# Patient Record
Sex: FEMALE | Race: WHITE | NOT HISPANIC OR LATINO | Employment: OTHER | URBAN - METROPOLITAN AREA
[De-identification: names, ages, dates, MRNs, and addresses within clinical notes are randomized per-mention and may not be internally consistent; named-entity substitution may affect disease eponyms.]

---

## 2019-09-17 ENCOUNTER — TRANSCRIBE ORDERS (OUTPATIENT)
Dept: LAB | Facility: CLINIC | Age: 64
End: 2019-09-17

## 2019-09-17 ENCOUNTER — APPOINTMENT (OUTPATIENT)
Dept: LAB | Facility: CLINIC | Age: 64
End: 2019-09-17
Payer: MEDICARE

## 2019-09-17 DIAGNOSIS — Z00.01 ENCOUNTER FOR GENERAL ADULT MEDICAL EXAMINATION WITH ABNORMAL FINDINGS: ICD-10-CM

## 2019-09-17 DIAGNOSIS — E55.9 AVITAMINOSIS D: ICD-10-CM

## 2019-09-17 DIAGNOSIS — G90.09 SPHENOPALATINE NEURALGIA: ICD-10-CM

## 2019-09-17 DIAGNOSIS — E78.5 HYPERLIPIDEMIA, UNSPECIFIED HYPERLIPIDEMIA TYPE: Primary | ICD-10-CM

## 2019-09-17 DIAGNOSIS — E78.5 HYPERLIPIDEMIA, UNSPECIFIED HYPERLIPIDEMIA TYPE: ICD-10-CM

## 2019-09-17 DIAGNOSIS — E11.65 TYPE 2 DIABETES MELLITUS WITH HYPERGLYCEMIA, UNSPECIFIED WHETHER LONG TERM INSULIN USE (HCC): ICD-10-CM

## 2019-09-17 LAB
25(OH)D3 SERPL-MCNC: 35.5 NG/ML (ref 30–100)
ALBUMIN SERPL BCP-MCNC: 4 G/DL (ref 3.5–5)
ALP SERPL-CCNC: 41 U/L (ref 46–116)
ALT SERPL W P-5'-P-CCNC: 24 U/L (ref 12–78)
ANION GAP SERPL CALCULATED.3IONS-SCNC: 6 MMOL/L (ref 4–13)
AST SERPL W P-5'-P-CCNC: 16 U/L (ref 5–45)
BASOPHILS # BLD AUTO: 0.05 THOUSANDS/ΜL (ref 0–0.1)
BASOPHILS NFR BLD AUTO: 1 % (ref 0–1)
BILIRUB SERPL-MCNC: 0.53 MG/DL (ref 0.2–1)
BUN SERPL-MCNC: 22 MG/DL (ref 5–25)
CALCIUM SERPL-MCNC: 9 MG/DL (ref 8.3–10.1)
CHLORIDE SERPL-SCNC: 101 MMOL/L (ref 100–108)
CHOLEST SERPL-MCNC: 107 MG/DL (ref 50–200)
CO2 SERPL-SCNC: 29 MMOL/L (ref 21–32)
CREAT SERPL-MCNC: 0.97 MG/DL (ref 0.6–1.3)
EOSINOPHIL # BLD AUTO: 0.52 THOUSAND/ΜL (ref 0–0.61)
EOSINOPHIL NFR BLD AUTO: 7 % (ref 0–6)
ERYTHROCYTE [DISTWIDTH] IN BLOOD BY AUTOMATED COUNT: 13.2 % (ref 11.6–15.1)
EST. AVERAGE GLUCOSE BLD GHB EST-MCNC: 114 MG/DL
GFR SERPL CREATININE-BSD FRML MDRD: 61 ML/MIN/1.73SQ M
GLUCOSE P FAST SERPL-MCNC: 94 MG/DL (ref 65–99)
HBA1C MFR BLD: 5.6 % (ref 4.2–6.3)
HCT VFR BLD AUTO: 47.2 % (ref 34.8–46.1)
HDLC SERPL-MCNC: 37 MG/DL (ref 40–60)
HGB BLD-MCNC: 15.5 G/DL (ref 11.5–15.4)
IMM GRANULOCYTES # BLD AUTO: 0.03 THOUSAND/UL (ref 0–0.2)
IMM GRANULOCYTES NFR BLD AUTO: 0 % (ref 0–2)
LDLC SERPL CALC-MCNC: 45 MG/DL (ref 0–100)
LYMPHOCYTES # BLD AUTO: 1.88 THOUSANDS/ΜL (ref 0.6–4.47)
LYMPHOCYTES NFR BLD AUTO: 25 % (ref 14–44)
MCH RBC QN AUTO: 30.9 PG (ref 26.8–34.3)
MCHC RBC AUTO-ENTMCNC: 32.8 G/DL (ref 31.4–37.4)
MCV RBC AUTO: 94 FL (ref 82–98)
MONOCYTES # BLD AUTO: 0.61 THOUSAND/ΜL (ref 0.17–1.22)
MONOCYTES NFR BLD AUTO: 8 % (ref 4–12)
NEUTROPHILS # BLD AUTO: 4.52 THOUSANDS/ΜL (ref 1.85–7.62)
NEUTS SEG NFR BLD AUTO: 59 % (ref 43–75)
NONHDLC SERPL-MCNC: 70 MG/DL
NRBC BLD AUTO-RTO: 0 /100 WBCS
PLATELET # BLD AUTO: 257 THOUSANDS/UL (ref 149–390)
PMV BLD AUTO: 11.3 FL (ref 8.9–12.7)
POTASSIUM SERPL-SCNC: 3.4 MMOL/L (ref 3.5–5.3)
PROT SERPL-MCNC: 7.4 G/DL (ref 6.4–8.2)
RBC # BLD AUTO: 5.01 MILLION/UL (ref 3.81–5.12)
SODIUM SERPL-SCNC: 136 MMOL/L (ref 136–145)
TRIGL SERPL-MCNC: 123 MG/DL
TSH SERPL DL<=0.05 MIU/L-ACNC: 1.74 UIU/ML (ref 0.36–3.74)
VIT B12 SERPL-MCNC: 637 PG/ML (ref 100–900)
WBC # BLD AUTO: 7.61 THOUSAND/UL (ref 4.31–10.16)

## 2019-09-17 PROCEDURE — 83036 HEMOGLOBIN GLYCOSYLATED A1C: CPT

## 2019-09-17 PROCEDURE — 85025 COMPLETE CBC W/AUTO DIFF WBC: CPT

## 2019-09-17 PROCEDURE — 84443 ASSAY THYROID STIM HORMONE: CPT

## 2019-09-17 PROCEDURE — 80061 LIPID PANEL: CPT

## 2019-09-17 PROCEDURE — 82306 VITAMIN D 25 HYDROXY: CPT

## 2019-09-17 PROCEDURE — 80053 COMPREHEN METABOLIC PANEL: CPT

## 2019-09-17 PROCEDURE — 36415 COLL VENOUS BLD VENIPUNCTURE: CPT

## 2019-09-17 PROCEDURE — 82607 VITAMIN B-12: CPT

## 2020-01-13 ENCOUNTER — TRANSCRIBE ORDERS (OUTPATIENT)
Dept: LAB | Facility: CLINIC | Age: 65
End: 2020-01-13

## 2020-01-13 ENCOUNTER — APPOINTMENT (OUTPATIENT)
Dept: LAB | Facility: CLINIC | Age: 65
End: 2020-01-13
Payer: MEDICARE

## 2020-01-13 DIAGNOSIS — E03.9 SEVERE HYPOTHYROIDISM: ICD-10-CM

## 2020-01-13 DIAGNOSIS — I10 HYPERTENSION, UNSPECIFIED TYPE: Primary | ICD-10-CM

## 2020-01-13 DIAGNOSIS — R53.83 FATIGUE, UNSPECIFIED TYPE: ICD-10-CM

## 2020-01-13 DIAGNOSIS — I10 HYPERTENSION, UNSPECIFIED TYPE: ICD-10-CM

## 2020-01-13 LAB
ALBUMIN SERPL BCP-MCNC: 3.8 G/DL (ref 3.5–5)
ALP SERPL-CCNC: 40 U/L (ref 46–116)
ALT SERPL W P-5'-P-CCNC: 21 U/L (ref 12–78)
ANION GAP SERPL CALCULATED.3IONS-SCNC: 6 MMOL/L (ref 4–13)
AST SERPL W P-5'-P-CCNC: 11 U/L (ref 5–45)
BASOPHILS # BLD AUTO: 0.06 THOUSANDS/ΜL (ref 0–0.1)
BASOPHILS NFR BLD AUTO: 1 % (ref 0–1)
BILIRUB SERPL-MCNC: 0.69 MG/DL (ref 0.2–1)
BUN SERPL-MCNC: 25 MG/DL (ref 5–25)
CALCIUM SERPL-MCNC: 9.3 MG/DL (ref 8.3–10.1)
CHLORIDE SERPL-SCNC: 103 MMOL/L (ref 100–108)
CO2 SERPL-SCNC: 28 MMOL/L (ref 21–32)
CREAT SERPL-MCNC: 0.98 MG/DL (ref 0.6–1.3)
EOSINOPHIL # BLD AUTO: 0.4 THOUSAND/ΜL (ref 0–0.61)
EOSINOPHIL NFR BLD AUTO: 5 % (ref 0–6)
ERYTHROCYTE [DISTWIDTH] IN BLOOD BY AUTOMATED COUNT: 13.2 % (ref 11.6–15.1)
GFR SERPL CREATININE-BSD FRML MDRD: 60 ML/MIN/1.73SQ M
GLUCOSE P FAST SERPL-MCNC: 94 MG/DL (ref 65–99)
HCT VFR BLD AUTO: 46 % (ref 34.8–46.1)
HGB BLD-MCNC: 14.9 G/DL (ref 11.5–15.4)
IMM GRANULOCYTES # BLD AUTO: 0.02 THOUSAND/UL (ref 0–0.2)
IMM GRANULOCYTES NFR BLD AUTO: 0 % (ref 0–2)
LYMPHOCYTES # BLD AUTO: 2.36 THOUSANDS/ΜL (ref 0.6–4.47)
LYMPHOCYTES NFR BLD AUTO: 31 % (ref 14–44)
MCH RBC QN AUTO: 31 PG (ref 26.8–34.3)
MCHC RBC AUTO-ENTMCNC: 32.4 G/DL (ref 31.4–37.4)
MCV RBC AUTO: 96 FL (ref 82–98)
MONOCYTES # BLD AUTO: 0.63 THOUSAND/ΜL (ref 0.17–1.22)
MONOCYTES NFR BLD AUTO: 8 % (ref 4–12)
NEUTROPHILS # BLD AUTO: 4.23 THOUSANDS/ΜL (ref 1.85–7.62)
NEUTS SEG NFR BLD AUTO: 55 % (ref 43–75)
NRBC BLD AUTO-RTO: 0 /100 WBCS
PLATELET # BLD AUTO: 229 THOUSANDS/UL (ref 149–390)
PMV BLD AUTO: 11.2 FL (ref 8.9–12.7)
POTASSIUM SERPL-SCNC: 3.7 MMOL/L (ref 3.5–5.3)
PROT SERPL-MCNC: 7.2 G/DL (ref 6.4–8.2)
RBC # BLD AUTO: 4.8 MILLION/UL (ref 3.81–5.12)
SODIUM SERPL-SCNC: 137 MMOL/L (ref 136–145)
TSH SERPL DL<=0.05 MIU/L-ACNC: 1.24 UIU/ML (ref 0.36–3.74)
WBC # BLD AUTO: 7.7 THOUSAND/UL (ref 4.31–10.16)

## 2020-01-13 PROCEDURE — 84443 ASSAY THYROID STIM HORMONE: CPT

## 2020-01-13 PROCEDURE — 85025 COMPLETE CBC W/AUTO DIFF WBC: CPT

## 2020-01-13 PROCEDURE — 36415 COLL VENOUS BLD VENIPUNCTURE: CPT

## 2020-01-13 PROCEDURE — 80053 COMPREHEN METABOLIC PANEL: CPT

## 2020-05-29 ENCOUNTER — HOSPITAL ENCOUNTER (INPATIENT)
Facility: HOSPITAL | Age: 65
LOS: 3 days | DRG: 374 | End: 2020-06-01
Attending: EMERGENCY MEDICINE | Admitting: INTERNAL MEDICINE
Payer: MEDICARE

## 2020-05-29 ENCOUNTER — APPOINTMENT (EMERGENCY)
Dept: ULTRASOUND IMAGING | Facility: HOSPITAL | Age: 65
DRG: 374 | End: 2020-05-29
Payer: MEDICARE

## 2020-05-29 ENCOUNTER — APPOINTMENT (EMERGENCY)
Dept: CT IMAGING | Facility: HOSPITAL | Age: 65
DRG: 374 | End: 2020-05-29
Payer: MEDICARE

## 2020-05-29 ENCOUNTER — TRANSCRIBE ORDERS (OUTPATIENT)
Dept: ADMINISTRATIVE | Facility: HOSPITAL | Age: 65
End: 2020-05-29

## 2020-05-29 DIAGNOSIS — K86.89 PANCREATIC MASS: ICD-10-CM

## 2020-05-29 DIAGNOSIS — K83.1 CHRONIC CHOLESTATIC JAUNDICE SYNDROME: Primary | ICD-10-CM

## 2020-05-29 DIAGNOSIS — E80.6 HYPERBILIRUBINEMIA: ICD-10-CM

## 2020-05-29 DIAGNOSIS — R74.01 TRANSAMINITIS: ICD-10-CM

## 2020-05-29 DIAGNOSIS — R17 PAINLESS JAUNDICE: Primary | ICD-10-CM

## 2020-05-29 DIAGNOSIS — K83.1 COMMON BILE DUCT (CBD) OBSTRUCTION: ICD-10-CM

## 2020-05-29 LAB
ALBUMIN SERPL BCP-MCNC: 3.4 G/DL (ref 3.5–5)
ALP SERPL-CCNC: 390 U/L (ref 46–116)
ALT SERPL W P-5'-P-CCNC: 295 U/L (ref 12–78)
AMMONIA PLAS-SCNC: 31 UMOL/L (ref 11–35)
ANION GAP SERPL CALCULATED.3IONS-SCNC: 15 MMOL/L (ref 4–13)
APTT PPP: 30 SECONDS (ref 23–37)
AST SERPL W P-5'-P-CCNC: 143 U/L (ref 5–45)
BACTERIA UR QL AUTO: ABNORMAL /HPF
BASOPHILS # BLD AUTO: 0.05 THOUSANDS/ΜL (ref 0–0.1)
BASOPHILS NFR BLD AUTO: 1 % (ref 0–1)
BILIRUB DIRECT SERPL-MCNC: 7.94 MG/DL (ref 0–0.2)
BILIRUB SERPL-MCNC: 9.4 MG/DL (ref 0.2–1)
BILIRUB UR QL STRIP: ABNORMAL
BUN SERPL-MCNC: 27 MG/DL (ref 5–25)
CALCIUM SERPL-MCNC: 9.7 MG/DL (ref 8.3–10.1)
CHLORIDE SERPL-SCNC: 97 MMOL/L (ref 100–108)
CLARITY UR: CLEAR
CO2 SERPL-SCNC: 26 MMOL/L (ref 21–32)
COLOR UR: YELLOW
CREAT SERPL-MCNC: 1.08 MG/DL (ref 0.6–1.3)
EOSINOPHIL # BLD AUTO: 0.16 THOUSAND/ΜL (ref 0–0.61)
EOSINOPHIL NFR BLD AUTO: 2 % (ref 0–6)
ERYTHROCYTE [DISTWIDTH] IN BLOOD BY AUTOMATED COUNT: 16.2 % (ref 11.6–15.1)
GFR SERPL CREATININE-BSD FRML MDRD: 53 ML/MIN/1.73SQ M
GLUCOSE SERPL-MCNC: 189 MG/DL (ref 65–140)
GLUCOSE UR STRIP-MCNC: NEGATIVE MG/DL
HCT VFR BLD AUTO: 43.3 % (ref 34.8–46.1)
HGB BLD-MCNC: 15.3 G/DL (ref 11.5–15.4)
HGB UR QL STRIP.AUTO: ABNORMAL
IMM GRANULOCYTES # BLD AUTO: 0.05 THOUSAND/UL (ref 0–0.2)
IMM GRANULOCYTES NFR BLD AUTO: 1 % (ref 0–2)
INR PPP: 0.88 (ref 0.84–1.19)
KETONES UR STRIP-MCNC: NEGATIVE MG/DL
LACTATE SERPL-SCNC: 4.4 MMOL/L (ref 0.5–2)
LEUKOCYTE ESTERASE UR QL STRIP: ABNORMAL
LIPASE SERPL-CCNC: 2744 U/L (ref 73–393)
LYMPHOCYTES # BLD AUTO: 2.01 THOUSANDS/ΜL (ref 0.6–4.47)
LYMPHOCYTES NFR BLD AUTO: 23 % (ref 14–44)
MAGNESIUM SERPL-MCNC: 1.6 MG/DL (ref 1.6–2.6)
MCH RBC QN AUTO: 31.6 PG (ref 26.8–34.3)
MCHC RBC AUTO-ENTMCNC: 35.3 G/DL (ref 31.4–37.4)
MCV RBC AUTO: 90 FL (ref 82–98)
MONOCYTES # BLD AUTO: 0.86 THOUSAND/ΜL (ref 0.17–1.22)
MONOCYTES NFR BLD AUTO: 10 % (ref 4–12)
NEUTROPHILS # BLD AUTO: 5.71 THOUSANDS/ΜL (ref 1.85–7.62)
NEUTS SEG NFR BLD AUTO: 63 % (ref 43–75)
NITRITE UR QL STRIP: NEGATIVE
NON-SQ EPI CELLS URNS QL MICRO: ABNORMAL /HPF
NRBC BLD AUTO-RTO: 0 /100 WBCS
NT-PROBNP SERPL-MCNC: 22 PG/ML
PH UR STRIP.AUTO: 6 [PH]
PLATELET # BLD AUTO: 300 THOUSANDS/UL (ref 149–390)
PMV BLD AUTO: 11.8 FL (ref 8.9–12.7)
POTASSIUM SERPL-SCNC: 3.6 MMOL/L (ref 3.5–5.3)
PROT SERPL-MCNC: 7.8 G/DL (ref 6.4–8.2)
PROT UR STRIP-MCNC: NEGATIVE MG/DL
PROTHROMBIN TIME: 11.9 SECONDS (ref 11.6–14.5)
RBC # BLD AUTO: 4.84 MILLION/UL (ref 3.81–5.12)
RBC #/AREA URNS AUTO: ABNORMAL /HPF
SARS-COV-2 RNA RESP QL NAA+PROBE: NEGATIVE
SODIUM SERPL-SCNC: 138 MMOL/L (ref 136–145)
SP GR UR STRIP.AUTO: 1.02 (ref 1–1.03)
TROPONIN I SERPL-MCNC: <0.02 NG/ML
UROBILINOGEN UR QL STRIP.AUTO: 1 E.U./DL
WBC # BLD AUTO: 8.84 THOUSAND/UL (ref 4.31–10.16)
WBC #/AREA URNS AUTO: ABNORMAL /HPF

## 2020-05-29 PROCEDURE — 80076 HEPATIC FUNCTION PANEL: CPT | Performed by: EMERGENCY MEDICINE

## 2020-05-29 PROCEDURE — 83735 ASSAY OF MAGNESIUM: CPT | Performed by: EMERGENCY MEDICINE

## 2020-05-29 PROCEDURE — 80074 ACUTE HEPATITIS PANEL: CPT | Performed by: EMERGENCY MEDICINE

## 2020-05-29 PROCEDURE — 81001 URINALYSIS AUTO W/SCOPE: CPT | Performed by: EMERGENCY MEDICINE

## 2020-05-29 PROCEDURE — 83690 ASSAY OF LIPASE: CPT | Performed by: EMERGENCY MEDICINE

## 2020-05-29 PROCEDURE — 99285 EMERGENCY DEPT VISIT HI MDM: CPT | Performed by: EMERGENCY MEDICINE

## 2020-05-29 PROCEDURE — 85610 PROTHROMBIN TIME: CPT | Performed by: EMERGENCY MEDICINE

## 2020-05-29 PROCEDURE — 83880 ASSAY OF NATRIURETIC PEPTIDE: CPT | Performed by: EMERGENCY MEDICINE

## 2020-05-29 PROCEDURE — 96361 HYDRATE IV INFUSION ADD-ON: CPT

## 2020-05-29 PROCEDURE — 36415 COLL VENOUS BLD VENIPUNCTURE: CPT | Performed by: EMERGENCY MEDICINE

## 2020-05-29 PROCEDURE — 85730 THROMBOPLASTIN TIME PARTIAL: CPT | Performed by: EMERGENCY MEDICINE

## 2020-05-29 PROCEDURE — 80048 BASIC METABOLIC PNL TOTAL CA: CPT | Performed by: EMERGENCY MEDICINE

## 2020-05-29 PROCEDURE — 96374 THER/PROPH/DIAG INJ IV PUSH: CPT

## 2020-05-29 PROCEDURE — 84484 ASSAY OF TROPONIN QUANT: CPT | Performed by: EMERGENCY MEDICINE

## 2020-05-29 PROCEDURE — 87635 SARS-COV-2 COVID-19 AMP PRB: CPT | Performed by: EMERGENCY MEDICINE

## 2020-05-29 PROCEDURE — 74177 CT ABD & PELVIS W/CONTRAST: CPT

## 2020-05-29 PROCEDURE — 82140 ASSAY OF AMMONIA: CPT | Performed by: EMERGENCY MEDICINE

## 2020-05-29 PROCEDURE — 83605 ASSAY OF LACTIC ACID: CPT | Performed by: EMERGENCY MEDICINE

## 2020-05-29 PROCEDURE — 99285 EMERGENCY DEPT VISIT HI MDM: CPT

## 2020-05-29 PROCEDURE — 76705 ECHO EXAM OF ABDOMEN: CPT

## 2020-05-29 PROCEDURE — 71260 CT THORAX DX C+: CPT

## 2020-05-29 PROCEDURE — 85025 COMPLETE CBC W/AUTO DIFF WBC: CPT | Performed by: EMERGENCY MEDICINE

## 2020-05-29 PROCEDURE — 93005 ELECTROCARDIOGRAM TRACING: CPT

## 2020-05-29 RX ORDER — SIMVASTATIN 10 MG
10 TABLET ORAL DAILY
COMMUNITY
End: 2020-06-11

## 2020-05-29 RX ORDER — GABAPENTIN 300 MG/1
300 CAPSULE ORAL 3 TIMES DAILY
COMMUNITY

## 2020-05-29 RX ORDER — TRIAMTERENE AND HYDROCHLOROTHIAZIDE 37.5; 25 MG/1; MG/1
1 CAPSULE ORAL DAILY
COMMUNITY
End: 2020-06-11

## 2020-05-29 RX ORDER — ALLOPURINOL 100 MG/1
100 TABLET ORAL DAILY
COMMUNITY
Start: 2019-02-13 | End: 2021-01-07 | Stop reason: ALTCHOICE

## 2020-05-29 RX ORDER — LORAZEPAM 2 MG/ML
0.5 INJECTION INTRAMUSCULAR ONCE
Status: COMPLETED | OUTPATIENT
Start: 2020-05-29 | End: 2020-05-29

## 2020-05-29 RX ORDER — HYDROXYZINE HYDROCHLORIDE 25 MG/1
50 TABLET, FILM COATED ORAL ONCE
Status: COMPLETED | OUTPATIENT
Start: 2020-05-29 | End: 2020-05-29

## 2020-05-29 RX ORDER — METFORMIN HYDROCHLORIDE 500 MG/1
500 TABLET, EXTENDED RELEASE ORAL 2 TIMES DAILY WITH MEALS
COMMUNITY
Start: 2019-02-13 | End: 2020-06-11

## 2020-05-29 RX ORDER — MONTELUKAST SODIUM 10 MG/1
10 TABLET ORAL
COMMUNITY
End: 2020-06-09 | Stop reason: HOSPADM

## 2020-05-29 RX ORDER — ASPIRIN 81 MG/1
81 TABLET ORAL DAILY
COMMUNITY
End: 2020-10-09

## 2020-05-29 RX ORDER — LORAZEPAM 2 MG/ML
1 INJECTION INTRAMUSCULAR ONCE
Status: COMPLETED | OUTPATIENT
Start: 2020-05-29 | End: 2020-05-29

## 2020-05-29 RX ADMIN — SODIUM CHLORIDE 500 ML: 0.9 INJECTION, SOLUTION INTRAVENOUS at 21:28

## 2020-05-29 RX ADMIN — IOHEXOL 100 ML: 350 INJECTION, SOLUTION INTRAVENOUS at 22:15

## 2020-05-29 RX ADMIN — SODIUM CHLORIDE 500 ML: 0.9 INJECTION, SOLUTION INTRAVENOUS at 20:36

## 2020-05-29 RX ADMIN — SODIUM CHLORIDE 1000 ML: 0.9 INJECTION, SOLUTION INTRAVENOUS at 23:16

## 2020-05-29 RX ADMIN — LORAZEPAM 0.5 MG: 2 INJECTION INTRAMUSCULAR; INTRAVENOUS at 21:20

## 2020-05-29 RX ADMIN — LORAZEPAM 1 MG: 2 INJECTION INTRAMUSCULAR; INTRAVENOUS at 23:16

## 2020-05-29 RX ADMIN — HYDROXYZINE HYDROCHLORIDE 50 MG: 25 TABLET ORAL at 20:21

## 2020-05-30 PROBLEM — K86.89 PANCREATIC MASS: Status: ACTIVE | Noted: 2020-05-30

## 2020-05-30 PROBLEM — R17 JAUNDICE: Status: ACTIVE | Noted: 2020-05-30

## 2020-05-30 PROBLEM — E78.5 HYPERLIPIDEMIA: Status: ACTIVE | Noted: 2018-02-14

## 2020-05-30 PROBLEM — E87.2 LACTIC ACIDOSIS: Status: ACTIVE | Noted: 2020-05-30

## 2020-05-30 PROBLEM — I10 ESSENTIAL HYPERTENSION: Status: ACTIVE | Noted: 2018-02-14

## 2020-05-30 PROBLEM — E11.9 TYPE 2 DIABETES MELLITUS WITHOUT COMPLICATION, WITHOUT LONG-TERM CURRENT USE OF INSULIN (HCC): Status: ACTIVE | Noted: 2018-02-14

## 2020-05-30 PROBLEM — E87.20 LACTIC ACIDOSIS: Status: ACTIVE | Noted: 2020-05-30

## 2020-05-30 LAB
ALBUMIN SERPL BCP-MCNC: 2.6 G/DL (ref 3.5–5)
ALP SERPL-CCNC: 295 U/L (ref 46–116)
ALT SERPL W P-5'-P-CCNC: 229 U/L (ref 12–78)
ANION GAP SERPL CALCULATED.3IONS-SCNC: 13 MMOL/L (ref 4–13)
AST SERPL W P-5'-P-CCNC: 124 U/L (ref 5–45)
ATRIAL RATE: 90 BPM
BASOPHILS # BLD AUTO: 0.05 THOUSANDS/ΜL (ref 0–0.1)
BASOPHILS NFR BLD AUTO: 1 % (ref 0–1)
BILIRUB SERPL-MCNC: 7.6 MG/DL (ref 0.2–1)
BUN SERPL-MCNC: 21 MG/DL (ref 5–25)
CALCIUM SERPL-MCNC: 8.1 MG/DL (ref 8.3–10.1)
CHLORIDE SERPL-SCNC: 105 MMOL/L (ref 100–108)
CO2 SERPL-SCNC: 23 MMOL/L (ref 21–32)
CREAT SERPL-MCNC: 0.98 MG/DL (ref 0.6–1.3)
EOSINOPHIL # BLD AUTO: 0.17 THOUSAND/ΜL (ref 0–0.61)
EOSINOPHIL NFR BLD AUTO: 2 % (ref 0–6)
ERYTHROCYTE [DISTWIDTH] IN BLOOD BY AUTOMATED COUNT: 16.7 % (ref 11.6–15.1)
GFR SERPL CREATININE-BSD FRML MDRD: 60 ML/MIN/1.73SQ M
GLUCOSE SERPL-MCNC: 100 MG/DL (ref 65–140)
GLUCOSE SERPL-MCNC: 101 MG/DL (ref 65–140)
GLUCOSE SERPL-MCNC: 134 MG/DL (ref 65–140)
GLUCOSE SERPL-MCNC: 240 MG/DL (ref 65–140)
GLUCOSE SERPL-MCNC: 84 MG/DL (ref 65–140)
HAV IGM SER QL: NORMAL
HBV CORE IGM SER QL: NORMAL
HBV SURFACE AG SER QL: NORMAL
HCT VFR BLD AUTO: 37 % (ref 34.8–46.1)
HCV AB SER QL: NORMAL
HGB BLD-MCNC: 12.7 G/DL (ref 11.5–15.4)
IMM GRANULOCYTES # BLD AUTO: 0.04 THOUSAND/UL (ref 0–0.2)
IMM GRANULOCYTES NFR BLD AUTO: 1 % (ref 0–2)
LACTATE SERPL-SCNC: 1.2 MMOL/L (ref 0.5–2)
LACTATE SERPL-SCNC: 2.8 MMOL/L (ref 0.5–2)
LACTATE SERPL-SCNC: 2.8 MMOL/L (ref 0.5–2)
LYMPHOCYTES # BLD AUTO: 2.09 THOUSANDS/ΜL (ref 0.6–4.47)
LYMPHOCYTES NFR BLD AUTO: 26 % (ref 14–44)
MCH RBC QN AUTO: 30.8 PG (ref 26.8–34.3)
MCHC RBC AUTO-ENTMCNC: 34.3 G/DL (ref 31.4–37.4)
MCV RBC AUTO: 90 FL (ref 82–98)
MONOCYTES # BLD AUTO: 0.69 THOUSAND/ΜL (ref 0.17–1.22)
MONOCYTES NFR BLD AUTO: 9 % (ref 4–12)
NEUTROPHILS # BLD AUTO: 4.92 THOUSANDS/ΜL (ref 1.85–7.62)
NEUTS SEG NFR BLD AUTO: 61 % (ref 43–75)
NRBC BLD AUTO-RTO: 0 /100 WBCS
P AXIS: 59 DEGREES
PLATELET # BLD AUTO: 254 THOUSANDS/UL (ref 149–390)
PMV BLD AUTO: 11.9 FL (ref 8.9–12.7)
POTASSIUM SERPL-SCNC: 3.4 MMOL/L (ref 3.5–5.3)
PR INTERVAL: 128 MS
PROT SERPL-MCNC: 5.9 G/DL (ref 6.4–8.2)
QRS AXIS: 43 DEGREES
QRSD INTERVAL: 68 MS
QT INTERVAL: 368 MS
QTC INTERVAL: 450 MS
RBC # BLD AUTO: 4.12 MILLION/UL (ref 3.81–5.12)
SODIUM SERPL-SCNC: 141 MMOL/L (ref 136–145)
T WAVE AXIS: 33 DEGREES
VENTRICULAR RATE: 90 BPM
WBC # BLD AUTO: 7.96 THOUSAND/UL (ref 4.31–10.16)

## 2020-05-30 PROCEDURE — 83605 ASSAY OF LACTIC ACID: CPT | Performed by: NURSE PRACTITIONER

## 2020-05-30 PROCEDURE — 99223 1ST HOSP IP/OBS HIGH 75: CPT | Performed by: PHYSICIAN ASSISTANT

## 2020-05-30 PROCEDURE — 82948 REAGENT STRIP/BLOOD GLUCOSE: CPT

## 2020-05-30 PROCEDURE — 80053 COMPREHEN METABOLIC PANEL: CPT | Performed by: PHYSICIAN ASSISTANT

## 2020-05-30 PROCEDURE — 99223 1ST HOSP IP/OBS HIGH 75: CPT | Performed by: INTERNAL MEDICINE

## 2020-05-30 PROCEDURE — 85025 COMPLETE CBC W/AUTO DIFF WBC: CPT | Performed by: PHYSICIAN ASSISTANT

## 2020-05-30 PROCEDURE — 83605 ASSAY OF LACTIC ACID: CPT | Performed by: PHYSICIAN ASSISTANT

## 2020-05-30 PROCEDURE — 93010 ELECTROCARDIOGRAM REPORT: CPT | Performed by: INTERNAL MEDICINE

## 2020-05-30 RX ORDER — SODIUM CHLORIDE 9 MG/ML
100 INJECTION, SOLUTION INTRAVENOUS CONTINUOUS
Status: DISPENSED | OUTPATIENT
Start: 2020-05-30 | End: 2020-05-31

## 2020-05-30 RX ORDER — DIPHENHYDRAMINE HCL 25 MG
25 CAPSULE ORAL EVERY 6 HOURS PRN
COMMUNITY

## 2020-05-30 RX ORDER — CHOLESTYRAMINE LIGHT 4 G/5.7G
4 POWDER, FOR SUSPENSION ORAL 2 TIMES DAILY
Status: DISCONTINUED | OUTPATIENT
Start: 2020-05-30 | End: 2020-05-31

## 2020-05-30 RX ORDER — SODIUM CHLORIDE 9 MG/ML
100 INJECTION, SOLUTION INTRAVENOUS CONTINUOUS
Status: DISPENSED | OUTPATIENT
Start: 2020-05-30 | End: 2020-05-30

## 2020-05-30 RX ORDER — ASPIRIN 81 MG/1
81 TABLET ORAL DAILY
Status: DISCONTINUED | OUTPATIENT
Start: 2020-05-30 | End: 2020-06-01 | Stop reason: HOSPADM

## 2020-05-30 RX ORDER — ONDANSETRON 2 MG/ML
4 INJECTION INTRAMUSCULAR; INTRAVENOUS EVERY 6 HOURS PRN
Status: DISCONTINUED | OUTPATIENT
Start: 2020-05-30 | End: 2020-06-01 | Stop reason: HOSPADM

## 2020-05-30 RX ORDER — GABAPENTIN 300 MG/1
300 CAPSULE ORAL 3 TIMES DAILY
Status: DISCONTINUED | OUTPATIENT
Start: 2020-05-30 | End: 2020-06-01 | Stop reason: HOSPADM

## 2020-05-30 RX ORDER — DOCUSATE SODIUM 100 MG/1
100 CAPSULE, LIQUID FILLED ORAL 2 TIMES DAILY
Status: DISCONTINUED | OUTPATIENT
Start: 2020-05-30 | End: 2020-06-01 | Stop reason: HOSPADM

## 2020-05-30 RX ORDER — HYDROXYZINE HYDROCHLORIDE 25 MG/1
25 TABLET, FILM COATED ORAL EVERY 6 HOURS PRN
Status: DISCONTINUED | OUTPATIENT
Start: 2020-05-30 | End: 2020-06-01 | Stop reason: HOSPADM

## 2020-05-30 RX ORDER — DIPHENHYDRAMINE HCL 25 MG
25 TABLET ORAL EVERY 6 HOURS PRN
Status: DISCONTINUED | OUTPATIENT
Start: 2020-05-30 | End: 2020-06-01 | Stop reason: HOSPADM

## 2020-05-30 RX ORDER — MONTELUKAST SODIUM 10 MG/1
10 TABLET ORAL
Status: DISCONTINUED | OUTPATIENT
Start: 2020-05-30 | End: 2020-06-01 | Stop reason: HOSPADM

## 2020-05-30 RX ORDER — TRIAMTERENE AND HYDROCHLOROTHIAZIDE 37.5; 25 MG/1; MG/1
1 TABLET ORAL DAILY
Status: DISCONTINUED | OUTPATIENT
Start: 2020-05-30 | End: 2020-06-01 | Stop reason: HOSPADM

## 2020-05-30 RX ORDER — ALLOPURINOL 100 MG/1
100 TABLET ORAL DAILY
Status: DISCONTINUED | OUTPATIENT
Start: 2020-05-30 | End: 2020-06-01 | Stop reason: HOSPADM

## 2020-05-30 RX ADMIN — CHOLESTYRAMINE 4 G: 4 POWDER, FOR SUSPENSION ORAL at 15:27

## 2020-05-30 RX ADMIN — INSULIN LISPRO 2 UNITS: 100 INJECTION, SOLUTION INTRAVENOUS; SUBCUTANEOUS at 17:41

## 2020-05-30 RX ADMIN — HYDROXYZINE HYDROCHLORIDE 25 MG: 25 TABLET, FILM COATED ORAL at 18:27

## 2020-05-30 RX ADMIN — DOCUSATE SODIUM 100 MG: 100 CAPSULE, LIQUID FILLED ORAL at 09:11

## 2020-05-30 RX ADMIN — SODIUM CHLORIDE 100 ML/HR: 0.9 INJECTION, SOLUTION INTRAVENOUS at 02:12

## 2020-05-30 RX ADMIN — ENOXAPARIN SODIUM 40 MG: 40 INJECTION SUBCUTANEOUS at 09:10

## 2020-05-30 RX ADMIN — SODIUM CHLORIDE 100 ML/HR: 0.9 INJECTION, SOLUTION INTRAVENOUS at 18:28

## 2020-05-30 RX ADMIN — SODIUM CHLORIDE 500 ML: 0.9 INJECTION, SOLUTION INTRAVENOUS at 02:12

## 2020-05-30 RX ADMIN — MONTELUKAST 10 MG: 10 TABLET, FILM COATED ORAL at 21:54

## 2020-05-30 RX ADMIN — TRIAMTERENE AND HYDROCHLOROTHIAZIDE 1 TABLET: 37.5; 25 TABLET ORAL at 09:11

## 2020-05-30 RX ADMIN — ASPIRIN 81 MG: 81 TABLET, COATED ORAL at 09:11

## 2020-05-30 RX ADMIN — GABAPENTIN 300 MG: 300 CAPSULE ORAL at 17:36

## 2020-05-30 RX ADMIN — GABAPENTIN 300 MG: 300 CAPSULE ORAL at 21:54

## 2020-05-30 RX ADMIN — ALLOPURINOL 100 MG: 100 TABLET ORAL at 09:11

## 2020-05-30 RX ADMIN — MONTELUKAST 10 MG: 10 TABLET, FILM COATED ORAL at 02:23

## 2020-05-30 RX ADMIN — DOCUSATE SODIUM 100 MG: 100 CAPSULE, LIQUID FILLED ORAL at 17:36

## 2020-05-30 RX ADMIN — DIPHENHYDRAMINE HCL 25 MG: 25 TABLET, COATED ORAL at 12:10

## 2020-05-30 RX ADMIN — GABAPENTIN 300 MG: 300 CAPSULE ORAL at 09:11

## 2020-05-30 RX ADMIN — DIPHENHYDRAMINE HCL 25 MG: 25 TABLET, COATED ORAL at 21:54

## 2020-05-30 RX ADMIN — HYDROXYZINE HYDROCHLORIDE 25 MG: 25 TABLET, FILM COATED ORAL at 23:57

## 2020-05-31 PROBLEM — L29.9 PRURITUS: Status: ACTIVE | Noted: 2020-05-31

## 2020-05-31 PROBLEM — E80.6 HYPERBILIRUBINEMIA: Status: ACTIVE | Noted: 2020-05-31

## 2020-05-31 PROBLEM — R74.01 TRANSAMINITIS: Status: ACTIVE | Noted: 2020-05-31

## 2020-05-31 LAB
ALBUMIN SERPL BCP-MCNC: 2.7 G/DL (ref 3.5–5)
ALP SERPL-CCNC: 330 U/L (ref 46–116)
ALT SERPL W P-5'-P-CCNC: 254 U/L (ref 12–78)
ANION GAP SERPL CALCULATED.3IONS-SCNC: 10 MMOL/L (ref 4–13)
AST SERPL W P-5'-P-CCNC: 131 U/L (ref 5–45)
BASOPHILS # BLD AUTO: 0.05 THOUSANDS/ΜL (ref 0–0.1)
BASOPHILS NFR BLD AUTO: 1 % (ref 0–1)
BILIRUB SERPL-MCNC: 10.3 MG/DL (ref 0.2–1)
BUN SERPL-MCNC: 18 MG/DL (ref 5–25)
CALCIUM SERPL-MCNC: 8.5 MG/DL (ref 8.3–10.1)
CHLORIDE SERPL-SCNC: 107 MMOL/L (ref 100–108)
CO2 SERPL-SCNC: 25 MMOL/L (ref 21–32)
CREAT SERPL-MCNC: 0.79 MG/DL (ref 0.6–1.3)
EOSINOPHIL # BLD AUTO: 0.36 THOUSAND/ΜL (ref 0–0.61)
EOSINOPHIL NFR BLD AUTO: 5 % (ref 0–6)
ERYTHROCYTE [DISTWIDTH] IN BLOOD BY AUTOMATED COUNT: 17.2 % (ref 11.6–15.1)
GFR SERPL CREATININE-BSD FRML MDRD: 78 ML/MIN/1.73SQ M
GLUCOSE SERPL-MCNC: 130 MG/DL (ref 65–140)
GLUCOSE SERPL-MCNC: 138 MG/DL (ref 65–140)
GLUCOSE SERPL-MCNC: 143 MG/DL (ref 65–140)
GLUCOSE SERPL-MCNC: 157 MG/DL (ref 65–140)
GLUCOSE SERPL-MCNC: 212 MG/DL (ref 65–140)
GLUCOSE SERPL-MCNC: 280 MG/DL (ref 65–140)
HCT VFR BLD AUTO: 37.1 % (ref 34.8–46.1)
HGB BLD-MCNC: 12.9 G/DL (ref 11.5–15.4)
IMM GRANULOCYTES # BLD AUTO: 0.04 THOUSAND/UL (ref 0–0.2)
IMM GRANULOCYTES NFR BLD AUTO: 1 % (ref 0–2)
INR PPP: 0.97 (ref 0.84–1.19)
LIPASE SERPL-CCNC: 2304 U/L (ref 73–393)
LYMPHOCYTES # BLD AUTO: 1.66 THOUSANDS/ΜL (ref 0.6–4.47)
LYMPHOCYTES NFR BLD AUTO: 22 % (ref 14–44)
MCH RBC QN AUTO: 31.6 PG (ref 26.8–34.3)
MCHC RBC AUTO-ENTMCNC: 34.8 G/DL (ref 31.4–37.4)
MCV RBC AUTO: 91 FL (ref 82–98)
MONOCYTES # BLD AUTO: 0.66 THOUSAND/ΜL (ref 0.17–1.22)
MONOCYTES NFR BLD AUTO: 9 % (ref 4–12)
NEUTROPHILS # BLD AUTO: 4.77 THOUSANDS/ΜL (ref 1.85–7.62)
NEUTS SEG NFR BLD AUTO: 62 % (ref 43–75)
NRBC BLD AUTO-RTO: 0 /100 WBCS
PLATELET # BLD AUTO: 266 THOUSANDS/UL (ref 149–390)
PMV BLD AUTO: 11.9 FL (ref 8.9–12.7)
POTASSIUM SERPL-SCNC: 3.7 MMOL/L (ref 3.5–5.3)
PROT SERPL-MCNC: 6.2 G/DL (ref 6.4–8.2)
PROTHROMBIN TIME: 12.9 SECONDS (ref 11.6–14.5)
RBC # BLD AUTO: 4.08 MILLION/UL (ref 3.81–5.12)
SODIUM SERPL-SCNC: 142 MMOL/L (ref 136–145)
WBC # BLD AUTO: 7.54 THOUSAND/UL (ref 4.31–10.16)

## 2020-05-31 PROCEDURE — 99232 SBSQ HOSP IP/OBS MODERATE 35: CPT | Performed by: INTERNAL MEDICINE

## 2020-05-31 PROCEDURE — 86301 IMMUNOASSAY TUMOR CA 19-9: CPT | Performed by: INTERNAL MEDICINE

## 2020-05-31 PROCEDURE — 82948 REAGENT STRIP/BLOOD GLUCOSE: CPT

## 2020-05-31 PROCEDURE — 80053 COMPREHEN METABOLIC PANEL: CPT | Performed by: FAMILY MEDICINE

## 2020-05-31 PROCEDURE — 85025 COMPLETE CBC W/AUTO DIFF WBC: CPT | Performed by: FAMILY MEDICINE

## 2020-05-31 PROCEDURE — 99232 SBSQ HOSP IP/OBS MODERATE 35: CPT | Performed by: FAMILY MEDICINE

## 2020-05-31 PROCEDURE — 85610 PROTHROMBIN TIME: CPT | Performed by: INTERNAL MEDICINE

## 2020-05-31 PROCEDURE — 83690 ASSAY OF LIPASE: CPT | Performed by: INTERNAL MEDICINE

## 2020-05-31 RX ORDER — SODIUM CHLORIDE 9 MG/ML
125 INJECTION, SOLUTION INTRAVENOUS CONTINUOUS
Status: DISCONTINUED | OUTPATIENT
Start: 2020-05-31 | End: 2020-06-01

## 2020-05-31 RX ADMIN — DOCUSATE SODIUM 100 MG: 100 CAPSULE, LIQUID FILLED ORAL at 08:53

## 2020-05-31 RX ADMIN — GABAPENTIN 300 MG: 300 CAPSULE ORAL at 08:53

## 2020-05-31 RX ADMIN — INSULIN LISPRO 1 UNITS: 100 INJECTION, SOLUTION INTRAVENOUS; SUBCUTANEOUS at 12:31

## 2020-05-31 RX ADMIN — MONTELUKAST 10 MG: 10 TABLET, FILM COATED ORAL at 22:02

## 2020-05-31 RX ADMIN — CHOLESTYRAMINE 4 G: 4 POWDER, FOR SUSPENSION ORAL at 08:53

## 2020-05-31 RX ADMIN — ENOXAPARIN SODIUM 40 MG: 40 INJECTION SUBCUTANEOUS at 08:53

## 2020-05-31 RX ADMIN — GABAPENTIN 300 MG: 300 CAPSULE ORAL at 20:21

## 2020-05-31 RX ADMIN — DIPHENHYDRAMINE HCL 25 MG: 25 TABLET, COATED ORAL at 20:21

## 2020-05-31 RX ADMIN — DIPHENHYDRAMINE HCL 25 MG: 25 TABLET, COATED ORAL at 10:52

## 2020-05-31 RX ADMIN — DOCUSATE SODIUM 100 MG: 100 CAPSULE, LIQUID FILLED ORAL at 17:04

## 2020-05-31 RX ADMIN — ASPIRIN 81 MG: 81 TABLET, COATED ORAL at 08:53

## 2020-05-31 RX ADMIN — GABAPENTIN 300 MG: 300 CAPSULE ORAL at 17:04

## 2020-05-31 RX ADMIN — SODIUM CHLORIDE 100 ML/HR: 0.9 INJECTION, SOLUTION INTRAVENOUS at 04:17

## 2020-05-31 RX ADMIN — ALLOPURINOL 100 MG: 100 TABLET ORAL at 08:53

## 2020-05-31 RX ADMIN — INSULIN LISPRO 2 UNITS: 100 INJECTION, SOLUTION INTRAVENOUS; SUBCUTANEOUS at 17:05

## 2020-05-31 RX ADMIN — SODIUM CHLORIDE 125 ML/HR: 0.9 INJECTION, SOLUTION INTRAVENOUS at 19:19

## 2020-05-31 RX ADMIN — HYDROXYZINE HYDROCHLORIDE 25 MG: 25 TABLET, FILM COATED ORAL at 10:52

## 2020-05-31 RX ADMIN — TRIAMTERENE AND HYDROCHLOROTHIAZIDE 1 TABLET: 37.5; 25 TABLET ORAL at 08:53

## 2020-05-31 RX ADMIN — SODIUM CHLORIDE 125 ML/HR: 0.9 INJECTION, SOLUTION INTRAVENOUS at 10:53

## 2020-05-31 RX ADMIN — HYDROXYZINE HYDROCHLORIDE 25 MG: 25 TABLET, FILM COATED ORAL at 22:01

## 2020-06-01 ENCOUNTER — APPOINTMENT (INPATIENT)
Dept: MRI IMAGING | Facility: HOSPITAL | Age: 65
DRG: 374 | End: 2020-06-01
Payer: MEDICARE

## 2020-06-01 ENCOUNTER — APPOINTMENT (INPATIENT)
Dept: PERIOP | Facility: HOSPITAL | Age: 65
DRG: 374 | End: 2020-06-01
Payer: MEDICARE

## 2020-06-01 ENCOUNTER — ANESTHESIA (INPATIENT)
Dept: PERIOP | Facility: HOSPITAL | Age: 65
DRG: 374 | End: 2020-06-01
Payer: MEDICARE

## 2020-06-01 ENCOUNTER — ANESTHESIA EVENT (INPATIENT)
Dept: PERIOP | Facility: HOSPITAL | Age: 65
DRG: 374 | End: 2020-06-01
Payer: MEDICARE

## 2020-06-01 ENCOUNTER — HOSPITAL ENCOUNTER (INPATIENT)
Facility: HOSPITAL | Age: 65
LOS: 2 days | Discharge: HOME | DRG: 435 | End: 2020-06-03
Attending: HOSPITALIST | Admitting: HOSPITALIST
Payer: MEDICARE

## 2020-06-01 ENCOUNTER — APPOINTMENT (INPATIENT)
Dept: RADIOLOGY | Facility: HOSPITAL | Age: 65
DRG: 374 | End: 2020-06-01
Payer: MEDICARE

## 2020-06-01 VITALS
DIASTOLIC BLOOD PRESSURE: 78 MMHG | WEIGHT: 119.49 LBS | SYSTOLIC BLOOD PRESSURE: 143 MMHG | OXYGEN SATURATION: 98 % | HEIGHT: 60 IN | RESPIRATION RATE: 18 BRPM | BODY MASS INDEX: 23.46 KG/M2 | TEMPERATURE: 97.9 F | HEART RATE: 80 BPM

## 2020-06-01 DIAGNOSIS — K83.1 OBSTRUCTIVE JAUNDICE: ICD-10-CM

## 2020-06-01 DIAGNOSIS — K86.89 PANCREATIC MASS: Primary | ICD-10-CM

## 2020-06-01 LAB
ALBUMIN SERPL BCP-MCNC: 2.8 G/DL (ref 3.5–5)
ALP SERPL-CCNC: 358 U/L (ref 46–116)
ALT SERPL W P-5'-P-CCNC: 276 U/L (ref 12–78)
ANION GAP SERPL CALCULATED.3IONS-SCNC: 10 MMOL/L (ref 4–13)
AST SERPL W P-5'-P-CCNC: 152 U/L (ref 5–45)
BASOPHILS # BLD AUTO: 0.05 THOUSANDS/ΜL (ref 0–0.1)
BASOPHILS NFR BLD AUTO: 1 % (ref 0–1)
BILIRUB SERPL-MCNC: 12.2 MG/DL (ref 0.2–1)
BUN SERPL-MCNC: 16 MG/DL (ref 5–25)
CALCIUM SERPL-MCNC: 9 MG/DL (ref 8.3–10.1)
CHLORIDE SERPL-SCNC: 105 MMOL/L (ref 100–108)
CO2 SERPL-SCNC: 26 MMOL/L (ref 21–32)
CREAT SERPL-MCNC: 0.81 MG/DL (ref 0.6–1.3)
EOSINOPHIL # BLD AUTO: 0.41 THOUSAND/ΜL (ref 0–0.61)
EOSINOPHIL NFR BLD AUTO: 5 % (ref 0–6)
ERYTHROCYTE [DISTWIDTH] IN BLOOD BY AUTOMATED COUNT: 17.8 % (ref 11.6–15.1)
EST. AVERAGE GLUCOSE BLD GHB EST-MCNC: 114 MG/DL
GFR SERPL CREATININE-BSD FRML MDRD: 75 ML/MIN/1.73SQ M
GLUCOSE SERPL-MCNC: 147 MG/DL (ref 65–140)
GLUCOSE SERPL-MCNC: 149 MG/DL (ref 65–140)
GLUCOSE SERPL-MCNC: 161 MG/DL (ref 65–140)
GLUCOSE SERPL-MCNC: 165 MG/DL (ref 65–140)
GLUCOSE SERPL-MCNC: 212 MG/DL (ref 65–140)
GLUCOSE SERPL-MCNC: 329 MG/DL (ref 65–140)
GLUCOSE SERPL-MCNC: 372 MG/DL (ref 65–140)
HBA1C MFR BLD: 5.6 %
HCT VFR BLD AUTO: 37.6 % (ref 34.8–46.1)
HGB BLD-MCNC: 13 G/DL (ref 11.5–15.4)
IMM GRANULOCYTES # BLD AUTO: 0.06 THOUSAND/UL (ref 0–0.2)
IMM GRANULOCYTES NFR BLD AUTO: 1 % (ref 0–2)
INR PPP: 0.99 (ref 0.84–1.19)
LYMPHOCYTES # BLD AUTO: 1.74 THOUSANDS/ΜL (ref 0.6–4.47)
LYMPHOCYTES NFR BLD AUTO: 21 % (ref 14–44)
MCH RBC QN AUTO: 30.8 PG (ref 26.8–34.3)
MCHC RBC AUTO-ENTMCNC: 34.6 G/DL (ref 31.4–37.4)
MCV RBC AUTO: 89 FL (ref 82–98)
MONOCYTES # BLD AUTO: 0.73 THOUSAND/ΜL (ref 0.17–1.22)
MONOCYTES NFR BLD AUTO: 9 % (ref 4–12)
NEUTROPHILS # BLD AUTO: 5.34 THOUSANDS/ΜL (ref 1.85–7.62)
NEUTS SEG NFR BLD AUTO: 63 % (ref 43–75)
NRBC BLD AUTO-RTO: 0 /100 WBCS
PLATELET # BLD AUTO: 299 THOUSANDS/UL (ref 149–390)
PMV BLD AUTO: 12.1 FL (ref 8.9–12.7)
POTASSIUM SERPL-SCNC: 3.6 MMOL/L (ref 3.5–5.3)
PROT SERPL-MCNC: 6.4 G/DL (ref 6.4–8.2)
PROTHROMBIN TIME: 13.1 SECONDS (ref 11.6–14.5)
RBC # BLD AUTO: 4.22 MILLION/UL (ref 3.81–5.12)
SODIUM SERPL-SCNC: 141 MMOL/L (ref 136–145)
WBC # BLD AUTO: 8.33 THOUSAND/UL (ref 4.31–10.16)

## 2020-06-01 PROCEDURE — 82948 REAGENT STRIP/BLOOD GLUCOSE: CPT

## 2020-06-01 PROCEDURE — 85610 PROTHROMBIN TIME: CPT | Performed by: INTERNAL MEDICINE

## 2020-06-01 PROCEDURE — 74183 MRI ABD W/O CNTR FLWD CNTR: CPT

## 2020-06-01 PROCEDURE — 99223 1ST HOSP IP/OBS HIGH 75: CPT | Performed by: INTERNAL MEDICINE

## 2020-06-01 PROCEDURE — C1769 GUIDE WIRE: HCPCS

## 2020-06-01 PROCEDURE — A9585 GADOBUTROL INJECTION: HCPCS | Performed by: FAMILY MEDICINE

## 2020-06-01 PROCEDURE — 85610 PROTHROMBIN TIME: CPT | Performed by: FAMILY MEDICINE

## 2020-06-01 PROCEDURE — 85025 COMPLETE CBC W/AUTO DIFF WBC: CPT | Performed by: FAMILY MEDICINE

## 2020-06-01 PROCEDURE — 76377 3D RENDER W/INTRP POSTPROCES: CPT

## 2020-06-01 PROCEDURE — 74328 X-RAY BILE DUCT ENDOSCOPY: CPT

## 2020-06-01 PROCEDURE — 99239 HOSP IP/OBS DSCHRG MGMT >30: CPT | Performed by: FAMILY MEDICINE

## 2020-06-01 PROCEDURE — 80053 COMPREHEN METABOLIC PANEL: CPT | Performed by: FAMILY MEDICINE

## 2020-06-01 PROCEDURE — 83036 HEMOGLOBIN GLYCOSYLATED A1C: CPT | Performed by: INTERNAL MEDICINE

## 2020-06-01 PROCEDURE — 43260 ERCP W/SPECIMEN COLLECTION: CPT | Performed by: INTERNAL MEDICINE

## 2020-06-01 PROCEDURE — 0DJ08ZZ INSPECTION OF UPPER INTESTINAL TRACT, VIA NATURAL OR ARTIFICIAL OPENING ENDOSCOPIC: ICD-10-PCS | Performed by: INTERNAL MEDICINE

## 2020-06-01 RX ORDER — METOCLOPRAMIDE HYDROCHLORIDE 5 MG/ML
10 INJECTION INTRAMUSCULAR; INTRAVENOUS ONCE AS NEEDED
Status: DISCONTINUED | OUTPATIENT
Start: 2020-06-01 | End: 2020-06-01 | Stop reason: HOSPADM

## 2020-06-01 RX ORDER — SUCCINYLCHOLINE/SOD CL,ISO/PF 100 MG/5ML
SYRINGE (ML) INTRAVENOUS AS NEEDED
Status: DISCONTINUED | OUTPATIENT
Start: 2020-06-01 | End: 2020-06-01 | Stop reason: SURG

## 2020-06-01 RX ORDER — MONTELUKAST SODIUM 10 MG/1
10 TABLET ORAL
Status: DISCONTINUED | OUTPATIENT
Start: 2020-06-01 | End: 2020-06-03 | Stop reason: HOSPADM

## 2020-06-01 RX ORDER — DOCUSATE SODIUM 100 MG/1
100 CAPSULE, LIQUID FILLED ORAL 2 TIMES DAILY
Status: DISCONTINUED | OUTPATIENT
Start: 2020-06-01 | End: 2020-06-03 | Stop reason: HOSPADM

## 2020-06-01 RX ORDER — SODIUM CHLORIDE, SODIUM LACTATE, POTASSIUM CHLORIDE, CALCIUM CHLORIDE 600; 310; 30; 20 MG/100ML; MG/100ML; MG/100ML; MG/100ML
50 INJECTION, SOLUTION INTRAVENOUS CONTINUOUS
Status: DISCONTINUED | OUTPATIENT
Start: 2020-06-01 | End: 2020-06-01 | Stop reason: HOSPADM

## 2020-06-01 RX ORDER — ALLOPURINOL 100 MG/1
100 TABLET ORAL DAILY
Status: DISCONTINUED | OUTPATIENT
Start: 2020-06-02 | End: 2020-06-03 | Stop reason: HOSPADM

## 2020-06-01 RX ORDER — LIDOCAINE HYDROCHLORIDE 10 MG/ML
INJECTION, SOLUTION EPIDURAL; INFILTRATION; INTRACAUDAL; PERINEURAL AS NEEDED
Status: DISCONTINUED | OUTPATIENT
Start: 2020-06-01 | End: 2020-06-01 | Stop reason: SURG

## 2020-06-01 RX ORDER — ONDANSETRON 2 MG/ML
4 INJECTION INTRAMUSCULAR; INTRAVENOUS EVERY 6 HOURS PRN
Status: DISCONTINUED | OUTPATIENT
Start: 2020-06-01 | End: 2020-06-03 | Stop reason: HOSPADM

## 2020-06-01 RX ORDER — FENTANYL CITRATE 50 UG/ML
INJECTION, SOLUTION INTRAMUSCULAR; INTRAVENOUS AS NEEDED
Status: DISCONTINUED | OUTPATIENT
Start: 2020-06-01 | End: 2020-06-01 | Stop reason: SURG

## 2020-06-01 RX ORDER — HYDROXYZINE HYDROCHLORIDE 10 MG/1
10 TABLET, FILM COATED ORAL EVERY 8 HOURS PRN
Status: DISCONTINUED | OUTPATIENT
Start: 2020-06-01 | End: 2020-06-03 | Stop reason: HOSPADM

## 2020-06-01 RX ORDER — TRIAMTERENE AND HYDROCHLOROTHIAZIDE 37.5; 25 MG/1; MG/1
1 TABLET ORAL DAILY
Status: DISCONTINUED | OUTPATIENT
Start: 2020-06-02 | End: 2020-06-03 | Stop reason: HOSPADM

## 2020-06-01 RX ORDER — MIDAZOLAM HYDROCHLORIDE 2 MG/2ML
INJECTION, SOLUTION INTRAMUSCULAR; INTRAVENOUS AS NEEDED
Status: DISCONTINUED | OUTPATIENT
Start: 2020-06-01 | End: 2020-06-01 | Stop reason: SURG

## 2020-06-01 RX ORDER — DIPHENHYDRAMINE HCL 25 MG
25 TABLET ORAL EVERY 6 HOURS PRN
Status: DISCONTINUED | OUTPATIENT
Start: 2020-06-01 | End: 2020-06-03 | Stop reason: HOSPADM

## 2020-06-01 RX ORDER — MAGNESIUM HYDROXIDE/ALUMINUM HYDROXICE/SIMETHICONE 120; 1200; 1200 MG/30ML; MG/30ML; MG/30ML
30 SUSPENSION ORAL EVERY 6 HOURS PRN
Status: DISCONTINUED | OUTPATIENT
Start: 2020-06-01 | End: 2020-06-03 | Stop reason: HOSPADM

## 2020-06-01 RX ORDER — GABAPENTIN 300 MG/1
300 CAPSULE ORAL 3 TIMES DAILY
Status: DISCONTINUED | OUTPATIENT
Start: 2020-06-01 | End: 2020-06-03 | Stop reason: HOSPADM

## 2020-06-01 RX ORDER — FENTANYL CITRATE/PF 50 MCG/ML
25 SYRINGE (ML) INJECTION
Status: DISCONTINUED | OUTPATIENT
Start: 2020-06-01 | End: 2020-06-01 | Stop reason: HOSPADM

## 2020-06-01 RX ORDER — ONDANSETRON 2 MG/ML
INJECTION INTRAMUSCULAR; INTRAVENOUS AS NEEDED
Status: DISCONTINUED | OUTPATIENT
Start: 2020-06-01 | End: 2020-06-01 | Stop reason: SURG

## 2020-06-01 RX ORDER — LORAZEPAM 2 MG/ML
0.5 INJECTION INTRAMUSCULAR ONCE
Status: DISCONTINUED | OUTPATIENT
Start: 2020-06-01 | End: 2020-06-01 | Stop reason: HOSPADM

## 2020-06-01 RX ORDER — PROMETHAZINE HYDROCHLORIDE 25 MG/ML
12.5 INJECTION, SOLUTION INTRAMUSCULAR; INTRAVENOUS ONCE AS NEEDED
Status: DISCONTINUED | OUTPATIENT
Start: 2020-06-01 | End: 2020-06-01 | Stop reason: HOSPADM

## 2020-06-01 RX ORDER — SODIUM CHLORIDE, SODIUM LACTATE, POTASSIUM CHLORIDE, CALCIUM CHLORIDE 600; 310; 30; 20 MG/100ML; MG/100ML; MG/100ML; MG/100ML
INJECTION, SOLUTION INTRAVENOUS CONTINUOUS PRN
Status: DISCONTINUED | OUTPATIENT
Start: 2020-06-01 | End: 2020-06-01 | Stop reason: SURG

## 2020-06-01 RX ORDER — ONDANSETRON 2 MG/ML
4 INJECTION INTRAMUSCULAR; INTRAVENOUS ONCE AS NEEDED
Status: COMPLETED | OUTPATIENT
Start: 2020-06-01 | End: 2020-06-01

## 2020-06-01 RX ORDER — ACETAMINOPHEN 325 MG/1
650 TABLET ORAL EVERY 6 HOURS PRN
Status: DISCONTINUED | OUTPATIENT
Start: 2020-06-01 | End: 2020-06-03 | Stop reason: HOSPADM

## 2020-06-01 RX ORDER — DEXAMETHASONE SODIUM PHOSPHATE 10 MG/ML
INJECTION, SOLUTION INTRAMUSCULAR; INTRAVENOUS AS NEEDED
Status: DISCONTINUED | OUTPATIENT
Start: 2020-06-01 | End: 2020-06-01 | Stop reason: SURG

## 2020-06-01 RX ORDER — PRAVASTATIN SODIUM 20 MG
20 TABLET ORAL
Status: DISCONTINUED | OUTPATIENT
Start: 2020-06-01 | End: 2020-06-03 | Stop reason: HOSPADM

## 2020-06-01 RX ORDER — PROPOFOL 10 MG/ML
INJECTION, EMULSION INTRAVENOUS AS NEEDED
Status: DISCONTINUED | OUTPATIENT
Start: 2020-06-01 | End: 2020-06-01 | Stop reason: SURG

## 2020-06-01 RX ORDER — ALBUTEROL SULFATE 2.5 MG/3ML
2.5 SOLUTION RESPIRATORY (INHALATION) ONCE AS NEEDED
Status: DISCONTINUED | OUTPATIENT
Start: 2020-06-01 | End: 2020-06-01 | Stop reason: HOSPADM

## 2020-06-01 RX ORDER — HYDROMORPHONE HCL/PF 1 MG/ML
0.4 SYRINGE (ML) INJECTION
Status: DISCONTINUED | OUTPATIENT
Start: 2020-06-01 | End: 2020-06-01 | Stop reason: HOSPADM

## 2020-06-01 RX ADMIN — HYDROXYZINE HYDROCHLORIDE 25 MG: 25 TABLET, FILM COATED ORAL at 05:53

## 2020-06-01 RX ADMIN — DEXAMETHASONE SODIUM PHOSPHATE 4 MG: 10 INJECTION, SOLUTION INTRAMUSCULAR; INTRAVENOUS at 12:26

## 2020-06-01 RX ADMIN — INDOMETHACIN 100 MG: 50 SUPPOSITORY RECTAL at 12:31

## 2020-06-01 RX ADMIN — SODIUM CHLORIDE 125 ML/HR: 0.9 INJECTION, SOLUTION INTRAVENOUS at 03:12

## 2020-06-01 RX ADMIN — PHENYLEPHRINE HYDROCHLORIDE 100 MCG: 10 INJECTION INTRAVENOUS at 12:26

## 2020-06-01 RX ADMIN — PHENYLEPHRINE HYDROCHLORIDE 100 MCG: 10 INJECTION INTRAVENOUS at 12:42

## 2020-06-01 RX ADMIN — ONDANSETRON 4 MG: 2 INJECTION INTRAMUSCULAR; INTRAVENOUS at 13:45

## 2020-06-01 RX ADMIN — DIPHENHYDRAMINE HCL 25 MG: 25 TABLET ORAL at 21:51

## 2020-06-01 RX ADMIN — MONTELUKAST 10 MG: 10 TABLET, FILM COATED ORAL at 21:51

## 2020-06-01 RX ADMIN — GLUCAGON HYDROCHLORIDE 0.5 MG: KIT at 12:32

## 2020-06-01 RX ADMIN — GABAPENTIN 300 MG: 300 CAPSULE ORAL at 09:51

## 2020-06-01 RX ADMIN — LIDOCAINE HYDROCHLORIDE 50 MG: 10 INJECTION, SOLUTION EPIDURAL; INFILTRATION; INTRACAUDAL; PERINEURAL at 12:15

## 2020-06-01 RX ADMIN — PROPOFOL 160 MG: 10 INJECTION, EMULSION INTRAVENOUS at 12:15

## 2020-06-01 RX ADMIN — GADOBUTROL 5 ML: 604.72 INJECTION INTRAVENOUS at 07:52

## 2020-06-01 RX ADMIN — Medication 100 MG: at 12:15

## 2020-06-01 RX ADMIN — HYDROXYZINE HYDROCHLORIDE 10 MG: 10 TABLET, FILM COATED ORAL at 21:04

## 2020-06-01 RX ADMIN — HYDROXYZINE HYDROCHLORIDE 25 MG: 25 TABLET, FILM COATED ORAL at 14:15

## 2020-06-01 RX ADMIN — GLUCAGON HYDROCHLORIDE 0.5 MG: KIT at 12:45

## 2020-06-01 RX ADMIN — DIPHENHYDRAMINE HCL 25 MG: 25 TABLET, COATED ORAL at 03:16

## 2020-06-01 RX ADMIN — ONDANSETRON 4 MG: 2 INJECTION INTRAMUSCULAR; INTRAVENOUS at 12:51

## 2020-06-01 RX ADMIN — GABAPENTIN 300 MG: 300 CAPSULE ORAL at 21:51

## 2020-06-01 RX ADMIN — TRIAMTERENE AND HYDROCHLOROTHIAZIDE 1 TABLET: 37.5; 25 TABLET ORAL at 09:46

## 2020-06-01 RX ADMIN — SODIUM CHLORIDE, SODIUM LACTATE, POTASSIUM CHLORIDE, AND CALCIUM CHLORIDE: .6; .31; .03; .02 INJECTION, SOLUTION INTRAVENOUS at 12:13

## 2020-06-01 RX ADMIN — INSULIN HUMAN 5 UNITS: 100 INJECTION, SOLUTION PARENTERAL at 13:40

## 2020-06-01 RX ADMIN — SODIUM CHLORIDE, SODIUM LACTATE, POTASSIUM CHLORIDE, AND CALCIUM CHLORIDE 50 ML/HR: .6; .31; .03; .02 INJECTION, SOLUTION INTRAVENOUS at 14:16

## 2020-06-01 RX ADMIN — DIPHENHYDRAMINE HCL 25 MG: 25 TABLET, COATED ORAL at 14:15

## 2020-06-01 RX ADMIN — ALLOPURINOL 100 MG: 100 TABLET ORAL at 09:51

## 2020-06-01 RX ADMIN — FENTANYL CITRATE 100 MCG: 50 INJECTION, SOLUTION INTRAMUSCULAR; INTRAVENOUS at 12:15

## 2020-06-01 RX ADMIN — MIDAZOLAM HYDROCHLORIDE 2 MG: 1 INJECTION, SOLUTION INTRAMUSCULAR; INTRAVENOUS at 12:13

## 2020-06-01 RX ADMIN — PRAVASTATIN SODIUM 20 MG: 20 TABLET ORAL at 21:51

## 2020-06-02 ENCOUNTER — APPOINTMENT (INPATIENT)
Dept: RADIOLOGY | Facility: HOSPITAL | Age: 65
DRG: 435 | End: 2020-06-02
Payer: MEDICARE

## 2020-06-02 ENCOUNTER — APPOINTMENT (INPATIENT)
Dept: GASTROENTEROLOGY | Facility: HOSPITAL | Age: 65
DRG: 435 | End: 2020-06-02
Attending: INTERNAL MEDICINE
Payer: MEDICARE

## 2020-06-02 ENCOUNTER — ANESTHESIA (INPATIENT)
Dept: GASTROENTEROLOGY | Facility: HOSPITAL | Age: 65
DRG: 435 | End: 2020-06-02
Payer: MEDICARE

## 2020-06-02 ENCOUNTER — ANESTHESIA EVENT (INPATIENT)
Dept: GASTROENTEROLOGY | Facility: HOSPITAL | Age: 65
DRG: 435 | End: 2020-06-02
Payer: MEDICARE

## 2020-06-02 LAB
ALBUMIN SERPL BCP-MCNC: 2.8 G/DL (ref 3.5–5)
ALP SERPL-CCNC: 349 U/L (ref 46–116)
ALT SERPL W P-5'-P-CCNC: 251 U/L (ref 12–78)
ANION GAP SERPL CALCULATED.3IONS-SCNC: 6 MMOL/L (ref 4–13)
AST SERPL W P-5'-P-CCNC: 91 U/L (ref 5–45)
BASOPHILS # BLD AUTO: 0.03 THOUSANDS/ΜL (ref 0–0.1)
BASOPHILS NFR BLD AUTO: 0 % (ref 0–1)
BILIRUB SERPL-MCNC: 6.38 MG/DL (ref 0.2–1)
BUN SERPL-MCNC: 22 MG/DL (ref 5–25)
CALCIUM SERPL-MCNC: 9.1 MG/DL (ref 8.3–10.1)
CANCER AG19-9 SERPL-ACNC: 51 U/ML (ref 0–35)
CHLORIDE SERPL-SCNC: 105 MMOL/L (ref 100–108)
CO2 SERPL-SCNC: 25 MMOL/L (ref 21–32)
CREAT SERPL-MCNC: 0.97 MG/DL (ref 0.6–1.3)
EOSINOPHIL # BLD AUTO: 0.02 THOUSAND/ΜL (ref 0–0.61)
EOSINOPHIL NFR BLD AUTO: 0 % (ref 0–6)
ERYTHROCYTE [DISTWIDTH] IN BLOOD BY AUTOMATED COUNT: 18.1 % (ref 11.6–15.1)
GFR SERPL CREATININE-BSD FRML MDRD: 61 ML/MIN/1.73SQ M
GLUCOSE SERPL-MCNC: 122 MG/DL (ref 65–140)
GLUCOSE SERPL-MCNC: 145 MG/DL (ref 65–140)
GLUCOSE SERPL-MCNC: 182 MG/DL (ref 65–140)
GLUCOSE SERPL-MCNC: 207 MG/DL (ref 65–140)
GLUCOSE SERPL-MCNC: 340 MG/DL (ref 65–140)
GLUCOSE SERPL-MCNC: 413 MG/DL (ref 65–140)
HCT VFR BLD AUTO: 36.1 % (ref 34.8–46.1)
HGB BLD-MCNC: 12.8 G/DL (ref 11.5–15.4)
IMM GRANULOCYTES # BLD AUTO: 0.09 THOUSAND/UL (ref 0–0.2)
IMM GRANULOCYTES NFR BLD AUTO: 1 % (ref 0–2)
INR PPP: 1.07 (ref 0.84–1.19)
LYMPHOCYTES # BLD AUTO: 1.58 THOUSANDS/ΜL (ref 0.6–4.47)
LYMPHOCYTES NFR BLD AUTO: 12 % (ref 14–44)
MCH RBC QN AUTO: 31.2 PG (ref 26.8–34.3)
MCHC RBC AUTO-ENTMCNC: 35.5 G/DL (ref 31.4–37.4)
MCV RBC AUTO: 88 FL (ref 82–98)
MONOCYTES # BLD AUTO: 0.89 THOUSAND/ΜL (ref 0.17–1.22)
MONOCYTES NFR BLD AUTO: 7 % (ref 4–12)
NEUTROPHILS # BLD AUTO: 11.14 THOUSANDS/ΜL (ref 1.85–7.62)
NEUTS SEG NFR BLD AUTO: 80 % (ref 43–75)
NRBC BLD AUTO-RTO: 0 /100 WBCS
PLATELET # BLD AUTO: 326 THOUSANDS/UL (ref 149–390)
PMV BLD AUTO: 12.5 FL (ref 8.9–12.7)
POTASSIUM SERPL-SCNC: 3.8 MMOL/L (ref 3.5–5.3)
PROT SERPL-MCNC: 6.6 G/DL (ref 6.4–8.2)
PROTHROMBIN TIME: 13.5 SECONDS (ref 11.6–14.5)
RBC # BLD AUTO: 4.1 MILLION/UL (ref 3.81–5.12)
SODIUM SERPL-SCNC: 136 MMOL/L (ref 136–145)
WBC # BLD AUTO: 13.75 THOUSAND/UL (ref 4.31–10.16)

## 2020-06-02 PROCEDURE — 88172 CYTP DX EVAL FNA 1ST EA SITE: CPT | Performed by: PATHOLOGY

## 2020-06-02 PROCEDURE — 99232 SBSQ HOSP IP/OBS MODERATE 35: CPT | Performed by: INTERNAL MEDICINE

## 2020-06-02 PROCEDURE — C1874 STENT, COATED/COV W/DEL SYS: HCPCS

## 2020-06-02 PROCEDURE — 0FBG8ZX EXCISION OF PANCREAS, VIA NATURAL OR ARTIFICIAL OPENING ENDOSCOPIC, DIAGNOSTIC: ICD-10-PCS | Performed by: INTERNAL MEDICINE

## 2020-06-02 PROCEDURE — 0FJD8ZZ INSPECTION OF PANCREATIC DUCT, VIA NATURAL OR ARTIFICIAL OPENING ENDOSCOPIC: ICD-10-PCS | Performed by: INTERNAL MEDICINE

## 2020-06-02 PROCEDURE — NC001 PR NO CHARGE: Performed by: INTERNAL MEDICINE

## 2020-06-02 PROCEDURE — C1769 GUIDE WIRE: HCPCS

## 2020-06-02 PROCEDURE — 85025 COMPLETE CBC W/AUTO DIFF WBC: CPT | Performed by: INTERNAL MEDICINE

## 2020-06-02 PROCEDURE — 0F798DZ DILATION OF COMMON BILE DUCT WITH INTRALUMINAL DEVICE, VIA NATURAL OR ARTIFICIAL OPENING ENDOSCOPIC: ICD-10-PCS | Performed by: INTERNAL MEDICINE

## 2020-06-02 PROCEDURE — 88305 TISSUE EXAM BY PATHOLOGIST: CPT | Performed by: PATHOLOGY

## 2020-06-02 PROCEDURE — 74328 X-RAY BILE DUCT ENDOSCOPY: CPT

## 2020-06-02 PROCEDURE — C2617 STENT, NON-COR, TEM W/O DEL: HCPCS

## 2020-06-02 PROCEDURE — 88173 CYTOPATH EVAL FNA REPORT: CPT | Performed by: PATHOLOGY

## 2020-06-02 PROCEDURE — 80053 COMPREHEN METABOLIC PANEL: CPT | Performed by: INTERNAL MEDICINE

## 2020-06-02 PROCEDURE — 82948 REAGENT STRIP/BLOOD GLUCOSE: CPT

## 2020-06-02 PROCEDURE — 43238 EGD US FINE NEEDLE BX/ASPIR: CPT | Performed by: INTERNAL MEDICINE

## 2020-06-02 PROCEDURE — 43274 ERCP DUCT STENT PLACEMENT: CPT | Performed by: INTERNAL MEDICINE

## 2020-06-02 RX ORDER — SUCCINYLCHOLINE/SOD CL,ISO/PF 100 MG/5ML
SYRINGE (ML) INTRAVENOUS AS NEEDED
Status: DISCONTINUED | OUTPATIENT
Start: 2020-06-02 | End: 2020-06-02 | Stop reason: SURG

## 2020-06-02 RX ORDER — LIDOCAINE HYDROCHLORIDE 10 MG/ML
INJECTION, SOLUTION EPIDURAL; INFILTRATION; INTRACAUDAL; PERINEURAL AS NEEDED
Status: DISCONTINUED | OUTPATIENT
Start: 2020-06-02 | End: 2020-06-02 | Stop reason: SURG

## 2020-06-02 RX ORDER — PROPOFOL 10 MG/ML
INJECTION, EMULSION INTRAVENOUS AS NEEDED
Status: DISCONTINUED | OUTPATIENT
Start: 2020-06-02 | End: 2020-06-02 | Stop reason: SURG

## 2020-06-02 RX ORDER — FENTANYL CITRATE/PF 50 MCG/ML
25 SYRINGE (ML) INJECTION
Status: DISCONTINUED | OUTPATIENT
Start: 2020-06-02 | End: 2020-06-02

## 2020-06-02 RX ORDER — FENTANYL CITRATE 50 UG/ML
INJECTION, SOLUTION INTRAMUSCULAR; INTRAVENOUS AS NEEDED
Status: DISCONTINUED | OUTPATIENT
Start: 2020-06-02 | End: 2020-06-02 | Stop reason: SURG

## 2020-06-02 RX ORDER — DEXAMETHASONE SODIUM PHOSPHATE 10 MG/ML
INJECTION, SOLUTION INTRAMUSCULAR; INTRAVENOUS AS NEEDED
Status: DISCONTINUED | OUTPATIENT
Start: 2020-06-02 | End: 2020-06-02 | Stop reason: SURG

## 2020-06-02 RX ORDER — SODIUM CHLORIDE 9 MG/ML
50 INJECTION, SOLUTION INTRAVENOUS CONTINUOUS
Status: DISCONTINUED | OUTPATIENT
Start: 2020-06-02 | End: 2020-06-03 | Stop reason: HOSPADM

## 2020-06-02 RX ORDER — ONDANSETRON 2 MG/ML
INJECTION INTRAMUSCULAR; INTRAVENOUS AS NEEDED
Status: DISCONTINUED | OUTPATIENT
Start: 2020-06-02 | End: 2020-06-02 | Stop reason: SURG

## 2020-06-02 RX ORDER — ONDANSETRON 2 MG/ML
4 INJECTION INTRAMUSCULAR; INTRAVENOUS EVERY 4 HOURS PRN
Status: DISCONTINUED | OUTPATIENT
Start: 2020-06-02 | End: 2020-06-02

## 2020-06-02 RX ORDER — SODIUM CHLORIDE, SODIUM LACTATE, POTASSIUM CHLORIDE, CALCIUM CHLORIDE 600; 310; 30; 20 MG/100ML; MG/100ML; MG/100ML; MG/100ML
INJECTION, SOLUTION INTRAVENOUS CONTINUOUS PRN
Status: DISCONTINUED | OUTPATIENT
Start: 2020-06-02 | End: 2020-06-02 | Stop reason: SURG

## 2020-06-02 RX ADMIN — FENTANYL CITRATE 50 MCG: 50 INJECTION, SOLUTION INTRAMUSCULAR; INTRAVENOUS at 16:08

## 2020-06-02 RX ADMIN — INSULIN LISPRO 4 UNITS: 100 INJECTION, SOLUTION INTRAVENOUS; SUBCUTANEOUS at 22:31

## 2020-06-02 RX ADMIN — PHENYLEPHRINE HYDROCHLORIDE 100 MCG: 10 INJECTION INTRAVENOUS at 15:49

## 2020-06-02 RX ADMIN — DOCUSATE SODIUM 100 MG: 100 CAPSULE, LIQUID FILLED ORAL at 09:08

## 2020-06-02 RX ADMIN — TRIAMTERENE AND HYDROCHLOROTHIAZIDE 1 TABLET: 37.5; 25 TABLET ORAL at 09:09

## 2020-06-02 RX ADMIN — GABAPENTIN 300 MG: 300 CAPSULE ORAL at 09:08

## 2020-06-02 RX ADMIN — HYDROXYZINE HYDROCHLORIDE 10 MG: 10 TABLET, FILM COATED ORAL at 12:24

## 2020-06-02 RX ADMIN — Medication 100 MG: at 15:38

## 2020-06-02 RX ADMIN — PROPOFOL 200 MG: 10 INJECTION, EMULSION INTRAVENOUS at 15:38

## 2020-06-02 RX ADMIN — FENTANYL CITRATE 50 MCG: 50 INJECTION, SOLUTION INTRAMUSCULAR; INTRAVENOUS at 15:36

## 2020-06-02 RX ADMIN — SITAGLIPTIN 100 MG: 100 TABLET, FILM COATED ORAL at 09:09

## 2020-06-02 RX ADMIN — HYDROXYZINE HYDROCHLORIDE 10 MG: 10 TABLET, FILM COATED ORAL at 05:34

## 2020-06-02 RX ADMIN — GABAPENTIN 300 MG: 300 CAPSULE ORAL at 21:31

## 2020-06-02 RX ADMIN — IOHEXOL 11 ML: 240 INJECTION, SOLUTION INTRATHECAL; INTRAVASCULAR; INTRAVENOUS; ORAL at 17:25

## 2020-06-02 RX ADMIN — DIPHENHYDRAMINE HCL 25 MG: 25 TABLET ORAL at 09:08

## 2020-06-02 RX ADMIN — PHENYLEPHRINE HYDROCHLORIDE 200 MCG: 10 INJECTION INTRAVENOUS at 15:57

## 2020-06-02 RX ADMIN — HYDROXYZINE HYDROCHLORIDE 10 MG: 10 TABLET, FILM COATED ORAL at 22:34

## 2020-06-02 RX ADMIN — LIDOCAINE HYDROCHLORIDE 100 MG: 10 INJECTION, SOLUTION EPIDURAL; INFILTRATION; INTRACAUDAL; PERINEURAL at 15:38

## 2020-06-02 RX ADMIN — DIPHENHYDRAMINE HCL 25 MG: 25 TABLET ORAL at 21:31

## 2020-06-02 RX ADMIN — MONTELUKAST 10 MG: 10 TABLET, FILM COATED ORAL at 21:31

## 2020-06-02 RX ADMIN — SODIUM CHLORIDE, SODIUM LACTATE, POTASSIUM CHLORIDE, AND CALCIUM CHLORIDE: .6; .31; .03; .02 INJECTION, SOLUTION INTRAVENOUS at 15:35

## 2020-06-02 RX ADMIN — PHENYLEPHRINE HYDROCHLORIDE 200 MCG: 10 INJECTION INTRAVENOUS at 15:43

## 2020-06-02 RX ADMIN — PHENYLEPHRINE HYDROCHLORIDE 40 MCG/MIN: 10 INJECTION INTRAVENOUS at 16:04

## 2020-06-02 RX ADMIN — ALLOPURINOL 100 MG: 100 TABLET ORAL at 09:08

## 2020-06-02 RX ADMIN — DEXAMETHASONE SODIUM PHOSPHATE 10 MG: 10 INJECTION, SOLUTION INTRAMUSCULAR; INTRAVENOUS at 15:38

## 2020-06-02 RX ADMIN — ONDANSETRON 4 MG: 2 INJECTION INTRAMUSCULAR; INTRAVENOUS at 15:38

## 2020-06-03 ENCOUNTER — TELEPHONE (OUTPATIENT)
Dept: SURGICAL ONCOLOGY | Facility: CLINIC | Age: 65
End: 2020-06-03

## 2020-06-03 ENCOUNTER — TELEPHONE (OUTPATIENT)
Dept: OTHER | Facility: HOSPITAL | Age: 65
End: 2020-06-03

## 2020-06-03 VITALS
SYSTOLIC BLOOD PRESSURE: 119 MMHG | DIASTOLIC BLOOD PRESSURE: 80 MMHG | OXYGEN SATURATION: 98 % | BODY MASS INDEX: 23.29 KG/M2 | WEIGHT: 118.61 LBS | HEART RATE: 70 BPM | HEIGHT: 60 IN | RESPIRATION RATE: 14 BRPM | TEMPERATURE: 97.5 F

## 2020-06-03 DIAGNOSIS — K86.89 PANCREATIC MASS: Primary | ICD-10-CM

## 2020-06-03 LAB
ALBUMIN SERPL BCP-MCNC: 3 G/DL (ref 3.5–5)
ALP SERPL-CCNC: 383 U/L (ref 46–116)
ALT SERPL W P-5'-P-CCNC: 372 U/L (ref 12–78)
ANION GAP SERPL CALCULATED.3IONS-SCNC: 7 MMOL/L (ref 4–13)
AST SERPL W P-5'-P-CCNC: 162 U/L (ref 5–45)
BASOPHILS # BLD AUTO: 0.03 THOUSANDS/ΜL (ref 0–0.1)
BASOPHILS NFR BLD AUTO: 0 % (ref 0–1)
BILIRUB DIRECT SERPL-MCNC: 3.8 MG/DL (ref 0–0.2)
BILIRUB SERPL-MCNC: 5.07 MG/DL (ref 0.2–1)
BUN SERPL-MCNC: 25 MG/DL (ref 5–25)
CALCIUM SERPL-MCNC: 10 MG/DL (ref 8.3–10.1)
CHLORIDE SERPL-SCNC: 102 MMOL/L (ref 100–108)
CO2 SERPL-SCNC: 26 MMOL/L (ref 21–32)
CREAT SERPL-MCNC: 1.13 MG/DL (ref 0.6–1.3)
EOSINOPHIL # BLD AUTO: 0 THOUSAND/ΜL (ref 0–0.61)
EOSINOPHIL NFR BLD AUTO: 0 % (ref 0–6)
ERYTHROCYTE [DISTWIDTH] IN BLOOD BY AUTOMATED COUNT: 18.8 % (ref 11.6–15.1)
GFR SERPL CREATININE-BSD FRML MDRD: 50 ML/MIN/1.73SQ M
GLUCOSE SERPL-MCNC: 252 MG/DL (ref 65–140)
GLUCOSE SERPL-MCNC: 263 MG/DL (ref 65–140)
GLUCOSE SERPL-MCNC: 276 MG/DL (ref 65–140)
GLUCOSE SERPL-MCNC: 344 MG/DL (ref 65–140)
GLUCOSE SERPL-MCNC: 345 MG/DL (ref 65–140)
HCT VFR BLD AUTO: 39.6 % (ref 34.8–46.1)
HGB BLD-MCNC: 13.4 G/DL (ref 11.5–15.4)
IMM GRANULOCYTES # BLD AUTO: 0.12 THOUSAND/UL (ref 0–0.2)
IMM GRANULOCYTES NFR BLD AUTO: 1 % (ref 0–2)
LYMPHOCYTES # BLD AUTO: 1.07 THOUSANDS/ΜL (ref 0.6–4.47)
LYMPHOCYTES NFR BLD AUTO: 7 % (ref 14–44)
MCH RBC QN AUTO: 31.3 PG (ref 26.8–34.3)
MCHC RBC AUTO-ENTMCNC: 33.8 G/DL (ref 31.4–37.4)
MCV RBC AUTO: 93 FL (ref 82–98)
MONOCYTES # BLD AUTO: 0.77 THOUSAND/ΜL (ref 0.17–1.22)
MONOCYTES NFR BLD AUTO: 5 % (ref 4–12)
NEUTROPHILS # BLD AUTO: 12.93 THOUSANDS/ΜL (ref 1.85–7.62)
NEUTS SEG NFR BLD AUTO: 87 % (ref 43–75)
NRBC BLD AUTO-RTO: 0 /100 WBCS
PLATELET # BLD AUTO: 329 THOUSANDS/UL (ref 149–390)
PMV BLD AUTO: 12.4 FL (ref 8.9–12.7)
POTASSIUM SERPL-SCNC: 4.4 MMOL/L (ref 3.5–5.3)
PROT SERPL-MCNC: 7 G/DL (ref 6.4–8.2)
RBC # BLD AUTO: 4.28 MILLION/UL (ref 3.81–5.12)
SODIUM SERPL-SCNC: 135 MMOL/L (ref 136–145)
WBC # BLD AUTO: 14.92 THOUSAND/UL (ref 4.31–10.16)

## 2020-06-03 PROCEDURE — 99239 HOSP IP/OBS DSCHRG MGMT >30: CPT | Performed by: INTERNAL MEDICINE

## 2020-06-03 PROCEDURE — 82948 REAGENT STRIP/BLOOD GLUCOSE: CPT

## 2020-06-03 PROCEDURE — 80048 BASIC METABOLIC PNL TOTAL CA: CPT | Performed by: INTERNAL MEDICINE

## 2020-06-03 PROCEDURE — NC001 PR NO CHARGE: Performed by: INTERNAL MEDICINE

## 2020-06-03 PROCEDURE — 80076 HEPATIC FUNCTION PANEL: CPT | Performed by: INTERNAL MEDICINE

## 2020-06-03 PROCEDURE — 99232 SBSQ HOSP IP/OBS MODERATE 35: CPT | Performed by: INTERNAL MEDICINE

## 2020-06-03 PROCEDURE — 85025 COMPLETE CBC W/AUTO DIFF WBC: CPT | Performed by: INTERNAL MEDICINE

## 2020-06-03 RX ORDER — HYDROXYZINE HYDROCHLORIDE 10 MG/1
10 TABLET, FILM COATED ORAL EVERY 8 HOURS PRN
Qty: 30 TABLET | Refills: 0 | Status: SHIPPED | OUTPATIENT
Start: 2020-06-03 | End: 2022-03-23

## 2020-06-03 RX ORDER — PRAVASTATIN SODIUM 20 MG
20 TABLET ORAL
Qty: 30 TABLET | Refills: 0 | Status: SHIPPED | OUTPATIENT
Start: 2020-06-03 | End: 2020-06-11

## 2020-06-03 RX ADMIN — SITAGLIPTIN 100 MG: 100 TABLET, FILM COATED ORAL at 08:24

## 2020-06-03 RX ADMIN — GABAPENTIN 300 MG: 300 CAPSULE ORAL at 08:24

## 2020-06-03 RX ADMIN — ALLOPURINOL 100 MG: 100 TABLET ORAL at 08:24

## 2020-06-03 RX ADMIN — INSULIN LISPRO 4 UNITS: 100 INJECTION, SOLUTION INTRAVENOUS; SUBCUTANEOUS at 11:57

## 2020-06-03 RX ADMIN — ACETAMINOPHEN 650 MG: 325 TABLET ORAL at 12:41

## 2020-06-03 RX ADMIN — INSULIN LISPRO 2 UNITS: 100 INJECTION, SOLUTION INTRAVENOUS; SUBCUTANEOUS at 08:26

## 2020-06-03 RX ADMIN — TRIAMTERENE AND HYDROCHLOROTHIAZIDE 1 TABLET: 37.5; 25 TABLET ORAL at 08:24

## 2020-06-03 RX ADMIN — INSULIN LISPRO 3 UNITS: 100 INJECTION, SOLUTION INTRAVENOUS; SUBCUTANEOUS at 03:02

## 2020-06-03 RX ADMIN — DOCUSATE SODIUM 100 MG: 100 CAPSULE, LIQUID FILLED ORAL at 08:24

## 2020-06-03 RX ADMIN — DIPHENHYDRAMINE HCL 25 MG: 25 TABLET ORAL at 08:24

## 2020-06-04 ENCOUNTER — TELEPHONE (OUTPATIENT)
Dept: GASTROENTEROLOGY | Facility: AMBULARY SURGERY CENTER | Age: 65
End: 2020-06-04

## 2020-06-04 ENCOUNTER — TELEPHONE (OUTPATIENT)
Dept: SURGICAL ONCOLOGY | Facility: CLINIC | Age: 65
End: 2020-06-04

## 2020-06-04 ENCOUNTER — APPOINTMENT (OUTPATIENT)
Dept: LAB | Facility: CLINIC | Age: 65
End: 2020-06-04
Payer: MEDICARE

## 2020-06-04 DIAGNOSIS — K86.89 PANCREATIC MASS: ICD-10-CM

## 2020-06-04 DIAGNOSIS — K83.1 OBSTRUCTIVE JAUNDICE: ICD-10-CM

## 2020-06-04 LAB
ALBUMIN SERPL BCP-MCNC: 3.3 G/DL (ref 3.5–5)
ALP SERPL-CCNC: 329 U/L (ref 46–116)
ALT SERPL W P-5'-P-CCNC: 236 U/L (ref 12–78)
AST SERPL W P-5'-P-CCNC: 47 U/L (ref 5–45)
BILIRUB DIRECT SERPL-MCNC: 3.66 MG/DL (ref 0–0.2)
BILIRUB SERPL-MCNC: 5.02 MG/DL (ref 0.2–1)
PROT SERPL-MCNC: 7.9 G/DL (ref 6.4–8.2)

## 2020-06-04 PROCEDURE — 36415 COLL VENOUS BLD VENIPUNCTURE: CPT

## 2020-06-04 PROCEDURE — 80076 HEPATIC FUNCTION PANEL: CPT

## 2020-06-08 PROBLEM — C25.9 PANCREATIC ADENOCARCINOMA (HCC): Status: ACTIVE | Noted: 2020-06-08

## 2020-06-09 ENCOUNTER — CONSULT (OUTPATIENT)
Dept: SURGICAL ONCOLOGY | Facility: CLINIC | Age: 65
End: 2020-06-09
Payer: MEDICARE

## 2020-06-09 ENCOUNTER — PATIENT OUTREACH (OUTPATIENT)
Dept: HEMATOLOGY ONCOLOGY | Facility: CLINIC | Age: 65
End: 2020-06-09

## 2020-06-09 VITALS
HEIGHT: 60 IN | TEMPERATURE: 98.4 F | BODY MASS INDEX: 21.99 KG/M2 | HEART RATE: 112 BPM | RESPIRATION RATE: 16 BRPM | SYSTOLIC BLOOD PRESSURE: 114 MMHG | DIASTOLIC BLOOD PRESSURE: 78 MMHG | WEIGHT: 112 LBS

## 2020-06-09 DIAGNOSIS — C25.9 PANCREATIC ADENOCARCINOMA (HCC): Primary | ICD-10-CM

## 2020-06-09 DIAGNOSIS — K86.89 PANCREATIC MASS: ICD-10-CM

## 2020-06-09 PROCEDURE — 1124F ACP DISCUSS-NO DSCNMKR DOCD: CPT | Performed by: SURGERY

## 2020-06-09 PROCEDURE — 99203 OFFICE O/P NEW LOW 30 MIN: CPT | Performed by: SURGERY

## 2020-06-10 ENCOUNTER — TELEPHONE (OUTPATIENT)
Dept: SURGICAL ONCOLOGY | Facility: CLINIC | Age: 65
End: 2020-06-10

## 2020-06-16 ENCOUNTER — PATIENT OUTREACH (OUTPATIENT)
Dept: HEMATOLOGY ONCOLOGY | Facility: CLINIC | Age: 65
End: 2020-06-16

## 2020-06-16 ENCOUNTER — APPOINTMENT (OUTPATIENT)
Dept: LAB | Facility: CLINIC | Age: 65
End: 2020-06-16
Payer: MEDICARE

## 2020-06-16 DIAGNOSIS — C25.9 PANCREATIC ADENOCARCINOMA (HCC): Primary | ICD-10-CM

## 2020-06-16 DIAGNOSIS — C25.9 PANCREATIC ADENOCARCINOMA (HCC): ICD-10-CM

## 2020-06-16 LAB
ALBUMIN SERPL BCP-MCNC: 3.3 G/DL (ref 3.5–5)
ALP SERPL-CCNC: 103 U/L (ref 46–116)
ALT SERPL W P-5'-P-CCNC: 41 U/L (ref 12–78)
ANION GAP SERPL CALCULATED.3IONS-SCNC: 4 MMOL/L (ref 4–13)
AST SERPL W P-5'-P-CCNC: 15 U/L (ref 5–45)
BASOPHILS # BLD AUTO: 0.07 THOUSANDS/ΜL (ref 0–0.1)
BASOPHILS NFR BLD AUTO: 1 % (ref 0–1)
BILIRUB SERPL-MCNC: 1 MG/DL (ref 0.2–1)
BUN SERPL-MCNC: 14 MG/DL (ref 5–25)
CALCIUM SERPL-MCNC: 9.5 MG/DL (ref 8.3–10.1)
CHLORIDE SERPL-SCNC: 107 MMOL/L (ref 100–108)
CO2 SERPL-SCNC: 29 MMOL/L (ref 21–32)
CREAT SERPL-MCNC: 0.79 MG/DL (ref 0.6–1.3)
EOSINOPHIL # BLD AUTO: 0.3 THOUSAND/ΜL (ref 0–0.61)
EOSINOPHIL NFR BLD AUTO: 3 % (ref 0–6)
ERYTHROCYTE [DISTWIDTH] IN BLOOD BY AUTOMATED COUNT: 15.1 % (ref 11.6–15.1)
GFR SERPL CREATININE-BSD FRML MDRD: 78 ML/MIN/1.73SQ M
GLUCOSE P FAST SERPL-MCNC: 216 MG/DL (ref 65–99)
HCT VFR BLD AUTO: 35.9 % (ref 34.8–46.1)
HGB BLD-MCNC: 11.5 G/DL (ref 11.5–15.4)
IMM GRANULOCYTES # BLD AUTO: 0.11 THOUSAND/UL (ref 0–0.2)
IMM GRANULOCYTES NFR BLD AUTO: 1 % (ref 0–2)
LYMPHOCYTES # BLD AUTO: 2.29 THOUSANDS/ΜL (ref 0.6–4.47)
LYMPHOCYTES NFR BLD AUTO: 19 % (ref 14–44)
MCH RBC QN AUTO: 31.6 PG (ref 26.8–34.3)
MCHC RBC AUTO-ENTMCNC: 32 G/DL (ref 31.4–37.4)
MCV RBC AUTO: 99 FL (ref 82–98)
MONOCYTES # BLD AUTO: 0.81 THOUSAND/ΜL (ref 0.17–1.22)
MONOCYTES NFR BLD AUTO: 7 % (ref 4–12)
NEUTROPHILS # BLD AUTO: 8.43 THOUSANDS/ΜL (ref 1.85–7.62)
NEUTS SEG NFR BLD AUTO: 69 % (ref 43–75)
NRBC BLD AUTO-RTO: 0 /100 WBCS
PLATELET # BLD AUTO: 451 THOUSANDS/UL (ref 149–390)
PMV BLD AUTO: 11.6 FL (ref 8.9–12.7)
POTASSIUM SERPL-SCNC: 3.9 MMOL/L (ref 3.5–5.3)
PROT SERPL-MCNC: 7.2 G/DL (ref 6.4–8.2)
RBC # BLD AUTO: 3.64 MILLION/UL (ref 3.81–5.12)
SODIUM SERPL-SCNC: 140 MMOL/L (ref 136–145)
WBC # BLD AUTO: 12.01 THOUSAND/UL (ref 4.31–10.16)

## 2020-06-16 PROCEDURE — 86301 IMMUNOASSAY TUMOR CA 19-9: CPT

## 2020-06-16 PROCEDURE — 85025 COMPLETE CBC W/AUTO DIFF WBC: CPT

## 2020-06-16 PROCEDURE — 80053 COMPREHEN METABOLIC PANEL: CPT

## 2020-06-16 PROCEDURE — 36415 COLL VENOUS BLD VENIPUNCTURE: CPT

## 2020-06-17 LAB — CANCER AG19-9 SERPL-ACNC: 107 U/ML (ref 0–35)

## 2020-06-18 ENCOUNTER — TELEPHONE (OUTPATIENT)
Dept: HEMATOLOGY ONCOLOGY | Facility: CLINIC | Age: 65
End: 2020-06-18

## 2020-06-18 ENCOUNTER — CONSULT (OUTPATIENT)
Dept: HEMATOLOGY ONCOLOGY | Facility: CLINIC | Age: 65
End: 2020-06-18
Payer: MEDICARE

## 2020-06-18 ENCOUNTER — DOCUMENTATION (OUTPATIENT)
Dept: HEMATOLOGY ONCOLOGY | Facility: CLINIC | Age: 65
End: 2020-06-18

## 2020-06-18 VITALS
BODY MASS INDEX: 23.39 KG/M2 | WEIGHT: 116 LBS | RESPIRATION RATE: 18 BRPM | HEIGHT: 59 IN | OXYGEN SATURATION: 98 % | TEMPERATURE: 98.8 F | HEART RATE: 90 BPM | DIASTOLIC BLOOD PRESSURE: 82 MMHG | SYSTOLIC BLOOD PRESSURE: 152 MMHG

## 2020-06-18 DIAGNOSIS — C25.9 PANCREATIC ADENOCARCINOMA (HCC): ICD-10-CM

## 2020-06-18 DIAGNOSIS — C25.9 PANCREATIC ADENOCARCINOMA (HCC): Primary | ICD-10-CM

## 2020-06-18 PROBLEM — Z95.828 PORT-A-CATH IN PLACE: Status: ACTIVE | Noted: 2020-06-18

## 2020-06-18 PROCEDURE — U0003 INFECTIOUS AGENT DETECTION BY NUCLEIC ACID (DNA OR RNA); SEVERE ACUTE RESPIRATORY SYNDROME CORONAVIRUS 2 (SARS-COV-2) (CORONAVIRUS DISEASE [COVID-19]), AMPLIFIED PROBE TECHNIQUE, MAKING USE OF HIGH THROUGHPUT TECHNOLOGIES AS DESCRIBED BY CMS-2020-01-R: HCPCS | Performed by: OBSTETRICS & GYNECOLOGY

## 2020-06-18 PROCEDURE — 99205 OFFICE O/P NEW HI 60 MIN: CPT | Performed by: INTERNAL MEDICINE

## 2020-06-18 RX ORDER — ATROPINE SULFATE 1 MG/ML
0.25 INJECTION, SOLUTION INTRAMUSCULAR; INTRAVENOUS; SUBCUTANEOUS ONCE AS NEEDED
Status: CANCELLED | OUTPATIENT
Start: 2020-06-30

## 2020-06-18 RX ORDER — DEXTROSE MONOHYDRATE 50 MG/ML
20 INJECTION, SOLUTION INTRAVENOUS ONCE
Status: CANCELLED | OUTPATIENT
Start: 2020-06-30

## 2020-06-18 RX ORDER — ATROPINE SULFATE 1 MG/ML
0.25 INJECTION, SOLUTION INTRAMUSCULAR; INTRAVENOUS; SUBCUTANEOUS ONCE
Status: CANCELLED | OUTPATIENT
Start: 2020-06-30

## 2020-06-18 RX ORDER — FLUOROURACIL 50 MG/ML
400 INJECTION, SOLUTION INTRAVENOUS ONCE
Status: CANCELLED | OUTPATIENT
Start: 2020-06-30

## 2020-06-18 RX ORDER — SODIUM CHLORIDE 9 MG/ML
20 INJECTION, SOLUTION INTRAVENOUS ONCE AS NEEDED
Status: CANCELLED | OUTPATIENT
Start: 2020-06-30

## 2020-06-18 RX ORDER — PROCHLORPERAZINE MALEATE 10 MG
10 TABLET ORAL EVERY 6 HOURS PRN
Qty: 45 TABLET | Refills: 3 | Status: SHIPPED | OUTPATIENT
Start: 2020-06-18 | End: 2021-02-23 | Stop reason: SDUPTHER

## 2020-06-19 DIAGNOSIS — C25.9 PANCREATIC ADENOCARCINOMA (HCC): Primary | ICD-10-CM

## 2020-06-19 LAB — SARS-COV-2 RNA SPEC QL NAA+PROBE: NOT DETECTED

## 2020-06-22 ENCOUNTER — ANESTHESIA EVENT (OUTPATIENT)
Dept: PERIOP | Facility: HOSPITAL | Age: 65
End: 2020-06-22
Payer: MEDICARE

## 2020-06-22 DIAGNOSIS — C25.9 PANCREATIC ADENOCARCINOMA (HCC): Primary | ICD-10-CM

## 2020-06-22 RX ORDER — TRAMADOL HYDROCHLORIDE 50 MG/1
50 TABLET ORAL EVERY 6 HOURS PRN
Qty: 10 TABLET | Refills: 0 | Status: SHIPPED | OUTPATIENT
Start: 2020-06-22 | End: 2020-07-09

## 2020-06-23 ENCOUNTER — ANESTHESIA (OUTPATIENT)
Dept: PERIOP | Facility: HOSPITAL | Age: 65
End: 2020-06-23
Payer: MEDICARE

## 2020-06-23 ENCOUNTER — HOSPITAL ENCOUNTER (OUTPATIENT)
Dept: RADIOLOGY | Facility: HOSPITAL | Age: 65
Setting detail: OUTPATIENT SURGERY
Discharge: HOME | End: 2020-06-23
Payer: MEDICARE

## 2020-06-23 ENCOUNTER — APPOINTMENT (OUTPATIENT)
Dept: RADIOLOGY | Facility: HOSPITAL | Age: 65
End: 2020-06-23
Payer: MEDICARE

## 2020-06-23 ENCOUNTER — HOSPITAL ENCOUNTER (OUTPATIENT)
Facility: HOSPITAL | Age: 65
Setting detail: OUTPATIENT SURGERY
Discharge: HOME | End: 2020-06-23
Attending: SURGERY | Admitting: SURGERY
Payer: MEDICARE

## 2020-06-23 VITALS
RESPIRATION RATE: 18 BRPM | TEMPERATURE: 97.6 F | SYSTOLIC BLOOD PRESSURE: 117 MMHG | HEIGHT: 59 IN | WEIGHT: 116 LBS | OXYGEN SATURATION: 98 % | BODY MASS INDEX: 23.39 KG/M2 | DIASTOLIC BLOOD PRESSURE: 62 MMHG | HEART RATE: 81 BPM

## 2020-06-23 DIAGNOSIS — C25.9 PANCREATIC ADENOCARCINOMA (HCC): ICD-10-CM

## 2020-06-23 LAB
GLUCOSE SERPL-MCNC: 107 MG/DL (ref 65–140)
GLUCOSE SERPL-MCNC: 113 MG/DL (ref 65–140)

## 2020-06-23 PROCEDURE — 77001 FLUOROGUIDE FOR VEIN DEVICE: CPT | Performed by: SURGERY

## 2020-06-23 PROCEDURE — 77001 FLUOROGUIDE FOR VEIN DEVICE: CPT

## 2020-06-23 PROCEDURE — C1788 PORT, INDWELLING, IMP: HCPCS | Performed by: SURGERY

## 2020-06-23 PROCEDURE — 82948 REAGENT STRIP/BLOOD GLUCOSE: CPT

## 2020-06-23 PROCEDURE — 36561 INSERT TUNNELED CV CATH: CPT | Performed by: SURGERY

## 2020-06-23 DEVICE — 8F PLASTIC DIGNITY® MID-SIZED CT PORT W/SILICONE FILLED SUTURE HOLES W/ATTACHABLE CHRONOFLEX® POLYURETHANE CATHETER
Type: IMPLANTABLE DEVICE | Site: CHEST | Status: FUNCTIONAL
Brand: DIGNITY® MID-SIZED CT PORT

## 2020-06-23 RX ORDER — ALBUTEROL SULFATE 2.5 MG/3ML
2.5 SOLUTION RESPIRATORY (INHALATION) ONCE AS NEEDED
Status: DISCONTINUED | OUTPATIENT
Start: 2020-06-23 | End: 2020-06-23 | Stop reason: HOSPADM

## 2020-06-23 RX ORDER — MIDAZOLAM HYDROCHLORIDE 2 MG/2ML
INJECTION, SOLUTION INTRAMUSCULAR; INTRAVENOUS AS NEEDED
Status: DISCONTINUED | OUTPATIENT
Start: 2020-06-23 | End: 2020-06-23 | Stop reason: SURG

## 2020-06-23 RX ORDER — METOCLOPRAMIDE HYDROCHLORIDE 5 MG/ML
10 INJECTION INTRAMUSCULAR; INTRAVENOUS ONCE AS NEEDED
Status: DISCONTINUED | OUTPATIENT
Start: 2020-06-23 | End: 2020-06-23 | Stop reason: HOSPADM

## 2020-06-23 RX ORDER — LABETALOL 20 MG/4 ML (5 MG/ML) INTRAVENOUS SYRINGE
10
Status: DISCONTINUED | OUTPATIENT
Start: 2020-06-23 | End: 2020-06-23 | Stop reason: HOSPADM

## 2020-06-23 RX ORDER — SODIUM CHLORIDE, SODIUM LACTATE, POTASSIUM CHLORIDE, CALCIUM CHLORIDE 600; 310; 30; 20 MG/100ML; MG/100ML; MG/100ML; MG/100ML
50 INJECTION, SOLUTION INTRAVENOUS CONTINUOUS
Status: DISCONTINUED | OUTPATIENT
Start: 2020-06-23 | End: 2020-06-23 | Stop reason: HOSPADM

## 2020-06-23 RX ORDER — PROMETHAZINE HYDROCHLORIDE 25 MG/ML
12.5 INJECTION, SOLUTION INTRAMUSCULAR; INTRAVENOUS ONCE AS NEEDED
Status: DISCONTINUED | OUTPATIENT
Start: 2020-06-23 | End: 2020-06-23 | Stop reason: HOSPADM

## 2020-06-23 RX ORDER — HYDROMORPHONE HCL/PF 1 MG/ML
0.2 SYRINGE (ML) INJECTION
Status: DISCONTINUED | OUTPATIENT
Start: 2020-06-23 | End: 2020-06-23 | Stop reason: HOSPADM

## 2020-06-23 RX ORDER — ACETAMINOPHEN 325 MG/1
650 TABLET ORAL EVERY 6 HOURS PRN
Status: DISCONTINUED | OUTPATIENT
Start: 2020-06-23 | End: 2020-06-23 | Stop reason: HOSPADM

## 2020-06-23 RX ORDER — PROPOFOL 10 MG/ML
INJECTION, EMULSION INTRAVENOUS AS NEEDED
Status: DISCONTINUED | OUTPATIENT
Start: 2020-06-23 | End: 2020-06-23 | Stop reason: SURG

## 2020-06-23 RX ORDER — FENTANYL CITRATE/PF 50 MCG/ML
25 SYRINGE (ML) INJECTION
Status: DISCONTINUED | OUTPATIENT
Start: 2020-06-23 | End: 2020-06-23 | Stop reason: HOSPADM

## 2020-06-23 RX ORDER — DEXAMETHASONE SODIUM PHOSPHATE 10 MG/ML
INJECTION, SOLUTION INTRAMUSCULAR; INTRAVENOUS AS NEEDED
Status: DISCONTINUED | OUTPATIENT
Start: 2020-06-23 | End: 2020-06-23 | Stop reason: SURG

## 2020-06-23 RX ORDER — BUPIVACAINE HYDROCHLORIDE 2.5 MG/ML
INJECTION, SOLUTION EPIDURAL; INFILTRATION; INTRACAUDAL AS NEEDED
Status: DISCONTINUED | OUTPATIENT
Start: 2020-06-23 | End: 2020-06-23 | Stop reason: HOSPADM

## 2020-06-23 RX ORDER — ONDANSETRON 2 MG/ML
4 INJECTION INTRAMUSCULAR; INTRAVENOUS ONCE AS NEEDED
Status: DISCONTINUED | OUTPATIENT
Start: 2020-06-23 | End: 2020-06-23 | Stop reason: HOSPADM

## 2020-06-23 RX ORDER — LIDOCAINE HYDROCHLORIDE 10 MG/ML
0.5 INJECTION, SOLUTION EPIDURAL; INFILTRATION; INTRACAUDAL; PERINEURAL ONCE AS NEEDED
Status: COMPLETED | OUTPATIENT
Start: 2020-06-23 | End: 2020-06-23

## 2020-06-23 RX ORDER — HYDROMORPHONE HCL/PF 1 MG/ML
0.5 SYRINGE (ML) INJECTION
Status: DISCONTINUED | OUTPATIENT
Start: 2020-06-23 | End: 2020-06-23 | Stop reason: HOSPADM

## 2020-06-23 RX ORDER — LIDOCAINE HYDROCHLORIDE 10 MG/ML
INJECTION, SOLUTION EPIDURAL; INFILTRATION; INTRACAUDAL; PERINEURAL AS NEEDED
Status: DISCONTINUED | OUTPATIENT
Start: 2020-06-23 | End: 2020-06-23 | Stop reason: HOSPADM

## 2020-06-23 RX ORDER — CEFAZOLIN SODIUM 1 G/3ML
INJECTION, POWDER, FOR SOLUTION INTRAMUSCULAR; INTRAVENOUS AS NEEDED
Status: DISCONTINUED | OUTPATIENT
Start: 2020-06-23 | End: 2020-06-23 | Stop reason: SURG

## 2020-06-23 RX ORDER — HYDRALAZINE HYDROCHLORIDE 20 MG/ML
5 INJECTION INTRAMUSCULAR; INTRAVENOUS
Status: DISCONTINUED | OUTPATIENT
Start: 2020-06-23 | End: 2020-06-23 | Stop reason: HOSPADM

## 2020-06-23 RX ORDER — FENTANYL CITRATE 50 UG/ML
INJECTION, SOLUTION INTRAMUSCULAR; INTRAVENOUS AS NEEDED
Status: DISCONTINUED | OUTPATIENT
Start: 2020-06-23 | End: 2020-06-23 | Stop reason: SURG

## 2020-06-23 RX ORDER — LIDOCAINE HYDROCHLORIDE 10 MG/ML
INJECTION, SOLUTION EPIDURAL; INFILTRATION; INTRACAUDAL; PERINEURAL AS NEEDED
Status: DISCONTINUED | OUTPATIENT
Start: 2020-06-23 | End: 2020-06-23 | Stop reason: SURG

## 2020-06-23 RX ORDER — OXYCODONE HYDROCHLORIDE AND ACETAMINOPHEN 5; 325 MG/1; MG/1
1 TABLET ORAL EVERY 4 HOURS PRN
Status: DISCONTINUED | OUTPATIENT
Start: 2020-06-23 | End: 2020-06-23 | Stop reason: HOSPADM

## 2020-06-23 RX ORDER — ONDANSETRON 2 MG/ML
INJECTION INTRAMUSCULAR; INTRAVENOUS AS NEEDED
Status: DISCONTINUED | OUTPATIENT
Start: 2020-06-23 | End: 2020-06-23 | Stop reason: SURG

## 2020-06-23 RX ADMIN — DEXAMETHASONE SODIUM PHOSPHATE 5 MG: 10 INJECTION, SOLUTION INTRAMUSCULAR; INTRAVENOUS at 15:03

## 2020-06-23 RX ADMIN — FENTANYL CITRATE 25 MCG: 50 INJECTION, SOLUTION INTRAMUSCULAR; INTRAVENOUS at 14:35

## 2020-06-23 RX ADMIN — SODIUM CHLORIDE, SODIUM LACTATE, POTASSIUM CHLORIDE, AND CALCIUM CHLORIDE 50 ML/HR: .6; .31; .03; .02 INJECTION, SOLUTION INTRAVENOUS at 13:42

## 2020-06-23 RX ADMIN — MIDAZOLAM 1 MG: 1 INJECTION INTRAMUSCULAR; INTRAVENOUS at 14:10

## 2020-06-23 RX ADMIN — CEFAZOLIN 1000 MG: 1 INJECTION, POWDER, FOR SOLUTION INTRAMUSCULAR; INTRAVENOUS at 14:38

## 2020-06-23 RX ADMIN — LIDOCAINE HYDROCHLORIDE 0.2 ML: 10 INJECTION, SOLUTION EPIDURAL; INFILTRATION; INTRACAUDAL; PERINEURAL at 13:42

## 2020-06-23 RX ADMIN — MIDAZOLAM 1 MG: 1 INJECTION INTRAMUSCULAR; INTRAVENOUS at 14:20

## 2020-06-23 RX ADMIN — FENTANYL CITRATE 25 MCG: 50 INJECTION, SOLUTION INTRAMUSCULAR; INTRAVENOUS at 15:18

## 2020-06-23 RX ADMIN — ONDANSETRON 4 MG: 2 INJECTION INTRAMUSCULAR; INTRAVENOUS at 15:02

## 2020-06-23 RX ADMIN — SODIUM CHLORIDE, SODIUM LACTATE, POTASSIUM CHLORIDE, AND CALCIUM CHLORIDE: .6; .31; .03; .02 INJECTION, SOLUTION INTRAVENOUS at 14:09

## 2020-06-23 RX ADMIN — OXYCODONE HYDROCHLORIDE AND ACETAMINOPHEN 1 TABLET: 5; 325 TABLET ORAL at 16:50

## 2020-06-23 RX ADMIN — PHENYLEPHRINE HYDROCHLORIDE 20 MCG/MIN: 10 INJECTION INTRAVENOUS at 14:25

## 2020-06-23 RX ADMIN — LIDOCAINE HYDROCHLORIDE 50 MG: 10 INJECTION, SOLUTION EPIDURAL; INFILTRATION; INTRACAUDAL; PERINEURAL at 14:20

## 2020-06-23 RX ADMIN — PROPOFOL 100 MG: 10 INJECTION, EMULSION INTRAVENOUS at 14:20

## 2020-06-29 ENCOUNTER — TELEPHONE (OUTPATIENT)
Dept: PALLIATIVE MEDICINE | Facility: CLINIC | Age: 65
End: 2020-06-29

## 2020-06-29 ENCOUNTER — APPOINTMENT (OUTPATIENT)
Dept: LAB | Facility: HOSPITAL | Age: 65
End: 2020-06-29
Attending: INTERNAL MEDICINE
Payer: MEDICARE

## 2020-06-29 ENCOUNTER — TELEPHONE (OUTPATIENT)
Dept: HEMATOLOGY ONCOLOGY | Facility: CLINIC | Age: 65
End: 2020-06-29

## 2020-06-29 DIAGNOSIS — C25.9 PANCREATIC ADENOCARCINOMA (HCC): ICD-10-CM

## 2020-06-29 LAB
ALBUMIN SERPL BCP-MCNC: 3.5 G/DL (ref 3.5–5)
ALP SERPL-CCNC: 62 U/L (ref 46–116)
ALT SERPL W P-5'-P-CCNC: 16 U/L (ref 12–78)
ANION GAP SERPL CALCULATED.3IONS-SCNC: 9 MMOL/L (ref 4–13)
AST SERPL W P-5'-P-CCNC: 10 U/L (ref 5–45)
BASOPHILS # BLD AUTO: 0.02 THOUSANDS/ΜL (ref 0–0.1)
BASOPHILS NFR BLD AUTO: 0 % (ref 0–1)
BILIRUB SERPL-MCNC: 0.8 MG/DL (ref 0.2–1)
BUN SERPL-MCNC: 14 MG/DL (ref 5–25)
CALCIUM SERPL-MCNC: 9.2 MG/DL (ref 8.3–10.1)
CHLORIDE SERPL-SCNC: 107 MMOL/L (ref 100–108)
CO2 SERPL-SCNC: 28 MMOL/L (ref 21–32)
CREAT SERPL-MCNC: 0.83 MG/DL (ref 0.6–1.3)
EOSINOPHIL # BLD AUTO: 0.26 THOUSAND/ΜL (ref 0–0.61)
EOSINOPHIL NFR BLD AUTO: 3 % (ref 0–6)
ERYTHROCYTE [DISTWIDTH] IN BLOOD BY AUTOMATED COUNT: 15.3 % (ref 11.6–15.1)
GFR SERPL CREATININE-BSD FRML MDRD: 73 ML/MIN/1.73SQ M
GLUCOSE P FAST SERPL-MCNC: 131 MG/DL (ref 65–99)
HCT VFR BLD AUTO: 38.4 % (ref 34.8–46.1)
HGB BLD-MCNC: 12.5 G/DL (ref 11.5–15.4)
IMM GRANULOCYTES # BLD AUTO: 0.05 THOUSAND/UL (ref 0–0.2)
IMM GRANULOCYTES NFR BLD AUTO: 1 % (ref 0–2)
LYMPHOCYTES # BLD AUTO: 1.21 THOUSANDS/ΜL (ref 0.6–4.47)
LYMPHOCYTES NFR BLD AUTO: 12 % (ref 14–44)
MCH RBC QN AUTO: 32 PG (ref 26.8–34.3)
MCHC RBC AUTO-ENTMCNC: 32.6 G/DL (ref 31.4–37.4)
MCV RBC AUTO: 98 FL (ref 82–98)
MONOCYTES # BLD AUTO: 0.66 THOUSAND/ΜL (ref 0.17–1.22)
MONOCYTES NFR BLD AUTO: 7 % (ref 4–12)
NEUTROPHILS # BLD AUTO: 7.98 THOUSANDS/ΜL (ref 1.85–7.62)
NEUTS SEG NFR BLD AUTO: 77 % (ref 43–75)
NRBC BLD AUTO-RTO: 0 /100 WBCS
PLATELET # BLD AUTO: 217 THOUSANDS/UL (ref 149–390)
PMV BLD AUTO: 11.2 FL (ref 8.9–12.7)
POTASSIUM SERPL-SCNC: 4.4 MMOL/L (ref 3.5–5.3)
PROT SERPL-MCNC: 6.9 G/DL (ref 6.4–8.2)
RBC # BLD AUTO: 3.91 MILLION/UL (ref 3.81–5.12)
SODIUM SERPL-SCNC: 144 MMOL/L (ref 136–145)
WBC # BLD AUTO: 10.18 THOUSAND/UL (ref 4.31–10.16)

## 2020-06-29 PROCEDURE — 85025 COMPLETE CBC W/AUTO DIFF WBC: CPT

## 2020-06-29 PROCEDURE — 36415 COLL VENOUS BLD VENIPUNCTURE: CPT

## 2020-06-29 PROCEDURE — 80053 COMPREHEN METABOLIC PANEL: CPT

## 2020-06-30 ENCOUNTER — HOSPITAL ENCOUNTER (OUTPATIENT)
Dept: INFUSION CENTER | Facility: CLINIC | Age: 65
Discharge: HOME | End: 2020-06-30
Payer: MEDICARE

## 2020-06-30 ENCOUNTER — TELEPHONE (OUTPATIENT)
Dept: HEMATOLOGY ONCOLOGY | Facility: CLINIC | Age: 65
End: 2020-06-30

## 2020-06-30 VITALS
WEIGHT: 113.76 LBS | HEART RATE: 86 BPM | OXYGEN SATURATION: 99 % | TEMPERATURE: 97.2 F | HEIGHT: 59 IN | RESPIRATION RATE: 20 BRPM | SYSTOLIC BLOOD PRESSURE: 128 MMHG | DIASTOLIC BLOOD PRESSURE: 59 MMHG | BODY MASS INDEX: 22.93 KG/M2

## 2020-06-30 DIAGNOSIS — C25.9 PANCREATIC ADENOCARCINOMA (HCC): Primary | ICD-10-CM

## 2020-06-30 PROCEDURE — G0498 CHEMO EXTEND IV INFUS W/PUMP: HCPCS

## 2020-06-30 PROCEDURE — 96413 CHEMO IV INFUSION 1 HR: CPT

## 2020-06-30 PROCEDURE — 96367 TX/PROPH/DG ADDL SEQ IV INF: CPT

## 2020-06-30 PROCEDURE — 96415 CHEMO IV INFUSION ADDL HR: CPT

## 2020-06-30 PROCEDURE — 96375 TX/PRO/DX INJ NEW DRUG ADDON: CPT

## 2020-06-30 PROCEDURE — 96411 CHEMO IV PUSH ADDL DRUG: CPT

## 2020-06-30 PROCEDURE — 96417 CHEMO IV INFUS EACH ADDL SEQ: CPT

## 2020-06-30 RX ORDER — SODIUM CHLORIDE 9 MG/ML
20 INJECTION, SOLUTION INTRAVENOUS ONCE AS NEEDED
Status: DISCONTINUED | OUTPATIENT
Start: 2020-06-30 | End: 2020-07-03 | Stop reason: HOSPADM

## 2020-06-30 RX ORDER — ATROPINE SULFATE 1 MG/ML
0.25 INJECTION, SOLUTION INTRAMUSCULAR; INTRAVENOUS; SUBCUTANEOUS ONCE AS NEEDED
Status: DISCONTINUED | OUTPATIENT
Start: 2020-06-30 | End: 2020-07-03 | Stop reason: HOSPADM

## 2020-06-30 RX ORDER — FLUOROURACIL 50 MG/ML
400 INJECTION, SOLUTION INTRAVENOUS ONCE
Status: COMPLETED | OUTPATIENT
Start: 2020-06-30 | End: 2020-06-30

## 2020-06-30 RX ORDER — DEXTROSE MONOHYDRATE 50 MG/ML
20 INJECTION, SOLUTION INTRAVENOUS ONCE
Status: COMPLETED | OUTPATIENT
Start: 2020-06-30 | End: 2020-06-30

## 2020-06-30 RX ORDER — ATROPINE SULFATE 1 MG/ML
0.25 INJECTION, SOLUTION INTRAMUSCULAR; INTRAVENOUS; SUBCUTANEOUS ONCE
Status: COMPLETED | OUTPATIENT
Start: 2020-06-30 | End: 2020-06-30

## 2020-06-30 RX ADMIN — OXALIPLATIN 124.1 MG: 5 INJECTION, SOLUTION INTRAVENOUS at 10:41

## 2020-06-30 RX ADMIN — DEXAMETHASONE SODIUM PHOSPHATE: 10 INJECTION, SOLUTION INTRAMUSCULAR; INTRAVENOUS at 09:32

## 2020-06-30 RX ADMIN — SODIUM CHLORIDE 150 MG: 0.9 INJECTION, SOLUTION INTRAVENOUS at 09:58

## 2020-06-30 RX ADMIN — SODIUM CHLORIDE 20 ML/HR: 0.9 INJECTION, SOLUTION INTRAVENOUS at 09:15

## 2020-06-30 RX ADMIN — ATROPINE SULFATE 0.25 MG: 1 INJECTION, SOLUTION INTRAMUSCULAR; INTRAVENOUS; SUBCUTANEOUS at 12:48

## 2020-06-30 RX ADMIN — DEXTROSE 20 ML/HR: 5 SOLUTION INTRAVENOUS at 10:33

## 2020-06-30 RX ADMIN — IRINOTECAN HYDROCHLORIDE 263 MG: 20 INJECTION, SOLUTION INTRAVENOUS at 12:51

## 2020-06-30 RX ADMIN — FLUOROURACIL 585 MG: 50 INJECTION, SOLUTION INTRAVENOUS at 15:11

## 2020-06-30 NOTE — PROGRESS NOTES
Pt tolerated remainder of irinotecan and adrucil and fluorouracil CADD pump hook-up without complications, CADD pump education completed, questions encouraged and answered, pt aware of when to return for CADD pump disconnect, avs printed and reviewed

## 2020-06-30 NOTE — PROGRESS NOTES
Pt to clinic for initial FOLFIRINOX treatment, about 50 minutes into irinotecan infusion pt c/o jaw tightness and a "sluggish" tongue, irinotecan stopped, pt denied SOB, VSS, spoke with Astrid Grijalva RN regarding pt's c/o jaw tightness and a "sluggish" tongue, per Suresh Bunch these are likely d/t oxaliplatin and are nerve-related symptoms, Suresh Bunch stated to inform pt to avoid anything cold and utilize warm compresses and beverages, Suresh Bunch also stated it is okay to proceed with remainder of today's treatment and no changes are to be made, pt informed of this information and agreed to proceed with treatment, warm blanket given to pt, irinotecan restarted, pt currently tolerated remainder of irinotecan without complications

## 2020-06-30 NOTE — PLAN OF CARE
Problem: Potential for Falls  Goal: Patient will remain free of falls  Description  INTERVENTIONS:  - Assess patient frequently for physical needs  -  Identify cognitive and physical deficits and behaviors that affect risk of falls  -  [place] fall precautions as indicated by assessment   - Educate patient/family on patient safety including physical limitations  - Instruct patient to call for assistance with activity based on assessment  - Modify environment to reduce risk of injury  - Consider OT/PT consult to assist with strengthening/mobility  Outcome: Progressing     Problem: SAFETY ADULT  Goal: Patient will remain free of falls  Description  INTERVENTIONS:  - Assess patient frequently for physical needs  -  Identify cognitive and physical deficits and behaviors that affect risk of falls  -  [place] fall precautions as indicated by assessment   - Educate patient/family on patient safety including physical limitations  - Instruct patient to call for assistance with activity based on assessment  - Modify environment to reduce risk of injury  - Consider OT/PT consult to assist with strengthening/mobility  Outcome: Progressing     Problem: Knowledge Deficit  Goal: Patient/family/caregiver demonstrates understanding of disease process, treatment plan, medications, and discharge instructions  Description  Complete learning assessment and assess knowledge base    Interventions:  - Provide teaching at level of understanding  - Provide teaching via preferred learning methods  Outcome: Progressing

## 2020-07-01 ENCOUNTER — DOCUMENTATION (OUTPATIENT)
Dept: INFUSION CENTER | Facility: CLINIC | Age: 65
End: 2020-07-01

## 2020-07-01 NOTE — SOCIAL WORK
LSW received Dt and problem list via Email  PT self scored 7/10 and noted concerns with fear, nervousness, worry, bowel issues, fatigue, indigestion and sleep  LSW contacted PT to discuss the DT  PT stated she is tired and sleeps at least 20 hours a day  PT stated she is struggling with anxiety and her primary physician has given her medication to help her sleep at night  Pt stated she has an excellent support system with her  and children  Pt reported her daughter and son in law live in [place] and were up to visit for the weekend  Pt stated her son is struggling with her diagnosis and is tearful during their phone conversations  Pt does not want her children to know her fears as she does not want to worry them  PT stated she attempts to get outside, walk her dog and do crafts to keep her mind busy  She stated the physicians have told her the cancer she has is very treatable and curable  Pt stated she is focusing on getting better and positive thoughts  Pt was very talkative and open with LSW stating she is very pleased with the care she is receiving from [address] [place] [address] and all the individuals she has been in contact with  LSW encouraged PT to contact LSW when needs arises or if she needs support  PT agreed to call LSW, took contact information for further conversations

## 2020-07-02 ENCOUNTER — HOSPITAL ENCOUNTER (OUTPATIENT)
Dept: INFUSION CENTER | Facility: CLINIC | Age: 65
Discharge: HOME | End: 2020-07-02
Payer: MEDICARE

## 2020-07-02 VITALS — TEMPERATURE: 98 F

## 2020-07-02 DIAGNOSIS — C25.9 PANCREATIC ADENOCARCINOMA (HCC): Primary | ICD-10-CM

## 2020-07-02 PROCEDURE — 96372 THER/PROPH/DIAG INJ SC/IM: CPT

## 2020-07-02 RX ADMIN — PEGFILGRASTIM 6 MG: KIT SUBCUTANEOUS at 14:09

## 2020-07-02 NOTE — PROGRESS NOTES
Pt here today for cadd pump d/c and Neulasta Onpro  Pt denies any discomforts  Good blood return noted from port  Res volume=0mL  Neulasta Onpro placed on right abdomen, green light blinking  Pt provided instructional video for Neulasta Onpro    Pt aware to take off Onpro tomorrow 7/3 at 630pm   Pt aware of next appointment, printed AVS

## 2020-07-05 ENCOUNTER — HOSPITAL ENCOUNTER (EMERGENCY)
Facility: HOSPITAL | Age: 65
Discharge: HOME | End: 2020-07-05
Attending: EMERGENCY MEDICINE | Admitting: EMERGENCY MEDICINE
Payer: MEDICARE

## 2020-07-05 ENCOUNTER — TELEPHONE (OUTPATIENT)
Dept: OTHER | Facility: HOSPITAL | Age: 65
End: 2020-07-05

## 2020-07-05 ENCOUNTER — TELEPHONE (OUTPATIENT)
Dept: OTHER | Facility: OTHER | Age: 65
End: 2020-07-05

## 2020-07-05 ENCOUNTER — APPOINTMENT (EMERGENCY)
Dept: CT IMAGING | Facility: HOSPITAL | Age: 65
End: 2020-07-05
Payer: MEDICARE

## 2020-07-05 ENCOUNTER — APPOINTMENT (EMERGENCY)
Dept: RADIOLOGY | Facility: HOSPITAL | Age: 65
End: 2020-07-05
Payer: MEDICARE

## 2020-07-05 VITALS
RESPIRATION RATE: 16 BRPM | SYSTOLIC BLOOD PRESSURE: 156 MMHG | DIASTOLIC BLOOD PRESSURE: 72 MMHG | TEMPERATURE: 98 F | OXYGEN SATURATION: 98 % | HEART RATE: 84 BPM

## 2020-07-05 DIAGNOSIS — R79.89 ELEVATED LACTIC ACID LEVEL: ICD-10-CM

## 2020-07-05 DIAGNOSIS — K52.9 ENTERITIS: ICD-10-CM

## 2020-07-05 DIAGNOSIS — R10.13 ACUTE EPIGASTRIC PAIN: Primary | ICD-10-CM

## 2020-07-05 LAB
ALBUMIN SERPL BCP-MCNC: 3.3 G/DL (ref 3.5–5)
ALP SERPL-CCNC: 88 U/L (ref 46–116)
ALT SERPL W P-5'-P-CCNC: 23 U/L (ref 12–78)
ANION GAP SERPL CALCULATED.3IONS-SCNC: 12 MMOL/L (ref 4–13)
APTT PPP: 24 SECONDS (ref 23–37)
AST SERPL W P-5'-P-CCNC: 12 U/L (ref 5–45)
BACTERIA UR QL AUTO: ABNORMAL /HPF
BASOPHILS # BLD MANUAL: 0 THOUSAND/UL (ref 0–0.1)
BASOPHILS NFR MAR MANUAL: 0 % (ref 0–1)
BILIRUB DIRECT SERPL-MCNC: 0.3 MG/DL (ref 0–0.2)
BILIRUB SERPL-MCNC: 0.5 MG/DL (ref 0.2–1)
BILIRUB UR QL STRIP: NEGATIVE
BUN SERPL-MCNC: 15 MG/DL (ref 5–25)
CALCIUM SERPL-MCNC: 8.9 MG/DL (ref 8.3–10.1)
CHLORIDE SERPL-SCNC: 104 MMOL/L (ref 100–108)
CLARITY UR: CLEAR
CO2 SERPL-SCNC: 25 MMOL/L (ref 21–32)
COLOR UR: YELLOW
CREAT SERPL-MCNC: 1.05 MG/DL (ref 0.6–1.3)
EOSINOPHIL # BLD MANUAL: 0 THOUSAND/UL (ref 0–0.4)
EOSINOPHIL NFR BLD MANUAL: 0 % (ref 0–6)
ERYTHROCYTE [DISTWIDTH] IN BLOOD BY AUTOMATED COUNT: 14.3 % (ref 11.6–15.1)
GFR SERPL CREATININE-BSD FRML MDRD: 55 ML/MIN/1.73SQ M
GLUCOSE SERPL-MCNC: 136 MG/DL (ref 65–140)
GLUCOSE UR STRIP-MCNC: NEGATIVE MG/DL
HCT VFR BLD AUTO: 40 % (ref 34.8–46.1)
HGB BLD-MCNC: 13.3 G/DL (ref 11.5–15.4)
HGB UR QL STRIP.AUTO: ABNORMAL
INR PPP: 1.06 (ref 0.84–1.19)
KETONES UR STRIP-MCNC: NEGATIVE MG/DL
LACTATE SERPL-SCNC: 3.2 MMOL/L (ref 0.5–2)
LACTATE SERPL-SCNC: 4.1 MMOL/L (ref 0.5–2)
LEUKOCYTE ESTERASE UR QL STRIP: NEGATIVE
LIPASE SERPL-CCNC: 510 U/L (ref 73–393)
LYMPHOCYTES # BLD AUTO: 3.33 THOUSAND/UL (ref 0.6–4.47)
LYMPHOCYTES # BLD AUTO: 9 % (ref 14–44)
MAGNESIUM SERPL-MCNC: 1.6 MG/DL (ref 1.6–2.6)
MCH RBC QN AUTO: 32 PG (ref 26.8–34.3)
MCHC RBC AUTO-ENTMCNC: 33.3 G/DL (ref 31.4–37.4)
MCV RBC AUTO: 96 FL (ref 82–98)
MONOCYTES # BLD AUTO: 1.11 THOUSAND/UL (ref 0–1.22)
MONOCYTES NFR BLD: 3 % (ref 4–12)
NEUTROPHILS # BLD MANUAL: 32.56 THOUSAND/UL (ref 1.85–7.62)
NEUTS BAND NFR BLD MANUAL: 7 % (ref 0–8)
NEUTS SEG NFR BLD AUTO: 81 % (ref 43–75)
NITRITE UR QL STRIP: NEGATIVE
NON-SQ EPI CELLS URNS QL MICRO: ABNORMAL /HPF
NRBC BLD AUTO-RTO: 0 /100 WBCS
PH UR STRIP.AUTO: 6 [PH]
PLATELET # BLD AUTO: 191 THOUSANDS/UL (ref 149–390)
PLATELET BLD QL SMEAR: ADEQUATE
PMV BLD AUTO: 11.5 FL (ref 8.9–12.7)
POTASSIUM SERPL-SCNC: 3.7 MMOL/L (ref 3.5–5.3)
PROT SERPL-MCNC: 6.8 G/DL (ref 6.4–8.2)
PROT UR STRIP-MCNC: NEGATIVE MG/DL
PROTHROMBIN TIME: 13.3 SECONDS (ref 11.6–14.5)
RBC # BLD AUTO: 4.16 MILLION/UL (ref 3.81–5.12)
RBC #/AREA URNS AUTO: ABNORMAL /HPF
RBC MORPH BLD: NORMAL
SODIUM SERPL-SCNC: 141 MMOL/L (ref 136–145)
SP GR UR STRIP.AUTO: 1.02 (ref 1–1.03)
TOTAL CELLS COUNTED SPEC: 100
TROPONIN I SERPL-MCNC: <0.02 NG/ML
UROBILINOGEN UR QL STRIP.AUTO: 0.2 E.U./DL
WBC # BLD AUTO: 37 THOUSAND/UL (ref 4.31–10.16)
WBC #/AREA URNS AUTO: ABNORMAL /HPF

## 2020-07-05 PROCEDURE — 80076 HEPATIC FUNCTION PANEL: CPT | Performed by: EMERGENCY MEDICINE

## 2020-07-05 PROCEDURE — 83605 ASSAY OF LACTIC ACID: CPT | Performed by: EMERGENCY MEDICINE

## 2020-07-05 PROCEDURE — 96375 TX/PRO/DX INJ NEW DRUG ADDON: CPT

## 2020-07-05 PROCEDURE — 85610 PROTHROMBIN TIME: CPT | Performed by: EMERGENCY MEDICINE

## 2020-07-05 PROCEDURE — 85007 BL SMEAR W/DIFF WBC COUNT: CPT | Performed by: EMERGENCY MEDICINE

## 2020-07-05 PROCEDURE — 99285 EMERGENCY DEPT VISIT HI MDM: CPT | Performed by: EMERGENCY MEDICINE

## 2020-07-05 PROCEDURE — 74177 CT ABD & PELVIS W/CONTRAST: CPT

## 2020-07-05 PROCEDURE — 96361 HYDRATE IV INFUSION ADD-ON: CPT

## 2020-07-05 PROCEDURE — 84484 ASSAY OF TROPONIN QUANT: CPT | Performed by: EMERGENCY MEDICINE

## 2020-07-05 PROCEDURE — C9113 INJ PANTOPRAZOLE SODIUM, VIA: HCPCS | Performed by: EMERGENCY MEDICINE

## 2020-07-05 PROCEDURE — 83690 ASSAY OF LIPASE: CPT | Performed by: EMERGENCY MEDICINE

## 2020-07-05 PROCEDURE — 93005 ELECTROCARDIOGRAM TRACING: CPT

## 2020-07-05 PROCEDURE — 99285 EMERGENCY DEPT VISIT HI MDM: CPT

## 2020-07-05 PROCEDURE — 85730 THROMBOPLASTIN TIME PARTIAL: CPT | Performed by: EMERGENCY MEDICINE

## 2020-07-05 PROCEDURE — 96374 THER/PROPH/DIAG INJ IV PUSH: CPT

## 2020-07-05 PROCEDURE — 81001 URINALYSIS AUTO W/SCOPE: CPT | Performed by: EMERGENCY MEDICINE

## 2020-07-05 PROCEDURE — 80048 BASIC METABOLIC PNL TOTAL CA: CPT | Performed by: EMERGENCY MEDICINE

## 2020-07-05 PROCEDURE — 36415 COLL VENOUS BLD VENIPUNCTURE: CPT | Performed by: EMERGENCY MEDICINE

## 2020-07-05 PROCEDURE — 85027 COMPLETE CBC AUTOMATED: CPT | Performed by: EMERGENCY MEDICINE

## 2020-07-05 PROCEDURE — 71045 X-RAY EXAM CHEST 1 VIEW: CPT

## 2020-07-05 PROCEDURE — 83735 ASSAY OF MAGNESIUM: CPT | Performed by: EMERGENCY MEDICINE

## 2020-07-05 RX ORDER — LIDOCAINE HYDROCHLORIDE 20 MG/ML
10 SOLUTION OROPHARYNGEAL ONCE
Status: COMPLETED | OUTPATIENT
Start: 2020-07-05 | End: 2020-07-05

## 2020-07-05 RX ORDER — MAGNESIUM HYDROXIDE/ALUMINUM HYDROXICE/SIMETHICONE 120; 1200; 1200 MG/30ML; MG/30ML; MG/30ML
30 SUSPENSION ORAL ONCE
Status: COMPLETED | OUTPATIENT
Start: 2020-07-05 | End: 2020-07-05

## 2020-07-05 RX ORDER — DICYCLOMINE HCL 20 MG
20 TABLET ORAL EVERY 8 HOURS PRN
Qty: 12 TABLET | Refills: 0 | Status: SHIPPED | OUTPATIENT
Start: 2020-07-05 | End: 2020-07-09 | Stop reason: SDUPTHER

## 2020-07-05 RX ORDER — DICYCLOMINE HCL 20 MG
20 TABLET ORAL ONCE
Status: COMPLETED | OUTPATIENT
Start: 2020-07-05 | End: 2020-07-05

## 2020-07-05 RX ORDER — PANTOPRAZOLE SODIUM 40 MG/1
40 INJECTION, POWDER, FOR SOLUTION INTRAVENOUS ONCE
Status: COMPLETED | OUTPATIENT
Start: 2020-07-05 | End: 2020-07-05

## 2020-07-05 RX ORDER — MORPHINE SULFATE 4 MG/ML
4 INJECTION, SOLUTION INTRAMUSCULAR; INTRAVENOUS ONCE
Status: COMPLETED | OUTPATIENT
Start: 2020-07-05 | End: 2020-07-05

## 2020-07-05 RX ORDER — SUCRALFATE ORAL 1 G/10ML
1 SUSPENSION ORAL
Qty: 420 ML | Refills: 0 | Status: ON HOLD | OUTPATIENT
Start: 2020-07-05 | End: 2020-10-20 | Stop reason: ALTCHOICE

## 2020-07-05 RX ORDER — OMEPRAZOLE 20 MG/1
20 CAPSULE, DELAYED RELEASE ORAL DAILY
Qty: 30 CAPSULE | Refills: 0 | Status: SHIPPED | OUTPATIENT
Start: 2020-07-05 | End: 2020-07-09 | Stop reason: SDUPTHER

## 2020-07-05 RX ADMIN — PANTOPRAZOLE SODIUM 40 MG: 40 INJECTION, POWDER, FOR SOLUTION INTRAVENOUS at 14:07

## 2020-07-05 RX ADMIN — LIDOCAINE HYDROCHLORIDE 10 ML: 20 SOLUTION ORAL; TOPICAL at 14:05

## 2020-07-05 RX ADMIN — SODIUM CHLORIDE 1000 ML: 0.9 INJECTION, SOLUTION INTRAVENOUS at 15:10

## 2020-07-05 RX ADMIN — MORPHINE SULFATE 4 MG: 4 INJECTION INTRAVENOUS at 16:27

## 2020-07-05 RX ADMIN — DICYCLOMINE HYDROCHLORIDE 20 MG: 20 TABLET ORAL at 16:27

## 2020-07-05 RX ADMIN — ALUMINUM HYDROXIDE, MAGNESIUM HYDROXIDE, AND SIMETHICONE 30 ML: 200; 200; 20 SUSPENSION ORAL at 14:05

## 2020-07-05 RX ADMIN — SODIUM CHLORIDE 1000 ML: 0.9 INJECTION, SOLUTION INTRAVENOUS at 14:11

## 2020-07-05 RX ADMIN — IOHEXOL 100 ML: 350 INJECTION, SOLUTION INTRAVENOUS at 14:59

## 2020-07-05 NOTE — TELEPHONE ENCOUNTER
She is having abdominal area severe pain that she haven't had experienced before  She had her infusion on Tuesday, the pump came out Thursday then Neulasta on Friday

## 2020-07-05 NOTE — TELEPHONE ENCOUNTER
The patient called with severe abdominal pain  She is status post 1 cycle of FOLFIRINOX followed by Neulasta about 2 days ago  She stated that she had the tramadol this morning which improved her pain little bit but now her severe abdominal pain recurred  I did advise her to go to the emergency room immediately for further evaluation  The patient agreed with the plan

## 2020-07-05 NOTE — DISCHARGE INSTRUCTIONS
Epigastric Pain   WHAT YOU NEED TO KNOW:   Epigastric pain is felt in the middle of the upper abdomen, between the ribs and the bellybutton  The pain may be mild or severe  Pain may spread from or to another part of your body  Epigastric pain may be a sign of a serious health problem that needs to be treated  DISCHARGE INSTRUCTIONS:   Call 911 for any of the following:   · You have any of the following signs of a heart attack:      ¨ Squeezing, pressure, or pain in your chest that lasts longer than 5 minutes or returns    ¨ Discomfort or pain in your back, neck, jaw, stomach, or arm     ¨ Trouble breathing    ¨ Nausea or vomiting    ¨ Lightheadedness or a sudden cold sweat, especially with chest pain or trouble breathing    · You have severe pain that radiates to your jaw or back  Return to the emergency department if:   · You have severe pain that starts suddenly and quickly gets worse  · You cannot have a bowel movement and are vomiting  · You vomit or cough up blood  · You see blood in your urine or bowel movement  · You feel drowsy and your breathing is slower than usual   Contact your healthcare provider if:   · You have a fever or chills  · You have yellowing of your skin or the whites of your eyes  · You vomit often or several times in a row  · You lose weight without trying  · You have symptoms for longer than 2 weeks  · You have questions or concerns about your condition or care  Medicines:   · Medicines  may be given to treat pain or stop vomiting  You may also need medicines to reduce or control stomach acid, or treat an infection  · Take your medicine as directed  Contact your healthcare provider if you think your medicine is not helping or if you have side effects  Tell him of her if you are allergic to any medicine  Keep a list of the medicines, vitamins, and herbs you take  Include the amounts, and when and why you take them   Bring the list or the pill bottles to follow-up visits  Carry your medicine list with you in case of an emergency  Follow up with your healthcare provider as directed:  Write down your questions so you remember to ask them during your visits  Manage your symptoms:   · Keep a record of your symptoms  Include when the pain starts, how long it lasts, and if it is sharp or dull  Also include any foods you ate or activities you did before the pain started  Keep track of anything that helped the pain  · Eat a variety of healthy foods  Healthy foods include fruits, vegetables, whole-grain breads, low-fat dairy products, beans, lean meats, and fish  Ask if you need to be on a special diet  Certain foods may cause your pain, such as alcohol or foods that are high in fat  You may need to eat smaller meals and to eat more often than usual     · Drink liquids as directed  Ask how much liquid to drink each day and which liquids are best for you  Do not have drinks that contain alcohol or caffeine  © 2017 [address] [facility] Information is for End User's use only and may not be sold, redistributed or otherwise used for commercial purposes  All illustrations and images included in CareNotes® are the copyrighted property of [employer] D [employer] M [employer]  or [employer]i [employer]  The above information is an  only  It is not intended as medical advice for individual conditions or treatments  Talk to your doctor, nurse or pharmacist before following any medical regimen to see if it is safe and effective for you  Enteritis   WHAT YOU NEED TO KNOW:   Enteritis is inflammation of the small intestine  It may be caused by eating foods or drinking liquids contaminated with a virus, bacteria, or parasites  It may also be caused by certain medicines, damage from radiation, and medical conditions such as Crohn disease  DISCHARGE INSTRUCTIONS:   Seek care immediately if:   · You cannot stop vomiting      · You have not urinated for 12 hours   Contact your healthcare provider if:   · You have a fever over 101 5  · You have blood or mucus in your bowel movements  · You continue to vomit or have diarrhea for more than 3 days, even after treatment  · You have a dry mouth and eyes, you are urinating less than usual, and you feel dizzy when you stand up  · Your mouth or eyes are dry  You are not urinating as much or as often  · You are losing weight without trying  · You have questions or concerns about your condition or care  Medicines:   · Medicines  may be given to fight an infection caused by bacteria or a parasite  You may also need medicines to slow or stop your diarrhea or vomiting  Do not take these medicines unless your healthcare provider say it is okay  Other medicines may be needed to treat medical conditions that are causing enteritis  · Take your medicine as directed  Contact your healthcare provider if you think your medicine is not helping or if you have side effects  Tell him of her if you are allergic to any medicine  Keep a list of the medicines, vitamins, and herbs you take  Include the amounts, and when and why you take them  Bring the list or the pill bottles to follow-up visits  Carry your medicine list with you in case of an emergency  Manage enteritis:   · Eat foods that help to decrease symptoms  Limit or avoid foods and liquids that are high in sugar, fat, and fiber to help relieve diarrhea  It may be helpful to avoid lactose  Lactose is a type of sugar that is found in milk products  You may be able to tolerate soups, broths, well-cooked vegetables, canned fruit, and baked or broiled meats  Ask your dietitian or healthcare provider if you should follow a special diet  You may need to avoid other foods if you have certain medical conditions such as celiac disease  · Drink liquids as directed  Ask how much liquid to drink each day and which liquids are best for you   It is important to prevent or treat dehydration  Even if you have been vomiting, suck on ice chips or take small sips of clear liquids often  Slowly increase the amount of clear liquids you drink  If you become dehydrated, you may need IV liquids  · Drink an oral rehydration solution (ORS) as directed  An ORS contains water, salts, and sugar that are needed to replace lost body fluids  Ask what kind of ORS to use, how much to drink, and where to get it  Prevent enteritis:  Enteritis that is caused by bacteria, parasites, or viruses can be prevented  The following may help to prevent this type of enteritis:  · Wash your hands often  Use soap and water  Wash your hands after you use the bathroom, change a child's diapers, or sneeze  Wash your hands before you prepare or eat food  · Clean surfaces and do laundry often  Wash your clothes and towels separately from the rest of the laundry  Clean surfaces in your home with antibacterial  or bleach  · Clean food thoroughly and cook safely  Wash raw vegetables before you cook  Cook meat, fish, and eggs fully  Do not use the same dishes for raw meat as you do for other foods  Refrigerate any leftover food immediately  · Be aware when you camp or travel  Drink only clean water  Do not drink from rivers or lakes unless you purify or boil the water first  When you travel, drink bottled water and do not add ice  Do not eat fruit that has not been peeled  Do not eat raw fish or meat that is not fully cooked  Follow up with your healthcare provider as directed:  Write down your questions so you remember to ask them during your visits  © 2017 [address] [employer] Information is for End User's use only and may not be sold, redistributed or otherwise used for commercial purposes  All illustrations and images included in CareNotes® are the copyrighted property of [employer]  or Jordi Chow  The above information is an  only   It is not intended as medical advice for individual conditions or treatments  Talk to your doctor, nurse or pharmacist before following any medical regimen to see if it is safe and effective for you  Lactic Acidosis   WHAT YOU NEED TO KNOW:   What is lactic acidosis and what causes it? Lactic acidosis is the buildup of lactic acid in your blood  Lactic acid is a substance that can build up in your body if you are not getting enough oxygen  It can also occur if you have a condition that causes an increased need for oxygen  The following may cause lactic acidosis:  · Shock from trauma or severe blood loss    · Sepsis (a serious condition that occurs when the body overreacts to an infection)    · Seizures    · Heart attack or heart failure    · Severe lung disease    · Liver or kidney disease    · Cancer or AIDS    · Diabetic ketoacidosis     · Certain medicines such as metformin (diabetes medicine) or some HIV medicines    · Intense exercise  What are the signs and symptoms of lactic acidosis? · Muscular weakness    · Breathing faster than normal    · Nausea and vomiting    · Coma  How is lactic acidosis diagnosed and treated? Lactic acidosis is diagnosed with a blood test  The blood test measures the amount of lactate in your blood  Treatment depends on the cause of your lactic acidosis  The condition that caused lactic acidosis will need to be treated  When should I contact my healthcare provider? · Your symptoms return  · You have questions or concerns about your condition or care  CARE AGREEMENT:   You have the right to help plan your care  Learn about your health condition and how it may be treated  Discuss treatment options with your caregivers to decide what care you want to receive  You always have the right to refuse treatment  The above information is an  only  It is not intended as medical advice for individual conditions or treatments   Talk to your doctor, nurse or pharmacist before following any medical regimen to see if it is safe and effective for you  © 2017 [address] [employer] Information is for End User's use only and may not be sold, redistributed or otherwise used for commercial purposes  All illustrations and images included in CareNotes® are the copyrighted property of [employer] M [employer]  or Jordi Chow

## 2020-07-05 NOTE — ED PROVIDER NOTES
History  Chief Complaint   Patient presents with    Abdominal Pain     pt has abdominal pain since 5am, just finished her fiorst round of chemo thsi week alot of belching pt took ativan as well as tramadol this am      Patient is a 24-year-old female with past medical history of type 2 non-insulin-dependent diabetes, hypertension, diabetic neuropathy, recently diagnosed pancreatic cancer after she had presented with painless jaundice, status post bile duct stent and status post her 1st round of chemotherapy last Tuesday, presents to the ED complaining of epigastric abdominal pain that started today  Patient reports the pain is both sharp and crampy and waxes and wanes in severity  She reports eating crackers seems to alleviate the pain somewhat  She did take tramadol which helped initially but it wore off very quickly  She reports she does have some indigestion feeling and has been burping more  When she does burp she feels some burning in her chest but then it goes down into her epigastrium immediately  She denies any new fever, shaking chills, headaches, dizziness or near syncope, cough, URI symptoms, hemoptysis, palpitations, chest pain, dyspnea, abdominal distension, nausea, vomiting, diarrhea, constipation, blood per rectum or melena, dysuria, change in urinary frequency, hematuria, flank pain, skin rash or new color change, extremity swelling or pain, extremity weakness or paresthesia or other focal neurologic deficits  Other than her recent biliary stent procedure, only other surgical history of the abdomen includes prior   Patient reports her 1st chemo was on Tuesday and then she got Neulasta on Thursday        History provided by:  Patient, spouse and medical records   used: No    Abdominal Pain   Associated symptoms: no chest pain, no chills, no constipation, no cough, no diarrhea, no dysuria, no fever, no hematuria, no nausea, no shortness of breath, no sore throat and no vomiting        Prior to Admission Medications   Prescriptions Last Dose Informant Patient Reported?  Taking?   allopurinol (ZYLOPRIM) 100 mg tablet  Self Yes No   Sig: Take 100 mg by mouth daily   aspirin (Aspirin 81) 81 mg EC tablet  Self Yes No   Sig: Take 81 mg by mouth daily   diphenhydrAMINE (BENADRYL) 25 mg capsule  Self Yes No   Sig: Take 25 mg by mouth every 6 (six) hours as needed for itching   gabapentin (NEURONTIN) 300 mg capsule  Self Yes No   Sig: Take 300 mg by mouth 3 (three) times a day   hydrOXYzine HCL (ATARAX) 10 mg tablet  Self No No   Sig: Take 1 tablet (10 mg total) by mouth every 8 (eight) hours as needed for itching   metFORMIN (GLUCOPHAGE) 500 mg tablet  Self Yes No   Sig: Take by mouth 2 (two) times a day   prochlorperazine (COMPAZINE) 10 mg tablet   No No   Sig: Take 1 tablet (10 mg total) by mouth every 6 (six) hours as needed for nausea or vomiting   sitaGLIPtin-metFORMIN (JANUMET)  MG per tablet  Self Yes No   Sig: Take 1 tablet by mouth 2 (two) times a day with meals   traMADol (ULTRAM) 50 mg tablet   No No   Sig: Take 1 tablet (50 mg total) by mouth every 6 (six) hours as needed for moderate pain      Facility-Administered Medications: None       Past Medical History:   Diagnosis Date    Diabetes mellitus ([facility] [address] )     Hypertension     Neuropathy        Past Surgical History:   Procedure Laterality Date     SECTION      COLONOSCOPY      ECTOPIC PREGNANCY SURGERY      FL GUIDED CENTRAL VENOUS ACCESS DEVICE INSERTION  2020    JOINT REPLACEMENT Bilateral     MANDIBLE FRACTURE SURGERY  1972    REPLACEMENT TOTAL HIP W/  RESURFACING IMPLANTS Bilateral     right hip done 2012; left hip done 2012    TONSILLECTOMY      TUNNELED VENOUS PORT PLACEMENT Left 2020    Procedure: INSERTION VENOUS PORT (PORT-A-CATH), left;  Surgeon: Fern Jain MD;  [facility]: [facility];  Service: Surgical Oncology       Family History   Problem Relation Age of Onset    Diabetes Mother     Heart disease Mother     Heart disease Father     Breast cancer additional onset Sister     Breast cancer additional onset Maternal Aunt     Stroke Maternal Grandfather      I have reviewed and agree with the history as documented  E-Cigarette/Vaping    E-Cigarette Use Never User      E-Cigarette/Vaping Substances    Nicotine No     THC No     CBD No     Flavoring No     Other No     Unknown No      Social History     Tobacco Use    Smoking status: Former Smoker    Smokeless tobacco: Never Used    Tobacco comment: quit 40 years ago   Substance Use Topics    Alcohol use: Not Currently     Frequency: Monthly or less    Drug use: Never       Review of Systems   Constitutional: Negative for appetite change, chills, diaphoresis and fever  HENT: Negative for congestion, ear pain, rhinorrhea and sore throat  Eyes: Negative for pain and visual disturbance  Respiratory: Negative for cough, chest tightness, shortness of breath and wheezing  Cardiovascular: Negative for chest pain and palpitations  Gastrointestinal: Positive for abdominal pain  Negative for abdominal distention, blood in stool, constipation, diarrhea, nausea and vomiting  Genitourinary: Negative for dysuria, flank pain, frequency and hematuria  Musculoskeletal: Negative for back pain and neck pain  Skin: Negative for color change, pallor and rash  Allergic/Immunologic: Negative for immunocompromised state  Neurological: Negative for dizziness, syncope, weakness, light-headedness, numbness and headaches  Hematological: Negative for adenopathy  Psychiatric/Behavioral: Negative for confusion and decreased concentration  All other systems reviewed and are negative  Physical Exam  Physical Exam   Constitutional: She is oriented to person, place, and time  She appears well-developed and well-nourished  No distress  HENT:   Head: Normocephalic and atraumatic     Right Ear: External ear normal    Left Ear: External ear normal    Orpharyngeal exam deferred at this time due to risk of exposure to COVID-19 during current pandemic  Patient has no oropharyngeal complaints  Eyes: Pupils are equal, round, and reactive to light  Conjunctivae and EOM are normal    Neck: Normal range of motion  Neck supple  No JVD present  Cardiovascular: Normal rate, regular rhythm, normal heart sounds and intact distal pulses  Exam reveals no gallop and no friction rub  No murmur heard  Pulmonary/Chest: Effort normal and breath sounds normal  No respiratory distress  She has no wheezes  She has no rales  She exhibits no tenderness  Abdominal: Soft  Bowel sounds are normal  She exhibits no distension  There is tenderness  There is no rebound and no guarding  +Epigastric tenderness  Musculoskeletal: Normal range of motion  She exhibits no edema or tenderness  Neurological: She is alert and oriented to person, place, and time  No gross motor or sensory deficits  Skin: Skin is warm and dry  No rash noted  She is not diaphoretic  No erythema  No pallor  Psychiatric: She has a normal mood and affect  Her behavior is normal    Nursing note and vitals reviewed        Vital Signs  ED Triage Vitals   Temperature Pulse Respirations Blood Pressure SpO2   07/05/20 1254 07/05/20 1254 07/05/20 1254 07/05/20 1254 07/05/20 1254   98 °F (36 7 °C) 99 18 124/92 99 %      Temp Source Heart Rate Source Patient Position - Orthostatic VS BP Location FiO2 (%)   07/05/20 1254 07/05/20 1254 07/05/20 1254 07/05/20 1254 --   Oral Monitor Sitting Left arm       Pain Score       07/05/20 1627       5         Vitals:    07/05/20 1415 07/05/20 1515 07/05/20 1530 07/05/20 1600   BP: 128/74 150/70 150/72 158/79   BP Location: Right arm   Right arm   Pulse: 88 82 81 84   Resp: 17 16 15 18   Temp:       TempSrc:       SpO2: 98% 100% 100% 100%       Visual Acuity      ED Medications  Medications   sodium chloride 0 9 % bolus 1,000 mL (0 mL Intravenous Stopped 7/5/20 1601)   pantoprazole (PROTONIX) injection 40 mg (40 mg Intravenous Given 7/5/20 1407)   aluminum-magnesium hydroxide-simethicone (MYLANTA) 200-200-20 mg/5 mL oral suspension 30 mL (30 mL Oral Given 7/5/20 1405)   Lidocaine Viscous HCl (XYLOCAINE) 2 % mucosal solution 10 mL (10 mL Swish & Swallow Given 7/5/20 1405)   sodium chloride 0 9 % bolus 1,000 mL (0 mL Intravenous Stopped 7/5/20 1610)   iohexol (OMNIPAQUE) 350 MG/ML injection (MULTI-DOSE) 100 mL (100 mL Intravenous Given 7/5/20 1459)   morphine (PF) 4 mg/mL injection 4 mg (4 mg Intravenous Given 7/5/20 1627)   dicyclomine (BENTYL) tablet 20 mg (20 mg Oral Given 7/5/20 1627)       Diagnostic Studies  Results Reviewed     Procedure Component Value Units Date/Time    Lactic acid 2 Hours [[id number]]  (Abnormal) Collected:  07/05/20 1603    Lab Status:  Final result Specimen:  Blood from Arm, Left Updated:  07/05/20 1657     LACTIC ACID 3 2 mmol/L     Narrative:       Result may be elevated if tourniquet was used during collection      Urine Microscopic [[id number]]  (Abnormal) Collected:  07/05/20 1450    Lab Status:  Final result Specimen:  Urine, Clean Catch Updated:  07/05/20 1507     RBC, UA 2-4 /hpf      WBC, UA 0-1 /hpf      Epithelial Cells Occasional /hpf      Bacteria, UA None Seen /hpf     UA (URINE) with reflex to Scope [[id number]]  (Abnormal) Collected:  07/05/20 1450    Lab Status:  Final result Specimen:  Urine, Clean Catch Updated:  07/05/20 1501     Color, UA Yellow     Clarity, UA Clear     Specific Gravity, UA 1 025     pH, UA 6 0     Leukocytes, UA Negative     Nitrite, UA Negative     Protein, UA Negative mg/dl      Glucose, UA Negative mg/dl      Ketones, UA Negative mg/dl      Urobilinogen, UA 0 2 E U /dl      Bilirubin, UA Negative     Blood, UA Trace-Intact    CBC and differential [[id number]]  (Abnormal) Collected:  07/05/20 1408    Lab Status:  Final result Specimen:  Blood from Arm, Left Updated:  07/05/20 1458     WBC 37 00 Thousand/uL      RBC 4 16 Million/uL      Hemoglobin 13 3 g/dL      Hematocrit 40 0 %      MCV 96 fL      MCH 32 0 pg      MCHC 33 3 g/dL      RDW 14 3 %      MPV 11 5 fL      Platelets 414 Thousands/uL      nRBC 0 /100 WBCs     Lactic acid [[id number]]  (Abnormal) Collected:  07/05/20 1408    Lab Status:  Final result Specimen:  Blood from Arm, Left Updated:  07/05/20 1454     LACTIC ACID 4 1 mmol/L     Narrative:       Result may be elevated if tourniquet was used during collection      Troponin I [[id number]]  (Normal) Collected:  07/05/20 1408    Lab Status:  Final result Specimen:  Blood from Arm, Left Updated:  07/05/20 1447     Troponin I <0 02 ng/mL     Lipase [[id number]]  (Abnormal) Collected:  07/05/20 1408    Lab Status:  Final result Specimen:  Blood from Arm, Left Updated:  07/05/20 1446     Lipase 510 u/L     Basic metabolic panel [[id number]] Collected:  07/05/20 1408    Lab Status:  Final result Specimen:  Blood from Arm, Left Updated:  07/05/20 1446     Sodium 141 mmol/L      Potassium 3 7 mmol/L      Chloride 104 mmol/L      CO2 25 mmol/L      ANION GAP 12 mmol/L      BUN 15 mg/dL      Creatinine 1 05 mg/dL      Glucose 136 mg/dL      Calcium 8 9 mg/dL      eGFR 55 ml/min/1 73sq m     Narrative:       [place] guidelines for Chronic Kidney Disease (CKD):     Stage 1 with normal or high GFR (GFR > 90 mL/min/1 73 square meters)    Stage 2 Mild CKD (GFR = 60-89 mL/min/1 73 square meters)    Stage 3A Moderate CKD (GFR = 45-59 mL/min/1 73 square meters)    Stage 3B Moderate CKD (GFR = 30-44 mL/min/1 73 square meters)    Stage 4 Severe CKD (GFR = 15-29 mL/min/1 73 square meters)    Stage 5 End Stage CKD (GFR <15 mL/min/1 73 square meters)  Note: GFR calculation is accurate only with a steady state creatinine    Hepatic function panel [[id number]]  (Abnormal) Collected:  07/05/20 1408    Lab Status:  Final result Specimen:  Blood from Arm, Left Updated:  07/05/20 1446     Total Bilirubin 0 50 mg/dL      Bilirubin, Direct 0 30 mg/dL      Alkaline Phosphatase 88 U/L      AST 12 U/L      ALT 23 U/L      Total Protein 6 8 g/dL      Albumin 3 3 g/dL     Magnesium [[id number]]  (Normal) Collected:  07/05/20 1408    Lab Status:  Final result Specimen:  Blood from Arm, Left Updated:  07/05/20 1446     Magnesium 1 6 mg/dL     Protime-INR [[id number]]  (Normal) Collected:  07/05/20 1408    Lab Status:  Final result Specimen:  Blood from Arm, Left Updated:  07/05/20 1442     Protime 13 3 seconds      INR 1 06    APTT [[id number]]  (Normal) Collected:  07/05/20 1408    Lab Status:  Final result Specimen:  Blood from Arm, Left Updated:  07/05/20 1442     PTT 24 seconds                  CT abdomen pelvis with contrast   Final Result by Marissa Lee MD (07/05 2049)         1  Interval placement of a bile duct stent with resolution of biliary obstruction  Pneumobilia is likely postsurgical    2   Stable dilatation of the proximal pancreatic duct  Limited visualization of pancreatic head 2 2 cm mass, stable  3   Findings suggestive of constipation  Possible mild enteritis  No evidence of bowel obstruction, colitis or diverticulitis  Workstation performed: [id number]         XR chest 1 view portable   Final Result by Lucía Cruz MD (07/05 1410)      No acute cardiopulmonary disease              Workstation performed: [id number]                    Procedures  ECG 12 Lead Documentation Only  Date/Time: 7/5/2020 1:30 PM  Performed by: Jose Carolina DO  Authorized by: Jose Carolina DO     ECG reviewed by me, the ED Provider: yes    Patient location:  ED  Previous ECG:     Previous ECG:  Compared to current    Comparison ECG info:  5-    Similarity:  No change  Rate:     ECG rate:  91    ECG rate assessment: normal    Rhythm:     Rhythm: sinus rhythm      Rhythm comment:  With short NH interval  Ectopy:     Ectopy: none    QRS:     QRS axis:  Normal    QRS intervals:  Normal  Conduction:     Conduction: normal    ST segments:     ST segments:  Normal  T waves:     T waves: normal               ED Course  ED Course as of Jul 05 1728   Sun Jul 05, 2020   1446 Significantly decreased from 1 month ago (lipase >2000 at that time)  She is not vomiting or having inability to tolerate PO  Lipase(!): 510   1455 Will recheck after 1st L IVF  Will add 2nd liter  LACTIC ACID(!!): 4 1   1500 Patient received Neulasta 4 days ago  WBC(!!): 37 00   1623 Updated patient about workup thus far being essentially normal other than elevated lactic acid  Repeat lactic acid pending  Patient reports the pain has improved and she is no longer getting the sharp pain but she does still get intermittent cramping pain  Will give a dose of morphine and Bentyl and reassessed  I advised her to follow up closely with Dr Joshua Bowen, her oncologist tomorrow and will also give referral for Gastroenterology  02 40 12 20 89 with on-call hematologist/oncologist, Dr Vaibhav Mcginnis, who recommended discharging her home if her pain is under control and does not feel she needs to be admitted just for the lactic acid alone  She is not presenting like she is septic, is afebrile and overall nontoxic appearing  No source of infection identified other than mild enteritis  He stated that the white count is normal after getting Neulasta  1720 Updated patient about repeat lactic acid, my conversation with oncologist and I gave patient the option of being discharged and following up closely with her oncologist tomorrow 1st being admitted for observation to trend the lactic acid  Patient would like to be discharged this time  Her pain is improved  I advised her to return immediately if her symptoms worsen or she develops fever, vomiting or any other new concerning symptoms  Advised her to call her oncologist's office 1st thing in the morning to get seen            US AUDIT      Most Recent Value   Initial Alcohol Screen: US AUDIT-C    1  How often do you have a drink containing alcohol?  0 Filed at: 07/05/2020 1412   2  How many drinks containing alcohol do you have on a typical day you are drinking? 0 Filed at: 07/05/2020 1412   3a  Male UNDER 65: How often do you have five or more drinks on one occasion? 0 Filed at: 07/05/2020 1412   3b  FEMALE Any Age, or MALE 65+: How often do you have 4 or more drinks on one occassion? 0 Filed at: 07/05/2020 1412   Audit-C Score  0 Filed at: 07/05/2020 1412                  ELLEN/DAST-10      Most Recent Value   How many times in the past year have you    Used an illegal drug or used a prescription medication for non-medical reasons? Never Filed at: 07/05/2020 1412                                MDM  Number of Diagnoses or Management Options  Diagnosis management comments: 57-year-old female with recently diagnosed pancreatic cancer, presents to the ED complaining of new epigastric pain that started today  Most likely patient has acute gastritis, possibly peptic ulcer disease  Differential includes biliary colic, cholecystitis, acute pancreatitis, cancer related pain but less likely, bowel obstruction  Cardiac etiology also considered but overall low suspicion for such  Will workup with abdominal and cardiac labs, chest x-ray, EKG and CT abdomen and pelvis  Will give IV fluids, Protonix and GI cocktail         Amount and/or Complexity of Data Reviewed  Clinical lab tests: ordered and reviewed  Tests in the radiology section of CPT®: ordered and reviewed  Tests in the medicine section of CPT®: ordered and reviewed  Independent visualization of images, tracings, or specimens: yes          Disposition  Final diagnoses:   Acute epigastric pain   Enteritis   Elevated lactic acid level     Time reflects when diagnosis was documented in both MDM as applicable and the Disposition within this note     Time User Action Codes Description Comment    7/5/2020  5:22 PM Oumar LE Add [R10 13] Acute epigastric pain     7/5/2020  5:23 PM Oumar LE Add [K52 9] Enteritis     7/5/2020  5:23 PM Oumar LE Add [R79 89] Elevated lactic acid level       ED Disposition     ED Disposition Condition Date/Time Comment    Discharge Stable Sun Jul 5, 2020  5:22 PM Jone Trujillo discharge to home/self care  Follow-up Information     Follow up With Specialties Details Why Contact Info Additional Information    Kenna Hu MD Oncology, Hematology and Oncology, Hematology Call in 1 day  [address]       [facility] Gastroenterology Schedule an appointment as soon as possible for a visit   [address]  [address] [facility] [address]  [address] [facility], [address] [facility]  [address] [facility], [facility], [place], [address] [facility] [facility] Emergency Department Emergency Medicine Go to  If symptoms worsen [address] [facility] [address]  [phone] [place] [facility], [address] [place], [place], [address]          Patient's Medications   Discharge Prescriptions    DICYCLOMINE (BENTYL) 20 MG TABLET    Take 1 tablet (20 mg total) by mouth every 8 (eight) hours as needed (abdominal pain)       Start Date: 7/5/2020  End Date: --       Order Dose: 20 mg       Quantity: 12 tablet    Refills: 0    OMEPRAZOLE (PRILOSEC) 20 MG DELAYED RELEASE CAPSULE    Take 1 capsule (20 mg total) by mouth daily       Start Date: 7/5/2020  End Date: --       Order Dose: 20 mg       Quantity: 30 capsule    Refills: 0    SUCRALFATE (CARAFATE) 1 G/10 ML SUSPENSION    Take 10 mL (1 g total) by mouth 4 (four) times a day (with meals and at bedtime)       Start Date: 7/5/2020  End Date: --       Order Dose: 1 g       Quantity: 420 mL    Refills: 0     No discharge procedures on file      PDMP Review Value Time User    PDMP Reviewed  Yes 6/22/2020  1:29 PM Eze Chirinos MD          ED Provider  Electronically Signed by           Finn Underwood DO  07/05/20 8606

## 2020-07-06 ENCOUNTER — TELEPHONE (OUTPATIENT)
Dept: HEMATOLOGY ONCOLOGY | Facility: CLINIC | Age: 65
End: 2020-07-06

## 2020-07-06 ENCOUNTER — CONSULT (OUTPATIENT)
Dept: GYNECOLOGIC ONCOLOGY | Facility: CLINIC | Age: 65
End: 2020-07-06

## 2020-07-06 DIAGNOSIS — Z80.3 FAMILY HISTORY OF BREAST CANCER: ICD-10-CM

## 2020-07-06 DIAGNOSIS — C25.9 PANCREATIC ADENOCARCINOMA (HCC): Primary | ICD-10-CM

## 2020-07-06 PROCEDURE — NC001 PR NO CHARGE: Performed by: GENETIC COUNSELOR, MS

## 2020-07-06 NOTE — TELEPHONE ENCOUNTER
Patient called stating that she was in the ED and would like to discuss the result of the testing that was completed during her stay  Best call back  [phone]

## 2020-07-06 NOTE — PROGRESS NOTES
FOLLOW UP PHONE CALL: Pt called for follow up questionnaire  Pt reports having abdominal pain yesterday, Dr Fidel Jimenez advised pt to be seen in the ED  Pt reports she was evaluated and is feeling much better  Pt was prescribed Bentyl and Prilosec  Pt verbalized understanding of next physician follow up and infusion appointments  Pt aware to get blood work prior to next treatment, report she is planning on getting labs done Monday before she comes for treatment  Pt aware of when to call the physician if symptoms or concerns arise

## 2020-07-06 NOTE — PROGRESS NOTES
Pre-Test Genetic Counseling Consult Note    Patient Name: Puneet Gutierrze   /Age: 1955/65 y o  Referring Provider: Bonnie Ac MD     Date of Service: 2020  Genetic Counselor: [facility]    Interpretation Services: None    Service: Type: Telephone consult   Length of Visit: 61 minutes      Ceci Pritchett was referred to the [address] [place] [address] and Genetic Assessment Program due to her personal history of pancreatic cancer  Cancer and Treatment History:    20 FNA of Pancreas Final Diagnosis   A-B-C  Pancreas, uncinate mass (fine needle aspiration with cell block preparation):      - Malignant (PSC Category VI) - See note  - Compatible with adenocarcinoma with mucinous features       Cancer Screening:     Breast  Clinical breast exam: Annual   Mammogram: Annual    Breast MRI: None   Breast biopsy: One bx for calcifications     Colon  Colonoscopy: Most recent colonoscopy at age 46; no history of colon polyps     Gynecologic  Pelvic exam: Routine    Ovaries and uterus: Intact    Skin   Skin cancer screening: None     Reproductive History  Age at menarche: 6y  Parity:   Age at first live birth: 29Y  : No   Oral Contraception: Approximately 5-10 years   Fertility Medication: No   Menopause: 44; natural   Hormone replacement: None      Medical and Surgical History  Pertinent surgical history:   Past Surgical History:   Procedure Laterality Date     SECTION      [address]  2020    JOINT REPLACEMENT Bilateral     MANDIBLE FRACTURE SURGERY  1972    REPLACEMENT TOTAL HIP W/  RESURFACING IMPLANTS Bilateral     right hip done 2012; left hip done 2012    TONSILLECTOMY      TUNNELED VENOUS PORT PLACEMENT Left 2020    Procedure: INSERTION VENOUS PORT (PORT-A-CATH), left;  Surgeon: Bonnie Ac MD;  [facility];  Service: Surgical Oncology      Pertinent medical history:  Past Medical History:   Diagnosis Date    Diabetes mellitus ([address] )     Hypertension     Neuropathy        Other History:  Height:   Ht Readings from Last 1 Encounters:   20 4' 11" (1 499 m)     Weight:   Wt Readings from Last 1 Encounters:   20 51 6 kg (113 lb 12 1 oz)      Relevant Family History:  Ancestry: Polish/Yi (Non-Mormonism)    Please refer to the scanned pedigree in the Media Tab for a complete family history     - Sister:  age 47 with breast cancer diagnosed at age 48  - Maternal Aunt:  52's with breast cancer    *All history is reported as provided by the patient; records are not available for review, except where indicated  Assessment:  We discussed sporadic, familial and hereditary cancer  We also discussed the many factors that influence our risk for cancer such as age, environmental exposures, lifestyle choices and family history  We discussed that the majority of pancreatic cancer is sporadic but there is a percentage associated with an underlying hereditary condition  Pawan Francisco meets NCCN C8 7362 Testing Criteria for High-Penetrance Breast and/or Ovarian Cancer Susceptibility genes and NCCN V1 2020 Testing Criteria for Pancreatic Cancer Susceptibility Genes based on her personal history of pancreatic cancer  The risks, benefits, and limitations of genetic testing were reviewed with the patient, as well as genetic discrimination laws, and possible test results (positive, negative, variants of uncertain significance) and their clinical implications  Pawan Francisco was informed that there will be no out of pocket cost for the test as she meets Medicare's genetic testing criteria  Plan    Pawan Francisco seemed anxious throughout our discussion and expressed concern about her prognosis and treatment course    We did our best to explain that the primary purpose of this test is to determine if Pawan Francisco was born with a genetic change that may explain her history of pancreatic cancer and to determine if she is at risk for any other type of cancer  We explained that if she carries a genetic change in certain genes, she may be eligible for a medication such as a PARP inhibitor  We stressed that this test will not tell her about the overall prognosis or how curable her condition is, it can only tell us if she is eligible for a different medication  She expressed concern over learning her risk for breast cancer as well as concern for whether or not we would recommend additional surgery like a mastectomy  I assured her that we would not recommend any additional surgery at this time  We discussed that her primary concern should be coming to terms with her recent diagnosis and successfully navigating her treatment  We discussed that information learned from this test will help direct future screening  Overall Oskar Crowe verbalized that she is feeling very overwhelmed with her recent diagnosis and this information  She verbalized her desire to take some time to think about testing and discuss the information with her   We will e-mail Oskar Crowe a brochure on hereditary cancer testing and the [place] law  We provided her with our contact information in the event that she or her family have any additional questions  If and when Oskar Crowe is ready to move forward with testing she can contact our office

## 2020-07-07 RX ORDER — FLUOROURACIL 50 MG/ML
400 INJECTION, SOLUTION INTRAVENOUS ONCE
Status: CANCELLED | OUTPATIENT
Start: 2020-07-14

## 2020-07-07 RX ORDER — ATROPINE SULFATE 1 MG/ML
0.25 INJECTION, SOLUTION INTRAMUSCULAR; INTRAVENOUS; SUBCUTANEOUS ONCE AS NEEDED
Status: CANCELLED | OUTPATIENT
Start: 2020-07-14

## 2020-07-07 RX ORDER — DEXTROSE MONOHYDRATE 50 MG/ML
20 INJECTION, SOLUTION INTRAVENOUS ONCE
Status: CANCELLED | OUTPATIENT
Start: 2020-07-14

## 2020-07-07 RX ORDER — SODIUM CHLORIDE 9 MG/ML
20 INJECTION, SOLUTION INTRAVENOUS ONCE AS NEEDED
Status: CANCELLED | OUTPATIENT
Start: 2020-07-14

## 2020-07-07 RX ORDER — ATROPINE SULFATE 1 MG/ML
0.25 INJECTION, SOLUTION INTRAMUSCULAR; INTRAVENOUS; SUBCUTANEOUS ONCE
Status: CANCELLED | OUTPATIENT
Start: 2020-07-14

## 2020-07-07 NOTE — TELEPHONE ENCOUNTER
Called and spoke with the patient  She stated that she was in the ED for acute epigastric pain and they advised her to follow up with a gastroenterologist  She stated she was feeling better after taking Prilosec and using a heating pad and wants to know if a follow up is necessary

## 2020-07-07 NOTE — PATIENT INSTRUCTIONS
PRESCRIPTION REFILL REMINDER:  All medication refills should be requested prior to [facility] on Friday  Any refill requests after noon on Friday would be addressed the following Monday  Please protect yourself from the novel Coronavirus (COVID-19)! Even though we STILL do not have a vaccine or good antiviral drugs for this infection, the following strategies can help you stay healthy:    = Wash your hands with soap and water, or hand  with at least 60% alcohol, often  = Avoid touching your face!   = Avoid close contact with others, even if they seem healthy  Avoid gatherings of more than 10 people, and don't travel unnecessarily  = Stay home, except to get medical care or other essentials  If you can eat and drink and breathe and sleep, please consider calling your doctor's office instead of visiting in person, even if you are ill   = Cover your cough with a tissue, or your elbow  = Clean frequently touched surfaces and objects daily (e g , tables, countertops, light switches, doorknobs, and cabinet handles)  Regular household detergent and water are sufficient  Numbers of cases of Coronavirus are spiking in many [place] States  This is not a more dangerous virus, but a sign that more people in a community are spreading the virus  Please check the local disease reports near you if you consider travelling this summer  We do NOT advise travel to any community or State with a rising viral caseload  Check out [employer] for Elizabeth data that are updated daily  http://www [employer]/   Global Epidemics  Org, from [facility] will give you [id number] information on virus cases  Https://globalepidemics  org/

## 2020-07-07 NOTE — TELEPHONE ENCOUNTER
Please advise patient this follow up is necessary  She can follow up with PCP if she does not wish to continue with GI but they most likely will defer back to them for follow up

## 2020-07-08 ENCOUNTER — PATIENT OUTREACH (OUTPATIENT)
Dept: HEMATOLOGY ONCOLOGY | Facility: CLINIC | Age: 65
End: 2020-07-08

## 2020-07-08 DIAGNOSIS — C25.9 PANCREATIC ADENOCARCINOMA (HCC): Primary | ICD-10-CM

## 2020-07-08 LAB
ATRIAL RATE: 91 BPM
P AXIS: 55 DEGREES
PR INTERVAL: 108 MS
QRS AXIS: 32 DEGREES
QRSD INTERVAL: 68 MS
QT INTERVAL: 346 MS
QTC INTERVAL: 425 MS
T WAVE AXIS: 36 DEGREES
VENTRICULAR RATE: 91 BPM

## 2020-07-08 PROCEDURE — 93010 ELECTROCARDIOGRAM REPORT: CPT | Performed by: INTERNAL MEDICINE

## 2020-07-08 NOTE — PROGRESS NOTES
Spoke to Mikki Shultz to check in, she said after her first treatment she had neuropathy, jaw claudication and felt like her tongue expanded but all symptoms resolved once she got home, she said she is also experiencing a rash on her hands, she went to the ED over the weekend for stomach cramps and they gave her Bentyl and Prilosec and that has helped, she has scheduled an appointment with the GI group, she said she spoke to the [employer] but she is unsure if she is ready to undergo this at this time as there is so much going on and she is afraid she will have a positive result that will tell her she will have to worry about breast cancer, she said she will have this done down the road, she is set up to see Palliative care to help manage symptoms, she said everyone at [facility] has been so wonderful and helpful and has been so caring, she is staying positive and looking for the best outcome possible, she also stated she is having a metallic taste in her mouth, we discussed this is most likely from the Oxaliplatin, we talked about plastic forks and spoons and using lemon candy to suck on as the strong flavor may take away that taste, emailed her the " taste changes during cancer therapies" article from [facility] for more guidance, she was appreciative, instructed her to call with any other questions or concerns

## 2020-07-09 ENCOUNTER — TELEMEDICINE (OUTPATIENT)
Dept: PALLIATIVE MEDICINE | Facility: CLINIC | Age: 65
End: 2020-07-09
Payer: MEDICARE

## 2020-07-09 DIAGNOSIS — K52.9 ENTERITIS: ICD-10-CM

## 2020-07-09 DIAGNOSIS — T40.2X5A THERAPEUTIC OPIOID-INDUCED CONSTIPATION (OIC): Chronic | ICD-10-CM

## 2020-07-09 DIAGNOSIS — G89.3 CANCER-RELATED PAIN: Primary | ICD-10-CM

## 2020-07-09 DIAGNOSIS — K59.03 THERAPEUTIC OPIOID-INDUCED CONSTIPATION (OIC): Chronic | ICD-10-CM

## 2020-07-09 DIAGNOSIS — C25.9 PANCREATIC ADENOCARCINOMA (HCC): ICD-10-CM

## 2020-07-09 DIAGNOSIS — R10.13 ACUTE EPIGASTRIC PAIN: ICD-10-CM

## 2020-07-09 PROBLEM — E87.20 LACTIC ACIDOSIS: Status: RESOLVED | Noted: 2020-05-30 | Resolved: 2020-07-09

## 2020-07-09 PROBLEM — E87.2 LACTIC ACIDOSIS: Status: RESOLVED | Noted: 2020-05-30 | Resolved: 2020-07-09

## 2020-07-09 PROBLEM — K83.1 OBSTRUCTIVE JAUNDICE: Status: RESOLVED | Noted: 2020-06-01 | Resolved: 2020-07-09

## 2020-07-09 PROCEDURE — 99214 OFFICE O/P EST MOD 30 MIN: CPT | Performed by: FAMILY MEDICINE

## 2020-07-09 RX ORDER — OMEPRAZOLE 20 MG/1
20 CAPSULE, DELAYED RELEASE ORAL DAILY
Qty: 30 CAPSULE | Refills: 1 | Status: SHIPPED | OUTPATIENT
Start: 2020-07-09 | End: 2020-07-09 | Stop reason: SDUPTHER

## 2020-07-09 RX ORDER — OMEPRAZOLE 20 MG/1
20 CAPSULE, DELAYED RELEASE ORAL DAILY
Qty: 30 CAPSULE | Refills: 1 | Status: SHIPPED | OUTPATIENT
Start: 2020-07-09 | End: 2020-09-25

## 2020-07-09 RX ORDER — DICYCLOMINE HCL 20 MG
20 TABLET ORAL 2 TIMES DAILY
Qty: 60 TABLET | Refills: 0 | Status: SHIPPED | OUTPATIENT
Start: 2020-07-09 | End: 2020-08-07

## 2020-07-09 RX ORDER — OXYCODONE HYDROCHLORIDE 5 MG/1
5 TABLET ORAL EVERY 4 HOURS PRN
Qty: 45 TABLET | Refills: 0 | Status: SHIPPED | OUTPATIENT
Start: 2020-07-09 | End: 2020-09-21 | Stop reason: HOSPADM

## 2020-07-09 RX ORDER — POLYETHYLENE GLYCOL 3350 17 G/17G
17 POWDER, FOR SOLUTION ORAL DAILY
Qty: 30 EACH | Refills: 1 | Status: SHIPPED | OUTPATIENT
Start: 2020-07-09 | End: 2020-10-26 | Stop reason: HOSPADM

## 2020-07-09 NOTE — Clinical Note
Not sure if you already addressed this, but it may have been that pt had something of an extrapyramidal side effect after Zofran given during chemo last infusion  Not typical of Zofran, but can occur with higher doses  Please consider Zofran dose reduction, and consider bendryl if side effects recur

## 2020-07-09 NOTE — PROGRESS NOTES
Outpatient Virtual Regular Visit - Consult to Palliative and [address] 77 y o  female [phone]    Intake and Disclaimer:    Reason for visit is est care re: cancer of pancreas  The patient is unable to visit the clinic safely due to the coronavirus pandemic  Patient agrees to participate in a virtual check in via telephone or video visit instead of presenting to the office to address urgent/immediate medical needs, or to respect quarantine and public health guidelines  Patient was informed this is a billable service  After connecting through [employer], the patient was identified by name and date of birth  Neo Saez was informed that this was a telemedicine visit and that the visit is being conducted through [employer] and patient was informed that this is a secure, HIPAA-compliant platform  She agrees to proceed  which may not be secure and therefore might not be HIPAA-compliant  My office door was closed  No one else was in the room  She acknowledged consent and understanding of privacy and security of the virtual check-in visit  I informed the patient that I have reviewed her record in Epic and presented the opportunity for her to ask any questions regarding the visit today  The patient initiated communication and agreed to participate          Assessment and Plan  Problem List Items Addressed This Visit     Pancreatic adenocarcinoma (HCC) (Chronic)    Relevant Medications    oxyCODONE (ROXICODONE) 5 mg immediate release tablet    polyethylene glycol (MIRALAX) 17 g packet    Therapeutic opioid-induced constipation (OIC) (Chronic)    Relevant Medications    polyethylene glycol (MIRALAX) 17 g packet      Other Visit Diagnoses     Cancer-related pain    -  Primary    Relevant Medications    oxyCODONE (ROXICODONE) 5 mg immediate release tablet    Acute epigastric pain        Relevant Medications    dicyclomine (BENTYL) 20 mg tablet    omeprazole (PriLOSEC) 20 mg delayed release capsule    Enteritis        Relevant Medications    dicyclomine (BENTYL) 20 mg tablet    omeprazole (PriLOSEC) 20 mg delayed release capsule        Medications Discontinued During This Encounter   Medication Reason    dicyclomine (BENTYL) 20 mg tablet Reorder    omeprazole (PriLOSEC) 20 mg delayed release capsule Reorder    omeprazole (PriLOSEC) 20 mg delayed release capsule Reorder    traMADol (ULTRAM) 50 mg tablet         Alyson Lee was assessed today for symptoms and care planning related to above illnesses  I have reviewed the patient's controlled substance dispensing history in the Prescription Drug Monitoring Program in compliance with the [facility] regulations before prescribing any controlled substances  She is invited to continue to follow with us  If there are questions or concerns, please contact us through our clinic/answering service 24 hours a day, seven days a week  As a result of this visit, I have referred the patient for further evaluation  No Rex Boxer, MD  [facility]            Subjective    Alyson Lee is a 77 y o  female referred for us by Dr Bienvenido Talbot for panc adedno with obstructive jaundice  Bili fell, and she was advised to take neoadjuvant chemo prior to resection of primary mass  She is following with Dr Bienvenido Talbot for this  She has had a port placed, and has been struggling since that time with occ abd discomfort and nausea  She went to ED this weekend for severe abd pain, not responsive to tramadol nor lorazepam   She has undergone one round of chemo, and GCSF x 1  After the GCSF, her abd pain sharply worsened overnight, and she presented to ED  She has found bentyl helpful for this, and now she is feeling more or less back to baseline  She suffered with what might have been possible EPSE during first chemo; she describes and uncontrollable TMJ spasm    After chemo, there was an itchy, flaky psoriaform rash   She has also recently noted stool urgency      Past Medical History:   Diagnosis Date    Diabetes mellitus ([address] )     Hypertension     Lactic acidosis 2020    Neuropathy     Obstructive jaundice 2020     Past Surgical History:   Procedure Laterality Date     SECTION      COLONOSCOPY     [address]    [place] [facility] [facility] VENOUS ACCESS DEVICE INSERTION  2020    JOINT REPLACEMENT Bilateral     MANDIBLE FRACTURE SURGERY  1972    REPLACEMENT TOTAL HIP W/  RESURFACING IMPLANTS Bilateral     right hip done 2012; left hip done 2012    TONSILLECTOMY      TUNNELED VENOUS PORT PLACEMENT Left 2020    Procedure: INSERTION VENOUS PORT (PORT-A-CATH), left;  Surgeon: Maria Elena Alvarado MD;  [facility];  Service: Surgical Oncology     Current Outpatient Medications   Medication Sig Dispense Refill    allopurinol (ZYLOPRIM) 100 mg tablet Take 100 mg by mouth daily      aspirin (Aspirin 81) 81 mg EC tablet Take 81 mg by mouth daily      dicyclomine (BENTYL) 20 mg tablet Take 1 tablet (20 mg total) by mouth 2 (two) times a day 60 tablet 0    diphenhydrAMINE (BENADRYL) 25 mg capsule Take 25 mg by mouth every 6 (six) hours as needed for itching      gabapentin (NEURONTIN) 300 mg capsule Take 300 mg by mouth 3 (three) times a day      hydrOXYzine HCL (ATARAX) 10 mg tablet Take 1 tablet (10 mg total) by mouth every 8 (eight) hours as needed for itching 30 tablet 0    metFORMIN (GLUCOPHAGE) 500 mg tablet Take by mouth 2 (two) times a day      omeprazole (PriLOSEC) 20 mg delayed release capsule Take 1 capsule (20 mg total) by mouth daily 30 capsule 1    oxyCODONE (ROXICODONE) 5 mg immediate release tablet Take 1 tablet (5 mg total) by mouth every 4 (four) hours as needed (cancer pain)Max Daily Amount: 30 mg 45 tablet 0    polyethylene glycol (MIRALAX) 17 g packet Take 17 g by mouth daily 30 each 1    prochlorperazine (COMPAZINE) 10 mg tablet Take 1 tablet (10 mg total) by mouth every 6 (six) hours as needed for nausea or vomiting 45 tablet 3    sitaGLIPtin-metFORMIN (JANUMET)  MG per tablet Take 1 tablet by mouth 2 (two) times a day with meals      sucralfate (CARAFATE) 1 g/10 mL suspension Take 10 mL (1 g total) by mouth 4 (four) times a day (with meals and at bedtime) 420 mL 0     No current facility-administered medications for this visit  Allergies   Allergen Reactions    Betadine [Povidone Iodine] Rash     Also itching from betadine       Review of Systems   Constitutional: Positive for fatigue  Negative for activity change (decreased energy), appetite change, chills, diaphoresis and unexpected weight change  HENT: Negative for mouth sores and nosebleeds  Eyes: Negative for photophobia and redness  Cardiovascular: Negative for chest pain and leg swelling  Gastrointestinal: Positive for nausea  Negative for vomiting  Endocrine: Negative for polyphagia and polyuria  Genitourinary: Negative for difficulty urinating, dysuria and flank pain  Musculoskeletal: Positive for back pain  Negative for gait problem and neck pain  Skin: Negative for pallor and rash  Neurological: Positive for light-headedness  Negative for seizures and speech difficulty  Spasms noted   Psychiatric/Behavioral: Negative for agitation, dysphoric mood and sleep disturbance  The patient is nervous/anxious  Video Exam  There were no vitals filed for this visit  Physical Exam   Constitutional: She is oriented to person, place, and time  She appears well-developed and well-nourished  No distress  HENT:   Head: Normocephalic and atraumatic  Right Ear: External ear normal    Left Ear: External ear normal    Mouth/Throat: Oropharynx is clear and moist    Eyes: Pupils are equal, round, and reactive to light  Conjunctivae and EOM are normal  Right eye exhibits no discharge  Left eye exhibits no discharge     Neck: Normal range of motion  No tracheal deviation present  Cardiovascular: Normal rate and regular rhythm  Pulmonary/Chest: Effort normal  No stridor  No respiratory distress  Abdominal: Soft  She exhibits no distension  Musculoskeletal: She exhibits no deformity  Neurological: She is alert and oriented to person, place, and time  No cranial nerve deficit  Skin: Skin is warm and dry  No rash noted  She is not diaphoretic  No erythema  No pallor  Psychiatric: She has a normal mood and affect  Her behavior is normal  Judgment and thought content normal            Using virtual media, I made an assessment that the patient has a severe and/or cancer-related illness that is causing intense pain and suffering that is not responding to non-opioid therapies  I discussed the following rules about opioid use with the patient, and informed them that the will need to sign a hard copy document that the Palliative office will mail to them  1 - Opioids are not going to completely eliminate pain, but are designed to reduce pain enough to improve function  They have risks and side effects like any other medicine  2 - Unlike other medicines, the side effects from opioid use may range from simple constipation to life-threatening respiratory suppression  3 - The patient is completely responsible for their own medicines  Medicines that are lost, destroyed, stolen, or misplaced will not be replaced by the provider  4 - The patient should not --  under ANY circumstances -- sell nor distribute their medicines  This is not only unsafe, it is illegal according to [place] [employer] [place] [employer]  5 - Opioids will be used by the patient not for recreational purposes, but to improve participation with a multimodal and interdisciplinary care plan  This may include attending visits to clinic with other specialities, and/or completing therapies and exercises with other disciplines besides the prescriber's service line    6 - The patient will bring a list of medication, and/or medication bottles upon provider request, to perform medication reconciliation at every visit  7 - Any change in opioid usage -- due to emergency, hospitalization, travel, or other cause - will be reported to provider as soon as possible to allow recalculation of appropriate opioid dosing by provider  8 - The patient may be required to submit for drug screening or pill counts at provider discretion  9 - Violation of the agreement, or any stipulation thereof, allows the provider to immediately cease provision of any and all opioid substances  10 - Patients who commit verbal, physical, and/or emotional abuse, or manipulation of the prescriber or their staff may trigger immediate discontinuation of all opioid medicines  Abuse of health care workers will not be tolerated  11 - In the setting of acute cessation of opioids, withdrawal may ensue  Withdrawal symptoms may include rebound pain, nausea, diarrhea, runny nose, irritability  Opioid withdrawal is not a life-threatening condition, and is self-limited  Special considerations for opioids:  1 - Narcan may be provided by prescriber for patients with high opioid burden or needs  This is a safety measure meant to help reverse overdose-related injury in settings of intentional or unintentional overdose  2 - Pregnant patients, or patients who may become pregnant, may impose several severe birth or developmental complications on the fetus by using opioids, even as prescribed  The patient is advised of these risks (if applicable), and I have considered appropriate contraception and/or family planning items with them  3 - Patients who do not have cancer pain may be restricted to fewer than 90mg of Oral Morphine Equivalents (OME) per day, per CDC guidelines  The patient accepts that prescriber has ultimate authority to respect or appeal this limitation  4 - Opioids can cause sedation or sleepiness, and may affect judgment  The patient is instructed not to drive nor operate machinery if experiencing these side effects, or other impairments  I spent 50+ minutes was spent during this virtual visit by Dorie, face to face with Alyson Lee and her family with greater than 50% of the time spent in counseling or coordination of care including discussions of etiology of diagnosis, diagnostic results, impression, and recommendations, risks and benefits of treatment, instructions for disease self management, risk factors and risk reduction of disease and compliance with treatment regimen, as well as intake and initial counseling on appropriate opioid use  All of the patient's questions were answered during this discussion  Encounter provider Rex Boxer, MD, located at:  [address] [place] [id number] [phone]    Recent Visits  Date Type Provider Dept   07/09/20 Telemedicine Rex Boxer, MD [address] recent visits within past 7 days and meeting all other requirements     Future Appointments  No visits were found meeting these conditions     Showing future appointments within next 150 days and meeting all other requirements

## 2020-07-13 ENCOUNTER — PATIENT OUTREACH (OUTPATIENT)
Dept: HEMATOLOGY ONCOLOGY | Facility: CLINIC | Age: 65
End: 2020-07-13

## 2020-07-13 ENCOUNTER — APPOINTMENT (OUTPATIENT)
Dept: LAB | Facility: HOSPITAL | Age: 65
End: 2020-07-13
Attending: INTERNAL MEDICINE
Payer: MEDICARE

## 2020-07-13 DIAGNOSIS — C25.9 PANCREATIC ADENOCARCINOMA (HCC): ICD-10-CM

## 2020-07-13 LAB
ALBUMIN SERPL BCP-MCNC: 3 G/DL (ref 3.5–5)
ALP SERPL-CCNC: 90 U/L (ref 46–116)
ALT SERPL W P-5'-P-CCNC: 20 U/L (ref 12–78)
ANION GAP SERPL CALCULATED.3IONS-SCNC: 10 MMOL/L (ref 4–13)
AST SERPL W P-5'-P-CCNC: 17 U/L (ref 5–45)
BASOPHILS # BLD MANUAL: 0 THOUSAND/UL (ref 0–0.1)
BASOPHILS NFR MAR MANUAL: 0 % (ref 0–1)
BILIRUB SERPL-MCNC: 0.4 MG/DL (ref 0.2–1)
BUN SERPL-MCNC: 13 MG/DL (ref 5–25)
CALCIUM SERPL-MCNC: 8.5 MG/DL (ref 8.3–10.1)
CHLORIDE SERPL-SCNC: 107 MMOL/L (ref 100–108)
CO2 SERPL-SCNC: 27 MMOL/L (ref 21–32)
CREAT SERPL-MCNC: 0.87 MG/DL (ref 0.6–1.3)
EOSINOPHIL # BLD MANUAL: 0 THOUSAND/UL (ref 0–0.4)
EOSINOPHIL NFR BLD MANUAL: 0 % (ref 0–6)
ERYTHROCYTE [DISTWIDTH] IN BLOOD BY AUTOMATED COUNT: 14.3 % (ref 11.6–15.1)
GFR SERPL CREATININE-BSD FRML MDRD: 69 ML/MIN/1.73SQ M
GLUCOSE P FAST SERPL-MCNC: 133 MG/DL (ref 65–99)
HCT VFR BLD AUTO: 38.7 % (ref 34.8–46.1)
HGB BLD-MCNC: 12.6 G/DL (ref 11.5–15.4)
LYMPHOCYTES # BLD AUTO: 33 % (ref 14–44)
LYMPHOCYTES # BLD AUTO: 4.79 THOUSAND/UL (ref 0.6–4.47)
MCH RBC QN AUTO: 31.8 PG (ref 26.8–34.3)
MCHC RBC AUTO-ENTMCNC: 32.6 G/DL (ref 31.4–37.4)
MCV RBC AUTO: 98 FL (ref 82–98)
METAMYELOCYTES NFR BLD MANUAL: 1 % (ref 0–1)
MONOCYTES # BLD AUTO: 0.44 THOUSAND/UL (ref 0–1.22)
MONOCYTES NFR BLD: 3 % (ref 4–12)
MYELOCYTES NFR BLD MANUAL: 1 % (ref 0–1)
NEUTROPHILS # BLD MANUAL: 8.99 THOUSAND/UL (ref 1.85–7.62)
NEUTS BAND NFR BLD MANUAL: 14 % (ref 0–8)
NEUTS SEG NFR BLD AUTO: 48 % (ref 43–75)
NRBC BLD AUTO-RTO: 0 /100 WBCS
PLATELET # BLD AUTO: 218 THOUSANDS/UL (ref 149–390)
PLATELET BLD QL SMEAR: ADEQUATE
PMV BLD AUTO: 10.5 FL (ref 8.9–12.7)
POTASSIUM SERPL-SCNC: 4.2 MMOL/L (ref 3.5–5.3)
PROT SERPL-MCNC: 6.5 G/DL (ref 6.4–8.2)
RBC # BLD AUTO: 3.96 MILLION/UL (ref 3.81–5.12)
SODIUM SERPL-SCNC: 144 MMOL/L (ref 136–145)
TOTAL CELLS COUNTED SPEC: 100
WBC # BLD AUTO: 14.5 THOUSAND/UL (ref 4.31–10.16)

## 2020-07-13 PROCEDURE — 80053 COMPREHEN METABOLIC PANEL: CPT

## 2020-07-13 PROCEDURE — 85007 BL SMEAR W/DIFF WBC COUNT: CPT

## 2020-07-13 PROCEDURE — 86301 IMMUNOASSAY TUMOR CA 19-9: CPT

## 2020-07-13 PROCEDURE — 85027 COMPLETE CBC AUTOMATED: CPT

## 2020-07-13 PROCEDURE — 36415 COLL VENOUS BLD VENIPUNCTURE: CPT

## 2020-07-14 ENCOUNTER — HOSPITAL ENCOUNTER (OUTPATIENT)
Dept: INFUSION CENTER | Facility: CLINIC | Age: 65
Discharge: HOME | End: 2020-07-14

## 2020-07-14 ENCOUNTER — HOSPITAL ENCOUNTER (OUTPATIENT)
Dept: INFUSION CENTER | Facility: CLINIC | Age: 65
Discharge: HOME | End: 2020-07-14
Payer: MEDICARE

## 2020-07-14 VITALS
HEART RATE: 100 BPM | DIASTOLIC BLOOD PRESSURE: 81 MMHG | TEMPERATURE: 97.7 F | BODY MASS INDEX: 23.11 KG/M2 | SYSTOLIC BLOOD PRESSURE: 144 MMHG | OXYGEN SATURATION: 99 % | RESPIRATION RATE: 20 BRPM | WEIGHT: 114.64 LBS | HEIGHT: 59 IN

## 2020-07-14 DIAGNOSIS — C25.9 PANCREATIC ADENOCARCINOMA (HCC): Primary | ICD-10-CM

## 2020-07-14 PROCEDURE — 96417 CHEMO IV INFUS EACH ADDL SEQ: CPT

## 2020-07-14 PROCEDURE — 96375 TX/PRO/DX INJ NEW DRUG ADDON: CPT

## 2020-07-14 PROCEDURE — 96415 CHEMO IV INFUSION ADDL HR: CPT

## 2020-07-14 PROCEDURE — 96376 TX/PRO/DX INJ SAME DRUG ADON: CPT

## 2020-07-14 PROCEDURE — 96367 TX/PROPH/DG ADDL SEQ IV INF: CPT

## 2020-07-14 PROCEDURE — 96411 CHEMO IV PUSH ADDL DRUG: CPT

## 2020-07-14 PROCEDURE — G0498 CHEMO EXTEND IV INFUS W/PUMP: HCPCS

## 2020-07-14 PROCEDURE — 96413 CHEMO IV INFUSION 1 HR: CPT

## 2020-07-14 RX ORDER — ATROPINE SULFATE 1 MG/ML
0.25 INJECTION, SOLUTION INTRAMUSCULAR; INTRAVENOUS; SUBCUTANEOUS ONCE
Status: COMPLETED | OUTPATIENT
Start: 2020-07-14 | End: 2020-07-14

## 2020-07-14 RX ORDER — ATROPINE SULFATE 1 MG/ML
0.25 INJECTION, SOLUTION INTRAMUSCULAR; INTRAVENOUS; SUBCUTANEOUS ONCE AS NEEDED
Status: DISCONTINUED | OUTPATIENT
Start: 2020-07-14 | End: 2020-07-17 | Stop reason: HOSPADM

## 2020-07-14 RX ORDER — DIPHENHYDRAMINE HYDROCHLORIDE 50 MG/ML
25 INJECTION INTRAMUSCULAR; INTRAVENOUS ONCE
Status: CANCELLED
Start: 2020-07-14

## 2020-07-14 RX ORDER — FAMOTIDINE 20 MG/50ML
20 INJECTION, SOLUTION INTRAVENOUS ONCE
Status: COMPLETED | OUTPATIENT
Start: 2020-07-14 | End: 2020-07-14

## 2020-07-14 RX ORDER — ALBUTEROL SULFATE 90 UG/1
2 AEROSOL, METERED RESPIRATORY (INHALATION) EVERY 4 HOURS PRN
Status: DISCONTINUED | OUTPATIENT
Start: 2020-07-14 | End: 2020-07-17 | Stop reason: HOSPADM

## 2020-07-14 RX ORDER — DIPHENHYDRAMINE HYDROCHLORIDE 50 MG/ML
50 INJECTION INTRAMUSCULAR; INTRAVENOUS ONCE
Status: COMPLETED | OUTPATIENT
Start: 2020-07-14 | End: 2020-07-14

## 2020-07-14 RX ORDER — FLUOROURACIL 50 MG/ML
400 INJECTION, SOLUTION INTRAVENOUS ONCE
Status: COMPLETED | OUTPATIENT
Start: 2020-07-14 | End: 2020-07-14

## 2020-07-14 RX ORDER — SODIUM CHLORIDE 9 MG/ML
20 INJECTION, SOLUTION INTRAVENOUS ONCE AS NEEDED
Status: DISCONTINUED | OUTPATIENT
Start: 2020-07-14 | End: 2020-07-17 | Stop reason: HOSPADM

## 2020-07-14 RX ORDER — DEXTROSE MONOHYDRATE 50 MG/ML
20 INJECTION, SOLUTION INTRAVENOUS ONCE
Status: COMPLETED | OUTPATIENT
Start: 2020-07-14 | End: 2020-07-14

## 2020-07-14 RX ADMIN — DIPHENHYDRAMINE HYDROCHLORIDE 50 MG: 50 INJECTION, SOLUTION INTRAMUSCULAR; INTRAVENOUS at 14:57

## 2020-07-14 RX ADMIN — DEXTROSE 20 ML/HR: 5 SOLUTION INTRAVENOUS at 10:22

## 2020-07-14 RX ADMIN — SODIUM CHLORIDE 150 MG: 0.9 INJECTION, SOLUTION INTRAVENOUS at 09:48

## 2020-07-14 RX ADMIN — SODIUM CHLORIDE 20 ML/HR: 0.9 INJECTION, SOLUTION INTRAVENOUS at 08:33

## 2020-07-14 RX ADMIN — DIPHENHYDRAMINE HYDROCHLORIDE 25 MG: 50 INJECTION, SOLUTION INTRAMUSCULAR; INTRAVENOUS at 09:18

## 2020-07-14 RX ADMIN — HYDROCORTISONE SODIUM SUCCINATE 100 MG: 100 INJECTION, POWDER, FOR SOLUTION INTRAMUSCULAR; INTRAVENOUS at 14:58

## 2020-07-14 RX ADMIN — ATROPINE SULFATE 0.25 MG: 1 INJECTION, SOLUTION INTRAMUSCULAR; INTRAVENOUS; SUBCUTANEOUS at 13:23

## 2020-07-14 RX ADMIN — FAMOTIDINE 20 MG: 20 INJECTION, SOLUTION INTRAVENOUS at 15:00

## 2020-07-14 RX ADMIN — OXALIPLATIN 124.1 MG: 5 INJECTION, SOLUTION INTRAVENOUS at 10:26

## 2020-07-14 RX ADMIN — FLUOROURACIL 585 MG: 50 INJECTION, SOLUTION INTRAVENOUS at 16:40

## 2020-07-14 RX ADMIN — ALBUTEROL SULFATE 2 PUFF: 90 AEROSOL, METERED RESPIRATORY (INHALATION) at 15:04

## 2020-07-14 RX ADMIN — IRINOTECAN HYDROCHLORIDE 263 MG: 20 INJECTION, SOLUTION INTRAVENOUS at 13:29

## 2020-07-14 RX ADMIN — DEXAMETHASONE SODIUM PHOSPHATE: 10 INJECTION, SOLUTION INTRAMUSCULAR; INTRAVENOUS at 08:55

## 2020-07-14 NOTE — PLAN OF CARE
Problem: Potential for Falls  Goal: Patient will remain free of falls  Description  INTERVENTIONS:  - Assess patient frequently for physical needs  -  Identify cognitive and physical deficits and behaviors that affect risk of falls  -  [place] fall precautions as indicated by assessment   - Educate patient/family on patient safety including physical limitations  - Instruct patient to call for assistance with activity based on assessment  - Modify environment to reduce risk of injury  - Consider OT/PT consult to assist with strengthening/mobility  Outcome: Progressing     Problem: SAFETY ADULT  Goal: Patient will remain free of falls  Description  INTERVENTIONS:  - Assess patient frequently for physical needs  -  Identify cognitive and physical deficits and behaviors that affect risk of falls  -  [place] fall precautions as indicated by assessment   - Educate patient/family on patient safety including physical limitations  - Instruct patient to call for assistance with activity based on assessment  - Modify environment to reduce risk of injury  - Consider OT/PT consult to assist with strengthening/mobility  Outcome: Progressing     Problem: Knowledge Deficit  Goal: Patient/family/caregiver demonstrates understanding of disease process, treatment plan, medications, and discharge instructions  Description  Complete learning assessment and assess knowledge base    Interventions:  - Provide teaching at level of understanding  - Provide teaching via preferred learning methods  Outcome: Progressing

## 2020-07-15 LAB — CANCER AG19-9 SERPL-ACNC: 41 U/ML (ref 0–35)

## 2020-07-16 ENCOUNTER — HOSPITAL ENCOUNTER (OUTPATIENT)
Dept: INFUSION CENTER | Facility: CLINIC | Age: 65
Discharge: HOME | End: 2020-07-16
Payer: MEDICARE

## 2020-07-16 DIAGNOSIS — C25.9 PANCREATIC ADENOCARCINOMA (HCC): Primary | ICD-10-CM

## 2020-07-16 PROCEDURE — 96372 THER/PROPH/DIAG INJ SC/IM: CPT

## 2020-07-16 RX ADMIN — PEGFILGRASTIM 6 MG: KIT SUBCUTANEOUS at 15:51

## 2020-07-16 NOTE — PROGRESS NOTES
Patient here for CADD pump removal and Neulasta OnPro  Reservoir volume was [employer]  Port flushed and deaccessed  Neulasta OnPro applied to the left abdomen  Patient aware to take onpro off 7/17 2000  AVS given  Aware of next scheduled appointment  Walked out in stable condition

## 2020-07-20 RX ORDER — LORAZEPAM 0.5 MG/1
0.5 TABLET ORAL 2 TIMES DAILY PRN
COMMUNITY
Start: 2020-06-30 | End: 2020-11-16 | Stop reason: SDUPTHER

## 2020-07-20 NOTE — PROGRESS NOTES
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  The patient's bilirubin which was elevated at 12 2 at its peak has now come down to 1 0  She was seen by our colleagues in Surgical Oncology and they recommended neoadjuvant chemotherapy prior to consideration of a resection  She was started on FOLFIRINOX  every 2 weeks with Neulasta growth factor support; first dose was given on 6/30/20  Previous Hematologic/ Oncologic History:       Pancreatic adenocarcinoma ([facility] [address] )    6/2/2020 Biopsy     [facility]- Dr Nguyen Prime:  Pancreas, uncinate mass:      - Malignant ([facility] Category VI)      - Compatible with adenocarcinoma with mucinous features        6/30/2020 -  Chemotherapy     fluorouracil (ADRUCIL) injection 585 mg, 400 mg/m2 = 585 mg, Intravenous, Once, 2 of 12 cycles  Administration: 585 mg (6/30/2020), 585 mg (7/14/2020)  pegfilgrastim (NEULASTA ONPRO) subcutaneous injection kit 6 mg, 6 mg, Subcutaneous, Once, 2 of 12 cycles  Administration: 6 mg (7/2/2020), 6 mg (7/16/2020)  fosaprepitant (EMEND) 150 mg in sodium chloride 0 9 % 250 mL IVPB, 150 mg, Intravenous, Once, 2 of 12 cycles  Administration: 150 mg (6/30/2020), 150 mg (7/14/2020)  irinotecan (CAMPTOSAR) 263 mg in dextrose 5 % 500 mL chemo infusion, 180 mg/m2 = 263 mg, Intravenous, Once, 2 of 12 cycles  Administration: 263 mg (6/30/2020), 263 mg (7/14/2020)  oxaliplatin (ELOXATIN) 124 1 mg in dextrose 5 % 250 mL chemo infusion, 85 mg/m2 = 124 1 mg, Intravenous, Once, 2 of 12 cycles  Administration: 124 1 mg (2020), 124 1 mg (2020)         Current Hematologic/ Oncologic Treatment:    FOLFIRINOX every 2 weeks with Neulasta growth factor support    ECO - Symptomatic but completely ambulatory    Interval History:  The patient presents for follow-up visit along with her   She has completed 2 cycles of FOLFIRINOX  She has experienced some treatment related side effects including crampy abdominal pain, taste bud changes, minor cold sensitivity, numbness in her fingers, jaw claudication  Patient is highly anxious and endorses heightened anxiety on days she has to go for treatment  During her last infusion she described sensation of lock jaw and feeling that her throat was closing; this improved with warm tea and all of her symptoms were resolved once she was home from treatment  She was seen in ED on  for evaluation of abdominal pain and was started on Bentyl and Prilosec  These have been helpful  CT of the abdomen and pelvis was done as part of her ED workup, results indicated: Interval placement of a bile duct stent with resolution of biliary obstruction  Pneumobilia is likely postsurgical  Stable dilatation of the proximal pancreatic duct  Limited visualization of pancreatic head 2 2 cm mass, stable  Findings suggestive of constipation  Possible mild enteritis  No evidence of bowel obstruction, colitis or diverticulitis  The  She is following with palliative care  She feels well today and states that typically she feels well the week off treatment  She denies any nausea/vomiting  No complaints of diarrhea or constipation  She now taking MiraLax regularly  She has been having some treatment related fatigue  She does nap during the day  She continues to do activities around the house that she enjoys such as crafting  Sleep is disrupted by her anxiety at times    Most recent laboratory results were reviewed    Her CA 19- 9: 41; this is coming down nicely  Her CBC and CMP all within acceptable range  Cancer Staging:  Cancer Staging  No matching staging information was found for the patient  Molecular Testing:       Test Results:    Imaging: Xr Chest 1 View Portable    Result Date: 7/5/2020  Narrative: CHEST INDICATION:   epigastric pain  COMPARISON:  Chest radiograph from 6/23/2020 and chest CT from 5/29/2020  EXAM PERFORMED/VIEWS:  XR CHEST PORTABLE FINDINGS:  Left port at cavoatrial junction  Cardiomediastinal silhouette appears unremarkable  The lungs are clear  No pneumothorax or pleural effusion  Osseous structures appear within normal limits for patient age  Impression: No acute cardiopulmonary disease  Workstation performed: [id number]     Xr Chest Portable    Result Date: 6/23/2020  Narrative: CHEST INDICATION:   port insertion  COMPARISON:  None EXAM PERFORMED/VIEWS:  XR CHEST PORTABLE FINDINGS:  Left subclavian chest port with tip near the cavoatrial junction  Cardiomediastinal silhouette appears unremarkable  The lungs are clear  No pneumothorax or pleural effusion  Osseous structures appear within normal limits for patient age  Impression: Left subclavian chest port with tip near the cavoatrial junction  No active pulmonary disease  No pneumothorax  Workstation performed: [id number]     Fl Guided Central Venous Access Device Insertion    Result Date: 6/24/2020  Narrative: C-ARM - chest INDICATION: C25 9: Malignant neoplasm of pancreas, unspecified  Procedure guidance  COMPARISON:  None TECHNIQUE: FLUOROSCOPY TIME:   3 3 SECONDS 1 FLUOROSCOPIC IMAGES FINDINGS: Fluoroscopic guidance provided for surgical procedure  Osseous and soft tissue detail limited by technique  Impression: Fluoroscopic guidance provided for surgical procedure  Please refer to the separate procedure notes for additional details    Workstation performed: [id number]     Ct Abdomen Pelvis With Contrast    Result Date: 7/5/2020  Narrative: CT ABDOMEN AND PELVIS WITH IV CONTRAST INDICATION:   Epigastric pain  Undergoing chemotherapy for pancreatic cancer  COMPARISON:  5/29/2020  TECHNIQUE:  CT examination of the abdomen and pelvis was performed  Axial, sagittal, and coronal 2D reformatted images were created from the source data and submitted for interpretation  Radiation dose length product (DLP) for this visit:  440 mGy-cm   This examination, like all CT scans performed in the [facility], was performed utilizing techniques to minimize radiation dose exposure, including the use of iterative reconstruction and automated exposure control  IV Contrast:  100 mL of iohexol (OMNIPAQUE) Enteric Contrast:  Enteric contrast was not administered  FINDINGS: ABDOMEN LOWER CHEST:  Clear lung bases  LIVER/BILIARY TREE:  Intrahepatic pneumobilia with stable mild biliary dilatation  Interval placement of stent in the common bile duct with proximal pneumobilia and interval resolution of ductal obstruction  GALLBLADDER:  Small amount of gas in the gallbladder likely secondary to stent placement  No evidence of cholecystitis  SPLEEN:  Unremarkable  PANCREAS:  Stable dilatation of the proximal pancreatic duct measuring 8 mm in diameter  Pancreatic head mass less well visualized though likely stable measuring approximately 2 2 cm  ADRENAL GLANDS:  Unremarkable  KIDNEYS/URETERS:  No pyelonephritis or obstructive uropathy  One or more sharply circumscribed subcentimeter renal hypodensities are noted  These lesions are too small to accurately characterize, but are statistically most likely to represent benign cortical renal cyst(s)  According to the guidelines published in  the [employer] Paper of the ACR Incidental Findings Committee (Radiology 2010), no further workup of these lesions is recommended  STOMACH AND BOWEL: Partially collapsed gastric fundus, limiting evaluation  Fluid-filled loops of mildly distended small bowel without wall thickening or transition point  Moderate amount stool throughout the colon  No evidence of colitis, diverticulitis or bowel obstruction    APPENDIX:  No findings to suggest appendicitis  ABDOMINOPELVIC CAVITY:  No ascites  No pneumoperitoneum  No lymphadenopathy  VESSELS:  No mesenteric vessel encasement  Patent portal, splenic and superior mesenteric veins  PELVIS REPRODUCTIVE ORGANS:  Pelvis obscured by streak and beam hardening artifact  URINARY BLADDER:  Unremarkable  ABDOMINAL WALL/INGUINAL REGIONS:  Unremarkable  OSSEOUS STRUCTURES:  Normal alignment of bilateral hip arthroplasties  Impression: 1  Interval placement of a bile duct stent with resolution of biliary obstruction  Pneumobilia is likely postsurgical  2   Stable dilatation of the proximal pancreatic duct  Limited visualization of pancreatic head 2 2 cm mass, stable  3   Findings suggestive of constipation  Possible mild enteritis  No evidence of bowel obstruction, colitis or diverticulitis  Workstation performed: [id number]       Labs:   Lab Results   Component Value Date    WBC 14 50 (H) 07/13/2020    HGB 12 6 07/13/2020    HCT 38 7 07/13/2020    MCV 98 07/13/2020     07/13/2020     Lab Results   Component Value Date    K 4 2 07/13/2020     07/13/2020    CO2 27 07/13/2020    BUN 13 07/13/2020    CREATININE 0 87 07/13/2020    GLUF 133 (H) 07/13/2020    CALCIUM 8 5 07/13/2020    AST 17 07/13/2020    ALT 20 07/13/2020    ALKPHOS 90 07/13/2020    EGFR 69 07/13/2020       Lab Results   Component Value Date    [id number] 458 09/17/2019         Review of Systems   Constitutional: Positive for fatigue  HENT: Positive for voice change (hoarsness )  Neurological: Positive for numbness (finger tips, feet)  Psychiatric/Behavioral: Positive for sleep disturbance  The patient is nervous/anxious            Active Problems:   Patient Active Problem List   Diagnosis    Hyperlipidemia    Essential hypertension    Type 2 diabetes mellitus without complication, without long-term current use of insulin (HCC)    Pruritus    Transaminitis    Pancreatic adenocarcinoma ([facility] [address] )    Port-A-Cath in place    Therapeutic opioid-induced constipation (OIC)    Anxiety       Past Medical History:   Past Medical History:   Diagnosis Date    Diabetes mellitus ([facility] [address] )     Hypertension     Lactic acidosis 2020    Neuropathy     Obstructive jaundice 2020       Surgical History:   Past Surgical History:   Procedure Laterality Date     SECTION      COLONOSCOPY      ECTOPIC PREGNANCY SURGERY      [address]  2020    JOINT REPLACEMENT Bilateral     MANDIBLE FRACTURE SURGERY  1972    REPLACEMENT TOTAL HIP W/  RESURFACING IMPLANTS Bilateral 2012    right hip done 2012; left hip done 2012    TONSILLECTOMY      TUNNELED VENOUS PORT PLACEMENT Left 2020    Procedure: INSERTION VENOUS PORT (PORT-A-CATH), left;  Surgeon: Génesis Khalil MD;  [facility];  Service: Surgical Oncology       Family History:    Family History   Problem Relation Age of Onset    Diabetes Mother     Heart disease Mother     Heart disease Father     Breast cancer additional onset Sister     Breast cancer additional onset Maternal Aunt     Stroke Maternal Grandfather        Cancer-related family history is not on file      Social History:   Social History     Socioeconomic History    Marital status: /Civil Union     Spouse name: Not on file    Number of children: Not on file    Years of education: Not on file    Highest education level: Not on file   Occupational History    Not on file   Social Needs    Financial resource strain: Not on file    Food insecurity:     Worry: Not on file     Inability: Not on file    Transportation needs:     Medical: Not on file     Non-medical: Not on file   Tobacco Use    Smoking status: Former Smoker    Smokeless tobacco: Never Used    Tobacco comment: quit 40 years ago   Substance and Sexual Activity    Alcohol use: Not Currently     Frequency: Monthly or less    Drug use: Never    Sexual activity: Not Currently   Lifestyle    Physical activity:     Days per week: Not on file     Minutes per session: Not on file    Stress: Not on file   Relationships    Social connections:     Talks on phone: Not on file     Gets together: Not on file     Attends Yazdanism service: Not on file     Active member of club or organization: Not on file     Attends meetings of clubs or organizations: Not on file     Relationship status: Not on file    Intimate partner violence:     Fear of current or ex partner: Not on file     Emotionally abused: Not on file     Physically abused: Not on file     Forced sexual activity: Not on file   Other Topics Concern    Not on file   Social History Narrative    Not on file       Current Medications:   Current Outpatient Medications   Medication Sig Dispense Refill    allopurinol (ZYLOPRIM) 100 mg tablet Take 100 mg by mouth daily      aspirin (Aspirin 81) 81 mg EC tablet Take 81 mg by mouth daily      dicyclomine (BENTYL) 20 mg tablet Take 1 tablet (20 mg total) by mouth 2 (two) times a day 60 tablet 0    diphenhydrAMINE (BENADRYL) 25 mg capsule Take 25 mg by mouth every 6 (six) hours as needed for itching      gabapentin (NEURONTIN) 300 mg capsule Take 300 mg by mouth 3 (three) times a day      hydrOXYzine HCL (ATARAX) 10 mg tablet Take 1 tablet (10 mg total) by mouth every 8 (eight) hours as needed for itching 30 tablet 0    LORazepam (ATIVAN) 0 5 mg tablet Take 0 5 mg by mouth 2 (two) times a day as needed      metFORMIN (GLUCOPHAGE) 500 mg tablet Take by mouth 2 (two) times a day      omeprazole (PriLOSEC) 20 mg delayed release capsule Take 1 capsule (20 mg total) by mouth daily 30 capsule 1    oxyCODONE (ROXICODONE) 5 mg immediate release tablet Take 1 tablet (5 mg total) by mouth every 4 (four) hours as needed (cancer pain)Max Daily Amount: 30 mg 45 tablet 0    polyethylene glycol (MIRALAX) 17 g packet Take 17 g by mouth daily 30 each 1    prochlorperazine (COMPAZINE) 10 mg tablet Take 1 tablet (10 mg total) by mouth every 6 (six) hours as needed for nausea or vomiting 45 tablet 3    sitaGLIPtin-metFORMIN (JANUMET)  MG per tablet Take 1 tablet by mouth 2 (two) times a day with meals      sucralfate (CARAFATE) 1 g/10 mL suspension Take 10 mL (1 g total) by mouth 4 (four) times a day (with meals and at bedtime) 420 mL 0    mirtazapine (REMERON) 15 mg tablet Take 1 tablet (15 mg total) by mouth daily at bedtime 30 tablet 1     No current facility-administered medications for this visit  Allergies: Allergies   Allergen Reactions    Betadine [Povidone Iodine] Rash     Also itching from betadine       Physical Exam:  /76 (BP Location: Right arm)   Pulse (!) 125   Temp (!) 97 4 °F (36 3 °C) (Tympanic)   Resp 16   Ht 4' 11" (1 499 m)   Wt 50 3 kg (111 lb)   SpO2 98%   BMI 22 42 kg/m²   Body surface area is 1 44 meters squared  Wt Readings from Last 3 Encounters:   07/21/20 50 3 kg (111 lb)   07/14/20 52 kg (114 lb 10 2 oz)   06/30/20 51 6 kg (113 lb 12 1 oz)           Physical Exam   Constitutional: She is oriented to person, place, and time  She appears well-developed and well-nourished  No distress  Alert, pleasant, anxious appearing  In no acute distress   HENT:   Head: Normocephalic and atraumatic  Right Ear: External ear normal    Left Ear: External ear normal    Mouth/Throat: Oropharynx is clear and moist  No oropharyngeal exudate  Eyes: Pupils are equal, round, and reactive to light  Right eye exhibits no discharge  Left eye exhibits no discharge  No scleral icterus  Neck: Normal range of motion  Neck supple  Cardiovascular: Normal rate and regular rhythm  Pulmonary/Chest: Effort normal and breath sounds normal    Abdominal: Soft  Bowel sounds are normal  There is no tenderness  Musculoskeletal: Normal range of motion  She exhibits no edema  Lymphadenopathy:     She has no cervical adenopathy  Neurological: She is alert and oriented to person, place, and time  Skin: Skin is warm and dry  She is not diaphoretic  Psychiatric: Her behavior is normal  Judgment and thought content normal    Patient is anxious       Assessment / Plan:    1  Pancreatic adenocarcinoma [facility])   The patient is a pleasant 57-year-old female with newly diagnosed pancreatic cancer which is localized to the pancreas based on imaging  She is currently undergoing neoadjuvant chemotherapy with FOLFIRINOX every 2 weeks with Neulasta growth factor support  She has completed 2 cycles  She has had some treatment related side effects, however she feels her symptoms have been manageable and does not wish to have any dose reduction on her chemotherapy  I did explain to her that if her baseline neuropathy worsens or she develops worsening jaw claudication we may need to adjust her dose of oxaliplatin  She verbalized understanding  Most recent CT scan was completed on 7/5 showing stable disease  Plan would be to complete at least 6 cycles prior to resection  She has a follow up with her surgeon, Dr Evie Abad on 8/17    She will continue on current treatment with blood work every 2 weeks  I will see her back in 4 weeks  She was instructed to call with any questions or concerns prior to her next office visit  2  Anxiety  Patient endorses baseline anxiety that is the heightened on day of treatment  Her PCP started her on Lorazepam  This has been helpful  I will add lorazepam 0 5 mg IV as a premedication on her day 1 of future treatments  Also discussed the benefit of starting mirtazapine to help provide better control over of her anxiety  This medication has also been shown to be effective in helping with sleep and appetite which she could benefit from as well    Patient was agreeable to starting this medication  Mirtazapine 15 mg at bedtime was recommended  Prescription was sent to her pharmacy  She may also continue to take her lorazepam as needed as prescribed by her PCP  Goals and Barriers:  Current Goal:  Prolong Survival from pancreatic cancer  Barriers: None  Patient's Capacity to Self Care:  Patient is able to self care  Portions of the record may have been created with voice recognition software  Occasional wrong word or "sound a like" substitutions may have occurred due to the inherent limitations of voice recognition software  Read the chart carefully and recognize, using context, where substitutions have occurred

## 2020-07-21 ENCOUNTER — OFFICE VISIT (OUTPATIENT)
Dept: HEMATOLOGY ONCOLOGY | Facility: CLINIC | Age: 65
End: 2020-07-21
Payer: MEDICARE

## 2020-07-21 VITALS
BODY MASS INDEX: 22.38 KG/M2 | OXYGEN SATURATION: 98 % | RESPIRATION RATE: 16 BRPM | TEMPERATURE: 97.4 F | WEIGHT: 111 LBS | HEIGHT: 59 IN | DIASTOLIC BLOOD PRESSURE: 76 MMHG | SYSTOLIC BLOOD PRESSURE: 142 MMHG | HEART RATE: 125 BPM

## 2020-07-21 DIAGNOSIS — C25.9 PANCREATIC ADENOCARCINOMA (HCC): Chronic | ICD-10-CM

## 2020-07-21 DIAGNOSIS — F41.9 ANXIETY: Primary | ICD-10-CM

## 2020-07-21 DIAGNOSIS — C25.9 PANCREATIC ADENOCARCINOMA (HCC): Primary | ICD-10-CM

## 2020-07-21 PROCEDURE — 99215 OFFICE O/P EST HI 40 MIN: CPT | Performed by: NURSE PRACTITIONER

## 2020-07-21 RX ORDER — DEXTROSE MONOHYDRATE 50 MG/ML
20 INJECTION, SOLUTION INTRAVENOUS ONCE
Status: CANCELLED | OUTPATIENT
Start: 2020-07-28

## 2020-07-21 RX ORDER — LORAZEPAM 2 MG/ML
0.5 INJECTION INTRAMUSCULAR ONCE
Status: CANCELLED
Start: 2020-07-28

## 2020-07-21 RX ORDER — SODIUM CHLORIDE 9 MG/ML
20 INJECTION, SOLUTION INTRAVENOUS ONCE AS NEEDED
Status: CANCELLED | OUTPATIENT
Start: 2020-08-11

## 2020-07-21 RX ORDER — FLUOROURACIL 50 MG/ML
400 INJECTION, SOLUTION INTRAVENOUS ONCE
Status: CANCELLED | OUTPATIENT
Start: 2020-07-28

## 2020-07-21 RX ORDER — MIRTAZAPINE 15 MG/1
15 TABLET, FILM COATED ORAL
Qty: 30 TABLET | Refills: 1 | Status: SHIPPED | OUTPATIENT
Start: 2020-07-21 | End: 2020-08-12

## 2020-07-21 RX ORDER — ATROPINE SULFATE 1 MG/ML
0.25 INJECTION, SOLUTION INTRAMUSCULAR; INTRAVENOUS; SUBCUTANEOUS ONCE
Status: CANCELLED | OUTPATIENT
Start: 2020-08-11

## 2020-07-21 RX ORDER — ATROPINE SULFATE 1 MG/ML
0.25 INJECTION, SOLUTION INTRAMUSCULAR; INTRAVENOUS; SUBCUTANEOUS ONCE AS NEEDED
Status: CANCELLED | OUTPATIENT
Start: 2020-07-28

## 2020-07-21 RX ORDER — SODIUM CHLORIDE 9 MG/ML
20 INJECTION, SOLUTION INTRAVENOUS ONCE AS NEEDED
Status: CANCELLED | OUTPATIENT
Start: 2020-07-28

## 2020-07-21 RX ORDER — ATROPINE SULFATE 1 MG/ML
0.25 INJECTION, SOLUTION INTRAMUSCULAR; INTRAVENOUS; SUBCUTANEOUS ONCE
Status: CANCELLED | OUTPATIENT
Start: 2020-07-28

## 2020-07-21 RX ORDER — DEXTROSE MONOHYDRATE 50 MG/ML
20 INJECTION, SOLUTION INTRAVENOUS ONCE
Status: CANCELLED | OUTPATIENT
Start: 2020-08-11

## 2020-07-21 RX ORDER — ATROPINE SULFATE 1 MG/ML
0.25 INJECTION, SOLUTION INTRAMUSCULAR; INTRAVENOUS; SUBCUTANEOUS ONCE AS NEEDED
Status: CANCELLED | OUTPATIENT
Start: 2020-08-11

## 2020-07-21 RX ORDER — FLUOROURACIL 50 MG/ML
400 INJECTION, SOLUTION INTRAVENOUS ONCE
Status: CANCELLED | OUTPATIENT
Start: 2020-08-11

## 2020-07-21 RX ORDER — LORAZEPAM 2 MG/ML
0.5 INJECTION INTRAMUSCULAR ONCE
Status: CANCELLED
Start: 2020-08-11

## 2020-07-23 ENCOUNTER — TELEPHONE (OUTPATIENT)
Dept: GYNECOLOGIC ONCOLOGY | Facility: CLINIC | Age: 65
End: 2020-07-23

## 2020-07-23 ENCOUNTER — OFFICE VISIT (OUTPATIENT)
Dept: GASTROENTEROLOGY | Facility: CLINIC | Age: 65
End: 2020-07-23
Payer: MEDICARE

## 2020-07-23 VITALS
TEMPERATURE: 98 F | DIASTOLIC BLOOD PRESSURE: 64 MMHG | SYSTOLIC BLOOD PRESSURE: 120 MMHG | HEART RATE: 114 BPM | WEIGHT: 111 LBS | HEIGHT: 59 IN | BODY MASS INDEX: 22.38 KG/M2

## 2020-07-23 DIAGNOSIS — C25.9 PANCREATIC ADENOCARCINOMA (HCC): Primary | Chronic | ICD-10-CM

## 2020-07-23 PROCEDURE — 99213 OFFICE O/P EST LOW 20 MIN: CPT | Performed by: PHYSICIAN ASSISTANT

## 2020-07-23 NOTE — TELEPHONE ENCOUNTER
Harvey Lee called back and decided to proceed with genetic testing  Plan    Summary: Genetic testing is indicated for Harvey Lee based on meeting NCCN testing criteria  Sample Collection: We will overnight her an [employer] blood kit and requisition form  She will go to a [facility]'s lab to have her blood drawn  Genetic Testing Preformed: [employer] (21 genes): APC, TIMOTHY, BRCA1, BRCA2, BRIP1, CDH1, CDKN2A, CHEK2, EPCAM, MLH1, MSH2, MSH6, NBN, NF1, PALB2, PMS2, PTEN, RAD51C, RAD51D, STK11, TP53    Genetic Testing Lab: [facility]    Results take approximately 2-3 weeks to complete once test is started  We will contact Harvey Rosa once results are available  Additional recommendations for surveillance/medical management will be made pending genetic test results

## 2020-07-23 NOTE — PATIENT INSTRUCTIONS
Jaundice   WHAT YOU NEED TO KNOW:   Jaundice is yellowing of your skin and eyes  It is caused by high levels of bilirubin in your body  Bilirubin is a substance that is produced when the liver breaks down red blood cells  Jaundice may be a symptom of liver, pancreas, or gallbladder problems  Genetic disorders, medicines such as acetaminophen, or excess alcohol use may also cause jaundice  DISCHARGE INSTRUCTIONS:   Return to the emergency department if:   · You have severe abdominal pain or a fever  · You suddenly feel lightheaded or faint  Contact your healthcare provider if:   · You begin to have tea-colored urine or pale, gray bowel movements  · Your skin and eyes become more yellow, or other symptoms get worse  · You are confused, or others notice changes in your behavior  · You have questions or concerns about your condition or care  Medicines:   · Medicines  can decrease bilirubin levels and reduce your itching  · Take your medicine as directed  Contact your healthcare provider if you think your medicine is not helping or if you have side effects  Tell him of her if you are allergic to any medicine  Keep a list of the medicines, vitamins, and herbs you take  Include the amounts, and when and why you take them  Bring the list or the pill bottles to follow-up visits  Carry your medicine list with you in case of an emergency  Follow up with your healthcare provider as directed: You will need to return for more tests  Write down your questions so you remember to ask them during your visits  Manage your symptoms:   · Drink more liquids as directed  Liquids help you stay hydrated and urinate more  This helps prevent harm to your kidneys  Ask how much liquid to drink each day and which liquids are best for you  · Eat foods low in fat  Healthy low-fat foods include fruits, vegetables, whole-grain breads, low-fat dairy products, beans, lean meats, and fish   These foods are easier to digest and may help reduce your symptoms  · Do not drink alcohol  Alcohol harms your liver and may make your symptoms worse  © 2017 [employer] Information is for End User's use only and may not be sold, redistributed or otherwise used for commercial purposes  All illustrations and images included in CareNotes® are the copyrighted property of A [employer] M [employer]  or J[employer]i Chow  The above information is an  only  It is not intended as medical advice for individual conditions or treatments  Talk to your doctor, nurse or pharmacist before following any medical regimen to see if it is safe and effective for you

## 2020-07-23 NOTE — TELEPHONE ENCOUNTER
Overnight Amb[employer] Kit and paperwork to patient as instructed by Ellen Conde       [facility] Tracking# [id number]

## 2020-07-23 NOTE — PROGRESS NOTES
[facility] - Outpatient Follow-up Note  Puneet Gutierrez 77 y o  female MRN: [id number]  Encounter: [id number]          ASSESSMENT AND PLAN:      1  Pancreatic adenocarcinoma [facility])  Patient will continue treatment and follow up with Oncology and Surgical Oncology at present  No plans for any gastrointestinal procedures at this time  Will continue to monitor patient's liver enzymes  Encourage patient to utilize Gas-X as needed for abdominal gas symptoms  Patient has Bentyl to utilize for pain  I have encouraged continued activity  Patient is going to work on eating 6 meals a day  Will continue to monitor weight     ______________________________________________________________________    SUBJECTIVE:   64-year-old female recently diagnosed with pancreatic adenocarcinoma  Patient reports that over a month ago she started with an acute onset of diffuse pruritus as well as jaundice  Patient was admitted to [facility] [place] and then further transferred to [facility] for workup of a pancreatic mass  Patient was diagnosed pancreatic adenocarcinoma and is currently following with Oncology as well as Surgical Oncology  Patient is currently ready to start her 3rd round of chemotherapy next week  Patient is doing very well and responding to the chemotherapy regimen  Patient's tumor has shrunk in size  Patient will be having surgery after her 6th round of chemotherapy  At the present time patient's liver enzymes are completely normal   Patient has a complaint of abdominal gas  I have asked her to start taking Gas-X daily  The present time patient denies any nausea or vomiting  She denies any severe abdominal pain  She reports epigastric discomfort  She denies any issues with diarrhea  She reports occasional constipation  REVIEW OF SYSTEMS IS OTHERWISE NEGATIVE        Historical Information   Past Medical History:   Diagnosis Date    Diabetes mellitus ([facility] [address] )     Hypertension     Lactic acidosis 2020    Neuropathy     Obstructive jaundice 2020     Past Surgical History:   Procedure Laterality Date     SECTION      COLONOSCOPY      ECTOPIC PREGNANCY SURGERY      [place] GUIDED CENTRAL VENOUS ACCESS DEVICE INSERTION  2020    JOINT REPLACEMENT Bilateral     MANDIBLE FRACTURE SURGERY  1972    REPLACEMENT TOTAL HIP W/  RESURFACING IMPLANTS Bilateral 2012    right hip done 2012; left hip done 2012    TONSILLECTOMY  195    TUNNELED VENOUS PORT PLACEMENT Left 2020    Procedure: INSERTION VENOUS PORT (PORT-A-CATH), left;  Surgeon: Consuelo Forman MD;  [facility];  Service: Surgical Oncology     Social History   Social History     Substance and Sexual Activity   Alcohol Use Not Currently    Frequency: Monthly or less     Social History     Substance and Sexual Activity   Drug Use Never     Social History     Tobacco Use   Smoking Status Former Smoker   Smokeless Tobacco Never Used   Tobacco Comment    quit 40 years ago     Family History   Problem Relation Age of Onset    Diabetes Mother     Heart disease Mother     Heart disease Father     Breast cancer additional onset Sister     Breast cancer additional onset Maternal Aunt     Stroke Maternal Grandfather        Meds/Allergies       Current Outpatient Medications:     allopurinol (ZYLOPRIM) 100 mg tablet    aspirin (Aspirin 81) 81 mg EC tablet    dicyclomine (BENTYL) 20 mg tablet    diphenhydrAMINE (BENADRYL) 25 mg capsule    fluorouracil 3,505 mg in CADD infusion pump    gabapentin (NEURONTIN) 300 mg capsule    hydrOXYzine HCL (ATARAX) 10 mg tablet    LORazepam (ATIVAN) 0 5 mg tablet    metFORMIN (GLUCOPHAGE) 500 mg tablet    mirtazapine (REMERON) 15 mg tablet    omeprazole (PriLOSEC) 20 mg delayed release capsule    oxyCODONE (ROXICODONE) 5 mg immediate release tablet    polyethylene glycol (MIRALAX) 17 g packet   prochlorperazine (COMPAZINE) 10 mg tablet    sitaGLIPtin-metFORMIN (JANUMET)  MG per tablet    sucralfate (CARAFATE) 1 g/10 mL suspension    Allergies   Allergen Reactions    Betadine [Povidone Iodine] Rash     Also itching from betadine           Objective     Blood pressure 120/64, pulse (!) 114, temperature 98 °F (36 7 °C), temperature source Tympanic, height 4' 11" (1 499 m), weight 50 3 kg (111 lb)  Body mass index is 22 42 kg/m²  PHYSICAL EXAM:      General Appearance:   Alert, cooperative, no distress   HEENT:   Normocephalic, atraumatic, anicteric      Neck:  Supple, symmetrical, trachea midline   Lungs:   Clear to auscultation bilaterally; no rales, rhonchi or wheezing; respirations unlabored    Heart[de-identified]   Regular rate and rhythm; no murmur, rub, or gallop  Abdomen:   Soft, non-tender, non-distended; normal bowel sounds; no masses, no organomegaly    Genitalia:   Deferred    Rectal:   Deferred    Extremities:  No cyanosis, clubbing or edema    Pulses:  2+ and symmetric    Skin:  No jaundice, rashes, or lesions    Lymph nodes:  No palpable cervical lymphadenopathy        Lab Results:   No visits with results within 1 Day(s) from this visit     Latest known visit with results is:   Appointment on 07/13/2020   Component Date Value    CA 19-9 07/13/2020 41*    WBC 07/13/2020 14 50*    RBC 07/13/2020 3 96     Hemoglobin 07/13/2020 12 6     Hematocrit 07/13/2020 38 7     MCV 07/13/2020 98     MCH 07/13/2020 31 8     MCHC 07/13/2020 32 6     RDW 07/13/2020 14 3     MPV 07/13/2020 10 5     Platelets 40/18/4770 218     nRBC 07/13/2020 0     Sodium 07/13/2020 144     Potassium 07/13/2020 4 2     Chloride 07/13/2020 107     CO2 07/13/2020 27     ANION GAP 07/13/2020 10     BUN 07/13/2020 13     Creatinine 07/13/2020 0 87     Glucose, Fasting 07/13/2020 133*    Calcium 07/13/2020 8 5     AST 07/13/2020 17     ALT 07/13/2020 20     Alkaline Phosphatase 07/13/2020 90     Total Protein 07/13/2020 6 5     Albumin 07/13/2020 3 0*    Total Bilirubin 07/13/2020 0 40     eGFR 07/13/2020 69     Segmented % 07/13/2020 48     Bands % 07/13/2020 14*    Lymphocytes % 07/13/2020 33     Monocytes % 07/13/2020 3*    Eosinophils, % 07/13/2020 0     Basophils % 07/13/2020 0     Metamyelocytes% 07/13/2020 1     Myelocytes % 07/13/2020 1     Absolute Neutrophils 07/13/2020 8 99*    Lymphocytes Absolute 07/13/2020 4 79*    Monocytes Absolute 07/13/2020 0 44     Eosinophils Absolute 07/13/2020 0 00     Basophils Absolute 07/13/2020 0 00     Total Counted 07/13/2020 100     Platelet Estimate 74/36/0302 Adequate          Radiology Results:   Xr Chest 1 View Portable    Result Date: 7/5/2020  Narrative: CHEST INDICATION:   epigastric pain  COMPARISON:  Chest radiograph from 6/23/2020 and chest CT from 5/29/2020  EXAM PERFORMED/VIEWS:  XR CHEST PORTABLE FINDINGS:  Left port at cavoatrial junction  Cardiomediastinal silhouette appears unremarkable  The lungs are clear  No pneumothorax or pleural effusion  Osseous structures appear within normal limits for patient age  Impression: No acute cardiopulmonary disease  Workstation performed: [id number]     Xr Chest Portable    Result Date: 6/23/2020  Narrative: CHEST INDICATION:   port insertion  COMPARISON:  None EXAM PERFORMED/VIEWS:  XR CHEST PORTABLE FINDINGS:  Left subclavian chest port with tip near the cavoatrial junction  Cardiomediastinal silhouette appears unremarkable  The lungs are clear  No pneumothorax or pleural effusion  Osseous structures appear within normal limits for patient age  Impression: Left subclavian chest port with tip near the cavoatrial junction  No active pulmonary disease  No pneumothorax  Workstation performed: [id number]     Fl Guided Central Venous Access Device Insertion    Result Date: 6/24/2020  Narrative: C-ARM - chest INDICATION: C25 9: Malignant neoplasm of pancreas, unspecified    Procedure guidance  COMPARISON:  None TECHNIQUE: FLUOROSCOPY TIME:   3 3 SECONDS 1 FLUOROSCOPIC IMAGES FINDINGS: Fluoroscopic guidance provided for surgical procedure  Osseous and soft tissue detail limited by technique  Impression: Fluoroscopic guidance provided for surgical procedure  Please refer to the separate procedure notes for additional details  Workstation performed: [id number]     Ct Abdomen Pelvis With Contrast    Result Date: 7/5/2020  Narrative: CT ABDOMEN AND PELVIS WITH IV CONTRAST INDICATION:   Epigastric pain  Undergoing chemotherapy for pancreatic cancer  COMPARISON:  5/29/2020  TECHNIQUE:  CT examination of the abdomen and pelvis was performed  Axial, sagittal, and coronal 2D reformatted images were created from the source data and submitted for interpretation  Radiation dose length product (DLP) for this visit:  440 mGy-cm   This examination, like all CT scans performed in the [facility], was performed utilizing techniques to minimize radiation dose exposure, including the use of iterative reconstruction and automated exposure control  IV Contrast:  100 mL of iohexol (OMNIPAQUE) Enteric Contrast:  Enteric contrast was not administered  FINDINGS: ABDOMEN LOWER CHEST:  Clear lung bases  LIVER/BILIARY TREE:  Intrahepatic pneumobilia with stable mild biliary dilatation  Interval placement of stent in the common bile duct with proximal pneumobilia and interval resolution of ductal obstruction  GALLBLADDER:  Small amount of gas in the gallbladder likely secondary to stent placement  No evidence of cholecystitis  SPLEEN:  Unremarkable  PANCREAS:  Stable dilatation of the proximal pancreatic duct measuring 8 mm in diameter  Pancreatic head mass less well visualized though likely stable measuring approximately 2 2 cm  ADRENAL GLANDS:  Unremarkable  KIDNEYS/URETERS:  No pyelonephritis or obstructive uropathy   One or more sharply circumscribed subcentimeter renal hypodensities are noted  These lesions are too small to accurately characterize, but are statistically most likely to represent benign cortical renal cyst(s)  According to the guidelines published in  the [employer] Paper of the ACR Incidental Findings Committee (Radiology 2010), no further workup of these lesions is recommended  STOMACH AND BOWEL: Partially collapsed gastric fundus, limiting evaluation  Fluid-filled loops of mildly distended small bowel without wall thickening or transition point  Moderate amount stool throughout the colon  No evidence of colitis, diverticulitis or bowel obstruction    APPENDIX:  No findings to suggest appendicitis  ABDOMINOPELVIC CAVITY:  No ascites  No pneumoperitoneum  No lymphadenopathy  VESSELS:  No mesenteric vessel encasement  Patent portal, splenic and superior mesenteric veins  PELVIS REPRODUCTIVE ORGANS:  Pelvis obscured by streak and beam hardening artifact  URINARY BLADDER:  Unremarkable  ABDOMINAL WALL/INGUINAL REGIONS:  Unremarkable  OSSEOUS STRUCTURES:  Normal alignment of bilateral hip arthroplasties  Impression: 1  Interval placement of a bile duct stent with resolution of biliary obstruction  Pneumobilia is likely postsurgical  2   Stable dilatation of the proximal pancreatic duct  Limited visualization of pancreatic head 2 2 cm mass, stable  3   Findings suggestive of constipation  Possible mild enteritis  No evidence of bowel obstruction, colitis or diverticulitis   Workstation performed: [id number]

## 2020-07-23 NOTE — LETTER
July 23, 2020     Nory El DO  [address] [address]    Patient: Cathy Potts   YOB: 1955   Date of Visit: 7/23/2020       Dear Dr Dilip Norris: Thank you for referring Cathy Potts to me for evaluation  Below are my notes for this consultation  If you have questions, please do not hesitate to call me  I look forward to following your patient along with you  Sincerely,        Bro Douglas PA-C        CC: No Recipients  Jhon Hollis  7/23/2020 10:08 AM  Sign at close encounter  [facility] - Outpatient Follow-up Note  Cathy Potts 77 y o  female MRN: [id number]  Encounter: [id number]          ASSESSMENT AND PLAN:      1  Pancreatic adenocarcinoma [facility])  Patient will continue treatment and follow up with Oncology and Surgical Oncology at present  No plans for any gastrointestinal procedures at this time  Will continue to monitor patient's liver enzymes  Encourage patient to utilize Gas-X as needed for abdominal gas symptoms  Patient has Bentyl to utilize for pain  I have encouraged continued activity  Patient is going to work on eating 6 meals a day  Will continue to monitor weight     ______________________________________________________________________    SUBJECTIVE:   78-year-old female recently diagnosed with pancreatic adenocarcinoma  Patient reports that over a month ago she started with an acute onset of diffuse pruritus as well as jaundice  Patient was admitted to [facility] [place] and then further transferred to [facility] for workup of a pancreatic mass  Patient was diagnosed pancreatic adenocarcinoma and is currently following with Oncology as well as Surgical Oncology  Patient is currently ready to start her 3rd round of chemotherapy next week  Patient is doing very well and responding to the chemotherapy regimen  Patient's tumor has shrunk in size    Patient will be having surgery after her 6th round of chemotherapy  At the present time patient's liver enzymes are completely normal   Patient has a complaint of abdominal gas  I have asked her to start taking Gas-X daily  The present time patient denies any nausea or vomiting  She denies any severe abdominal pain  She reports epigastric discomfort  She denies any issues with diarrhea  She reports occasional constipation  REVIEW OF SYSTEMS IS OTHERWISE NEGATIVE        Historical Information   Past Medical History:   Diagnosis Date    Diabetes mellitus ([facility] [address] )     Hypertension     Lactic acidosis 2020    Neuropathy     Obstructive jaundice 2020     Past Surgical History:   Procedure Laterality Date     SECTION      COLONOSCOPY      ECTOPIC PREGNANCY SURGERY      [facility] GUIDED CENTRAL VENOUS ACCESS DEVICE INSERTION  2020    JOINT REPLACEMENT Bilateral     MANDIBLE FRACTURE SURGERY      REPLACEMENT TOTAL HIP W/  RESURFACING IMPLANTS Bilateral 2012    right hip done 2012; left hip done 2012    TONSILLECTOMY      TUNNELED VENOUS PORT PLACEMENT Left 2020    Procedure: INSERTION VENOUS PORT (PORT-A-CATH), left;  Surgeon: Milton Gates MD;  [facility];  Service: Surgical Oncology     Social History   Social History     Substance and Sexual Activity   Alcohol Use Not Currently    Frequency: Monthly or less     Social History     Substance and Sexual Activity   Drug Use Never     Social History     Tobacco Use   Smoking Status Former Smoker   Smokeless Tobacco Never Used   Tobacco Comment    quit 36 years ago     Family History   Problem Relation Age of Onset    Diabetes Mother     Heart disease Mother     Heart disease Father     Breast cancer additional onset Sister     Breast cancer additional onset Maternal Aunt     Stroke Maternal Grandfather        Meds/Allergies       Current Outpatient Medications:     allopurinol (ZYLOPRIM) 100 mg tablet    aspirin (Aspirin 81) 81 mg EC tablet    dicyclomine (BENTYL) 20 mg tablet    diphenhydrAMINE (BENADRYL) 25 mg capsule    fluorouracil 3,505 mg in CADD infusion pump    gabapentin (NEURONTIN) 300 mg capsule    hydrOXYzine HCL (ATARAX) 10 mg tablet    LORazepam (ATIVAN) 0 5 mg tablet    metFORMIN (GLUCOPHAGE) 500 mg tablet    mirtazapine (REMERON) 15 mg tablet    omeprazole (PriLOSEC) 20 mg delayed release capsule    oxyCODONE (ROXICODONE) 5 mg immediate release tablet    polyethylene glycol (MIRALAX) 17 g packet    prochlorperazine (COMPAZINE) 10 mg tablet    sitaGLIPtin-metFORMIN (JANUMET)  MG per tablet    sucralfate (CARAFATE) 1 g/10 mL suspension    Allergies   Allergen Reactions    Betadine [Povidone Iodine] Rash     Also itching from betadine           Objective     Blood pressure 120/64, pulse (!) 114, temperature 98 °F (36 7 °C), temperature source Tympanic, height 4' 11" (1 499 m), weight 50 3 kg (111 lb)  Body mass index is 22 42 kg/m²  PHYSICAL EXAM:      General Appearance:   Alert, cooperative, no distress   HEENT:   Normocephalic, atraumatic, anicteric      Neck:  Supple, symmetrical, trachea midline   Lungs:   Clear to auscultation bilaterally; no rales, rhonchi or wheezing; respirations unlabored    Heart[de-identified]   Regular rate and rhythm; no murmur, rub, or gallop  Abdomen:   Soft, non-tender, non-distended; normal bowel sounds; no masses, no organomegaly    Genitalia:   Deferred    Rectal:   Deferred    Extremities:  No cyanosis, clubbing or edema    Pulses:  2+ and symmetric    Skin:  No jaundice, rashes, or lesions    Lymph nodes:  No palpable cervical lymphadenopathy        Lab Results:   No visits with results within 1 Day(s) from this visit     Latest known visit with results is:   Appointment on 07/13/2020   Component Date Value    CA 19-9 07/13/2020 41*    WBC 07/13/2020 14 50*    RBC 07/13/2020 3 96     Hemoglobin 07/13/2020 12 6     Hematocrit 07/13/2020 38 7     MCV 07/13/2020 98     MCH 07/13/2020 31 8     MCHC 07/13/2020 32 6     RDW 07/13/2020 14 3     MPV 07/13/2020 10 5     Platelets 39/27/4881 218     nRBC 07/13/2020 0     Sodium 07/13/2020 144     Potassium 07/13/2020 4 2     Chloride 07/13/2020 107     CO2 07/13/2020 27     ANION GAP 07/13/2020 10     BUN 07/13/2020 13     Creatinine 07/13/2020 0 87     Glucose, Fasting 07/13/2020 133*    Calcium 07/13/2020 8 5     AST 07/13/2020 17     ALT 07/13/2020 20     Alkaline Phosphatase 07/13/2020 90     Total Protein 07/13/2020 6 5     Albumin 07/13/2020 3 0*    Total Bilirubin 07/13/2020 0 40     eGFR 07/13/2020 69     Segmented % 07/13/2020 48     Bands % 07/13/2020 14*    Lymphocytes % 07/13/2020 33     Monocytes % 07/13/2020 3*    Eosinophils, % 07/13/2020 0     Basophils % 07/13/2020 0     Metamyelocytes% 07/13/2020 1     Myelocytes % 07/13/2020 1     Absolute Neutrophils 07/13/2020 8 99*    Lymphocytes Absolute 07/13/2020 4 79*    Monocytes Absolute 07/13/2020 0 44     Eosinophils Absolute 07/13/2020 0 00     Basophils Absolute 07/13/2020 0 00     Total Counted 07/13/2020 100     Platelet Estimate 74/15/6978 Adequate          Radiology Results:   Xr Chest 1 View Portable    Result Date: 7/5/2020  Narrative: CHEST INDICATION:   epigastric pain  COMPARISON:  Chest radiograph from 6/23/2020 and chest CT from 5/29/2020  EXAM PERFORMED/VIEWS:  XR CHEST PORTABLE FINDINGS:  Left port at cavoatrial junction  Cardiomediastinal silhouette appears unremarkable  The lungs are clear  No pneumothorax or pleural effusion  Osseous structures appear within normal limits for patient age  Impression: No acute cardiopulmonary disease  Workstation performed: [id number]     Xr Chest Portable    Result Date: 6/23/2020  Narrative: CHEST INDICATION:   port insertion   COMPARISON:  None EXAM PERFORMED/VIEWS:  XR CHEST PORTABLE FINDINGS:  Left subclavian chest port with tip near the cavoatrial junction  Cardiomediastinal silhouette appears unremarkable  The lungs are clear  No pneumothorax or pleural effusion  Osseous structures appear within normal limits for patient age  Impression: Left subclavian chest port with tip near the cavoatrial junction  No active pulmonary disease  No pneumothorax  Workstation performed: [id number]     Fl Guided Central Venous Access Device Insertion    Result Date: 6/24/2020  Narrative: C-ARM - chest INDICATION: C25 9: Malignant neoplasm of pancreas, unspecified  Procedure guidance  COMPARISON:  None TECHNIQUE: FLUOROSCOPY TIME:   3 3 SECONDS 1 FLUOROSCOPIC IMAGES FINDINGS: Fluoroscopic guidance provided for surgical procedure  Osseous and soft tissue detail limited by technique  Impression: Fluoroscopic guidance provided for surgical procedure  Please refer to the separate procedure notes for additional details  Workstation performed: [id number]     Ct Abdomen Pelvis With Contrast    Result Date: 7/5/2020  Narrative: CT ABDOMEN AND PELVIS WITH IV CONTRAST INDICATION:   Epigastric pain  Undergoing chemotherapy for pancreatic cancer  COMPARISON:  5/29/2020  TECHNIQUE:  CT examination of the abdomen and pelvis was performed  Axial, sagittal, and coronal 2D reformatted images were created from the source data and submitted for interpretation  Radiation dose length product (DLP) for this visit:  440 mGy-cm   This examination, like all CT scans performed in the [facility], was performed utilizing techniques to minimize radiation dose exposure, including the use of iterative reconstruction and automated exposure control  IV Contrast:  100 mL of iohexol (OMNIPAQUE) Enteric Contrast:  Enteric contrast was not administered  FINDINGS: ABDOMEN LOWER CHEST:  Clear lung bases  LIVER/BILIARY TREE:  Intrahepatic pneumobilia with stable mild biliary dilatation    Interval placement of stent in the common bile duct with proximal pneumobilia and interval resolution of ductal obstruction  GALLBLADDER:  Small amount of gas in the gallbladder likely secondary to stent placement  No evidence of cholecystitis  SPLEEN:  Unremarkable  PANCREAS:  Stable dilatation of the proximal pancreatic duct measuring 8 mm in diameter  Pancreatic head mass less well visualized though likely stable measuring approximately 2 2 cm  ADRENAL GLANDS:  Unremarkable  KIDNEYS/URETERS:  No pyelonephritis or obstructive uropathy  One or more sharply circumscribed subcentimeter renal hypodensities are noted  These lesions are too small to accurately characterize, but are statistically most likely to represent benign cortical renal cyst(s)  According to the guidelines published in  the [facility] Paper of the ACR Incidental Findings Committee (Radiology 2010), no further workup of these lesions is recommended  STOMACH AND BOWEL: Partially collapsed gastric fundus, limiting evaluation  Fluid-filled loops of mildly distended small bowel without wall thickening or transition point  Moderate amount stool throughout the colon  No evidence of colitis, diverticulitis or bowel obstruction    APPENDIX:  No findings to suggest appendicitis  ABDOMINOPELVIC CAVITY:  No ascites  No pneumoperitoneum  No lymphadenopathy  VESSELS:  No mesenteric vessel encasement  Patent portal, splenic and superior mesenteric veins  PELVIS REPRODUCTIVE ORGANS:  Pelvis obscured by streak and beam hardening artifact  URINARY BLADDER:  Unremarkable  ABDOMINAL WALL/INGUINAL REGIONS:  Unremarkable  OSSEOUS STRUCTURES:  Normal alignment of bilateral hip arthroplasties  Impression: 1  Interval placement of a bile duct stent with resolution of biliary obstruction  Pneumobilia is likely postsurgical  2   Stable dilatation of the proximal pancreatic duct  Limited visualization of pancreatic head 2 2 cm mass, stable  3   Findings suggestive of constipation  Possible mild enteritis    No evidence of bowel obstruction, colitis or diverticulitis   Workstation performed: [id number]

## 2020-07-27 ENCOUNTER — TRANSCRIBE ORDERS (OUTPATIENT)
Dept: ADMINISTRATIVE | Facility: HOSPITAL | Age: 65
End: 2020-07-27

## 2020-07-27 ENCOUNTER — APPOINTMENT (OUTPATIENT)
Dept: LAB | Facility: HOSPITAL | Age: 65
End: 2020-07-27
Attending: INTERNAL MEDICINE
Payer: MEDICARE

## 2020-07-27 DIAGNOSIS — Z80.3 FAMILY HISTORY OF MALIGNANT NEOPLASM OF BREAST: ICD-10-CM

## 2020-07-27 DIAGNOSIS — C25.9 MALIGNANT NEOPLASM OF PANCREAS, UNSPECIFIED LOCATION OF MALIGNANCY (HCC): Primary | ICD-10-CM

## 2020-07-27 DIAGNOSIS — C25.9 PANCREATIC ADENOCARCINOMA (HCC): ICD-10-CM

## 2020-07-27 DIAGNOSIS — C25.9 MALIGNANT NEOPLASM OF PANCREAS, UNSPECIFIED LOCATION OF MALIGNANCY (HCC): ICD-10-CM

## 2020-07-27 LAB
ALBUMIN SERPL BCP-MCNC: 3 G/DL (ref 3.5–5)
ALP SERPL-CCNC: 89 U/L (ref 46–116)
ALT SERPL W P-5'-P-CCNC: 18 U/L (ref 12–78)
ANION GAP SERPL CALCULATED.3IONS-SCNC: 10 MMOL/L (ref 4–13)
AST SERPL W P-5'-P-CCNC: 14 U/L (ref 5–45)
BASOPHILS # BLD MANUAL: 0 THOUSAND/UL (ref 0–0.1)
BASOPHILS NFR MAR MANUAL: 0 % (ref 0–1)
BILIRUB SERPL-MCNC: 0.3 MG/DL (ref 0.2–1)
BUN SERPL-MCNC: 14 MG/DL (ref 5–25)
CALCIUM SERPL-MCNC: 8.7 MG/DL (ref 8.3–10.1)
CHLORIDE SERPL-SCNC: 104 MMOL/L (ref 100–108)
CO2 SERPL-SCNC: 27 MMOL/L (ref 21–32)
CREAT SERPL-MCNC: 1.04 MG/DL (ref 0.6–1.3)
EOSINOPHIL # BLD MANUAL: 0 THOUSAND/UL (ref 0–0.4)
EOSINOPHIL NFR BLD MANUAL: 0 % (ref 0–6)
ERYTHROCYTE [DISTWIDTH] IN BLOOD BY AUTOMATED COUNT: 14.2 % (ref 11.6–15.1)
GFR SERPL CREATININE-BSD FRML MDRD: 56 ML/MIN/1.73SQ M
GLUCOSE P FAST SERPL-MCNC: 124 MG/DL (ref 65–99)
HCT VFR BLD AUTO: 42.7 % (ref 34.8–46.1)
HGB BLD-MCNC: 14 G/DL (ref 11.5–15.4)
LYMPHOCYTES # BLD AUTO: 2.97 THOUSAND/UL (ref 0.6–4.47)
LYMPHOCYTES # BLD AUTO: 32 % (ref 14–44)
MCH RBC QN AUTO: 31.8 PG (ref 26.8–34.3)
MCHC RBC AUTO-ENTMCNC: 32.8 G/DL (ref 31.4–37.4)
MCV RBC AUTO: 97 FL (ref 82–98)
MISCELLANEOUS LAB TEST RESULT: NORMAL
MONOCYTES # BLD AUTO: 0.56 THOUSAND/UL (ref 0–1.22)
MONOCYTES NFR BLD: 6 % (ref 4–12)
MYELOCYTES NFR BLD MANUAL: 1 % (ref 0–1)
NEUTROPHILS # BLD MANUAL: 5.57 THOUSAND/UL (ref 1.85–7.62)
NEUTS BAND NFR BLD MANUAL: 15 % (ref 0–8)
NEUTS SEG NFR BLD AUTO: 45 % (ref 43–75)
NRBC BLD AUTO-RTO: 0 /100 WBCS
PLATELET # BLD AUTO: 239 THOUSANDS/UL (ref 149–390)
PLATELET BLD QL SMEAR: ADEQUATE
PMV BLD AUTO: 10.1 FL (ref 8.9–12.7)
POTASSIUM SERPL-SCNC: 3.9 MMOL/L (ref 3.5–5.3)
PROT SERPL-MCNC: 6.3 G/DL (ref 6.4–8.2)
RBC # BLD AUTO: 4.4 MILLION/UL (ref 3.81–5.12)
SODIUM SERPL-SCNC: 141 MMOL/L (ref 136–145)
TOTAL CELLS COUNTED SPEC: 100
VARIANT LYMPHS # BLD AUTO: 1 %
WBC # BLD AUTO: 9.29 THOUSAND/UL (ref 4.31–10.16)

## 2020-07-27 PROCEDURE — 86301 IMMUNOASSAY TUMOR CA 19-9: CPT

## 2020-07-27 PROCEDURE — 85007 BL SMEAR W/DIFF WBC COUNT: CPT

## 2020-07-27 PROCEDURE — 80053 COMPREHEN METABOLIC PANEL: CPT

## 2020-07-27 PROCEDURE — 85027 COMPLETE CBC AUTOMATED: CPT

## 2020-07-27 PROCEDURE — 36415 COLL VENOUS BLD VENIPUNCTURE: CPT

## 2020-07-28 ENCOUNTER — HOSPITAL ENCOUNTER (OUTPATIENT)
Dept: INFUSION CENTER | Facility: CLINIC | Age: 65
Discharge: HOME | End: 2020-07-28
Payer: MEDICARE

## 2020-07-28 ENCOUNTER — DOCUMENTATION (OUTPATIENT)
Dept: HEMATOLOGY ONCOLOGY | Facility: CLINIC | Age: 65
End: 2020-07-28

## 2020-07-28 VITALS
WEIGHT: 111.11 LBS | HEART RATE: 94 BPM | TEMPERATURE: 97.3 F | OXYGEN SATURATION: 99 % | RESPIRATION RATE: 20 BRPM | BODY MASS INDEX: 22.4 KG/M2 | HEIGHT: 59 IN | SYSTOLIC BLOOD PRESSURE: 119 MMHG | DIASTOLIC BLOOD PRESSURE: 61 MMHG

## 2020-07-28 DIAGNOSIS — C25.9 PANCREATIC ADENOCARCINOMA (HCC): Primary | ICD-10-CM

## 2020-07-28 LAB — CANCER AG19-9 SERPL-ACNC: 26 U/ML (ref 0–35)

## 2020-07-28 PROCEDURE — 96411 CHEMO IV PUSH ADDL DRUG: CPT

## 2020-07-28 PROCEDURE — 96415 CHEMO IV INFUSION ADDL HR: CPT

## 2020-07-28 PROCEDURE — 96376 TX/PRO/DX INJ SAME DRUG ADON: CPT

## 2020-07-28 PROCEDURE — 96367 TX/PROPH/DG ADDL SEQ IV INF: CPT

## 2020-07-28 PROCEDURE — 96375 TX/PRO/DX INJ NEW DRUG ADDON: CPT

## 2020-07-28 PROCEDURE — 96413 CHEMO IV INFUSION 1 HR: CPT

## 2020-07-28 PROCEDURE — G0498 CHEMO EXTEND IV INFUS W/PUMP: HCPCS

## 2020-07-28 PROCEDURE — 96417 CHEMO IV INFUS EACH ADDL SEQ: CPT

## 2020-07-28 RX ORDER — ATROPINE SULFATE 1 MG/ML
0.25 INJECTION, SOLUTION INTRAMUSCULAR; INTRAVENOUS; SUBCUTANEOUS ONCE AS NEEDED
Status: DISCONTINUED | OUTPATIENT
Start: 2020-07-28 | End: 2020-07-31 | Stop reason: HOSPADM

## 2020-07-28 RX ORDER — SODIUM CHLORIDE 9 MG/ML
20 INJECTION, SOLUTION INTRAVENOUS ONCE AS NEEDED
Status: DISCONTINUED | OUTPATIENT
Start: 2020-07-28 | End: 2020-07-31 | Stop reason: HOSPADM

## 2020-07-28 RX ORDER — DEXTROSE MONOHYDRATE 50 MG/ML
20 INJECTION, SOLUTION INTRAVENOUS ONCE
Status: COMPLETED | OUTPATIENT
Start: 2020-07-28 | End: 2020-07-28

## 2020-07-28 RX ORDER — LORAZEPAM 2 MG/ML
0.5 INJECTION INTRAMUSCULAR ONCE
Status: COMPLETED | OUTPATIENT
Start: 2020-07-28 | End: 2020-07-28

## 2020-07-28 RX ORDER — FLUOROURACIL 50 MG/ML
400 INJECTION, SOLUTION INTRAVENOUS ONCE
Status: COMPLETED | OUTPATIENT
Start: 2020-07-28 | End: 2020-07-28

## 2020-07-28 RX ORDER — ATROPINE SULFATE 1 MG/ML
0.25 INJECTION, SOLUTION INTRAMUSCULAR; INTRAVENOUS; SUBCUTANEOUS ONCE
Status: COMPLETED | OUTPATIENT
Start: 2020-07-28 | End: 2020-07-28

## 2020-07-28 RX ADMIN — SODIUM CHLORIDE 150 MG: 0.9 INJECTION, SOLUTION INTRAVENOUS at 09:55

## 2020-07-28 RX ADMIN — IRINOTECAN HYDROCHLORIDE 263 MG: 20 INJECTION, SOLUTION INTRAVENOUS at 13:40

## 2020-07-28 RX ADMIN — LORAZEPAM 0.5 MG: 2 INJECTION INTRAMUSCULAR; INTRAVENOUS at 10:43

## 2020-07-28 RX ADMIN — FLUOROURACIL 585 MG: 50 INJECTION, SOLUTION INTRAVENOUS at 17:08

## 2020-07-28 RX ADMIN — ATROPINE SULFATE 0.25 MG: 1 INJECTION, SOLUTION INTRAMUSCULAR; INTRAVENOUS; SUBCUTANEOUS at 13:36

## 2020-07-28 RX ADMIN — DIPHENHYDRAMINE HYDROCHLORIDE 25 MG: 50 INJECTION, SOLUTION INTRAMUSCULAR; INTRAVENOUS at 08:43

## 2020-07-28 RX ADMIN — DEXAMETHASONE SODIUM PHOSPHATE: 10 INJECTION, SOLUTION INTRAMUSCULAR; INTRAVENOUS at 09:17

## 2020-07-28 RX ADMIN — DEXTROSE 20 ML/HR: 5 SOLUTION INTRAVENOUS at 08:33

## 2020-07-28 RX ADMIN — HYDROCORTISONE SODIUM SUCCINATE 50 MG: 100 INJECTION, POWDER, FOR SOLUTION INTRAMUSCULAR; INTRAVENOUS at 16:34

## 2020-07-28 RX ADMIN — HYDROCORTISONE SODIUM SUCCINATE 50 MG: 100 INJECTION, POWDER, FOR SOLUTION INTRAMUSCULAR; INTRAVENOUS at 15:16

## 2020-07-28 RX ADMIN — ATROPINE SULFATE 0.25 MG: 1 INJECTION, SOLUTION INTRAMUSCULAR; INTRAVENOUS; SUBCUTANEOUS at 10:36

## 2020-07-28 RX ADMIN — OXALIPLATIN 124.1 MG: 5 INJECTION, SOLUTION INTRAVENOUS at 10:47

## 2020-07-28 NOTE — PROGRESS NOTES
Several calls were received today from infusion center as patient developed symptoms of locked jaw sensation and  tongue thickening during her irinotecan infusion  Patiently was heavily pretreated with lorazepam 0 5 mg IV, Benadryl 25 mg IV, atropine 0 25 mg, Zofran 16 mg with DXM 10 mg and  Emend 150 mg as she has experienced these symptoms during her last infusion  Thought was that she was experiencing a delayed reaction to her oxaliplatin  Her oxaliplatin was infused today over 3 hours without incident  However patient started developing symptoms while irinotecan was actively infusing  At initial presentation of symptoms, nursing staff stopped infusion and patient was observed  Hypersensitivity protocol was not initiated as patient did not display any hives, shortness of breath or change in vital signs  Patient's symptoms started to resolve on their own once Irinotecan had been stooped  I ordered a rechallenge of her Irinotecan  Patient was premedicated with Solu-Cortef 50 mg and Irinotecan was started at half the rate for 30 minutes then rate was increased to regular rate  Patient tolerated Irinotecan at reduced rate but started to develop symptoms again 16 minutes into the infusion at a regular rate  This time, infusion nurses report patient's tongue thickening with interfering with speech  I ordered and an additional dose of Solu-Cortef 50 mg to be given x1  Patient will not be rechallenged any further with irinotecan today  Nurses were instructed to observe patient for 30 minutes  If her symptoms have resolved they may then give her 5 FU bolus and connect her CADD pump  I will address discontinuation of irinotecan from treatment regimen with Dr Kiran Patel upon his return

## 2020-07-28 NOTE — PROGRESS NOTES
Patient to infusion today for FOLFIRINOX and about 41 minutes into treatment with Irinotecan complained of "lock jaw" feeling, sore throat and her tongue is "thickening"  Patient has no chest pain, itching, hives, SOB or other symptoms at this time  Infusion is stopped  Vitals are stable  Once infusion has been stopped, patient reports improvement in her symptoms  Spoke with Lawson Milian in Dr Vashti Masterson office  According to Leslie Elise, this could be a delayed reaction to the Oxaliplatin which the patient experienced during her past 2 treatments  She will order 50mg of Solu-Cortef to be administered  Once Solu-Cortef is administered, patient to have Irinotecan restarted at 1/2 the rate it was being administered at the time of reaction  At time of the reaction, the patient was receiving 271 5 ml/hr with 358 3ml left within the bag  If patient tolerates the decreased rate after 30 minutes, patient is to have rate increased to previous rate at time of symptom on-set  If patient tolerates the increased rate, we are to complete treatment  If patient has return of symptoms, Irinotecan is to be discontinued today and we are to proceed with her push and pump  Patient made aware of this plan and verbalized understanding  Rates verified with Damian Kraus RN prior to re-starting Irinotecan

## 2020-07-28 NOTE — PLAN OF CARE
Problem: Potential for Falls  Goal: Patient will remain free of falls  Description  INTERVENTIONS:  - Assess patient frequently for physical needs  -  Identify cognitive and physical deficits and behaviors that affect risk of falls    -  [place] fall precautions as indicated by assessment   - Educate patient/family on patient safety including physical limitations  - Instruct patient to call for assistance with activity based on assessment  - Modify environment to reduce risk of injury  - Consider OT/PT consult to assist with strengthening/mobility  Outcome: Progressing

## 2020-07-28 NOTE — PROGRESS NOTES
Pt tolerated second dose of steroid and 30 minute observation period without incident  Pt reports her symptoms were resolved  5FU push given and CADD pump connected, line patent and infusing  Pt aware of CADD disconnection time ob Thursday  AVS printed for pt  Pt aware to contact physician's office if symptoms return or pt has developing concerns

## 2020-07-28 NOTE — PROGRESS NOTES
Patient presents today for chemotherapy offering complaints of occasional diarrhea which is resolved with Immodium  Otherwise patient has no complaints  Vitals are stable  Will continue to monitor

## 2020-07-28 NOTE — PROGRESS NOTES
Patient re-started on Irinotecan at lower dose as ordered and tolerated without incident  Patient was 16 minutes into the resumed dose of 271 5ml/hr and complained that her lock jaw and tongue "thickening" were returning  Patient had 218 7ml remaining in the bag  Infusion stopped  Vitals are stable  Patient has no other complaints at this time  Spoke with Jayden Martínez who stated patient is not to be re-challenged and treatment with Irinotecan was completed for today  She is to receive an additional 50mg of Solu-Cortef, observe for 30 minutes and if patient improves with Solu-Cortef, the push and the CADD may be administered  Patient made aware of this plan and verbalized understanding

## 2020-07-30 ENCOUNTER — HOSPITAL ENCOUNTER (OUTPATIENT)
Dept: INFUSION CENTER | Facility: CLINIC | Age: 65
Discharge: HOME | End: 2020-07-30
Payer: MEDICARE

## 2020-07-30 ENCOUNTER — PATIENT OUTREACH (OUTPATIENT)
Dept: HEMATOLOGY ONCOLOGY | Facility: CLINIC | Age: 65
End: 2020-07-30

## 2020-07-30 DIAGNOSIS — C25.9 PANCREATIC ADENOCARCINOMA (HCC): Primary | ICD-10-CM

## 2020-07-30 PROCEDURE — 96372 THER/PROPH/DIAG INJ SC/IM: CPT

## 2020-07-30 RX ADMIN — PEGFILGRASTIM 6 MG: KIT SUBCUTANEOUS at 15:58

## 2020-07-30 NOTE — PROGRESS NOTES
Call to Adriano Reyes to see how she was feeling since her last infusion, no answer, left her a message also containing an appointment made for her with Toribio Langford on 8/6/20 at [address] [place] [address] in the [facility] office so that she may be re evaluated and discuss possible treatment change prior to her next infusion, provided my contact information and asked her to call back with any questions or concerns    Received call back from Adriano Reyes, she said she is feeling much better today, she is having fatigue, neuropathy when warm but she states when she runs her hands under warm water and puts on gloves it is improved, she also takes a Compazine 1 hour prior to dinner and that helps her eat, we discussed in our last conversation her c/o metallic taste in her mouth, she has been trying out what we reviewed and that has been a big help, she confirmed the appointment with [facility]

## 2020-07-30 NOTE — PROGRESS NOTES
Pt arrived for CADD pump disconnect  Res volume 0 0mL  CADD pump disconnected per protocol without incident  Neulasta OnPro applied to left side of abdomen - blinking green and full upon discharge  Pt aware to remove device tomorrow when infusion is complete (8PM)  AVS provided and pt aware of next infusion appointment  Discharged in stable condition

## 2020-07-31 DIAGNOSIS — R10.13 ACUTE EPIGASTRIC PAIN: ICD-10-CM

## 2020-07-31 DIAGNOSIS — K52.9 ENTERITIS: ICD-10-CM

## 2020-08-03 NOTE — PROGRESS NOTES
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   The patient's bilirubin which was elevated at 12 2 at its peak has now come down to 1 0   She was seen by our colleagues in Surgical Oncology and they recommended neoadjuvant chemotherapy prior to consideration of a resection  She was started on FOLFIRINOX  every 2 weeks with Neulasta growth factor support; first dose was given on 6/30/20  Previous Hematologic/ Oncologic History:    Oncology History   Pancreatic adenocarcinoma ([facility] [address] )   6/2/2020 Biopsy    EUS- Dr Sarika Rudolph:  Pancreas, uncinate mass:      - Malignant ([facility] Category VI)      - Compatible with adenocarcinoma with mucinous features       6/30/2020 -  Chemotherapy    fluorouracil (ADRUCIL) injection 585 mg, 400 mg/m2 = 585 mg, Intravenous, Once, 3 of 12 cycles  Administration: 585 mg (6/30/2020), 585 mg (7/14/2020), 585 mg (7/28/2020)  pegfilgrastim (NEULASTA ONPRO) subcutaneous injection kit 6 mg, 6 mg, Subcutaneous, Once, 3 of 12 cycles  Administration: 6 mg (7/2/2020), 6 mg (7/16/2020), 6 mg (7/30/2020)  fosaprepitant (EMEND) 150 mg in sodium chloride 0 9 % 250 mL IVPB, 150 mg, Intravenous, Once, 3 of 12 cycles  Administration: 150 mg (6/30/2020), 150 mg (7/14/2020), 150 mg (7/28/2020)  irinotecan (CAMPTOSAR) 263 mg in dextrose 5 % 500 mL chemo infusion, 180 mg/m2 = 263 mg, Intravenous, Once, 3 of 11 cycles  Administration: 263 mg (6/30/2020), 263 mg (7/14/2020), 263 mg (7/28/2020)  oxaliplatin (ELOXATIN) 124 1 mg in dextrose 5 % 250 mL chemo infusion, 85 mg/m2 = 124 1 mg, Intravenous, Once, 3 of 12 cycles  Administration: 124 1 mg (2020), 124 1 mg (2020), 124 1 mg (2020)         Current Hematologic/ Oncologic Treatment:    FOLFIRINOX every 2 weeks with Neulasta growth factor support    ECO - Symptomatic but completely ambulatory    Interval History:  Pt reports for a follow up visit  She has had treatment related complications including crampy abdominal pain, taste bud changes, minor cold sensitivity, numbness in her fingers, jaw claudication  She developed infusion reaction last week while irinotecan was infusing, she had symptoms of lock jaw, the sensation that her tongue was swelling, slurred speech and brain fog  ? Delayed Oxaliplatin reaction vs actual reaction to CPT11  Pt symptoms did improve once her CPT11 was placed on hold and did return once CPT11 was re-started  Over the past week, she reports frequent episodes of diarrhea over first 24-48 post treatment, fatigue and decreased appetite and 3 lb weight loss  She denies any abdominal pain  She reports her anxiety has greatly improved since starting mirtazapine  Her labs were reviewed with her today and her tumor marker has normalized, at presentation her CEA was 107 its now 26 indicating good treatment response  CBC and CMP WNL  I discussed ongoing treatment options with Dr Anastasia Severe given her history of infusion related side effects and possible infusion reaction to CPT T11  For her next cycle we will skip Irinotecan  I discussed this with patient and her  today and reassured her that she still getting standard care with curative intent  If she tolerates her next infusion without any reactions, we can determine that she has probable reaction to CPT 11  If she still develops symptoms of locked all with tongue thickening and slurred speech then we can presume her symptoms are related to oxaliplatin          Cancer Staging:  Cancer Staging  No matching staging information was found for the patient  Molecular Testing:         Test Results:    Imaging: Xr Chest 1 View Portable    Result Date: 7/5/2020  Narrative: CHEST INDICATION:   epigastric pain  COMPARISON:  Chest radiograph from 6/23/2020 and chest CT from 5/29/2020  EXAM PERFORMED/VIEWS:  XR CHEST PORTABLE FINDINGS:  Left port at cavoatrial junction  Cardiomediastinal silhouette appears unremarkable  The lungs are clear  No pneumothorax or pleural effusion  Osseous structures appear within normal limits for patient age  Impression: No acute cardiopulmonary disease  Workstation performed: [id number]     Ct Abdomen Pelvis With Contrast    Result Date: 7/5/2020  Narrative: CT ABDOMEN AND PELVIS WITH IV CONTRAST INDICATION:   Epigastric pain  Undergoing chemotherapy for pancreatic cancer  COMPARISON:  5/29/2020  TECHNIQUE:  CT examination of the abdomen and pelvis was performed  Axial, sagittal, and coronal 2D reformatted images were created from the source data and submitted for interpretation  Radiation dose length product (DLP) for this visit:  440 mGy-cm   This examination, like all CT scans performed in the [facility], was performed utilizing techniques to minimize radiation dose exposure, including the use of iterative reconstruction and automated exposure control  IV Contrast:  100 mL of iohexol (OMNIPAQUE) Enteric Contrast:  Enteric contrast was not administered  FINDINGS: ABDOMEN LOWER CHEST:  Clear lung bases  LIVER/BILIARY TREE:  Intrahepatic pneumobilia with stable mild biliary dilatation  Interval placement of stent in the common bile duct with proximal pneumobilia and interval resolution of ductal obstruction  GALLBLADDER:  Small amount of gas in the gallbladder likely secondary to stent placement  No evidence of cholecystitis  SPLEEN:  Unremarkable  PANCREAS:  Stable dilatation of the proximal pancreatic duct measuring 8 mm in diameter    Pancreatic head mass less well visualized though likely stable measuring approximately 2 2 cm  ADRENAL GLANDS:  Unremarkable  KIDNEYS/URETERS:  No pyelonephritis or obstructive uropathy  One or more sharply circumscribed subcentimeter renal hypodensities are noted  These lesions are too small to accurately characterize, but are statistically most likely to represent benign cortical renal cyst(s)  According to the guidelines published in  the [facility] Paper of the ACR Incidental Findings Committee (Radiology 2010), no further workup of these lesions is recommended  STOMACH AND BOWEL: Partially collapsed gastric fundus, limiting evaluation  Fluid-filled loops of mildly distended small bowel without wall thickening or transition point  Moderate amount stool throughout the colon  No evidence of colitis, diverticulitis or bowel obstruction    APPENDIX:  No findings to suggest appendicitis  ABDOMINOPELVIC CAVITY:  No ascites  No pneumoperitoneum  No lymphadenopathy  VESSELS:  No mesenteric vessel encasement  Patent portal, splenic and superior mesenteric veins  PELVIS REPRODUCTIVE ORGANS:  Pelvis obscured by streak and beam hardening artifact  URINARY BLADDER:  Unremarkable  ABDOMINAL WALL/INGUINAL REGIONS:  Unremarkable  OSSEOUS STRUCTURES:  Normal alignment of bilateral hip arthroplasties  Impression: 1  Interval placement of a bile duct stent with resolution of biliary obstruction  Pneumobilia is likely postsurgical  2   Stable dilatation of the proximal pancreatic duct  Limited visualization of pancreatic head 2 2 cm mass, stable  3   Findings suggestive of constipation  Possible mild enteritis  No evidence of bowel obstruction, colitis or diverticulitis   Workstation performed: [id number]       Labs:   Lab Results   Component Value Date    WBC 9 29 07/27/2020    HGB 14 0 07/27/2020    HCT 42 7 07/27/2020    MCV 97 07/27/2020     07/27/2020     Lab Results   Component Value Date    K 3 9 07/27/2020     07/27/2020    CO2 27 2020    BUN 14 2020    CREATININE 1 04 2020    GLUF 124 (H) 2020    CALCIUM 8 7 2020    AST 14 2020    ALT 18 2020    ALKPHOS 89 2020    EGFR 56 2020             Lab Results   Component Value Date    [id number] 469 2019       Review of Systems   Constitutional: Positive for fatigue and unexpected weight change (lost 3 lb since last visit )  Gastrointestinal: Positive for diarrhea (first 24-48 hours after treatment ) and nausea (intermittent, compazine helps  )  Psychiatric/Behavioral: The patient is nervous/anxious  All other systems reviewed and are negative          Active Problems:   Patient Active Problem List   Diagnosis    Hyperlipidemia    Essential hypertension    Type 2 diabetes mellitus without complication, without long-term current use of insulin (HCC)    Pruritus    Transaminitis    Pancreatic adenocarcinoma ([address] )    Port-A-Cath in place    Therapeutic opioid-induced constipation (OIC)    Anxiety    Functional diarrhea    Poor appetite       Past Medical History:   Past Medical History:   Diagnosis Date    Diabetes mellitus ([address] )     Hypertension     Lactic acidosis 2020    Neuropathy     Obstructive jaundice 2020       Surgical History:   Past Surgical History:   Procedure Laterality Date     SECTION      COLONOSCOPY      ECTOPIC PREGNANCY SURGERY      [place] GUIDED CENTRAL VENOUS ACCESS DEVICE INSERTION  2020    JOINT REPLACEMENT Bilateral     MANDIBLE FRACTURE SURGERY      REPLACEMENT TOTAL HIP W/  RESURFACING IMPLANTS Bilateral     right hip done 2012; left hip done 2012    TONSILLECTOMY      TUNNELED VENOUS PORT PLACEMENT Left 2020    Procedure: INSERTION VENOUS PORT (PORT-A-CATH), left;  Surgeon: Fern Jain MD;  [facility]: [facility];  Service: Surgical Oncology       Family History:    Family History   Problem Relation Age of Onset    Diabetes Mother     Heart disease Mother     Heart disease Father     Breast cancer additional onset Sister     Breast cancer additional onset Maternal Aunt     Stroke Maternal Grandfather        Cancer-related family history is not on file      Social History:   Social History     Socioeconomic History    Marital status: /Civil Union     Spouse name: Not on file    Number of children: Not on file    Years of education: Not on file    Highest education level: Not on file   Occupational History    Not on file   Social Needs    Financial resource strain: Not on file    Food insecurity     Worry: Not on file     Inability: Not on file   [employer] needs     Medical: Not on file     Non-medical: Not on file   Tobacco Use    Smoking status: Former Smoker    Smokeless tobacco: Never Used    Tobacco comment: quit 40 years ago   Substance and Sexual Activity    Alcohol use: Not Currently     Frequency: Monthly or less    Drug use: Never    Sexual activity: Not Currently   Lifestyle    Physical activity     Days per week: Not on file     Minutes per session: Not on file    Stress: Not on file   Relationships    Social connections     Talks on phone: Not on file     Gets together: Not on file     Attends Bahai service: Not on file     Active member of club or organization: Not on file     Attends meetings of clubs or organizations: Not on file     Relationship status: Not on file    Intimate partner violence     Fear of current or ex partner: Not on file     Emotionally abused: Not on file     Physically abused: Not on file     Forced sexual activity: Not on file   Other Topics Concern    Not on file   Social History Narrative    Not on file       Current Medications:   Current Outpatient Medications   Medication Sig Dispense Refill    allopurinol (ZYLOPRIM) 100 mg tablet Take 100 mg by mouth daily      aspirin (Aspirin 81) 81 mg EC tablet Take 81 mg by mouth daily      dicyclomine (BENTYL) 20 mg tablet Take 1 tablet (20 mg total) by mouth 2 (two) times a day 60 tablet 0    diphenhydrAMINE (BENADRYL) 25 mg capsule Take 25 mg by mouth every 6 (six) hours as needed for itching      fluorouracil 3,505 mg in CADD infusion pump Infuse 3,505 mg (1,200 mg/m2/day x 1 46 m2 (Treatment plan recorded BSA)) into a venous catheter over 46 hours for 2 days [place] to be administered on 8/11/20 1 Device 0    gabapentin (NEURONTIN) 300 mg capsule Take 300 mg by mouth 3 (three) times a day      hydrOXYzine HCL (ATARAX) 10 mg tablet Take 1 tablet (10 mg total) by mouth every 8 (eight) hours as needed for itching 30 tablet 0    LORazepam (ATIVAN) 0 5 mg tablet Take 0 5 mg by mouth 2 (two) times a day as needed      metFORMIN (GLUCOPHAGE) 500 mg tablet Take by mouth 2 (two) times a day      mirtazapine (REMERON) 15 mg tablet Take 1 tablet (15 mg total) by mouth daily at bedtime 30 tablet 1    omeprazole (PriLOSEC) 20 mg delayed release capsule Take 1 capsule (20 mg total) by mouth daily 30 capsule 1    oxyCODONE (ROXICODONE) 5 mg immediate release tablet Take 1 tablet (5 mg total) by mouth every 4 (four) hours as needed (cancer pain)Max Daily Amount: 30 mg 45 tablet 0    polyethylene glycol (MIRALAX) 17 g packet Take 17 g by mouth daily 30 each 1    prochlorperazine (COMPAZINE) 10 mg tablet Take 1 tablet (10 mg total) by mouth every 6 (six) hours as needed for nausea or vomiting 45 tablet 3    sitaGLIPtin-metFORMIN (JANUMET)  MG per tablet Take 1 tablet by mouth 2 (two) times a day with meals      sucralfate (CARAFATE) 1 g/10 mL suspension Take 10 mL (1 g total) by mouth 4 (four) times a day (with meals and at bedtime) 420 mL 0    dexamethasone (DECADRON) 4 mg tablet Take 1 tablet (4 mg total) by mouth daily with breakfast 40 tablet 0    diphenoxylate-atropine (LOMOTIL) 2 5-0 025 mg per tablet Take 1 tablet by mouth 4 (four) times a day as needed for diarrhea DO NOT EXCEED 4 DOSES IN 1 DAY 30 tablet 0     No current facility-administered medications for this visit  Allergies: Allergies   Allergen Reactions    Betadine [Povidone Iodine] Rash     Also itching from betadine       Physical Exam:  /70 (BP Location: Right arm, Patient Position: Sitting, Cuff Size: Adult)   Pulse (!) 111   Temp 97 6 °F (36 4 °C)   Resp 18   Ht 4' 11" (1 499 m)   Wt 49 kg (108 lb)   SpO2 97%   BMI 21 81 kg/m²   Body surface area is 1 42 meters squared  Wt Readings from Last 3 Encounters:   08/06/20 49 kg (108 lb)   07/28/20 50 4 kg (111 lb 1 8 oz)   07/23/20 50 3 kg (111 lb)           Physical Exam   Constitutional: She is oriented to person, place, and time  She does not appear ill  No distress  Alert, pleasant, cooperative  Thin appearing female who appears her stated age in no acute distress   HENT:   Head: Normocephalic and atraumatic  Mouth/Throat: Mucous membranes are moist  Oropharynx is clear  Eyes: Left eye exhibits no discharge  No scleral icterus  Neck: Normal range of motion  Neck supple  Cardiovascular: Normal rate, regular rhythm and normal heart sounds  Pulmonary/Chest: Effort normal and breath sounds normal    Musculoskeletal: Normal range of motion  Right lower leg: No edema  Left lower leg: No edema  Lymphadenopathy:     She has no cervical adenopathy  Neurological: She is alert and oriented to person, place, and time  Skin: Skin is warm and dry  She is not diaphoretic  Psychiatric: Her behavior is normal  Mood, judgment and thought content normal        Assessment / Plan:    1  Pancreatic adenocarcinoma [facility])   The patient is a pleasant 47-year-old female with newly diagnosed pancreatic cancer which is localized to the pancreas based on imaging  She is currently undergoing neoadjuvant chemotherapy with FOLFIRINOX every 2 weeks with Neulasta growth factor support  She has completed 3 cycles    She has had some treatment related side effects, and experienced an  infusion reaction while irinotecan was infusing during cycle 3  She was initially rechallenged however developed further reaction and irinotecan was discontinued  She did not receive full dose of irinotecan     I discussed ongoing treatment options with Dr Betty Seymour given her history of infusion related side effects and possible infusion reaction to CPT T11  For her next cycle we will skip Irinotecan  I discussed this with patient and her  today and reassured her that she still getting standard care with curative intent  If she tolerates her next infusion without any reactions, we can determine that she has probable reaction to CPT 11  If she still develops symptoms of locked all with tongue thickening and slurred speech then we can presume her symptoms are related to oxaliplatin  Patients tumor has has normalized, at presentation her CEA was 107 its now 26 indicating good treatment response  Remainder of her counts also remain within acceptable treatment range  She will proceed with cycle 4 next week  Irinotecan will be held from her regimen  Many questions were answered for patient and her spouse today  They were in agreement of plan of care created today  They will return for follow up in 2 weeks  Patient was instructed to call with any questions or concerns prior to her next office  2  Diarrhea   I discussed diarrhea management today  Imodium was not effective at keeping her symptoms controlled  I have ordered Lomotil with instructions on how to use  Patient also encouraged to limit dairy and follow a brat diet days following treatment    Patient verbalized understanding     3  Poor appetite   Patient has experienced poor appetite, recent 3 lb weight loss and some worsening fatigue    I explained these are common treatment related side effects  Discussed the benefit of low-dose daily dexamethasone to help manage the spectrum of the side effects enable her to stay on task with her scheduled chemotherapy treatments  Patient was agreeable  She will start dexamethasone 4 mg daily with food every morning  Goals and Barriers:  Current Goal:  Prolong Survival from pancreatic cancer  Barriers: None  Patient's Capacity to Self Care:  Patient is able to self care  Portions of the record may have been created with voice recognition software  Occasional wrong word or "sound a like" substitutions may have occurred due to the inherent limitations of voice recognition software  Read the chart carefully and recognize, using context, where substitutions have occurred

## 2020-08-04 DIAGNOSIS — C25.9 PANCREATIC ADENOCARCINOMA (HCC): Primary | Chronic | ICD-10-CM

## 2020-08-05 ENCOUNTER — TELEPHONE (OUTPATIENT)
Dept: HEMATOLOGY ONCOLOGY | Facility: CLINIC | Age: 65
End: 2020-08-05

## 2020-08-05 DIAGNOSIS — C25.9 PANCREATIC ADENOCARCINOMA (HCC): Primary | ICD-10-CM

## 2020-08-05 RX ORDER — OMEPRAZOLE 20 MG/1
20 CAPSULE, DELAYED RELEASE ORAL DAILY
Qty: 30 CAPSULE | Refills: 1 | OUTPATIENT
Start: 2020-08-05

## 2020-08-05 NOTE — TELEPHONE ENCOUNTER
Patient called to do COVID-19 pre screening  Patient was tested and it was negative   Patient answered no to all other pre screening questions

## 2020-08-06 ENCOUNTER — OFFICE VISIT (OUTPATIENT)
Dept: HEMATOLOGY ONCOLOGY | Facility: CLINIC | Age: 65
End: 2020-08-06
Payer: MEDICARE

## 2020-08-06 VITALS
BODY MASS INDEX: 21.77 KG/M2 | HEIGHT: 59 IN | OXYGEN SATURATION: 97 % | DIASTOLIC BLOOD PRESSURE: 70 MMHG | WEIGHT: 108 LBS | TEMPERATURE: 97.6 F | RESPIRATION RATE: 18 BRPM | HEART RATE: 111 BPM | SYSTOLIC BLOOD PRESSURE: 124 MMHG

## 2020-08-06 DIAGNOSIS — C25.9 PANCREATIC ADENOCARCINOMA (HCC): Primary | Chronic | ICD-10-CM

## 2020-08-06 DIAGNOSIS — R63.0 POOR APPETITE: ICD-10-CM

## 2020-08-06 DIAGNOSIS — K59.1 FUNCTIONAL DIARRHEA: ICD-10-CM

## 2020-08-06 DIAGNOSIS — C25.9 PANCREATIC ADENOCARCINOMA (HCC): Primary | ICD-10-CM

## 2020-08-06 PROCEDURE — 99215 OFFICE O/P EST HI 40 MIN: CPT | Performed by: NURSE PRACTITIONER

## 2020-08-06 RX ORDER — ATROPINE SULFATE 1 MG/ML
0.25 INJECTION, SOLUTION INTRAMUSCULAR; INTRAVENOUS; SUBCUTANEOUS ONCE AS NEEDED
Status: CANCELLED | OUTPATIENT
Start: 2020-08-25

## 2020-08-06 RX ORDER — DEXTROSE MONOHYDRATE 50 MG/ML
20 INJECTION, SOLUTION INTRAVENOUS ONCE
Status: CANCELLED | OUTPATIENT
Start: 2020-09-22

## 2020-08-06 RX ORDER — SODIUM CHLORIDE 9 MG/ML
20 INJECTION, SOLUTION INTRAVENOUS ONCE AS NEEDED
Status: CANCELLED | OUTPATIENT
Start: 2020-08-25

## 2020-08-06 RX ORDER — FLUOROURACIL 50 MG/ML
400 INJECTION, SOLUTION INTRAVENOUS ONCE
Status: CANCELLED | OUTPATIENT
Start: 2020-08-25

## 2020-08-06 RX ORDER — DEXTROSE MONOHYDRATE 50 MG/ML
20 INJECTION, SOLUTION INTRAVENOUS ONCE
Status: CANCELLED | OUTPATIENT
Start: 2020-09-08

## 2020-08-06 RX ORDER — ATROPINE SULFATE 1 MG/ML
0.25 INJECTION, SOLUTION INTRAMUSCULAR; INTRAVENOUS; SUBCUTANEOUS ONCE
Status: CANCELLED | OUTPATIENT
Start: 2020-09-22

## 2020-08-06 RX ORDER — LORAZEPAM 2 MG/ML
0.5 INJECTION INTRAMUSCULAR ONCE
Status: CANCELLED
Start: 2020-09-08

## 2020-08-06 RX ORDER — LORAZEPAM 2 MG/ML
0.5 INJECTION INTRAMUSCULAR ONCE
Status: CANCELLED
Start: 2020-09-22

## 2020-08-06 RX ORDER — DIPHENOXYLATE HYDROCHLORIDE AND ATROPINE SULFATE 2.5; .025 MG/1; MG/1
1 TABLET ORAL 4 TIMES DAILY PRN
Qty: 30 TABLET | Refills: 0 | Status: SHIPPED | OUTPATIENT
Start: 2020-08-06 | End: 2021-08-03 | Stop reason: SDUPTHER

## 2020-08-06 RX ORDER — ATROPINE SULFATE 1 MG/ML
0.25 INJECTION, SOLUTION INTRAMUSCULAR; INTRAVENOUS; SUBCUTANEOUS ONCE AS NEEDED
Status: CANCELLED | OUTPATIENT
Start: 2020-09-08

## 2020-08-06 RX ORDER — FLUOROURACIL 50 MG/ML
400 INJECTION, SOLUTION INTRAVENOUS ONCE
Status: CANCELLED | OUTPATIENT
Start: 2020-09-08

## 2020-08-06 RX ORDER — ATROPINE SULFATE 1 MG/ML
0.25 INJECTION, SOLUTION INTRAMUSCULAR; INTRAVENOUS; SUBCUTANEOUS ONCE
Status: CANCELLED | OUTPATIENT
Start: 2020-09-08

## 2020-08-06 RX ORDER — ATROPINE SULFATE 1 MG/ML
0.25 INJECTION, SOLUTION INTRAMUSCULAR; INTRAVENOUS; SUBCUTANEOUS ONCE AS NEEDED
Status: CANCELLED | OUTPATIENT
Start: 2020-09-22

## 2020-08-06 RX ORDER — DEXTROSE MONOHYDRATE 50 MG/ML
20 INJECTION, SOLUTION INTRAVENOUS ONCE
Status: CANCELLED | OUTPATIENT
Start: 2020-08-25

## 2020-08-06 RX ORDER — FLUOROURACIL 50 MG/ML
400 INJECTION, SOLUTION INTRAVENOUS ONCE
Status: CANCELLED | OUTPATIENT
Start: 2020-09-22

## 2020-08-06 RX ORDER — SODIUM CHLORIDE 9 MG/ML
20 INJECTION, SOLUTION INTRAVENOUS ONCE AS NEEDED
Status: CANCELLED | OUTPATIENT
Start: 2020-09-08

## 2020-08-06 RX ORDER — DEXAMETHASONE 4 MG/1
4 TABLET ORAL
Qty: 40 TABLET | Refills: 0 | Status: SHIPPED | OUTPATIENT
Start: 2020-08-06 | End: 2020-09-05

## 2020-08-06 RX ORDER — LORAZEPAM 2 MG/ML
0.5 INJECTION INTRAMUSCULAR ONCE
Status: CANCELLED
Start: 2020-08-25

## 2020-08-06 RX ORDER — ATROPINE SULFATE 1 MG/ML
0.25 INJECTION, SOLUTION INTRAMUSCULAR; INTRAVENOUS; SUBCUTANEOUS ONCE
Status: CANCELLED | OUTPATIENT
Start: 2020-08-25

## 2020-08-06 RX ORDER — SODIUM CHLORIDE 9 MG/ML
20 INJECTION, SOLUTION INTRAVENOUS ONCE AS NEEDED
Status: CANCELLED | OUTPATIENT
Start: 2020-09-22

## 2020-08-07 DIAGNOSIS — K52.9 ENTERITIS: ICD-10-CM

## 2020-08-07 DIAGNOSIS — R10.13 ACUTE EPIGASTRIC PAIN: ICD-10-CM

## 2020-08-07 RX ORDER — DICYCLOMINE HCL 20 MG
TABLET ORAL
Qty: 60 TABLET | Refills: 0 | Status: SHIPPED | OUTPATIENT
Start: 2020-08-07 | End: 2020-08-17

## 2020-08-10 ENCOUNTER — APPOINTMENT (OUTPATIENT)
Dept: LAB | Facility: HOSPITAL | Age: 65
End: 2020-08-10
Attending: INTERNAL MEDICINE
Payer: MEDICARE

## 2020-08-10 DIAGNOSIS — C25.9 PANCREATIC ADENOCARCINOMA (HCC): ICD-10-CM

## 2020-08-10 LAB
ALBUMIN SERPL BCP-MCNC: 3 G/DL (ref 3.5–5)
ALP SERPL-CCNC: 106 U/L (ref 46–116)
ALT SERPL W P-5'-P-CCNC: 25 U/L (ref 12–78)
ANION GAP SERPL CALCULATED.3IONS-SCNC: 9 MMOL/L (ref 4–13)
AST SERPL W P-5'-P-CCNC: 21 U/L (ref 5–45)
BASOPHILS # BLD MANUAL: 0 THOUSAND/UL (ref 0–0.1)
BASOPHILS NFR MAR MANUAL: 0 % (ref 0–1)
BILIRUB SERPL-MCNC: 0.2 MG/DL (ref 0.2–1)
BUN SERPL-MCNC: 12 MG/DL (ref 5–25)
CALCIUM SERPL-MCNC: 8.8 MG/DL (ref 8.3–10.1)
CHLORIDE SERPL-SCNC: 108 MMOL/L (ref 100–108)
CO2 SERPL-SCNC: 28 MMOL/L (ref 21–32)
CREAT SERPL-MCNC: 0.99 MG/DL (ref 0.6–1.3)
EOSINOPHIL # BLD MANUAL: 0 THOUSAND/UL (ref 0–0.4)
EOSINOPHIL NFR BLD MANUAL: 0 % (ref 0–6)
ERYTHROCYTE [DISTWIDTH] IN BLOOD BY AUTOMATED COUNT: 14.5 % (ref 11.6–15.1)
GFR SERPL CREATININE-BSD FRML MDRD: 59 ML/MIN/1.73SQ M
GLUCOSE P FAST SERPL-MCNC: 136 MG/DL (ref 65–99)
HCT VFR BLD AUTO: 38.6 % (ref 34.8–46.1)
HGB BLD-MCNC: 12.4 G/DL (ref 11.5–15.4)
LG PLATELETS BLD QL SMEAR: PRESENT
LYMPHOCYTES # BLD AUTO: 19 % (ref 14–44)
LYMPHOCYTES # BLD AUTO: 4.68 THOUSAND/UL (ref 0.6–4.47)
MCH RBC QN AUTO: 31.5 PG (ref 26.8–34.3)
MCHC RBC AUTO-ENTMCNC: 32.1 G/DL (ref 31.4–37.4)
MCV RBC AUTO: 98 FL (ref 82–98)
METAMYELOCYTES NFR BLD MANUAL: 3 % (ref 0–1)
MONOCYTES # BLD AUTO: 0.98 THOUSAND/UL (ref 0–1.22)
MONOCYTES NFR BLD: 4 % (ref 4–12)
MYELOCYTES NFR BLD MANUAL: 3 % (ref 0–1)
NEUTROPHILS # BLD MANUAL: 17.48 THOUSAND/UL (ref 1.85–7.62)
NEUTS SEG NFR BLD AUTO: 71 % (ref 43–75)
NRBC BLD AUTO-RTO: 1 /100 WBCS
PLATELET # BLD AUTO: 211 THOUSANDS/UL (ref 149–390)
PLATELET BLD QL SMEAR: ADEQUATE
PMV BLD AUTO: 10.2 FL (ref 8.9–12.7)
POTASSIUM SERPL-SCNC: 3.5 MMOL/L (ref 3.5–5.3)
PROT SERPL-MCNC: 6.2 G/DL (ref 6.4–8.2)
RBC # BLD AUTO: 3.94 MILLION/UL (ref 3.81–5.12)
SODIUM SERPL-SCNC: 145 MMOL/L (ref 136–145)
TOTAL CELLS COUNTED SPEC: 100
WBC # BLD AUTO: 24.62 THOUSAND/UL (ref 4.31–10.16)

## 2020-08-10 PROCEDURE — 86301 IMMUNOASSAY TUMOR CA 19-9: CPT

## 2020-08-10 PROCEDURE — 85027 COMPLETE CBC AUTOMATED: CPT

## 2020-08-10 PROCEDURE — 85007 BL SMEAR W/DIFF WBC COUNT: CPT

## 2020-08-10 PROCEDURE — 80053 COMPREHEN METABOLIC PANEL: CPT

## 2020-08-10 PROCEDURE — 36415 COLL VENOUS BLD VENIPUNCTURE: CPT

## 2020-08-11 ENCOUNTER — TELEPHONE (OUTPATIENT)
Dept: GYNECOLOGIC ONCOLOGY | Facility: CLINIC | Age: 65
End: 2020-08-11

## 2020-08-11 ENCOUNTER — HOSPITAL ENCOUNTER (OUTPATIENT)
Dept: INFUSION CENTER | Facility: CLINIC | Age: 65
Discharge: HOME | End: 2020-08-11
Payer: MEDICARE

## 2020-08-11 VITALS
HEART RATE: 100 BPM | OXYGEN SATURATION: 97 % | DIASTOLIC BLOOD PRESSURE: 71 MMHG | HEIGHT: 59 IN | TEMPERATURE: 97.5 F | BODY MASS INDEX: 22.84 KG/M2 | SYSTOLIC BLOOD PRESSURE: 135 MMHG | RESPIRATION RATE: 18 BRPM | WEIGHT: 113.32 LBS

## 2020-08-11 DIAGNOSIS — C25.9 PANCREATIC ADENOCARCINOMA (HCC): Primary | ICD-10-CM

## 2020-08-11 LAB — CANCER AG19-9 SERPL-ACNC: 33 U/ML (ref 0–35)

## 2020-08-11 PROCEDURE — 96413 CHEMO IV INFUSION 1 HR: CPT

## 2020-08-11 PROCEDURE — G0498 CHEMO EXTEND IV INFUS W/PUMP: HCPCS

## 2020-08-11 PROCEDURE — 96415 CHEMO IV INFUSION ADDL HR: CPT

## 2020-08-11 PROCEDURE — 96367 TX/PROPH/DG ADDL SEQ IV INF: CPT

## 2020-08-11 PROCEDURE — 96375 TX/PRO/DX INJ NEW DRUG ADDON: CPT

## 2020-08-11 PROCEDURE — 96411 CHEMO IV PUSH ADDL DRUG: CPT

## 2020-08-11 RX ORDER — LORAZEPAM 2 MG/ML
0.5 INJECTION INTRAMUSCULAR ONCE
Status: COMPLETED | OUTPATIENT
Start: 2020-08-11 | End: 2020-08-11

## 2020-08-11 RX ORDER — FLUOROURACIL 50 MG/ML
400 INJECTION, SOLUTION INTRAVENOUS ONCE
Status: COMPLETED | OUTPATIENT
Start: 2020-08-11 | End: 2020-08-11

## 2020-08-11 RX ORDER — SODIUM CHLORIDE 9 MG/ML
20 INJECTION, SOLUTION INTRAVENOUS ONCE AS NEEDED
Status: DISCONTINUED | OUTPATIENT
Start: 2020-08-11 | End: 2020-08-14 | Stop reason: HOSPADM

## 2020-08-11 RX ORDER — DEXTROSE MONOHYDRATE 50 MG/ML
20 INJECTION, SOLUTION INTRAVENOUS ONCE
Status: COMPLETED | OUTPATIENT
Start: 2020-08-11 | End: 2020-08-11

## 2020-08-11 RX ADMIN — DEXTROSE 20 ML/HR: 5 SOLUTION INTRAVENOUS at 10:59

## 2020-08-11 RX ADMIN — FLUOROURACIL 585 MG: 50 INJECTION, SOLUTION INTRAVENOUS at 14:27

## 2020-08-11 RX ADMIN — DEXAMETHASONE SODIUM PHOSPHATE: 10 INJECTION, SOLUTION INTRAMUSCULAR; INTRAVENOUS at 09:10

## 2020-08-11 RX ADMIN — DIPHENHYDRAMINE HYDROCHLORIDE 25 MG: 50 INJECTION, SOLUTION INTRAMUSCULAR; INTRAVENOUS at 09:35

## 2020-08-11 RX ADMIN — LORAZEPAM 0.5 MG: 2 INJECTION INTRAMUSCULAR; INTRAVENOUS at 10:56

## 2020-08-11 RX ADMIN — OXALIPLATIN 124.1 MG: 5 INJECTION, SOLUTION INTRAVENOUS at 11:10

## 2020-08-11 RX ADMIN — SODIUM CHLORIDE 20 ML/HR: 0.9 INJECTION, SOLUTION INTRAVENOUS at 08:52

## 2020-08-11 RX ADMIN — SODIUM CHLORIDE 150 MG: 0.9 INJECTION, SOLUTION INTRAVENOUS at 10:03

## 2020-08-11 NOTE — TELEPHONE ENCOUNTER
I called Jorge Albertolayton Baumann today to discuss the results of her genetic test  She was in infusion and stated it was not a good time to talk and said that tomorrow would be best  I let her know that I will reach out tomorrow

## 2020-08-11 NOTE — PROGRESS NOTES
Pt to clinic for oxaliplatin, fluorouracil, and CADD pump hook-up, tolerated infusions and CADD pump hook-up without complications, CADD pump states "running" and blinking green, pt aware of when to return for CADD pump disconnect, avs printed and reviewed

## 2020-08-12 ENCOUNTER — TELEPHONE (OUTPATIENT)
Dept: GYNECOLOGIC ONCOLOGY | Facility: CLINIC | Age: 65
End: 2020-08-12

## 2020-08-12 DIAGNOSIS — F41.9 ANXIETY: ICD-10-CM

## 2020-08-12 DIAGNOSIS — C25.9 PANCREATIC ADENOCARCINOMA (HCC): Chronic | ICD-10-CM

## 2020-08-12 RX ORDER — MIRTAZAPINE 15 MG/1
TABLET, FILM COATED ORAL
Qty: 30 TABLET | Refills: 1 | Status: SHIPPED | OUTPATIENT
Start: 2020-08-12 | End: 2020-11-02 | Stop reason: SDUPTHER

## 2020-08-12 NOTE — TELEPHONE ENCOUNTER
I called Jaden Lassiter and left a voicemail letting her know her results were available and to call our program when she is free to discuss them

## 2020-08-13 ENCOUNTER — TELEPHONE (OUTPATIENT)
Dept: GYNECOLOGIC ONCOLOGY | Facility: CLINIC | Age: 65
End: 2020-08-13

## 2020-08-13 ENCOUNTER — HOSPITAL ENCOUNTER (OUTPATIENT)
Dept: INFUSION CENTER | Facility: CLINIC | Age: 65
Discharge: HOME | End: 2020-08-13
Payer: MEDICARE

## 2020-08-13 DIAGNOSIS — K52.9 ENTERITIS: ICD-10-CM

## 2020-08-13 DIAGNOSIS — C25.9 PANCREATIC ADENOCARCINOMA (HCC): Primary | ICD-10-CM

## 2020-08-13 DIAGNOSIS — R10.13 ACUTE EPIGASTRIC PAIN: ICD-10-CM

## 2020-08-13 PROCEDURE — 96372 THER/PROPH/DIAG INJ SC/IM: CPT

## 2020-08-13 RX ADMIN — PEGFILGRASTIM 6 MG: KIT SUBCUTANEOUS at 13:16

## 2020-08-13 NOTE — TELEPHONE ENCOUNTER
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); heterozygous; uncertain significance     Assessment:  A variant of uncertain significance (VUS) means that a change was identified in a specific gene but it cannot be determined whether the variant is associated with an increased risk of cancer or is a harmless genetic change  It is possible that the variant was seen in only a handful of individuals, or there may be conflicting or incomplete information in the medical literature about the variant and its association with hereditary cancer  Variant 1  The significance of the PALB2 variant is currently not known and therefore this test result cannot be used to help determine Jaden Lassiter cancer risks  Variant 1 Familial VUS testing  Familial VUS testing was not offered at this time  The laboratory will continue to accumulate information on this variant and will reclassify it as either a positive or negative genetic test result when they are confident that they have adequate information  As updated information is obtained, we will notify Jaden Lassiter with the updated information  It is important to note that the majority of variants of uncertain significance are reclassified as likely benign or benign as additional information about the variant becomes available  I reviewed with Jaden Lassiter that her cousin could consider testing as her mom was diagnosed in her 52's  Jaden Lassiter plans on speaking with her cousin about her results and will mention this       Risk Based on Family History:  The significance of this variant is currently not known and therefore this test result cannot be used to help determine Christine Cm cancer risks  Rather her personal medical history and family history of cancer are the most important factors used to estimate her risk for developing certain cancers and to direct her medical management  We discussed that Christine Cm risk of developing breast cancer may be elevated, compared to the general population risk, based on her family history  I reviewed with Christine Cm that different breast cancer risk models estimate her risk to be between 14 5% and 23 3% when considering various factors  This is compared to a 12-13% average lifetime risk for a woman to develop breast cancer  We discussed that this does qualify her for increased screening if she was interested, which is outlined below  Plan:   Recommendations for Christine Cm are outlined below however the surveillance and medical management should continue as clinically indicated and as determined appropriate by her healthcare providers  Breast Cancer Screening:  Screening for women who have a lifetime risk of >20% as defined by models that are largely dependent on family history:  Hank Perry: 23 3  TC: 17 7  Erica: 14 5  -Clinical encounter including breast cancer risk assessment, risk reduction counseling, and clinical breast exam every 6-12 months   -Annual screening mammogram and consideration of tomosynthesis  -Recommend annual breast MRI  -Breast awareness   -Consider risk reduction strategies  Options for risk reduction may include risk-reducing medications, risk-reducing surgery, participation in clinical research and healthy lifestyle choices  In general, risk-reducing surgery should only be considered in women with a gene mutation strongly predisposing them to breast cancer or those at high risk due to a compelling family history or prior thoracic radiotherapy    In addition, there are age-dependent risks and contraindications to taking risk-reducing medications  The options for risk reduction should be discussed with a breast specialist or other healthcare provider able to explain the benefits and risks of these options  I encouraged Sorin Ward to review this information with her healthcare provider when she goes for her annual mammogram later this year  Colon Cancer Screening:   Population-based screening guidelines are appropriate  Gynecologic Cancer Screening/Risk Reduction  Routine gynecologic screening  There are no standard screening recommendations for endometrial or ovarian cancer  Skin Cancer Screening   Continue skin cancer screening as recommended by your healthcare provider  VUS Result: Sorin Ward was strongly encouraged to contact us regarding any changes in her personal or family history of cancer as these changes could alter our recommendation regarding genetic testing and/or cancer screening  Sorin Ward was also encouraged to follow up with us on an annual basis as variant classifications are subject to change

## 2020-08-13 NOTE — PROGRESS NOTES
Pt arrived for cadd d/c, res vol=0  Good blood return noted, flushed per protocol  Neulasta on pro places on left side abdomen   Offers no complaints

## 2020-08-17 RX ORDER — DICYCLOMINE HCL 20 MG
TABLET ORAL
Qty: 180 TABLET | Refills: 1 | Status: SHIPPED | OUTPATIENT
Start: 2020-08-17

## 2020-08-18 DIAGNOSIS — C25.9 PANCREATIC ADENOCARCINOMA (HCC): Primary | ICD-10-CM

## 2020-08-23 NOTE — PLAN OF CARE
Problem: Potential for Falls  Goal: Patient will remain free of falls  Description: INTERVENTIONS:  - Assess patient frequently for physical needs  -  Identify cognitive and physical deficits and behaviors that affect risk of falls  -  [place] fall precautions as indicated by assessment   - Educate patient/family on patient safety including physical limitations  - Instruct patient to call for assistance with activity based on assessment  - Modify environment to reduce risk of injury  - Consider OT/PT consult to assist with strengthening/mobility  Outcome: Progressing     Problem: SAFETY ADULT  Goal: Patient will remain free of falls  Description: INTERVENTIONS:  - Assess patient frequently for physical needs  -  Identify cognitive and physical deficits and behaviors that affect risk of falls  -  [place] fall precautions as indicated by assessment   - Educate patient/family on patient safety including physical limitations  - Instruct patient to call for assistance with activity based on assessment  - Modify environment to reduce risk of injury  - Consider OT/PT consult to assist with strengthening/mobility  Outcome: Progressing     Problem: Knowledge Deficit  Goal: Patient/family/caregiver demonstrates understanding of disease process, treatment plan, medications, and discharge instructions  Description: Complete learning assessment and assess knowledge base    Interventions:  - Provide teaching at level of understanding  - Provide teaching via preferred learning methods  Outcome: Progressing No

## 2020-08-24 ENCOUNTER — APPOINTMENT (OUTPATIENT)
Dept: LAB | Facility: HOSPITAL | Age: 65
End: 2020-08-24
Attending: INTERNAL MEDICINE
Payer: MEDICARE

## 2020-08-24 DIAGNOSIS — C25.9 PANCREATIC ADENOCARCINOMA (HCC): ICD-10-CM

## 2020-08-24 LAB
ALBUMIN SERPL BCP-MCNC: 2.8 G/DL (ref 3.5–5)
ALP SERPL-CCNC: 135 U/L (ref 46–116)
ALT SERPL W P-5'-P-CCNC: 19 U/L (ref 12–78)
ANION GAP SERPL CALCULATED.3IONS-SCNC: 12 MMOL/L (ref 4–13)
AST SERPL W P-5'-P-CCNC: 15 U/L (ref 5–45)
BASOPHILS # BLD AUTO: 0.04 THOUSANDS/ΜL (ref 0–0.1)
BASOPHILS NFR BLD AUTO: 0 % (ref 0–1)
BILIRUB SERPL-MCNC: 0.2 MG/DL (ref 0.2–1)
BUN SERPL-MCNC: 15 MG/DL (ref 5–25)
CALCIUM SERPL-MCNC: 8.5 MG/DL (ref 8.3–10.1)
CHLORIDE SERPL-SCNC: 107 MMOL/L (ref 100–108)
CO2 SERPL-SCNC: 26 MMOL/L (ref 21–32)
CREAT SERPL-MCNC: 1.01 MG/DL (ref 0.6–1.3)
EOSINOPHIL # BLD AUTO: 0.14 THOUSAND/ΜL (ref 0–0.61)
EOSINOPHIL NFR BLD AUTO: 1 % (ref 0–6)
ERYTHROCYTE [DISTWIDTH] IN BLOOD BY AUTOMATED COUNT: 14.8 % (ref 11.6–15.1)
GFR SERPL CREATININE-BSD FRML MDRD: 58 ML/MIN/1.73SQ M
GLUCOSE P FAST SERPL-MCNC: 145 MG/DL (ref 65–99)
HCT VFR BLD AUTO: 38.4 % (ref 34.8–46.1)
HGB BLD-MCNC: 12 G/DL (ref 11.5–15.4)
IMM GRANULOCYTES # BLD AUTO: 0.18 THOUSAND/UL (ref 0–0.2)
IMM GRANULOCYTES NFR BLD AUTO: 1 % (ref 0–2)
LYMPHOCYTES # BLD AUTO: 2.2 THOUSANDS/ΜL (ref 0.6–4.47)
LYMPHOCYTES NFR BLD AUTO: 14 % (ref 14–44)
MCH RBC QN AUTO: 30.8 PG (ref 26.8–34.3)
MCHC RBC AUTO-ENTMCNC: 31.3 G/DL (ref 31.4–37.4)
MCV RBC AUTO: 99 FL (ref 82–98)
MONOCYTES # BLD AUTO: 1.25 THOUSAND/ΜL (ref 0.17–1.22)
MONOCYTES NFR BLD AUTO: 8 % (ref 4–12)
NEUTROPHILS # BLD AUTO: 11.63 THOUSANDS/ΜL (ref 1.85–7.62)
NEUTS SEG NFR BLD AUTO: 76 % (ref 43–75)
NRBC BLD AUTO-RTO: 0 /100 WBCS
PLATELET # BLD AUTO: 223 THOUSANDS/UL (ref 149–390)
PMV BLD AUTO: 10.5 FL (ref 8.9–12.7)
POTASSIUM SERPL-SCNC: 3.3 MMOL/L (ref 3.5–5.3)
PROT SERPL-MCNC: 6.2 G/DL (ref 6.4–8.2)
RBC # BLD AUTO: 3.9 MILLION/UL (ref 3.81–5.12)
SODIUM SERPL-SCNC: 145 MMOL/L (ref 136–145)
WBC # BLD AUTO: 15.44 THOUSAND/UL (ref 4.31–10.16)

## 2020-08-24 PROCEDURE — 36415 COLL VENOUS BLD VENIPUNCTURE: CPT

## 2020-08-24 PROCEDURE — 80053 COMPREHEN METABOLIC PANEL: CPT

## 2020-08-24 PROCEDURE — 85025 COMPLETE CBC W/AUTO DIFF WBC: CPT

## 2020-08-25 ENCOUNTER — HOSPITAL ENCOUNTER (OUTPATIENT)
Dept: INFUSION CENTER | Facility: CLINIC | Age: 65
Discharge: HOME | End: 2020-08-25
Payer: MEDICARE

## 2020-08-25 VITALS
SYSTOLIC BLOOD PRESSURE: 140 MMHG | HEIGHT: 59 IN | RESPIRATION RATE: 18 BRPM | BODY MASS INDEX: 22.31 KG/M2 | TEMPERATURE: 96.9 F | WEIGHT: 110.67 LBS | HEART RATE: 99 BPM | DIASTOLIC BLOOD PRESSURE: 73 MMHG

## 2020-08-25 DIAGNOSIS — C25.9 PANCREATIC ADENOCARCINOMA (HCC): Primary | ICD-10-CM

## 2020-08-25 PROCEDURE — 96375 TX/PRO/DX INJ NEW DRUG ADDON: CPT

## 2020-08-25 PROCEDURE — 96411 CHEMO IV PUSH ADDL DRUG: CPT

## 2020-08-25 PROCEDURE — G0498 CHEMO EXTEND IV INFUS W/PUMP: HCPCS

## 2020-08-25 PROCEDURE — 96367 TX/PROPH/DG ADDL SEQ IV INF: CPT

## 2020-08-25 PROCEDURE — 96415 CHEMO IV INFUSION ADDL HR: CPT

## 2020-08-25 PROCEDURE — 96413 CHEMO IV INFUSION 1 HR: CPT

## 2020-08-25 RX ORDER — DEXTROSE MONOHYDRATE 50 MG/ML
20 INJECTION, SOLUTION INTRAVENOUS ONCE
Status: COMPLETED | OUTPATIENT
Start: 2020-08-25 | End: 2020-08-25

## 2020-08-25 RX ORDER — LORAZEPAM 2 MG/ML
0.5 INJECTION INTRAMUSCULAR ONCE
Status: COMPLETED | OUTPATIENT
Start: 2020-08-25 | End: 2020-08-25

## 2020-08-25 RX ORDER — FLUOROURACIL 50 MG/ML
400 INJECTION, SOLUTION INTRAVENOUS ONCE
Status: COMPLETED | OUTPATIENT
Start: 2020-08-25 | End: 2020-08-25

## 2020-08-25 RX ORDER — SODIUM CHLORIDE 9 MG/ML
20 INJECTION, SOLUTION INTRAVENOUS ONCE AS NEEDED
Status: DISCONTINUED | OUTPATIENT
Start: 2020-08-25 | End: 2020-08-28 | Stop reason: HOSPADM

## 2020-08-25 RX ADMIN — DIPHENHYDRAMINE HYDROCHLORIDE 25 MG: 50 INJECTION, SOLUTION INTRAMUSCULAR; INTRAVENOUS at 09:38

## 2020-08-25 RX ADMIN — DEXTROSE 20 ML/HR: 5 SOLUTION INTRAVENOUS at 10:54

## 2020-08-25 RX ADMIN — FLUOROURACIL 585 MG: 50 INJECTION, SOLUTION INTRAVENOUS at 14:16

## 2020-08-25 RX ADMIN — DEXAMETHASONE SODIUM PHOSPHATE: 10 INJECTION, SOLUTION INTRAMUSCULAR; INTRAVENOUS at 09:13

## 2020-08-25 RX ADMIN — OXALIPLATIN 124.1 MG: 5 INJECTION, SOLUTION INTRAVENOUS at 11:01

## 2020-08-25 RX ADMIN — LORAZEPAM 0.5 MG: 2 INJECTION INTRAMUSCULAR; INTRAVENOUS at 10:50

## 2020-08-25 RX ADMIN — SODIUM CHLORIDE 20 ML/HR: 0.9 INJECTION, SOLUTION INTRAVENOUS at 09:11

## 2020-08-25 RX ADMIN — SODIUM CHLORIDE 150 MG: 0.9 INJECTION, SOLUTION INTRAVENOUS at 10:03

## 2020-08-25 NOTE — PROGRESS NOTES
Pt here for chemotherapy and CADD connection  Left port accessed with positive blood return throughout treatment  TIME[facility] Myron Eastman [facility] re: they are aware of potassium of 3 3, told to have patient increase her potassium rich foods, diarrhea: patient to take her antidiarrheals as prescribed, she discontinued the atropine from plan as patient isn't getting Irinotecan anymore, they are changing her to FOLFOX but don't have consent for leucovorin as of yet, they will consent her next office visit  Per Watson Apley, pt is only getting Oxaliplatin, Fluorouracill push and Fluorouracil CADD pump today  Patient tolerated treatment without incident today  Port flushed with positive blood return noted before CADD pump connection  AVS given  Pt aware to come Thursday 8/27 at 1300 for CADD disconnect  Walked out in stable condition

## 2020-08-27 ENCOUNTER — HOSPITAL ENCOUNTER (OUTPATIENT)
Dept: INFUSION CENTER | Facility: CLINIC | Age: 65
Discharge: HOME | End: 2020-08-27
Payer: MEDICARE

## 2020-08-27 VITALS — TEMPERATURE: 97 F

## 2020-08-27 DIAGNOSIS — R10.13 ACUTE EPIGASTRIC PAIN: ICD-10-CM

## 2020-08-27 DIAGNOSIS — C25.9 PANCREATIC ADENOCARCINOMA (HCC): Primary | ICD-10-CM

## 2020-08-27 DIAGNOSIS — K52.9 ENTERITIS: ICD-10-CM

## 2020-08-27 PROCEDURE — 96372 THER/PROPH/DIAG INJ SC/IM: CPT

## 2020-08-27 RX ORDER — OMEPRAZOLE 20 MG/1
CAPSULE, DELAYED RELEASE ORAL
Qty: 30 CAPSULE | Refills: 1 | OUTPATIENT
Start: 2020-08-27

## 2020-08-27 RX ADMIN — PEGFILGRASTIM 6 MG: KIT SUBCUTANEOUS at 14:01

## 2020-08-27 NOTE — PROGRESS NOTES
Patient presents today for CADD pump disconnect and Neulasta OnPro placement  CADD pump reservoir volume is 0ml upon arrical  CADD pump disconnected, port flushed, blood return noted and de-accessed without incident  Neulasta OnPro placed on left lower quadrant of abdomen without incident  OnPro was full and flashing green upon discharge  Patient provided AVS  Ambulated off unit in no signs of distress

## 2020-08-27 NOTE — PLAN OF CARE
Problem: Potential for Falls  Goal: Patient will remain free of falls  Description: INTERVENTIONS:  - Assess patient frequently for physical needs  -  Identify cognitive and physical deficits and behaviors that affect risk of falls    -  [place] fall precautions as indicated by assessment   - Educate patient/family on patient safety including physical limitations  - Instruct patient to call for assistance with activity based on assessment  - Modify environment to reduce risk of injury  - Consider OT/PT consult to assist with strengthening/mobility  Outcome: Progressing

## 2020-08-29 NOTE — PROGRESS NOTES
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   The patient's bilirubin which was elevated at 12 2 at its peak has now come down to 1 0   She was seen by our colleagues in Surgical Oncology and they recommended neoadjuvant chemotherapy prior to consideration of a resection  She was started on FOLFIRINOX  every 2 weeks with Neulasta growth factor support; first dose was given on 6/30/20      Previous Hematologic/ Oncologic History:    Oncology History   Pancreatic adenocarcinoma ([facility] [address] )   6/2/2020 Biopsy    EUS- Dr Jordyn Valdovinos:  Pancreas, uncinate mass:      - Malignant ([facility] Category VI)      - Compatible with adenocarcinoma with mucinous features       6/30/2020 -  Chemotherapy    fluorouracil (ADRUCIL) injection 585 mg, 400 mg/m2 = 585 mg, Intravenous, Once, 5 of 12 cycles  Administration: 585 mg (6/30/2020), 585 mg (7/14/2020), 585 mg (7/28/2020), 585 mg (8/11/2020), 585 mg (8/25/2020)  pegfilgrastim (NEULASTA ONPRO) subcutaneous injection kit 6 mg, 6 mg, Subcutaneous, Once, 5 of 12 cycles  Administration: 6 mg (7/2/2020), 6 mg (7/16/2020), 6 mg (7/30/2020), 6 mg (8/13/2020), 6 mg (8/27/2020)  fosaprepitant (EMEND) 150 mg in sodium chloride 0 9 % 250 mL IVPB, 150 mg, Intravenous, Once, 5 of 12 cycles  Administration: 150 mg (6/30/2020), 150 mg (7/14/2020), 150 mg (7/28/2020), 150 mg (8/11/2020), 150 mg (8/25/2020)  irinotecan (CAMPTOSAR) 263 mg in dextrose 5 % 500 mL chemo infusion, 180 mg/m2 = 263 mg, Intravenous, Once, 3 of 3 cycles  Administration: 263 mg (2020), 263 mg (2020), 263 mg (2020)  oxaliplatin (ELOXATIN) 124 1 mg in dextrose 5 % 250 mL chemo infusion, 85 mg/m2 = 124 1 mg, Intravenous, Once, 5 of 12 cycles  Administration: 124 1 mg (2020), 124 1 mg (2020), 124 1 mg (2020), 124 1 mg (2020), 124 1 mg (2020)  leucovorin calcium injection 584 mg, 400 mg/m2 = 584 mg (100 % of original dose 400 mg/m2), Intravenous, Once, 0 of 7 cycles  Dose modification: 400 mg/m2 (original dose 400 mg/m2, Cycle 6)     FOLFIRINOX every 2 weeks with Neulasta growth factor support  Irinotecan discontinued after cycle 4 d/t drug allergy (she developed infusion reaction while irinotecan was infusing, she had symptoms of lock jaw, the sensation that her tongue was swelling, slurred speech and brain fog)    Current Hematologic/ Oncologic Treatment:    FOLFOX: She received Oxaliplatin and 5Fu alone for cycle 5  Leucovorin added for cycle 6    ECO - Symptomatic but completely ambulatory    Interval History:   The patient presents for routine follow up  She developed infusion reaction during cycle 3 while irinotecan was infusing, she had symptoms of lock jaw, the sensation that her tongue was swelling, slurred speech and brain fog  This was held for cycle 4 and she tolerated her entire infusion without difficulty  Irinotecan has been discontinued and she will finish out her neoadjuvant treatment with FOLFOX, Leucovorin will be added to her regimen  Most recent blood work completed on 20 was reviewed  Her CA 19-9 is within normal range at 33  CBC and CMP remain in acceptable treatment range  As far as symptoms are concerned, she feels fairly well  She has some fatigue, and some diarrhea but this is managed with imodium as needed  Her appetite is good  Denies N/V  Weight is stable    Denies any pain      Cancer Staging:  Cancer Staging  No matching staging information was found for the patient  Molecular Testing:       Test Results:    Imaging: No results found  Labs:   Lab Results   Component Value Date    WBC 15 44 (H) 2020    HGB 12 0 2020    HCT 38 4 2020    MCV 99 (H) 2020     2020     Lab Results   Component Value Date    K 3 3 (L) 2020     2020    CO2 26 2020    BUN 15 2020    CREATININE 1 01 2020    GLUF 145 (H) 2020    CALCIUM 8 5 2020    AST 15 2020    ALT 19 2020    ALKPHOS 135 (H) 2020    EGFR 58 2020           Review of Systems   Constitutional: Positive for fatigue  Gastrointestinal: Positive for diarrhea  Psychiatric/Behavioral: The patient is nervous/anxious  All other systems reviewed and are negative          Active Problems:   Patient Active Problem List   Diagnosis    Hyperlipidemia    Essential hypertension    Type 2 diabetes mellitus without complication, without long-term current use of insulin (HCC)    Pruritus    Transaminitis    Pancreatic adenocarcinoma ([facility] [address] )    Port-A-Cath in place    Therapeutic opioid-induced constipation (OIC)    Anxiety    Functional diarrhea    Poor appetite       Past Medical History:   Past Medical History:   Diagnosis Date    Diabetes mellitus ([address] )     Hypertension     Lactic acidosis 2020    Neuropathy     Obstructive jaundice 2020       Surgical History:   Past Surgical History:   Procedure Laterality Date     SECTION      COLONOSCOPY      ECTOPIC PREGNANCY SURGERY      [facility] GUIDED CENTRAL VENOUS ACCESS DEVICE INSERTION  2020    JOINT REPLACEMENT Bilateral     MANDIBLE FRACTURE SURGERY  1972    REPLACEMENT TOTAL HIP W/  RESURFACING IMPLANTS Bilateral     right hip done 2012; left hip done 2012    TONSILLECTOMY      TUNNELED VENOUS PORT PLACEMENT Left 2020    Procedure: INSERTION VENOUS PORT (PORT-A-CATH), left;  Surgeon: Milton Gates MD; [facility];  Service: Surgical Oncology       Family History:    Family History   Problem Relation Age of Onset    Diabetes Mother     Heart disease Mother     Heart disease Father     Breast cancer additional onset Sister     Breast cancer additional onset Maternal Aunt     Stroke Maternal Grandfather        Cancer-related family history is not on file      Social History:   Social History     Socioeconomic History    Marital status: /Civil Union     Spouse name: Not on file    Number of children: Not on file    Years of education: Not on file    Highest education level: Not on file   Occupational History    Not on file   Social Needs    Financial resource strain: Not on file    Food insecurity     Worry: Not on file     Inability: Not on file   [employer] needs     Medical: Not on file     Non-medical: Not on file   Tobacco Use    Smoking status: Former Smoker    Smokeless tobacco: Never Used    Tobacco comment: quit 40 years ago   Substance and Sexual Activity    Alcohol use: Not Currently     Frequency: Monthly or less    Drug use: Never    Sexual activity: Not Currently   Lifestyle    Physical activity     Days per week: Not on file     Minutes per session: Not on file    Stress: Not on file   Relationships    Social connections     Talks on phone: Not on file     Gets together: Not on file     Attends [employer] service: Not on file     Active member of club or organization: Not on file     Attends meetings of clubs or organizations: Not on file     Relationship status: Not on file    Intimate partner violence     Fear of current or ex partner: Not on file     Emotionally abused: Not on file     Physically abused: Not on file     Forced sexual activity: Not on file   Other Topics Concern    Not on file   Social History Narrative    Not on file       Current Medications:   Current Outpatient Medications   Medication Sig Dispense Refill    allopurinol (ZYLOPRIM) 100 mg tablet Take 100 mg by mouth daily      aspirin (Aspirin 81) 81 mg EC tablet Take 81 mg by mouth daily      dexamethasone (DECADRON) 4 mg tablet Take 1 tablet (4 mg total) by mouth daily with breakfast 40 tablet 0    dicyclomine (BENTYL) 20 mg tablet TAKE 1 TABLET BY MOUTH TWICE A  tablet 1    diphenhydrAMINE (BENADRYL) 25 mg capsule Take 25 mg by mouth every 6 (six) hours as needed for itching      diphenoxylate-atropine (LOMOTIL) 2 5-0 025 mg per tablet Take 1 tablet by mouth 4 (four) times a day as needed for diarrhea DO NOT EXCEED 4 DOSES IN 1 DAY 30 tablet 0    gabapentin (NEURONTIN) 300 mg capsule Take 300 mg by mouth 3 (three) times a day      hydrOXYzine HCL (ATARAX) 10 mg tablet Take 1 tablet (10 mg total) by mouth every 8 (eight) hours as needed for itching 30 tablet 0    LORazepam (ATIVAN) 0 5 mg tablet Take 0 5 mg by mouth 2 (two) times a day as needed      metFORMIN (GLUCOPHAGE) 500 mg tablet Take by mouth 2 (two) times a day      mirtazapine (REMERON) 15 mg tablet TAKE 1 TABLET BY MOUTH DAILY AT BEDTIME 30 tablet 1    omeprazole (PriLOSEC) 20 mg delayed release capsule Take 1 capsule (20 mg total) by mouth daily 30 capsule 1    oxyCODONE (ROXICODONE) 5 mg immediate release tablet Take 1 tablet (5 mg total) by mouth every 4 (four) hours as needed (cancer pain)Max Daily Amount: 30 mg 45 tablet 0    polyethylene glycol (MIRALAX) 17 g packet Take 17 g by mouth daily 30 each 1    prochlorperazine (COMPAZINE) 10 mg tablet Take 1 tablet (10 mg total) by mouth every 6 (six) hours as needed for nausea or vomiting 45 tablet 3    sitaGLIPtin-metFORMIN (JANUMET)  MG per tablet Take 1 tablet by mouth 2 (two) times a day with meals      sucralfate (CARAFATE) 1 g/10 mL suspension Take 10 mL (1 g total) by mouth 4 (four) times a day (with meals and at bedtime) 420 mL 0     No current facility-administered medications for this visit  Allergies:    Allergies   Allergen Reactions    Betadine [Povidone Iodine] Rash     Also itching from betadine       Physical Exam:  /72 (BP Location: Left arm)   Pulse (!) 114   Temp 98 5 °F (36 9 °C) (Tympanic)   Resp 16   Ht 4' 11" (1 499 m)   Wt 49 4 kg (109 lb)   SpO2 97%   BMI 22 02 kg/m²   Body surface area is 1 42 meters squared  Wt Readings from Last 3 Encounters:   09/01/20 49 4 kg (109 lb)   08/25/20 50 2 kg (110 lb 10 7 oz)   08/11/20 51 4 kg (113 lb 5 1 oz)           Physical Exam  Constitutional:       Appearance: Normal appearance  HENT:      Head: Normocephalic and atraumatic  Mouth/Throat:      Mouth: Mucous membranes are moist       Pharynx: Oropharynx is clear  Eyes:      General: No scleral icterus  Right eye: No discharge  Left eye: No discharge  Neck:      Musculoskeletal: Neck supple  Cardiovascular:      Rate and Rhythm: Normal rate and regular rhythm  Heart sounds: Normal heart sounds  Pulmonary:      Effort: Pulmonary effort is normal       Breath sounds: Normal breath sounds  Abdominal:      General: Bowel sounds are normal       Palpations: Abdomen is soft  There is no mass  Tenderness: There is no abdominal tenderness  Musculoskeletal: Normal range of motion  Right lower leg: No edema  Left lower leg: No edema  Skin:     General: Skin is warm and dry  Neurological:      General: No focal deficit present  Mental Status: She is alert and oriented to person, place, and time  Psychiatric:         Mood and Affect: Mood normal          Behavior: Behavior normal          Assessment / Plan:    1   Pancreatic adenocarcinoma [facility])    The patient is a pleasant 57-year-old female with pancreatic cancer which is localized to the pancreas based on imaging   She was initiated on neoadjuvant chemotherapy with FOLFIRINOX every 2 weeks with Neulasta growth factor support   She completed 3 cycles but developed  an  infusion reaction while irinotecan was infusing during cycle 3  She was initially rechallenged however developed further reaction and irinotecan was discontinued  She did not receive full dose of irinotecan  Irinotecan was held from cycle 4 and she was able to tolerate her infusion without incident  Cycle 5 she received Oxaliplatin and 5Fu alone as she had not been consented to Leucovorin  Today, I discussed with her potential benefit of adding on Leucovorin and having her finish out her neoadjuvant therapy with FOLFOX  I explained that Leucovorin is used in combination with her other chemotherapy drugs to enhance their effectiveness  Potential side effects also reviewed to include potentially increasing side effects associated with her other chemotherapy I e  diarrhea, nausea, mouth sores  Patient was agreeable to proceed  Both Verbal and written consent obtained by myself and Dr Kiran Patel     Patient will proceed with cycle 6 of treatment with FOLFOX  She will have blood work completed prior to her treatment scheduled for next week  I will order CT scan to be completed after she has completed cycle 6  She is scheduled to follow up with Dr Jo Gottron on 9/21/20 to discuss surgical intervention  She will follow up with Dr Kiran Patel after she meets with Dr Jo Gottron  Patient was in agreement with plan of care  She was instructed to call with any questions or concerns prior to her next offcie visit  Goals and Barriers:  Current Goal:  Prolong Survival from pancreatic cancer  Barriers: None  Patient's Capacity to Self Care:  Patient is  able to self care  Portions of the record may have been created with voice recognition software  Occasional wrong word or "sound a like" substitutions may have occurred due to the inherent limitations of voice recognition software  Read the chart carefully and recognize, using context, where substitutions have occurred

## 2020-08-31 ENCOUNTER — TELEPHONE (OUTPATIENT)
Dept: HEMATOLOGY ONCOLOGY | Facility: CLINIC | Age: 65
End: 2020-08-31

## 2020-09-01 ENCOUNTER — OFFICE VISIT (OUTPATIENT)
Dept: HEMATOLOGY ONCOLOGY | Facility: CLINIC | Age: 65
End: 2020-09-01
Payer: MEDICARE

## 2020-09-01 VITALS
WEIGHT: 109 LBS | TEMPERATURE: 98.5 F | BODY MASS INDEX: 21.97 KG/M2 | OXYGEN SATURATION: 97 % | RESPIRATION RATE: 16 BRPM | SYSTOLIC BLOOD PRESSURE: 122 MMHG | HEART RATE: 114 BPM | DIASTOLIC BLOOD PRESSURE: 72 MMHG | HEIGHT: 59 IN

## 2020-09-01 DIAGNOSIS — C25.9 PANCREATIC ADENOCARCINOMA (HCC): Primary | Chronic | ICD-10-CM

## 2020-09-01 DIAGNOSIS — C25.9 PANCREATIC ADENOCARCINOMA (HCC): Primary | ICD-10-CM

## 2020-09-01 PROCEDURE — 99215 OFFICE O/P EST HI 40 MIN: CPT | Performed by: NURSE PRACTITIONER

## 2020-09-01 RX ORDER — LEUCOVORIN CALCIUM 50 MG/5ML
400 INJECTION, POWDER, LYOPHILIZED, FOR SOLUTION INTRAMUSCULAR; INTRAVENOUS ONCE
Status: CANCELLED
Start: 2020-09-08

## 2020-09-05 ENCOUNTER — APPOINTMENT (OUTPATIENT)
Dept: LAB | Facility: HOSPITAL | Age: 65
End: 2020-09-05
Attending: INTERNAL MEDICINE
Payer: MEDICARE

## 2020-09-05 DIAGNOSIS — C25.9 PANCREATIC ADENOCARCINOMA (HCC): ICD-10-CM

## 2020-09-05 LAB
ALBUMIN SERPL BCP-MCNC: 3.2 G/DL (ref 3.5–5)
ALP SERPL-CCNC: 174 U/L (ref 46–116)
ALT SERPL W P-5'-P-CCNC: 36 U/L (ref 12–78)
ANION GAP SERPL CALCULATED.3IONS-SCNC: 11 MMOL/L (ref 4–13)
AST SERPL W P-5'-P-CCNC: 11 U/L (ref 5–45)
BASOPHILS # BLD AUTO: 0.05 THOUSANDS/ΜL (ref 0–0.1)
BASOPHILS NFR BLD AUTO: 0 % (ref 0–1)
BILIRUB SERPL-MCNC: 0.1 MG/DL (ref 0.2–1)
BUN SERPL-MCNC: 23 MG/DL (ref 5–25)
CALCIUM SERPL-MCNC: 8.6 MG/DL (ref 8.3–10.1)
CHLORIDE SERPL-SCNC: 105 MMOL/L (ref 100–108)
CO2 SERPL-SCNC: 27 MMOL/L (ref 21–32)
CREAT SERPL-MCNC: 1.03 MG/DL (ref 0.6–1.3)
EOSINOPHIL # BLD AUTO: 0.01 THOUSAND/ΜL (ref 0–0.61)
EOSINOPHIL NFR BLD AUTO: 0 % (ref 0–6)
ERYTHROCYTE [DISTWIDTH] IN BLOOD BY AUTOMATED COUNT: 15.3 % (ref 11.6–15.1)
GFR SERPL CREATININE-BSD FRML MDRD: 56 ML/MIN/1.73SQ M
GLUCOSE SERPL-MCNC: 135 MG/DL (ref 65–140)
HCT VFR BLD AUTO: 39 % (ref 34.8–46.1)
HGB BLD-MCNC: 12.2 G/DL (ref 11.5–15.4)
IMM GRANULOCYTES # BLD AUTO: 0.3 THOUSAND/UL (ref 0–0.2)
IMM GRANULOCYTES NFR BLD AUTO: 1 % (ref 0–2)
LYMPHOCYTES # BLD AUTO: 4.74 THOUSANDS/ΜL (ref 0.6–4.47)
LYMPHOCYTES NFR BLD AUTO: 20 % (ref 14–44)
MCH RBC QN AUTO: 30.9 PG (ref 26.8–34.3)
MCHC RBC AUTO-ENTMCNC: 31.3 G/DL (ref 31.4–37.4)
MCV RBC AUTO: 99 FL (ref 82–98)
MONOCYTES # BLD AUTO: 2.04 THOUSAND/ΜL (ref 0.17–1.22)
MONOCYTES NFR BLD AUTO: 8 % (ref 4–12)
NEUTROPHILS # BLD AUTO: 17.05 THOUSANDS/ΜL (ref 1.85–7.62)
NEUTS SEG NFR BLD AUTO: 71 % (ref 43–75)
NRBC BLD AUTO-RTO: 0 /100 WBCS
PLATELET # BLD AUTO: 224 THOUSANDS/UL (ref 149–390)
PMV BLD AUTO: 10.1 FL (ref 8.9–12.7)
POTASSIUM SERPL-SCNC: 3.7 MMOL/L (ref 3.5–5.3)
PROT SERPL-MCNC: 6.3 G/DL (ref 6.4–8.2)
RBC # BLD AUTO: 3.95 MILLION/UL (ref 3.81–5.12)
SODIUM SERPL-SCNC: 143 MMOL/L (ref 136–145)
WBC # BLD AUTO: 24.19 THOUSAND/UL (ref 4.31–10.16)

## 2020-09-05 PROCEDURE — 86301 IMMUNOASSAY TUMOR CA 19-9: CPT

## 2020-09-05 PROCEDURE — 36415 COLL VENOUS BLD VENIPUNCTURE: CPT

## 2020-09-05 PROCEDURE — 85025 COMPLETE CBC W/AUTO DIFF WBC: CPT

## 2020-09-05 PROCEDURE — 80053 COMPREHEN METABOLIC PANEL: CPT

## 2020-09-08 ENCOUNTER — HOSPITAL ENCOUNTER (OUTPATIENT)
Dept: INFUSION CENTER | Facility: CLINIC | Age: 65
Discharge: HOME | End: 2020-09-08
Payer: MEDICARE

## 2020-09-08 VITALS
SYSTOLIC BLOOD PRESSURE: 124 MMHG | BODY MASS INDEX: 21.87 KG/M2 | HEIGHT: 59 IN | WEIGHT: 108.47 LBS | DIASTOLIC BLOOD PRESSURE: 73 MMHG | RESPIRATION RATE: 16 BRPM | HEART RATE: 80 BPM | TEMPERATURE: 98.6 F

## 2020-09-08 DIAGNOSIS — C25.9 PANCREATIC ADENOCARCINOMA (HCC): Primary | ICD-10-CM

## 2020-09-08 PROCEDURE — 96375 TX/PRO/DX INJ NEW DRUG ADDON: CPT

## 2020-09-08 PROCEDURE — 96367 TX/PROPH/DG ADDL SEQ IV INF: CPT

## 2020-09-08 PROCEDURE — 96413 CHEMO IV INFUSION 1 HR: CPT

## 2020-09-08 PROCEDURE — 96368 THER/DIAG CONCURRENT INF: CPT

## 2020-09-08 PROCEDURE — G0498 CHEMO EXTEND IV INFUS W/PUMP: HCPCS

## 2020-09-08 PROCEDURE — 96415 CHEMO IV INFUSION ADDL HR: CPT

## 2020-09-08 PROCEDURE — 96411 CHEMO IV PUSH ADDL DRUG: CPT

## 2020-09-08 RX ORDER — DEXTROSE MONOHYDRATE 50 MG/ML
20 INJECTION, SOLUTION INTRAVENOUS ONCE
Status: COMPLETED | OUTPATIENT
Start: 2020-09-08 | End: 2020-09-08

## 2020-09-08 RX ORDER — FLUOROURACIL 50 MG/ML
400 INJECTION, SOLUTION INTRAVENOUS ONCE
Status: COMPLETED | OUTPATIENT
Start: 2020-09-08 | End: 2020-09-08

## 2020-09-08 RX ORDER — SODIUM CHLORIDE 9 MG/ML
20 INJECTION, SOLUTION INTRAVENOUS ONCE AS NEEDED
Status: DISCONTINUED | OUTPATIENT
Start: 2020-09-08 | End: 2020-09-11 | Stop reason: HOSPADM

## 2020-09-08 RX ORDER — LORAZEPAM 2 MG/ML
0.5 INJECTION INTRAMUSCULAR ONCE
Status: COMPLETED | OUTPATIENT
Start: 2020-09-08 | End: 2020-09-08

## 2020-09-08 RX ADMIN — SODIUM CHLORIDE 20 ML/HR: 0.9 INJECTION, SOLUTION INTRAVENOUS at 08:37

## 2020-09-08 RX ADMIN — DEXAMETHASONE SODIUM PHOSPHATE: 10 INJECTION, SOLUTION INTRAMUSCULAR; INTRAVENOUS at 09:48

## 2020-09-08 RX ADMIN — FLUOROURACIL 585 MG: 50 INJECTION, SOLUTION INTRAVENOUS at 14:17

## 2020-09-08 RX ADMIN — OXALIPLATIN 124.1 MG: 5 INJECTION, SOLUTION INTRAVENOUS at 11:12

## 2020-09-08 RX ADMIN — DIPHENHYDRAMINE HYDROCHLORIDE 25 MG: 50 INJECTION, SOLUTION INTRAMUSCULAR; INTRAVENOUS at 09:23

## 2020-09-08 RX ADMIN — LORAZEPAM 0.5 MG: 2 INJECTION INTRAMUSCULAR; INTRAVENOUS at 09:20

## 2020-09-08 RX ADMIN — LEUCOVORIN CALCIUM 584 MG: 350 INJECTION, POWDER, LYOPHILIZED, FOR SOLUTION INTRAMUSCULAR; INTRAVENOUS at 11:07

## 2020-09-08 RX ADMIN — DEXTROSE 20 ML/HR: 5 SOLUTION INTRAVENOUS at 11:04

## 2020-09-08 RX ADMIN — SODIUM CHLORIDE 150 MG: 0.9 INJECTION, SOLUTION INTRAVENOUS at 10:21

## 2020-09-08 NOTE — PROGRESS NOTES
Patient completed treatment without any difficulty or complaint  Patiet port remained accessed blood return noted, with CADD connect  AVS provided, patient d/c'ed in stable condition

## 2020-09-09 LAB — CANCER AG19-9 SERPL-ACNC: 61 U/ML (ref 0–35)

## 2020-09-10 ENCOUNTER — HOSPITAL ENCOUNTER (OUTPATIENT)
Dept: INFUSION CENTER | Facility: CLINIC | Age: 65
Discharge: HOME | End: 2020-09-10
Payer: MEDICARE

## 2020-09-10 DIAGNOSIS — C25.9 PANCREATIC ADENOCARCINOMA (HCC): Primary | ICD-10-CM

## 2020-09-10 PROCEDURE — 96372 THER/PROPH/DIAG INJ SC/IM: CPT

## 2020-09-10 RX ADMIN — PEGFILGRASTIM 6 MG: KIT SUBCUTANEOUS at 13:03

## 2020-09-10 NOTE — PROGRESS NOTES
Patient presents today for CADD pump disconnect and Neulasta On-pro placement  Patient offers no complaints  VSS  CADD pump reservoir volume noted zero ml  Port flushed and de-accessed as per protocol  Neulasta On-pro placed onto left abdomen as per patient request, green light noted flashing  Patient aware of date and time to remove pump  Patient's next appointment confirmed  AVS printed and given to patient

## 2020-09-14 ENCOUNTER — HOSPITAL ENCOUNTER (OUTPATIENT)
Dept: RADIOLOGY | Facility: MEDICAL CENTER | Age: 65
Discharge: HOME | End: 2020-09-14
Payer: MEDICARE

## 2020-09-14 DIAGNOSIS — C25.9 PANCREATIC ADENOCARCINOMA (HCC): Chronic | ICD-10-CM

## 2020-09-14 PROCEDURE — G1004 CDSM NDSC: HCPCS

## 2020-09-14 PROCEDURE — 74177 CT ABD & PELVIS W/CONTRAST: CPT

## 2020-09-14 PROCEDURE — 71260 CT THORAX DX C+: CPT

## 2020-09-14 RX ADMIN — IOHEXOL 85 ML: 350 INJECTION, SOLUTION INTRAVENOUS at 11:20

## 2020-09-15 ENCOUNTER — TELEPHONE (OUTPATIENT)
Dept: HEMATOLOGY ONCOLOGY | Facility: CLINIC | Age: 65
End: 2020-09-15

## 2020-09-15 DIAGNOSIS — C25.9 PANCREATIC ADENOCARCINOMA (HCC): Primary | ICD-10-CM

## 2020-09-16 ENCOUNTER — TELEPHONE (OUTPATIENT)
Dept: HEMATOLOGY ONCOLOGY | Facility: CLINIC | Age: 65
End: 2020-09-16

## 2020-09-16 NOTE — TELEPHONE ENCOUNTER
Patient called to see if she needs to keep her appointment for chemo scheduled on 9/22/20  She thought she was finished but still see's this appointment scheduled      Let me know and I can call her back

## 2020-09-17 DIAGNOSIS — C25.9 PANCREATIC ADENOCARCINOMA (HCC): Primary | Chronic | ICD-10-CM

## 2020-09-17 RX ORDER — FLUOROURACIL 50 MG/ML
400 INJECTION, SOLUTION INTRAVENOUS ONCE
Status: CANCELLED | OUTPATIENT
Start: 2020-09-22

## 2020-09-17 NOTE — TELEPHONE ENCOUNTER
Called and cancelled treatment for 9/22  Patients CT reviewed and is good  Patient aware of results  Will see Dr Agustín Leos on Monday 9/21 to discuss surgery

## 2020-09-18 ENCOUNTER — TELEPHONE (OUTPATIENT)
Dept: SURGICAL ONCOLOGY | Facility: CLINIC | Age: 65
End: 2020-09-18

## 2020-09-21 ENCOUNTER — OFFICE VISIT (OUTPATIENT)
Dept: SURGICAL ONCOLOGY | Facility: CLINIC | Age: 65
End: 2020-09-21
Payer: MEDICARE

## 2020-09-21 VITALS
DIASTOLIC BLOOD PRESSURE: 80 MMHG | RESPIRATION RATE: 16 BRPM | HEIGHT: 59 IN | BODY MASS INDEX: 22.38 KG/M2 | HEART RATE: 99 BPM | SYSTOLIC BLOOD PRESSURE: 132 MMHG | TEMPERATURE: 96.5 F | WEIGHT: 111 LBS

## 2020-09-21 DIAGNOSIS — C25.9 PANCREATIC ADENOCARCINOMA (HCC): Primary | Chronic | ICD-10-CM

## 2020-09-21 PROCEDURE — 99215 OFFICE O/P EST HI 40 MIN: CPT | Performed by: SURGERY

## 2020-09-21 NOTE — PROGRESS NOTES
Surgical Oncology Follow Up       [address] [facility] [place]  [address] [facility] [address]  [facility] [address] [phone]    Sharon Telles  1955  [id number]  [address] [facility] [place]  [address] [place] [address] [phone]    Chief Complaint   Patient presents with    Follow-up    Pancreatic Cancer       Assessment/Plan:    No problem-specific Assessment & Plan notes found for this encounter  Diagnoses and all orders for this visit:    Pancreatic adenocarcinoma [facility])  -     Case request operating room: WHIPPLE PROCEDURE/PANCREATICO-DUODENECTOMY; Standing  -     PAT Covid Screening; Future  -     Type and screen; Future  -     Prepare Leukoreduced RBC: 2 Units; Future  -     APTT; Future  -     Protime-INR; Future  -     HEMOGLOBIN A1C W/ EAG ESTIMATION; Future  -     CBC and differential; Future  -     Comprehensive metabolic panel; Future  -     Case request operating room: WHIPPLE PROCEDURE/PANCREATICO-DUODENECTOMY    Other orders  -     sitaGLIPtin (JANUVIA) 100 mg tablet; Take 100 mg by mouth daily  -     Incentive spirometry; Standing  -     Insert and maintain IV line; Standing  -     Void On-Call to O R ; Standing  -     Place sequential compression device; Standing  -     Nursing communication Please give pre-op Carbohydrate drink to patient 2-4 hours prior to surgery (Drink is provided by [address] [place] [address]); Standing        Advance Care Planning/Advance Directives:  Discussed disease status, cancer treatment plans and/or cancer treatment goals with the patient  Oncology History   Pancreatic adenocarcinoma ([facility] [address] )   6/2/2020 Biopsy    EUS- Dr iHwot Reddy:  Pancreas, uncinate mass:      - Malignant ([facility] Category VI)      - Compatible with adenocarcinoma with mucinous features       6/30/2020 -  Chemotherapy    fluorouracil (ADRUCIL) injection 585 mg, 400 mg/m2 = 585 mg, Intravenous, Once, 7 of 12 cycles  Administration: 585 mg (6/30/2020), 585 mg (7/14/2020), 585 mg (7/28/2020), 585 mg (8/11/2020), 585 mg (9/8/2020), 585 mg (8/25/2020)  pegfilgrastim (NEULASTA ONPRO) subcutaneous injection kit 6 mg, 6 mg, Subcutaneous, Once, 7 of 12 cycles  Administration: 6 mg (7/2/2020), 6 mg (7/16/2020), 6 mg (7/30/2020), 6 mg (8/13/2020), 6 mg (8/27/2020), 6 mg (9/10/2020)  fosaprepitant (EMEND) 150 mg in sodium chloride 0 9 % 250 mL IVPB, 150 mg, Intravenous, Once, 7 of 12 cycles  Administration: 150 mg (6/30/2020), 150 mg (7/14/2020), 150 mg (7/28/2020), 150 mg (8/11/2020), 150 mg (9/8/2020), 150 mg (8/25/2020)  irinotecan (CAMPTOSAR) 263 mg in dextrose 5 % 500 mL chemo infusion, 180 mg/m2 = 263 mg, Intravenous, Once, 3 of 3 cycles  Administration: 263 mg (6/30/2020), 263 mg (7/14/2020), 263 mg (7/28/2020)  leucovorin 584 mg in dextrose 5 % 250 mL IVPB, 400 mg/m2 = 584 mg (100 % of original dose 400 mg/m2), Intravenous, Once, 2 of 7 cycles  Dose modification: 400 mg/m2 (original dose 400 mg/m2, Cycle 6)  Administration: 584 mg (9/8/2020)  oxaliplatin (ELOXATIN) 124 1 mg in dextrose 5 % 250 mL chemo infusion, 85 mg/m2 = 124 1 mg, Intravenous, Once, 7 of 12 cycles  Administration: 124 1 mg (6/30/2020), 124 1 mg (7/14/2020), 124 1 mg (7/28/2020), 124 1 mg (8/11/2020), 124 1 mg (9/8/2020), 124 1 mg (8/25/2020)         History of Present Illness:  Patient is a 64-year-old woman with pancreas cancer  She has completed 6 cycles of chemotherapy so far  -Interval History:  She is here to discuss surgery  She did fairly well for the chemotherapy, with the usual side effects  She had a CT scan done recently in anticipation of today's visit  Her appetite is fair and her weight is stable  Review of Systems:  Review of Systems   Constitutional: Positive for appetite change  HENT: Negative  Eyes: Negative  Respiratory: Negative  Cardiovascular: Negative      Gastrointestinal: Positive for diarrhea  Endocrine: Negative  Genitourinary: Negative  Musculoskeletal: Negative  Skin: Negative  Allergic/Immunologic: Negative  Neurological: Negative  Hematological: Negative  Psychiatric/Behavioral: Negative          Patient Active Problem List   Diagnosis    Hyperlipidemia    Essential hypertension    Type 2 diabetes mellitus without complication, without long-term current use of insulin ([facility] [address] )    Pruritus    Transaminitis    Pancreatic adenocarcinoma ([facility] [address] )    Port-A-Cath in place    Therapeutic opioid-induced constipation (OIC)    Anxiety    Functional diarrhea    Poor appetite     Past Medical History:   Diagnosis Date    Diabetes mellitus ([facility] [address] )     Hypertension     Lactic acidosis 2020    Neuropathy     Obstructive jaundice 2020     Past Surgical History:   Procedure Laterality Date     SECTION      COLONOSCOPY      ECTOPIC PREGNANCY SURGERY      [address]  2020    JOINT REPLACEMENT Bilateral     MANDIBLE FRACTURE SURGERY      REPLACEMENT TOTAL HIP W/  RESURFACING IMPLANTS Bilateral 2012    right hip done 2012; left hip done 2012    TONSILLECTOMY      TUNNELED VENOUS PORT PLACEMENT Left 2020    Procedure: INSERTION VENOUS PORT (PORT-A-CATH), left;  Surgeon: Petrona Guerrero MD;  [facility];  Service: Surgical Oncology     Family History   Problem Relation Age of Onset    Diabetes Mother     Heart disease Mother     Heart disease Father     Breast cancer additional onset Sister     Breast cancer additional onset Maternal Aunt     Stroke Maternal Grandfather      Social History     Socioeconomic History    Marital status: /Civil Union     Spouse name: Not on file    Number of children: Not on file    Years of education: Not on file    Highest education level: Not on file   Occupational History    Not on file   Social Needs    Financial resource strain: Not on file    Food insecurity     Worry: Not on file     Inability: Not on file    Transportation needs     Medical: Not on file     Non-medical: Not on file   Tobacco Use    Smoking status: Former Smoker    Smokeless tobacco: Never Used    Tobacco comment: quit 40 years ago   Substance and Sexual Activity    Alcohol use: Not Currently     Frequency: Monthly or less    Drug use: Never    Sexual activity: Not Currently   Lifestyle    Physical activity     Days per week: Not on file     Minutes per session: Not on file    Stress: Not on file   Relationships    Social connections     Talks on phone: Not on file     Gets together: Not on file     Attends Muslim service: Not on file     Active member of club or organization: Not on file     Attends meetings of clubs or organizations: Not on file     Relationship status: Not on file    Intimate partner violence     Fear of current or ex partner: Not on file     Emotionally abused: Not on file     Physically abused: Not on file     Forced sexual activity: Not on file   Other Topics Concern    Not on file   Social History Narrative    Not on file       Current Outpatient Medications:     allopurinol (ZYLOPRIM) 100 mg tablet, Take 100 mg by mouth daily, Disp: , Rfl:     dicyclomine (BENTYL) 20 mg tablet, TAKE 1 TABLET BY MOUTH TWICE A DAY, Disp: 180 tablet, Rfl: 1    diphenoxylate-atropine (LOMOTIL) 2 5-0 025 mg per tablet, Take 1 tablet by mouth 4 (four) times a day as needed for diarrhea DO NOT EXCEED 4 DOSES IN 1 DAY, Disp: 30 tablet, Rfl: 0    gabapentin (NEURONTIN) 300 mg capsule, Take 300 mg by mouth 3 (three) times a day, Disp: , Rfl:     LORazepam (ATIVAN) 0 5 mg tablet, Take 0 5 mg by mouth 2 (two) times a day as needed, Disp: , Rfl:     metFORMIN (GLUCOPHAGE) 500 mg tablet, Take by mouth 2 (two) times a day, Disp: , Rfl:     mirtazapine (REMERON) 15 mg tablet, TAKE 1 TABLET BY MOUTH DAILY AT BEDTIME, Disp: 30 tablet, Rfl: 1    omeprazole (PriLOSEC) 20 mg delayed release capsule, Take 1 capsule (20 mg total) by mouth daily, Disp: 30 capsule, Rfl: 1    prochlorperazine (COMPAZINE) 10 mg tablet, Take 1 tablet (10 mg total) by mouth every 6 (six) hours as needed for nausea or vomiting, Disp: 45 tablet, Rfl: 3    sitaGLIPtin (JANUVIA) 100 mg tablet, Take 100 mg by mouth daily, Disp: , Rfl:     aspirin (Aspirin 81) 81 mg EC tablet, Take 81 mg by mouth daily, Disp: , Rfl:     diphenhydrAMINE (BENADRYL) 25 mg capsule, Take 25 mg by mouth every 6 (six) hours as needed for itching, Disp: , Rfl:     [START ON 9/22/2020] fluorouracil 3,505 mg in CADD infusion pump, Infuse 3,505 mg (1,200 mg/m2/day x 1 46 m2 (Treatment plan recorded BSA)) into a venous catheter over 46 hours for 2 days [facility] to be administered on (Patient not taking: Reported on 9/21/2020), Disp: 1 Device, Rfl: 0    hydrOXYzine HCL (ATARAX) 10 mg tablet, Take 1 tablet (10 mg total) by mouth every 8 (eight) hours as needed for itching (Patient not taking: Reported on 9/21/2020), Disp: 30 tablet, Rfl: 0    polyethylene glycol (MIRALAX) 17 g packet, Take 17 g by mouth daily (Patient not taking: Reported on 9/21/2020), Disp: 30 each, Rfl: 1    sucralfate (CARAFATE) 1 g/10 mL suspension, Take 10 mL (1 g total) by mouth 4 (four) times a day (with meals and at bedtime) (Patient not taking: Reported on 9/21/2020), Disp: 420 mL, Rfl: 0  Allergies   Allergen Reactions    Betadine [Povidone Iodine] Rash     Also itching from betadine     Vitals:    09/21/20 0958   BP: 132/80   Pulse: 99   Resp: 16   Temp: (!) 96 5 °F (35 8 °C)       Physical Exam  Vitals signs reviewed  Constitutional:       General: She is not in acute distress  Appearance: Normal appearance  She is not ill-appearing  HENT:      Head: Normocephalic and atraumatic  Nose: Nose normal    Eyes:      General: No scleral icterus  Extraocular Movements: Extraocular movements intact  Pupils: Pupils are equal, round, and reactive to light  Neck:      Musculoskeletal: Normal range of motion  Cardiovascular:      Rate and Rhythm: Normal rate and regular rhythm  Heart sounds: Normal heart sounds  Pulmonary:      Effort: Pulmonary effort is normal       Breath sounds: Normal breath sounds  Abdominal:      General: Bowel sounds are normal       Palpations: Abdomen is soft  Musculoskeletal: Normal range of motion  Skin:     General: Skin is warm and dry  Coloration: Skin is not jaundiced  Neurological:      General: No focal deficit present  Mental Status: She is alert and oriented to person, place, and time  Psychiatric:         Mood and Affect: Mood normal          Behavior: Behavior normal          Thought Content: Thought content normal          Judgment: Judgment normal            Results:  Labs:  Lab Results   Component Value Date    SODIUM 143 09/05/2020    K 3 7 09/05/2020     09/05/2020    CO2 27 09/05/2020    AGAP 11 09/05/2020    BUN 23 09/05/2020    CREATININE 1 03 09/05/2020    GLUC 135 09/05/2020    GLUF 145 (H) 08/24/2020    CALCIUM 8 6 09/05/2020    AST 11 09/05/2020    ALT 36 09/05/2020    ALKPHOS 174 (H) 09/05/2020    TP 6 3 (L) 09/05/2020    TBILI 0 10 (L) 09/05/2020    EGFR 56 09/05/2020      Ref Range & Units  9/5/20 8:28 AM   CA 19-9  0 - 35 U/mL  61High           Imaging  Ct Chest Abdomen Pelvis W Contrast    Result Date: 9/16/2020  Narrative: CT CHEST, ABDOMEN AND PELVIS WITH IV CONTRAST INDICATION:   C25 9: Malignant neoplasm of pancreas, unspecified  Patient was diagnosed with pancreatic adenocarcinoma with mucinous features at biopsy performed June 2, 2020  The patient was given neoadjuvant chemotherapy beginning June 30, 2020  COMPARISON: Multiple prior examinations including most recent abdominal and pelvic CT performed July 5, 2020 TECHNIQUE: CT examination of the chest, abdomen and pelvis was performed   Scanning through the abdomen was performed in arterial, venous and delayed phases according a protocol spefically designed to evaluate upper abdominal viscera  Axial, sagittal, and coronal 2D reformatted images were created from the source data and submitted for interpretation  Radiation dose length product (DLP) for this visit:  905 mGy-cm   This examination, like all CT scans performed in the [facility], was performed utilizing techniques to minimize radiation dose exposure, including the use of iterative reconstruction and automated exposure control  IV Contrast:  85 mL of iohexol (OMNIPAQUE) Enteric Contrast: Enteric contrast was administered  FINDINGS: CHEST LUNGS:  Lungs are clear  There is no tracheal or endobronchial lesion  PLEURA:  Unremarkable  HEART/GREAT VESSELS:  Unremarkable for patient's age  MEDIASTINUM AND SANDRA:  No mediastinal or hilar lymphadenopathy  Mild uniform thickening of the wall of the esophagus suggests the possibility of reflux esophagitis  CHEST WALL AND LOWER NECK:   Left chest port noted  No axillary lymphadenopathy  ABDOMEN LIVER/BILIARY TREE:  No suspicious hepatic mass  Mild hepatic steatosis  Pneumobilia is again noted  There is a metallic stent in the common bile duct, unchanged in position and overall CT appearance compared to July 5, 2020  GALLBLADDER:  Decompressed but otherwise unremarkable  Bubbles of gas again noted in the gallbladder lumen SPLEEN:  Unremarkable  PANCREAS:  Again noted is enlargement of main pancreatic duct most notably in the pancreatic head and uncinate process measuring up to about 8 mm  Borders of pancreatic head mass are difficult to ascertain but the mass is estimated at 1 6 x 1 2 cm on image 61 of series 3 and, and this represents very slight decrease in size from estimated 1 8 x 1 4 cm when measured using similar technique on the previous exam   As on prior examination there is no encasement of nearby vessels  ADRENAL GLANDS:  Unremarkable  KIDNEYS/URETERS:  Lobulated renal contours suggesting prior renal scarring  No solid renal mass, urinary tract calculus, or obstructive uropathy  No hydronephrosis  STOMACH AND BOWEL:  Unremarkable  APPENDIX:  No findings to suggest appendicitis  ABDOMINOPELVIC CAVITY:  No ascites  No pneumoperitoneum  No lymphadenopathy  VESSELS:  Unremarkable for patient's age  PELVIS REPRODUCTIVE ORGANS:  Unremarkable for patient's age  URINARY BLADDER:  Unremarkable  ABDOMINAL WALL/INGUINAL REGIONS:  Unremarkable  OSSEOUS STRUCTURES:  No acute fracture or destructive osseous lesion  There is streak artifact over the pelvis as a result of bilateral metallic hip arthroplasty  Impression: Slight decrease in the size of patient's primary pancreatic head tumor mass  Indwelling biliary stent unchanged in position from prior examination  No CT evidence of metastatic disease in the chest, abdomen or pelvis  Mild uniform thickening of the wall of the esophagus suggesting the possibility of reflux esophagitis  Workstation performed: [id number]     I reviewed the above laboratory and imaging data  Discussion/Summary:  57-year-old woman, who was completed neoadjuvant chemotherapy for pancreas cancer  She has had what appears to be a fairly good response to therapy with decrease in size of tumor, in no other evidence of disease progression  She is therefore candidate for resection  This would entail Whipple procedure  Rationale for this plan along with risks and benefits of surgery, details of operation, and possible outcomes discussed with patient and her   We discussed complications including infection, bleeding, heart trouble, lung trouble, kidney trouble, stroke, or even death  We discussed complications such as pancreatic leak, fistula, abscess, as well, with up to 25% chance of leak, which is mitigated by placement of a drain  All questions answered    Consents signed for Whipple procedure, with NanoKnife/ablation

## 2020-09-21 NOTE — LETTER
September 21, 2020     Chris Trujillo, DO  [address] [place] [address]    Patient: Shauna Boothe   YOB: 1955   Date of Visit: 9/21/2020       Dear Dr Brice Ring: Thank you for referring Shauna Boothe to me for evaluation  Below are my notes for this consultation  If you have questions, please do not hesitate to call me  I look forward to following your patient along with you  Sincerely,        Ricky Neely MD        CC: MD Tim Miller RN Harlee Mae, PA-C Afton Lawyer, MD Ruddy Sick, MD  9/21/2020 10:52 AM  Sign when Signing Visit     Surgical Oncology Follow Up       [address] [facility] [place]  [address] [facility] [address]  [phone]    Shauna Boothe  1955  [id number]  [address] [facility]  [address] [facility] [address] [facility]  [phone]    Chief Complaint   Patient presents with    Follow-up    Pancreatic Cancer       Assessment/Plan:    No problem-specific Assessment & Plan notes found for this encounter  Diagnoses and all orders for this visit:    Pancreatic adenocarcinoma [facility])  -     Case request operating room: WHIPPLE PROCEDURE/PANCREATICO-DUODENECTOMY; Standing  -     PAT Covid Screening; Future  -     Type and screen; Future  -     Prepare Leukoreduced RBC: 2 Units; Future  -     APTT; Future  -     Protime-INR; Future  -     HEMOGLOBIN A1C W/ EAG ESTIMATION; Future  -     CBC and differential; Future  -     Comprehensive metabolic panel; Future  -     Case request operating room: WHIPPLE PROCEDURE/PANCREATICO-DUODENECTOMY    Other orders  -     sitaGLIPtin (JANUVIA) 100 mg tablet;  Take 100 mg by mouth daily  -     Incentive spirometry; Standing  -     Insert and maintain IV line; Standing  -     Void On-Call to O R ; Standing  -     Place sequential compression device; Standing  -     Nursing communication Please give pre-op Carbohydrate drink to patient 2-4 hours prior to surgery (Drink is provided by [address] [place] [address]); Standing        Advance Care Planning/Advance Directives:  Discussed disease status, cancer treatment plans and/or cancer treatment goals with the patient  Oncology History   Pancreatic adenocarcinoma ([facility] [address] )   6/2/2020 Biopsy    [facility]- Dr Mendes Ship:  Pancreas, uncinate mass:      - Malignant ([facility] Category VI)      - Compatible with adenocarcinoma with mucinous features       6/30/2020 -  Chemotherapy    fluorouracil (ADRUCIL) injection 585 mg, 400 mg/m2 = 585 mg, Intravenous, Once, 7 of 12 cycles  Administration: 585 mg (6/30/2020), 585 mg (7/14/2020), 585 mg (7/28/2020), 585 mg (8/11/2020), 585 mg (9/8/2020), 585 mg (8/25/2020)  pegfilgrastim (NEULASTA ONPRO) subcutaneous injection kit 6 mg, 6 mg, Subcutaneous, Once, 7 of 12 cycles  Administration: 6 mg (7/2/2020), 6 mg (7/16/2020), 6 mg (7/30/2020), 6 mg (8/13/2020), 6 mg (8/27/2020), 6 mg (9/10/2020)  fosaprepitant (EMEND) 150 mg in sodium chloride 0 9 % 250 mL IVPB, 150 mg, Intravenous, Once, 7 of 12 cycles  Administration: 150 mg (6/30/2020), 150 mg (7/14/2020), 150 mg (7/28/2020), 150 mg (8/11/2020), 150 mg (9/8/2020), 150 mg (8/25/2020)  irinotecan (CAMPTOSAR) 263 mg in dextrose 5 % 500 mL chemo infusion, 180 mg/m2 = 263 mg, Intravenous, Once, 3 of 3 cycles  Administration: 263 mg (6/30/2020), 263 mg (7/14/2020), 263 mg (7/28/2020)  leucovorin 584 mg in dextrose 5 % 250 mL IVPB, 400 mg/m2 = 584 mg (100 % of original dose 400 mg/m2), Intravenous, Once, 2 of 7 cycles  Dose modification: 400 mg/m2 (original dose 400 mg/m2, Cycle 6)  Administration: 584 mg (9/8/2020)  oxaliplatin (ELOXATIN) 124 1 mg in dextrose 5 % 250 mL chemo infusion, 85 mg/m2 = 124 1 mg, Intravenous, Once, 7 of 12 cycles  Administration: 124 1 mg (6/30/2020), 124 1 mg (7/14/2020), 124 1 mg (7/28/2020), 124 1 mg (8/11/2020), 124 1 mg (2020), 124 1 mg (2020)         History of Present Illness:  Patient is a 75-year-old woman with pancreas cancer  She has completed 6 cycles of chemotherapy so far  -Interval History:  She is here to discuss surgery  She did fairly well for the chemotherapy, with the usual side effects  She had a CT scan done recently in anticipation of today's visit  Her appetite is fair and her weight is stable  Review of Systems:  Review of Systems   Constitutional: Positive for appetite change  HENT: Negative  Eyes: Negative  Respiratory: Negative  Cardiovascular: Negative  Gastrointestinal: Positive for diarrhea  Endocrine: Negative  Genitourinary: Negative  Musculoskeletal: Negative  Skin: Negative  Allergic/Immunologic: Negative  Neurological: Negative  Hematological: Negative  Psychiatric/Behavioral: Negative          Patient Active Problem List   Diagnosis    Hyperlipidemia    Essential hypertension    Type 2 diabetes mellitus without complication, without long-term current use of insulin ([facility] [address] )    Pruritus    Transaminitis    Pancreatic adenocarcinoma ([address] )    Port-A-Cath in place    Therapeutic opioid-induced constipation (OIC)    Anxiety    Functional diarrhea    Poor appetite     Past Medical History:   Diagnosis Date    Diabetes mellitus ([address] )     Hypertension     Lactic acidosis 2020    Neuropathy     Obstructive jaundice 2020     Past Surgical History:   Procedure Laterality Date     SECTION      COLONOSCOPY      ECTOPIC PREGNANCY SURGERY      [address]  2020    JOINT REPLACEMENT Bilateral     MANDIBLE FRACTURE SURGERY      REPLACEMENT TOTAL HIP W/  RESURFACING IMPLANTS Bilateral     right hip done 2012; left hip done 2012    TONSILLECTOMY      TUNNELED VENOUS PORT PLACEMENT Left 2020    Procedure: INSERTION VENOUS PORT (PORT-A-CATH), left; Surgeon: Fern Jain MD;  [facility];  Service: Surgical Oncology     Family History   Problem Relation Age of Onset    Diabetes Mother     Heart disease Mother     Heart disease Father     Breast cancer additional onset Sister     Breast cancer additional onset Maternal Aunt     Stroke Maternal Grandfather      Social History     Socioeconomic History    Marital status: /Civil Union     Spouse name: Not on file    Number of children: Not on file    Years of education: Not on file    Highest education level: Not on file   Occupational History    Not on file   Social Needs    Financial resource strain: Not on file    Food insecurity     Worry: Not on file     Inability: Not on file   [employer] needs     Medical: Not on file     Non-medical: Not on file   Tobacco Use    Smoking status: Former Smoker    Smokeless tobacco: Never Used    Tobacco comment: quit 40 years ago   Substance and Sexual Activity    Alcohol use: Not Currently     Frequency: Monthly or less    Drug use: Never    Sexual activity: Not Currently   Lifestyle    Physical activity     Days per week: Not on file     Minutes per session: Not on file    Stress: Not on file   Relationships    Social connections     Talks on phone: Not on file     Gets together: Not on file     Attends Faith service: Not on file     Active member of club or organization: Not on file     Attends meetings of clubs or organizations: Not on file     Relationship status: Not on file    Intimate partner violence     Fear of current or ex partner: Not on file     Emotionally abused: Not on file     Physically abused: Not on file     Forced sexual activity: Not on file   Other Topics Concern    Not on file   Social History Narrative    Not on file       Current Outpatient Medications:     allopurinol (ZYLOPRIM) 100 mg tablet, Take 100 mg by mouth daily, Disp: , Rfl:     dicyclomine (BENTYL) 20 mg tablet, TAKE 1 TABLET BY MOUTH TWICE A DAY, Disp: 180 tablet, Rfl: 1    diphenoxylate-atropine (LOMOTIL) 2 5-0 025 mg per tablet, Take 1 tablet by mouth 4 (four) times a day as needed for diarrhea DO NOT EXCEED 4 DOSES IN 1 DAY, Disp: 30 tablet, Rfl: 0    gabapentin (NEURONTIN) 300 mg capsule, Take 300 mg by mouth 3 (three) times a day, Disp: , Rfl:     LORazepam (ATIVAN) 0 5 mg tablet, Take 0 5 mg by mouth 2 (two) times a day as needed, Disp: , Rfl:     metFORMIN (GLUCOPHAGE) 500 mg tablet, Take by mouth 2 (two) times a day, Disp: , Rfl:     mirtazapine (REMERON) 15 mg tablet, TAKE 1 TABLET BY MOUTH DAILY AT BEDTIME, Disp: 30 tablet, Rfl: 1    omeprazole (PriLOSEC) 20 mg delayed release capsule, Take 1 capsule (20 mg total) by mouth daily, Disp: 30 capsule, Rfl: 1    prochlorperazine (COMPAZINE) 10 mg tablet, Take 1 tablet (10 mg total) by mouth every 6 (six) hours as needed for nausea or vomiting, Disp: 45 tablet, Rfl: 3    sitaGLIPtin (JANUVIA) 100 mg tablet, Take 100 mg by mouth daily, Disp: , Rfl:     aspirin (Aspirin 81) 81 mg EC tablet, Take 81 mg by mouth daily, Disp: , Rfl:     diphenhydrAMINE (BENADRYL) 25 mg capsule, Take 25 mg by mouth every 6 (six) hours as needed for itching, Disp: , Rfl:     [START ON 9/22/2020] fluorouracil 3,505 mg in CADD infusion pump, Infuse 3,505 mg (1,200 mg/m2/day x 1 46 m2 (Treatment plan recorded BSA)) into a venous catheter over 46 hours for 2 days [employer] to be administered on (Patient not taking: Reported on 9/21/2020), Disp: 1 Device, Rfl: 0    hydrOXYzine HCL (ATARAX) 10 mg tablet, Take 1 tablet (10 mg total) by mouth every 8 (eight) hours as needed for itching (Patient not taking: Reported on 9/21/2020), Disp: 30 tablet, Rfl: 0    polyethylene glycol (MIRALAX) 17 g packet, Take 17 g by mouth daily (Patient not taking: Reported on 9/21/2020), Disp: 30 each, Rfl: 1    sucralfate (CARAFATE) 1 g/10 mL suspension, Take 10 mL (1 g total) by mouth 4 (four) times a day (with meals and at bedtime) (Patient not taking: Reported on 9/21/2020), Disp: 420 mL, Rfl: 0  Allergies   Allergen Reactions    Betadine [Povidone Iodine] Rash     Also itching from betadine     Vitals:    09/21/20 0958   BP: 132/80   Pulse: 99   Resp: 16   Temp: (!) 96 5 °F (35 8 °C)       Physical Exam  Vitals signs reviewed  Constitutional:       General: She is not in acute distress  Appearance: Normal appearance  She is not ill-appearing  HENT:      Head: Normocephalic and atraumatic  Nose: Nose normal    Eyes:      General: No scleral icterus  Extraocular Movements: Extraocular movements intact  Pupils: Pupils are equal, round, and reactive to light  Neck:      Musculoskeletal: Normal range of motion  Cardiovascular:      Rate and Rhythm: Normal rate and regular rhythm  Heart sounds: Normal heart sounds  Pulmonary:      Effort: Pulmonary effort is normal       Breath sounds: Normal breath sounds  Abdominal:      General: Bowel sounds are normal       Palpations: Abdomen is soft  Musculoskeletal: Normal range of motion  Skin:     General: Skin is warm and dry  Coloration: Skin is not jaundiced  Neurological:      General: No focal deficit present  Mental Status: She is alert and oriented to person, place, and time  Psychiatric:         Mood and Affect: Mood normal          Behavior: Behavior normal          Thought Content:  Thought content normal          Judgment: Judgment normal            Results:  Labs:  Lab Results   Component Value Date    SODIUM 143 09/05/2020    K 3 7 09/05/2020     09/05/2020    CO2 27 09/05/2020    AGAP 11 09/05/2020    BUN 23 09/05/2020    CREATININE 1 03 09/05/2020    GLUC 135 09/05/2020    GLUF 145 (H) 08/24/2020    CALCIUM 8 6 09/05/2020    AST 11 09/05/2020    ALT 36 09/05/2020    ALKPHOS 174 (H) 09/05/2020    TP 6 3 (L) 09/05/2020    TBILI 0 10 (L) 09/05/2020    EGFR 56 09/05/2020      Ref Range & Units  9/5/20 8:28 AM   CA 19-9  0 - 35 U/mL  61High           Imaging  Ct Chest Abdomen Pelvis W Contrast    Result Date: 9/16/2020  Narrative: CT CHEST, ABDOMEN AND PELVIS WITH IV CONTRAST INDICATION:   C25 9: Malignant neoplasm of pancreas, unspecified  Patient was diagnosed with pancreatic adenocarcinoma with mucinous features at biopsy performed June 2, 2020  The patient was given neoadjuvant chemotherapy beginning June 30, 2020  COMPARISON: Multiple prior examinations including most recent abdominal and pelvic CT performed July 5, 2020 TECHNIQUE: CT examination of the chest, abdomen and pelvis was performed  Scanning through the abdomen was performed in arterial, venous and delayed phases according a protocol spefically designed to evaluate upper abdominal viscera  Axial, sagittal, and coronal 2D reformatted images were created from the source data and submitted for interpretation  Radiation dose length product (DLP) for this visit:  905 mGy-cm   This examination, like all CT scans performed in the [facility], was performed utilizing techniques to minimize radiation dose exposure, including the use of iterative reconstruction and automated exposure control  IV Contrast:  85 mL of iohexol (OMNIPAQUE) Enteric Contrast: Enteric contrast was administered  FINDINGS: CHEST LUNGS:  Lungs are clear  There is no tracheal or endobronchial lesion  PLEURA:  Unremarkable  HEART/GREAT VESSELS:  Unremarkable for patient's age  MEDIASTINUM AND SANDRA:  No mediastinal or hilar lymphadenopathy  Mild uniform thickening of the wall of the esophagus suggests the possibility of reflux esophagitis  CHEST WALL AND LOWER NECK:   Left chest port noted  No axillary lymphadenopathy  ABDOMEN LIVER/BILIARY TREE:  No suspicious hepatic mass  Mild hepatic steatosis  Pneumobilia is again noted  There is a metallic stent in the common bile duct, unchanged in position and overall CT appearance compared to July 5, 2020   GALLBLADDER:  Decompressed but otherwise unremarkable  Bubbles of gas again noted in the gallbladder lumen SPLEEN:  Unremarkable  PANCREAS:  Again noted is enlargement of main pancreatic duct most notably in the pancreatic head and uncinate process measuring up to about 8 mm  Borders of pancreatic head mass are difficult to ascertain but the mass is estimated at 1 6 x 1 2 cm on image 61 of series 3 and, and this represents very slight decrease in size from estimated 1 8 x 1 4 cm when measured using similar technique on the previous exam   As on prior examination there is no encasement of nearby vessels  ADRENAL GLANDS:  Unremarkable  KIDNEYS/URETERS:  Lobulated renal contours suggesting prior renal scarring  No solid renal mass, urinary tract calculus, or obstructive uropathy  No hydronephrosis  STOMACH AND BOWEL:  Unremarkable  APPENDIX:  No findings to suggest appendicitis  ABDOMINOPELVIC CAVITY:  No ascites  No pneumoperitoneum  No lymphadenopathy  VESSELS:  Unremarkable for patient's age  PELVIS REPRODUCTIVE ORGANS:  Unremarkable for patient's age  URINARY BLADDER:  Unremarkable  ABDOMINAL WALL/INGUINAL REGIONS:  Unremarkable  OSSEOUS STRUCTURES:  No acute fracture or destructive osseous lesion  There is streak artifact over the pelvis as a result of bilateral metallic hip arthroplasty  Impression: Slight decrease in the size of patient's primary pancreatic head tumor mass  Indwelling biliary stent unchanged in position from prior examination  No CT evidence of metastatic disease in the chest, abdomen or pelvis  Mild uniform thickening of the wall of the esophagus suggesting the possibility of reflux esophagitis  Workstation performed: [id number]     I reviewed the above laboratory and imaging data  Discussion/Summary:  77-year-old woman, who was completed neoadjuvant chemotherapy for pancreas cancer    She has had what appears to be a fairly good response to therapy with decrease in size of tumor, in no other evidence of disease progression  She is therefore candidate for resection  This would entail Whipple procedure  Rationale for this plan along with risks and benefits of surgery, details of operation, and possible outcomes discussed with patient and her   We discussed complications including infection, bleeding, heart trouble, lung trouble, kidney trouble, stroke, or even death  We discussed complications such as pancreatic leak, fistula, abscess, as well, with up to 25% chance of leak, which is mitigated by placement of a drain  All questions answered  Consents signed for Whipple procedure, with [employer]ife/ablation

## 2020-09-21 NOTE — PATIENT INSTRUCTIONS
Pre-Surgery Instructions:   Medication Instructions    allopurinol (ZYLOPRIM) 100 mg tablet Stop taking 1 days prior to surgery    dicyclomine (BENTYL) 20 mg tablet Stop taking 1 days prior to surgery    diphenoxylate-atropine (LOMOTIL) 2 5-0 025 mg per tablet Stop taking 1 days prior to surgery    gabapentin (NEURONTIN) 300 mg capsule Stop taking 1 days prior to surgery    LORazepam (ATIVAN) 0 5 mg tablet Stop taking 1 days prior to surgery    metFORMIN (GLUCOPHAGE) 500 mg tablet Stop taking 1 days prior to surgery    mirtazapine (REMERON) 15 mg tablet Stop taking 1 days prior to surgery    omeprazole (PriLOSEC) 20 mg delayed release capsule Stop taking 1 days prior to surgery    prochlorperazine (COMPAZINE) 10 mg tablet Stop taking 1 days prior to surgery    sitaGLIPtin (JANUVIA) 100 mg tablet Stop taking 1 days prior to surgery     Pancreaticoduodenectomy   WHAT YOU NEED TO KNOW:   Pancreaticoduodenectomy is surgery to remove a tumor from your pancreas or bile duct  It is also called a Whipple procedure  DISCHARGE INSTRUCTIONS:   Medicines:   · Medicines  are given to decrease pain and prevent a bacterial infection  You may also need medicine to control your blood sugar  · Take your medicine as directed  Contact your healthcare provider if you think your medicine is not helping or if you have side effects  Tell him or her if you are allergic to any medicine  Keep a list of the medicines, vitamins, and herbs you take  Include the amounts, and when and why you take them  Bring the list or the pill bottles to follow-up visits  Carry your medicine list with you in case of an emergency  Follow up with your healthcare provider as directed: You may need to return to have your stitches removed  You may also be referred to an oncologist  Write down your questions so you remember to ask them during your visits  Wound care:  Care for your wound as directed   Carefully wash the wound with soap and water  Dry the area and put on new, clean bandages as directed  Change your bandages when they get wet or dirty  Rest as needed:  Slowly start to do more each day  Return to your daily activities as directed  Contact your healthcare provider if:   · You have a fever  · You have chills, a cough, or feel weak and achy  · Your wound is red, swollen, or draining pus  · You have dizziness, nausea, or vomiting  · Your bandage becomes soaked with blood  · Your skin is itchy, swollen, or has a rash  · Your symptoms come back or become worse  · You have questions or concerns about your condition or care  Seek care immediately or call 911 if:   · You have any of the following signs of a stroke:      ¨ Numbness or drooping on one side of your face     ¨ Weakness in an arm or leg    ¨ Confusion or difficulty speaking    ¨ Dizziness, a severe headache, or vision loss    · You feel lightheaded, faint, or have a seizure  · You are short of breath, have chest pain, or cough up blood  · Your arm or leg feels warm, tender, and painful  It may look swollen and red  · You have severe abdominal pain that does not go away even after you take medicine  · Your jaundice gets worse  © 2017 [employer]  Information is for End User's use only and may not be sold, redistributed or otherwise used for commercial purposes  All illustrations and images included in CareNotes® are the copyrighted property of A D A M [employer] Inc  or Jordi Chow  The above information is an  only  It is not intended as medical advice for individual conditions or treatments  Talk to your doctor, nurse or pharmacist before following any medical regimen to see if it is safe and effective for you

## 2020-09-22 ENCOUNTER — PREP FOR PROCEDURE (OUTPATIENT)
Dept: SURGICAL ONCOLOGY | Facility: CLINIC | Age: 65
End: 2020-09-22

## 2020-09-25 DIAGNOSIS — R10.13 ACUTE EPIGASTRIC PAIN: ICD-10-CM

## 2020-09-25 DIAGNOSIS — K52.9 ENTERITIS: ICD-10-CM

## 2020-09-25 RX ORDER — OMEPRAZOLE 20 MG/1
CAPSULE, DELAYED RELEASE ORAL
Qty: 30 CAPSULE | Refills: 1 | Status: ON HOLD | OUTPATIENT
Start: 2020-09-25 | End: 2020-10-26

## 2020-09-28 ENCOUNTER — TELEPHONE (OUTPATIENT)
Dept: HEMATOLOGY ONCOLOGY | Facility: CLINIC | Age: 65
End: 2020-09-28

## 2020-09-28 RX ORDER — LEVOFLOXACIN 5 MG/ML
500 INJECTION, SOLUTION INTRAVENOUS ONCE
Status: CANCELLED | OUTPATIENT
Start: 2020-09-28

## 2020-09-28 NOTE — TELEPHONE ENCOUNTER
Patient called requesting to speak with Cecilia Villela, patient would like to know if she should keep her 10/1 follow up with Dr Sunday Bradford  Best call back [phone]

## 2020-09-29 ENCOUNTER — TELEPHONE (OUTPATIENT)
Dept: HEMATOLOGY ONCOLOGY | Facility: CLINIC | Age: 65
End: 2020-09-29

## 2020-09-29 NOTE — TELEPHONE ENCOUNTER
Joanna Carranza did not get to pt calls till late yesterday as I could not get off the phone with infusion rooms yesterday

## 2020-09-29 NOTE — TELEPHONE ENCOUNTER
She states she is having surgery on 10/20/2020 and isn't sure she needs this appt on Thursday with Dr Amaris Licea  she is screened, but I did not cancel the appt

## 2020-10-08 ENCOUNTER — HOSPITAL ENCOUNTER (OUTPATIENT)
Dept: INFUSION CENTER | Facility: CLINIC | Age: 65
Discharge: HOME | End: 2020-10-08
Payer: MEDICARE

## 2020-10-08 DIAGNOSIS — Z95.828 PORT-A-CATH IN PLACE: Primary | ICD-10-CM

## 2020-10-08 LAB
ABO GROUP BLD: NORMAL
ALBUMIN SERPL BCP-MCNC: 2.8 G/DL (ref 3.5–5)
ALP SERPL-CCNC: 112 U/L (ref 46–116)
ALT SERPL W P-5'-P-CCNC: 49 U/L (ref 12–78)
ANION GAP SERPL CALCULATED.3IONS-SCNC: 9 MMOL/L (ref 4–13)
APTT PPP: 30 SECONDS (ref 23–37)
AST SERPL W P-5'-P-CCNC: 27 U/L (ref 5–45)
BASOPHILS # BLD AUTO: 0.04 THOUSANDS/ΜL (ref 0–0.1)
BASOPHILS NFR BLD AUTO: 0 % (ref 0–1)
BILIRUB SERPL-MCNC: 0.3 MG/DL (ref 0.2–1)
BLD GP AB SCN SERPL QL: NEGATIVE
BUN SERPL-MCNC: 17 MG/DL (ref 5–25)
CALCIUM ALBUM COR SERPL-MCNC: 9.8 MG/DL (ref 8.3–10.1)
CALCIUM SERPL-MCNC: 8.8 MG/DL (ref 8.3–10.1)
CHLORIDE SERPL-SCNC: 106 MMOL/L (ref 100–108)
CO2 SERPL-SCNC: 29 MMOL/L (ref 21–32)
CREAT SERPL-MCNC: 0.66 MG/DL (ref 0.6–1.3)
EOSINOPHIL # BLD AUTO: 0.26 THOUSAND/ΜL (ref 0–0.61)
EOSINOPHIL NFR BLD AUTO: 3 % (ref 0–6)
ERYTHROCYTE [DISTWIDTH] IN BLOOD BY AUTOMATED COUNT: 16 % (ref 11.6–15.1)
GFR SERPL CREATININE-BSD FRML MDRD: 92 ML/MIN/1.73SQ M
GLUCOSE SERPL-MCNC: 102 MG/DL (ref 65–140)
HCT VFR BLD AUTO: 34.8 % (ref 34.8–46.1)
HGB BLD-MCNC: 11.1 G/DL (ref 11.5–15.4)
IMM GRANULOCYTES # BLD AUTO: 0.11 THOUSAND/UL (ref 0–0.2)
IMM GRANULOCYTES NFR BLD AUTO: 1 % (ref 0–2)
INR PPP: 1.05 (ref 0.84–1.19)
LYMPHOCYTES # BLD AUTO: 2.01 THOUSANDS/ΜL (ref 0.6–4.47)
LYMPHOCYTES NFR BLD AUTO: 20 % (ref 14–44)
MCH RBC QN AUTO: 31.9 PG (ref 26.8–34.3)
MCHC RBC AUTO-ENTMCNC: 31.9 G/DL (ref 31.4–37.4)
MCV RBC AUTO: 100 FL (ref 82–98)
MONOCYTES # BLD AUTO: 0.65 THOUSAND/ΜL (ref 0.17–1.22)
MONOCYTES NFR BLD AUTO: 7 % (ref 4–12)
NEUTROPHILS # BLD AUTO: 6.95 THOUSANDS/ΜL (ref 1.85–7.62)
NEUTS SEG NFR BLD AUTO: 69 % (ref 43–75)
NRBC BLD AUTO-RTO: 0 /100 WBCS
PLATELET # BLD AUTO: 268 THOUSANDS/UL (ref 149–390)
PMV BLD AUTO: 10.5 FL (ref 8.9–12.7)
POTASSIUM SERPL-SCNC: 3.5 MMOL/L (ref 3.5–5.3)
PROT SERPL-MCNC: 6.1 G/DL (ref 6.4–8.2)
PROTHROMBIN TIME: 13.2 SECONDS (ref 11.6–14.5)
RBC # BLD AUTO: 3.48 MILLION/UL (ref 3.81–5.12)
RH BLD: POSITIVE
SODIUM SERPL-SCNC: 144 MMOL/L (ref 136–145)
SPECIMEN EXPIRATION DATE: NORMAL
WBC # BLD AUTO: 10.02 THOUSAND/UL (ref 4.31–10.16)

## 2020-10-08 PROCEDURE — 85730 THROMBOPLASTIN TIME PARTIAL: CPT

## 2020-10-08 PROCEDURE — 86901 BLOOD TYPING SEROLOGIC RH(D): CPT

## 2020-10-08 PROCEDURE — 36415 COLL VENOUS BLD VENIPUNCTURE: CPT

## 2020-10-08 PROCEDURE — 80053 COMPREHEN METABOLIC PANEL: CPT

## 2020-10-08 PROCEDURE — 86900 BLOOD TYPING SEROLOGIC ABO: CPT

## 2020-10-08 PROCEDURE — 85025 COMPLETE CBC W/AUTO DIFF WBC: CPT

## 2020-10-08 PROCEDURE — 85610 PROTHROMBIN TIME: CPT

## 2020-10-08 PROCEDURE — 86850 RBC ANTIBODY SCREEN: CPT

## 2020-10-08 PROCEDURE — 83036 HEMOGLOBIN GLYCOSYLATED A1C: CPT

## 2020-10-09 ENCOUNTER — CLINICAL SUPPORT (OUTPATIENT)
Dept: URGENT CARE | Facility: MEDICAL CENTER | Age: 65
End: 2020-10-09
Payer: MEDICARE

## 2020-10-09 DIAGNOSIS — Z23 FLU VACCINE NEED: Primary | ICD-10-CM

## 2020-10-09 DIAGNOSIS — Z91.89 AT HIGH RISK FOR KIDNEY INJURY: Primary | ICD-10-CM

## 2020-10-09 LAB
EST. AVERAGE GLUCOSE BLD GHB EST-MCNC: 137 MG/DL
HBA1C MFR BLD: 6.4 %

## 2020-10-09 PROCEDURE — 90662 IIV NO PRSV INCREASED AG IM: CPT

## 2020-10-09 PROCEDURE — G0008 ADMIN INFLUENZA VIRUS VAC: HCPCS | Performed by: PHYSICIAN ASSISTANT

## 2020-10-10 DIAGNOSIS — C25.9 PANCREATIC ADENOCARCINOMA (HCC): Chronic | ICD-10-CM

## 2020-10-10 PROCEDURE — U0003 INFECTIOUS AGENT DETECTION BY NUCLEIC ACID (DNA OR RNA); SEVERE ACUTE RESPIRATORY SYNDROME CORONAVIRUS 2 (SARS-COV-2) (CORONAVIRUS DISEASE [COVID-19]), AMPLIFIED PROBE TECHNIQUE, MAKING USE OF HIGH THROUGHPUT TECHNOLOGIES AS DESCRIBED BY CMS-2020-01-R: HCPCS | Performed by: SURGERY

## 2020-10-11 LAB — SARS-COV-2 RNA SPEC QL NAA+PROBE: NOT DETECTED

## 2020-10-12 DIAGNOSIS — F41.9 ANXIETY: ICD-10-CM

## 2020-10-12 DIAGNOSIS — C25.9 PANCREATIC ADENOCARCINOMA (HCC): Chronic | ICD-10-CM

## 2020-10-12 RX ORDER — MIRTAZAPINE 15 MG/1
15 TABLET, FILM COATED ORAL
Qty: 30 TABLET | Refills: 1 | Status: CANCELLED | OUTPATIENT
Start: 2020-10-12

## 2020-10-13 ENCOUNTER — ANESTHESIA EVENT (OUTPATIENT)
Dept: PERIOP | Facility: HOSPITAL | Age: 65
DRG: 406 | End: 2020-10-13
Payer: MEDICARE

## 2020-10-20 ENCOUNTER — HOSPITAL ENCOUNTER (INPATIENT)
Facility: HOSPITAL | Age: 65
LOS: 6 days | Discharge: HOME | DRG: 406 | End: 2020-10-26
Attending: SURGERY | Admitting: SURGERY
Payer: MEDICARE

## 2020-10-20 ENCOUNTER — ANESTHESIA (OUTPATIENT)
Dept: PERIOP | Facility: HOSPITAL | Age: 65
DRG: 406 | End: 2020-10-20
Payer: MEDICARE

## 2020-10-20 VITALS — HEART RATE: 88 BPM

## 2020-10-20 DIAGNOSIS — C25.9 PANCREATIC ADENOCARCINOMA (HCC): ICD-10-CM

## 2020-10-20 LAB
ABO GROUP BLD: NORMAL
ALBUMIN SERPL BCP-MCNC: 3.1 G/DL (ref 3.5–5)
ALP SERPL-CCNC: 81 U/L (ref 46–116)
ALT SERPL W P-5'-P-CCNC: 151 U/L (ref 12–78)
ANION GAP SERPL CALCULATED.3IONS-SCNC: 8 MMOL/L (ref 4–13)
AST SERPL W P-5'-P-CCNC: 320 U/L (ref 5–45)
BASE EXCESS BLDA CALC-SCNC: -1 MMOL/L (ref -2–3)
BASE EXCESS BLDA CALC-SCNC: -1 MMOL/L (ref -2–3)
BASE EXCESS BLDA CALC-SCNC: -2 MMOL/L (ref -2–3)
BASE EXCESS BLDA CALC-SCNC: -3 MMOL/L (ref -2–3)
BASOPHILS # BLD AUTO: 0.01 THOUSANDS/ΜL (ref 0–0.1)
BASOPHILS NFR BLD AUTO: 0 % (ref 0–1)
BILIRUB SERPL-MCNC: 1.01 MG/DL (ref 0.2–1)
BLD GP AB SCN SERPL QL: NEGATIVE
BUN SERPL-MCNC: 13 MG/DL (ref 5–25)
CA-I BLD-SCNC: 1.09 MMOL/L (ref 1.12–1.32)
CA-I BLD-SCNC: 1.1 MMOL/L (ref 1.12–1.32)
CA-I BLD-SCNC: 1.11 MMOL/L (ref 1.12–1.32)
CA-I BLD-SCNC: 1.12 MMOL/L (ref 1.12–1.32)
CA-I BLD-SCNC: 1.13 MMOL/L (ref 1.12–1.32)
CA-I BLD-SCNC: 1.14 MMOL/L (ref 1.12–1.32)
CALCIUM ALBUM COR SERPL-MCNC: 8 MG/DL (ref 8.3–10.1)
CALCIUM SERPL-MCNC: 7.3 MG/DL (ref 8.3–10.1)
CHLORIDE SERPL-SCNC: 112 MMOL/L (ref 100–108)
CO2 SERPL-SCNC: 22 MMOL/L (ref 21–32)
CREAT SERPL-MCNC: 0.83 MG/DL (ref 0.6–1.3)
EOSINOPHIL # BLD AUTO: 0.02 THOUSAND/ΜL (ref 0–0.61)
EOSINOPHIL NFR BLD AUTO: 0 % (ref 0–6)
ERYTHROCYTE [DISTWIDTH] IN BLOOD BY AUTOMATED COUNT: 15 % (ref 11.6–15.1)
GFR SERPL CREATININE-BSD FRML MDRD: 73 ML/MIN/1.73SQ M
GLUCOSE SERPL-MCNC: 119 MG/DL (ref 65–140)
GLUCOSE SERPL-MCNC: 133 MG/DL (ref 65–140)
GLUCOSE SERPL-MCNC: 141 MG/DL (ref 65–140)
GLUCOSE SERPL-MCNC: 153 MG/DL (ref 65–140)
GLUCOSE SERPL-MCNC: 165 MG/DL (ref 65–140)
GLUCOSE SERPL-MCNC: 175 MG/DL (ref 65–140)
GLUCOSE SERPL-MCNC: 187 MG/DL (ref 65–140)
GLUCOSE SERPL-MCNC: 221 MG/DL (ref 65–140)
GLUCOSE SERPL-MCNC: 245 MG/DL (ref 65–140)
GLUCOSE SERPL-MCNC: 245 MG/DL (ref 65–140)
GLUCOSE SERPL-MCNC: 247 MG/DL (ref 65–140)
HCO3 BLDA-SCNC: 21.2 MMOL/L (ref 22–28)
HCO3 BLDA-SCNC: 21.3 MMOL/L (ref 22–28)
HCO3 BLDA-SCNC: 21.4 MMOL/L (ref 22–28)
HCO3 BLDA-SCNC: 22.4 MMOL/L (ref 22–28)
HCO3 BLDA-SCNC: 23.6 MMOL/L (ref 22–28)
HCO3 BLDA-SCNC: 23.6 MMOL/L (ref 22–28)
HCT VFR BLD AUTO: 30 % (ref 34.8–46.1)
HCT VFR BLD CALC: 25 % (ref 34.8–46.1)
HCT VFR BLD CALC: 25 % (ref 34.8–46.1)
HCT VFR BLD CALC: 26 % (ref 34.8–46.1)
HCT VFR BLD CALC: 26 % (ref 34.8–46.1)
HCT VFR BLD CALC: 27 % (ref 34.8–46.1)
HCT VFR BLD CALC: 29 % (ref 34.8–46.1)
HGB BLD-MCNC: 9.4 G/DL (ref 11.5–15.4)
HGB BLDA-MCNC: 8.5 G/DL (ref 11.5–15.4)
HGB BLDA-MCNC: 8.5 G/DL (ref 11.5–15.4)
HGB BLDA-MCNC: 8.8 G/DL (ref 11.5–15.4)
HGB BLDA-MCNC: 8.8 G/DL (ref 11.5–15.4)
HGB BLDA-MCNC: 9.2 G/DL (ref 11.5–15.4)
HGB BLDA-MCNC: 9.9 G/DL (ref 11.5–15.4)
IMM GRANULOCYTES # BLD AUTO: 0.03 THOUSAND/UL (ref 0–0.2)
IMM GRANULOCYTES NFR BLD AUTO: 0 % (ref 0–2)
LYMPHOCYTES # BLD AUTO: 0.54 THOUSANDS/ΜL (ref 0.6–4.47)
LYMPHOCYTES NFR BLD AUTO: 6 % (ref 14–44)
MCH RBC QN AUTO: 32.3 PG (ref 26.8–34.3)
MCHC RBC AUTO-ENTMCNC: 31.3 G/DL (ref 31.4–37.4)
MCV RBC AUTO: 103 FL (ref 82–98)
MONOCYTES # BLD AUTO: 0.76 THOUSAND/ΜL (ref 0.17–1.22)
MONOCYTES NFR BLD AUTO: 8 % (ref 4–12)
NEUTROPHILS # BLD AUTO: 8.54 THOUSANDS/ΜL (ref 1.85–7.62)
NEUTS SEG NFR BLD AUTO: 86 % (ref 43–75)
NRBC BLD AUTO-RTO: 0 /100 WBCS
PCO2 BLD: 22 MMOL/L (ref 21–32)
PCO2 BLD: 23 MMOL/L (ref 21–32)
PCO2 BLD: 25 MMOL/L (ref 21–32)
PCO2 BLD: 25 MMOL/L (ref 21–32)
PCO2 BLD: 33.6 MM HG (ref 36–44)
PCO2 BLD: 35.1 MM HG (ref 36–44)
PCO2 BLD: 35.1 MM HG (ref 36–44)
PCO2 BLD: 35.9 MM HG (ref 36–44)
PCO2 BLD: 37.1 MM HG (ref 36–44)
PCO2 BLD: 37.4 MM HG (ref 36–44)
PH BLD: 7.38 [PH] (ref 7.35–7.45)
PH BLD: 7.39 [PH] (ref 7.35–7.45)
PH BLD: 7.39 [PH] (ref 7.35–7.45)
PH BLD: 7.41 [PH] (ref 7.35–7.45)
PH BLD: 7.41 [PH] (ref 7.35–7.45)
PH BLD: 7.43 [PH] (ref 7.35–7.45)
PLATELET # BLD AUTO: 173 THOUSANDS/UL (ref 149–390)
PMV BLD AUTO: 10.4 FL (ref 8.9–12.7)
PO2 BLD: 223 MM HG (ref 75–129)
PO2 BLD: 230 MM HG (ref 75–129)
PO2 BLD: 234 MM HG (ref 75–129)
PO2 BLD: 240 MM HG (ref 75–129)
PO2 BLD: 240 MM HG (ref 75–129)
PO2 BLD: 292 MM HG (ref 75–129)
POTASSIUM BLD-SCNC: 3.4 MMOL/L (ref 3.5–5.3)
POTASSIUM BLD-SCNC: 3.4 MMOL/L (ref 3.5–5.3)
POTASSIUM BLD-SCNC: 3.5 MMOL/L (ref 3.5–5.3)
POTASSIUM BLD-SCNC: 3.5 MMOL/L (ref 3.5–5.3)
POTASSIUM BLD-SCNC: 3.7 MMOL/L (ref 3.5–5.3)
POTASSIUM BLD-SCNC: 3.9 MMOL/L (ref 3.5–5.3)
POTASSIUM SERPL-SCNC: 4 MMOL/L (ref 3.5–5.3)
PROT SERPL-MCNC: 5.2 G/DL (ref 6.4–8.2)
RBC # BLD AUTO: 2.91 MILLION/UL (ref 3.81–5.12)
RH BLD: POSITIVE
SAO2 % BLD FROM PO2: 100 % (ref 60–85)
SODIUM BLD-SCNC: 140 MMOL/L (ref 136–145)
SODIUM BLD-SCNC: 141 MMOL/L (ref 136–145)
SODIUM BLD-SCNC: 142 MMOL/L (ref 136–145)
SODIUM BLD-SCNC: 143 MMOL/L (ref 136–145)
SODIUM SERPL-SCNC: 142 MMOL/L (ref 136–145)
SPECIMEN EXPIRATION DATE: NORMAL
SPECIMEN SOURCE: ABNORMAL
WBC # BLD AUTO: 9.9 THOUSAND/UL (ref 4.31–10.16)

## 2020-10-20 PROCEDURE — 84295 ASSAY OF SERUM SODIUM: CPT

## 2020-10-20 PROCEDURE — 82948 REAGENT STRIP/BLOOD GLUCOSE: CPT

## 2020-10-20 PROCEDURE — 82803 BLOOD GASES ANY COMBINATION: CPT

## 2020-10-20 PROCEDURE — 0FBG0ZZ EXCISION OF PANCREAS, OPEN APPROACH: ICD-10-PCS | Performed by: SURGERY

## 2020-10-20 PROCEDURE — 88331 PATH CONSLTJ SURG 1 BLK 1SPC: CPT | Performed by: PATHOLOGY

## 2020-10-20 PROCEDURE — 0DT90ZZ RESECTION OF DUODENUM, OPEN APPROACH: ICD-10-PCS | Performed by: SURGERY

## 2020-10-20 PROCEDURE — 0FT40ZZ RESECTION OF GALLBLADDER, OPEN APPROACH: ICD-10-PCS | Performed by: SURGERY

## 2020-10-20 PROCEDURE — G9198 NO ORDER FOR CEPH NO REASON: HCPCS | Performed by: SURGERY

## 2020-10-20 PROCEDURE — 88332 PATH CONSLTJ SURG EA ADD BLK: CPT | Performed by: PATHOLOGY

## 2020-10-20 PROCEDURE — 0FB90ZZ EXCISION OF COMMON BILE DUCT, OPEN APPROACH: ICD-10-PCS | Performed by: SURGERY

## 2020-10-20 PROCEDURE — 88305 TISSUE EXAM BY PATHOLOGIST: CPT | Performed by: PATHOLOGY

## 2020-10-20 PROCEDURE — 86850 RBC ANTIBODY SCREEN: CPT | Performed by: SURGERY

## 2020-10-20 PROCEDURE — 88304 TISSUE EXAM BY PATHOLOGIST: CPT | Performed by: PATHOLOGY

## 2020-10-20 PROCEDURE — 99222 1ST HOSP IP/OBS MODERATE 55: CPT | Performed by: SURGERY

## 2020-10-20 PROCEDURE — 86900 BLOOD TYPING SEROLOGIC ABO: CPT | Performed by: SURGERY

## 2020-10-20 PROCEDURE — C9113 INJ PANTOPRAZOLE SODIUM, VIA: HCPCS | Performed by: SURGERY

## 2020-10-20 PROCEDURE — 85025 COMPLETE CBC W/AUTO DIFF WBC: CPT | Performed by: SURGERY

## 2020-10-20 PROCEDURE — 85014 HEMATOCRIT: CPT

## 2020-10-20 PROCEDURE — 88309 TISSUE EXAM BY PATHOLOGIST: CPT | Performed by: PATHOLOGY

## 2020-10-20 PROCEDURE — 82947 ASSAY GLUCOSE BLOOD QUANT: CPT

## 2020-10-20 PROCEDURE — 80053 COMPREHEN METABOLIC PANEL: CPT | Performed by: SURGERY

## 2020-10-20 PROCEDURE — 86920 COMPATIBILITY TEST SPIN: CPT

## 2020-10-20 PROCEDURE — 86901 BLOOD TYPING SEROLOGIC RH(D): CPT | Performed by: SURGERY

## 2020-10-20 PROCEDURE — 48150 PARTIAL REMOVAL OF PANCREAS: CPT | Performed by: SURGERY

## 2020-10-20 PROCEDURE — 49203 PR EXCISION/DESTRUCTION OPEN ABDOMINAL TUMORS 5 CM: CPT | Performed by: SURGERY

## 2020-10-20 PROCEDURE — 82330 ASSAY OF CALCIUM: CPT

## 2020-10-20 PROCEDURE — 84132 ASSAY OF SERUM POTASSIUM: CPT

## 2020-10-20 RX ORDER — BUPIVACAINE HYDROCHLORIDE 2.5 MG/ML
INJECTION, SOLUTION EPIDURAL; INFILTRATION; INTRACAUDAL AS NEEDED
Status: DISCONTINUED | OUTPATIENT
Start: 2020-10-20 | End: 2020-10-20

## 2020-10-20 RX ORDER — HYDRALAZINE HYDROCHLORIDE 20 MG/ML
5 INJECTION INTRAMUSCULAR; INTRAVENOUS
Status: DISCONTINUED | OUTPATIENT
Start: 2020-10-20 | End: 2020-10-20 | Stop reason: HOSPADM

## 2020-10-20 RX ORDER — SODIUM CHLORIDE 9 MG/ML
INJECTION, SOLUTION INTRAVENOUS CONTINUOUS PRN
Status: DISCONTINUED | OUTPATIENT
Start: 2020-10-20 | End: 2020-10-20

## 2020-10-20 RX ORDER — MAGNESIUM HYDROXIDE 1200 MG/15ML
LIQUID ORAL AS NEEDED
Status: DISCONTINUED | OUTPATIENT
Start: 2020-10-20 | End: 2020-10-20 | Stop reason: HOSPADM

## 2020-10-20 RX ORDER — CHLORHEXIDINE GLUCONATE 0.12 MG/ML
15 RINSE ORAL EVERY 12 HOURS SCHEDULED
Status: DISCONTINUED | OUTPATIENT
Start: 2020-10-20 | End: 2020-10-26 | Stop reason: HOSPADM

## 2020-10-20 RX ORDER — SODIUM CHLORIDE, SODIUM GLUCONATE, SODIUM ACETATE, POTASSIUM CHLORIDE, MAGNESIUM CHLORIDE, SODIUM PHOSPHATE, DIBASIC, AND POTASSIUM PHOSPHATE .53; .5; .37; .037; .03; .012; .00082 G/100ML; G/100ML; G/100ML; G/100ML; G/100ML; G/100ML; G/100ML
125 INJECTION, SOLUTION INTRAVENOUS CONTINUOUS
Status: DISCONTINUED | OUTPATIENT
Start: 2020-10-20 | End: 2020-10-21

## 2020-10-20 RX ORDER — ONDANSETRON 2 MG/ML
INJECTION INTRAMUSCULAR; INTRAVENOUS AS NEEDED
Status: DISCONTINUED | OUTPATIENT
Start: 2020-10-20 | End: 2020-10-20

## 2020-10-20 RX ORDER — DEXAMETHASONE SODIUM PHOSPHATE 10 MG/ML
INJECTION, SOLUTION INTRAMUSCULAR; INTRAVENOUS AS NEEDED
Status: DISCONTINUED | OUTPATIENT
Start: 2020-10-20 | End: 2020-10-20

## 2020-10-20 RX ORDER — PROPOFOL 10 MG/ML
INJECTION, EMULSION INTRAVENOUS CONTINUOUS PRN
Status: DISCONTINUED | OUTPATIENT
Start: 2020-10-20 | End: 2020-10-20

## 2020-10-20 RX ORDER — LIDOCAINE HYDROCHLORIDE AND EPINEPHRINE 15; 5 MG/ML; UG/ML
INJECTION, SOLUTION EPIDURAL
Status: COMPLETED | OUTPATIENT
Start: 2020-10-20 | End: 2020-10-20

## 2020-10-20 RX ORDER — METOCLOPRAMIDE HYDROCHLORIDE 5 MG/ML
10 INJECTION INTRAMUSCULAR; INTRAVENOUS ONCE AS NEEDED
Status: DISCONTINUED | OUTPATIENT
Start: 2020-10-20 | End: 2020-10-20 | Stop reason: HOSPADM

## 2020-10-20 RX ORDER — FENTANYL CITRATE/PF 50 MCG/ML
25 SYRINGE (ML) INJECTION
Status: DISCONTINUED | OUTPATIENT
Start: 2020-10-20 | End: 2020-10-20 | Stop reason: HOSPADM

## 2020-10-20 RX ORDER — LIDOCAINE HYDROCHLORIDE 10 MG/ML
0.5 INJECTION, SOLUTION EPIDURAL; INFILTRATION; INTRACAUDAL; PERINEURAL ONCE AS NEEDED
Status: COMPLETED | OUTPATIENT
Start: 2020-10-20 | End: 2020-10-20

## 2020-10-20 RX ORDER — CALCIUM GLUCONATE 20 MG/ML
1 INJECTION, SOLUTION INTRAVENOUS ONCE
Status: COMPLETED | OUTPATIENT
Start: 2020-10-20 | End: 2020-10-20

## 2020-10-20 RX ORDER — ONDANSETRON 2 MG/ML
4 INJECTION INTRAMUSCULAR; INTRAVENOUS ONCE AS NEEDED
Status: DISCONTINUED | OUTPATIENT
Start: 2020-10-20 | End: 2020-10-20 | Stop reason: HOSPADM

## 2020-10-20 RX ORDER — HEPARIN SODIUM 5000 [USP'U]/ML
INJECTION, SOLUTION INTRAVENOUS; SUBCUTANEOUS AS NEEDED
Status: DISCONTINUED | OUTPATIENT
Start: 2020-10-20 | End: 2020-10-20

## 2020-10-20 RX ORDER — DIPHENHYDRAMINE HYDROCHLORIDE 50 MG/ML
25 INJECTION INTRAMUSCULAR; INTRAVENOUS EVERY 6 HOURS PRN
Status: DISCONTINUED | OUTPATIENT
Start: 2020-10-20 | End: 2020-10-26 | Stop reason: HOSPADM

## 2020-10-20 RX ORDER — SODIUM CHLORIDE, SODIUM LACTATE, POTASSIUM CHLORIDE, CALCIUM CHLORIDE 600; 310; 30; 20 MG/100ML; MG/100ML; MG/100ML; MG/100ML
125 INJECTION, SOLUTION INTRAVENOUS CONTINUOUS
Status: DISCONTINUED | OUTPATIENT
Start: 2020-10-20 | End: 2020-10-20

## 2020-10-20 RX ORDER — METOCLOPRAMIDE HYDROCHLORIDE 5 MG/ML
5 INJECTION INTRAMUSCULAR; INTRAVENOUS EVERY 6 HOURS PRN
Status: DISCONTINUED | OUTPATIENT
Start: 2020-10-20 | End: 2020-10-26 | Stop reason: HOSPADM

## 2020-10-20 RX ORDER — HYDROMORPHONE HCL/PF 1 MG/ML
0.2 SYRINGE (ML) INJECTION
Status: DISCONTINUED | OUTPATIENT
Start: 2020-10-20 | End: 2020-10-20 | Stop reason: HOSPADM

## 2020-10-20 RX ORDER — FENTANYL CITRATE 50 UG/ML
INJECTION, SOLUTION INTRAMUSCULAR; INTRAVENOUS AS NEEDED
Status: DISCONTINUED | OUTPATIENT
Start: 2020-10-20 | End: 2020-10-20

## 2020-10-20 RX ORDER — PROMETHAZINE HYDROCHLORIDE 25 MG/ML
12.5 INJECTION, SOLUTION INTRAMUSCULAR; INTRAVENOUS ONCE AS NEEDED
Status: DISCONTINUED | OUTPATIENT
Start: 2020-10-20 | End: 2020-10-20 | Stop reason: HOSPADM

## 2020-10-20 RX ORDER — ROPIVACAINE HYDROCHLORIDE 2 MG/ML
INJECTION, SOLUTION EPIDURAL; INFILTRATION; PERINEURAL AS NEEDED
Status: DISCONTINUED | OUTPATIENT
Start: 2020-10-20 | End: 2020-10-20

## 2020-10-20 RX ORDER — SODIUM CHLORIDE, SODIUM GLUCONATE, SODIUM ACETATE, POTASSIUM CHLORIDE, MAGNESIUM CHLORIDE, SODIUM PHOSPHATE, DIBASIC, AND POTASSIUM PHOSPHATE .53; .5; .37; .037; .03; .012; .00082 G/100ML; G/100ML; G/100ML; G/100ML; G/100ML; G/100ML; G/100ML
500 INJECTION, SOLUTION INTRAVENOUS ONCE
Status: COMPLETED | OUTPATIENT
Start: 2020-10-21 | End: 2020-10-21

## 2020-10-20 RX ORDER — HEPARIN SODIUM 5000 [USP'U]/ML
5000 INJECTION, SOLUTION INTRAVENOUS; SUBCUTANEOUS EVERY 8 HOURS SCHEDULED
Status: DISCONTINUED | OUTPATIENT
Start: 2020-10-20 | End: 2020-10-26 | Stop reason: HOSPADM

## 2020-10-20 RX ORDER — LABETALOL 20 MG/4 ML (5 MG/ML) INTRAVENOUS SYRINGE
10
Status: DISCONTINUED | OUTPATIENT
Start: 2020-10-20 | End: 2020-10-20 | Stop reason: HOSPADM

## 2020-10-20 RX ORDER — KETAMINE HYDROCHLORIDE 50 MG/ML
INJECTION, SOLUTION, CONCENTRATE INTRAMUSCULAR; INTRAVENOUS AS NEEDED
Status: DISCONTINUED | OUTPATIENT
Start: 2020-10-20 | End: 2020-10-20

## 2020-10-20 RX ORDER — HYDROMORPHONE HCL/PF 1 MG/ML
0.5 SYRINGE (ML) INJECTION
Status: DISCONTINUED | OUTPATIENT
Start: 2020-10-20 | End: 2020-10-20 | Stop reason: HOSPADM

## 2020-10-20 RX ORDER — CLONIDINE 100 UG/ML
INJECTION, SOLUTION EPIDURAL AS NEEDED
Status: DISCONTINUED | OUTPATIENT
Start: 2020-10-20 | End: 2020-10-20

## 2020-10-20 RX ORDER — ONDANSETRON 2 MG/ML
4 INJECTION INTRAMUSCULAR; INTRAVENOUS EVERY 4 HOURS PRN
Status: DISCONTINUED | OUTPATIENT
Start: 2020-10-20 | End: 2020-10-26 | Stop reason: HOSPADM

## 2020-10-20 RX ORDER — METOPROLOL TARTRATE 5 MG/5ML
5 INJECTION INTRAVENOUS EVERY 6 HOURS PRN
Status: DISCONTINUED | OUTPATIENT
Start: 2020-10-20 | End: 2020-10-26 | Stop reason: HOSPADM

## 2020-10-20 RX ORDER — MIDAZOLAM HYDROCHLORIDE 2 MG/2ML
INJECTION, SOLUTION INTRAMUSCULAR; INTRAVENOUS AS NEEDED
Status: DISCONTINUED | OUTPATIENT
Start: 2020-10-20 | End: 2020-10-20

## 2020-10-20 RX ORDER — LIDOCAINE HYDROCHLORIDE 10 MG/ML
INJECTION, SOLUTION EPIDURAL; INFILTRATION; INTRACAUDAL; PERINEURAL AS NEEDED
Status: DISCONTINUED | OUTPATIENT
Start: 2020-10-20 | End: 2020-10-20

## 2020-10-20 RX ORDER — ROCURONIUM BROMIDE 10 MG/ML
INJECTION, SOLUTION INTRAVENOUS AS NEEDED
Status: DISCONTINUED | OUTPATIENT
Start: 2020-10-20 | End: 2020-10-20

## 2020-10-20 RX ORDER — ONDANSETRON 2 MG/ML
4 INJECTION INTRAMUSCULAR; INTRAVENOUS EVERY 6 HOURS PRN
Status: DISCONTINUED | OUTPATIENT
Start: 2020-10-20 | End: 2020-10-20

## 2020-10-20 RX ORDER — LEVOFLOXACIN 5 MG/ML
500 INJECTION, SOLUTION INTRAVENOUS ONCE
Status: COMPLETED | OUTPATIENT
Start: 2020-10-20 | End: 2020-10-20

## 2020-10-20 RX ORDER — ROPIVACAINE HYDROCHLORIDE 2 MG/ML
INJECTION, SOLUTION EPIDURAL; INFILTRATION; PERINEURAL CONTINUOUS PRN
Status: DISCONTINUED | OUTPATIENT
Start: 2020-10-20 | End: 2020-10-20

## 2020-10-20 RX ORDER — ALBUMIN, HUMAN INJ 5% 5 %
SOLUTION INTRAVENOUS CONTINUOUS PRN
Status: DISCONTINUED | OUTPATIENT
Start: 2020-10-20 | End: 2020-10-20

## 2020-10-20 RX ORDER — PANTOPRAZOLE SODIUM 40 MG/1
40 INJECTION, POWDER, FOR SOLUTION INTRAVENOUS
Status: DISCONTINUED | OUTPATIENT
Start: 2020-10-20 | End: 2020-10-23 | Stop reason: SDUPTHER

## 2020-10-20 RX ORDER — ALBUTEROL SULFATE 2.5 MG/3ML
2.5 SOLUTION RESPIRATORY (INHALATION) ONCE AS NEEDED
Status: DISCONTINUED | OUTPATIENT
Start: 2020-10-20 | End: 2020-10-20 | Stop reason: HOSPADM

## 2020-10-20 RX ORDER — PROPOFOL 10 MG/ML
INJECTION, EMULSION INTRAVENOUS AS NEEDED
Status: DISCONTINUED | OUTPATIENT
Start: 2020-10-20 | End: 2020-10-20

## 2020-10-20 RX ADMIN — PROPOFOL 100 MCG/KG/MIN: 10 INJECTION, EMULSION INTRAVENOUS at 08:34

## 2020-10-20 RX ADMIN — PHENYLEPHRINE HYDROCHLORIDE 100 MCG: 10 INJECTION INTRAVENOUS at 09:33

## 2020-10-20 RX ADMIN — SODIUM CHLORIDE, SODIUM LACTATE, POTASSIUM CHLORIDE, AND CALCIUM CHLORIDE 125 ML/HR: .6; .31; .03; .02 INJECTION, SOLUTION INTRAVENOUS at 07:21

## 2020-10-20 RX ADMIN — SODIUM CHLORIDE, SODIUM GLUCONATE, SODIUM ACETATE, POTASSIUM CHLORIDE, MAGNESIUM CHLORIDE, SODIUM PHOSPHATE, DIBASIC, AND POTASSIUM PHOSPHATE 500 ML: .53; .5; .37; .037; .03; .012; .00082 INJECTION, SOLUTION INTRAVENOUS at 23:55

## 2020-10-20 RX ADMIN — PHENYLEPHRINE HYDROCHLORIDE 300 MCG: 10 INJECTION INTRAVENOUS at 08:17

## 2020-10-20 RX ADMIN — INSULIN LISPRO 2 UNITS: 100 INJECTION, SOLUTION INTRAVENOUS; SUBCUTANEOUS at 20:15

## 2020-10-20 RX ADMIN — ROCURONIUM BROMIDE 20 MG: 10 INJECTION, SOLUTION INTRAVENOUS at 10:26

## 2020-10-20 RX ADMIN — PHENYLEPHRINE HYDROCHLORIDE 300 MCG: 10 INJECTION INTRAVENOUS at 08:08

## 2020-10-20 RX ADMIN — PHENYLEPHRINE HYDROCHLORIDE 100 MCG: 10 INJECTION INTRAVENOUS at 08:01

## 2020-10-20 RX ADMIN — MIDAZOLAM 2 MG: 1 INJECTION INTRAMUSCULAR; INTRAVENOUS at 07:50

## 2020-10-20 RX ADMIN — LEVOFLOXACIN 500 MG: 5 INJECTION, SOLUTION INTRAVENOUS at 07:22

## 2020-10-20 RX ADMIN — PHENYLEPHRINE HYDROCHLORIDE 100 MCG: 10 INJECTION INTRAVENOUS at 10:46

## 2020-10-20 RX ADMIN — ROCURONIUM BROMIDE 10 MG: 10 INJECTION, SOLUTION INTRAVENOUS at 12:23

## 2020-10-20 RX ADMIN — PHENYLEPHRINE HYDROCHLORIDE 100 MCG: 10 INJECTION INTRAVENOUS at 12:16

## 2020-10-20 RX ADMIN — KETAMINE HYDROCHLORIDE 20 MG: 50 INJECTION, SOLUTION INTRAMUSCULAR; INTRAVENOUS at 11:32

## 2020-10-20 RX ADMIN — PHENYLEPHRINE HYDROCHLORIDE 100 MCG: 10 INJECTION INTRAVENOUS at 11:48

## 2020-10-20 RX ADMIN — ALBUMIN (HUMAN): 12.5 INJECTION, SOLUTION INTRAVENOUS at 11:58

## 2020-10-20 RX ADMIN — ROPIVACAINE HYDROCHLORIDE 2.5 ML/HR: 2 INJECTION, SOLUTION EPIDURAL; INFILTRATION at 09:58

## 2020-10-20 RX ADMIN — ROCURONIUM BROMIDE 10 MG: 10 INJECTION, SOLUTION INTRAVENOUS at 14:10

## 2020-10-20 RX ADMIN — SODIUM CHLORIDE: 0.9 INJECTION, SOLUTION INTRAVENOUS at 08:08

## 2020-10-20 RX ADMIN — REMIFENTANIL HYDROCHLORIDE 0.2 MCG/KG/MIN: 1 INJECTION, POWDER, LYOPHILIZED, FOR SOLUTION INTRAVENOUS at 12:19

## 2020-10-20 RX ADMIN — PROPOFOL 120 MG: 10 INJECTION, EMULSION INTRAVENOUS at 08:01

## 2020-10-20 RX ADMIN — PHENYLEPHRINE HYDROCHLORIDE 20 MCG/MIN: 10 INJECTION INTRAVENOUS at 09:32

## 2020-10-20 RX ADMIN — SODIUM CHLORIDE, SODIUM LACTATE, POTASSIUM CHLORIDE, AND CALCIUM CHLORIDE: .6; .31; .03; .02 INJECTION, SOLUTION INTRAVENOUS at 07:55

## 2020-10-20 RX ADMIN — PHENYLEPHRINE HYDROCHLORIDE 100 MCG: 10 INJECTION INTRAVENOUS at 11:46

## 2020-10-20 RX ADMIN — BUPIVACAINE HYDROCHLORIDE 2.5 ML: 2.5 INJECTION, SOLUTION EPIDURAL; INFILTRATION; INTRACAUDAL at 09:02

## 2020-10-20 RX ADMIN — HEPARIN SODIUM 5000 UNITS: 5000 INJECTION INTRAVENOUS; SUBCUTANEOUS at 22:09

## 2020-10-20 RX ADMIN — CHLORHEXIDINE GLUCONATE 15 ML: 1.2 RINSE ORAL at 20:15

## 2020-10-20 RX ADMIN — FENTANYL CITRATE 50 MCG: 50 INJECTION, SOLUTION INTRAMUSCULAR; INTRAVENOUS at 07:50

## 2020-10-20 RX ADMIN — PANTOPRAZOLE SODIUM 40 MG: 40 INJECTION, POWDER, FOR SOLUTION INTRAVENOUS at 17:46

## 2020-10-20 RX ADMIN — PHENYLEPHRINE HYDROCHLORIDE 200 MCG: 10 INJECTION INTRAVENOUS at 08:20

## 2020-10-20 RX ADMIN — PHENYLEPHRINE HYDROCHLORIDE 300 MCG: 10 INJECTION INTRAVENOUS at 08:29

## 2020-10-20 RX ADMIN — METRONIDAZOLE 500 MG: 500 INJECTION, SOLUTION INTRAVENOUS at 08:32

## 2020-10-20 RX ADMIN — ONDANSETRON 4 MG: 2 INJECTION INTRAMUSCULAR; INTRAVENOUS at 08:01

## 2020-10-20 RX ADMIN — SODIUM CHLORIDE, SODIUM GLUCONATE, SODIUM ACETATE, POTASSIUM CHLORIDE, MAGNESIUM CHLORIDE, SODIUM PHOSPHATE, DIBASIC, AND POTASSIUM PHOSPHATE 125 ML/HR: .53; .5; .37; .037; .03; .012; .00082 INJECTION, SOLUTION INTRAVENOUS at 15:39

## 2020-10-20 RX ADMIN — ONDANSETRON 4 MG: 2 INJECTION INTRAMUSCULAR; INTRAVENOUS at 23:16

## 2020-10-20 RX ADMIN — FENTANYL CITRATE 50 MCG: 50 INJECTION, SOLUTION INTRAMUSCULAR; INTRAVENOUS at 08:01

## 2020-10-20 RX ADMIN — ROCURONIUM BROMIDE 10 MG: 10 INJECTION, SOLUTION INTRAVENOUS at 13:17

## 2020-10-20 RX ADMIN — PHENYLEPHRINE HYDROCHLORIDE 100 MCG: 10 INJECTION INTRAVENOUS at 11:39

## 2020-10-20 RX ADMIN — LIDOCAINE HYDROCHLORIDE AND EPINEPHRINE 5 ML: 15; 5 INJECTION, SOLUTION EPIDURAL at 07:50

## 2020-10-20 RX ADMIN — ROCURONIUM BROMIDE 10 MG: 10 INJECTION, SOLUTION INTRAVENOUS at 11:44

## 2020-10-20 RX ADMIN — INSULIN HUMAN 4 UNITS: 100 INJECTION, SOLUTION PARENTERAL at 09:15

## 2020-10-20 RX ADMIN — SUGAMMADEX 101 MG: 100 INJECTION, SOLUTION INTRAVENOUS at 15:19

## 2020-10-20 RX ADMIN — SODIUM CHLORIDE, SODIUM LACTATE, POTASSIUM CHLORIDE, AND CALCIUM CHLORIDE: .6; .31; .03; .02 INJECTION, SOLUTION INTRAVENOUS at 11:31

## 2020-10-20 RX ADMIN — CLONIDINE 50 MCG: 100 INJECTION, SOLUTION EPIDURAL at 08:50

## 2020-10-20 RX ADMIN — Medication 25 MCG: at 15:56

## 2020-10-20 RX ADMIN — HEPARIN SODIUM 5000 UNITS: 5000 INJECTION INTRAVENOUS; SUBCUTANEOUS at 08:57

## 2020-10-20 RX ADMIN — Medication 25 MCG: at 16:03

## 2020-10-20 RX ADMIN — ALBUMIN (HUMAN): 12.5 INJECTION, SOLUTION INTRAVENOUS at 09:07

## 2020-10-20 RX ADMIN — ONDANSETRON 4 MG: 2 INJECTION INTRAMUSCULAR; INTRAVENOUS at 15:01

## 2020-10-20 RX ADMIN — CALCIUM GLUCONATE 1 G: 20 INJECTION, SOLUTION INTRAVENOUS at 17:46

## 2020-10-20 RX ADMIN — SODIUM CHLORIDE, SODIUM GLUCONATE, SODIUM ACETATE, POTASSIUM CHLORIDE, MAGNESIUM CHLORIDE, SODIUM PHOSPHATE, DIBASIC, AND POTASSIUM PHOSPHATE 125 ML/HR: .53; .5; .37; .037; .03; .012; .00082 INJECTION, SOLUTION INTRAVENOUS at 23:30

## 2020-10-20 RX ADMIN — METRONIDAZOLE 500 MG: 500 INJECTION, SOLUTION INTRAVENOUS at 14:20

## 2020-10-20 RX ADMIN — BUPIVACAINE HYDROCHLORIDE 2.5 ML: 2.5 INJECTION, SOLUTION EPIDURAL; INFILTRATION; INTRACAUDAL at 08:50

## 2020-10-20 RX ADMIN — LIDOCAINE HYDROCHLORIDE 50 MG: 10 INJECTION, SOLUTION EPIDURAL; INFILTRATION; INTRACAUDAL; PERINEURAL at 08:01

## 2020-10-20 RX ADMIN — SODIUM CHLORIDE 3 UNITS/HR: 9 INJECTION, SOLUTION INTRAVENOUS at 09:30

## 2020-10-20 RX ADMIN — ALBUMIN (HUMAN): 12.5 INJECTION, SOLUTION INTRAVENOUS at 08:37

## 2020-10-20 RX ADMIN — ROCURONIUM BROMIDE 50 MG: 10 INJECTION, SOLUTION INTRAVENOUS at 08:02

## 2020-10-20 RX ADMIN — SODIUM CHLORIDE: 0.9 INJECTION, SOLUTION INTRAVENOUS at 09:31

## 2020-10-20 RX ADMIN — KETAMINE HYDROCHLORIDE 30 MG: 50 INJECTION, SOLUTION INTRAMUSCULAR; INTRAVENOUS at 08:43

## 2020-10-20 RX ADMIN — ROCURONIUM BROMIDE 20 MG: 10 INJECTION, SOLUTION INTRAVENOUS at 09:26

## 2020-10-20 RX ADMIN — PHENYLEPHRINE HYDROCHLORIDE 200 MCG: 10 INJECTION INTRAVENOUS at 08:05

## 2020-10-20 RX ADMIN — DEXAMETHASONE SODIUM PHOSPHATE 5 MG: 10 INJECTION, SOLUTION INTRAMUSCULAR; INTRAVENOUS at 08:13

## 2020-10-20 RX ADMIN — CLONIDINE 50 MCG: 100 INJECTION, SOLUTION EPIDURAL at 09:02

## 2020-10-20 RX ADMIN — LIDOCAINE HYDROCHLORIDE 0.2 ML: 10 INJECTION, SOLUTION EPIDURAL; INFILTRATION; INTRACAUDAL; PERINEURAL at 07:22

## 2020-10-21 LAB
ANION GAP SERPL CALCULATED.3IONS-SCNC: 13 MMOL/L (ref 4–13)
BASOPHILS # BLD AUTO: 0.01 THOUSANDS/ΜL (ref 0–0.1)
BASOPHILS NFR BLD AUTO: 0 % (ref 0–1)
BUN SERPL-MCNC: 9 MG/DL (ref 5–25)
CALCIUM SERPL-MCNC: 6.1 MG/DL (ref 8.3–10.1)
CHLORIDE SERPL-SCNC: 109 MMOL/L (ref 100–108)
CO2 SERPL-SCNC: 23 MMOL/L (ref 21–32)
CREAT SERPL-MCNC: 0.52 MG/DL (ref 0.6–1.3)
EOSINOPHIL # BLD AUTO: 0 THOUSAND/ΜL (ref 0–0.61)
EOSINOPHIL NFR BLD AUTO: 0 % (ref 0–6)
ERYTHROCYTE [DISTWIDTH] IN BLOOD BY AUTOMATED COUNT: 15.3 % (ref 11.6–15.1)
GFR SERPL CREATININE-BSD FRML MDRD: 99 ML/MIN/1.73SQ M
GLUCOSE SERPL-MCNC: 117 MG/DL (ref 65–140)
GLUCOSE SERPL-MCNC: 127 MG/DL (ref 65–140)
GLUCOSE SERPL-MCNC: 137 MG/DL (ref 65–140)
GLUCOSE SERPL-MCNC: 151 MG/DL (ref 65–140)
GLUCOSE SERPL-MCNC: 204 MG/DL (ref 65–140)
GLUCOSE SERPL-MCNC: 215 MG/DL (ref 65–140)
HCT VFR BLD AUTO: 21.6 % (ref 34.8–46.1)
HCT VFR BLD AUTO: 33.2 % (ref 34.8–46.1)
HGB BLD-MCNC: 10.3 G/DL (ref 11.5–15.4)
HGB BLD-MCNC: 6.9 G/DL (ref 11.5–15.4)
IMM GRANULOCYTES # BLD AUTO: 0.04 THOUSAND/UL (ref 0–0.2)
IMM GRANULOCYTES NFR BLD AUTO: 1 % (ref 0–2)
LYMPHOCYTES # BLD AUTO: 0.68 THOUSANDS/ΜL (ref 0.6–4.47)
LYMPHOCYTES NFR BLD AUTO: 8 % (ref 14–44)
MAGNESIUM SERPL-MCNC: 1.9 MG/DL (ref 1.6–2.6)
MCH RBC QN AUTO: 33 PG (ref 26.8–34.3)
MCHC RBC AUTO-ENTMCNC: 31.9 G/DL (ref 31.4–37.4)
MCV RBC AUTO: 103 FL (ref 82–98)
MONOCYTES # BLD AUTO: 0.56 THOUSAND/ΜL (ref 0.17–1.22)
MONOCYTES NFR BLD AUTO: 6 % (ref 4–12)
NEUTROPHILS # BLD AUTO: 7.55 THOUSANDS/ΜL (ref 1.85–7.62)
NEUTS SEG NFR BLD AUTO: 85 % (ref 43–75)
NRBC BLD AUTO-RTO: 0 /100 WBCS
PHOSPHATE SERPL-MCNC: 3.8 MG/DL (ref 2.3–4.1)
PLATELET # BLD AUTO: 123 THOUSANDS/UL (ref 149–390)
PMV BLD AUTO: 11 FL (ref 8.9–12.7)
POTASSIUM SERPL-SCNC: 4.1 MMOL/L (ref 3.5–5.3)
RBC # BLD AUTO: 2.09 MILLION/UL (ref 3.81–5.12)
SODIUM SERPL-SCNC: 145 MMOL/L (ref 136–145)
WBC # BLD AUTO: 8.84 THOUSAND/UL (ref 4.31–10.16)

## 2020-10-21 PROCEDURE — 84100 ASSAY OF PHOSPHORUS: CPT | Performed by: SURGERY

## 2020-10-21 PROCEDURE — 82948 REAGENT STRIP/BLOOD GLUCOSE: CPT

## 2020-10-21 PROCEDURE — 97167 OT EVAL HIGH COMPLEX 60 MIN: CPT

## 2020-10-21 PROCEDURE — 99024 POSTOP FOLLOW-UP VISIT: CPT | Performed by: SURGERY

## 2020-10-21 PROCEDURE — 83735 ASSAY OF MAGNESIUM: CPT | Performed by: SURGERY

## 2020-10-21 PROCEDURE — 99221 1ST HOSP IP/OBS SF/LOW 40: CPT | Performed by: ANESTHESIOLOGY

## 2020-10-21 PROCEDURE — 30233N1 TRANSFUSION OF NONAUTOLOGOUS RED BLOOD CELLS INTO PERIPHERAL VEIN, PERCUTANEOUS APPROACH: ICD-10-PCS | Performed by: SURGERY

## 2020-10-21 PROCEDURE — 80048 BASIC METABOLIC PNL TOTAL CA: CPT | Performed by: SURGERY

## 2020-10-21 PROCEDURE — 85018 HEMOGLOBIN: CPT | Performed by: PHYSICIAN ASSISTANT

## 2020-10-21 PROCEDURE — C9113 INJ PANTOPRAZOLE SODIUM, VIA: HCPCS | Performed by: SURGERY

## 2020-10-21 PROCEDURE — 97163 PT EVAL HIGH COMPLEX 45 MIN: CPT

## 2020-10-21 PROCEDURE — P9016 RBC LEUKOCYTES REDUCED: HCPCS

## 2020-10-21 PROCEDURE — 99232 SBSQ HOSP IP/OBS MODERATE 35: CPT | Performed by: SURGERY

## 2020-10-21 PROCEDURE — 85025 COMPLETE CBC W/AUTO DIFF WBC: CPT | Performed by: SURGERY

## 2020-10-21 PROCEDURE — 85014 HEMATOCRIT: CPT | Performed by: PHYSICIAN ASSISTANT

## 2020-10-21 RX ORDER — DEXTROSE, SODIUM CHLORIDE, AND POTASSIUM CHLORIDE 5; .45; .15 G/100ML; G/100ML; G/100ML
50 INJECTION INTRAVENOUS CONTINUOUS
Status: DISCONTINUED | OUTPATIENT
Start: 2020-10-21 | End: 2020-10-24

## 2020-10-21 RX ORDER — HYDROMORPHONE HCL/PF 1 MG/ML
0.5 SYRINGE (ML) INJECTION
Status: DISCONTINUED | OUTPATIENT
Start: 2020-10-21 | End: 2020-10-26 | Stop reason: HOSPADM

## 2020-10-21 RX ORDER — HYDROMORPHONE HCL/PF 1 MG/ML
0.5 SYRINGE (ML) INJECTION ONCE
Status: COMPLETED | OUTPATIENT
Start: 2020-10-21 | End: 2020-10-21

## 2020-10-21 RX ADMIN — HEPARIN SODIUM 5000 UNITS: 5000 INJECTION INTRAVENOUS; SUBCUTANEOUS at 13:11

## 2020-10-21 RX ADMIN — INSULIN LISPRO 2 UNITS: 100 INJECTION, SOLUTION INTRAVENOUS; SUBCUTANEOUS at 23:21

## 2020-10-21 RX ADMIN — HEPARIN SODIUM 5000 UNITS: 5000 INJECTION INTRAVENOUS; SUBCUTANEOUS at 23:18

## 2020-10-21 RX ADMIN — HEPARIN SODIUM 5000 UNITS: 5000 INJECTION INTRAVENOUS; SUBCUTANEOUS at 05:24

## 2020-10-21 RX ADMIN — DEXTROSE, SODIUM CHLORIDE, AND POTASSIUM CHLORIDE 125 ML/HR: 5; .45; .15 INJECTION INTRAVENOUS at 21:20

## 2020-10-21 RX ADMIN — FENTANYL CITRATE: 50 INJECTION INTRAMUSCULAR; INTRAVENOUS at 13:07

## 2020-10-21 RX ADMIN — DIPHENHYDRAMINE HYDROCHLORIDE 25 MG: 50 INJECTION, SOLUTION INTRAMUSCULAR; INTRAVENOUS at 18:15

## 2020-10-21 RX ADMIN — INSULIN LISPRO 1 UNITS: 100 INJECTION, SOLUTION INTRAVENOUS; SUBCUTANEOUS at 17:32

## 2020-10-21 RX ADMIN — HYDROMORPHONE HYDROCHLORIDE 0.5 MG: 1 INJECTION, SOLUTION INTRAMUSCULAR; INTRAVENOUS; SUBCUTANEOUS at 09:12

## 2020-10-21 RX ADMIN — CHLORHEXIDINE GLUCONATE 15 ML: 1.2 RINSE ORAL at 23:18

## 2020-10-21 RX ADMIN — SODIUM CHLORIDE, SODIUM GLUCONATE, SODIUM ACETATE, POTASSIUM CHLORIDE, MAGNESIUM CHLORIDE, SODIUM PHOSPHATE, DIBASIC, AND POTASSIUM PHOSPHATE 125 ML/HR: .53; .5; .37; .037; .03; .012; .00082 INJECTION, SOLUTION INTRAVENOUS at 04:46

## 2020-10-21 RX ADMIN — PANTOPRAZOLE SODIUM 40 MG: 40 INJECTION, POWDER, FOR SOLUTION INTRAVENOUS at 09:12

## 2020-10-21 RX ADMIN — DEXTROSE, SODIUM CHLORIDE, AND POTASSIUM CHLORIDE 125 ML/HR: 5; .45; .15 INJECTION INTRAVENOUS at 12:38

## 2020-10-21 RX ADMIN — HYDROMORPHONE HYDROCHLORIDE 0.5 MG: 1 INJECTION, SOLUTION INTRAMUSCULAR; INTRAVENOUS; SUBCUTANEOUS at 22:51

## 2020-10-22 LAB
ABO GROUP BLD BPU: NORMAL
ANION GAP SERPL CALCULATED.3IONS-SCNC: 5 MMOL/L (ref 4–13)
BASOPHILS # BLD AUTO: 0.02 THOUSANDS/ΜL (ref 0–0.1)
BASOPHILS NFR BLD AUTO: 0 % (ref 0–1)
BPU ID: NORMAL
BUN SERPL-MCNC: 8 MG/DL (ref 5–25)
CALCIUM SERPL-MCNC: 8.3 MG/DL (ref 8.3–10.1)
CHLORIDE SERPL-SCNC: 108 MMOL/L (ref 100–108)
CO2 SERPL-SCNC: 28 MMOL/L (ref 21–32)
CREAT SERPL-MCNC: 0.77 MG/DL (ref 0.6–1.3)
CROSSMATCH: NORMAL
EOSINOPHIL # BLD AUTO: 0.15 THOUSAND/ΜL (ref 0–0.61)
EOSINOPHIL NFR BLD AUTO: 2 % (ref 0–6)
ERYTHROCYTE [DISTWIDTH] IN BLOOD BY AUTOMATED COUNT: 16 % (ref 11.6–15.1)
GFR SERPL CREATININE-BSD FRML MDRD: 80 ML/MIN/1.73SQ M
GLUCOSE SERPL-MCNC: 136 MG/DL (ref 65–140)
GLUCOSE SERPL-MCNC: 145 MG/DL (ref 65–140)
GLUCOSE SERPL-MCNC: 156 MG/DL (ref 65–140)
GLUCOSE SERPL-MCNC: 157 MG/DL (ref 65–140)
HCT VFR BLD AUTO: 32.9 % (ref 34.8–46.1)
HGB BLD-MCNC: 10 G/DL (ref 11.5–15.4)
IMM GRANULOCYTES # BLD AUTO: 0.05 THOUSAND/UL (ref 0–0.2)
IMM GRANULOCYTES NFR BLD AUTO: 1 % (ref 0–2)
LYMPHOCYTES # BLD AUTO: 1.33 THOUSANDS/ΜL (ref 0.6–4.47)
LYMPHOCYTES NFR BLD AUTO: 13 % (ref 14–44)
MAGNESIUM SERPL-MCNC: 1.7 MG/DL (ref 1.6–2.6)
MCH RBC QN AUTO: 31.3 PG (ref 26.8–34.3)
MCHC RBC AUTO-ENTMCNC: 30.4 G/DL (ref 31.4–37.4)
MCV RBC AUTO: 103 FL (ref 82–98)
MONOCYTES # BLD AUTO: 0.89 THOUSAND/ΜL (ref 0.17–1.22)
MONOCYTES NFR BLD AUTO: 9 % (ref 4–12)
NEUTROPHILS # BLD AUTO: 7.65 THOUSANDS/ΜL (ref 1.85–7.62)
NEUTS SEG NFR BLD AUTO: 75 % (ref 43–75)
NRBC BLD AUTO-RTO: 0 /100 WBCS
PHOSPHATE SERPL-MCNC: 2.9 MG/DL (ref 2.3–4.1)
PLATELET # BLD AUTO: 145 THOUSANDS/UL (ref 149–390)
PMV BLD AUTO: 10.8 FL (ref 8.9–12.7)
POTASSIUM SERPL-SCNC: 3.7 MMOL/L (ref 3.5–5.3)
RBC # BLD AUTO: 3.19 MILLION/UL (ref 3.81–5.12)
SODIUM SERPL-SCNC: 141 MMOL/L (ref 136–145)
UNIT DISPENSE STATUS: NORMAL
UNIT PRODUCT CODE: NORMAL
UNIT RH: NORMAL
WBC # BLD AUTO: 10.09 THOUSAND/UL (ref 4.31–10.16)

## 2020-10-22 PROCEDURE — C9113 INJ PANTOPRAZOLE SODIUM, VIA: HCPCS | Performed by: SURGERY

## 2020-10-22 PROCEDURE — 97110 THERAPEUTIC EXERCISES: CPT

## 2020-10-22 PROCEDURE — 85025 COMPLETE CBC W/AUTO DIFF WBC: CPT | Performed by: SURGERY

## 2020-10-22 PROCEDURE — 80048 BASIC METABOLIC PNL TOTAL CA: CPT | Performed by: SURGERY

## 2020-10-22 PROCEDURE — 83735 ASSAY OF MAGNESIUM: CPT | Performed by: SURGERY

## 2020-10-22 PROCEDURE — 97116 GAIT TRAINING THERAPY: CPT

## 2020-10-22 PROCEDURE — 82948 REAGENT STRIP/BLOOD GLUCOSE: CPT

## 2020-10-22 PROCEDURE — 84100 ASSAY OF PHOSPHORUS: CPT | Performed by: SURGERY

## 2020-10-22 PROCEDURE — 99232 SBSQ HOSP IP/OBS MODERATE 35: CPT | Performed by: ANESTHESIOLOGY

## 2020-10-22 PROCEDURE — 99024 POSTOP FOLLOW-UP VISIT: CPT | Performed by: SURGERY

## 2020-10-22 RX ORDER — MAGNESIUM SULFATE HEPTAHYDRATE 40 MG/ML
2 INJECTION, SOLUTION INTRAVENOUS ONCE
Status: COMPLETED | OUTPATIENT
Start: 2020-10-22 | End: 2020-10-22

## 2020-10-22 RX ORDER — POTASSIUM CHLORIDE 20 MEQ/1
20 TABLET, EXTENDED RELEASE ORAL ONCE
Status: COMPLETED | OUTPATIENT
Start: 2020-10-22 | End: 2020-10-22

## 2020-10-22 RX ORDER — DIPHENHYDRAMINE HYDROCHLORIDE 50 MG/ML
12.5 INJECTION INTRAMUSCULAR; INTRAVENOUS ONCE
Status: DISCONTINUED | OUTPATIENT
Start: 2020-10-22 | End: 2020-10-22

## 2020-10-22 RX ORDER — MIRTAZAPINE 15 MG/1
15 TABLET, FILM COATED ORAL
Status: DISCONTINUED | OUTPATIENT
Start: 2020-10-22 | End: 2020-10-26 | Stop reason: HOSPADM

## 2020-10-22 RX ADMIN — DEXTROSE, SODIUM CHLORIDE, AND POTASSIUM CHLORIDE 84 ML/HR: 5; .45; .15 INJECTION INTRAVENOUS at 18:45

## 2020-10-22 RX ADMIN — DEXTROSE, SODIUM CHLORIDE, AND POTASSIUM CHLORIDE 84 ML/HR: 5; .45; .15 INJECTION INTRAVENOUS at 05:36

## 2020-10-22 RX ADMIN — CHLORHEXIDINE GLUCONATE 15 ML: 1.2 RINSE ORAL at 10:30

## 2020-10-22 RX ADMIN — FENTANYL CITRATE: 50 INJECTION INTRAMUSCULAR; INTRAVENOUS at 05:15

## 2020-10-22 RX ADMIN — INSULIN LISPRO 1 UNITS: 100 INJECTION, SOLUTION INTRAVENOUS; SUBCUTANEOUS at 07:42

## 2020-10-22 RX ADMIN — HEPARIN SODIUM 5000 UNITS: 5000 INJECTION INTRAVENOUS; SUBCUTANEOUS at 21:11

## 2020-10-22 RX ADMIN — HYDROMORPHONE HYDROCHLORIDE 0.5 MG: 1 INJECTION, SOLUTION INTRAMUSCULAR; INTRAVENOUS; SUBCUTANEOUS at 19:49

## 2020-10-22 RX ADMIN — HEPARIN SODIUM 5000 UNITS: 5000 INJECTION INTRAVENOUS; SUBCUTANEOUS at 16:10

## 2020-10-22 RX ADMIN — PANTOPRAZOLE SODIUM 40 MG: 40 INJECTION, POWDER, FOR SOLUTION INTRAVENOUS at 10:28

## 2020-10-22 RX ADMIN — MAGNESIUM SULFATE IN WATER 2 G: 40 INJECTION, SOLUTION INTRAVENOUS at 10:58

## 2020-10-22 RX ADMIN — POTASSIUM CHLORIDE 20 MEQ: 1500 TABLET, EXTENDED RELEASE ORAL at 10:30

## 2020-10-22 RX ADMIN — MIRTAZAPINE 15 MG: 15 TABLET, FILM COATED ORAL at 21:10

## 2020-10-22 RX ADMIN — CHLORHEXIDINE GLUCONATE 15 ML: 1.2 RINSE ORAL at 21:11

## 2020-10-22 RX ADMIN — DIPHENHYDRAMINE HYDROCHLORIDE 25 MG: 50 INJECTION, SOLUTION INTRAMUSCULAR; INTRAVENOUS at 19:48

## 2020-10-22 RX ADMIN — HEPARIN SODIUM 5000 UNITS: 5000 INJECTION INTRAVENOUS; SUBCUTANEOUS at 05:19

## 2020-10-23 LAB
ANION GAP SERPL CALCULATED.3IONS-SCNC: 5 MMOL/L (ref 4–13)
BASOPHILS # BLD AUTO: 0.03 THOUSANDS/ΜL (ref 0–0.1)
BASOPHILS NFR BLD AUTO: 0 % (ref 0–1)
BUN SERPL-MCNC: 6 MG/DL (ref 5–25)
CALCIUM SERPL-MCNC: 8.6 MG/DL (ref 8.3–10.1)
CHLORIDE SERPL-SCNC: 109 MMOL/L (ref 100–108)
CO2 SERPL-SCNC: 28 MMOL/L (ref 21–32)
CREAT SERPL-MCNC: 0.75 MG/DL (ref 0.6–1.3)
EOSINOPHIL # BLD AUTO: 0.55 THOUSAND/ΜL (ref 0–0.61)
EOSINOPHIL NFR BLD AUTO: 7 % (ref 0–6)
ERYTHROCYTE [DISTWIDTH] IN BLOOD BY AUTOMATED COUNT: 15.2 % (ref 11.6–15.1)
GFR SERPL CREATININE-BSD FRML MDRD: 83 ML/MIN/1.73SQ M
GLUCOSE SERPL-MCNC: 115 MG/DL (ref 65–140)
GLUCOSE SERPL-MCNC: 131 MG/DL (ref 65–140)
GLUCOSE SERPL-MCNC: 138 MG/DL (ref 65–140)
GLUCOSE SERPL-MCNC: 149 MG/DL (ref 65–140)
GLUCOSE SERPL-MCNC: 173 MG/DL (ref 65–140)
GLUCOSE SERPL-MCNC: 210 MG/DL (ref 65–140)
HCT VFR BLD AUTO: 34.7 % (ref 34.8–46.1)
HGB BLD-MCNC: 11.2 G/DL (ref 11.5–15.4)
IMM GRANULOCYTES # BLD AUTO: 0.03 THOUSAND/UL (ref 0–0.2)
IMM GRANULOCYTES NFR BLD AUTO: 0 % (ref 0–2)
LYMPHOCYTES # BLD AUTO: 1.32 THOUSANDS/ΜL (ref 0.6–4.47)
LYMPHOCYTES NFR BLD AUTO: 16 % (ref 14–44)
MAGNESIUM SERPL-MCNC: 2 MG/DL (ref 1.6–2.6)
MCH RBC QN AUTO: 32.6 PG (ref 26.8–34.3)
MCHC RBC AUTO-ENTMCNC: 32.3 G/DL (ref 31.4–37.4)
MCV RBC AUTO: 101 FL (ref 82–98)
MONOCYTES # BLD AUTO: 0.65 THOUSAND/ΜL (ref 0.17–1.22)
MONOCYTES NFR BLD AUTO: 8 % (ref 4–12)
NEUTROPHILS # BLD AUTO: 5.49 THOUSANDS/ΜL (ref 1.85–7.62)
NEUTS SEG NFR BLD AUTO: 69 % (ref 43–75)
NRBC BLD AUTO-RTO: 0 /100 WBCS
PLATELET # BLD AUTO: 189 THOUSANDS/UL (ref 149–390)
PMV BLD AUTO: 10.9 FL (ref 8.9–12.7)
POTASSIUM SERPL-SCNC: 4 MMOL/L (ref 3.5–5.3)
RBC # BLD AUTO: 3.44 MILLION/UL (ref 3.81–5.12)
SODIUM SERPL-SCNC: 142 MMOL/L (ref 136–145)
WBC # BLD AUTO: 8.07 THOUSAND/UL (ref 4.31–10.16)

## 2020-10-23 PROCEDURE — 82948 REAGENT STRIP/BLOOD GLUCOSE: CPT

## 2020-10-23 PROCEDURE — 85025 COMPLETE CBC W/AUTO DIFF WBC: CPT | Performed by: PHYSICIAN ASSISTANT

## 2020-10-23 PROCEDURE — 99024 POSTOP FOLLOW-UP VISIT: CPT | Performed by: SURGERY

## 2020-10-23 PROCEDURE — C9113 INJ PANTOPRAZOLE SODIUM, VIA: HCPCS | Performed by: SURGERY

## 2020-10-23 PROCEDURE — 99232 SBSQ HOSP IP/OBS MODERATE 35: CPT | Performed by: ANESTHESIOLOGY

## 2020-10-23 PROCEDURE — 83735 ASSAY OF MAGNESIUM: CPT | Performed by: PHYSICIAN ASSISTANT

## 2020-10-23 PROCEDURE — 80048 BASIC METABOLIC PNL TOTAL CA: CPT | Performed by: PHYSICIAN ASSISTANT

## 2020-10-23 RX ORDER — LANOLIN ALCOHOL/MO/W.PET/CERES
3 CREAM (GRAM) TOPICAL
Status: DISCONTINUED | OUTPATIENT
Start: 2020-10-23 | End: 2020-10-26 | Stop reason: HOSPADM

## 2020-10-23 RX ORDER — GABAPENTIN 250 MG/5ML
300 SOLUTION ORAL 3 TIMES DAILY PRN
Status: DISCONTINUED | OUTPATIENT
Start: 2020-10-23 | End: 2020-10-26 | Stop reason: HOSPADM

## 2020-10-23 RX ORDER — GABAPENTIN 300 MG/1
300 CAPSULE ORAL 3 TIMES DAILY
Status: DISCONTINUED | OUTPATIENT
Start: 2020-10-23 | End: 2020-10-26 | Stop reason: HOSPADM

## 2020-10-23 RX ORDER — DIPHENHYDRAMINE HYDROCHLORIDE 50 MG/ML
25 INJECTION INTRAMUSCULAR; INTRAVENOUS ONCE
Status: COMPLETED | OUTPATIENT
Start: 2020-10-23 | End: 2020-10-23

## 2020-10-23 RX ORDER — GABAPENTIN 100 MG/1
100 CAPSULE ORAL 3 TIMES DAILY
Status: DISCONTINUED | OUTPATIENT
Start: 2020-10-23 | End: 2020-10-23

## 2020-10-23 RX ORDER — PANTOPRAZOLE SODIUM 40 MG/1
40 TABLET, DELAYED RELEASE ORAL
Status: DISCONTINUED | OUTPATIENT
Start: 2020-10-24 | End: 2020-10-26 | Stop reason: HOSPADM

## 2020-10-23 RX ORDER — HYDROXYZINE HYDROCHLORIDE 25 MG/1
25 TABLET, FILM COATED ORAL EVERY 6 HOURS PRN
Status: DISCONTINUED | OUTPATIENT
Start: 2020-10-23 | End: 2020-10-26 | Stop reason: HOSPADM

## 2020-10-23 RX ADMIN — FENTANYL CITRATE: 50 INJECTION INTRAMUSCULAR; INTRAVENOUS at 00:21

## 2020-10-23 RX ADMIN — HEPARIN SODIUM 5000 UNITS: 5000 INJECTION INTRAVENOUS; SUBCUTANEOUS at 06:10

## 2020-10-23 RX ADMIN — MELATONIN 3 MG: at 22:17

## 2020-10-23 RX ADMIN — DIPHENHYDRAMINE HYDROCHLORIDE 25 MG: 50 INJECTION, SOLUTION INTRAMUSCULAR; INTRAVENOUS at 09:43

## 2020-10-23 RX ADMIN — GABAPENTIN 300 MG: 300 CAPSULE ORAL at 16:22

## 2020-10-23 RX ADMIN — DEXTROSE, SODIUM CHLORIDE, AND POTASSIUM CHLORIDE 50 ML/HR: 5; .45; .15 INJECTION INTRAVENOUS at 20:15

## 2020-10-23 RX ADMIN — HYDROMORPHONE HYDROCHLORIDE 0.5 MG: 1 INJECTION, SOLUTION INTRAMUSCULAR; INTRAVENOUS; SUBCUTANEOUS at 03:45

## 2020-10-23 RX ADMIN — MIRTAZAPINE 15 MG: 15 TABLET, FILM COATED ORAL at 22:17

## 2020-10-23 RX ADMIN — CHLORHEXIDINE GLUCONATE 15 ML: 1.2 RINSE ORAL at 20:36

## 2020-10-23 RX ADMIN — DEXTROSE, SODIUM CHLORIDE, AND POTASSIUM CHLORIDE 84 ML/HR: 5; .45; .15 INJECTION INTRAVENOUS at 06:11

## 2020-10-23 RX ADMIN — GABAPENTIN 300 MG: 300 CAPSULE ORAL at 20:36

## 2020-10-23 RX ADMIN — ROPIVACAINE HYDROCHLORIDE: 5 INJECTION, SOLUTION EPIDURAL; INFILTRATION; PERINEURAL at 12:15

## 2020-10-23 RX ADMIN — ROPIVACAINE HYDROCHLORIDE: 5 INJECTION, SOLUTION EPIDURAL; INFILTRATION; PERINEURAL at 17:00

## 2020-10-23 RX ADMIN — DIPHENHYDRAMINE HYDROCHLORIDE 25 MG: 50 INJECTION, SOLUTION INTRAMUSCULAR; INTRAVENOUS at 15:50

## 2020-10-23 RX ADMIN — GABAPENTIN 300 MG: 250 SOLUTION ORAL at 20:36

## 2020-10-23 RX ADMIN — INSULIN LISPRO 2 UNITS: 100 INJECTION, SOLUTION INTRAVENOUS; SUBCUTANEOUS at 00:28

## 2020-10-23 RX ADMIN — HEPARIN SODIUM 5000 UNITS: 5000 INJECTION INTRAVENOUS; SUBCUTANEOUS at 22:17

## 2020-10-23 RX ADMIN — DIPHENHYDRAMINE HYDROCHLORIDE 25 MG: 50 INJECTION, SOLUTION INTRAMUSCULAR; INTRAVENOUS at 03:45

## 2020-10-23 RX ADMIN — CHLORHEXIDINE GLUCONATE 15 ML: 1.2 RINSE ORAL at 09:44

## 2020-10-23 RX ADMIN — DIPHENHYDRAMINE HYDROCHLORIDE 25 MG: 50 INJECTION, SOLUTION INTRAMUSCULAR; INTRAVENOUS at 18:12

## 2020-10-23 RX ADMIN — HEPARIN SODIUM 5000 UNITS: 5000 INJECTION INTRAVENOUS; SUBCUTANEOUS at 14:02

## 2020-10-23 RX ADMIN — HYDROXYZINE HYDROCHLORIDE 25 MG: 25 TABLET ORAL at 11:29

## 2020-10-23 RX ADMIN — PANTOPRAZOLE SODIUM 40 MG: 40 INJECTION, POWDER, FOR SOLUTION INTRAVENOUS at 09:44

## 2020-10-24 DIAGNOSIS — K52.9 ENTERITIS: ICD-10-CM

## 2020-10-24 DIAGNOSIS — R10.13 ACUTE EPIGASTRIC PAIN: ICD-10-CM

## 2020-10-24 LAB
AMYLASE FLD QL: 4 U/L
ANION GAP SERPL CALCULATED.3IONS-SCNC: 7 MMOL/L (ref 4–13)
BUN SERPL-MCNC: 6 MG/DL (ref 5–25)
CALCIUM SERPL-MCNC: 8.2 MG/DL (ref 8.3–10.1)
CHLORIDE SERPL-SCNC: 109 MMOL/L (ref 100–108)
CO2 SERPL-SCNC: 26 MMOL/L (ref 21–32)
CREAT SERPL-MCNC: 0.67 MG/DL (ref 0.6–1.3)
GFR SERPL CREATININE-BSD FRML MDRD: 91 ML/MIN/1.73SQ M
GLUCOSE SERPL-MCNC: 141 MG/DL (ref 65–140)
GLUCOSE SERPL-MCNC: 155 MG/DL (ref 65–140)
GLUCOSE SERPL-MCNC: 169 MG/DL (ref 65–140)
GLUCOSE SERPL-MCNC: 99 MG/DL (ref 65–140)
POTASSIUM SERPL-SCNC: 3.5 MMOL/L (ref 3.5–5.3)
SODIUM SERPL-SCNC: 142 MMOL/L (ref 136–145)

## 2020-10-24 PROCEDURE — 80048 BASIC METABOLIC PNL TOTAL CA: CPT | Performed by: PHYSICIAN ASSISTANT

## 2020-10-24 PROCEDURE — 99024 POSTOP FOLLOW-UP VISIT: CPT | Performed by: SURGERY

## 2020-10-24 PROCEDURE — 82948 REAGENT STRIP/BLOOD GLUCOSE: CPT

## 2020-10-24 PROCEDURE — 99232 SBSQ HOSP IP/OBS MODERATE 35: CPT | Performed by: ANESTHESIOLOGY

## 2020-10-24 PROCEDURE — 82150 ASSAY OF AMYLASE: CPT | Performed by: SURGERY

## 2020-10-24 RX ADMIN — CHLORHEXIDINE GLUCONATE 15 ML: 1.2 RINSE ORAL at 09:15

## 2020-10-24 RX ADMIN — HEPARIN SODIUM 5000 UNITS: 5000 INJECTION INTRAVENOUS; SUBCUTANEOUS at 05:09

## 2020-10-24 RX ADMIN — DIPHENHYDRAMINE HYDROCHLORIDE 25 MG: 50 INJECTION, SOLUTION INTRAMUSCULAR; INTRAVENOUS at 16:05

## 2020-10-24 RX ADMIN — MIRTAZAPINE 15 MG: 15 TABLET, FILM COATED ORAL at 21:29

## 2020-10-24 RX ADMIN — GABAPENTIN 300 MG: 300 CAPSULE ORAL at 09:15

## 2020-10-24 RX ADMIN — HEPARIN SODIUM 5000 UNITS: 5000 INJECTION INTRAVENOUS; SUBCUTANEOUS at 21:29

## 2020-10-24 RX ADMIN — PANTOPRAZOLE SODIUM 40 MG: 40 TABLET, DELAYED RELEASE ORAL at 05:09

## 2020-10-24 RX ADMIN — GABAPENTIN 300 MG: 300 CAPSULE ORAL at 16:05

## 2020-10-24 RX ADMIN — ROPIVACAINE HYDROCHLORIDE: 5 INJECTION, SOLUTION EPIDURAL; INFILTRATION; PERINEURAL at 12:28

## 2020-10-24 RX ADMIN — GABAPENTIN 300 MG: 300 CAPSULE ORAL at 21:29

## 2020-10-24 RX ADMIN — MELATONIN 3 MG: at 21:29

## 2020-10-24 RX ADMIN — HEPARIN SODIUM 5000 UNITS: 5000 INJECTION INTRAVENOUS; SUBCUTANEOUS at 13:13

## 2020-10-24 RX ADMIN — CHLORHEXIDINE GLUCONATE 15 ML: 1.2 RINSE ORAL at 21:29

## 2020-10-24 RX ADMIN — DIPHENHYDRAMINE HYDROCHLORIDE 25 MG: 50 INJECTION, SOLUTION INTRAMUSCULAR; INTRAVENOUS at 09:15

## 2020-10-25 LAB
ALBUMIN SERPL BCP-MCNC: 2.5 G/DL (ref 3.5–5)
ALP SERPL-CCNC: 114 U/L (ref 46–116)
ALT SERPL W P-5'-P-CCNC: 39 U/L (ref 12–78)
ANION GAP SERPL CALCULATED.3IONS-SCNC: 6 MMOL/L (ref 4–13)
AST SERPL W P-5'-P-CCNC: 22 U/L (ref 5–45)
BASOPHILS # BLD AUTO: 0.03 THOUSANDS/ΜL (ref 0–0.1)
BASOPHILS NFR BLD AUTO: 0 % (ref 0–1)
BILIRUB SERPL-MCNC: 0.45 MG/DL (ref 0.2–1)
BUN SERPL-MCNC: 10 MG/DL (ref 5–25)
CALCIUM ALBUM COR SERPL-MCNC: 9.9 MG/DL (ref 8.3–10.1)
CALCIUM SERPL-MCNC: 8.7 MG/DL (ref 8.3–10.1)
CHLORIDE SERPL-SCNC: 110 MMOL/L (ref 100–108)
CO2 SERPL-SCNC: 27 MMOL/L (ref 21–32)
CREAT SERPL-MCNC: 0.7 MG/DL (ref 0.6–1.3)
EOSINOPHIL # BLD AUTO: 0.82 THOUSAND/ΜL (ref 0–0.61)
EOSINOPHIL NFR BLD AUTO: 12 % (ref 0–6)
ERYTHROCYTE [DISTWIDTH] IN BLOOD BY AUTOMATED COUNT: 14.9 % (ref 11.6–15.1)
GFR SERPL CREATININE-BSD FRML MDRD: 90 ML/MIN/1.73SQ M
GLUCOSE SERPL-MCNC: 115 MG/DL (ref 65–140)
GLUCOSE SERPL-MCNC: 120 MG/DL (ref 65–140)
GLUCOSE SERPL-MCNC: 149 MG/DL (ref 65–140)
GLUCOSE SERPL-MCNC: 87 MG/DL (ref 65–140)
HCT VFR BLD AUTO: 32.6 % (ref 34.8–46.1)
HGB BLD-MCNC: 10.4 G/DL (ref 11.5–15.4)
IMM GRANULOCYTES # BLD AUTO: 0.05 THOUSAND/UL (ref 0–0.2)
IMM GRANULOCYTES NFR BLD AUTO: 1 % (ref 0–2)
LYMPHOCYTES # BLD AUTO: 2.1 THOUSANDS/ΜL (ref 0.6–4.47)
LYMPHOCYTES NFR BLD AUTO: 31 % (ref 14–44)
MCH RBC QN AUTO: 32.1 PG (ref 26.8–34.3)
MCHC RBC AUTO-ENTMCNC: 31.9 G/DL (ref 31.4–37.4)
MCV RBC AUTO: 101 FL (ref 82–98)
MONOCYTES # BLD AUTO: 0.69 THOUSAND/ΜL (ref 0.17–1.22)
MONOCYTES NFR BLD AUTO: 10 % (ref 4–12)
NEUTROPHILS # BLD AUTO: 3.08 THOUSANDS/ΜL (ref 1.85–7.62)
NEUTS SEG NFR BLD AUTO: 46 % (ref 43–75)
NRBC BLD AUTO-RTO: 0 /100 WBCS
PLATELET # BLD AUTO: 231 THOUSANDS/UL (ref 149–390)
PMV BLD AUTO: 10.1 FL (ref 8.9–12.7)
POTASSIUM SERPL-SCNC: 3.4 MMOL/L (ref 3.5–5.3)
PROT SERPL-MCNC: 5.8 G/DL (ref 6.4–8.2)
RBC # BLD AUTO: 3.24 MILLION/UL (ref 3.81–5.12)
SODIUM SERPL-SCNC: 143 MMOL/L (ref 136–145)
WBC # BLD AUTO: 6.77 THOUSAND/UL (ref 4.31–10.16)

## 2020-10-25 PROCEDURE — 99024 POSTOP FOLLOW-UP VISIT: CPT | Performed by: SURGERY

## 2020-10-25 PROCEDURE — 99232 SBSQ HOSP IP/OBS MODERATE 35: CPT | Performed by: ANESTHESIOLOGY

## 2020-10-25 PROCEDURE — 85025 COMPLETE CBC W/AUTO DIFF WBC: CPT | Performed by: SURGERY

## 2020-10-25 PROCEDURE — 80053 COMPREHEN METABOLIC PANEL: CPT | Performed by: SURGERY

## 2020-10-25 PROCEDURE — 82948 REAGENT STRIP/BLOOD GLUCOSE: CPT

## 2020-10-25 RX ORDER — POTASSIUM CHLORIDE 29.8 MG/ML
40 INJECTION INTRAVENOUS ONCE
Status: DISCONTINUED | OUTPATIENT
Start: 2020-10-25 | End: 2020-10-25

## 2020-10-25 RX ORDER — OXYCODONE HYDROCHLORIDE AND ACETAMINOPHEN 5; 325 MG/1; MG/1
2 TABLET ORAL EVERY 4 HOURS PRN
Status: DISCONTINUED | OUTPATIENT
Start: 2020-10-25 | End: 2020-10-26 | Stop reason: HOSPADM

## 2020-10-25 RX ORDER — POTASSIUM CHLORIDE 20 MEQ/1
40 TABLET, EXTENDED RELEASE ORAL ONCE
Status: COMPLETED | OUTPATIENT
Start: 2020-10-25 | End: 2020-10-25

## 2020-10-25 RX ORDER — ACETAMINOPHEN 325 MG/1
650 TABLET ORAL EVERY 6 HOURS PRN
Status: DISCONTINUED | OUTPATIENT
Start: 2020-10-25 | End: 2020-10-26 | Stop reason: HOSPADM

## 2020-10-25 RX ORDER — OXYCODONE HYDROCHLORIDE AND ACETAMINOPHEN 5; 325 MG/1; MG/1
1 TABLET ORAL EVERY 4 HOURS PRN
Status: DISCONTINUED | OUTPATIENT
Start: 2020-10-25 | End: 2020-10-26 | Stop reason: HOSPADM

## 2020-10-25 RX ADMIN — OXYCODONE HYDROCHLORIDE AND ACETAMINOPHEN 2 TABLET: 5; 325 TABLET ORAL at 12:44

## 2020-10-25 RX ADMIN — HEPARIN SODIUM 5000 UNITS: 5000 INJECTION INTRAVENOUS; SUBCUTANEOUS at 14:22

## 2020-10-25 RX ADMIN — OXYCODONE HYDROCHLORIDE AND ACETAMINOPHEN 2 TABLET: 5; 325 TABLET ORAL at 20:49

## 2020-10-25 RX ADMIN — PANTOPRAZOLE SODIUM 40 MG: 40 TABLET, DELAYED RELEASE ORAL at 05:19

## 2020-10-25 RX ADMIN — GABAPENTIN 300 MG: 300 CAPSULE ORAL at 16:40

## 2020-10-25 RX ADMIN — POTASSIUM CHLORIDE 40 MEQ: 1500 TABLET, EXTENDED RELEASE ORAL at 14:22

## 2020-10-25 RX ADMIN — OXYCODONE HYDROCHLORIDE AND ACETAMINOPHEN 1 TABLET: 5; 325 TABLET ORAL at 16:40

## 2020-10-25 RX ADMIN — MELATONIN 3 MG: at 21:01

## 2020-10-25 RX ADMIN — CHLORHEXIDINE GLUCONATE 15 ML: 1.2 RINSE ORAL at 08:35

## 2020-10-25 RX ADMIN — HYDROMORPHONE HYDROCHLORIDE 0.5 MG: 1 INJECTION, SOLUTION INTRAMUSCULAR; INTRAVENOUS; SUBCUTANEOUS at 17:55

## 2020-10-25 RX ADMIN — CHLORHEXIDINE GLUCONATE 15 ML: 1.2 RINSE ORAL at 20:48

## 2020-10-25 RX ADMIN — MIRTAZAPINE 15 MG: 15 TABLET, FILM COATED ORAL at 21:01

## 2020-10-25 RX ADMIN — GABAPENTIN 300 MG: 300 CAPSULE ORAL at 20:48

## 2020-10-25 RX ADMIN — GABAPENTIN 300 MG: 300 CAPSULE ORAL at 08:35

## 2020-10-25 RX ADMIN — HEPARIN SODIUM 5000 UNITS: 5000 INJECTION INTRAVENOUS; SUBCUTANEOUS at 21:01

## 2020-10-26 VITALS
SYSTOLIC BLOOD PRESSURE: 133 MMHG | HEART RATE: 81 BPM | HEIGHT: 59 IN | RESPIRATION RATE: 18 BRPM | DIASTOLIC BLOOD PRESSURE: 72 MMHG | TEMPERATURE: 98.6 F | OXYGEN SATURATION: 99 % | BODY MASS INDEX: 23.29 KG/M2 | WEIGHT: 115.52 LBS

## 2020-10-26 LAB
ANION GAP SERPL CALCULATED.3IONS-SCNC: 4 MMOL/L (ref 4–13)
BASOPHILS # BLD AUTO: 0.03 THOUSANDS/ΜL (ref 0–0.1)
BASOPHILS NFR BLD AUTO: 1 % (ref 0–1)
BUN SERPL-MCNC: 10 MG/DL (ref 5–25)
CALCIUM SERPL-MCNC: 9.1 MG/DL (ref 8.3–10.1)
CHLORIDE SERPL-SCNC: 110 MMOL/L (ref 100–108)
CO2 SERPL-SCNC: 28 MMOL/L (ref 21–32)
CREAT SERPL-MCNC: 0.79 MG/DL (ref 0.6–1.3)
EOSINOPHIL # BLD AUTO: 0.66 THOUSAND/ΜL (ref 0–0.61)
EOSINOPHIL NFR BLD AUTO: 12 % (ref 0–6)
ERYTHROCYTE [DISTWIDTH] IN BLOOD BY AUTOMATED COUNT: 14.7 % (ref 11.6–15.1)
GFR SERPL CREATININE-BSD FRML MDRD: 78 ML/MIN/1.73SQ M
GLUCOSE SERPL-MCNC: 103 MG/DL (ref 65–140)
GLUCOSE SERPL-MCNC: 160 MG/DL (ref 65–140)
GLUCOSE SERPL-MCNC: 98 MG/DL (ref 65–140)
HCT VFR BLD AUTO: 34.6 % (ref 34.8–46.1)
HGB BLD-MCNC: 11 G/DL (ref 11.5–15.4)
IMM GRANULOCYTES # BLD AUTO: 0.04 THOUSAND/UL (ref 0–0.2)
IMM GRANULOCYTES NFR BLD AUTO: 1 % (ref 0–2)
LYMPHOCYTES # BLD AUTO: 1.62 THOUSANDS/ΜL (ref 0.6–4.47)
LYMPHOCYTES NFR BLD AUTO: 28 % (ref 14–44)
MCH RBC QN AUTO: 32.2 PG (ref 26.8–34.3)
MCHC RBC AUTO-ENTMCNC: 31.8 G/DL (ref 31.4–37.4)
MCV RBC AUTO: 101 FL (ref 82–98)
MONOCYTES # BLD AUTO: 0.64 THOUSAND/ΜL (ref 0.17–1.22)
MONOCYTES NFR BLD AUTO: 11 % (ref 4–12)
NEUTROPHILS # BLD AUTO: 2.77 THOUSANDS/ΜL (ref 1.85–7.62)
NEUTS SEG NFR BLD AUTO: 47 % (ref 43–75)
NRBC BLD AUTO-RTO: 0 /100 WBCS
PLATELET # BLD AUTO: 259 THOUSANDS/UL (ref 149–390)
PMV BLD AUTO: 10.1 FL (ref 8.9–12.7)
POTASSIUM SERPL-SCNC: 3.7 MMOL/L (ref 3.5–5.3)
RBC # BLD AUTO: 3.42 MILLION/UL (ref 3.81–5.12)
SODIUM SERPL-SCNC: 142 MMOL/L (ref 136–145)
WBC # BLD AUTO: 5.76 THOUSAND/UL (ref 4.31–10.16)

## 2020-10-26 PROCEDURE — 85025 COMPLETE CBC W/AUTO DIFF WBC: CPT | Performed by: SURGERY

## 2020-10-26 PROCEDURE — 82948 REAGENT STRIP/BLOOD GLUCOSE: CPT

## 2020-10-26 PROCEDURE — 80048 BASIC METABOLIC PNL TOTAL CA: CPT | Performed by: SURGERY

## 2020-10-26 PROCEDURE — NC001 PR NO CHARGE: Performed by: SURGERY

## 2020-10-26 PROCEDURE — 99024 POSTOP FOLLOW-UP VISIT: CPT | Performed by: SURGERY

## 2020-10-26 RX ORDER — OXYCODONE HYDROCHLORIDE AND ACETAMINOPHEN 5; 325 MG/1; MG/1
1 TABLET ORAL EVERY 6 HOURS PRN
Qty: 20 TABLET | Refills: 0 | Status: SHIPPED | OUTPATIENT
Start: 2020-10-26 | End: 2020-11-05

## 2020-10-26 RX ORDER — OMEPRAZOLE 20 MG/1
CAPSULE, DELAYED RELEASE ORAL
Qty: 30 CAPSULE | Refills: 1 | Status: SHIPPED | OUTPATIENT
Start: 2020-10-26 | End: 2020-12-13

## 2020-10-26 RX ADMIN — OXYCODONE HYDROCHLORIDE AND ACETAMINOPHEN 2 TABLET: 5; 325 TABLET ORAL at 13:49

## 2020-10-26 RX ADMIN — PANTOPRAZOLE SODIUM 40 MG: 40 TABLET, DELAYED RELEASE ORAL at 05:13

## 2020-10-26 RX ADMIN — OXYCODONE HYDROCHLORIDE AND ACETAMINOPHEN 2 TABLET: 5; 325 TABLET ORAL at 09:18

## 2020-10-26 RX ADMIN — HEPARIN SODIUM 5000 UNITS: 5000 INJECTION INTRAVENOUS; SUBCUTANEOUS at 05:13

## 2020-10-26 RX ADMIN — CHLORHEXIDINE GLUCONATE 15 ML: 1.2 RINSE ORAL at 09:18

## 2020-10-26 RX ADMIN — OXYCODONE HYDROCHLORIDE AND ACETAMINOPHEN 2 TABLET: 5; 325 TABLET ORAL at 01:01

## 2020-10-26 RX ADMIN — OXYCODONE HYDROCHLORIDE AND ACETAMINOPHEN 2 TABLET: 5; 325 TABLET ORAL at 05:13

## 2020-10-26 RX ADMIN — HEPARIN SODIUM 5000 UNITS: 5000 INJECTION INTRAVENOUS; SUBCUTANEOUS at 13:50

## 2020-10-26 RX ADMIN — GABAPENTIN 300 MG: 300 CAPSULE ORAL at 09:17

## 2020-10-26 RX ADMIN — GABAPENTIN 300 MG: 300 CAPSULE ORAL at 16:01

## 2020-10-30 ENCOUNTER — HOSPITAL ENCOUNTER (INPATIENT)
Facility: HOSPITAL | Age: 65
LOS: 3 days | Discharge: HOME | DRG: 381 | End: 2020-11-02
Attending: SURGERY | Admitting: SURGERY
Payer: MEDICARE

## 2020-10-30 ENCOUNTER — APPOINTMENT (EMERGENCY)
Dept: CT IMAGING | Facility: HOSPITAL | Age: 65
End: 2020-10-30
Payer: MEDICARE

## 2020-10-30 ENCOUNTER — HOSPITAL ENCOUNTER (EMERGENCY)
Facility: HOSPITAL | Age: 65
End: 2020-10-30
Attending: EMERGENCY MEDICINE | Admitting: EMERGENCY MEDICINE
Payer: MEDICARE

## 2020-10-30 VITALS
DIASTOLIC BLOOD PRESSURE: 81 MMHG | HEART RATE: 88 BPM | SYSTOLIC BLOOD PRESSURE: 165 MMHG | RESPIRATION RATE: 20 BRPM | HEIGHT: 59 IN | WEIGHT: 110.89 LBS | BODY MASS INDEX: 22.36 KG/M2 | TEMPERATURE: 98.1 F | OXYGEN SATURATION: 97 %

## 2020-10-30 DIAGNOSIS — R10.12 LUQ PAIN: ICD-10-CM

## 2020-10-30 DIAGNOSIS — R11.2 NAUSEA AND VOMITING: ICD-10-CM

## 2020-10-30 DIAGNOSIS — C25.9 PANCREATIC ADENOCARCINOMA (HCC): Primary | Chronic | ICD-10-CM

## 2020-10-30 DIAGNOSIS — T81.9XXA POST-OPERATIVE COMPLICATION: ICD-10-CM

## 2020-10-30 DIAGNOSIS — K31.1 GASTRIC OUTLET OBSTRUCTION: Primary | ICD-10-CM

## 2020-10-30 LAB
ALBUMIN SERPL BCP-MCNC: 3.4 G/DL (ref 3.5–5)
ALP SERPL-CCNC: 109 U/L (ref 46–116)
ALT SERPL W P-5'-P-CCNC: 24 U/L (ref 12–78)
ANION GAP SERPL CALCULATED.3IONS-SCNC: 13 MMOL/L (ref 4–13)
AST SERPL W P-5'-P-CCNC: 27 U/L (ref 5–45)
BASOPHILS # BLD AUTO: 0.05 THOUSANDS/ΜL (ref 0–0.1)
BASOPHILS NFR BLD AUTO: 0 % (ref 0–1)
BILIRUB DIRECT SERPL-MCNC: 0.11 MG/DL (ref 0–0.2)
BILIRUB SERPL-MCNC: 0.4 MG/DL (ref 0.2–1)
BUN SERPL-MCNC: 12 MG/DL (ref 5–25)
CALCIUM SERPL-MCNC: 9.7 MG/DL (ref 8.3–10.1)
CHLORIDE SERPL-SCNC: 101 MMOL/L (ref 100–108)
CO2 SERPL-SCNC: 29 MMOL/L (ref 21–32)
CREAT SERPL-MCNC: 0.91 MG/DL (ref 0.6–1.3)
EOSINOPHIL # BLD AUTO: 0.04 THOUSAND/ΜL (ref 0–0.61)
EOSINOPHIL NFR BLD AUTO: 0 % (ref 0–6)
ERYTHROCYTE [DISTWIDTH] IN BLOOD BY AUTOMATED COUNT: 14.1 % (ref 11.6–15.1)
GFR SERPL CREATININE-BSD FRML MDRD: 65 ML/MIN/1.73SQ M
GLUCOSE SERPL-MCNC: 112 MG/DL (ref 65–140)
GLUCOSE SERPL-MCNC: 116 MG/DL (ref 65–140)
GLUCOSE SERPL-MCNC: 169 MG/DL (ref 65–140)
HCT VFR BLD AUTO: 41 % (ref 34.8–46.1)
HGB BLD-MCNC: 13.1 G/DL (ref 11.5–15.4)
IMM GRANULOCYTES # BLD AUTO: 0.09 THOUSAND/UL (ref 0–0.2)
IMM GRANULOCYTES NFR BLD AUTO: 1 % (ref 0–2)
LIPASE SERPL-CCNC: 18 U/L (ref 73–393)
LYMPHOCYTES # BLD AUTO: 1.45 THOUSANDS/ΜL (ref 0.6–4.47)
LYMPHOCYTES NFR BLD AUTO: 13 % (ref 14–44)
MCH RBC QN AUTO: 31.6 PG (ref 26.8–34.3)
MCHC RBC AUTO-ENTMCNC: 32 G/DL (ref 31.4–37.4)
MCV RBC AUTO: 99 FL (ref 82–98)
MONOCYTES # BLD AUTO: 0.52 THOUSAND/ΜL (ref 0.17–1.22)
MONOCYTES NFR BLD AUTO: 5 % (ref 4–12)
NEUTROPHILS # BLD AUTO: 9.4 THOUSANDS/ΜL (ref 1.85–7.62)
NEUTS SEG NFR BLD AUTO: 81 % (ref 43–75)
NRBC BLD AUTO-RTO: 0 /100 WBCS
PLATELET # BLD AUTO: 579 THOUSANDS/UL (ref 149–390)
PMV BLD AUTO: 9.7 FL (ref 8.9–12.7)
POTASSIUM SERPL-SCNC: 4.3 MMOL/L (ref 3.5–5.3)
PROT SERPL-MCNC: 7.3 G/DL (ref 6.4–8.2)
RBC # BLD AUTO: 4.14 MILLION/UL (ref 3.81–5.12)
SODIUM SERPL-SCNC: 143 MMOL/L (ref 136–145)
WBC # BLD AUTO: 11.55 THOUSAND/UL (ref 4.31–10.16)

## 2020-10-30 PROCEDURE — 80076 HEPATIC FUNCTION PANEL: CPT | Performed by: EMERGENCY MEDICINE

## 2020-10-30 PROCEDURE — 96375 TX/PRO/DX INJ NEW DRUG ADDON: CPT

## 2020-10-30 PROCEDURE — 74177 CT ABD & PELVIS W/CONTRAST: CPT

## 2020-10-30 PROCEDURE — 85025 COMPLETE CBC W/AUTO DIFF WBC: CPT | Performed by: EMERGENCY MEDICINE

## 2020-10-30 PROCEDURE — 82948 REAGENT STRIP/BLOOD GLUCOSE: CPT

## 2020-10-30 PROCEDURE — 36415 COLL VENOUS BLD VENIPUNCTURE: CPT | Performed by: EMERGENCY MEDICINE

## 2020-10-30 PROCEDURE — 80048 BASIC METABOLIC PNL TOTAL CA: CPT | Performed by: EMERGENCY MEDICINE

## 2020-10-30 PROCEDURE — 96361 HYDRATE IV INFUSION ADD-ON: CPT

## 2020-10-30 PROCEDURE — 83690 ASSAY OF LIPASE: CPT | Performed by: EMERGENCY MEDICINE

## 2020-10-30 PROCEDURE — 99285 EMERGENCY DEPT VISIT HI MDM: CPT

## 2020-10-30 PROCEDURE — 99285 EMERGENCY DEPT VISIT HI MDM: CPT | Performed by: EMERGENCY MEDICINE

## 2020-10-30 PROCEDURE — G1004 CDSM NDSC: HCPCS

## 2020-10-30 PROCEDURE — 96374 THER/PROPH/DIAG INJ IV PUSH: CPT

## 2020-10-30 RX ORDER — SODIUM CHLORIDE, SODIUM LACTATE, POTASSIUM CHLORIDE, CALCIUM CHLORIDE 600; 310; 30; 20 MG/100ML; MG/100ML; MG/100ML; MG/100ML
125 INJECTION, SOLUTION INTRAVENOUS CONTINUOUS
Status: DISCONTINUED | OUTPATIENT
Start: 2020-10-30 | End: 2020-10-31

## 2020-10-30 RX ORDER — HEPARIN SODIUM 5000 [USP'U]/ML
5000 INJECTION, SOLUTION INTRAVENOUS; SUBCUTANEOUS EVERY 8 HOURS SCHEDULED
Status: DISCONTINUED | OUTPATIENT
Start: 2020-10-30 | End: 2020-11-02 | Stop reason: HOSPADM

## 2020-10-30 RX ORDER — PANTOPRAZOLE SODIUM 40 MG/1
40 INJECTION, POWDER, FOR SOLUTION INTRAVENOUS
Status: DISCONTINUED | OUTPATIENT
Start: 2020-10-31 | End: 2020-11-01

## 2020-10-30 RX ORDER — LIDOCAINE HYDROCHLORIDE 20 MG/ML
15 SOLUTION OROPHARYNGEAL ONCE
Status: DISCONTINUED | OUTPATIENT
Start: 2020-10-30 | End: 2020-10-30 | Stop reason: HOSPADM

## 2020-10-30 RX ORDER — HYDROMORPHONE HCL/PF 1 MG/ML
0.5 SYRINGE (ML) INJECTION EVERY 4 HOURS PRN
Status: DISCONTINUED | OUTPATIENT
Start: 2020-10-30 | End: 2020-11-02 | Stop reason: HOSPADM

## 2020-10-30 RX ORDER — ONDANSETRON 2 MG/ML
4 INJECTION INTRAMUSCULAR; INTRAVENOUS EVERY 6 HOURS PRN
Status: DISCONTINUED | OUTPATIENT
Start: 2020-10-30 | End: 2020-11-02 | Stop reason: HOSPADM

## 2020-10-30 RX ORDER — ONDANSETRON 2 MG/ML
4 INJECTION INTRAMUSCULAR; INTRAVENOUS ONCE
Status: COMPLETED | OUTPATIENT
Start: 2020-10-30 | End: 2020-10-30

## 2020-10-30 RX ADMIN — HEPARIN SODIUM 5000 UNITS: 5000 INJECTION INTRAVENOUS; SUBCUTANEOUS at 18:08

## 2020-10-30 RX ADMIN — HYDROMORPHONE HYDROCHLORIDE 0.5 MG: 1 INJECTION, SOLUTION INTRAMUSCULAR; INTRAVENOUS; SUBCUTANEOUS at 20:57

## 2020-10-30 RX ADMIN — IOHEXOL 100 ML: 350 INJECTION, SOLUTION INTRAVENOUS at 11:05

## 2020-10-30 RX ADMIN — SODIUM CHLORIDE 1000 ML: 0.9 INJECTION, SOLUTION INTRAVENOUS at 10:45

## 2020-10-30 RX ADMIN — ONDANSETRON 4 MG: 2 INJECTION INTRAMUSCULAR; INTRAVENOUS at 10:57

## 2020-10-30 RX ADMIN — MORPHINE SULFATE 2 MG: 2 INJECTION, SOLUTION INTRAMUSCULAR; INTRAVENOUS at 10:58

## 2020-10-30 RX ADMIN — SODIUM CHLORIDE, SODIUM LACTATE, POTASSIUM CHLORIDE, AND CALCIUM CHLORIDE 125 ML/HR: .6; .31; .03; .02 INJECTION, SOLUTION INTRAVENOUS at 20:58

## 2020-10-30 RX ADMIN — HEPARIN SODIUM 5000 UNITS: 5000 INJECTION INTRAVENOUS; SUBCUTANEOUS at 22:05

## 2020-10-31 LAB
ANION GAP SERPL CALCULATED.3IONS-SCNC: 6 MMOL/L (ref 4–13)
BASOPHILS # BLD AUTO: 0.03 THOUSANDS/ΜL (ref 0–0.1)
BASOPHILS NFR BLD AUTO: 0 % (ref 0–1)
BUN SERPL-MCNC: 13 MG/DL (ref 5–25)
CALCIUM SERPL-MCNC: 8.9 MG/DL (ref 8.3–10.1)
CHLORIDE SERPL-SCNC: 104 MMOL/L (ref 100–108)
CO2 SERPL-SCNC: 34 MMOL/L (ref 21–32)
CREAT SERPL-MCNC: 0.66 MG/DL (ref 0.6–1.3)
EOSINOPHIL # BLD AUTO: 0.44 THOUSAND/ΜL (ref 0–0.61)
EOSINOPHIL NFR BLD AUTO: 4 % (ref 0–6)
ERYTHROCYTE [DISTWIDTH] IN BLOOD BY AUTOMATED COUNT: 14.1 % (ref 11.6–15.1)
GFR SERPL CREATININE-BSD FRML MDRD: 92 ML/MIN/1.73SQ M
GLUCOSE SERPL-MCNC: 123 MG/DL (ref 65–140)
GLUCOSE SERPL-MCNC: 127 MG/DL (ref 65–140)
GLUCOSE SERPL-MCNC: 127 MG/DL (ref 65–140)
HCT VFR BLD AUTO: 35.5 % (ref 34.8–46.1)
HGB BLD-MCNC: 11 G/DL (ref 11.5–15.4)
IMM GRANULOCYTES # BLD AUTO: 0.06 THOUSAND/UL (ref 0–0.2)
IMM GRANULOCYTES NFR BLD AUTO: 1 % (ref 0–2)
LYMPHOCYTES # BLD AUTO: 2.12 THOUSANDS/ΜL (ref 0.6–4.47)
LYMPHOCYTES NFR BLD AUTO: 20 % (ref 14–44)
MAGNESIUM SERPL-MCNC: 1.7 MG/DL (ref 1.6–2.6)
MCH RBC QN AUTO: 31.4 PG (ref 26.8–34.3)
MCHC RBC AUTO-ENTMCNC: 31 G/DL (ref 31.4–37.4)
MCV RBC AUTO: 101 FL (ref 82–98)
MONOCYTES # BLD AUTO: 0.57 THOUSAND/ΜL (ref 0.17–1.22)
MONOCYTES NFR BLD AUTO: 6 % (ref 4–12)
NEUTROPHILS # BLD AUTO: 7.22 THOUSANDS/ΜL (ref 1.85–7.62)
NEUTS SEG NFR BLD AUTO: 69 % (ref 43–75)
NRBC BLD AUTO-RTO: 0 /100 WBCS
PHOSPHATE SERPL-MCNC: 4.8 MG/DL (ref 2.3–4.1)
PLATELET # BLD AUTO: 480 THOUSANDS/UL (ref 149–390)
PMV BLD AUTO: 9.5 FL (ref 8.9–12.7)
POTASSIUM SERPL-SCNC: 3.1 MMOL/L (ref 3.5–5.3)
RBC # BLD AUTO: 3.5 MILLION/UL (ref 3.81–5.12)
SODIUM SERPL-SCNC: 144 MMOL/L (ref 136–145)
WBC # BLD AUTO: 10.44 THOUSAND/UL (ref 4.31–10.16)

## 2020-10-31 PROCEDURE — 84100 ASSAY OF PHOSPHORUS: CPT | Performed by: PHYSICIAN ASSISTANT

## 2020-10-31 PROCEDURE — 85025 COMPLETE CBC W/AUTO DIFF WBC: CPT | Performed by: PHYSICIAN ASSISTANT

## 2020-10-31 PROCEDURE — 82948 REAGENT STRIP/BLOOD GLUCOSE: CPT

## 2020-10-31 PROCEDURE — 83735 ASSAY OF MAGNESIUM: CPT | Performed by: PHYSICIAN ASSISTANT

## 2020-10-31 PROCEDURE — 80048 BASIC METABOLIC PNL TOTAL CA: CPT | Performed by: PHYSICIAN ASSISTANT

## 2020-10-31 PROCEDURE — C9113 INJ PANTOPRAZOLE SODIUM, VIA: HCPCS | Performed by: PHYSICIAN ASSISTANT

## 2020-10-31 RX ORDER — METOCLOPRAMIDE HYDROCHLORIDE 5 MG/ML
10 INJECTION INTRAMUSCULAR; INTRAVENOUS EVERY 6 HOURS SCHEDULED
Status: DISCONTINUED | OUTPATIENT
Start: 2020-10-31 | End: 2020-11-02 | Stop reason: HOSPADM

## 2020-10-31 RX ORDER — DIPHENHYDRAMINE HYDROCHLORIDE 50 MG/ML
25 INJECTION INTRAMUSCULAR; INTRAVENOUS ONCE
Status: COMPLETED | OUTPATIENT
Start: 2020-10-31 | End: 2020-10-31

## 2020-10-31 RX ORDER — DEXTROSE AND SODIUM CHLORIDE 5; .9 G/100ML; G/100ML
84 INJECTION, SOLUTION INTRAVENOUS CONTINUOUS
Status: DISCONTINUED | OUTPATIENT
Start: 2020-10-31 | End: 2020-11-01

## 2020-10-31 RX ORDER — POTASSIUM CHLORIDE 14.9 MG/ML
20 INJECTION INTRAVENOUS EVERY 8 HOURS
Status: COMPLETED | OUTPATIENT
Start: 2020-10-31 | End: 2020-11-01

## 2020-10-31 RX ORDER — MAGNESIUM SULFATE HEPTAHYDRATE 40 MG/ML
2 INJECTION, SOLUTION INTRAVENOUS ONCE
Status: COMPLETED | OUTPATIENT
Start: 2020-10-31 | End: 2020-11-01

## 2020-10-31 RX ADMIN — HEPARIN SODIUM 5000 UNITS: 5000 INJECTION INTRAVENOUS; SUBCUTANEOUS at 14:19

## 2020-10-31 RX ADMIN — POTASSIUM CHLORIDE 20 MEQ: 14.9 INJECTION, SOLUTION INTRAVENOUS at 13:11

## 2020-10-31 RX ADMIN — METOCLOPRAMIDE 10 MG: 5 INJECTION, SOLUTION INTRAMUSCULAR; INTRAVENOUS at 11:51

## 2020-10-31 RX ADMIN — DIPHENHYDRAMINE HYDROCHLORIDE 25 MG: 50 INJECTION INTRAMUSCULAR; INTRAVENOUS at 19:36

## 2020-10-31 RX ADMIN — MAGNESIUM SULFATE IN WATER 2 G: 40 INJECTION, SOLUTION INTRAVENOUS at 11:55

## 2020-10-31 RX ADMIN — INSULIN LISPRO 1 UNITS: 100 INJECTION, SOLUTION INTRAVENOUS; SUBCUTANEOUS at 18:04

## 2020-10-31 RX ADMIN — HYDROMORPHONE HYDROCHLORIDE 0.5 MG: 1 INJECTION, SOLUTION INTRAMUSCULAR; INTRAVENOUS; SUBCUTANEOUS at 00:57

## 2020-10-31 RX ADMIN — HEPARIN SODIUM 5000 UNITS: 5000 INJECTION INTRAVENOUS; SUBCUTANEOUS at 21:31

## 2020-10-31 RX ADMIN — DEXTROSE AND SODIUM CHLORIDE 100 ML/HR: 5; .9 INJECTION, SOLUTION INTRAVENOUS at 23:13

## 2020-10-31 RX ADMIN — METOCLOPRAMIDE 10 MG: 5 INJECTION, SOLUTION INTRAMUSCULAR; INTRAVENOUS at 18:05

## 2020-10-31 RX ADMIN — PANTOPRAZOLE SODIUM 40 MG: 40 INJECTION, POWDER, FOR SOLUTION INTRAVENOUS at 08:28

## 2020-10-31 RX ADMIN — HEPARIN SODIUM 5000 UNITS: 5000 INJECTION INTRAVENOUS; SUBCUTANEOUS at 05:04

## 2020-10-31 RX ADMIN — DEXTROSE AND SODIUM CHLORIDE 100 ML/HR: 5; .9 INJECTION, SOLUTION INTRAVENOUS at 13:11

## 2020-10-31 RX ADMIN — METOCLOPRAMIDE 10 MG: 5 INJECTION, SOLUTION INTRAMUSCULAR; INTRAVENOUS at 08:31

## 2020-10-31 RX ADMIN — HYDROMORPHONE HYDROCHLORIDE 0.5 MG: 1 INJECTION, SOLUTION INTRAMUSCULAR; INTRAVENOUS; SUBCUTANEOUS at 05:04

## 2020-10-31 RX ADMIN — POTASSIUM CHLORIDE 20 MEQ: 14.9 INJECTION, SOLUTION INTRAVENOUS at 18:45

## 2020-10-31 RX ADMIN — SODIUM CHLORIDE, SODIUM LACTATE, POTASSIUM CHLORIDE, AND CALCIUM CHLORIDE 125 ML/HR: .6; .31; .03; .02 INJECTION, SOLUTION INTRAVENOUS at 05:10

## 2020-11-01 LAB
ANION GAP SERPL CALCULATED.3IONS-SCNC: 5 MMOL/L (ref 4–13)
BASOPHILS # BLD AUTO: 0.03 THOUSANDS/ΜL (ref 0–0.1)
BASOPHILS NFR BLD AUTO: 0 % (ref 0–1)
BUN SERPL-MCNC: 7 MG/DL (ref 5–25)
CALCIUM SERPL-MCNC: 8.5 MG/DL (ref 8.3–10.1)
CHLORIDE SERPL-SCNC: 108 MMOL/L (ref 100–108)
CO2 SERPL-SCNC: 30 MMOL/L (ref 21–32)
CREAT SERPL-MCNC: 0.66 MG/DL (ref 0.6–1.3)
EOSINOPHIL # BLD AUTO: 0.12 THOUSAND/ΜL (ref 0–0.61)
EOSINOPHIL NFR BLD AUTO: 1 % (ref 0–6)
ERYTHROCYTE [DISTWIDTH] IN BLOOD BY AUTOMATED COUNT: 13.8 % (ref 11.6–15.1)
GFR SERPL CREATININE-BSD FRML MDRD: 92 ML/MIN/1.73SQ M
GLUCOSE SERPL-MCNC: 119 MG/DL (ref 65–140)
GLUCOSE SERPL-MCNC: 127 MG/DL (ref 65–140)
GLUCOSE SERPL-MCNC: 156 MG/DL (ref 65–140)
GLUCOSE SERPL-MCNC: 164 MG/DL (ref 65–140)
GLUCOSE SERPL-MCNC: 165 MG/DL (ref 65–140)
GLUCOSE SERPL-MCNC: 170 MG/DL (ref 65–140)
GLUCOSE SERPL-MCNC: 176 MG/DL (ref 65–140)
HCT VFR BLD AUTO: 34.2 % (ref 34.8–46.1)
HGB BLD-MCNC: 10.7 G/DL (ref 11.5–15.4)
IMM GRANULOCYTES # BLD AUTO: 0.04 THOUSAND/UL (ref 0–0.2)
IMM GRANULOCYTES NFR BLD AUTO: 0 % (ref 0–2)
LYMPHOCYTES # BLD AUTO: 1.32 THOUSANDS/ΜL (ref 0.6–4.47)
LYMPHOCYTES NFR BLD AUTO: 14 % (ref 14–44)
MAGNESIUM SERPL-MCNC: 1.8 MG/DL (ref 1.6–2.6)
MCH RBC QN AUTO: 31.6 PG (ref 26.8–34.3)
MCHC RBC AUTO-ENTMCNC: 31.3 G/DL (ref 31.4–37.4)
MCV RBC AUTO: 101 FL (ref 82–98)
MONOCYTES # BLD AUTO: 0.55 THOUSAND/ΜL (ref 0.17–1.22)
MONOCYTES NFR BLD AUTO: 6 % (ref 4–12)
NEUTROPHILS # BLD AUTO: 7.57 THOUSANDS/ΜL (ref 1.85–7.62)
NEUTS SEG NFR BLD AUTO: 79 % (ref 43–75)
NRBC BLD AUTO-RTO: 0 /100 WBCS
PHOSPHATE SERPL-MCNC: 2.8 MG/DL (ref 2.3–4.1)
PLATELET # BLD AUTO: 497 THOUSANDS/UL (ref 149–390)
PMV BLD AUTO: 10 FL (ref 8.9–12.7)
POTASSIUM SERPL-SCNC: 3.2 MMOL/L (ref 3.5–5.3)
RBC # BLD AUTO: 3.39 MILLION/UL (ref 3.81–5.12)
SODIUM SERPL-SCNC: 143 MMOL/L (ref 136–145)
WBC # BLD AUTO: 9.63 THOUSAND/UL (ref 4.31–10.16)

## 2020-11-01 PROCEDURE — 80048 BASIC METABOLIC PNL TOTAL CA: CPT | Performed by: SURGERY

## 2020-11-01 PROCEDURE — 83735 ASSAY OF MAGNESIUM: CPT | Performed by: SURGERY

## 2020-11-01 PROCEDURE — 84100 ASSAY OF PHOSPHORUS: CPT | Performed by: SURGERY

## 2020-11-01 PROCEDURE — C9113 INJ PANTOPRAZOLE SODIUM, VIA: HCPCS | Performed by: PHYSICIAN ASSISTANT

## 2020-11-01 PROCEDURE — 85025 COMPLETE CBC W/AUTO DIFF WBC: CPT | Performed by: SURGERY

## 2020-11-01 PROCEDURE — 82948 REAGENT STRIP/BLOOD GLUCOSE: CPT

## 2020-11-01 RX ORDER — POTASSIUM CHLORIDE 14.9 MG/ML
20 INJECTION INTRAVENOUS
Status: COMPLETED | OUTPATIENT
Start: 2020-11-01 | End: 2020-11-01

## 2020-11-01 RX ORDER — PANTOPRAZOLE SODIUM 40 MG/1
40 TABLET, DELAYED RELEASE ORAL
Status: DISCONTINUED | OUTPATIENT
Start: 2020-11-01 | End: 2020-11-02 | Stop reason: HOSPADM

## 2020-11-01 RX ORDER — DIPHENHYDRAMINE HYDROCHLORIDE 50 MG/ML
25 INJECTION INTRAMUSCULAR; INTRAVENOUS EVERY 6 HOURS PRN
Status: DISCONTINUED | OUTPATIENT
Start: 2020-11-01 | End: 2020-11-02 | Stop reason: HOSPADM

## 2020-11-01 RX ORDER — GABAPENTIN 300 MG/1
300 CAPSULE ORAL 3 TIMES DAILY
Status: DISCONTINUED | OUTPATIENT
Start: 2020-11-01 | End: 2020-11-02 | Stop reason: HOSPADM

## 2020-11-01 RX ORDER — MAGNESIUM SULFATE HEPTAHYDRATE 40 MG/ML
2 INJECTION, SOLUTION INTRAVENOUS ONCE
Status: COMPLETED | OUTPATIENT
Start: 2020-11-01 | End: 2020-11-01

## 2020-11-01 RX ORDER — MIRTAZAPINE 15 MG/1
15 TABLET, FILM COATED ORAL
Status: DISCONTINUED | OUTPATIENT
Start: 2020-11-01 | End: 2020-11-02 | Stop reason: HOSPADM

## 2020-11-01 RX ORDER — AMOXICILLIN 250 MG
1 CAPSULE ORAL
Status: DISCONTINUED | OUTPATIENT
Start: 2020-11-01 | End: 2020-11-02 | Stop reason: HOSPADM

## 2020-11-01 RX ADMIN — INSULIN LISPRO 1 UNITS: 100 INJECTION, SOLUTION INTRAVENOUS; SUBCUTANEOUS at 00:36

## 2020-11-01 RX ADMIN — POTASSIUM CHLORIDE 20 MEQ: 14.9 INJECTION, SOLUTION INTRAVENOUS at 10:33

## 2020-11-01 RX ADMIN — MAGNESIUM SULFATE IN WATER 2 G: 40 INJECTION, SOLUTION INTRAVENOUS at 08:28

## 2020-11-01 RX ADMIN — HEPARIN SODIUM 5000 UNITS: 5000 INJECTION INTRAVENOUS; SUBCUTANEOUS at 21:44

## 2020-11-01 RX ADMIN — PANTOPRAZOLE SODIUM 40 MG: 40 INJECTION, POWDER, FOR SOLUTION INTRAVENOUS at 08:23

## 2020-11-01 RX ADMIN — HEPARIN SODIUM 5000 UNITS: 5000 INJECTION INTRAVENOUS; SUBCUTANEOUS at 14:30

## 2020-11-01 RX ADMIN — POTASSIUM CHLORIDE 20 MEQ: 14.9 INJECTION, SOLUTION INTRAVENOUS at 03:09

## 2020-11-01 RX ADMIN — DIPHENHYDRAMINE HYDROCHLORIDE 25 MG: 50 INJECTION, SOLUTION INTRAMUSCULAR; INTRAVENOUS at 10:54

## 2020-11-01 RX ADMIN — DOCUSATE SODIUM AND SENNOSIDES 1 TABLET: 8.6; 5 TABLET ORAL at 21:43

## 2020-11-01 RX ADMIN — PANTOPRAZOLE SODIUM 40 MG: 40 TABLET, DELAYED RELEASE ORAL at 12:45

## 2020-11-01 RX ADMIN — DEXTROSE AND SODIUM CHLORIDE 100 ML/HR: 5; .9 INJECTION, SOLUTION INTRAVENOUS at 10:53

## 2020-11-01 RX ADMIN — HEPARIN SODIUM 5000 UNITS: 5000 INJECTION INTRAVENOUS; SUBCUTANEOUS at 05:53

## 2020-11-01 RX ADMIN — INSULIN LISPRO 1 UNITS: 100 INJECTION, SOLUTION INTRAVENOUS; SUBCUTANEOUS at 12:35

## 2020-11-01 RX ADMIN — MIRTAZAPINE 15 MG: 15 TABLET, FILM COATED ORAL at 21:44

## 2020-11-01 RX ADMIN — DIPHENHYDRAMINE HYDROCHLORIDE 25 MG: 50 INJECTION, SOLUTION INTRAMUSCULAR; INTRAVENOUS at 03:09

## 2020-11-01 RX ADMIN — GABAPENTIN 300 MG: 300 CAPSULE ORAL at 21:44

## 2020-11-01 RX ADMIN — METOCLOPRAMIDE 10 MG: 5 INJECTION, SOLUTION INTRAMUSCULAR; INTRAVENOUS at 18:13

## 2020-11-01 RX ADMIN — POTASSIUM CHLORIDE 20 MEQ: 14.9 INJECTION, SOLUTION INTRAVENOUS at 14:28

## 2020-11-01 RX ADMIN — INSULIN LISPRO 1 UNITS: 100 INJECTION, SOLUTION INTRAVENOUS; SUBCUTANEOUS at 05:54

## 2020-11-01 RX ADMIN — METOCLOPRAMIDE 10 MG: 5 INJECTION, SOLUTION INTRAMUSCULAR; INTRAVENOUS at 00:36

## 2020-11-01 RX ADMIN — GABAPENTIN 300 MG: 300 CAPSULE ORAL at 16:07

## 2020-11-01 RX ADMIN — POTASSIUM CHLORIDE 20 MEQ: 14.9 INJECTION, SOLUTION INTRAVENOUS at 12:32

## 2020-11-01 RX ADMIN — METOCLOPRAMIDE 10 MG: 5 INJECTION, SOLUTION INTRAMUSCULAR; INTRAVENOUS at 12:34

## 2020-11-02 ENCOUNTER — TELEPHONE (OUTPATIENT)
Dept: PALLIATIVE MEDICINE | Facility: CLINIC | Age: 65
End: 2020-11-02

## 2020-11-02 VITALS
WEIGHT: 110 LBS | HEIGHT: 59 IN | HEART RATE: 72 BPM | TEMPERATURE: 98.5 F | DIASTOLIC BLOOD PRESSURE: 92 MMHG | RESPIRATION RATE: 16 BRPM | BODY MASS INDEX: 22.18 KG/M2 | OXYGEN SATURATION: 98 % | SYSTOLIC BLOOD PRESSURE: 144 MMHG

## 2020-11-02 DIAGNOSIS — F41.9 ANXIETY: ICD-10-CM

## 2020-11-02 DIAGNOSIS — C25.9 PANCREATIC ADENOCARCINOMA (HCC): Chronic | ICD-10-CM

## 2020-11-02 PROBLEM — K31.1 GASTRIC OUTLET OBSTRUCTION: Status: RESOLVED | Noted: 2020-10-30 | Resolved: 2020-11-02

## 2020-11-02 LAB
ANION GAP SERPL CALCULATED.3IONS-SCNC: 9 MMOL/L (ref 4–13)
BUN SERPL-MCNC: 4 MG/DL (ref 5–25)
CALCIUM SERPL-MCNC: 8.7 MG/DL (ref 8.3–10.1)
CHLORIDE SERPL-SCNC: 109 MMOL/L (ref 100–108)
CO2 SERPL-SCNC: 26 MMOL/L (ref 21–32)
CREAT SERPL-MCNC: 0.68 MG/DL (ref 0.6–1.3)
ERYTHROCYTE [DISTWIDTH] IN BLOOD BY AUTOMATED COUNT: 13.9 % (ref 11.6–15.1)
GFR SERPL CREATININE-BSD FRML MDRD: 91 ML/MIN/1.73SQ M
GLUCOSE SERPL-MCNC: 117 MG/DL (ref 65–140)
GLUCOSE SERPL-MCNC: 119 MG/DL (ref 65–140)
GLUCOSE SERPL-MCNC: 143 MG/DL (ref 65–140)
HCT VFR BLD AUTO: 35.8 % (ref 34.8–46.1)
HGB BLD-MCNC: 11.1 G/DL (ref 11.5–15.4)
MCH RBC QN AUTO: 31.4 PG (ref 26.8–34.3)
MCHC RBC AUTO-ENTMCNC: 31 G/DL (ref 31.4–37.4)
MCV RBC AUTO: 101 FL (ref 82–98)
PLATELET # BLD AUTO: 508 THOUSANDS/UL (ref 149–390)
PMV BLD AUTO: 10 FL (ref 8.9–12.7)
POTASSIUM SERPL-SCNC: 3.6 MMOL/L (ref 3.5–5.3)
RBC # BLD AUTO: 3.53 MILLION/UL (ref 3.81–5.12)
SODIUM SERPL-SCNC: 144 MMOL/L (ref 136–145)
WBC # BLD AUTO: 8.51 THOUSAND/UL (ref 4.31–10.16)

## 2020-11-02 PROCEDURE — 99024 POSTOP FOLLOW-UP VISIT: CPT | Performed by: SURGERY

## 2020-11-02 PROCEDURE — 82948 REAGENT STRIP/BLOOD GLUCOSE: CPT

## 2020-11-02 PROCEDURE — NC001 PR NO CHARGE: Performed by: SURGERY

## 2020-11-02 PROCEDURE — 85027 COMPLETE CBC AUTOMATED: CPT | Performed by: SURGERY

## 2020-11-02 PROCEDURE — 80048 BASIC METABOLIC PNL TOTAL CA: CPT | Performed by: SURGERY

## 2020-11-02 RX ORDER — MIRTAZAPINE 15 MG/1
15 TABLET, FILM COATED ORAL
Qty: 30 TABLET | Refills: 3 | Status: SHIPPED | OUTPATIENT
Start: 2020-11-02 | End: 2021-01-07 | Stop reason: SDUPTHER

## 2020-11-02 RX ORDER — POTASSIUM CHLORIDE 20 MEQ/1
20 TABLET, EXTENDED RELEASE ORAL ONCE
Status: COMPLETED | OUTPATIENT
Start: 2020-11-02 | End: 2020-11-02

## 2020-11-02 RX ADMIN — PANTOPRAZOLE SODIUM 40 MG: 40 TABLET, DELAYED RELEASE ORAL at 05:21

## 2020-11-02 RX ADMIN — METFORMIN HYDROCHLORIDE 500 MG: 500 TABLET ORAL at 09:54

## 2020-11-02 RX ADMIN — METOCLOPRAMIDE 10 MG: 5 INJECTION, SOLUTION INTRAMUSCULAR; INTRAVENOUS at 05:21

## 2020-11-02 RX ADMIN — METOCLOPRAMIDE 10 MG: 5 INJECTION, SOLUTION INTRAMUSCULAR; INTRAVENOUS at 00:13

## 2020-11-02 RX ADMIN — POTASSIUM CHLORIDE 20 MEQ: 1500 TABLET, EXTENDED RELEASE ORAL at 09:54

## 2020-11-02 RX ADMIN — PANCRELIPASE 12000 UNITS: 30000; 6000; 19000 CAPSULE, DELAYED RELEASE PELLETS ORAL at 08:03

## 2020-11-02 RX ADMIN — HEPARIN SODIUM 5000 UNITS: 5000 INJECTION INTRAVENOUS; SUBCUTANEOUS at 05:21

## 2020-11-02 RX ADMIN — GABAPENTIN 300 MG: 300 CAPSULE ORAL at 08:03

## 2020-11-13 ENCOUNTER — TELEPHONE (OUTPATIENT)
Dept: SURGICAL ONCOLOGY | Facility: CLINIC | Age: 65
End: 2020-11-13

## 2020-11-16 ENCOUNTER — TELEPHONE (OUTPATIENT)
Dept: HEMATOLOGY ONCOLOGY | Facility: CLINIC | Age: 65
End: 2020-11-16

## 2020-11-16 ENCOUNTER — OFFICE VISIT (OUTPATIENT)
Dept: SURGICAL ONCOLOGY | Facility: CLINIC | Age: 65
End: 2020-11-16

## 2020-11-16 ENCOUNTER — PATIENT OUTREACH (OUTPATIENT)
Dept: HEMATOLOGY ONCOLOGY | Facility: CLINIC | Age: 65
End: 2020-11-16

## 2020-11-16 VITALS
HEIGHT: 59 IN | BODY MASS INDEX: 21.37 KG/M2 | TEMPERATURE: 97.8 F | HEART RATE: 94 BPM | RESPIRATION RATE: 16 BRPM | WEIGHT: 106 LBS | SYSTOLIC BLOOD PRESSURE: 140 MMHG | DIASTOLIC BLOOD PRESSURE: 84 MMHG

## 2020-11-16 DIAGNOSIS — F41.9 ANXIETY: Primary | ICD-10-CM

## 2020-11-16 DIAGNOSIS — C25.9 PANCREATIC ADENOCARCINOMA (HCC): Primary | Chronic | ICD-10-CM

## 2020-11-16 PROCEDURE — 99024 POSTOP FOLLOW-UP VISIT: CPT | Performed by: SURGERY

## 2020-11-16 RX ORDER — LORAZEPAM 0.5 MG/1
0.5 TABLET ORAL 2 TIMES DAILY PRN
Qty: 30 TABLET | Refills: 0 | Status: SHIPPED | OUTPATIENT
Start: 2020-11-16

## 2020-11-17 DIAGNOSIS — R10.13 ACUTE EPIGASTRIC PAIN: ICD-10-CM

## 2020-11-17 DIAGNOSIS — K52.9 ENTERITIS: ICD-10-CM

## 2020-11-17 RX ORDER — OMEPRAZOLE 20 MG/1
CAPSULE, DELAYED RELEASE ORAL
Qty: 30 CAPSULE | Refills: 1 | OUTPATIENT
Start: 2020-11-17

## 2020-11-23 ENCOUNTER — RADIATION ONCOLOGY CONSULT (OUTPATIENT)
Dept: RADIATION ONCOLOGY | Facility: CLINIC | Age: 65
End: 2020-11-23
Attending: RADIOLOGY
Payer: MEDICARE

## 2020-11-23 ENCOUNTER — TELEPHONE (OUTPATIENT)
Dept: NUTRITION | Facility: CLINIC | Age: 65
End: 2020-11-23

## 2020-11-23 VITALS
HEART RATE: 85 BPM | TEMPERATURE: 97.2 F | RESPIRATION RATE: 16 BRPM | HEIGHT: 59 IN | SYSTOLIC BLOOD PRESSURE: 162 MMHG | BODY MASS INDEX: 21.57 KG/M2 | WEIGHT: 107 LBS | DIASTOLIC BLOOD PRESSURE: 90 MMHG

## 2020-11-23 DIAGNOSIS — C25.9 PANCREATIC ADENOCARCINOMA (HCC): Chronic | ICD-10-CM

## 2020-11-23 DIAGNOSIS — C25.9 PANCREATIC ADENOCARCINOMA (HCC): Primary | ICD-10-CM

## 2020-11-23 PROCEDURE — 99211 OFF/OP EST MAY X REQ PHY/QHP: CPT | Performed by: RADIOLOGY

## 2020-11-25 ENCOUNTER — HOSPITAL ENCOUNTER (OUTPATIENT)
Dept: INFUSION CENTER | Facility: CLINIC | Age: 65
Discharge: HOME | End: 2020-11-25
Payer: MEDICARE

## 2020-11-25 ENCOUNTER — TRANSCRIBE ORDERS (OUTPATIENT)
Dept: ADMINISTRATIVE | Facility: HOSPITAL | Age: 65
End: 2020-11-25

## 2020-11-25 ENCOUNTER — LAB (OUTPATIENT)
Dept: LAB | Facility: HOSPITAL | Age: 65
End: 2020-11-25
Payer: MEDICARE

## 2020-11-25 DIAGNOSIS — C25.0 MALIGNANT NEOPLASM OF HEAD OF PANCREAS (HCC): ICD-10-CM

## 2020-11-25 DIAGNOSIS — Z95.828 PORT-A-CATH IN PLACE: Primary | ICD-10-CM

## 2020-11-25 DIAGNOSIS — C25.0 MALIGNANT NEOPLASM OF HEAD OF PANCREAS (HCC): Primary | ICD-10-CM

## 2020-11-25 LAB
ALBUMIN SERPL BCP-MCNC: 3.8 G/DL (ref 3.5–5)
ALP SERPL-CCNC: 87 U/L (ref 46–116)
ALT SERPL W P-5'-P-CCNC: 39 U/L (ref 12–78)
ANION GAP SERPL CALCULATED.3IONS-SCNC: 8 MMOL/L (ref 4–13)
AST SERPL W P-5'-P-CCNC: 28 U/L (ref 5–45)
BASOPHILS # BLD AUTO: 0.03 THOUSANDS/ΜL (ref 0–0.1)
BASOPHILS NFR BLD AUTO: 1 % (ref 0–1)
BILIRUB SERPL-MCNC: 0.4 MG/DL (ref 0.2–1)
BUN SERPL-MCNC: 15 MG/DL (ref 5–25)
CALCIUM SERPL-MCNC: 9.8 MG/DL (ref 8.3–10.1)
CHLORIDE SERPL-SCNC: 104 MMOL/L (ref 100–108)
CHOLEST SERPL-MCNC: 165 MG/DL (ref 50–200)
CO2 SERPL-SCNC: 31 MMOL/L (ref 21–32)
CREAT SERPL-MCNC: 0.86 MG/DL (ref 0.6–1.3)
EOSINOPHIL # BLD AUTO: 0.21 THOUSAND/ΜL (ref 0–0.61)
EOSINOPHIL NFR BLD AUTO: 3 % (ref 0–6)
ERYTHROCYTE [DISTWIDTH] IN BLOOD BY AUTOMATED COUNT: 12.6 % (ref 11.6–15.1)
EST. AVERAGE GLUCOSE BLD GHB EST-MCNC: 126 MG/DL
GFR SERPL CREATININE-BSD FRML MDRD: 70 ML/MIN/1.73SQ M
GLUCOSE P FAST SERPL-MCNC: 144 MG/DL (ref 65–99)
HBA1C MFR BLD: 6 %
HCT VFR BLD AUTO: 44.6 % (ref 34.8–46.1)
HDLC SERPL-MCNC: 31 MG/DL
HGB BLD-MCNC: 14.4 G/DL (ref 11.5–15.4)
IMM GRANULOCYTES # BLD AUTO: 0.01 THOUSAND/UL (ref 0–0.2)
IMM GRANULOCYTES NFR BLD AUTO: 0 % (ref 0–2)
LDLC SERPL CALC-MCNC: 97 MG/DL (ref 0–100)
LYMPHOCYTES # BLD AUTO: 1.81 THOUSANDS/ΜL (ref 0.6–4.47)
LYMPHOCYTES NFR BLD AUTO: 28 % (ref 14–44)
MCH RBC QN AUTO: 31.4 PG (ref 26.8–34.3)
MCHC RBC AUTO-ENTMCNC: 32.3 G/DL (ref 31.4–37.4)
MCV RBC AUTO: 97 FL (ref 82–98)
MONOCYTES # BLD AUTO: 0.48 THOUSAND/ΜL (ref 0.17–1.22)
MONOCYTES NFR BLD AUTO: 7 % (ref 4–12)
NEUTROPHILS # BLD AUTO: 3.91 THOUSANDS/ΜL (ref 1.85–7.62)
NEUTS SEG NFR BLD AUTO: 61 % (ref 43–75)
NONHDLC SERPL-MCNC: 134 MG/DL
NRBC BLD AUTO-RTO: 0 /100 WBCS
PLATELET # BLD AUTO: 214 THOUSANDS/UL (ref 149–390)
PMV BLD AUTO: 10.7 FL (ref 8.9–12.7)
POTASSIUM SERPL-SCNC: 4.4 MMOL/L (ref 3.5–5.3)
PROT SERPL-MCNC: 7.3 G/DL (ref 6.4–8.2)
RBC # BLD AUTO: 4.58 MILLION/UL (ref 3.81–5.12)
SODIUM SERPL-SCNC: 143 MMOL/L (ref 136–145)
TRIGL SERPL-MCNC: 187 MG/DL
TSH SERPL DL<=0.05 MIU/L-ACNC: 2.05 UIU/ML (ref 0.36–3.74)
URATE SERPL-MCNC: 6.5 MG/DL (ref 2–6.8)
WBC # BLD AUTO: 6.45 THOUSAND/UL (ref 4.31–10.16)

## 2020-11-25 PROCEDURE — 80061 LIPID PANEL: CPT

## 2020-11-25 PROCEDURE — 85025 COMPLETE CBC W/AUTO DIFF WBC: CPT

## 2020-11-25 PROCEDURE — 80053 COMPREHEN METABOLIC PANEL: CPT

## 2020-11-25 PROCEDURE — 83036 HEMOGLOBIN GLYCOSYLATED A1C: CPT

## 2020-11-25 PROCEDURE — 96523 IRRIG DRUG DELIVERY DEVICE: CPT

## 2020-11-25 PROCEDURE — 84443 ASSAY THYROID STIM HORMONE: CPT

## 2020-11-25 PROCEDURE — 36415 COLL VENOUS BLD VENIPUNCTURE: CPT

## 2020-11-25 PROCEDURE — 84550 ASSAY OF BLOOD/URIC ACID: CPT

## 2020-11-27 ENCOUNTER — TELEPHONE (OUTPATIENT)
Dept: HEMATOLOGY ONCOLOGY | Facility: HOSPITAL | Age: 65
End: 2020-11-27

## 2020-11-30 ENCOUNTER — TELEMEDICINE (OUTPATIENT)
Dept: HEMATOLOGY ONCOLOGY | Facility: HOSPITAL | Age: 65
End: 2020-11-30
Payer: MEDICARE

## 2020-11-30 DIAGNOSIS — C25.9 PANCREATIC ADENOCARCINOMA (HCC): Primary | Chronic | ICD-10-CM

## 2020-11-30 PROCEDURE — 99214 OFFICE O/P EST MOD 30 MIN: CPT | Performed by: INTERNAL MEDICINE

## 2020-12-02 DIAGNOSIS — C25.9 PANCREATIC ADENOCARCINOMA (HCC): Primary | ICD-10-CM

## 2020-12-04 ENCOUNTER — RADIATION THERAPY TREATMENT (OUTPATIENT)
Dept: RADIATION ONCOLOGY | Facility: CLINIC | Age: 65
End: 2020-12-04
Attending: RADIOLOGY
Payer: MEDICARE

## 2020-12-04 ENCOUNTER — NUTRITION (OUTPATIENT)
Dept: NUTRITION | Facility: CLINIC | Age: 65
End: 2020-12-04

## 2020-12-04 DIAGNOSIS — Z71.3 NUTRITIONAL COUNSELING: Primary | ICD-10-CM

## 2020-12-04 PROCEDURE — 77334 RADIATION TREATMENT AID(S): CPT | Performed by: RADIOLOGY

## 2020-12-04 PROCEDURE — 77470 SPECIAL RADIATION TREATMENT: CPT | Performed by: RADIOLOGY

## 2020-12-13 DIAGNOSIS — R10.13 ACUTE EPIGASTRIC PAIN: ICD-10-CM

## 2020-12-13 DIAGNOSIS — K52.9 ENTERITIS: ICD-10-CM

## 2020-12-13 RX ORDER — OMEPRAZOLE 20 MG/1
CAPSULE, DELAYED RELEASE ORAL
Qty: 30 CAPSULE | Refills: 1 | Status: SHIPPED | OUTPATIENT
Start: 2020-12-13 | End: 2020-12-29

## 2020-12-15 DIAGNOSIS — C25.9 PANCREATIC ADENOCARCINOMA (HCC): Primary | ICD-10-CM

## 2020-12-17 ENCOUNTER — TELEPHONE (OUTPATIENT)
Dept: HEMATOLOGY ONCOLOGY | Facility: CLINIC | Age: 65
End: 2020-12-17

## 2020-12-18 PROCEDURE — 77338 DESIGN MLC DEVICE FOR IMRT: CPT | Performed by: RADIOLOGY

## 2020-12-18 PROCEDURE — 77300 RADIATION THERAPY DOSE PLAN: CPT | Performed by: RADIOLOGY

## 2020-12-18 PROCEDURE — 77301 RADIOTHERAPY DOSE PLAN IMRT: CPT | Performed by: RADIOLOGY

## 2020-12-19 ENCOUNTER — LAB (OUTPATIENT)
Dept: LAB | Facility: HOSPITAL | Age: 65
End: 2020-12-19
Attending: INTERNAL MEDICINE
Payer: MEDICARE

## 2020-12-19 DIAGNOSIS — C25.9 PANCREATIC ADENOCARCINOMA (HCC): Chronic | ICD-10-CM

## 2020-12-19 LAB
ALBUMIN SERPL BCP-MCNC: 3.8 G/DL (ref 3.5–5)
ALP SERPL-CCNC: 88 U/L (ref 46–116)
ALT SERPL W P-5'-P-CCNC: 43 U/L (ref 12–78)
ANION GAP SERPL CALCULATED.3IONS-SCNC: 9 MMOL/L (ref 4–13)
AST SERPL W P-5'-P-CCNC: 27 U/L (ref 5–45)
BASOPHILS # BLD AUTO: 0.02 THOUSANDS/ΜL (ref 0–0.1)
BASOPHILS NFR BLD AUTO: 0 % (ref 0–1)
BILIRUB SERPL-MCNC: 0.4 MG/DL (ref 0.2–1)
BUN SERPL-MCNC: 15 MG/DL (ref 5–25)
CALCIUM SERPL-MCNC: 9.7 MG/DL (ref 8.3–10.1)
CHLORIDE SERPL-SCNC: 107 MMOL/L (ref 100–108)
CO2 SERPL-SCNC: 28 MMOL/L (ref 21–32)
CREAT SERPL-MCNC: 0.99 MG/DL (ref 0.6–1.3)
EOSINOPHIL # BLD AUTO: 0.18 THOUSAND/ΜL (ref 0–0.61)
EOSINOPHIL NFR BLD AUTO: 3 % (ref 0–6)
ERYTHROCYTE [DISTWIDTH] IN BLOOD BY AUTOMATED COUNT: 12.1 % (ref 11.6–15.1)
GFR SERPL CREATININE-BSD FRML MDRD: 59 ML/MIN/1.73SQ M
GLUCOSE P FAST SERPL-MCNC: 191 MG/DL (ref 65–99)
HCT VFR BLD AUTO: 45.3 % (ref 34.8–46.1)
HGB BLD-MCNC: 14.7 G/DL (ref 11.5–15.4)
IMM GRANULOCYTES # BLD AUTO: 0.02 THOUSAND/UL (ref 0–0.2)
IMM GRANULOCYTES NFR BLD AUTO: 0 % (ref 0–2)
LYMPHOCYTES # BLD AUTO: 1.7 THOUSANDS/ΜL (ref 0.6–4.47)
LYMPHOCYTES NFR BLD AUTO: 29 % (ref 14–44)
MCH RBC QN AUTO: 30.9 PG (ref 26.8–34.3)
MCHC RBC AUTO-ENTMCNC: 32.5 G/DL (ref 31.4–37.4)
MCV RBC AUTO: 95 FL (ref 82–98)
MONOCYTES # BLD AUTO: 0.54 THOUSAND/ΜL (ref 0.17–1.22)
MONOCYTES NFR BLD AUTO: 9 % (ref 4–12)
NEUTROPHILS # BLD AUTO: 3.36 THOUSANDS/ΜL (ref 1.85–7.62)
NEUTS SEG NFR BLD AUTO: 59 % (ref 43–75)
NRBC BLD AUTO-RTO: 0 /100 WBCS
PLATELET # BLD AUTO: 214 THOUSANDS/UL (ref 149–390)
PMV BLD AUTO: 10.6 FL (ref 8.9–12.7)
POTASSIUM SERPL-SCNC: 4.2 MMOL/L (ref 3.5–5.3)
PROT SERPL-MCNC: 7 G/DL (ref 6.4–8.2)
RBC # BLD AUTO: 4.76 MILLION/UL (ref 3.81–5.12)
SODIUM SERPL-SCNC: 144 MMOL/L (ref 136–145)
WBC # BLD AUTO: 5.82 THOUSAND/UL (ref 4.31–10.16)

## 2020-12-19 PROCEDURE — 80053 COMPREHEN METABOLIC PANEL: CPT

## 2020-12-19 PROCEDURE — 85025 COMPLETE CBC W/AUTO DIFF WBC: CPT

## 2020-12-19 PROCEDURE — 36415 COLL VENOUS BLD VENIPUNCTURE: CPT

## 2020-12-21 ENCOUNTER — APPOINTMENT (OUTPATIENT)
Dept: RADIATION ONCOLOGY | Facility: CLINIC | Age: 65
End: 2020-12-21
Attending: RADIOLOGY
Payer: MEDICARE

## 2020-12-21 ENCOUNTER — NUTRITION (OUTPATIENT)
Dept: NUTRITION | Facility: CLINIC | Age: 65
End: 2020-12-21

## 2020-12-21 ENCOUNTER — HOSPITAL ENCOUNTER (OUTPATIENT)
Dept: INFUSION CENTER | Facility: CLINIC | Age: 65
Discharge: HOME | End: 2020-12-21
Payer: MEDICARE

## 2020-12-21 VITALS
RESPIRATION RATE: 18 BRPM | TEMPERATURE: 97.2 F | BODY MASS INDEX: 22.4 KG/M2 | WEIGHT: 111.11 LBS | HEIGHT: 59 IN | DIASTOLIC BLOOD PRESSURE: 82 MMHG | HEART RATE: 72 BPM | SYSTOLIC BLOOD PRESSURE: 162 MMHG

## 2020-12-21 DIAGNOSIS — Z71.3 NUTRITIONAL COUNSELING: Primary | ICD-10-CM

## 2020-12-21 DIAGNOSIS — C25.9 PANCREATIC ADENOCARCINOMA (HCC): Primary | Chronic | ICD-10-CM

## 2020-12-21 DIAGNOSIS — C25.9 PANCREATIC ADENOCARCINOMA (HCC): Primary | ICD-10-CM

## 2020-12-21 PROCEDURE — G0498 CHEMO EXTEND IV INFUS W/PUMP: HCPCS

## 2020-12-21 PROCEDURE — 77386 HB NTSTY MODUL RAD TX DLVR CPLX: CPT | Performed by: RADIOLOGY

## 2020-12-22 ENCOUNTER — APPOINTMENT (OUTPATIENT)
Dept: RADIATION ONCOLOGY | Facility: CLINIC | Age: 65
End: 2020-12-22
Attending: RADIOLOGY
Payer: MEDICARE

## 2020-12-22 DIAGNOSIS — C25.9 PANCREATIC ADENOCARCINOMA (HCC): Primary | ICD-10-CM

## 2020-12-22 PROCEDURE — 77386 HB NTSTY MODUL RAD TX DLVR CPLX: CPT | Performed by: RADIOLOGY

## 2020-12-23 ENCOUNTER — APPOINTMENT (OUTPATIENT)
Dept: RADIATION ONCOLOGY | Facility: CLINIC | Age: 65
End: 2020-12-23
Attending: RADIOLOGY
Payer: MEDICARE

## 2020-12-23 PROCEDURE — 77386 HB NTSTY MODUL RAD TX DLVR CPLX: CPT | Performed by: RADIOLOGY

## 2020-12-24 ENCOUNTER — APPOINTMENT (OUTPATIENT)
Dept: RADIATION ONCOLOGY | Facility: CLINIC | Age: 65
End: 2020-12-24
Attending: RADIOLOGY
Payer: MEDICARE

## 2020-12-24 PROCEDURE — 77386 HB NTSTY MODUL RAD TX DLVR CPLX: CPT | Performed by: RADIOLOGY

## 2020-12-26 ENCOUNTER — HOSPITAL ENCOUNTER (OUTPATIENT)
Dept: INFUSION CENTER | Facility: CLINIC | Age: 65
Discharge: HOME | End: 2020-12-26
Payer: MEDICARE

## 2020-12-26 VITALS — TEMPERATURE: 97 F

## 2020-12-26 DIAGNOSIS — C25.9 PANCREATIC ADENOCARCINOMA (HCC): Primary | ICD-10-CM

## 2020-12-26 LAB
ALBUMIN SERPL BCP-MCNC: 3.6 G/DL (ref 3.5–5)
ALP SERPL-CCNC: 87 U/L (ref 46–116)
ALT SERPL W P-5'-P-CCNC: 32 U/L (ref 12–78)
ANION GAP SERPL CALCULATED.3IONS-SCNC: 9 MMOL/L (ref 4–13)
AST SERPL W P-5'-P-CCNC: 21 U/L (ref 5–45)
BASOPHILS # BLD AUTO: 0.04 THOUSANDS/ΜL (ref 0–0.1)
BASOPHILS NFR BLD AUTO: 1 % (ref 0–1)
BILIRUB SERPL-MCNC: 0.26 MG/DL (ref 0.2–1)
BUN SERPL-MCNC: 11 MG/DL (ref 5–25)
CALCIUM SERPL-MCNC: 9 MG/DL (ref 8.3–10.1)
CHLORIDE SERPL-SCNC: 103 MMOL/L (ref 100–108)
CO2 SERPL-SCNC: 27 MMOL/L (ref 21–32)
CREAT SERPL-MCNC: 0.87 MG/DL (ref 0.6–1.3)
EOSINOPHIL # BLD AUTO: 0.16 THOUSAND/ΜL (ref 0–0.61)
EOSINOPHIL NFR BLD AUTO: 2 % (ref 0–6)
ERYTHROCYTE [DISTWIDTH] IN BLOOD BY AUTOMATED COUNT: 11.9 % (ref 11.6–15.1)
GFR SERPL CREATININE-BSD FRML MDRD: 69 ML/MIN/1.73SQ M
GLUCOSE SERPL-MCNC: 148 MG/DL (ref 65–140)
HCT VFR BLD AUTO: 40.2 % (ref 34.8–46.1)
HGB BLD-MCNC: 13.5 G/DL (ref 11.5–15.4)
IMM GRANULOCYTES # BLD AUTO: 0.02 THOUSAND/UL (ref 0–0.2)
IMM GRANULOCYTES NFR BLD AUTO: 0 % (ref 0–2)
LYMPHOCYTES # BLD AUTO: 1.2 THOUSANDS/ΜL (ref 0.6–4.47)
LYMPHOCYTES NFR BLD AUTO: 15 % (ref 14–44)
MCH RBC QN AUTO: 30.9 PG (ref 26.8–34.3)
MCHC RBC AUTO-ENTMCNC: 33.6 G/DL (ref 31.4–37.4)
MCV RBC AUTO: 92 FL (ref 82–98)
MONOCYTES # BLD AUTO: 0.77 THOUSAND/ΜL (ref 0.17–1.22)
MONOCYTES NFR BLD AUTO: 10 % (ref 4–12)
NEUTROPHILS # BLD AUTO: 5.71 THOUSANDS/ΜL (ref 1.85–7.62)
NEUTS SEG NFR BLD AUTO: 72 % (ref 43–75)
NRBC BLD AUTO-RTO: 0 /100 WBCS
PLATELET # BLD AUTO: 220 THOUSANDS/UL (ref 149–390)
PMV BLD AUTO: 10.4 FL (ref 8.9–12.7)
POTASSIUM SERPL-SCNC: 3.9 MMOL/L (ref 3.5–5.3)
PROT SERPL-MCNC: 6.7 G/DL (ref 6.4–8.2)
RBC # BLD AUTO: 4.37 MILLION/UL (ref 3.81–5.12)
SODIUM SERPL-SCNC: 139 MMOL/L (ref 136–145)
WBC # BLD AUTO: 7.9 THOUSAND/UL (ref 4.31–10.16)

## 2020-12-26 PROCEDURE — 80053 COMPREHEN METABOLIC PANEL: CPT | Performed by: INTERNAL MEDICINE

## 2020-12-26 PROCEDURE — 85025 COMPLETE CBC W/AUTO DIFF WBC: CPT | Performed by: INTERNAL MEDICINE

## 2020-12-28 ENCOUNTER — TELEPHONE (OUTPATIENT)
Dept: HEMATOLOGY ONCOLOGY | Facility: MEDICAL CENTER | Age: 65
End: 2020-12-28

## 2020-12-28 ENCOUNTER — HOSPITAL ENCOUNTER (OUTPATIENT)
Dept: INFUSION CENTER | Facility: CLINIC | Age: 65
Discharge: HOME | End: 2020-12-28
Payer: MEDICARE

## 2020-12-28 ENCOUNTER — APPOINTMENT (OUTPATIENT)
Dept: RADIATION ONCOLOGY | Facility: CLINIC | Age: 65
End: 2020-12-28
Attending: RADIOLOGY
Payer: MEDICARE

## 2020-12-28 VITALS
WEIGHT: 109.35 LBS | DIASTOLIC BLOOD PRESSURE: 82 MMHG | HEIGHT: 59 IN | TEMPERATURE: 97 F | SYSTOLIC BLOOD PRESSURE: 158 MMHG | HEART RATE: 87 BPM | RESPIRATION RATE: 18 BRPM | BODY MASS INDEX: 22.04 KG/M2

## 2020-12-28 DIAGNOSIS — R10.13 ACUTE EPIGASTRIC PAIN: ICD-10-CM

## 2020-12-28 DIAGNOSIS — K52.9 ENTERITIS: ICD-10-CM

## 2020-12-28 DIAGNOSIS — C25.9 PANCREATIC ADENOCARCINOMA (HCC): Primary | ICD-10-CM

## 2020-12-28 PROCEDURE — 77336 RADIATION PHYSICS CONSULT: CPT | Performed by: RADIOLOGY

## 2020-12-28 PROCEDURE — 77386 HB NTSTY MODUL RAD TX DLVR CPLX: CPT | Performed by: RADIOLOGY

## 2020-12-28 PROCEDURE — G0498 CHEMO EXTEND IV INFUS W/PUMP: HCPCS

## 2020-12-29 ENCOUNTER — APPOINTMENT (OUTPATIENT)
Dept: RADIATION ONCOLOGY | Facility: CLINIC | Age: 65
End: 2020-12-29
Attending: RADIOLOGY
Payer: MEDICARE

## 2020-12-29 PROCEDURE — 77386 HB NTSTY MODUL RAD TX DLVR CPLX: CPT | Performed by: RADIOLOGY

## 2020-12-29 RX ORDER — OMEPRAZOLE 20 MG/1
CAPSULE, DELAYED RELEASE ORAL
Qty: 90 CAPSULE | Refills: 3 | Status: SHIPPED | OUTPATIENT
Start: 2020-12-29 | End: 2021-06-22 | Stop reason: SDUPTHER

## 2020-12-30 ENCOUNTER — APPOINTMENT (OUTPATIENT)
Dept: RADIATION ONCOLOGY | Facility: CLINIC | Age: 65
End: 2020-12-30
Attending: RADIOLOGY
Payer: MEDICARE

## 2020-12-30 PROCEDURE — 77386 HB NTSTY MODUL RAD TX DLVR CPLX: CPT | Performed by: RADIOLOGY

## 2020-12-31 ENCOUNTER — APPOINTMENT (OUTPATIENT)
Dept: RADIATION ONCOLOGY | Facility: CLINIC | Age: 65
End: 2020-12-31
Attending: RADIOLOGY
Payer: MEDICARE

## 2020-12-31 PROCEDURE — 77386 HB NTSTY MODUL RAD TX DLVR CPLX: CPT | Performed by: RADIOLOGY

## 2021-01-02 ENCOUNTER — HOSPITAL ENCOUNTER (OUTPATIENT)
Dept: INFUSION CENTER | Facility: CLINIC | Age: 66
Discharge: HOME | End: 2021-01-02
Payer: MEDICARE

## 2021-01-02 VITALS — TEMPERATURE: 96.3 F

## 2021-01-02 DIAGNOSIS — C25.9 PANCREATIC ADENOCARCINOMA (HCC): Primary | ICD-10-CM

## 2021-01-02 LAB
ALBUMIN SERPL BCP-MCNC: 3.7 G/DL (ref 3.5–5)
ALP SERPL-CCNC: 90 U/L (ref 46–116)
ALT SERPL W P-5'-P-CCNC: 40 U/L (ref 12–78)
ANION GAP SERPL CALCULATED.3IONS-SCNC: 9 MMOL/L (ref 4–13)
AST SERPL W P-5'-P-CCNC: 26 U/L (ref 5–45)
BASOPHILS # BLD AUTO: 0.04 THOUSANDS/ΜL (ref 0–0.1)
BASOPHILS NFR BLD AUTO: 1 % (ref 0–1)
BILIRUB SERPL-MCNC: 0.26 MG/DL (ref 0.2–1)
BUN SERPL-MCNC: 13 MG/DL (ref 5–25)
CALCIUM SERPL-MCNC: 9 MG/DL (ref 8.3–10.1)
CHLORIDE SERPL-SCNC: 103 MMOL/L (ref 100–108)
CO2 SERPL-SCNC: 26 MMOL/L (ref 21–32)
CREAT SERPL-MCNC: 0.9 MG/DL (ref 0.6–1.3)
EOSINOPHIL # BLD AUTO: 0.48 THOUSAND/ΜL (ref 0–0.61)
EOSINOPHIL NFR BLD AUTO: 7 % (ref 0–6)
ERYTHROCYTE [DISTWIDTH] IN BLOOD BY AUTOMATED COUNT: 12.4 % (ref 11.6–15.1)
GFR SERPL CREATININE-BSD FRML MDRD: 66 ML/MIN/1.73SQ M
GLUCOSE SERPL-MCNC: 143 MG/DL (ref 65–140)
HCT VFR BLD AUTO: 41.6 % (ref 34.8–46.1)
HGB BLD-MCNC: 13.7 G/DL (ref 11.5–15.4)
IMM GRANULOCYTES # BLD AUTO: 0.01 THOUSAND/UL (ref 0–0.2)
IMM GRANULOCYTES NFR BLD AUTO: 0 % (ref 0–2)
LYMPHOCYTES # BLD AUTO: 1 THOUSANDS/ΜL (ref 0.6–4.47)
LYMPHOCYTES NFR BLD AUTO: 15 % (ref 14–44)
MCH RBC QN AUTO: 30.4 PG (ref 26.8–34.3)
MCHC RBC AUTO-ENTMCNC: 32.9 G/DL (ref 31.4–37.4)
MCV RBC AUTO: 92 FL (ref 82–98)
MONOCYTES # BLD AUTO: 0.74 THOUSAND/ΜL (ref 0.17–1.22)
MONOCYTES NFR BLD AUTO: 11 % (ref 4–12)
NEUTROPHILS # BLD AUTO: 4.37 THOUSANDS/ΜL (ref 1.85–7.62)
NEUTS SEG NFR BLD AUTO: 66 % (ref 43–75)
NRBC BLD AUTO-RTO: 0 /100 WBCS
PLATELET # BLD AUTO: 275 THOUSANDS/UL (ref 149–390)
PMV BLD AUTO: 10.3 FL (ref 8.9–12.7)
POTASSIUM SERPL-SCNC: 3.4 MMOL/L (ref 3.5–5.3)
PROT SERPL-MCNC: 7 G/DL (ref 6.4–8.2)
RBC # BLD AUTO: 4.5 MILLION/UL (ref 3.81–5.12)
SODIUM SERPL-SCNC: 138 MMOL/L (ref 136–145)
WBC # BLD AUTO: 6.64 THOUSAND/UL (ref 4.31–10.16)

## 2021-01-02 PROCEDURE — 85025 COMPLETE CBC W/AUTO DIFF WBC: CPT | Performed by: INTERNAL MEDICINE

## 2021-01-02 PROCEDURE — 80053 COMPREHEN METABOLIC PANEL: CPT | Performed by: INTERNAL MEDICINE

## 2021-01-02 NOTE — PROGRESS NOTES
Patient at [facility] for CADD pump d/c  Pump disconnected without complications  [place] volume 0  Port flushed without issue, blood return noted, labs drawn per order  Per patient she brings her pump back to [address] to be re-connected on Monday 1/4, battery removed, pump turned off, empty reservoir discarded in chemo bin  Patient declined AVS  Appointments reviewed with patient

## 2021-01-03 NOTE — PROGRESS NOTES
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   The patient's bilirubin which was elevated at 12 2 at its peak has now come down to 1 0   She was seen by our colleagues in Surgical Oncology and they recommended neoadjuvant chemotherapy prior to consideration of a resection  She was started on FOLFIRINOX  every 2 weeks with Neulasta growth factor support; first dose was given on 6/30/20    She had this for approximately 3 cycles but then had a reaction to the CPT 11 so this was discontinued  She then proceeded to receive dose adjusted modified FOLFOX 6  She finished up 6 cycles and went for surgery  Whipple procedure/pancreaticoduodenectomy completed on 10/20/20  Surgery revealed 1 positive lymph node along with residual tumor  She is now undergoing concurrent chemoradiation to be followed by chemotherapy in the adjuvant setting  Previous Hematologic/ Oncologic History:    Oncology History   Pancreatic adenocarcinoma ([facility] [address] )   6/2/2020 Biopsy    EUS- Dr Thee Field:  Pancreas, uncinate mass:      - Malignant ([facility] Category VI)      - Compatible with adenocarcinoma with mucinous features       6/30/2020 - 9/16/2020 Chemotherapy    fluorouracil (ADRUCIL) injection 585 mg, 400 mg/m2 = 585 mg, Intravenous, Once, 7 of 12 cycles  Administration: 585 mg (6/30/2020), 585 mg (7/14/2020), 585 mg (7/28/2020), 585 mg (8/11/2020), 585 mg (9/8/2020), 585 mg (8/25/2020)  pegfilgrastim (NEULASTA ONPRO) subcutaneous injection kit 6 mg, 6 mg, Subcutaneous, Once, 7 of 12 cycles  Administration: 6 mg (7/2/2020), 6 mg (7/16/2020), 6 mg (7/30/2020), 6 mg (8/13/2020), 6 mg (8/27/2020), 6 mg (9/10/2020)  fosaprepitant (EMEND) 150 mg in sodium chloride 0 9 % 250 mL IVPB, 150 mg, Intravenous, Once, 7 of 12 cycles  Administration: 150 mg (6/30/2020), 150 mg (7/14/2020), 150 mg (7/28/2020), 150 mg (8/11/2020), 150 mg (9/8/2020), 150 mg (8/25/2020)  irinotecan (CAMPTOSAR) 263 mg in dextrose 5 % 500 mL chemo infusion, 180 mg/m2 = 263 mg, Intravenous, Once, 3 of 3 cycles  Administration: 263 mg (6/30/2020), 263 mg (7/14/2020), 263 mg (7/28/2020)  leucovorin 584 mg in dextrose 5 % 250 mL IVPB, 400 mg/m2 = 584 mg (100 % of original dose 400 mg/m2), Intravenous, Once, 2 of 7 cycles  Dose modification: 400 mg/m2 (original dose 400 mg/m2, Cycle 6)  Administration: 584 mg (9/8/2020)  oxaliplatin (ELOXATIN) 124 1 mg in dextrose 5 % 250 mL chemo infusion, 85 mg/m2 = 124 1 mg, Intravenous, Once, 7 of 12 cycles  Administration: 124 1 mg (6/30/2020), 124 1 mg (7/14/2020), 124 1 mg (7/28/2020), 124 1 mg (8/11/2020), 124 1 mg (9/8/2020), 124 1 mg (8/25/2020)     10/20/2020 Surgery    Whipple:  - Residual invasive adenocarcinoma with mucinous features, 2 3 cm   - Metastatic carcinoma present in one of fourteen lymph nodes (1/14)  - All margins are negative for tumor  12/21/2020 -  Chemotherapy    [No matching medication found in this treatment plan]     2/8/2021 -  Chemotherapy    fluorouracil (ADRUCIL) injection 570 mg, 400 mg/m2, Intravenous, Once, 0 of 12 cycles  leucovorin 568 mg in dextrose 5 % 250 mL IVPB, 400 mg/m2, Intravenous, Once, 0 of 12 cycles  oxaliplatin (ELOXATIN) 120 7 mg in dextrose 5 % 250 mL chemo infusion, 85 mg/m2, Intravenous, Once, 0 of 12 cycles     FOLFIRINOX every 2 weeks with Neulasta growth factor support started on June 30, 2020   This had to be modified and CPT 11 was discontinued because she had a question of a reaction     She completed a total of 6 cycles      She went for surgery and was found to have 1 positive lymph node    Current Hematologic/ Oncologic Treatment:    5 fluorouracil 225 mg/m2 CIVI  Monday through Friday with concurrent radiation to be followed by 4 cycles of modified Folfox 6    ECO - Symptomatic but completely ambulatory    Interval History:   The patient presents for routine follow up  Patient is currently undergoing concurrent chemoradiation  She is on week 3/5  Her blood work has remained in acceptable treatment range   Overall, patient states she is feeling well and is tolerating her chemo and radiation without any significant side effects  Her appetite fluctuates  She is eating and staying hydrated  She denies any nausea/vomiting  No complaints of diarrhea  No mucositis  Cancer Staging:  Cancer Staging  Pancreatic adenocarcinoma [facility])  Staging form: Pancreas, AJCC 8th Edition  - Clinical: Stage IIB (cT2, cN1, cM0) - Unsigned  Histologic grade (G): G2  Histologic grading system: 3 grade system      Molecular Testing:         Test Results:    Imaging: No results found  Labs:   Lab Results   Component Value Date    WBC 6 64 2021    HGB 13 7 2021    HCT 41 6 2021    MCV 92 2021     2021     Lab Results   Component Value Date    K 3 4 (L) 2021     2021    CO2 26 2021    BUN 13 2021    CREATININE 0 90 2021    GLUCOSE 153 (H) 10/20/2020    GLUF 191 (H) 2020    CALCIUM 9 0 2021    CORRECTEDCA 9 9 10/25/2020    AST 26 2021    ALT 40 2021    ALKPHOS 90 2021    EGFR 66 2021           Review of Systems   Constitutional: Positive for appetite change (Fluctuates) and fatigue  All other systems reviewed and are negative          Active Problems:   Patient Active Problem List   Diagnosis    Hyperlipidemia    Essential hypertension    Type 2 diabetes mellitus without complication, without long-term current use of insulin ([facility] [address] )    Pruritus    Transaminitis    Pancreatic adenocarcinoma ([facility] [address] )    Port-A-Cath in place    Therapeutic opioid-induced constipation (OIC)    Anxiety    Functional diarrhea    Poor appetite       Past Medical History:   Past Medical History:   Diagnosis Date    Diabetes mellitus ([facility] [address] )     Hypertension     Lactic acidosis 2020    Neuropathy     Obstructive jaundice 2020       Surgical History:   Past Surgical History:   Procedure Laterality Date    BREAST SURGERY Left     calcifications     SECTION      CHOLECYSTECTOMY N/A 10/20/2020    Procedure: CHOLECYSTECTOMY;  Surgeon: Tom Simmons MD;  [facility];  Service: Surgical Oncology    COLONOSCOPY      ECTOPIC PREGNANCY SURGERY      partial ovary removed    FL [address] [facility] [address]  2020    JOINT REPLACEMENT Bilateral     LAPAROTOMY N/A 10/20/2020    Procedure: LAPAROTOMY EXPLORATORY; INTRAOPERATIVE ULTRASOUND;  Surgeon: Tom Simmons MD;  [facility];  Service: Surgical Oncology    [address] [place] [address]    NOSE SURGERY      deviated septum    REPLACEMENT TOTAL HIP W/  RESURFACING IMPLANTS Bilateral     right hip done 2012; left hip done 2012    TONSILLECTOMY      TUNNELED VENOUS PORT PLACEMENT Left 2020    Procedure: INSERTION VENOUS PORT (PORT-A-CATH), left;  Surgeon: Tom Simmons MD;  [facility];  Service: Surgical Oncology    WHIPPLE PROCEDURE/PANCREATICO-DUODENECTOMY N/A 10/20/2020    Procedure:  WHIPPLE PROCEDURE/PANCREATICO-DUODENECTOMY;  Surgeon: Tom Simmons MD;  [facility];  Service: Surgical Oncology       Family History:    Family History   Problem Relation Age of Onset    Diabetes Mother     Heart disease Mother     Heart disease Father     Breast cancer additional onset Sister     Breast cancer additional onset Maternal Aunt     Stroke Maternal Grandfather Cancer-related family history is not on file      Social History:   Social History     Socioeconomic History    Marital status: /Civil Union     Spouse name: Not on file    Number of children: Not on file    Years of education: Not on file    Highest education level: Not on file   Occupational History    Not on file   Social Needs    Financial resource strain: Not on file    Food insecurity     Worry: Not on file     Inability: Not on file   [employer] needs     Medical: Not on file     Non-medical: Not on file   Tobacco Use    Smoking status: Former Smoker    Smokeless tobacco: Never Used    Tobacco comment: quit 40 years ago   Substance and Sexual Activity    Alcohol use: Not Currently     Frequency: Monthly or less    Drug use: Never    Sexual activity: Not Currently   Lifestyle    Physical activity     Days per week: Not on file     Minutes per session: Not on file    Stress: Not on file   Relationships    Social connections     Talks on phone: Not on file     Gets together: Not on file     Attends [employer] service: Not on file     Active member of club or organization: Not on file     Attends meetings of clubs or organizations: Not on file     Relationship status: Not on file    Intimate partner violence     Fear of current or ex partner: Not on file     Emotionally abused: Not on file     Physically abused: Not on file     Forced sexual activity: Not on file   Other Topics Concern    Not on file   Social History Narrative    Not on file       Current Medications:   Current Outpatient Medications   Medication Sig Dispense Refill    dicyclomine (BENTYL) 20 mg tablet TAKE 1 TABLET BY MOUTH TWICE A  tablet 1    diphenhydrAMINE (BENADRYL) 25 mg capsule Take 25 mg by mouth every 6 (six) hours as needed for itching      diphenoxylate-atropine (LOMOTIL) 2 5-0 025 mg per tablet Take 1 tablet by mouth 4 (four) times a day as needed for diarrhea DO NOT EXCEED 4 DOSES IN 1 DAY 30 tablet 0    gabapentin (NEURONTIN) 300 mg capsule Take 300 mg by mouth 3 (three) times a day      LORazepam (ATIVAN) 0 5 mg tablet Take 1 tablet (0 5 mg total) by mouth 2 (two) times a day as needed for anxiety NO FURTHER REFILLS WITHOUT CLINIC VISIT WITH PALLIATIVE CARE 30 tablet 0    mirtazapine (REMERON) 15 mg tablet Take 1 tablet (15 mg total) by mouth daily at bedtime 30 tablet 3    omeprazole (PriLOSEC) 20 mg delayed release capsule TAKE 1 CAPSULE BY MOUTH EVERY DAY 90 capsule 3    prochlorperazine (COMPAZINE) 10 mg tablet Take 1 tablet (10 mg total) by mouth every 6 (six) hours as needed for nausea or vomiting 45 tablet 3    allopurinol (ZYLOPRIM) 100 mg tablet Take 100 mg by mouth daily      fluorouracil 1,585 mg in CADD infusion pump Infuse 1,585 mg (225 mg/m2/day x 1 41 m2 (Treatment plan recorded BSA)) into a venous catheter over 120 hours for 5 days (Patient not taking: Reported on 1/7/2021) 1 Device 0    hydrOXYzine HCL (ATARAX) 10 mg tablet Take 1 tablet (10 mg total) by mouth every 8 (eight) hours as needed for itching (Patient not taking: Reported on 1/7/2021) 30 tablet 0    metFORMIN (GLUCOPHAGE) 500 mg tablet Take by mouth 2 (two) times a day      pancrelipase, Lip-Prot-Amyl, (CREON) 12,000 units capsule Take 12,000 units of lipase by mouth 3 (three) times a day with meals (Patient not taking: Reported on 11/16/2020) 60 capsule 0    sitaGLIPtin (JANUVIA) 100 mg tablet Take 100 mg by mouth daily       No current facility-administered medications for this visit  Allergies: Allergies   Allergen Reactions    Betadine [Povidone Iodine] Rash     Also itching from betadine    Other Throat Swelling     Pt unsure which chemotherapy drug caused tongue swelling and locked jaw  Please review in epic notes Folfirinox or possible Neulasta   NEED to review       Physical Exam:  /90 (BP Location: Left arm, Patient Position: Sitting, Cuff Size: Adult)   Pulse (!) 109   Temp 97 8 °F (36 6 °C) (Temporal)   Resp 18   Ht 4' 11" (1 499 m)   Wt 48 1 kg (106 lb)   SpO2 98%   BMI 21 41 kg/m²   Body surface area is 1 41 meters squared  Wt Readings from Last 3 Encounters:   01/07/21 48 1 kg (106 lb)   01/04/21 49 4 kg (108 lb 14 5 oz)   12/28/20 49 6 kg (109 lb 5 6 oz)           Physical Exam  Constitutional:       General: She is not in acute distress  Comments: Cadd pump connected and infusing  HENT:      Head: Normocephalic and atraumatic  Eyes:      General: No scleral icterus  Right eye: No discharge  Left eye: No discharge  Neck:      Musculoskeletal: Normal range of motion  Cardiovascular:      Rate and Rhythm: Normal rate  Pulmonary:      Effort: Pulmonary effort is normal  No respiratory distress  Abdominal:      General: Bowel sounds are normal       Palpations: Abdomen is soft  Musculoskeletal: Normal range of motion  Right lower leg: No edema  Left lower leg: No edema  Lymphadenopathy:      Cervical: No cervical adenopathy  Skin:     General: Skin is warm and dry  Neurological:      General: No focal deficit present  Mental Status: She is alert and oriented to person, place, and time  Psychiatric:         Mood and Affect: Mood normal          Behavior: Behavior normal          Assessment / Plan:    1  Pancreatic adenocarcinoma [facility])      The patient  is a 77 year female with pancreatic cancer  She was seen for an initial consultation on 6/18/20  She presented with painless jaundice  Workup was done including a CT scan of the chest abdomen pelvis along with an MRI which showed an isolated pancreatic head mass which was biopsied and 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   The patient's bilirubin which was elevated at 12 2 at its peak has now come down to 1 0   She was seen by our colleagues in Surgical Oncology and they recommended neoadjuvant chemotherapy prior to consideration of a resection   She was started on FOLFIRINOX  every 2 weeks with Neulasta growth factor support; first dose was given on 6/30/20    She had this for approximately 3 cycles but then had a reaction to the CPT 11 so this was discontinued  She then proceeded to receive dose adjusted modified FOLFOX 6  She finished up 6 cycles and went for surgery  Whipple procedure/pancreaticoduodenectomy completed on 10/20/20  Surgery revealed 1 positive lymph node along with residual tumor  She is now undergoing concurrent chemoradiation to be followed by chemotherapy in the adjuvant setting  Patient is currently on her 3rd week of 5 fluorouracil 225 mg/m2 CIVI  Monday through Friday with concurrent radiation  She will be due to complete therapy on 1/24/21  When she has completed concurrent chemoradiation she will be started on 2 months of treatment with adjuvant modified FOLFOX 6; this is targeted to begin the week of 02/08/2021  Overall, patient is tolerating her treatment well  Her blood counts remain in acceptable treatment range  She will continue on her current therapy and continue to have labs done on a weekly basis  She will return for a follow-up visit in 4 weeks at which time she will have finished concurrent chemoradiation  Time spent reviewing plan of care with patient, providing supportive listening and reassurance  Patient was in agreement with plan of care  She was instructed to call with any questions or concerns prior to her next visit    Goals and Barriers:  Current Goal:  Prolong Survival from pancreatic cancer  Barriers: None  Patient's Capacity to Self Care:  Patient  able to self care  Portions of the record may have been created with voice recognition software  Occasional wrong word or "sound a like" substitutions may have occurred due to the inherent limitations of voice recognition software  Read the chart carefully and recognize, using context, where substitutions have occurred

## 2021-01-04 ENCOUNTER — APPOINTMENT (OUTPATIENT)
Dept: RADIATION ONCOLOGY | Facility: CLINIC | Age: 66
End: 2021-01-04
Attending: RADIOLOGY
Payer: MEDICARE

## 2021-01-04 ENCOUNTER — HOSPITAL ENCOUNTER (OUTPATIENT)
Dept: INFUSION CENTER | Facility: CLINIC | Age: 66
Discharge: HOME | End: 2021-01-04
Payer: MEDICARE

## 2021-01-04 VITALS
DIASTOLIC BLOOD PRESSURE: 88 MMHG | SYSTOLIC BLOOD PRESSURE: 142 MMHG | BODY MASS INDEX: 21.96 KG/M2 | HEART RATE: 88 BPM | TEMPERATURE: 96.9 F | WEIGHT: 108.91 LBS | HEIGHT: 59 IN

## 2021-01-04 DIAGNOSIS — C25.9 PANCREATIC ADENOCARCINOMA (HCC): Primary | ICD-10-CM

## 2021-01-04 PROCEDURE — 77386 HB NTSTY MODUL RAD TX DLVR CPLX: CPT | Performed by: RADIOLOGY

## 2021-01-04 PROCEDURE — G0498 CHEMO EXTEND IV INFUS W/PUMP: HCPCS

## 2021-01-04 NOTE — PLAN OF CARE
Problem: SAFETY ADULT  Goal: Patient will remain free of falls  Description: INTERVENTIONS:  - Assess patient frequently for physical needs  -  Identify cognitive and physical deficits and behaviors that affect risk of falls  -  [place] fall precautions as indicated by assessment   - Educate patient/family on patient safety including physical limitations  - Instruct patient to call for assistance with activity based on assessment  - Modify environment to reduce risk of injury  - Consider OT/PT consult to assist with strengthening/mobility  Outcome: Progressing  Goal: Maintain or return to baseline ADL function  Description: INTERVENTIONS:  -  Assess patient's ability to carry out ADLs; assess patient's baseline for ADL function and identify physical deficits which impact ability to perform ADLs (bathing, care of mouth/teeth, toileting, grooming, dressing, etc )  - Assess/evaluate cause of self-care deficits   - Assess range of motion  - Assess patient's mobility; develop plan if impaired  - Assess patient's need for assistive devices and provide as appropriate  - Encourage maximum independence but intervene and supervise when necessary  - Involve family in performance of ADLs  - Assess for home care needs following discharge   - Consider OT consult to assist with ADL evaluation and planning for discharge  - Provide patient education as appropriate  Outcome: Progressing     Problem: Knowledge Deficit  Goal: Patient/family/caregiver demonstrates understanding of disease process, treatment plan, medications, and discharge instructions  Description: Complete learning assessment and assess knowledge base    Interventions:  - Provide teaching at level of understanding  - Provide teaching via preferred learning methods  Outcome: Progressing

## 2021-01-04 NOTE — PROGRESS NOTES
Pt presents for  CADD connect  Leilani Tariq notified of Pt K levels  Ok to to tx  Pt instructed to consume K+ rich foods  Pt verbalized understanding  Offers no complaints  Cadd connected per protocol, Blinking Green and indicates running  AVS provided  Aware of disconnect time and appt  Dc'd  in stable condition

## 2021-01-05 ENCOUNTER — APPOINTMENT (OUTPATIENT)
Dept: RADIATION ONCOLOGY | Facility: CLINIC | Age: 66
End: 2021-01-05
Attending: RADIOLOGY
Payer: MEDICARE

## 2021-01-05 PROCEDURE — 77386 HB NTSTY MODUL RAD TX DLVR CPLX: CPT | Performed by: RADIOLOGY

## 2021-01-05 PROCEDURE — 77336 RADIATION PHYSICS CONSULT: CPT | Performed by: RADIOLOGY

## 2021-01-06 ENCOUNTER — APPOINTMENT (OUTPATIENT)
Dept: RADIATION ONCOLOGY | Facility: CLINIC | Age: 66
End: 2021-01-06
Attending: RADIOLOGY
Payer: MEDICARE

## 2021-01-06 PROCEDURE — 77386 HB NTSTY MODUL RAD TX DLVR CPLX: CPT | Performed by: RADIOLOGY

## 2021-01-07 ENCOUNTER — OFFICE VISIT (OUTPATIENT)
Dept: HEMATOLOGY ONCOLOGY | Facility: CLINIC | Age: 66
End: 2021-01-07
Payer: MEDICARE

## 2021-01-07 ENCOUNTER — APPOINTMENT (OUTPATIENT)
Dept: RADIATION ONCOLOGY | Facility: CLINIC | Age: 66
End: 2021-01-07
Attending: RADIOLOGY
Payer: MEDICARE

## 2021-01-07 VITALS
DIASTOLIC BLOOD PRESSURE: 90 MMHG | TEMPERATURE: 97.8 F | WEIGHT: 106 LBS | SYSTOLIC BLOOD PRESSURE: 150 MMHG | BODY MASS INDEX: 21.37 KG/M2 | OXYGEN SATURATION: 98 % | RESPIRATION RATE: 18 BRPM | HEART RATE: 109 BPM | HEIGHT: 59 IN

## 2021-01-07 DIAGNOSIS — C25.9 PANCREATIC ADENOCARCINOMA (HCC): Primary | Chronic | ICD-10-CM

## 2021-01-07 DIAGNOSIS — F41.9 ANXIETY: ICD-10-CM

## 2021-01-07 PROCEDURE — 77386 HB NTSTY MODUL RAD TX DLVR CPLX: CPT | Performed by: RADIOLOGY

## 2021-01-07 PROCEDURE — 99214 OFFICE O/P EST MOD 30 MIN: CPT | Performed by: NURSE PRACTITIONER

## 2021-01-07 RX ORDER — MIRTAZAPINE 15 MG/1
15 TABLET, FILM COATED ORAL
Qty: 30 TABLET | Refills: 3 | Status: SHIPPED | OUTPATIENT
Start: 2021-01-07 | End: 2021-05-24 | Stop reason: SDUPTHER

## 2021-01-08 ENCOUNTER — APPOINTMENT (OUTPATIENT)
Dept: RADIATION ONCOLOGY | Facility: CLINIC | Age: 66
End: 2021-01-08
Attending: RADIOLOGY
Payer: MEDICARE

## 2021-01-08 ENCOUNTER — DOCUMENTATION (OUTPATIENT)
Dept: NUTRITION | Facility: CLINIC | Age: 66
End: 2021-01-08

## 2021-01-08 PROCEDURE — 77386 HB NTSTY MODUL RAD TX DLVR CPLX: CPT | Performed by: RADIOLOGY

## 2021-01-08 NOTE — PROGRESS NOTES
Spoke to Richard Rudolph today in [address] [place] [address] to touch base in regards to her nutrition  She states that she is doing well at this time  Her only concern is that her weight has decreased 2# over the past week, but she continues to try to eat the best that she can and plans to make efforts to not lose any more weight  She continues to drink Muscle Milk shakes daily  Her  bought her banana almond milk which she has been using to make homemade smoothies along with Boost  She has no nutrition questions at this time, she will reach out to this RD if her weight continues to decrease

## 2021-01-09 ENCOUNTER — HOSPITAL ENCOUNTER (OUTPATIENT)
Dept: INFUSION CENTER | Facility: CLINIC | Age: 66
Discharge: HOME | End: 2021-01-09
Payer: MEDICARE

## 2021-01-09 VITALS — TEMPERATURE: 97.8 F

## 2021-01-09 DIAGNOSIS — C25.9 PANCREATIC ADENOCARCINOMA (HCC): Primary | ICD-10-CM

## 2021-01-09 LAB
ALBUMIN SERPL BCP-MCNC: 3.5 G/DL (ref 3.5–5)
ALP SERPL-CCNC: 88 U/L (ref 46–116)
ALT SERPL W P-5'-P-CCNC: 46 U/L (ref 12–78)
ANION GAP SERPL CALCULATED.3IONS-SCNC: 12 MMOL/L (ref 4–13)
AST SERPL W P-5'-P-CCNC: 32 U/L (ref 5–45)
BASOPHILS # BLD AUTO: 0.03 THOUSANDS/ΜL (ref 0–0.1)
BASOPHILS NFR BLD AUTO: 1 % (ref 0–1)
BILIRUB SERPL-MCNC: 0.21 MG/DL (ref 0.2–1)
BUN SERPL-MCNC: 10 MG/DL (ref 5–25)
CALCIUM SERPL-MCNC: 8.9 MG/DL (ref 8.3–10.1)
CHLORIDE SERPL-SCNC: 104 MMOL/L (ref 100–108)
CO2 SERPL-SCNC: 26 MMOL/L (ref 21–32)
CREAT SERPL-MCNC: 0.96 MG/DL (ref 0.6–1.3)
EOSINOPHIL # BLD AUTO: 0.39 THOUSAND/ΜL (ref 0–0.61)
EOSINOPHIL NFR BLD AUTO: 7 % (ref 0–6)
ERYTHROCYTE [DISTWIDTH] IN BLOOD BY AUTOMATED COUNT: 13 % (ref 11.6–15.1)
GFR SERPL CREATININE-BSD FRML MDRD: 61 ML/MIN/1.73SQ M
GLUCOSE SERPL-MCNC: 198 MG/DL (ref 65–140)
HCT VFR BLD AUTO: 39.9 % (ref 34.8–46.1)
HGB BLD-MCNC: 13.2 G/DL (ref 11.5–15.4)
IMM GRANULOCYTES # BLD AUTO: 0.02 THOUSAND/UL (ref 0–0.2)
IMM GRANULOCYTES NFR BLD AUTO: 0 % (ref 0–2)
LYMPHOCYTES # BLD AUTO: 0.76 THOUSANDS/ΜL (ref 0.6–4.47)
LYMPHOCYTES NFR BLD AUTO: 13 % (ref 14–44)
MCH RBC QN AUTO: 30.8 PG (ref 26.8–34.3)
MCHC RBC AUTO-ENTMCNC: 33.1 G/DL (ref 31.4–37.4)
MCV RBC AUTO: 93 FL (ref 82–98)
MONOCYTES # BLD AUTO: 0.68 THOUSAND/ΜL (ref 0.17–1.22)
MONOCYTES NFR BLD AUTO: 11 % (ref 4–12)
NEUTROPHILS # BLD AUTO: 4.13 THOUSANDS/ΜL (ref 1.85–7.62)
NEUTS SEG NFR BLD AUTO: 68 % (ref 43–75)
NRBC BLD AUTO-RTO: 0 /100 WBCS
PLATELET # BLD AUTO: 250 THOUSANDS/UL (ref 149–390)
PMV BLD AUTO: 10 FL (ref 8.9–12.7)
POTASSIUM SERPL-SCNC: 3.6 MMOL/L (ref 3.5–5.3)
PROT SERPL-MCNC: 6.7 G/DL (ref 6.4–8.2)
RBC # BLD AUTO: 4.29 MILLION/UL (ref 3.81–5.12)
SODIUM SERPL-SCNC: 142 MMOL/L (ref 136–145)
WBC # BLD AUTO: 6.01 THOUSAND/UL (ref 4.31–10.16)

## 2021-01-09 PROCEDURE — 80053 COMPREHEN METABOLIC PANEL: CPT | Performed by: INTERNAL MEDICINE

## 2021-01-09 PROCEDURE — 85025 COMPLETE CBC W/AUTO DIFF WBC: CPT | Performed by: INTERNAL MEDICINE

## 2021-01-09 NOTE — PROGRESS NOTES
Pt tolerated treatment well  [place] volume 0  Cadd pump disconnected, labs drawn as ordered  Good blood return noted from port  Port saline locked and deaccessed without issue  CADD pump given to patient for Monday's appt at [facility]  Aware of next appt  AVS declined

## 2021-01-09 NOTE — PLAN OF CARE
Problem: Potential for Falls  Goal: Patient will remain free of falls  Description: INTERVENTIONS:  - Assess patient frequently for physical needs  -  Identify cognitive and physical deficits and behaviors that affect risk of falls    -  [place] fall precautions as indicated by assessment   - Educate patient/family on patient safety including physical limitations  - Instruct patient to call for assistance with activity based on assessment  - Modify environment to reduce risk of injury  - Consider OT/PT consult to assist with strengthening/mobility  Outcome: Progressing

## 2021-01-11 ENCOUNTER — APPOINTMENT (OUTPATIENT)
Dept: RADIATION ONCOLOGY | Facility: CLINIC | Age: 66
End: 2021-01-11
Attending: RADIOLOGY
Payer: MEDICARE

## 2021-01-11 ENCOUNTER — HOSPITAL ENCOUNTER (OUTPATIENT)
Dept: INFUSION CENTER | Facility: CLINIC | Age: 66
Discharge: HOME | End: 2021-01-11
Payer: MEDICARE

## 2021-01-11 VITALS
RESPIRATION RATE: 16 BRPM | HEIGHT: 59 IN | DIASTOLIC BLOOD PRESSURE: 82 MMHG | HEART RATE: 85 BPM | WEIGHT: 106.26 LBS | BODY MASS INDEX: 21.42 KG/M2 | SYSTOLIC BLOOD PRESSURE: 151 MMHG | TEMPERATURE: 96.6 F

## 2021-01-11 DIAGNOSIS — C25.9 PANCREATIC ADENOCARCINOMA (HCC): Primary | ICD-10-CM

## 2021-01-11 PROCEDURE — G0498 CHEMO EXTEND IV INFUS W/PUMP: HCPCS

## 2021-01-11 PROCEDURE — 77386 HB NTSTY MODUL RAD TX DLVR CPLX: CPT | Performed by: RADIOLOGY

## 2021-01-11 NOTE — PROGRESS NOTES
Pt here for CADD pump connection  Left port accessed with positive blood return noted  CADD pump connected and checked with Zara Joyner RN  Light is flashing green and running  Pt aware to go to [facility] on Saturday 1/16 at 1 pm   AVS declined  Walked out in stable condition

## 2021-01-11 NOTE — PROGRESS NOTES
Clarified with Tarry Coffee that patient is in fact getting a 5 day CADD with disconnect on Saturday at [place] and not on Friday

## 2021-01-12 ENCOUNTER — APPOINTMENT (OUTPATIENT)
Dept: RADIATION ONCOLOGY | Facility: CLINIC | Age: 66
End: 2021-01-12
Attending: RADIOLOGY
Payer: MEDICARE

## 2021-01-12 PROCEDURE — 77386 HB NTSTY MODUL RAD TX DLVR CPLX: CPT | Performed by: RADIOLOGY

## 2021-01-12 PROCEDURE — 77336 RADIATION PHYSICS CONSULT: CPT | Performed by: RADIOLOGY

## 2021-01-13 ENCOUNTER — APPOINTMENT (OUTPATIENT)
Dept: RADIATION ONCOLOGY | Facility: CLINIC | Age: 66
End: 2021-01-13
Attending: RADIOLOGY
Payer: MEDICARE

## 2021-01-13 PROCEDURE — 77386 HB NTSTY MODUL RAD TX DLVR CPLX: CPT | Performed by: RADIOLOGY

## 2021-01-14 ENCOUNTER — APPOINTMENT (OUTPATIENT)
Dept: RADIATION ONCOLOGY | Facility: CLINIC | Age: 66
End: 2021-01-14
Attending: RADIOLOGY
Payer: MEDICARE

## 2021-01-14 PROCEDURE — 77386 HB NTSTY MODUL RAD TX DLVR CPLX: CPT | Performed by: RADIOLOGY

## 2021-01-15 ENCOUNTER — APPOINTMENT (OUTPATIENT)
Dept: RADIATION ONCOLOGY | Facility: CLINIC | Age: 66
End: 2021-01-15
Attending: RADIOLOGY
Payer: MEDICARE

## 2021-01-15 PROCEDURE — 77386 HB NTSTY MODUL RAD TX DLVR CPLX: CPT | Performed by: RADIOLOGY

## 2021-01-16 ENCOUNTER — HOSPITAL ENCOUNTER (OUTPATIENT)
Dept: INFUSION CENTER | Facility: CLINIC | Age: 66
Discharge: HOME | End: 2021-01-16
Payer: MEDICARE

## 2021-01-16 VITALS — TEMPERATURE: 97.3 F

## 2021-01-16 DIAGNOSIS — C25.9 PANCREATIC ADENOCARCINOMA (HCC): Primary | ICD-10-CM

## 2021-01-16 LAB
ALBUMIN SERPL BCP-MCNC: 3.6 G/DL (ref 3.5–5)
ALP SERPL-CCNC: 94 U/L (ref 46–116)
ALT SERPL W P-5'-P-CCNC: 39 U/L (ref 12–78)
ANION GAP SERPL CALCULATED.3IONS-SCNC: 10 MMOL/L (ref 4–13)
AST SERPL W P-5'-P-CCNC: 24 U/L (ref 5–45)
BASOPHILS # BLD AUTO: 0.05 THOUSANDS/ΜL (ref 0–0.1)
BASOPHILS NFR BLD AUTO: 1 % (ref 0–1)
BILIRUB SERPL-MCNC: 0.27 MG/DL (ref 0.2–1)
BUN SERPL-MCNC: 18 MG/DL (ref 5–25)
CALCIUM SERPL-MCNC: 9.1 MG/DL (ref 8.3–10.1)
CHLORIDE SERPL-SCNC: 102 MMOL/L (ref 100–108)
CO2 SERPL-SCNC: 27 MMOL/L (ref 21–32)
CREAT SERPL-MCNC: 1.17 MG/DL (ref 0.6–1.3)
EOSINOPHIL # BLD AUTO: 0.35 THOUSAND/ΜL (ref 0–0.61)
EOSINOPHIL NFR BLD AUTO: 6 % (ref 0–6)
ERYTHROCYTE [DISTWIDTH] IN BLOOD BY AUTOMATED COUNT: 13.4 % (ref 11.6–15.1)
GFR SERPL CREATININE-BSD FRML MDRD: 48 ML/MIN/1.73SQ M
GLUCOSE SERPL-MCNC: 239 MG/DL (ref 65–140)
HCT VFR BLD AUTO: 43.3 % (ref 34.8–46.1)
HGB BLD-MCNC: 14.2 G/DL (ref 11.5–15.4)
IMM GRANULOCYTES # BLD AUTO: 0.01 THOUSAND/UL (ref 0–0.2)
IMM GRANULOCYTES NFR BLD AUTO: 0 % (ref 0–2)
LYMPHOCYTES # BLD AUTO: 0.63 THOUSANDS/ΜL (ref 0.6–4.47)
LYMPHOCYTES NFR BLD AUTO: 11 % (ref 14–44)
MCH RBC QN AUTO: 30.9 PG (ref 26.8–34.3)
MCHC RBC AUTO-ENTMCNC: 32.8 G/DL (ref 31.4–37.4)
MCV RBC AUTO: 94 FL (ref 82–98)
MONOCYTES # BLD AUTO: 0.81 THOUSAND/ΜL (ref 0.17–1.22)
MONOCYTES NFR BLD AUTO: 14 % (ref 4–12)
NEUTROPHILS # BLD AUTO: 4.17 THOUSANDS/ΜL (ref 1.85–7.62)
NEUTS SEG NFR BLD AUTO: 68 % (ref 43–75)
NRBC BLD AUTO-RTO: 0 /100 WBCS
PLATELET # BLD AUTO: 221 THOUSANDS/UL (ref 149–390)
PMV BLD AUTO: 10.8 FL (ref 8.9–12.7)
POTASSIUM SERPL-SCNC: 4.2 MMOL/L (ref 3.5–5.3)
PROT SERPL-MCNC: 6.9 G/DL (ref 6.4–8.2)
RBC # BLD AUTO: 4.59 MILLION/UL (ref 3.81–5.12)
SODIUM SERPL-SCNC: 139 MMOL/L (ref 136–145)
WBC # BLD AUTO: 6.02 THOUSAND/UL (ref 4.31–10.16)

## 2021-01-16 PROCEDURE — 80053 COMPREHEN METABOLIC PANEL: CPT | Performed by: INTERNAL MEDICINE

## 2021-01-16 PROCEDURE — 85025 COMPLETE CBC W/AUTO DIFF WBC: CPT | Performed by: INTERNAL MEDICINE

## 2021-01-16 NOTE — PROGRESS NOTES
Pt here for CADD pump disconnect  Pt tolerated at home infusion  Res volume is 0ml  Port flushed and blood return noted and was de accessed  CBC and CMP drawn and sent to lab  Pt aware of future appts   Pt declined AVS

## 2021-01-18 ENCOUNTER — HOSPITAL ENCOUNTER (OUTPATIENT)
Dept: INFUSION CENTER | Facility: CLINIC | Age: 66
Discharge: HOME | End: 2021-01-18
Payer: MEDICARE

## 2021-01-18 ENCOUNTER — APPOINTMENT (OUTPATIENT)
Dept: RADIATION ONCOLOGY | Facility: CLINIC | Age: 66
End: 2021-01-18
Attending: RADIOLOGY
Payer: MEDICARE

## 2021-01-18 VITALS
HEIGHT: 59 IN | TEMPERATURE: 97.4 F | DIASTOLIC BLOOD PRESSURE: 83 MMHG | OXYGEN SATURATION: 98 % | HEART RATE: 102 BPM | RESPIRATION RATE: 18 BRPM | BODY MASS INDEX: 21.07 KG/M2 | WEIGHT: 104.5 LBS | SYSTOLIC BLOOD PRESSURE: 139 MMHG

## 2021-01-18 DIAGNOSIS — C25.9 PANCREATIC ADENOCARCINOMA (HCC): Primary | ICD-10-CM

## 2021-01-18 PROCEDURE — 77386 HB NTSTY MODUL RAD TX DLVR CPLX: CPT | Performed by: RADIOLOGY

## 2021-01-18 PROCEDURE — G0498 CHEMO EXTEND IV INFUS W/PUMP: HCPCS

## 2021-01-18 NOTE — PROGRESS NOTES
Pt presents for CADD pump connection offering no complaints  Low dose warningpresent in plan prior to releasing  Confirmed with Edy Andersen RN that dose and duration of CADD is correct for treatment  CADD pump connected, green light flashing and pump running prior to discharge  AVS declined  Appointment time for Saturday at [address] reviewed

## 2021-01-18 NOTE — PROGRESS NOTES
Per Chris Brown, RN, pt to have 5 cycles total of 5 day CADD pump  Pt aware that this is last cycle as she is completed with radiation on 1/26/21  Pt verbalized understanding

## 2021-01-19 ENCOUNTER — APPOINTMENT (OUTPATIENT)
Dept: RADIATION ONCOLOGY | Facility: CLINIC | Age: 66
End: 2021-01-19
Attending: RADIOLOGY
Payer: MEDICARE

## 2021-01-19 PROCEDURE — 77386 HB NTSTY MODUL RAD TX DLVR CPLX: CPT | Performed by: RADIOLOGY

## 2021-01-19 PROCEDURE — 77336 RADIATION PHYSICS CONSULT: CPT | Performed by: RADIOLOGY

## 2021-01-20 ENCOUNTER — APPOINTMENT (OUTPATIENT)
Dept: RADIATION ONCOLOGY | Facility: CLINIC | Age: 66
End: 2021-01-20
Attending: RADIOLOGY
Payer: MEDICARE

## 2021-01-20 PROCEDURE — 77386 HB NTSTY MODUL RAD TX DLVR CPLX: CPT | Performed by: RADIOLOGY

## 2021-01-21 ENCOUNTER — APPOINTMENT (OUTPATIENT)
Dept: RADIATION ONCOLOGY | Facility: CLINIC | Age: 66
End: 2021-01-21
Attending: RADIOLOGY
Payer: MEDICARE

## 2021-01-21 PROCEDURE — 77386 HB NTSTY MODUL RAD TX DLVR CPLX: CPT | Performed by: RADIOLOGY

## 2021-01-22 ENCOUNTER — APPOINTMENT (OUTPATIENT)
Dept: RADIATION ONCOLOGY | Facility: CLINIC | Age: 66
End: 2021-01-22
Attending: RADIOLOGY
Payer: MEDICARE

## 2021-01-22 PROCEDURE — 77386 HB NTSTY MODUL RAD TX DLVR CPLX: CPT | Performed by: RADIOLOGY

## 2021-01-23 ENCOUNTER — HOSPITAL ENCOUNTER (OUTPATIENT)
Dept: INFUSION CENTER | Facility: CLINIC | Age: 66
Discharge: HOME | End: 2021-01-23

## 2021-01-23 DIAGNOSIS — C25.9 PANCREATIC ADENOCARCINOMA (HCC): Primary | ICD-10-CM

## 2021-01-23 NOTE — PROGRESS NOTES
Pt here for CADD pump disconnect  Pt tolerated at home infusion  Res volume is 0ml  Port flushed and blood return noted and was de accessed  Pt aware of future appts   Pt declined AVS

## 2021-01-25 ENCOUNTER — APPOINTMENT (OUTPATIENT)
Dept: RADIATION ONCOLOGY | Facility: CLINIC | Age: 66
End: 2021-01-25
Attending: RADIOLOGY
Payer: MEDICARE

## 2021-01-25 PROCEDURE — 77386 HB NTSTY MODUL RAD TX DLVR CPLX: CPT | Performed by: RADIOLOGY

## 2021-01-26 ENCOUNTER — APPOINTMENT (OUTPATIENT)
Dept: RADIATION ONCOLOGY | Facility: CLINIC | Age: 66
End: 2021-01-26
Attending: RADIOLOGY
Payer: MEDICARE

## 2021-01-26 PROCEDURE — 77336 RADIATION PHYSICS CONSULT: CPT | Performed by: RADIOLOGY

## 2021-01-26 PROCEDURE — 77386 HB NTSTY MODUL RAD TX DLVR CPLX: CPT | Performed by: RADIOLOGY

## 2021-01-27 ENCOUNTER — APPOINTMENT (OUTPATIENT)
Dept: RADIATION ONCOLOGY | Facility: CLINIC | Age: 66
End: 2021-01-27
Payer: MEDICARE

## 2021-01-27 DIAGNOSIS — C25.9 PANCREATIC ADENOCARCINOMA (HCC): Primary | ICD-10-CM

## 2021-01-27 DIAGNOSIS — T45.1X5A CHEMOTHERAPY INDUCED NEUTROPENIA (HCC): ICD-10-CM

## 2021-01-27 DIAGNOSIS — C25.9 PANCREATIC ADENOCARCINOMA (HCC): Primary | Chronic | ICD-10-CM

## 2021-01-27 DIAGNOSIS — D70.1 CHEMOTHERAPY INDUCED NEUTROPENIA (HCC): ICD-10-CM

## 2021-01-27 RX ORDER — LORAZEPAM 2 MG/ML
0.5 INJECTION INTRAMUSCULAR ONCE
Status: CANCELLED
Start: 2021-02-08

## 2021-01-27 RX ORDER — FLUOROURACIL 50 MG/ML
400 INJECTION, SOLUTION INTRAVENOUS ONCE
Status: CANCELLED | OUTPATIENT
Start: 2021-02-08

## 2021-01-27 RX ORDER — DEXTROSE MONOHYDRATE 50 MG/ML
20 INJECTION, SOLUTION INTRAVENOUS ONCE
Status: CANCELLED | OUTPATIENT
Start: 2021-02-08

## 2021-01-27 RX ORDER — SODIUM CHLORIDE 9 MG/ML
20 INJECTION, SOLUTION INTRAVENOUS ONCE AS NEEDED
Status: CANCELLED | OUTPATIENT
Start: 2021-02-08

## 2021-01-28 DIAGNOSIS — C25.9 PANCREATIC ADENOCARCINOMA (HCC): Primary | ICD-10-CM

## 2021-01-31 NOTE — PROGRESS NOTES
Hematology/Oncology Virtual Outpatient Follow- up Note  Jeannie Mcgarry, 1955, [id number]  1/31/2021      Intake:    Reason for virtual visit is for follow up while on treatment for pancreatic cancer  The patient is unable to visit the clinic safely due to the coronavirus pandemic/ blizzard like conditions  After connecting through [employer], the patient was identified by name and date of birth  Melvin Sanches was informed that this was a telemedicine visit and that the visit is being conducted through [facility] and patient was informed that this is a secure, HIPAA-compliant platform  She agrees to proceed     My office door was closed  No one else was in the room  Jovonayo Sanches  acknowledged consent and understanding of privacy and security of the video platform  The patient has agreed to participate and understands they can discontinue the visit at any time  HPI:  Jeannie Mcgarry is a 77 year female with pancreatic cancer  She was seen for an initial consultation on 6/18/20  She presented with painless jaundice  Workup was done including a CT scan of the chest abdomen pelvis along with an MRI which showed an isolated pancreatic head mass which was biopsied and 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   The patient's bilirubin which was elevated at 12 2 at its peak has now come down to 1 0   She was seen by our colleagues in Surgical Oncology and they recommended neoadjuvant chemotherapy prior to consideration of a resection  She was started on FOLFIRINOX  every 2 weeks with Neulasta growth factor support; first dose was given on 6/30/20    She had this for approximately 3 cycles but then had a reaction to the CPT 11 so this was discontinued   She then proceeded to receive dose adjusted modified FOLFOX 6  She finished up 6 cycles and went for surgery  Whipple procedure/pancreaticoduodenectomy completed on 10/20/20    Surgery revealed 1 positive lymph node along with residual tumor   She has completed concurrent chemoradiation in the adjuvant setting  She will now proceed with 4 cycles of modified FOLFOX 6    Previous Hematologic/ Oncologic History:    Oncology History   Pancreatic adenocarcinoma ([facility] [address] )   6/2/2020 Biopsy    [facility]- Dr London Berger:  Pancreas, uncinate mass:      - Malignant ([facility] Category VI)      - Compatible with adenocarcinoma with mucinous features       6/30/2020 - 9/16/2020 Chemotherapy    fluorouracil (ADRUCIL) injection 585 mg, 400 mg/m2 = 585 mg, Intravenous, Once, 7 of 12 cycles  Administration: 585 mg (6/30/2020), 585 mg (7/14/2020), 585 mg (7/28/2020), 585 mg (8/11/2020), 585 mg (9/8/2020), 585 mg (8/25/2020)  pegfilgrastim (NEULASTA ONPRO) subcutaneous injection kit 6 mg, 6 mg, Subcutaneous, Once, 7 of 12 cycles  Administration: 6 mg (7/2/2020), 6 mg (7/16/2020), 6 mg (7/30/2020), 6 mg (8/13/2020), 6 mg (8/27/2020), 6 mg (9/10/2020)  fosaprepitant (EMEND) 150 mg in sodium chloride 0 9 % 250 mL IVPB, 150 mg, Intravenous, Once, 7 of 12 cycles  Administration: 150 mg (6/30/2020), 150 mg (7/14/2020), 150 mg (7/28/2020), 150 mg (8/11/2020), 150 mg (9/8/2020), 150 mg (8/25/2020)  irinotecan (CAMPTOSAR) 263 mg in dextrose 5 % 500 mL chemo infusion, 180 mg/m2 = 263 mg, Intravenous, Once, 3 of 3 cycles  Administration: 263 mg (6/30/2020), 263 mg (7/14/2020), 263 mg (7/28/2020)  leucovorin 584 mg in dextrose 5 % 250 mL IVPB, 400 mg/m2 = 584 mg (100 % of original dose 400 mg/m2), Intravenous, Once, 2 of 7 cycles  Dose modification: 400 mg/m2 (original dose 400 mg/m2, Cycle 6)  Administration: 584 mg (9/8/2020)  oxaliplatin (ELOXATIN) 124 1 mg in dextrose 5 % 250 mL chemo infusion, 85 mg/m2 = 124 1 mg, Intravenous, Once, 7 of 12 cycles  Administration: 124 1 mg (6/30/2020), 124 1 mg (7/14/2020), 124 1 mg (7/28/2020), 124 1 mg (8/11/2020), 124 1 mg (9/8/2020), 124 1 mg (8/25/2020)     10/20/2020 Surgery    Whipple:  - Residual invasive adenocarcinoma with mucinous features, 2 3 cm   - Metastatic carcinoma present in one of fourteen lymph nodes ()  - All margins are negative for tumor  2020 - 2021 Chemotherapy    [No matching medication found in this treatment plan]     2021 -  Chemotherapy    fluorouracil (ADRUCIL) injection 560 mg, 400 mg/m2 = 560 mg, Intravenous, Once, 0 of 4 cycles  fosaprepitant (EMEND) 150 mg in sodium chloride 0 9 % 250 mL IVPB, 150 mg, Intravenous, Once, 0 of 4 cycles  leucovorin 560 mg in dextrose 5 % 250 mL IVPB, 400 mg/m2 = 560 mg, Intravenous, Once, 0 of 4 cycles  oxaliplatin (ELOXATIN) 119 mg in dextrose 5 % 250 mL chemo infusion, 85 mg/m2 = 119 mg, Intravenous, Once, 0 of 4 cycles     FOLFIRINOX every 2 weeks with Neulasta growth factor support started on 2020  This had to be modified and CPT 11 was discontinued because she had a question of a reaction     She completed a total of 6 cycles      She went for surgery and was found to have 1 positive lymph node    5 fluorouracil 225 mg/m2 CIVI  Monday through Friday with concurrent radiation    Current Hematologic/ Oncologic Treatment:    Will start modified Folfox 6 x 4 cycles     ECO - Symptomatic but completely ambulatory    Interval History:  The patient is seen virtually for routine follow up  She has completed concurrent chemoradiation and will now start modified FOLFOX for 4 cycles  /4 cycle is scheduled to be given on 2021  After she completes 4 cycles she will have re-imaging  Patient reports she tolerated concurrent chemoradiation very well  She fell fatigue to be the worst of her symptoms  She had some mild nausea this was well managed with Compazine when needed  Presently, patient states she is doing well  Her fatigue has improved and she is able to do activities that she enjoys  Her appetite remains fair, her weight is stable  She does have several bowel movements per day that she describes as soft  Not diarrhea    She denies any abdominal pain         Cancer Staging:  Cancer Staging  Pancreatic adenocarcinoma [facility])  Staging form: Pancreas, AJCC 8th Edition  - Clinical: Stage IIB (cT2, cN1, cM0) - Unsigned  Histologic grade (G): G2  Histologic grading system: 3 grade system      Molecular Testing:             Test Results:    Imaging: No results found  Labs:   Lab Results   Component Value Date    WBC 6 02 2021    HGB 14 2 2021    HCT 43 3 2021    MCV 94 2021     2021     Lab Results   Component Value Date    K 4 2 2021     2021    CO2 27 2021    BUN 18 2021    CREATININE 1 17 2021    GLUCOSE 153 (H) 10/20/2020    GLUF 191 (H) 2020    CALCIUM 9 1 2021    CORRECTEDCA 9 9 10/25/2020    AST 24 2021    ALT 39 2021    ALKPHOS 94 2021    EGFR 48 2021           Review of Systems   Constitutional: Positive for fatigue  All other systems reviewed and are negative          Active Problems:   Patient Active Problem List   Diagnosis    Hyperlipidemia    Essential hypertension    Type 2 diabetes mellitus without complication, without long-term current use of insulin (HCC)    Pruritus    Transaminitis    Pancreatic adenocarcinoma ([facility] [address] )    Port-A-Cath in place    Therapeutic opioid-induced constipation (OIC)    Anxiety    Functional diarrhea    Poor appetite       Past Medical History:   Past Medical History:   Diagnosis Date    Diabetes mellitus ([address] )     Hypertension     Lactic acidosis 2020    Neuropathy     Obstructive jaundice 2020       Surgical History:   Past Surgical History:   Procedure Laterality Date    BREAST SURGERY Left     calcifications     SECTION      CHOLECYSTECTOMY N/A 10/20/2020    Procedure: CHOLECYSTECTOMY;  Surgeon: Seb Zaldivar MD;  [facility];  Service: Surgical Oncology    COLONOSCOPY      ECTOPIC PREGNANCY SURGERY      partial ovary removed    FL GUIDED CENTRAL VENOUS ACCESS DEVICE INSERTION  6/23/2020    JOINT REPLACEMENT Bilateral     LAPAROTOMY N/A 10/20/2020    Procedure: LAPAROTOMY EXPLORATORY; INTRAOPERATIVE ULTRASOUND;  Surgeon: Tonya Lesch, MD;  [facility];  Service: Surgical Oncology    [address]    NOSE SURGERY      deviated septum    REPLACEMENT TOTAL HIP W/  RESURFACING IMPLANTS Bilateral 2012    right hip done July 2012; left hip done September 2012    TONSILLECTOMY  1957    TUNNELED VENOUS PORT PLACEMENT Left 6/23/2020    Procedure: INSERTION VENOUS PORT (PORT-A-CATH), left;  Surgeon: Tonya Lesch, MD;  [facility];  Service: Surgical Oncology    WHIPPLE PROCEDURE/PANCREATICO-DUODENECTOMY N/A 10/20/2020    Procedure: WHIPPLE PROCEDURE/PANCREATICO-DUODENECTOMY;  Surgeon: Tonya Lesch, MD;  [facility];  Service: Surgical Oncology       Family History:    Family History   Problem Relation Age of Onset    Diabetes Mother     Heart disease Mother     Heart disease Father     Breast cancer additional onset Sister     Breast cancer additional onset Maternal Aunt     Stroke Maternal Grandfather        Cancer-related family history is not on file      Social History:   Social History     Socioeconomic History    Marital status: /Civil Union     Spouse name: Not on file    Number of children: Not on file    Years of education: Not on file    Highest education level: Not on file   Occupational History    Not on file   Social Needs    Financial resource strain: Not on file    Food insecurity     Worry: Not on file     Inability: Not on file   [employer] needs     Medical: Not on file     Non-medical: Not on file   Tobacco Use    Smoking status: Former Smoker    Smokeless tobacco: Never Used    Tobacco comment: quit 40 years ago   Substance and Sexual Activity    Alcohol use: Not Currently     Frequency: Monthly or less    Drug use: Never    Sexual activity: Not Currently Lifestyle    Physical activity     Days per week: Not on file     Minutes per session: Not on file    Stress: Not on file   Relationships    Social connections     Talks on phone: Not on file     Gets together: Not on file     Attends Confucianist service: Not on file     Active member of club or organization: Not on file     Attends meetings of clubs or organizations: Not on file     Relationship status: Not on file    Intimate partner violence     Fear of current or ex partner: Not on file     Emotionally abused: Not on file     Physically abused: Not on file     Forced sexual activity: Not on file   Other Topics Concern    Not on file   Social History Narrative    Not on file       Current Medications:   Current Outpatient Medications   Medication Sig Dispense Refill    dicyclomine (BENTYL) 20 mg tablet TAKE 1 TABLET BY MOUTH TWICE A  tablet 1    diphenhydrAMINE (BENADRYL) 25 mg capsule Take 25 mg by mouth every 6 (six) hours as needed for itching      diphenoxylate-atropine (LOMOTIL) 2 5-0 025 mg per tablet Take 1 tablet by mouth 4 (four) times a day as needed for diarrhea DO NOT EXCEED 4 DOSES IN 1 DAY 30 tablet 0    [START ON 2/8/2021] fluorouracil 3,360 mg in CADD infusion pump Infuse 3,360 mg (1,200 mg/m2/day x 1 4 m2 (Treatment plan recorded BSA)) into a venous catheter over 46 hours for 2 days Treatment date 2/8/21 1 Device 0    gabapentin (NEURONTIN) 300 mg capsule Take 300 mg by mouth 3 (three) times a day      hydrOXYzine HCL (ATARAX) 10 mg tablet Take 1 tablet (10 mg total) by mouth every 8 (eight) hours as needed for itching (Patient not taking: Reported on 1/7/2021) 30 tablet 0    LORazepam (ATIVAN) 0 5 mg tablet Take 1 tablet (0 5 mg total) by mouth 2 (two) times a day as needed for anxiety NO FURTHER REFILLS WITHOUT CLINIC VISIT WITH PALLIATIVE CARE 30 tablet 0    mirtazapine (REMERON) 15 mg tablet Take 1 tablet (15 mg total) by mouth daily at bedtime 30 tablet 3    omeprazole (PriLOSEC) 20 mg delayed release capsule TAKE 1 CAPSULE BY MOUTH EVERY DAY 90 capsule 3    pancrelipase, Lip-Prot-Amyl, (CREON) 12,000 units capsule Take 12,000 units of lipase by mouth 3 (three) times a day with meals (Patient not taking: Reported on 11/16/2020) 60 capsule 0    prochlorperazine (COMPAZINE) 10 mg tablet Take 1 tablet (10 mg total) by mouth every 6 (six) hours as needed for nausea or vomiting 45 tablet 3     No current facility-administered medications for this visit  Allergies: Allergies   Allergen Reactions    Betadine [Povidone Iodine] Rash     Also itching from betadine    Other Throat Swelling     Pt unsure which chemotherapy drug caused tongue swelling and locked jaw  Please review in epic notes Folfirinox or possible Neulasta  NEED to review       Physical Exam:  There were no vitals taken for this visit  There is no height or weight on file to calculate BSA  Wt Readings from Last 3 Encounters:   01/18/21 47 4 kg (104 lb 8 oz)   01/11/21 48 2 kg (106 lb 4 2 oz)   01/07/21 48 1 kg (106 lb)           Physical Exam  Constitutional:       Comments: Patient is alert, pleasant, appears comfortable  In no acute distress physical exam limited due to nature of video visit   HENT:      Head: Normocephalic and atraumatic  Nose: Nose normal       Mouth/Throat:      Mouth: Mucous membranes are moist       Pharynx: Oropharynx is clear  No oropharyngeal exudate  Eyes:      General: No scleral icterus  Right eye: No discharge  Left eye: No discharge  Conjunctiva/sclera: Conjunctivae normal    Pulmonary:      Effort: Pulmonary effort is normal  No respiratory distress  Neurological:      Mental Status: She is alert and oriented to person, place, and time  Psychiatric:         Mood and Affect: Mood normal          Behavior: Behavior normal          Assessment / Plan:    1  Pancreatic adenocarcinoma [facility])    The patient  is a 77 year female with pancreatic cancer  She was seen for an initial consultation on 6/18/20  She presented with painless jaundice  Workup was done including a CT scan of the chest abdomen pelvis along with an MRI which showed an isolated pancreatic head mass which was biopsied and 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   The patient's bilirubin which was elevated at 12 2 at its peak has now come down to 1 0   She was seen by our colleagues in Surgical Oncology and they recommended neoadjuvant chemotherapy prior to consideration of a resection  She was started on FOLFIRINOX  every 2 weeks with Neulasta growth factor support; first dose was given on 6/30/20    She had this for approximately 3 cycles but then had a reaction to the CPT 11 so this was discontinued   She then proceeded to receive dose adjusted modified FOLFOX 6  She finished up 6 cycles and went for surgery  Whipple procedure/pancreaticoduodenectomy completed on 10/20/20  Surgery revealed 1 positive lymph node along with residual tumor     She has recently completed concurrent chemoradiation with 5  mg/m2 CIVI  Monday through Friday for a total of 6 weeks in the adjuvant setting  She tolerated this well  She will now be started on adjuvant treatment with modified FOLFOX 6 for a total of 4 cycles  Once she has completed an additional 4 cycles of FOLFOX, she will be scheduled for re-imaging  This will be scheduled for the end of March  Patient will have blood work completed prior to cycle 1 of 4 modified FOLFOX 6 that is scheduled to be given on Monday 02/08/2021  She will return for a follow-up visit with Dr Scott Lyle in 1 month  Patient was instructed to call with any questions or concerns prior to her next visit  VIRTUAL VISIT Celia Niño acknowledges that  she has consented to an online visit or consultation     She understands that the online visit is based solely on information provided by  her, and that, in the absence of a face-to-face physical evaluation by the physician, the diagnosis  She receives is both limited and provisional in terms of accuracy and completeness  This is not intended to replace a full medical face-to-face evaluation by the physician  Dilip Eth understands and accepts these terms  Patient was informed this is a billable service  Portions of the record may have been created with voice recognition software  Occasional wrong word or "sound a like" substitutions may have occurred due to the inherent limitations of voice recognition software  Read the chart carefully and recognize, using context, where substitutions have occurred

## 2021-02-01 ENCOUNTER — TELEPHONE (OUTPATIENT)
Dept: HEMATOLOGY ONCOLOGY | Facility: MEDICAL CENTER | Age: 66
End: 2021-02-01

## 2021-02-01 NOTE — TELEPHONE ENCOUNTER
LVM for patient to return call to the office regarding her appointment on 2/2/21 with GREG Neilsen needing to be converted to virtual due to the weather  Provided office phone number for a return call

## 2021-02-02 ENCOUNTER — TELEMEDICINE (OUTPATIENT)
Dept: HEMATOLOGY ONCOLOGY | Facility: CLINIC | Age: 66
End: 2021-02-02
Payer: MEDICARE

## 2021-02-02 VITALS — BODY MASS INDEX: 21.5 KG/M2 | WEIGHT: 106.5 LBS

## 2021-02-02 DIAGNOSIS — C25.9 PANCREATIC ADENOCARCINOMA (HCC): Primary | Chronic | ICD-10-CM

## 2021-02-02 PROCEDURE — 99214 OFFICE O/P EST MOD 30 MIN: CPT | Performed by: NURSE PRACTITIONER

## 2021-02-02 RX ORDER — AMLODIPINE BESYLATE 2.5 MG/1
2.5 TABLET ORAL DAILY
COMMUNITY
Start: 2021-01-20 | End: 2022-07-14

## 2021-02-02 RX ORDER — EMPAGLIFLOZIN 10 MG/1
10 TABLET, FILM COATED ORAL EVERY MORNING
COMMUNITY

## 2021-02-03 ENCOUNTER — TELEPHONE (OUTPATIENT)
Dept: HEMATOLOGY ONCOLOGY | Facility: CLINIC | Age: 66
End: 2021-02-03

## 2021-02-03 NOTE — TELEPHONE ENCOUNTER
Patient is calling in requesting to speak to Shahida regarding her appointments, she did not want me to help her either rescheduling, she can be reached back at [phone]

## 2021-02-03 NOTE — TELEPHONE ENCOUNTER
Reviewed appointments with patient  She wanted the f/u and CT scan ordered by Dr Janie Ma to be rescheduled to after her tx is complete on 2/22    Moved CT to 4/5 and f/u to 4/12

## 2021-02-06 ENCOUNTER — LAB (OUTPATIENT)
Dept: LAB | Facility: HOSPITAL | Age: 66
End: 2021-02-06
Attending: INTERNAL MEDICINE
Payer: MEDICARE

## 2021-02-06 DIAGNOSIS — C25.9 PANCREATIC ADENOCARCINOMA (HCC): ICD-10-CM

## 2021-02-06 LAB
ALBUMIN SERPL BCP-MCNC: 3.4 G/DL (ref 3.5–5)
ALP SERPL-CCNC: 140 U/L (ref 46–116)
ALT SERPL W P-5'-P-CCNC: 35 U/L (ref 12–78)
ANION GAP SERPL CALCULATED.3IONS-SCNC: 8 MMOL/L (ref 4–13)
AST SERPL W P-5'-P-CCNC: 30 U/L (ref 5–45)
BASOPHILS # BLD AUTO: 0.03 THOUSANDS/ΜL (ref 0–0.1)
BASOPHILS NFR BLD AUTO: 1 % (ref 0–1)
BILIRUB SERPL-MCNC: 0.4 MG/DL (ref 0.2–1)
BUN SERPL-MCNC: 13 MG/DL (ref 5–25)
CALCIUM ALBUM COR SERPL-MCNC: 10 MG/DL (ref 8.3–10.1)
CALCIUM SERPL-MCNC: 9.5 MG/DL (ref 8.3–10.1)
CHLORIDE SERPL-SCNC: 107 MMOL/L (ref 100–108)
CO2 SERPL-SCNC: 30 MMOL/L (ref 21–32)
CREAT SERPL-MCNC: 1.08 MG/DL (ref 0.6–1.3)
EOSINOPHIL # BLD AUTO: 0.21 THOUSAND/ΜL (ref 0–0.61)
EOSINOPHIL NFR BLD AUTO: 4 % (ref 0–6)
ERYTHROCYTE [DISTWIDTH] IN BLOOD BY AUTOMATED COUNT: 15.1 % (ref 11.6–15.1)
GFR SERPL CREATININE-BSD FRML MDRD: 53 ML/MIN/1.73SQ M
GLUCOSE P FAST SERPL-MCNC: 134 MG/DL (ref 65–99)
HCT VFR BLD AUTO: 43.8 % (ref 34.8–46.1)
HGB BLD-MCNC: 14.1 G/DL (ref 11.5–15.4)
IMM GRANULOCYTES # BLD AUTO: 0.01 THOUSAND/UL (ref 0–0.2)
IMM GRANULOCYTES NFR BLD AUTO: 0 % (ref 0–2)
LYMPHOCYTES # BLD AUTO: 0.58 THOUSANDS/ΜL (ref 0.6–4.47)
LYMPHOCYTES NFR BLD AUTO: 12 % (ref 14–44)
MCH RBC QN AUTO: 31.1 PG (ref 26.8–34.3)
MCHC RBC AUTO-ENTMCNC: 32.2 G/DL (ref 31.4–37.4)
MCV RBC AUTO: 97 FL (ref 82–98)
MONOCYTES # BLD AUTO: 0.69 THOUSAND/ΜL (ref 0.17–1.22)
MONOCYTES NFR BLD AUTO: 14 % (ref 4–12)
NEUTROPHILS # BLD AUTO: 3.39 THOUSANDS/ΜL (ref 1.85–7.62)
NEUTS SEG NFR BLD AUTO: 69 % (ref 43–75)
NRBC BLD AUTO-RTO: 0 /100 WBCS
PLATELET # BLD AUTO: 198 THOUSANDS/UL (ref 149–390)
PMV BLD AUTO: 10.5 FL (ref 8.9–12.7)
POTASSIUM SERPL-SCNC: 4.1 MMOL/L (ref 3.5–5.3)
PROT SERPL-MCNC: 6.9 G/DL (ref 6.4–8.2)
RBC # BLD AUTO: 4.53 MILLION/UL (ref 3.81–5.12)
SODIUM SERPL-SCNC: 145 MMOL/L (ref 136–145)
WBC # BLD AUTO: 4.91 THOUSAND/UL (ref 4.31–10.16)

## 2021-02-06 PROCEDURE — 80053 COMPREHEN METABOLIC PANEL: CPT

## 2021-02-06 PROCEDURE — 85025 COMPLETE CBC W/AUTO DIFF WBC: CPT

## 2021-02-06 PROCEDURE — 36415 COLL VENOUS BLD VENIPUNCTURE: CPT

## 2021-02-08 ENCOUNTER — HOSPITAL ENCOUNTER (OUTPATIENT)
Dept: INFUSION CENTER | Facility: CLINIC | Age: 66
Discharge: HOME | End: 2021-02-08
Payer: MEDICARE

## 2021-02-08 VITALS
TEMPERATURE: 96.6 F | DIASTOLIC BLOOD PRESSURE: 63 MMHG | RESPIRATION RATE: 18 BRPM | WEIGHT: 107.14 LBS | SYSTOLIC BLOOD PRESSURE: 113 MMHG | BODY MASS INDEX: 21.6 KG/M2 | HEART RATE: 71 BPM | HEIGHT: 59 IN

## 2021-02-08 DIAGNOSIS — C25.9 PANCREATIC ADENOCARCINOMA (HCC): Primary | ICD-10-CM

## 2021-02-08 PROCEDURE — 96413 CHEMO IV INFUSION 1 HR: CPT

## 2021-02-08 PROCEDURE — 96367 TX/PROPH/DG ADDL SEQ IV INF: CPT

## 2021-02-08 PROCEDURE — G0498 CHEMO EXTEND IV INFUS W/PUMP: HCPCS

## 2021-02-08 PROCEDURE — 96368 THER/DIAG CONCURRENT INF: CPT

## 2021-02-08 PROCEDURE — 96411 CHEMO IV PUSH ADDL DRUG: CPT

## 2021-02-08 PROCEDURE — 96415 CHEMO IV INFUSION ADDL HR: CPT

## 2021-02-08 RX ORDER — FLUOROURACIL 50 MG/ML
400 INJECTION, SOLUTION INTRAVENOUS ONCE
Status: COMPLETED | OUTPATIENT
Start: 2021-02-08 | End: 2021-02-08

## 2021-02-08 RX ORDER — SODIUM CHLORIDE 9 MG/ML
20 INJECTION, SOLUTION INTRAVENOUS ONCE AS NEEDED
Status: DISCONTINUED | OUTPATIENT
Start: 2021-02-08 | End: 2021-02-11 | Stop reason: HOSPADM

## 2021-02-08 RX ORDER — LORAZEPAM 2 MG/ML
0.5 INJECTION INTRAMUSCULAR ONCE
Status: DISCONTINUED | OUTPATIENT
Start: 2021-02-08 | End: 2021-02-11 | Stop reason: HOSPADM

## 2021-02-08 RX ORDER — DEXTROSE MONOHYDRATE 50 MG/ML
20 INJECTION, SOLUTION INTRAVENOUS ONCE
Status: COMPLETED | OUTPATIENT
Start: 2021-02-08 | End: 2021-02-08

## 2021-02-08 RX ADMIN — DEXTROSE 20 ML/HR: 5 SOLUTION INTRAVENOUS at 11:27

## 2021-02-08 RX ADMIN — FOSAPREPITANT 150 MG: 150 INJECTION, POWDER, LYOPHILIZED, FOR SOLUTION INTRAVENOUS at 10:46

## 2021-02-08 RX ADMIN — FLUOROURACIL 560 MG: 50 INJECTION, SOLUTION INTRAVENOUS at 13:51

## 2021-02-08 RX ADMIN — DEXAMETHASONE SODIUM PHOSPHATE: 10 INJECTION, SOLUTION INTRAMUSCULAR; INTRAVENOUS at 10:19

## 2021-02-08 RX ADMIN — SODIUM CHLORIDE 20 ML/HR: 0.9 INJECTION, SOLUTION INTRAVENOUS at 10:19

## 2021-02-08 RX ADMIN — LEUCOVORIN CALCIUM 560 MG: 350 INJECTION, POWDER, LYOPHILIZED, FOR SOLUTION INTRAMUSCULAR; INTRAVENOUS at 11:27

## 2021-02-08 RX ADMIN — OXALIPLATIN 119 MG: 5 INJECTION, SOLUTION, CONCENTRATE INTRAVENOUS at 11:27

## 2021-02-08 NOTE — PROGRESS NOTES
Pt here for day 1 cycle 1 of modified FOLFOX,  Left port accessed with positive blood return noted throughout treatment  Tolerated infusion without incident  CADD connected with Roxanne Melendrez RN verifying 2nd check  Running and green light blinking  PT aware to come back at noon on Wednesday for CADD disconnect  Walked out in stable condition  AVS declined

## 2021-02-10 ENCOUNTER — HOSPITAL ENCOUNTER (OUTPATIENT)
Dept: INFUSION CENTER | Facility: CLINIC | Age: 66
Discharge: HOME | End: 2021-02-10
Payer: MEDICARE

## 2021-02-10 DIAGNOSIS — D70.1 CHEMOTHERAPY INDUCED NEUTROPENIA (HCC): ICD-10-CM

## 2021-02-10 DIAGNOSIS — C25.9 PANCREATIC ADENOCARCINOMA (HCC): Primary | ICD-10-CM

## 2021-02-10 DIAGNOSIS — T45.1X5A CHEMOTHERAPY INDUCED NEUTROPENIA (HCC): ICD-10-CM

## 2021-02-10 PROCEDURE — 96372 THER/PROPH/DIAG INJ SC/IM: CPT

## 2021-02-10 RX ADMIN — PEGFILGRASTIM 6 MG: KIT SUBCUTANEOUS at 12:39

## 2021-02-10 NOTE — PROGRESS NOTES
Pt here today for cadd pump D/C, denies any discomforts  Good blood return noted, res volume=0mL  Pt asked if she would be getting the Neulasta Onpro today  Spoke with Shira Mcdonough and Onpro was added to treatment plan for today per Samantha James  Neulasta OnPro placed on left abdomen green light blinking and full    Pt aware to remove onpro tomorrow 2/11 at 5pm   Pt aware of next appointment, declines AVS

## 2021-02-15 RX ORDER — DEXTROSE MONOHYDRATE 50 MG/ML
20 INJECTION, SOLUTION INTRAVENOUS ONCE
Status: CANCELLED | OUTPATIENT
Start: 2021-02-22

## 2021-02-15 RX ORDER — FLUOROURACIL 50 MG/ML
400 INJECTION, SOLUTION INTRAVENOUS ONCE
Status: CANCELLED | OUTPATIENT
Start: 2021-02-22

## 2021-02-15 RX ORDER — SODIUM CHLORIDE 9 MG/ML
20 INJECTION, SOLUTION INTRAVENOUS ONCE AS NEEDED
Status: CANCELLED | OUTPATIENT
Start: 2021-02-22

## 2021-02-15 RX ORDER — LORAZEPAM 2 MG/ML
0.5 INJECTION INTRAMUSCULAR ONCE
Status: CANCELLED
Start: 2021-02-22

## 2021-02-16 DIAGNOSIS — T45.1X5A CHEMOTHERAPY INDUCED NEUTROPENIA (HCC): Primary | ICD-10-CM

## 2021-02-16 DIAGNOSIS — C25.9 PANCREATIC ADENOCARCINOMA (HCC): ICD-10-CM

## 2021-02-16 DIAGNOSIS — D70.1 CHEMOTHERAPY INDUCED NEUTROPENIA (HCC): Primary | ICD-10-CM

## 2021-02-20 ENCOUNTER — LAB (OUTPATIENT)
Dept: LAB | Facility: HOSPITAL | Age: 66
End: 2021-02-20
Attending: INTERNAL MEDICINE
Payer: MEDICARE

## 2021-02-20 DIAGNOSIS — D70.1 CHEMOTHERAPY INDUCED NEUTROPENIA (HCC): ICD-10-CM

## 2021-02-20 DIAGNOSIS — T45.1X5A CHEMOTHERAPY INDUCED NEUTROPENIA (HCC): ICD-10-CM

## 2021-02-20 DIAGNOSIS — C25.9 PANCREATIC ADENOCARCINOMA (HCC): ICD-10-CM

## 2021-02-20 LAB
ALBUMIN SERPL BCP-MCNC: 3.5 G/DL (ref 3.5–5)
ALP SERPL-CCNC: 226 U/L (ref 46–116)
ALT SERPL W P-5'-P-CCNC: 81 U/L (ref 12–78)
ANION GAP SERPL CALCULATED.3IONS-SCNC: 11 MMOL/L (ref 4–13)
ANISOCYTOSIS BLD QL SMEAR: PRESENT
AST SERPL W P-5'-P-CCNC: 50 U/L (ref 5–45)
BASOPHILS # BLD MANUAL: 0 THOUSAND/UL (ref 0–0.1)
BASOPHILS NFR MAR MANUAL: 0 % (ref 0–1)
BILIRUB SERPL-MCNC: 0.5 MG/DL (ref 0.2–1)
BUN SERPL-MCNC: 9 MG/DL (ref 5–25)
CALCIUM SERPL-MCNC: 9.6 MG/DL (ref 8.3–10.1)
CHLORIDE SERPL-SCNC: 104 MMOL/L (ref 100–108)
CO2 SERPL-SCNC: 28 MMOL/L (ref 21–32)
CREAT SERPL-MCNC: 1.12 MG/DL (ref 0.6–1.3)
EOSINOPHIL # BLD MANUAL: 0.18 THOUSAND/UL (ref 0–0.4)
EOSINOPHIL NFR BLD MANUAL: 2 % (ref 0–6)
ERYTHROCYTE [DISTWIDTH] IN BLOOD BY AUTOMATED COUNT: 15.8 % (ref 11.6–15.1)
GFR SERPL CREATININE-BSD FRML MDRD: 51 ML/MIN/1.73SQ M
GLUCOSE P FAST SERPL-MCNC: 160 MG/DL (ref 65–99)
HCT VFR BLD AUTO: 44.5 % (ref 34.8–46.1)
HGB BLD-MCNC: 14.7 G/DL (ref 11.5–15.4)
LYMPHOCYTES # BLD AUTO: 1.07 THOUSAND/UL (ref 0.6–4.47)
LYMPHOCYTES # BLD AUTO: 12 % (ref 14–44)
MCH RBC QN AUTO: 31.3 PG (ref 26.8–34.3)
MCHC RBC AUTO-ENTMCNC: 33 G/DL (ref 31.4–37.4)
MCV RBC AUTO: 95 FL (ref 82–98)
MONOCYTES # BLD AUTO: 1.24 THOUSAND/UL (ref 0–1.22)
MONOCYTES NFR BLD: 14 % (ref 4–12)
MYELOCYTES NFR BLD MANUAL: 1 % (ref 0–1)
NEUTROPHILS # BLD MANUAL: 6.3 THOUSAND/UL (ref 1.85–7.62)
NEUTS BAND NFR BLD MANUAL: 16 % (ref 0–8)
NEUTS SEG NFR BLD AUTO: 55 % (ref 43–75)
NRBC BLD AUTO-RTO: 0 /100 WBCS
PLATELET # BLD AUTO: 108 THOUSANDS/UL (ref 149–390)
PLATELET BLD QL SMEAR: ABNORMAL
PMV BLD AUTO: 10.3 FL (ref 8.9–12.7)
POTASSIUM SERPL-SCNC: 4.3 MMOL/L (ref 3.5–5.3)
PROT SERPL-MCNC: 7.2 G/DL (ref 6.4–8.2)
RBC # BLD AUTO: 4.69 MILLION/UL (ref 3.81–5.12)
SODIUM SERPL-SCNC: 143 MMOL/L (ref 136–145)
TOTAL CELLS COUNTED SPEC: 100
WBC # BLD AUTO: 8.88 THOUSAND/UL (ref 4.31–10.16)

## 2021-02-20 PROCEDURE — 85007 BL SMEAR W/DIFF WBC COUNT: CPT

## 2021-02-20 PROCEDURE — 85027 COMPLETE CBC AUTOMATED: CPT

## 2021-02-20 PROCEDURE — 36415 COLL VENOUS BLD VENIPUNCTURE: CPT

## 2021-02-20 PROCEDURE — 80053 COMPREHEN METABOLIC PANEL: CPT

## 2021-02-22 ENCOUNTER — TELEPHONE (OUTPATIENT)
Dept: HEMATOLOGY ONCOLOGY | Facility: MEDICAL CENTER | Age: 66
End: 2021-02-22

## 2021-02-22 ENCOUNTER — HOSPITAL ENCOUNTER (OUTPATIENT)
Dept: INFUSION CENTER | Facility: CLINIC | Age: 66
Discharge: HOME | End: 2021-02-22
Payer: MEDICARE

## 2021-02-22 VITALS
HEIGHT: 59 IN | WEIGHT: 105.82 LBS | SYSTOLIC BLOOD PRESSURE: 141 MMHG | RESPIRATION RATE: 18 BRPM | HEART RATE: 79 BPM | DIASTOLIC BLOOD PRESSURE: 72 MMHG | BODY MASS INDEX: 21.33 KG/M2 | TEMPERATURE: 96.5 F

## 2021-02-22 DIAGNOSIS — C25.9 PANCREATIC ADENOCARCINOMA (HCC): Primary | ICD-10-CM

## 2021-02-22 DIAGNOSIS — D70.1 CHEMOTHERAPY INDUCED NEUTROPENIA (HCC): ICD-10-CM

## 2021-02-22 DIAGNOSIS — T45.1X5A CHEMOTHERAPY INDUCED NEUTROPENIA (HCC): ICD-10-CM

## 2021-02-22 PROCEDURE — 96411 CHEMO IV PUSH ADDL DRUG: CPT

## 2021-02-22 PROCEDURE — 96413 CHEMO IV INFUSION 1 HR: CPT

## 2021-02-22 PROCEDURE — 96415 CHEMO IV INFUSION ADDL HR: CPT

## 2021-02-22 PROCEDURE — 96367 TX/PROPH/DG ADDL SEQ IV INF: CPT

## 2021-02-22 PROCEDURE — G0498 CHEMO EXTEND IV INFUS W/PUMP: HCPCS

## 2021-02-22 PROCEDURE — 96368 THER/DIAG CONCURRENT INF: CPT

## 2021-02-22 RX ORDER — LORAZEPAM 2 MG/ML
0.5 INJECTION INTRAMUSCULAR ONCE
Status: DISCONTINUED | OUTPATIENT
Start: 2021-02-22 | End: 2021-02-25 | Stop reason: HOSPADM

## 2021-02-22 RX ORDER — FLUOROURACIL 50 MG/ML
400 INJECTION, SOLUTION INTRAVENOUS ONCE
Status: COMPLETED | OUTPATIENT
Start: 2021-02-22 | End: 2021-02-22

## 2021-02-22 RX ORDER — SODIUM CHLORIDE 9 MG/ML
20 INJECTION, SOLUTION INTRAVENOUS ONCE AS NEEDED
Status: DISCONTINUED | OUTPATIENT
Start: 2021-02-22 | End: 2021-02-25 | Stop reason: HOSPADM

## 2021-02-22 RX ORDER — DEXTROSE MONOHYDRATE 50 MG/ML
20 INJECTION, SOLUTION INTRAVENOUS ONCE
Status: COMPLETED | OUTPATIENT
Start: 2021-02-22 | End: 2021-02-22

## 2021-02-22 RX ADMIN — DEXTROSE 20 ML/HR: 5 SOLUTION INTRAVENOUS at 10:05

## 2021-02-22 RX ADMIN — SODIUM CHLORIDE 20 ML/HR: 0.9 INJECTION, SOLUTION INTRAVENOUS at 09:02

## 2021-02-22 RX ADMIN — FOSAPREPITANT 150 MG: 150 INJECTION, POWDER, LYOPHILIZED, FOR SOLUTION INTRAVENOUS at 09:25

## 2021-02-22 RX ADMIN — OXALIPLATIN 119 MG: 5 INJECTION, SOLUTION, CONCENTRATE INTRAVENOUS at 10:10

## 2021-02-22 RX ADMIN — FLUOROURACIL 560 MG: 50 INJECTION, SOLUTION INTRAVENOUS at 12:26

## 2021-02-22 RX ADMIN — LEUCOVORIN CALCIUM 560 MG: 350 INJECTION, POWDER, LYOPHILIZED, FOR SUSPENSION INTRAMUSCULAR; INTRAVENOUS at 10:10

## 2021-02-22 RX ADMIN — DEXAMETHASONE SODIUM PHOSPHATE: 10 INJECTION, SOLUTION INTRAMUSCULAR; INTRAVENOUS at 09:02

## 2021-02-22 NOTE — TELEPHONE ENCOUNTER
Inbound Calls to Reschedule/Cancel Appointments   Patient Details:     Fiordaliza      1955     [id number]           Who is calling? Patient    Date of original appointment 02/23/2021   Visit Type Follow up    Who is the Provider and Location? Dr Amarilys Martin    Is the patient on active treatment? Chemo? Radiation? The patient would like to cancel, reschedule or make into a virtual appointment? Virtual    Reason for rescheduling or cancelation? Weather   Platform Using Phone    Smart Phone android    Next Steps:           [employer]: After completing the above information, please route to Ines Villalba and [id number] [facility] Team: Please review and reach out to the patient

## 2021-02-23 ENCOUNTER — TELEMEDICINE (OUTPATIENT)
Dept: HEMATOLOGY ONCOLOGY | Facility: CLINIC | Age: 66
End: 2021-02-23
Payer: MEDICARE

## 2021-02-23 DIAGNOSIS — C25.9 PANCREATIC ADENOCARCINOMA (HCC): ICD-10-CM

## 2021-02-23 PROCEDURE — 99214 OFFICE O/P EST MOD 30 MIN: CPT | Performed by: INTERNAL MEDICINE

## 2021-02-23 RX ORDER — PROCHLORPERAZINE MALEATE 10 MG
10 TABLET ORAL EVERY 6 HOURS PRN
Qty: 45 TABLET | Refills: 3 | Status: SHIPPED | OUTPATIENT
Start: 2021-02-23 | End: 2021-12-14 | Stop reason: SDUPTHER

## 2021-02-23 NOTE — PROGRESS NOTES
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   The patient's bilirubin which was elevated at 12 2 at its peak has now come down to 1 0   She was seen by our colleagues in Surgical Oncology and they recommended neoadjuvant chemotherapy prior to consideration of a resection  She was started on FOLFIRINOX  every 2 weeks with Neulasta growth factor support; first dose was given on 6/30/20    She had this for approximately 3 cycles but then had a reaction to the CPT 11 so this was discontinued   She then proceeded to receive dose adjusted modified FOLFOX 6  She finished up 6 cycles and went for surgery  Whipple procedure/pancreaticoduodenectomy completed on 10/20/20   Surgery revealed 1 positive lymph node along with residual tumor     She  completed concurrent chemoradiation with 5  mg/m2 CIVI  Monday through Friday for a total of 6 weeks in the adjuvant setting  She tolerated this well  She Is currently getting modified FOLFOX 6 for a total of 4 cycles  She has received 2 cycles so far  The plan is for her to complete 2 more cycles and then repeat imaging  She is already set up for imaging in April  We will continue her current regimen with no changes  If she has any questions she will call our office  Reason for visit is   Pancreatic cancer     Encounter provider Patricio Workman MD    Provider located at [address] [place] [id number]         The patient was identified by name and date of birth   Emilia Has was informed that this is a telemedicine visit and that the visit is being conducted through [employer] and patient was informed that this is a secure, HIPAA-compliant platform  She agrees to proceed     My office door was closed  No one else was in the room  She acknowledged consent and understanding of privacy and security of the video platform  The patient has agreed to participate and understands they can discontinue the visit at any time  Patient is aware this is a billable service  Solo Painting is a 77 y o  female   HPI     The patient was called for follow-up visit  She states she is doing well  She does have cold sensitivity  Denies any nausea denies any vomiting denies any diarrhea  Otherwise is tolerating her chemotherapy reasonably well  She did need a refill on her Compazine and I will send this in  The rest of her 15  Point review of systems today was negative        Past Medical History:   Diagnosis Date    Chemotherapy induced neutropenia ([facility] ) 2/10/2021    Diabetes mellitus ([facility] [address] )     Hypertension     Lactic acidosis 2020    Neuropathy     Obstructive jaundice 2020       Past Surgical History:   Procedure Laterality Date    BREAST SURGERY Left     calcifications     SECTION      CHOLECYSTECTOMY N/A 10/20/2020    Procedure: CHOLECYSTECTOMY;  Surgeon: Sharif Levine MD;  [facility];  Service: Surgical Oncology    COLONOSCOPY      ECTOPIC PREGNANCY SURGERY      partial ovary removed    FL GUIDED CENTRAL VENOUS ACCESS DEVICE INSERTION  2020    JOINT REPLACEMENT Bilateral     LAPAROTOMY N/A 10/20/2020    Procedure: LAPAROTOMY EXPLORATORY; INTRAOPERATIVE ULTRASOUND;  Surgeon: Sharif Levine MD;  [facility];  Service: Surgical Oncology    [facility]    NOSE SURGERY      deviated septum    REPLACEMENT TOTAL HIP W/  RESURFACING IMPLANTS Bilateral 2012    right hip done 2012; left hip done 2012    TONSILLECTOMY      TUNNELED VENOUS PORT PLACEMENT Left 6/23/2020    Procedure: INSERTION VENOUS PORT (PORT-A-CATH), left;  Surgeon: Radha Mtz MD;  [facility];  Service: Surgical Oncology    WHIPPLE PROCEDURE/PANCREATICO-DUODENECTOMY N/A 10/20/2020    Procedure:  WHIPPLE PROCEDURE/PANCREATICO-DUODENECTOMY;  Surgeon: Radha Mtz MD;  [facility];  Service: Surgical Oncology       Current Outpatient Medications   Medication Sig Dispense Refill    amLODIPine (NORVASC) 2 5 mg tablet Take 2 5 mg by mouth daily      dicyclomine (BENTYL) 20 mg tablet TAKE 1 TABLET BY MOUTH TWICE A  tablet 1    diphenhydrAMINE (BENADRYL) 25 mg capsule Take 25 mg by mouth every 6 (six) hours as needed for itching      diphenoxylate-atropine (LOMOTIL) 2 5-0 025 mg per tablet Take 1 tablet by mouth 4 (four) times a day as needed for diarrhea DO NOT EXCEED 4 DOSES IN 1 DAY 30 tablet 0    Empagliflozin (Jardiance) 10 MG TABS Take 10 mg by mouth every morning      fluorouracil 3,360 mg in CADD infusion pump Infuse 3,360 mg (1,200 mg/m2/day x 1 4 m2 (Treatment plan recorded BSA)) into a venous catheter over 46 hours for 2 days 1 Device 0    gabapentin (NEURONTIN) 300 mg capsule Take 300 mg by mouth 3 (three) times a day      hydrOXYzine HCL (ATARAX) 10 mg tablet Take 1 tablet (10 mg total) by mouth every 8 (eight) hours as needed for itching (Patient not taking: Reported on 1/7/2021) 30 tablet 0    LORazepam (ATIVAN) 0 5 mg tablet Take 1 tablet (0 5 mg total) by mouth 2 (two) times a day as needed for anxiety NO FURTHER REFILLS WITHOUT CLINIC VISIT WITH PALLIATIVE CARE 30 tablet 0    mirtazapine (REMERON) 15 mg tablet Take 1 tablet (15 mg total) by mouth daily at bedtime 30 tablet 3    omeprazole (PriLOSEC) 20 mg delayed release capsule TAKE 1 CAPSULE BY MOUTH EVERY DAY 90 capsule 3    pancrelipase, Lip-Prot-Amyl, (CREON) 12,000 units capsule Take 12,000 units of lipase by mouth 3 (three) times a day with meals (Patient not taking: Reported on 11/16/2020) 60 capsule 0    prochlorperazine (COMPAZINE) 10 mg tablet Take 1 tablet (10 mg total) by mouth every 6 (six) hours as needed for nausea or vomiting 45 tablet 3     No current facility-administered medications for this visit  Facility-Administered Medications Ordered in Other Visits   Medication Dose Route Frequency Provider Last Rate Last Admin    LORazepam (ATIVAN) injection 0 5 mg  0 5 mg Intravenous Once Nova Stinson MD        sodium chloride 0 9 % infusion  20 mL/hr Intravenous Once PRN Nova Stinson MD   Stopped at 02/22/21 1004        Allergies   Allergen Reactions    Betadine [Povidone Iodine] Rash     Also itching from betadine    Other Throat Swelling     Pt unsure which chemotherapy drug caused tongue swelling and locked jaw  Please review in epic notes Folfirinox or possible Neulasta  NEED to review       Review of Systems   All other systems reviewed and are negative  Video Exam    There were no vitals filed for this visit  Physical Exam  Constitutional:       Appearance: She is well-developed  HENT:      Head: Normocephalic and atraumatic  Right Ear: External ear normal       Left Ear: External ear normal       Nose: Nose normal    Eyes:      Conjunctiva/sclera: Conjunctivae normal    Neck:      Musculoskeletal: Normal range of motion  Pulmonary:      Effort: Pulmonary effort is normal    Musculoskeletal: Normal range of motion  Neurological:      Mental Status: She is alert and oriented to person, place, and time  Psychiatric:         Behavior: Behavior normal          Thought Content: Thought content normal          Judgment: Judgment normal           I spent 25 minutes with patient today in which greater than 50% of the time was spent in counseling/coordination of care regarding Pancreatic cancer      VIRTUAL VISIT DISCLAIMER    David Hodgson acknowledges that she has consented to an online visit or consultation   She understands that the online visit is based solely on information provided by her, and that, in the absence of a face-to-face physical evaluation by the physician, the diagnosis she receives is both limited and provisional in terms of accuracy and completeness  This is not intended to replace a full medical face-to-face evaluation by the physician  Liz Mirza understands and accepts these terms

## 2021-02-24 ENCOUNTER — HOSPITAL ENCOUNTER (OUTPATIENT)
Dept: INFUSION CENTER | Facility: CLINIC | Age: 66
Discharge: HOME | End: 2021-02-24
Payer: MEDICARE

## 2021-02-24 ENCOUNTER — TELEPHONE (OUTPATIENT)
Dept: HEMATOLOGY ONCOLOGY | Facility: CLINIC | Age: 66
End: 2021-02-24

## 2021-02-24 ENCOUNTER — TELEMEDICINE (OUTPATIENT)
Dept: RADIATION ONCOLOGY | Facility: CLINIC | Age: 66
End: 2021-02-24
Attending: RADIOLOGY

## 2021-02-24 VITALS — TEMPERATURE: 96.7 F

## 2021-02-24 DIAGNOSIS — Z95.828 PORT-A-CATH IN PLACE: ICD-10-CM

## 2021-02-24 DIAGNOSIS — C25.9 PANCREATIC ADENOCARCINOMA (HCC): Primary | Chronic | ICD-10-CM

## 2021-02-24 DIAGNOSIS — C25.9 PANCREATIC ADENOCARCINOMA (HCC): Primary | ICD-10-CM

## 2021-02-24 DIAGNOSIS — T45.1X5A CHEMOTHERAPY INDUCED NEUTROPENIA (HCC): ICD-10-CM

## 2021-02-24 DIAGNOSIS — D70.1 CHEMOTHERAPY INDUCED NEUTROPENIA (HCC): ICD-10-CM

## 2021-02-24 PROCEDURE — 96372 THER/PROPH/DIAG INJ SC/IM: CPT

## 2021-02-24 RX ADMIN — PEGFILGRASTIM 6 MG: KIT SUBCUTANEOUS at 11:14

## 2021-02-24 NOTE — TELEPHONE ENCOUNTER
Patient called requesting to reschedule her 3/23 with Dr Aurora Graham but he is out of the office on vacation , patient will keep 3/23 with Janneth Hernandes

## 2021-02-24 NOTE — PROGRESS NOTES
Follow-up - Radiation Oncology   Ree Painting 1955 77 y o  female [id number]      History of Present Illness   Cancer Staging  Pancreatic adenocarcinoma ([facility] [address] )  Staging form: Pancreas, AJCC 8th Edition  - Clinical: Stage IIB (cT2, cN1, cM0) - Unsigned  Histologic grade (G): G2  Histologic grading system: 3 grade system      Ree Painting is a 77y o  year old female with a history of 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 status post FOLFOX neoadjuvant chemotherapy (Camptosar stopped after cycle 3) with partial response followed by Whipple resection on 10/20/2020  Pathology was significant for tumor invading into the submucosa of the duodenum and (+) 1/14 lymph nodes  She completed a course of adjuvant chemotherapy and radiation 1/26/21  We contacted her by phone for follow-up evaluation today  The patient has initiated final 4 cycles of FOLFOX on 2/8/21 and recently completed cycle 2  She complains of fatigue and recurrence of mild bilateral hand neuropathy and nausea  She denies vomiting and nausea controlled with Compazine as needed  The patient denies weight loss, diarrhea, abdominal pain, gastritis, or change in stool or urine  2/20/21 LFTs were noted to be mildly elevated  AST 50 (was 30 on 2/6/21)  ALT 81 (was 35)  Alk phos 226 (was 140)    Upcoming appointment:  4/5/21 CT chest, abdomen and pelvis scheduled by Dr Maurilio Guevara      Assessment/Plan:  Ree Painting is a 77y o  year old female with a history of 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 status post FOLFOX neoadjuvant chemotherapy (Camptosar stopped after cycle 3) with partial response followed by Whipple resection on 10/20/2020  Pathology was significant for tumor invading into the submucosa of the duodenum and (+) 1/14 lymph nodes  She completed a course of adjuvant chemotherapy and radiation 1/26/21        She has mild elevation of LFTs since starting adjuvant FOLFOX, but overall appears to be tolerating systemic therapy well  She appears to have recovered well from radiation  She will complete adjuvant chemotherapy under Dr Dennys wilkerson and has CT surveillance follow-up scheduled on 4/5/21  She will return for follow-up with us in 6 months  We will see her sooner should the need arise  Cheng Doan MD  2/71/2384,6:80 PM    Portions of the record may have been created with voice recognition software   Occasional wrong word or "sound a like" substitutions may have occurred due to the inherent limitations of voice recognition software   Read the chart carefully and recognize, using context, where substitutions have occurred  Historical Information   Oncology History Overview Note      Pancreatic adenocarcinoma ([facility] [address] )   6/2/2020 Biopsy    [facility]- Dr Hemalatha Alfonsoo:  Pancreas, uncinate mass:      - Malignant ([facility] Category VI)      - Compatible with adenocarcinoma with mucinous features       6/30/2020 - 9/16/2020 Chemotherapy    fluorouracil (ADRUCIL) injection 585 mg, 400 mg/m2 = 585 mg, Intravenous, Once, 7 of 12 cycles  Administration: 585 mg (6/30/2020), 585 mg (7/14/2020), 585 mg (7/28/2020), 585 mg (8/11/2020), 585 mg (9/8/2020), 585 mg (8/25/2020)  pegfilgrastim (NEULASTA ONPRO) subcutaneous injection kit 6 mg, 6 mg, Subcutaneous, Once, 7 of 12 cycles  Administration: 6 mg (7/2/2020), 6 mg (7/16/2020), 6 mg (7/30/2020), 6 mg (8/13/2020), 6 mg (8/27/2020), 6 mg (9/10/2020)  fosaprepitant (EMEND) 150 mg in sodium chloride 0 9 % 250 mL IVPB, 150 mg, Intravenous, Once, 7 of 12 cycles  Administration: 150 mg (6/30/2020), 150 mg (7/14/2020), 150 mg (7/28/2020), 150 mg (8/11/2020), 150 mg (9/8/2020), 150 mg (8/25/2020)  irinotecan (CAMPTOSAR) 263 mg in dextrose 5 % 500 mL chemo infusion, 180 mg/m2 = 263 mg, Intravenous, Once, 3 of 3 cycles  Administration: 263 mg (6/30/2020), 263 mg (7/14/2020), 263 mg (7/28/2020)  leucovorin 584 mg in dextrose 5 % 250 mL IVPB, 400 mg/m2 = 584 mg (100 % of original dose 400 mg/m2), Intravenous, Once, 2 of 7 cycles  Dose modification: 400 mg/m2 (original dose 400 mg/m2, Cycle 6)  Administration: 584 mg (9/8/2020)  oxaliplatin (ELOXATIN) 124 1 mg in dextrose 5 % 250 mL chemo infusion, 85 mg/m2 = 124 1 mg, Intravenous, Once, 7 of 12 cycles  Administration: 124 1 mg (6/30/2020), 124 1 mg (7/14/2020), 124 1 mg (7/28/2020), 124 1 mg (8/11/2020), 124 1 mg (9/8/2020), 124 1 mg (8/25/2020)     10/20/2020 Surgery    Whipple:  - Residual invasive adenocarcinoma with mucinous features, 2 3 cm   - Metastatic carcinoma present in one of fourteen lymph nodes (1/14)  - All margins are negative for tumor          12/21/2020 - 1/24/2021 Chemotherapy    [No matching medication found in this treatment plan]     12/21/2020 - 1/26/2021 Radiation    5000 cGy in 25 fractions to high risk areas  Dr Mina Frausto     2/8/2021 -  Chemotherapy    fluorouracil (ADRUCIL) injection 560 mg, 400 mg/m2 = 560 mg, Intravenous, Once, 2 of 4 cycles  Administration: 560 mg (2/8/2021), 560 mg (2/22/2021)  pegfilgrastim (NEULASTA ONPRO) subcutaneous injection kit 6 mg, 6 mg, Subcutaneous, Once, 2 of 4 cycles  Administration: 6 mg (2/10/2021), 6 mg (2/24/2021)  fosaprepitant (EMEND) 150 mg in sodium chloride 0 9 % 250 mL IVPB, 150 mg, Intravenous, Once, 2 of 4 cycles  Administration: 150 mg (2/8/2021), 150 mg (2/22/2021)  leucovorin 560 mg in dextrose 5 % 250 mL IVPB, 400 mg/m2 = 560 mg, Intravenous, Once, 2 of 4 cycles  Administration: 560 mg (2/8/2021), 560 mg (2/22/2021)  oxaliplatin (ELOXATIN) 119 mg in dextrose 5 % 250 mL chemo infusion, 85 mg/m2 = 119 mg, Intravenous, Once, 2 of 4 cycles  Administration: 119 mg (2/8/2021), 119 mg (2/22/2021)         Past Medical History:   Diagnosis Date    Chemotherapy induced neutropenia ([facility] [address] ) 2/10/2021    Diabetes mellitus ([facility] )     Hypertension     Lactic acidosis 5/30/2020    Neuropathy     Obstructive jaundice 6/1/2020     Past Surgical History: Procedure Laterality Date    BREAST SURGERY Left     calcifications     SECTION      CHOLECYSTECTOMY N/A 10/20/2020    Procedure: CHOLECYSTECTOMY;  Surgeon: Telly Parra MD;  [facility];  Service: Surgical Oncology    COLONOSCOPY      ECTOPIC PREGNANCY SURGERY      partial ovary removed    FL GUIDED CENTRAL VENOUS ACCESS DEVICE INSERTION  2020    JOINT REPLACEMENT Bilateral     LAPAROTOMY N/A 10/20/2020    Procedure: LAPAROTOMY EXPLORATORY; INTRAOPERATIVE ULTRASOUND;  Surgeon: Telly Parra MD;  [facility];  Service: Surgical Oncology    [address]    NOSE SURGERY      deviated septum    REPLACEMENT TOTAL HIP W/  RESURFACING IMPLANTS Bilateral     right hip done 2012; left hip done 2012    TONSILLECTOMY      TUNNELED VENOUS PORT PLACEMENT Left 2020    Procedure: INSERTION VENOUS PORT (PORT-A-CATH), left;  Surgeon: Telly Parra MD;  [facility];  Service: Surgical Oncology    WHIPPLE PROCEDURE/PANCREATICO-DUODENECTOMY N/A 10/20/2020    Procedure:  WHIPPLE PROCEDURE/PANCREATICO-DUODENECTOMY;  Surgeon: Telly Parra MD;  [facility];  Service: Surgical Oncology       Social History   Social History     Substance and Sexual Activity   Alcohol Use Not Currently    Frequency: Monthly or less     Social History     Substance and Sexual Activity   Drug Use Never     Social History     Tobacco Use   Smoking Status Former Smoker   Smokeless Tobacco Never Used   Tobacco Comment    quit 40 years ago       Meds/Allergies     Current Outpatient Medications:     amLODIPine (NORVASC) 2 5 mg tablet, Take 2 5 mg by mouth daily, Disp: , Rfl:     dicyclomine (BENTYL) 20 mg tablet, TAKE 1 TABLET BY MOUTH TWICE A DAY, Disp: 180 tablet, Rfl: 1    diphenhydrAMINE (BENADRYL) 25 mg capsule, Take 25 mg by mouth every 6 (six) hours as needed for itching, Disp: , Rfl:     diphenoxylate-atropine (LOMOTIL) 2 5-0 025 mg per tablet, Take 1 tablet by mouth 4 (four) times a day as needed for diarrhea DO NOT EXCEED 4 DOSES IN 1 DAY, Disp: 30 tablet, Rfl: 0    Empagliflozin (Jardiance) 10 MG TABS, Take 10 mg by mouth every morning, Disp: , Rfl:     fluorouracil 3,360 mg in CADD infusion pump, Infuse 3,360 mg (1,200 mg/m2/day x 1 4 m2 (Treatment plan recorded BSA)) into a venous catheter over 46 hours for 2 days, Disp: 1 Device, Rfl: 0    gabapentin (NEURONTIN) 300 mg capsule, Take 300 mg by mouth 3 (three) times a day, Disp: , Rfl:     hydrOXYzine HCL (ATARAX) 10 mg tablet, Take 1 tablet (10 mg total) by mouth every 8 (eight) hours as needed for itching (Patient not taking: Reported on 1/7/2021), Disp: 30 tablet, Rfl: 0    LORazepam (ATIVAN) 0 5 mg tablet, Take 1 tablet (0 5 mg total) by mouth 2 (two) times a day as needed for anxiety NO FURTHER REFILLS WITHOUT CLINIC VISIT WITH PALLIATIVE CARE, Disp: 30 tablet, Rfl: 0    mirtazapine (REMERON) 15 mg tablet, Take 1 tablet (15 mg total) by mouth daily at bedtime, Disp: 30 tablet, Rfl: 3    omeprazole (PriLOSEC) 20 mg delayed release capsule, TAKE 1 CAPSULE BY MOUTH EVERY DAY, Disp: 90 capsule, Rfl: 3    pancrelipase, Lip-Prot-Amyl, (CREON) 12,000 units capsule, Take 12,000 units of lipase by mouth 3 (three) times a day with meals (Patient not taking: Reported on 11/16/2020), Disp: 60 capsule, Rfl: 0    prochlorperazine (COMPAZINE) 10 mg tablet, Take 1 tablet (10 mg total) by mouth every 6 (six) hours as needed for nausea or vomiting, Disp: 45 tablet, Rfl: 3  No current facility-administered medications for this visit       Facility-Administered Medications Ordered in Other Visits:     LORazepam (ATIVAN) injection 0 5 mg, 0 5 mg, Intravenous, Once, Dio Sahu MD    sodium chloride 0 9 % infusion, 20 mL/hr, Intravenous, Once PRN, Dio Sahu MD, Stopped at 02/22/21 1004  Allergies   Allergen Reactions    Betadine [Povidone Iodine] Rash     Also itching from betadine    Other Throat Swelling     Pt unsure which chemotherapy drug caused tongue swelling and locked jaw  Please review in epic notes Folfirinox or possible Neulasta   NEED to review

## 2021-02-24 NOTE — PROGRESS NOTES
Pt arrived for CADD disconnect and neulasta on pro  Pt offered no complaints  CADD reservoir had 0ml and was blinking yellow at the time of disconnect  Pt port was flushed with good blood return and de-accessed  Pts neulasta's was not mentioned in Dr Iman Antoine noted from 2/23/21 Felariturose marie Turcios from Dr Iman Antoine office clarified that patient is to continue receiving neulasta  Neulasta on pro was placed on patients L abdomen and was blinking green  Pt declined AVS and was discharged in stable condition

## 2021-02-24 NOTE — PLAN OF CARE
Problem: Potential for Falls  Goal: Patient will remain free of falls  Description: INTERVENTIONS:  - Assess patient frequently for physical needs  -  Identify cognitive and physical deficits and behaviors that affect risk of falls    -  [place] fall precautions as indicated by assessment   - Educate patient/family on patient safety including physical limitations  - Instruct patient to call for assistance with activity based on assessment  - Modify environment to reduce risk of injury  - Consider OT/PT consult to assist with strengthening/mobility  Outcome: Progressing

## 2021-03-01 RX ORDER — LORAZEPAM 2 MG/ML
0.5 INJECTION INTRAMUSCULAR ONCE
Status: CANCELLED
Start: 2021-03-08

## 2021-03-01 RX ORDER — DEXTROSE MONOHYDRATE 50 MG/ML
20 INJECTION, SOLUTION INTRAVENOUS ONCE
Status: CANCELLED | OUTPATIENT
Start: 2021-03-08

## 2021-03-01 RX ORDER — FLUOROURACIL 50 MG/ML
400 INJECTION, SOLUTION INTRAVENOUS ONCE
Status: CANCELLED | OUTPATIENT
Start: 2021-03-08

## 2021-03-01 RX ORDER — SODIUM CHLORIDE 9 MG/ML
20 INJECTION, SOLUTION INTRAVENOUS ONCE AS NEEDED
Status: CANCELLED | OUTPATIENT
Start: 2021-03-08

## 2021-03-03 DIAGNOSIS — C25.9 PANCREATIC ADENOCARCINOMA (HCC): ICD-10-CM

## 2021-03-03 DIAGNOSIS — D70.1 CHEMOTHERAPY INDUCED NEUTROPENIA (HCC): Primary | ICD-10-CM

## 2021-03-03 DIAGNOSIS — T45.1X5A CHEMOTHERAPY INDUCED NEUTROPENIA (HCC): Primary | ICD-10-CM

## 2021-03-05 ENCOUNTER — LAB (OUTPATIENT)
Dept: LAB | Facility: HOSPITAL | Age: 66
End: 2021-03-05
Attending: INTERNAL MEDICINE
Payer: MEDICARE

## 2021-03-05 DIAGNOSIS — C25.9 PANCREATIC ADENOCARCINOMA (HCC): ICD-10-CM

## 2021-03-05 DIAGNOSIS — T45.1X5A CHEMOTHERAPY INDUCED NEUTROPENIA (HCC): ICD-10-CM

## 2021-03-05 DIAGNOSIS — D70.1 CHEMOTHERAPY INDUCED NEUTROPENIA (HCC): ICD-10-CM

## 2021-03-05 LAB
ALBUMIN SERPL BCP-MCNC: 3.4 G/DL (ref 3.5–5)
ALP SERPL-CCNC: 240 U/L (ref 46–116)
ALT SERPL W P-5'-P-CCNC: 35 U/L (ref 12–78)
ANION GAP SERPL CALCULATED.3IONS-SCNC: 6 MMOL/L (ref 4–13)
AST SERPL W P-5'-P-CCNC: 27 U/L (ref 5–45)
BASOPHILS # BLD MANUAL: 0 THOUSAND/UL (ref 0–0.1)
BASOPHILS NFR MAR MANUAL: 0 % (ref 0–1)
BILIRUB SERPL-MCNC: 0.4 MG/DL (ref 0.2–1)
BUN SERPL-MCNC: 10 MG/DL (ref 5–25)
CALCIUM ALBUM COR SERPL-MCNC: 9.6 MG/DL (ref 8.3–10.1)
CALCIUM SERPL-MCNC: 9.1 MG/DL (ref 8.3–10.1)
CHLORIDE SERPL-SCNC: 105 MMOL/L (ref 100–108)
CO2 SERPL-SCNC: 28 MMOL/L (ref 21–32)
CREAT SERPL-MCNC: 0.89 MG/DL (ref 0.6–1.3)
EOSINOPHIL # BLD MANUAL: 0 THOUSAND/UL (ref 0–0.4)
EOSINOPHIL NFR BLD MANUAL: 0 % (ref 0–6)
ERYTHROCYTE [DISTWIDTH] IN BLOOD BY AUTOMATED COUNT: 15.4 % (ref 11.6–15.1)
GFR SERPL CREATININE-BSD FRML MDRD: 67 ML/MIN/1.73SQ M
GLUCOSE P FAST SERPL-MCNC: 139 MG/DL (ref 65–99)
HCT VFR BLD AUTO: 40.5 % (ref 34.8–46.1)
HGB BLD-MCNC: 13.3 G/DL (ref 11.5–15.4)
LYMPHOCYTES # BLD AUTO: 1.59 THOUSAND/UL (ref 0.6–4.47)
LYMPHOCYTES # BLD AUTO: 18 % (ref 14–44)
MCH RBC QN AUTO: 31.9 PG (ref 26.8–34.3)
MCHC RBC AUTO-ENTMCNC: 32.8 G/DL (ref 31.4–37.4)
MCV RBC AUTO: 97 FL (ref 82–98)
MONOCYTES # BLD AUTO: 0.89 THOUSAND/UL (ref 0–1.22)
MONOCYTES NFR BLD: 10 % (ref 4–12)
MYELOCYTES NFR BLD MANUAL: 1 % (ref 0–1)
NEUTROPHILS # BLD MANUAL: 6.29 THOUSAND/UL (ref 1.85–7.62)
NEUTS BAND NFR BLD MANUAL: 18 % (ref 0–8)
NEUTS SEG NFR BLD AUTO: 53 % (ref 43–75)
NRBC BLD AUTO-RTO: 0 /100 WBCS
PLATELET # BLD AUTO: 108 THOUSANDS/UL (ref 149–390)
PLATELET BLD QL SMEAR: ABNORMAL
PMV BLD AUTO: 10.5 FL (ref 8.9–12.7)
POTASSIUM SERPL-SCNC: 3.8 MMOL/L (ref 3.5–5.3)
PROT SERPL-MCNC: 6.6 G/DL (ref 6.4–8.2)
RBC # BLD AUTO: 4.17 MILLION/UL (ref 3.81–5.12)
SODIUM SERPL-SCNC: 139 MMOL/L (ref 136–145)
TOTAL CELLS COUNTED SPEC: 100
WBC # BLD AUTO: 8.86 THOUSAND/UL (ref 4.31–10.16)

## 2021-03-05 PROCEDURE — 85007 BL SMEAR W/DIFF WBC COUNT: CPT

## 2021-03-05 PROCEDURE — 36415 COLL VENOUS BLD VENIPUNCTURE: CPT

## 2021-03-05 PROCEDURE — 80053 COMPREHEN METABOLIC PANEL: CPT

## 2021-03-05 PROCEDURE — 85027 COMPLETE CBC AUTOMATED: CPT

## 2021-03-08 ENCOUNTER — HOSPITAL ENCOUNTER (OUTPATIENT)
Dept: INFUSION CENTER | Facility: CLINIC | Age: 66
Discharge: HOME | End: 2021-03-08
Payer: MEDICARE

## 2021-03-08 VITALS
RESPIRATION RATE: 18 BRPM | WEIGHT: 102.73 LBS | SYSTOLIC BLOOD PRESSURE: 131 MMHG | TEMPERATURE: 96.2 F | BODY MASS INDEX: 20.71 KG/M2 | DIASTOLIC BLOOD PRESSURE: 65 MMHG | HEART RATE: 102 BPM | HEIGHT: 59 IN

## 2021-03-08 DIAGNOSIS — C25.9 PANCREATIC ADENOCARCINOMA (HCC): Primary | ICD-10-CM

## 2021-03-08 DIAGNOSIS — D70.1 CHEMOTHERAPY INDUCED NEUTROPENIA (HCC): ICD-10-CM

## 2021-03-08 DIAGNOSIS — T45.1X5A CHEMOTHERAPY INDUCED NEUTROPENIA (HCC): ICD-10-CM

## 2021-03-08 PROCEDURE — G0498 CHEMO EXTEND IV INFUS W/PUMP: HCPCS

## 2021-03-08 PROCEDURE — 96411 CHEMO IV PUSH ADDL DRUG: CPT

## 2021-03-08 PROCEDURE — 96415 CHEMO IV INFUSION ADDL HR: CPT

## 2021-03-08 PROCEDURE — 96413 CHEMO IV INFUSION 1 HR: CPT

## 2021-03-08 PROCEDURE — 96368 THER/DIAG CONCURRENT INF: CPT

## 2021-03-08 PROCEDURE — 96367 TX/PROPH/DG ADDL SEQ IV INF: CPT

## 2021-03-08 RX ORDER — FLUOROURACIL 50 MG/ML
400 INJECTION, SOLUTION INTRAVENOUS ONCE
Status: COMPLETED | OUTPATIENT
Start: 2021-03-08 | End: 2021-03-08

## 2021-03-08 RX ORDER — DEXTROSE MONOHYDRATE 50 MG/ML
20 INJECTION, SOLUTION INTRAVENOUS ONCE
Status: COMPLETED | OUTPATIENT
Start: 2021-03-08 | End: 2021-03-08

## 2021-03-08 RX ORDER — SODIUM CHLORIDE 9 MG/ML
20 INJECTION, SOLUTION INTRAVENOUS ONCE AS NEEDED
Status: DISCONTINUED | OUTPATIENT
Start: 2021-03-08 | End: 2021-03-11 | Stop reason: HOSPADM

## 2021-03-08 RX ORDER — LORAZEPAM 2 MG/ML
0.5 INJECTION INTRAMUSCULAR ONCE
Status: DISCONTINUED | OUTPATIENT
Start: 2021-03-08 | End: 2021-03-11 | Stop reason: HOSPADM

## 2021-03-08 RX ADMIN — OXALIPLATIN 119 MG: 5 INJECTION, SOLUTION INTRAVENOUS at 10:34

## 2021-03-08 RX ADMIN — SODIUM CHLORIDE 20 ML/HR: 0.9 INJECTION, SOLUTION INTRAVENOUS at 08:39

## 2021-03-08 RX ADMIN — LEUCOVORIN CALCIUM 560 MG: 350 INJECTION, POWDER, LYOPHILIZED, FOR SUSPENSION INTRAMUSCULAR; INTRAVENOUS at 10:34

## 2021-03-08 RX ADMIN — FLUOROURACIL 560 MG: 50 INJECTION, SOLUTION INTRAVENOUS at 12:39

## 2021-03-08 RX ADMIN — FOSAPREPITANT 150 MG: 150 INJECTION, POWDER, LYOPHILIZED, FOR SOLUTION INTRAVENOUS at 09:27

## 2021-03-08 RX ADMIN — DEXTROSE 20 ML/HR: 5 SOLUTION INTRAVENOUS at 10:27

## 2021-03-08 RX ADMIN — DEXAMETHASONE SODIUM PHOSPHATE: 10 INJECTION, SOLUTION INTRAMUSCULAR; INTRAVENOUS at 08:54

## 2021-03-08 NOTE — PLAN OF CARE
Problem: Potential for Falls  Goal: Patient will remain free of falls  Description: INTERVENTIONS:  - Assess patient frequently for physical needs  -  Identify cognitive and physical deficits and behaviors that affect risk of falls  -  [place] fall precautions as indicated by assessment   - Educate patient/family on patient safety including physical limitations  - Instruct patient to call for assistance with activity based on assessment  - Modify environment to reduce risk of injury  - Consider OT/PT consult to assist with strengthening/mobility  Outcome: Progressing     Problem: SAFETY ADULT  Goal: Patient will remain free of falls  Description: INTERVENTIONS:  - Assess patient frequently for physical needs  -  Identify cognitive and physical deficits and behaviors that affect risk of falls  -  [place] fall precautions as indicated by assessment   - Educate patient/family on patient safety including physical limitations  - Instruct patient to call for assistance with activity based on assessment  - Modify environment to reduce risk of injury  - Consider OT/PT consult to assist with strengthening/mobility  Outcome: Progressing     Problem: Knowledge Deficit  Goal: Patient/family/caregiver demonstrates understanding of disease process, treatment plan, medications, and discharge instructions  Description: Complete learning assessment and assess knowledge base    Interventions:  - Provide teaching at level of understanding  - Provide teaching via preferred learning methods  Outcome: Progressing

## 2021-03-08 NOTE — PROGRESS NOTES
Pt tolerated chemotherapy tx without incident  CADD pump connected, running & light blinking green    AVS declined, reviewed appt for CADD d/c on 3/10 at 11 am

## 2021-03-08 NOTE — PROGRESS NOTES
Pt to clinic for leucovorin, oxaliplatin, 5 FU push, and 5 FU CADD pump hook-up, offers no complaints at this time, spoke with Dianna Galvan RN regarding pt's elevated alk phos, Mukesh Anne stated pt okay to proceed with treatment today as Dr Ellen Montanez reviewed her labs on 3/5, will continue to monitor pt

## 2021-03-10 ENCOUNTER — HOSPITAL ENCOUNTER (OUTPATIENT)
Dept: INFUSION CENTER | Facility: CLINIC | Age: 66
Discharge: HOME | End: 2021-03-10
Payer: MEDICARE

## 2021-03-10 VITALS — TEMPERATURE: 96.4 F

## 2021-03-10 DIAGNOSIS — D70.1 CHEMOTHERAPY INDUCED NEUTROPENIA (HCC): ICD-10-CM

## 2021-03-10 DIAGNOSIS — T45.1X5A CHEMOTHERAPY INDUCED NEUTROPENIA (HCC): ICD-10-CM

## 2021-03-10 DIAGNOSIS — C25.9 PANCREATIC ADENOCARCINOMA (HCC): Primary | ICD-10-CM

## 2021-03-10 DIAGNOSIS — Z23 ENCOUNTER FOR IMMUNIZATION: ICD-10-CM

## 2021-03-10 PROCEDURE — 96372 THER/PROPH/DIAG INJ SC/IM: CPT

## 2021-03-10 RX ADMIN — PEGFILGRASTIM 6 MG: KIT SUBCUTANEOUS at 11:21

## 2021-03-10 NOTE — PLAN OF CARE
Problem: Potential for Falls  Goal: Patient will remain free of falls  Description: INTERVENTIONS:  - Assess patient frequently for physical needs  -  Identify cognitive and physical deficits and behaviors that affect risk of falls  -  [place] fall precautions as indicated by assessment   - Educate patient/family on patient safety including physical limitations  - Instruct patient to call for assistance with activity based on assessment  - Modify environment to reduce risk of injury  - Consider OT/PT consult to assist with strengthening/mobility  Outcome: Progressing     Problem: Knowledge Deficit  Goal: Patient/family/caregiver demonstrates understanding of disease process, treatment plan, medications, and discharge instructions  Description: Complete learning assessment and assess knowledge base    Interventions:  - Provide teaching at level of understanding  - Provide teaching via preferred learning methods  Outcome: Progressing

## 2021-03-10 NOTE — PROGRESS NOTES
pt presents for CADD disconnect  And Nulasta On Pro  Res Vol 0  CADD beeping  Cadd disconnected, Port flushed, + blood return, Saline locked  OnPro placed to abdomen  Device full and blinking green  Pt aware of time to remove tomorrow  AVS declined  Pt aware of next appt  DC in stable condition  CADD returned to [facility] for return to [facility]

## 2021-03-15 ENCOUNTER — IMMUNIZATIONS (OUTPATIENT)
Dept: FAMILY MEDICINE CLINIC | Facility: HOSPITAL | Age: 66
End: 2021-03-15

## 2021-03-15 DIAGNOSIS — Z23 ENCOUNTER FOR IMMUNIZATION: Primary | ICD-10-CM

## 2021-03-15 PROCEDURE — 0011A SARS-COV-2 / COVID-19 MRNA VACCINE (MODERNA) 100 MCG: CPT

## 2021-03-15 PROCEDURE — 91301 SARS-COV-2 / COVID-19 MRNA VACCINE (MODERNA) 100 MCG: CPT

## 2021-03-15 RX ORDER — DEXTROSE MONOHYDRATE 50 MG/ML
20 INJECTION, SOLUTION INTRAVENOUS ONCE
Status: CANCELLED | OUTPATIENT
Start: 2021-03-22

## 2021-03-15 RX ORDER — FLUOROURACIL 50 MG/ML
400 INJECTION, SOLUTION INTRAVENOUS ONCE
Status: CANCELLED | OUTPATIENT
Start: 2021-03-22

## 2021-03-15 RX ORDER — SODIUM CHLORIDE 9 MG/ML
20 INJECTION, SOLUTION INTRAVENOUS ONCE AS NEEDED
Status: CANCELLED | OUTPATIENT
Start: 2021-03-22

## 2021-03-15 RX ORDER — LORAZEPAM 2 MG/ML
0.5 INJECTION INTRAMUSCULAR ONCE
Status: CANCELLED
Start: 2021-03-22

## 2021-03-17 DIAGNOSIS — C25.9 PANCREATIC ADENOCARCINOMA (HCC): ICD-10-CM

## 2021-03-17 DIAGNOSIS — T45.1X5A CHEMOTHERAPY INDUCED NEUTROPENIA (HCC): Primary | ICD-10-CM

## 2021-03-17 DIAGNOSIS — D70.1 CHEMOTHERAPY INDUCED NEUTROPENIA (HCC): Primary | ICD-10-CM

## 2021-03-19 ENCOUNTER — TELEPHONE (OUTPATIENT)
Dept: HEMATOLOGY ONCOLOGY | Facility: CLINIC | Age: 66
End: 2021-03-19

## 2021-03-19 ENCOUNTER — APPOINTMENT (OUTPATIENT)
Dept: LAB | Facility: HOSPITAL | Age: 66
End: 2021-03-19
Attending: INTERNAL MEDICINE
Payer: MEDICARE

## 2021-03-19 DIAGNOSIS — C25.9 PANCREATIC ADENOCARCINOMA (HCC): ICD-10-CM

## 2021-03-19 DIAGNOSIS — T45.1X5A CHEMOTHERAPY INDUCED NEUTROPENIA (HCC): ICD-10-CM

## 2021-03-19 DIAGNOSIS — D70.1 CHEMOTHERAPY INDUCED NEUTROPENIA (HCC): ICD-10-CM

## 2021-03-19 DIAGNOSIS — E87.6 HYPOKALEMIA: Primary | ICD-10-CM

## 2021-03-19 LAB
ALBUMIN SERPL BCP-MCNC: 3.4 G/DL (ref 3.5–5)
ALP SERPL-CCNC: 235 U/L (ref 46–116)
ALT SERPL W P-5'-P-CCNC: 31 U/L (ref 12–78)
ANION GAP SERPL CALCULATED.3IONS-SCNC: 10 MMOL/L (ref 4–13)
AST SERPL W P-5'-P-CCNC: 28 U/L (ref 5–45)
BASOPHILS # BLD MANUAL: 0.07 THOUSAND/UL (ref 0–0.1)
BASOPHILS NFR MAR MANUAL: 1 % (ref 0–1)
BILIRUB SERPL-MCNC: 0.3 MG/DL (ref 0.2–1)
BUN SERPL-MCNC: 9 MG/DL (ref 5–25)
CALCIUM ALBUM COR SERPL-MCNC: 9.5 MG/DL (ref 8.3–10.1)
CALCIUM SERPL-MCNC: 9 MG/DL (ref 8.3–10.1)
CHLORIDE SERPL-SCNC: 104 MMOL/L (ref 100–108)
CO2 SERPL-SCNC: 28 MMOL/L (ref 21–32)
CREAT SERPL-MCNC: 0.93 MG/DL (ref 0.6–1.3)
DOHLE BOD BLD QL SMEAR: PRESENT
EOSINOPHIL # BLD MANUAL: 0.28 THOUSAND/UL (ref 0–0.4)
EOSINOPHIL NFR BLD MANUAL: 4 % (ref 0–6)
ERYTHROCYTE [DISTWIDTH] IN BLOOD BY AUTOMATED COUNT: 15.1 % (ref 11.6–15.1)
GFR SERPL CREATININE-BSD FRML MDRD: 64 ML/MIN/1.73SQ M
GLUCOSE P FAST SERPL-MCNC: 127 MG/DL (ref 65–99)
HCT VFR BLD AUTO: 40.2 % (ref 34.8–46.1)
HGB BLD-MCNC: 13.1 G/DL (ref 11.5–15.4)
LYMPHOCYTES # BLD AUTO: 0.96 THOUSAND/UL (ref 0.6–4.47)
LYMPHOCYTES # BLD AUTO: 14 % (ref 14–44)
MCH RBC QN AUTO: 31.6 PG (ref 26.8–34.3)
MCHC RBC AUTO-ENTMCNC: 32.6 G/DL (ref 31.4–37.4)
MCV RBC AUTO: 97 FL (ref 82–98)
MONOCYTES # BLD AUTO: 0.83 THOUSAND/UL (ref 0–1.22)
MONOCYTES NFR BLD: 12 % (ref 4–12)
MYELOCYTES NFR BLD MANUAL: 1 % (ref 0–1)
NEUTROPHILS # BLD MANUAL: 4.68 THOUSAND/UL (ref 1.85–7.62)
NEUTS BAND NFR BLD MANUAL: 4 % (ref 0–8)
NEUTS SEG NFR BLD AUTO: 64 % (ref 43–75)
NRBC BLD AUTO-RTO: 0 /100 WBCS
PLATELET # BLD AUTO: 102 THOUSANDS/UL (ref 149–390)
PLATELET BLD QL SMEAR: ABNORMAL
PMV BLD AUTO: 10.7 FL (ref 8.9–12.7)
POTASSIUM SERPL-SCNC: 2.9 MMOL/L (ref 3.5–5.3)
PROT SERPL-MCNC: 6.9 G/DL (ref 6.4–8.2)
RBC # BLD AUTO: 4.14 MILLION/UL (ref 3.81–5.12)
SODIUM SERPL-SCNC: 142 MMOL/L (ref 136–145)
TOTAL CELLS COUNTED SPEC: 100
WBC # BLD AUTO: 6.88 THOUSAND/UL (ref 4.31–10.16)

## 2021-03-19 PROCEDURE — 85027 COMPLETE CBC AUTOMATED: CPT

## 2021-03-19 PROCEDURE — 36415 COLL VENOUS BLD VENIPUNCTURE: CPT

## 2021-03-19 PROCEDURE — 85007 BL SMEAR W/DIFF WBC COUNT: CPT

## 2021-03-19 PROCEDURE — 80053 COMPREHEN METABOLIC PANEL: CPT

## 2021-03-19 RX ORDER — POTASSIUM CHLORIDE 750 MG/1
20 CAPSULE, EXTENDED RELEASE ORAL 2 TIMES DAILY
Qty: 14 CAPSULE | Refills: 0 | Status: SHIPPED | OUTPATIENT
Start: 2021-03-19 | End: 2021-04-12 | Stop reason: ALTCHOICE

## 2021-03-19 NOTE — PROGRESS NOTES
Hematology/Oncology Outpatient Follow- up Note  Yolanda Weaver, 1955, [id number]  3/23/2021        Chief Complaint   Patient presents with    Follow-up       HPI:  Yolanda Weaver is a 77 year female with pancreatic cancer  She was seen for an initial consultation on 6/18/20  She presented with painless jaundice  Workup was done including a CT scan of the chest abdomen pelvis along with an MRI which showed an isolated pancreatic head mass which was biopsied and 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   The patient's bilirubin which was elevated at 12 2 at its peak has now come down to 1 0   She was seen by our colleagues in Surgical Oncology and they recommended neoadjuvant chemotherapy prior to consideration of a resection  She was started on FOLFIRINOX  every 2 weeks with Neulasta growth factor support; first dose was given on 6/30/20    She had this for approximately 3 cycles but then had a reaction to the CPT 11 so this was discontinued   She then proceeded to receive dose adjusted modified FOLFOX 6  She finished up 6 cycles and went for surgery  Whipple procedure/pancreaticoduodenectomy completed on 10/20/20   Surgery revealed 1 positive lymph node along with residual tumor   She has completed concurrent chemoradiation in the adjuvant setting  She will now proceed with 4 cycles of modified FOLFOX 6    Previous Hematologic/ Oncologic History:    Oncology History Overview Note   Yolanda Weaver is a 47-year-old woman with a history of 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 status post FOLFOX neoadjuvant chemotherapy (Camptosar stopped after cycle 3) with partial response followed by Whipple resection on 10/20/2020  Pathology was significant for tumor invading into the submucosa of the duodenum and (+) 1/14 lymph nodes    She completed a course of adjuvant chemo radiation 1/26/21 2/20/21   AST 50 (was 30 on 2/6/21)  ALT 81 (was 35)  Alk phos 226 (was 140)    4/5/21 CT chest, abdomen and pelvis scheduled by Dr Lesley Fernandez     Pancreatic adenocarcinoma [facility])   6/2/2020 Biopsy    E[facility]- Dr Penny Pyo:  Pancreas, uncinate mass:      - Malignant ([facility] Category VI)      - Compatible with adenocarcinoma with mucinous features  6/30/2020 - 9/16/2020 Chemotherapy    fluorouracil (ADRUCIL) injection 585 mg, 400 mg/m2 = 585 mg, Intravenous, Once, 7 of 12 cycles  Administration: 585 mg (6/30/2020), 585 mg (7/14/2020), 585 mg (7/28/2020), 585 mg (8/11/2020), 585 mg (9/8/2020), 585 mg (8/25/2020)  pegfilgrastim (NEULASTA ONPRO) subcutaneous injection kit 6 mg, 6 mg, Subcutaneous, Once, 7 of 12 cycles  Administration: 6 mg (7/2/2020), 6 mg (7/16/2020), 6 mg (7/30/2020), 6 mg (8/13/2020), 6 mg (8/27/2020), 6 mg (9/10/2020)  fosaprepitant (EMEND) 150 mg in sodium chloride 0 9 % 250 mL IVPB, 150 mg, Intravenous, Once, 7 of 12 cycles  Administration: 150 mg (6/30/2020), 150 mg (7/14/2020), 150 mg (7/28/2020), 150 mg (8/11/2020), 150 mg (9/8/2020), 150 mg (8/25/2020)  irinotecan (CAMPTOSAR) 263 mg in dextrose 5 % 500 mL chemo infusion, 180 mg/m2 = 263 mg, Intravenous, Once, 3 of 3 cycles  Administration: 263 mg (6/30/2020), 263 mg (7/14/2020), 263 mg (7/28/2020)  leucovorin 584 mg in dextrose 5 % 250 mL IVPB, 400 mg/m2 = 584 mg (100 % of original dose 400 mg/m2), Intravenous, Once, 2 of 7 cycles  Dose modification: 400 mg/m2 (original dose 400 mg/m2, Cycle 6)  Administration: 584 mg (9/8/2020)  oxaliplatin (ELOXATIN) 124 1 mg in dextrose 5 % 250 mL chemo infusion, 85 mg/m2 = 124 1 mg, Intravenous, Once, 7 of 12 cycles  Administration: 124 1 mg (6/30/2020), 124 1 mg (7/14/2020), 124 1 mg (7/28/2020), 124 1 mg (8/11/2020), 124 1 mg (9/8/2020), 124 1 mg (8/25/2020)     10/20/2020 Surgery    Whipple:  - Residual invasive adenocarcinoma with mucinous features, 2 3 cm   - Metastatic carcinoma present in one of fourteen lymph nodes (1/14)  - All margins are negative for tumor          12/21/2020 - 2021 Chemotherapy    [No matching medication found in this treatment plan]     2020 - 2021 Radiation    5000 cGy in 25 fractions to high risk areas  Dr Berenice Zhou     2021 -  Chemotherapy    fluorouracil (ADRUCIL) injection 560 mg, 400 mg/m2 = 560 mg, Intravenous, Once, 4 of 4 cycles  Administration: 560 mg (2021), 560 mg (2021), 560 mg (3/8/2021), 560 mg (3/22/2021)  pegfilgrastim (NEULASTA ONPRO) subcutaneous injection kit 6 mg, 6 mg, Subcutaneous, Once, 4 of 4 cycles  Administration: 6 mg (2/10/2021), 6 mg (2021), 6 mg (3/10/2021)  fosaprepitant (EMEND) 150 mg in sodium chloride 0 9 % 250 mL IVPB, 150 mg, Intravenous, Once, 4 of 4 cycles  Administration: 150 mg (2021), 150 mg (2021), 150 mg (3/8/2021), 150 mg (3/22/2021)  leucovorin 560 mg in dextrose 5 % 250 mL IVPB, 400 mg/m2 = 560 mg, Intravenous, Once, 4 of 4 cycles  Administration: 560 mg (2021), 560 mg (2021), 560 mg (3/8/2021), 560 mg (3/22/2021)  oxaliplatin (ELOXATIN) 119 mg in dextrose 5 % 250 mL chemo infusion, 85 mg/m2 = 119 mg, Intravenous, Once, 4 of 4 cycles  Administration: 119 mg (2021), 119 mg (2021), 119 mg (3/8/2021), 119 mg (3/22/2021)     FOLFIRINOX every 2 weeks with Neulasta growth factor support started on 2020  This had to be modified and CPT 11 was discontinued because she had a question of a reaction     She completed a total of 6 cycles      She went for surgery and was found to have 1 positive lymph node     5 fluorouracil 225 mg/m2 CIVI  Monday through Friday with concurrent radiation    Current Hematologic/ Oncologic Treatment:    modified Folfox 6 x 4 cycles; patient is on cycle 4    ECO-1    Interval History:   The patient presents for routine follow up  Most recent blood work completed on 3/19 was reviewed  Her K+ was low at 2 9; she is taking potassium replacement  Platelets 539,  Acceptable for treatment     White count and hemoglobin are normal   Alk-phos stable at 235  She started on her final cycle of chemo on 3/22  5FU currently infusing via CADD without any difficulty  She is set up for post treatment imaging in April  Overall, she states she is doing well  She does have some fatigue and mild cold sensitivity but this does improve when warm  Appetite is good, Denies abdominal pain  No nausea/vomiting  No diarrhea          Cancer Staging:  Cancer Staging  Pancreatic adenocarcinoma [facility])  Staging form: Pancreas, AJCC 8th Edition  - Clinical: Stage IIB (cT2, cN1, cM0) - Unsigned  Histologic grade (G): G2  Histologic grading system: 3 grade system      Molecular Testing:         Test Results:    Imaging: No results found  Labs:   Lab Results   Component Value Date    WBC 6 88 03/19/2021    HGB 13 1 03/19/2021    HCT 40 2 03/19/2021    MCV 97 03/19/2021     (L) 03/19/2021     Lab Results   Component Value Date    K 2 9 (L) 03/19/2021     03/19/2021    CO2 28 03/19/2021    BUN 9 03/19/2021    CREATININE 0 93 03/19/2021    GLUCOSE 153 (H) 10/20/2020    GLUF 127 (H) 03/19/2021    CALCIUM 9 0 03/19/2021    CORRECTEDCA 9 5 03/19/2021    AST 28 03/19/2021    ALT 31 03/19/2021    ALKPHOS 235 (H) 03/19/2021    EGFR 64 03/19/2021         Review of Systems   Constitutional: Positive for fatigue  Neurological: Positive for numbness (mild )  All other systems reviewed and are negative          Active Problems:   Patient Active Problem List   Diagnosis    Hyperlipidemia    Essential hypertension    Type 2 diabetes mellitus without complication, without long-term current use of insulin (HCC)    Pruritus    Transaminitis    [address] [address] [address]    Port-A-Cath in place    Therapeutic opioid-induced constipation ([facility]IC)    Anxiety    Functional diarrhea    Poor appetite    Chemotherapy induced neutropenia (HCC)       Past Medical History:   Past Medical History:   Diagnosis Date    Chemotherapy induced neutropenia ([address] ) 2/10/2021    Diabetes mellitus ([facility] [address] )     Hypertension     Lactic acidosis 2020    Neuropathy     Obstructive jaundice 2020       Surgical History:   Past Surgical History:   Procedure Laterality Date    BREAST SURGERY Left     calcifications     SECTION      CHOLECYSTECTOMY N/A 10/20/2020    Procedure: CHOLECYSTECTOMY;  Surgeon: Radha Mtz MD;  [facility];  Service: Surgical Oncology    COLONOSCOPY      ECTOPIC PREGNANCY SURGERY      partial ovary removed    FL GUIDED CENTRAL VENOUS ACCESS DEVICE INSERTION  2020    JOINT REPLACEMENT Bilateral     LAPAROTOMY N/A 10/20/2020    Procedure: LAPAROTOMY EXPLORATORY; INTRAOPERATIVE ULTRASOUND;  Surgeon: Radha Mtz MD;  [facility];  Service: Surgical Oncology    [address]    NOSE SURGERY      deviated septum    REPLACEMENT TOTAL HIP W/  RESURFACING IMPLANTS Bilateral     right hip done 2012; left hip done 2012    TONSILLECTOMY      TUNNELED VENOUS PORT PLACEMENT Left 2020    Procedure: INSERTION VENOUS PORT (PORT-A-CATH), left;  Surgeon: Radha Mtz MD;  [facility];  Service: Surgical Oncology    WHIPPLE PROCEDURE/PANCREATICO-DUODENECTOMY N/A 10/20/2020    Procedure: WHIPPLE PROCEDURE/PANCREATICO-DUODENECTOMY;  Surgeon: Radha Mtz MD;  [facility];  Service: Surgical Oncology       Family History:    Family History   Problem Relation Age of Onset    Diabetes Mother     Heart disease Mother     Heart disease Father     Breast cancer additional onset Sister     Breast cancer additional onset Maternal Aunt     Stroke Maternal Grandfather        Cancer-related family history is not on file      Social History:   Social History     Socioeconomic History    Marital status: /Civil Union     Spouse name: Not on file    Number of children: Not on file    Years of education: Not on file    Highest education level: Not on file Occupational History    Not on file   Social Needs    Financial resource strain: Not on file    Food insecurity     Worry: Not on file     Inability: Not on file    Transportation needs     Medical: Not on file     Non-medical: Not on file   Tobacco Use    Smoking status: Former Smoker    Smokeless tobacco: Never Used    Tobacco comment: quit 40 years ago   Substance and Sexual Activity    Alcohol use: Not Currently     Frequency: Monthly or less    Drug use: Never    Sexual activity: Not Currently   Lifestyle    Physical activity     Days per week: Not on file     Minutes per session: Not on file    Stress: Not on file   Relationships    Social connections     Talks on phone: Not on file     Gets together: Not on file     Attends Congregation service: Not on file     Active member of club or organization: Not on file     Attends meetings of clubs or organizations: Not on file     Relationship status: Not on file    Intimate partner violence     Fear of current or ex partner: Not on file     Emotionally abused: Not on file     Physically abused: Not on file     Forced sexual activity: Not on file   Other Topics Concern    Not on file   Social History Narrative    Not on file       Current Medications:   Current Outpatient Medications   Medication Sig Dispense Refill    amLODIPine (NORVASC) 2 5 mg tablet Take 2 5 mg by mouth daily      dicyclomine (BENTYL) 20 mg tablet TAKE 1 TABLET BY MOUTH TWICE A  tablet 1    diphenhydrAMINE (BENADRYL) 25 mg capsule Take 25 mg by mouth every 6 (six) hours as needed for itching      diphenoxylate-atropine (LOMOTIL) 2 5-0 025 mg per tablet Take 1 tablet by mouth 4 (four) times a day as needed for diarrhea DO NOT EXCEED 4 DOSES IN 1 DAY 30 tablet 0    Empagliflozin (Jardiance) 10 MG TABS Take 10 mg by mouth every morning      fluorouracil 3,360 mg in CADD infusion pump Infuse 3,360 mg (1,200 mg/m2/day x 1 4 m2 (Treatment plan recorded BSA)) into a venous catheter over 46 hours for 2 days 1 Device 0    gabapentin (NEURONTIN) 300 mg capsule Take 300 mg by mouth 3 (three) times a day      hydrOXYzine HCL (ATARAX) 10 mg tablet Take 1 tablet (10 mg total) by mouth every 8 (eight) hours as needed for itching 30 tablet 0    LORazepam (ATIVAN) 0 5 mg tablet Take 1 tablet (0 5 mg total) by mouth 2 (two) times a day as needed for anxiety NO FURTHER REFILLS WITHOUT CLINIC VISIT WITH PALLIATIVE CARE 30 tablet 0    mirtazapine (REMERON) 15 mg tablet Take 1 tablet (15 mg total) by mouth daily at bedtime 30 tablet 3    omeprazole (PriLOSEC) 20 mg delayed release capsule TAKE 1 CAPSULE BY MOUTH EVERY DAY 90 capsule 3    pancrelipase, Lip-Prot-Amyl, (CREON) 12,000 units capsule Take 12,000 units of lipase by mouth 3 (three) times a day with meals 60 capsule 0    potassium chloride (MICRO-K) 10 MEQ CR capsule Take 2 capsules (20 mEq total) by mouth 2 (two) times a day X 2 days followed by once daily x 3 14 capsule 0    prochlorperazine (COMPAZINE) 10 mg tablet Take 1 tablet (10 mg total) by mouth every 6 (six) hours as needed for nausea or vomiting 45 tablet 3     No current facility-administered medications for this visit  Facility-Administered Medications Ordered in Other Visits   Medication Dose Route Frequency Provider Last Rate Last Admin    sodium chloride 0 9 % infusion  20 mL/hr Intravenous Once PRN Delio Joy MD   Stopped at 03/22/21 1021       Allergies: Allergies   Allergen Reactions    Betadine [Povidone Iodine] Rash     Also itching from betadine    Other Throat Swelling     Pt unsure which chemotherapy drug caused tongue swelling and locked jaw  Please review in epic notes Folfirinox or possible Neulasta   NEED to review       Physical Exam:  /70 (BP Location: Left arm, Patient Position: Sitting, Cuff Size: Adult)   Pulse 93   Temp 98 3 °F (36 8 °C) (Temporal)   Resp 14   Ht 4' 11 02" (1 499 m)   Wt 47 6 kg (105 lb)   SpO2 98%   BMI 21 19 kg/m²   Body surface area is 1 4 meters squared  Wt Readings from Last 3 Encounters:   03/23/21 47 6 kg (105 lb)   03/22/21 47 2 kg (104 lb 0 9 oz)   03/08/21 46 6 kg (102 lb 11 8 oz)           Physical Exam  Constitutional:       General: She is not in acute distress  Appearance: She is not toxic-appearing  HENT:      Head: Normocephalic and atraumatic  Mouth/Throat:      Mouth: Mucous membranes are moist       Pharynx: Oropharynx is clear  Eyes:      General: No scleral icterus  Right eye: No discharge  Left eye: No discharge  Neck:      Musculoskeletal: Normal range of motion  Cardiovascular:      Rate and Rhythm: Normal rate and regular rhythm  Pulmonary:      Effort: Pulmonary effort is normal       Breath sounds: Normal breath sounds  Abdominal:      General: Bowel sounds are normal       Palpations: Abdomen is soft  Musculoskeletal: Normal range of motion  Right lower leg: No edema  Left lower leg: No edema  Skin:     General: Skin is warm and dry  Neurological:      General: No focal deficit present  Mental Status: She is alert and oriented to person, place, and time  Psychiatric:         Mood and Affect: Mood normal          Behavior: Behavior normal          Assessment / Plan:    1  Pancreatic adenocarcinoma [facility])    The patient  is a 77 year female with pancreatic cancer  She was seen for an initial consultation on 6/18/20  She presented with painless jaundice  Workup was done including a CT scan of the chest abdomen pelvis along with an MRI which showed an isolated pancreatic head mass which was biopsied and 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   The patient's bilirubin which was elevated at 12 2 at its peak has now come down to 1 0   She was seen by our colleagues in Surgical Oncology and they recommended neoadjuvant chemotherapy prior to consideration of a resection   She was started on FOLFIRINOX  every 2 weeks with Neulasta growth factor support; first dose was given on 6/30/20    She had this for approximately 3 cycles but then had a reaction to the CPT 11 so this was discontinued   She then proceeded to receive dose adjusted modified FOLFOX 6  She finished up 6 cycles and went for surgery  Whipple procedure/pancreaticoduodenectomy completed on 10/20/20   Surgery revealed 1 positive lymph node along with residual tumor     She has recently completed concurrent chemoradiation with 5  mg/m2 CIVI  Monday through Friday for a total of 6 weeks in the adjuvant setting  She tolerated this well  She has been started on adjuvant treatment with modified FOLFOX 6 for a total of 4 cycles  Her blood work has remained in acceptable treatment range  Most recent blood work did demonstrate some hypokalemia with a K of 2 9  She is on potassium replacement  We will recheck her potassium at pump disconnect  She is currently on day 2 of cycle 4  Once finished, she will go on observation  She is already set up for post treatment imaging this is scheduled for April  She has a follow-up appointment with her surgeon scheduled and she will return for a follow-up visit with Dr Kelly Thakur to review results of her imaging  Time spent reviewing this plan of care with patient and her spouse  She verbalized understanding and is in agreement with this plan of care  She is instructed to call at any time with questions or concerns      Goals and Barriers:  Current Goal:  Prolong Survival from  Pancreatic cancer  Barriers: None  Patient's Capacity to Self Care:  Patient able to self care  Portions of the record may have been created with voice recognition software  Occasional wrong word or "sound a like" substitutions may have occurred due to the inherent limitations of voice recognition software  Read the chart carefully and recognize, using context, where substitutions have occurred

## 2021-03-19 NOTE — TELEPHONE ENCOUNTER
Patient returning call  She denies vomiting, diarrhea or change in medications  I reviewed with patient that PO potassium will be sent to her pharmacy - labs will be repeated next week when she is in for her treatment     Patient verbalized understanding  Will send to cynthia Hinojosa

## 2021-03-22 ENCOUNTER — HOSPITAL ENCOUNTER (OUTPATIENT)
Dept: INFUSION CENTER | Facility: CLINIC | Age: 66
Discharge: HOME | End: 2021-03-22
Payer: MEDICARE

## 2021-03-22 VITALS
DIASTOLIC BLOOD PRESSURE: 79 MMHG | SYSTOLIC BLOOD PRESSURE: 149 MMHG | HEART RATE: 96 BPM | BODY MASS INDEX: 20.98 KG/M2 | WEIGHT: 104.06 LBS | TEMPERATURE: 96.1 F | RESPIRATION RATE: 18 BRPM | HEIGHT: 59 IN

## 2021-03-22 DIAGNOSIS — C25.9 PANCREATIC ADENOCARCINOMA (HCC): Primary | ICD-10-CM

## 2021-03-22 DIAGNOSIS — T45.1X5A CHEMOTHERAPY INDUCED NEUTROPENIA (HCC): ICD-10-CM

## 2021-03-22 DIAGNOSIS — D70.1 CHEMOTHERAPY INDUCED NEUTROPENIA (HCC): ICD-10-CM

## 2021-03-22 PROCEDURE — 96375 TX/PRO/DX INJ NEW DRUG ADDON: CPT

## 2021-03-22 PROCEDURE — 96413 CHEMO IV INFUSION 1 HR: CPT

## 2021-03-22 PROCEDURE — G0498 CHEMO EXTEND IV INFUS W/PUMP: HCPCS

## 2021-03-22 PROCEDURE — 96367 TX/PROPH/DG ADDL SEQ IV INF: CPT

## 2021-03-22 PROCEDURE — 96411 CHEMO IV PUSH ADDL DRUG: CPT

## 2021-03-22 PROCEDURE — 96368 THER/DIAG CONCURRENT INF: CPT

## 2021-03-22 PROCEDURE — 96415 CHEMO IV INFUSION ADDL HR: CPT

## 2021-03-22 RX ORDER — LORAZEPAM 2 MG/ML
0.5 INJECTION INTRAMUSCULAR ONCE
Status: COMPLETED | OUTPATIENT
Start: 2021-03-22 | End: 2021-03-22

## 2021-03-22 RX ORDER — FLUOROURACIL 50 MG/ML
400 INJECTION, SOLUTION INTRAVENOUS ONCE
Status: COMPLETED | OUTPATIENT
Start: 2021-03-22 | End: 2021-03-22

## 2021-03-22 RX ORDER — SODIUM CHLORIDE 9 MG/ML
20 INJECTION, SOLUTION INTRAVENOUS ONCE AS NEEDED
Status: DISCONTINUED | OUTPATIENT
Start: 2021-03-22 | End: 2021-03-25 | Stop reason: HOSPADM

## 2021-03-22 RX ORDER — DEXTROSE MONOHYDRATE 50 MG/ML
20 INJECTION, SOLUTION INTRAVENOUS ONCE
Status: COMPLETED | OUTPATIENT
Start: 2021-03-22 | End: 2021-03-22

## 2021-03-22 RX ADMIN — FLUOROURACIL 560 MG: 50 INJECTION, SOLUTION INTRAVENOUS at 12:43

## 2021-03-22 RX ADMIN — LEUCOVORIN CALCIUM 560 MG: 350 INJECTION, POWDER, LYOPHILIZED, FOR SOLUTION INTRAMUSCULAR; INTRAVENOUS at 10:32

## 2021-03-22 RX ADMIN — FOSAPREPITANT 150 MG: 150 INJECTION, POWDER, LYOPHILIZED, FOR SOLUTION INTRAVENOUS at 09:19

## 2021-03-22 RX ADMIN — SODIUM CHLORIDE 20 ML/HR: 0.9 INJECTION, SOLUTION INTRAVENOUS at 09:00

## 2021-03-22 RX ADMIN — DEXAMETHASONE SODIUM PHOSPHATE: 10 INJECTION, SOLUTION INTRAMUSCULAR; INTRAVENOUS at 08:57

## 2021-03-22 RX ADMIN — OXALIPLATIN 119 MG: 5 INJECTION, SOLUTION INTRAVENOUS at 10:32

## 2021-03-22 RX ADMIN — LORAZEPAM 0.5 MG: 2 INJECTION INTRAMUSCULAR; INTRAVENOUS at 08:55

## 2021-03-22 RX ADMIN — DEXTROSE 20 ML/HR: 5 SOLUTION INTRAVENOUS at 10:25

## 2021-03-22 NOTE — PLAN OF CARE
Problem: Potential for Falls  Goal: Patient will remain free of falls  Description: INTERVENTIONS:  - Assess patient frequently for physical needs  -  Identify cognitive and physical deficits and behaviors that affect risk of falls  -  [place] fall precautions as indicated by assessment   - Educate patient/family on patient safety including physical limitations  - Instruct patient to call for assistance with activity based on assessment  - Modify environment to reduce risk of injury  - Consider OT/PT consult to assist with strengthening/mobility  Outcome: Progressing     Problem: Knowledge Deficit  Goal: Patient/family/caregiver demonstrates understanding of disease process, treatment plan, medications, and discharge instructions  Description: Complete learning assessment and assess knowledge base    Interventions:  - Provide teaching at level of understanding  - Provide teaching via preferred learning methods  Outcome: Progressing

## 2021-03-22 NOTE — PROGRESS NOTES
Pt presents for  Chemo and CADD connect  Offers no complaints  Pt tolerated tx with out incident  Cadd connected and running  AVS declined  Aware of next appt

## 2021-03-23 ENCOUNTER — OFFICE VISIT (OUTPATIENT)
Dept: HEMATOLOGY ONCOLOGY | Facility: CLINIC | Age: 66
End: 2021-03-23
Payer: MEDICARE

## 2021-03-23 VITALS
BODY MASS INDEX: 21.17 KG/M2 | HEIGHT: 59 IN | TEMPERATURE: 98.3 F | HEART RATE: 93 BPM | DIASTOLIC BLOOD PRESSURE: 70 MMHG | WEIGHT: 105 LBS | RESPIRATION RATE: 14 BRPM | OXYGEN SATURATION: 98 % | SYSTOLIC BLOOD PRESSURE: 130 MMHG

## 2021-03-23 DIAGNOSIS — C25.9 PANCREATIC ADENOCARCINOMA (HCC): Primary | Chronic | ICD-10-CM

## 2021-03-23 PROCEDURE — 99214 OFFICE O/P EST MOD 30 MIN: CPT | Performed by: NURSE PRACTITIONER

## 2021-03-24 ENCOUNTER — HOSPITAL ENCOUNTER (OUTPATIENT)
Dept: INFUSION CENTER | Facility: CLINIC | Age: 66
Discharge: HOME | End: 2021-03-24
Payer: MEDICARE

## 2021-03-24 VITALS — TEMPERATURE: 97.6 F

## 2021-03-24 DIAGNOSIS — C25.9 PANCREATIC ADENOCARCINOMA (HCC): Primary | ICD-10-CM

## 2021-03-24 DIAGNOSIS — T45.1X5A CHEMOTHERAPY INDUCED NEUTROPENIA (HCC): ICD-10-CM

## 2021-03-24 DIAGNOSIS — D70.1 CHEMOTHERAPY INDUCED NEUTROPENIA (HCC): ICD-10-CM

## 2021-03-24 LAB — POTASSIUM SERPL-SCNC: 3.6 MMOL/L (ref 3.5–5.3)

## 2021-03-24 PROCEDURE — 96372 THER/PROPH/DIAG INJ SC/IM: CPT

## 2021-03-24 PROCEDURE — 84132 ASSAY OF SERUM POTASSIUM: CPT | Performed by: INTERNAL MEDICINE

## 2021-03-24 RX ADMIN — PEGFILGRASTIM 6 MG: KIT SUBCUTANEOUS at 11:09

## 2021-03-24 NOTE — PROGRESS NOTES
Pt presented for CADD disconnect, neulasta on pro and lab work  Pt offered no complaints  CADD pump reservoir was 0mL on disconnect  Neulasta onpro was placed on L abdomen, it was blinking green upon discharge  Pt declined AVS and was discharged in stable condition

## 2021-04-05 ENCOUNTER — HOSPITAL ENCOUNTER (OUTPATIENT)
Dept: CT IMAGING | Facility: HOSPITAL | Age: 66
Discharge: HOME | End: 2021-04-05
Attending: SURGERY
Payer: MEDICARE

## 2021-04-05 DIAGNOSIS — C25.9 PANCREATIC ADENOCARCINOMA (HCC): Chronic | ICD-10-CM

## 2021-04-05 PROCEDURE — G1004 CDSM NDSC: HCPCS

## 2021-04-05 PROCEDURE — 71260 CT THORAX DX C+: CPT

## 2021-04-05 PROCEDURE — 74177 CT ABD & PELVIS W/CONTRAST: CPT

## 2021-04-05 RX ADMIN — IOHEXOL 100 ML: 350 INJECTION, SOLUTION INTRAVENOUS at 09:38

## 2021-04-09 ENCOUNTER — TELEPHONE (OUTPATIENT)
Dept: SURGICAL ONCOLOGY | Facility: CLINIC | Age: 66
End: 2021-04-09

## 2021-04-10 ENCOUNTER — APPOINTMENT (OUTPATIENT)
Dept: LAB | Facility: HOSPITAL | Age: 66
End: 2021-04-10
Attending: SURGERY
Payer: MEDICARE

## 2021-04-10 DIAGNOSIS — C25.9 PANCREATIC ADENOCARCINOMA (HCC): Chronic | ICD-10-CM

## 2021-04-10 LAB
ANION GAP SERPL CALCULATED.3IONS-SCNC: 7 MMOL/L (ref 4–13)
BUN SERPL-MCNC: 11 MG/DL (ref 5–25)
CALCIUM SERPL-MCNC: 9.3 MG/DL (ref 8.3–10.1)
CHLORIDE SERPL-SCNC: 108 MMOL/L (ref 100–108)
CO2 SERPL-SCNC: 30 MMOL/L (ref 21–32)
CREAT SERPL-MCNC: 1.03 MG/DL (ref 0.6–1.3)
GFR SERPL CREATININE-BSD FRML MDRD: 56 ML/MIN/1.73SQ M
GLUCOSE P FAST SERPL-MCNC: 135 MG/DL (ref 65–99)
POTASSIUM SERPL-SCNC: 4.3 MMOL/L (ref 3.5–5.3)
SODIUM SERPL-SCNC: 145 MMOL/L (ref 136–145)

## 2021-04-10 PROCEDURE — 80048 BASIC METABOLIC PNL TOTAL CA: CPT

## 2021-04-10 PROCEDURE — 36415 COLL VENOUS BLD VENIPUNCTURE: CPT

## 2021-04-10 PROCEDURE — 86301 IMMUNOASSAY TUMOR CA 19-9: CPT

## 2021-04-11 LAB — CANCER AG19-9 SERPL-ACNC: 23 U/ML (ref 0–35)

## 2021-04-12 ENCOUNTER — OFFICE VISIT (OUTPATIENT)
Dept: SURGICAL ONCOLOGY | Facility: CLINIC | Age: 66
End: 2021-04-12
Payer: MEDICARE

## 2021-04-12 VITALS
RESPIRATION RATE: 16 BRPM | DIASTOLIC BLOOD PRESSURE: 78 MMHG | HEART RATE: 95 BPM | HEIGHT: 59 IN | SYSTOLIC BLOOD PRESSURE: 152 MMHG | WEIGHT: 102 LBS | BODY MASS INDEX: 20.56 KG/M2

## 2021-04-12 DIAGNOSIS — C25.9 PANCREATIC ADENOCARCINOMA (HCC): Primary | Chronic | ICD-10-CM

## 2021-04-12 PROCEDURE — 99214 OFFICE O/P EST MOD 30 MIN: CPT | Performed by: SURGERY

## 2021-04-12 NOTE — PROGRESS NOTES
Surgical Oncology Follow Up       [address] [facility]  [address] [facility] [id number] [phone]    Jone Trujillo  1955  [id number]  [address] [facility] SURGICAL ONCOLOGY Tariq Qua  [address] [facility] [id number] [phone]    Chief Complaint   Patient presents with    Follow-up     4 month follow up       Assessment/Plan:    No problem-specific Assessment & Plan notes found for this encounter  Diagnoses and all orders for this visit:    Pancreatic adenocarcinoma [facility])        Advance Care Planning/Advance Directives:  Discussed disease status, cancer treatment plans and/or cancer treatment goals with the patient  Oncology History Overview Note   Jone Trujillo is a 78-year-old woman with a history of 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 status post FOLFOX neoadjuvant chemotherapy (Camptosar stopped after cycle 3) with partial response followed by Whipple resection on 10/20/2020  Pathology was significant for tumor invading into the submucosa of the duodenum and (+) 1/14 lymph nodes  She completed a course of adjuvant chemo radiation 1/26/21 2/20/21   AST 50 (was 30 on 2/6/21)  ALT 81 (was 35)  Alk phos 226 (was 140)    4/5/21 CT chest, abdomen and pelvis scheduled by Dr Mccallum Marialuisa     Pancreatic adenocarcinoma [facility])   6/2/2020 Biopsy    [facility]- Dr Mena [place]:  Pancreas, uncinate mass:      - Malignant ([facility] Category VI)      - Compatible with adenocarcinoma with mucinous features       6/30/2020 - 9/16/2020 Chemotherapy    fluorouracil (ADRUCIL) injection 585 mg, 400 mg/m2 = 585 mg, Intravenous, Once, 7 of 12 cycles  Administration: 585 mg (6/30/2020), 585 mg (7/14/2020), 585 mg (7/28/2020), 585 mg (8/11/2020), 585 mg (9/8/2020), 585 mg (8/25/2020)  pegfilgrastim (NEULASTA ONPRO) subcutaneous injection kit 6 mg, 6 mg, Subcutaneous, Once, 7 of 12 cycles  Administration: 6 mg (7/2/2020), 6 mg (7/16/2020), 6 mg (7/30/2020), 6 mg (8/13/2020), 6 mg (8/27/2020), 6 mg (9/10/2020)  fosaprepitant (EMEND) 150 mg in sodium chloride 0 9 % 250 mL IVPB, 150 mg, Intravenous, Once, 7 of 12 cycles  Administration: 150 mg (6/30/2020), 150 mg (7/14/2020), 150 mg (7/28/2020), 150 mg (8/11/2020), 150 mg (9/8/2020), 150 mg (8/25/2020)  irinotecan (CAMPTOSAR) 263 mg in dextrose 5 % 500 mL chemo infusion, 180 mg/m2 = 263 mg, Intravenous, Once, 3 of 3 cycles  Administration: 263 mg (6/30/2020), 263 mg (7/14/2020), 263 mg (7/28/2020)  leucovorin 584 mg in dextrose 5 % 250 mL IVPB, 400 mg/m2 = 584 mg (100 % of original dose 400 mg/m2), Intravenous, Once, 2 of 7 cycles  Dose modification: 400 mg/m2 (original dose 400 mg/m2, Cycle 6)  Administration: 584 mg (9/8/2020)  oxaliplatin (ELOXATIN) 124 1 mg in dextrose 5 % 250 mL chemo infusion, 85 mg/m2 = 124 1 mg, Intravenous, Once, 7 of 12 cycles  Administration: 124 1 mg (6/30/2020), 124 1 mg (7/14/2020), 124 1 mg (7/28/2020), 124 1 mg (8/11/2020), 124 1 mg (9/8/2020), 124 1 mg (8/25/2020)     10/20/2020 Surgery    Whipple:  - Residual invasive adenocarcinoma with mucinous features, 2 3 cm   - Metastatic carcinoma present in one of fourteen lymph nodes (1/14)  - All margins are negative for tumor          12/21/2020 - 1/24/2021 Chemotherapy    [No matching medication found in this treatment plan]     12/21/2020 - 1/26/2021 Radiation    5000 cGy in 25 fractions to high risk areas  Dr Otto Tomas     2/8/2021 -  Chemotherapy    fluorouracil (ADRUCIL) injection 560 mg, 400 mg/m2 = 560 mg, Intravenous, Once, 4 of 4 cycles  Administration: 560 mg (2/8/2021), 560 mg (2/22/2021), 560 mg (3/8/2021), 560 mg (3/22/2021)  pegfilgrastim (NEULASTA ONPRO) subcutaneous injection kit 6 mg, 6 mg, Subcutaneous, Once, 4 of 4 cycles  Administration: 6 mg (2/10/2021), 6 mg (2/24/2021), 6 mg (3/10/2021), 6 mg (3/24/2021)  fosaprepitant (EMEND) 150 mg in sodium chloride 0 9 % 250 mL IVPB, 150 mg, Intravenous, Once, 4 of 4 cycles  Administration: 150 mg (2/8/2021), 150 mg (2/22/2021), 150 mg (3/8/2021), 150 mg (3/22/2021)  leucovorin 560 mg in dextrose 5 % 250 mL IVPB, 400 mg/m2 = 560 mg, Intravenous, Once, 4 of 4 cycles  Administration: 560 mg (2/8/2021), 560 mg (2/22/2021), 560 mg (3/8/2021), 560 mg (3/22/2021)  oxaliplatin (ELOXATIN) 119 mg in dextrose 5 % 250 mL chemo infusion, 85 mg/m2 = 119 mg, Intravenous, Once, 4 of 4 cycles  Administration: 119 mg (2/8/2021), 119 mg (2/22/2021), 119 mg (3/8/2021), 119 mg (3/22/2021)         History of Present Illness:  Colleen Connor is a 78-year-old woman here for surveillance visit  She is status post Whipple procedure, and underwent chemoRTX, and just recently completed chemotherapy   -Interval History: she comes in with no major complaints report  She is doing well  Review of Systems:  Review of Systems   Constitutional: Negative  HENT: Negative  Eyes: Negative  Respiratory: Negative  Cardiovascular: Negative  Gastrointestinal: Negative  Endocrine: Negative  Genitourinary: Negative  Musculoskeletal: Negative  Skin: Negative  Allergic/Immunologic: Negative  Neurological: Negative  Hematological: Negative  Psychiatric/Behavioral: Negative          Patient Active Problem List   Diagnosis    Hyperlipidemia    Essential hypertension    Type 2 diabetes mellitus without complication, without long-term current use of insulin ([address] )    Pruritus    Transaminitis    Pancreatic adenocarcinoma ([address] )    Port-A-Cath in place    Therapeutic opioid-induced constipation (OIC)    Anxiety    Functional diarrhea    Poor appetite    Chemotherapy induced neutropenia (HCC)     Past Medical History:   Diagnosis Date    Chemotherapy induced neutropenia ([address] ) 2/10/2021    Diabetes mellitus ([address] )     Hypertension     Lactic acidosis 5/30/2020    Neuropathy     Obstructive jaundice 6/1/2020     Past Surgical History:   Procedure Laterality Date    BREAST SURGERY Left     calcifications     SECTION      CHOLECYSTECTOMY N/A 10/20/2020    Procedure: CHOLECYSTECTOMY;  Surgeon: Patricio Mccloud MD;  [facility];  Service: Surgical Oncology    COLONOSCOPY      ECTOPIC PREGNANCY SURGERY      partial ovary removed    FL GUIDED CENTRAL VENOUS ACCESS DEVICE INSERTION  2020    JOINT REPLACEMENT Bilateral     LAPAROTOMY N/A 10/20/2020    Procedure: LAPAROTOMY EXPLORATORY; INTRAOPERATIVE ULTRASOUND;  Surgeon: Patricio Mccloud MD;  [facility];  Service: Surgical Oncology    [address]    NOSE SURGERY      deviated septum    REPLACEMENT TOTAL HIP W/  RESURFACING IMPLANTS Bilateral     right hip done 2012; left hip done 2012    TONSILLECTOMY      TUNNELED VENOUS PORT PLACEMENT Left 2020    Procedure: INSERTION VENOUS PORT (PORT-A-CATH), left;  Surgeon: Patricio Mccloud MD;  [facility];  Service: Surgical Oncology    WHIPPLE PROCEDURE/PANCREATICO-DUODENECTOMY N/A 10/20/2020    Procedure:  WHIPPLE PROCEDURE/PANCREATICO-DUODENECTOMY;  Surgeon: Patricio Mccloud MD;  [facility];  Service: Surgical Oncology     Family History   Problem Relation Age of Onset    Diabetes Mother     Heart disease Mother     Heart disease Father     Breast cancer additional onset Sister     Breast cancer additional onset Maternal Aunt     Stroke Maternal Grandfather      Social History     Socioeconomic History    Marital status: /Civil Union     Spouse name: Not on file    Number of children: Not on file    Years of education: Not on file    Highest education level: Not on file   Occupational History    Not on file   Social Needs    Financial resource strain: Not on file    Food insecurity     Worry: Not on file     Inability: Not on file    Transportation needs     Medical: Not on file     Non-medical: Not on file Tobacco Use    Smoking status: Former Smoker    Smokeless tobacco: Never Used    Tobacco comment: quit 40 years ago   Substance and Sexual Activity    Alcohol use: Not Currently     Frequency: Monthly or less    Drug use: Never    Sexual activity: Not Currently   Lifestyle    Physical activity     Days per week: Not on file     Minutes per session: Not on file    Stress: Not on file   Relationships    Social connections     Talks on phone: Not on file     Gets together: Not on file     Attends Yazidi service: Not on file     Active member of club or organization: Not on file     Attends meetings of clubs or organizations: Not on file     Relationship status: Not on file    Intimate partner violence     Fear of current or ex partner: Not on file     Emotionally abused: Not on file     Physically abused: Not on file     Forced sexual activity: Not on file   Other Topics Concern    Not on file   Social History Narrative    Not on file       Current Outpatient Medications:     amLODIPine (NORVASC) 2 5 mg tablet, Take 2 5 mg by mouth daily, Disp: , Rfl:     dicyclomine (BENTYL) 20 mg tablet, TAKE 1 TABLET BY MOUTH TWICE A DAY, Disp: 180 tablet, Rfl: 1    diphenhydrAMINE (BENADRYL) 25 mg capsule, Take 25 mg by mouth every 6 (six) hours as needed for itching, Disp: , Rfl:     diphenoxylate-atropine (LOMOTIL) 2 5-0 025 mg per tablet, Take 1 tablet by mouth 4 (four) times a day as needed for diarrhea DO NOT EXCEED 4 DOSES IN 1 DAY, Disp: 30 tablet, Rfl: 0    Empagliflozin (Jardiance) 10 MG TABS, Take 10 mg by mouth every morning, Disp: , Rfl:     gabapentin (NEURONTIN) 300 mg capsule, Take 300 mg by mouth 3 (three) times a day, Disp: , Rfl:     LORazepam (ATIVAN) 0 5 mg tablet, Take 1 tablet (0 5 mg total) by mouth 2 (two) times a day as needed for anxiety NO FURTHER REFILLS WITHOUT CLINIC VISIT WITH PALLIATIVE CARE, Disp: 30 tablet, Rfl: 0    mirtazapine (REMERON) 15 mg tablet, Take 1 tablet (15 mg total) by mouth daily at bedtime, Disp: 30 tablet, Rfl: 3    omeprazole (PriLOSEC) 20 mg delayed release capsule, TAKE 1 CAPSULE BY MOUTH EVERY DAY, Disp: 90 capsule, Rfl: 3    prochlorperazine (COMPAZINE) 10 mg tablet, Take 1 tablet (10 mg total) by mouth every 6 (six) hours as needed for nausea or vomiting, Disp: 45 tablet, Rfl: 3    hydrOXYzine HCL (ATARAX) 10 mg tablet, Take 1 tablet (10 mg total) by mouth every 8 (eight) hours as needed for itching (Patient not taking: Reported on 4/12/2021), Disp: 30 tablet, Rfl: 0  Allergies   Allergen Reactions    Betadine [Povidone Iodine] Rash     Also itching from betadine    Other Throat Swelling     Pt unsure which chemotherapy drug caused tongue swelling and locked jaw  Please review in epic notes Folfirinox or possible Neulasta  NEED to review     Vitals:    04/12/21 1104   BP: 152/78   Pulse: 95   Resp: 16       Physical Exam  Constitutional:       Appearance: Normal appearance  HENT:      Head: Normocephalic and atraumatic  Right Ear: External ear normal       Left Ear: External ear normal    Eyes:      General: No scleral icterus  Extraocular Movements: Extraocular movements intact  Pupils: Pupils are equal, round, and reactive to light  Neck:      Musculoskeletal: Normal range of motion and neck supple  Cardiovascular:      Rate and Rhythm: Normal rate and regular rhythm  Heart sounds: Normal heart sounds  Pulmonary:      Effort: Pulmonary effort is normal       Breath sounds: Normal breath sounds  Abdominal:      General: Abdomen is flat  Bowel sounds are normal  There is no distension  Palpations: Abdomen is soft  There is no mass  Tenderness: There is no abdominal tenderness  There is no guarding or rebound  Hernia: No hernia is present  Musculoskeletal: Normal range of motion  Skin:     General: Skin is warm and dry  Neurological:      General: No focal deficit present        Mental Status: She is alert and oriented to person, place, and time  Psychiatric:         Mood and Affect: Mood normal          Behavior: Behavior normal          Thought Content: Thought content normal          Judgment: Judgment normal            Results:  Labs:   Ref Range & Units 4/10/21 8:47 AM    CA 19-9 0 - 35 U/mL 23           Imaging  Ct Chest Abdomen Pelvis W Contrast    Result Date: 4/9/2021  Narrative: CT CHEST, ABDOMEN AND PELVIS WITH IV CONTRAST INDICATION:   C25 9: Malignant neoplasm of pancreas, unspecified  Surveillance COMPARISON:  10/30/2020 TECHNIQUE: CT examination of the chest, abdomen and pelvis was performed  Axial, sagittal, and coronal 2D reformatted images were created from the source data and submitted for interpretation  Radiation dose length product (DLP) for this visit:  841 mGy-cm   This examination, like all CT scans performed in the [facility], was performed utilizing techniques to minimize radiation dose exposure, including the use of iterative reconstruction and automated exposure control  IV Contrast:  100 mL of iohexol (OMNIPAQUE)     There are portal venous and delayed images available  Enteric Contrast: Enteric contrast was administered  FINDINGS: CHEST LUNGS:  Lungs are clear  There is no tracheal or endobronchial lesion  PLEURA:  Unremarkable  HEART/GREAT VESSELS:  Coronary artery calcifications  No pericardial effusion  MEDIASTINUM AND SANDRA:  No adenopathy  Relatively long segment of circumferential wall thickening of esophagus  CHEST WALL AND LOWER NECK:   Unremarkable  ABDOMEN LIVER/BILIARY TREE:  Development of a small hypodense nodule within the medial aspect of the right hepatic lobe image 6/58  Measures 1 1 x 0 7 cm  Most likely this is a small amount of fluid which has developed within a preexistent fissure given the elongated shape but should be carefully reassessed on follow-up  No other focal abnormalities  GALLBLADDER:  Gallbladder is surgically absent   SPLEEN: Unremarkable  PANCREAS:  Postsurgical changes as previously seen with pancreatic duct no longer appearing distended  No evidence of enlarging mass appreciated  Somewhat poorly defined borders of the peripancreatic tissue, likely related to nonspecific inflammatory change/soft tissue stranding for example image 6/58  Pancreatic duct no longer dilated  ADRENAL GLANDS:  Unremarkable  KIDNEYS/URETERS:  Unremarkable  No hydronephrosis  STOMACH AND BOWEL:  Prior study the stomach was significantly dilated  Stomach is now normal in size and there is no evidence of small bowel obstruction  There are nonspecific inflammatory changes noted in the region of the nicky hepatis, with small amount of retroperitoneal fluid for example image 58,  55 in the region of the nicky hepatis  No organized fluid collections  Nonspecific mild inflammatory changes of bowel in the region of the gastric jejunal anastomosis APPENDIX:  No findings to suggest appendicitis  ABDOMINOPELVIC CAVITY:  Trace perihepatic ascites, for example image 6/53  No free air  No suspicious adenopathy VESSELS:  Portal vein remains patent  No AAA PELVIS REPRODUCTIVE ORGANS:  Limited assessment secondary to artifact from bilateral hip prostheses URINARY BLADDER:  Also with limited assessment ABDOMINAL WALL/INGUINAL REGIONS:  Unremarkable  OSSEOUS STRUCTURES:  No acute fracture or destructive osseous lesion  Impression: 1  No evidence of metastatic disease in the thorax  Long segment of mild esophageal wall thickening which should be correlated for suspicion of esophagitis  2  Inflammatory changes /small amount of fluid noted in the region of the nicky hepatis and surgical bed without evidence to suggest enlarging pancreatic head mass  The pancreatic duct is no longer dilated  3  Stomach is no longer distended  Continued findings suggesting nonspecific inflammatory changes in the region of the gastrojejunal anastomosis 4  Hepatic steatosis    Hepatic hypodense nodule likely fluid within a fissure which should be carefully reassessed during follow-up  No discrete evidence of hepatic metastasis 5  Trace amount of perihepatic ascites  No suspicious adenopathy Workstation performed: [id number]     I reviewed the above laboratory and imaging data  Discussion/Summary:  History of pancreas cancer, status post chemotherapy, with procedure, chemo radiation, and chemotherapy  She is doing well  Plan on follow-up in 4 months for surveillance  Will order scan and blood work for that visit

## 2021-04-12 NOTE — LETTER
April 12, 2021     Monicapam Bishop DO  [address] [address]    Patient: Katy Berg   YOB: 1955   Date of Visit: 4/12/2021       Dear Dr Elijah Mercedes: Thank you for referring Katy Berg to me for evaluation  Below are my notes for this consultation  If you have questions, please do not hesitate to call me  I look forward to following your patient along with you  Sincerely,        Omid Flores MD        CC: MD Richard Souza RN Danelle Ensign, PA-C Leonel Lien, MD Belinda Roughen, MD Remigio Blare, ROSEMARIE Flores MD  4/12/2021 11:42 AM  Sign when Signing Visit     Surgical Oncology Follow Up       [address] [facility] [address] [id number] [phone]    Katy Berg  1955  [id number]  [address] [place] [address] SURGICAL ONCOLOGY [address]  [phone]    Chief Complaint   Patient presents with    Follow-up     4 month follow up       Assessment/Plan:    No problem-specific Assessment & Plan notes found for this encounter  Diagnoses and all orders for this visit:    Pancreatic adenocarcinoma 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 status post FOLFOX neoadjuvant chemotherapy (Camptosar stopped after cycle 3) with partial response followed by Whipple resection on 10/20/2020  Pathology was significant for tumor invading into the submucosa of the duodenum and (+) 1/14 lymph nodes    She completed a course of adjuvant chemo radiation 1/26/21 2/20/21   AST 50 (was 30 on 2/6/21)  ALT 81 (was 35)  Alk phos 226 (was 140)    4/5/21 CT chest, abdomen and pelvis scheduled by Dr Gayle Old     Pancreatic adenocarcinoma [facility])   6/2/2020 Biopsy    [facility]- Dr Hiwot Reddy:  Pancreas, uncinate mass:      - Malignant ([facility] Category VI)      - Compatible with adenocarcinoma with mucinous features  6/30/2020 - 9/16/2020 Chemotherapy    fluorouracil (ADRUCIL) injection 585 mg, 400 mg/m2 = 585 mg, Intravenous, Once, 7 of 12 cycles  Administration: 585 mg (6/30/2020), 585 mg (7/14/2020), 585 mg (7/28/2020), 585 mg (8/11/2020), 585 mg (9/8/2020), 585 mg (8/25/2020)  pegfilgrastim (NEULASTA ONPRO) subcutaneous injection kit 6 mg, 6 mg, Subcutaneous, Once, 7 of 12 cycles  Administration: 6 mg (7/2/2020), 6 mg (7/16/2020), 6 mg (7/30/2020), 6 mg (8/13/2020), 6 mg (8/27/2020), 6 mg (9/10/2020)  fosaprepitant (EMEND) 150 mg in sodium chloride 0 9 % 250 mL IVPB, 150 mg, Intravenous, Once, 7 of 12 cycles  Administration: 150 mg (6/30/2020), 150 mg (7/14/2020), 150 mg (7/28/2020), 150 mg (8/11/2020), 150 mg (9/8/2020), 150 mg (8/25/2020)  irinotecan (CAMPTOSAR) 263 mg in dextrose 5 % 500 mL chemo infusion, 180 mg/m2 = 263 mg, Intravenous, Once, 3 of 3 cycles  Administration: 263 mg (6/30/2020), 263 mg (7/14/2020), 263 mg (7/28/2020)  leucovorin 584 mg in dextrose 5 % 250 mL IVPB, 400 mg/m2 = 584 mg (100 % of original dose 400 mg/m2), Intravenous, Once, 2 of 7 cycles  Dose modification: 400 mg/m2 (original dose 400 mg/m2, Cycle 6)  Administration: 584 mg (9/8/2020)  oxaliplatin (ELOXATIN) 124 1 mg in dextrose 5 % 250 mL chemo infusion, 85 mg/m2 = 124 1 mg, Intravenous, Once, 7 of 12 cycles  Administration: 124 1 mg (6/30/2020), 124 1 mg (7/14/2020), 124 1 mg (7/28/2020), 124 1 mg (8/11/2020), 124 1 mg (9/8/2020), 124 1 mg (8/25/2020)     10/20/2020 Surgery    Whipple:  - Residual invasive adenocarcinoma with mucinous features, 2 3 cm   - Metastatic carcinoma present in one of fourteen lymph nodes (1/14)  - All margins are negative for tumor  12/21/2020 - 1/24/2021 Chemotherapy    [No matching medication found in this treatment plan]     12/21/2020 - 1/26/2021 Radiation    5000 cGy in 25 fractions to high risk areas  Dr Sadia Saldana     2/8/2021 -  Chemotherapy    fluorouracil (ADRUCIL) injection 560 mg, 400 mg/m2 = 560 mg, Intravenous, Once, 4 of 4 cycles  Administration: 560 mg (2/8/2021), 560 mg (2/22/2021), 560 mg (3/8/2021), 560 mg (3/22/2021)  pegfilgrastim (NEULASTA ONPRO) subcutaneous injection kit 6 mg, 6 mg, Subcutaneous, Once, 4 of 4 cycles  Administration: 6 mg (2/10/2021), 6 mg (2/24/2021), 6 mg (3/10/2021), 6 mg (3/24/2021)  fosaprepitant (EMEND) 150 mg in sodium chloride 0 9 % 250 mL IVPB, 150 mg, Intravenous, Once, 4 of 4 cycles  Administration: 150 mg (2/8/2021), 150 mg (2/22/2021), 150 mg (3/8/2021), 150 mg (3/22/2021)  leucovorin 560 mg in dextrose 5 % 250 mL IVPB, 400 mg/m2 = 560 mg, Intravenous, Once, 4 of 4 cycles  Administration: 560 mg (2/8/2021), 560 mg (2/22/2021), 560 mg (3/8/2021), 560 mg (3/22/2021)  oxaliplatin (ELOXATIN) 119 mg in dextrose 5 % 250 mL chemo infusion, 85 mg/m2 = 119 mg, Intravenous, Once, 4 of 4 cycles  Administration: 119 mg (2/8/2021), 119 mg (2/22/2021), 119 mg (3/8/2021), 119 mg (3/22/2021)         History of Present Illness:  Marian Mazariegos is a 68-year-old woman here for surveillance visit  She is status post Whipple procedure, and underwent chemoRTX, and just recently completed chemotherapy   -Interval History: she comes in with no major complaints report  She is doing well  Review of Systems:  Review of Systems   Constitutional: Negative  HENT: Negative  Eyes: Negative  Respiratory: Negative  Cardiovascular: Negative  Gastrointestinal: Negative  Endocrine: Negative  Genitourinary: Negative  Musculoskeletal: Negative  Skin: Negative  Allergic/Immunologic: Negative  Neurological: Negative  Hematological: Negative  Psychiatric/Behavioral: Negative          Patient Active Problem List   Diagnosis    Hyperlipidemia    Essential hypertension    Type 2 diabetes mellitus without complication, without long-term current use of insulin ([facility] [address] )    Pruritus    Transaminitis    Pancreatic adenocarcinoma ([facility] [address] )    Port-A-Cath in place    Therapeutic opioid-induced constipation (OIC)    Anxiety    Functional diarrhea    Poor appetite    Chemotherapy induced neutropenia (HCC)     Past Medical History:   Diagnosis Date    Chemotherapy induced neutropenia ([facility] [address] ) 2/10/2021    Diabetes mellitus ([facility] [address] )     Hypertension     Lactic acidosis 2020    Neuropathy     Obstructive jaundice 2020     Past Surgical History:   Procedure Laterality Date    BREAST SURGERY Left     calcifications     SECTION      CHOLECYSTECTOMY N/A 10/20/2020    Procedure: CHOLECYSTECTOMY;  Surgeon: Julianne Romo MD;  [facility];  Service: Surgical Oncology    COLONOSCOPY      ECTOPIC PREGNANCY SURGERY      partial ovary removed    [address] [facility]  2020    JOINT REPLACEMENT Bilateral     LAPAROTOMY N/A 10/20/2020    Procedure: LAPAROTOMY EXPLORATORY; INTRAOPERATIVE ULTRASOUND;  Surgeon: Julianne Romo MD;  [facility];  Service: Surgical Oncology    [address] [place] [address]    NOSE SURGERY      deviated septum    REPLACEMENT TOTAL HIP W/  RESURFACING IMPLANTS Bilateral     right hip done 2012; left hip done 2012    TONSILLECTOMY      TUNNELED VENOUS PORT PLACEMENT Left 2020    Procedure: INSERTION VENOUS PORT (PORT-A-CATH), left;  Surgeon: Julianne Romo MD;  [facility];  Service: Surgical Oncology    WHIPPLE PROCEDURE/PANCREATICO-DUODENECTOMY N/A 10/20/2020    Procedure:  WHIPPLE PROCEDURE/PANCREATICO-DUODENECTOMY;  Surgeon: Julianne Romo MD;  [facility];  Service: Surgical Oncology     Family History   Problem Relation Age of Onset    Diabetes Mother     Heart disease Mother     Heart disease Father     Breast cancer additional onset Sister     Breast cancer additional onset Maternal Aunt     Stroke Maternal Grandfather      Social History     Socioeconomic History    Marital status: /Civil Union     Spouse name: Not on file    Number of children: Not on file    Years of education: Not on file    Highest education level: Not on file   Occupational History    Not on file   Social Needs    Financial resource strain: Not on file    Food insecurity     Worry: Not on file     Inability: Not on file   [employer] needs     Medical: Not on file     Non-medical: Not on file   Tobacco Use    Smoking status: Former Smoker    Smokeless tobacco: Never Used    Tobacco comment: quit 40 years ago   Substance and Sexual Activity    Alcohol use: Not Currently     Frequency: Monthly or less    Drug use: Never    Sexual activity: Not Currently   Lifestyle    Physical activity     Days per week: Not on file     Minutes per session: Not on file    Stress: Not on file   Relationships    Social connections     Talks on phone: Not on file     Gets together: Not on file     Attends Oriental orthodox service: Not on file     Active member of club or organization: Not on file     Attends meetings of clubs or organizations: Not on file     Relationship status: Not on file    Intimate partner violence     Fear of current or ex partner: Not on file     Emotionally abused: Not on file     Physically abused: Not on file     Forced sexual activity: Not on file   Other Topics Concern    Not on file   Social History Narrative    Not on file       Current Outpatient Medications:     amLODIPine (NORVASC) 2 5 mg tablet, Take 2 5 mg by mouth daily, Disp: , Rfl:     dicyclomine (BENTYL) 20 mg tablet, TAKE 1 TABLET BY MOUTH TWICE A DAY, Disp: 180 tablet, Rfl: 1    diphenhydrAMINE (BENADRYL) 25 mg capsule, Take 25 mg by mouth every 6 (six) hours as needed for itching, Disp: , Rfl:   diphenoxylate-atropine (LOMOTIL) 2 5-0 025 mg per tablet, Take 1 tablet by mouth 4 (four) times a day as needed for diarrhea DO NOT EXCEED 4 DOSES IN 1 DAY, Disp: 30 tablet, Rfl: 0    Empagliflozin (Jardiance) 10 MG TABS, Take 10 mg by mouth every morning, Disp: , Rfl:     gabapentin (NEURONTIN) 300 mg capsule, Take 300 mg by mouth 3 (three) times a day, Disp: , Rfl:     LORazepam (ATIVAN) 0 5 mg tablet, Take 1 tablet (0 5 mg total) by mouth 2 (two) times a day as needed for anxiety NO FURTHER REFILLS WITHOUT CLINIC VISIT WITH PALLIATIVE CARE, Disp: 30 tablet, Rfl: 0    mirtazapine (REMERON) 15 mg tablet, Take 1 tablet (15 mg total) by mouth daily at bedtime, Disp: 30 tablet, Rfl: 3    omeprazole (PriLOSEC) 20 mg delayed release capsule, TAKE 1 CAPSULE BY MOUTH EVERY DAY, Disp: 90 capsule, Rfl: 3    prochlorperazine (COMPAZINE) 10 mg tablet, Take 1 tablet (10 mg total) by mouth every 6 (six) hours as needed for nausea or vomiting, Disp: 45 tablet, Rfl: 3    hydrOXYzine HCL (ATARAX) 10 mg tablet, Take 1 tablet (10 mg total) by mouth every 8 (eight) hours as needed for itching (Patient not taking: Reported on 4/12/2021), Disp: 30 tablet, Rfl: 0  Allergies   Allergen Reactions    Betadine [Povidone Iodine] Rash     Also itching from betadine    Other Throat Swelling     Pt unsure which chemotherapy drug caused tongue swelling and locked jaw  Please review in epic notes Folfirinox or possible Neulasta  NEED to review     Vitals:    04/12/21 1104   BP: 152/78   Pulse: 95   Resp: 16       Physical Exam  Constitutional:       Appearance: Normal appearance  HENT:      Head: Normocephalic and atraumatic  Right Ear: External ear normal       Left Ear: External ear normal    Eyes:      General: No scleral icterus  Extraocular Movements: Extraocular movements intact  Pupils: Pupils are equal, round, and reactive to light  Neck:      Musculoskeletal: Normal range of motion and neck supple  Cardiovascular:      Rate and Rhythm: Normal rate and regular rhythm  Heart sounds: Normal heart sounds  Pulmonary:      Effort: Pulmonary effort is normal       Breath sounds: Normal breath sounds  Abdominal:      General: Abdomen is flat  Bowel sounds are normal  There is no distension  Palpations: Abdomen is soft  There is no mass  Tenderness: There is no abdominal tenderness  There is no guarding or rebound  Hernia: No hernia is present  Musculoskeletal: Normal range of motion  Skin:     General: Skin is warm and dry  Neurological:      General: No focal deficit present  Mental Status: She is alert and oriented to person, place, and time  Psychiatric:         Mood and Affect: Mood normal          Behavior: Behavior normal          Thought Content: Thought content normal          Judgment: Judgment normal            Results:  Labs:   Ref Range & Units 4/10/21 8:47 AM    CA 19-9 0 - 35 U/mL 23           Imaging  Ct Chest Abdomen Pelvis W Contrast    Result Date: 4/9/2021  Narrative: CT CHEST, ABDOMEN AND PELVIS WITH IV CONTRAST INDICATION:   C25 9: Malignant neoplasm of pancreas, unspecified  Surveillance COMPARISON:  10/30/2020 TECHNIQUE: CT examination of the chest, abdomen and pelvis was performed  Axial, sagittal, and coronal 2D reformatted images were created from the source data and submitted for interpretation  Radiation dose length product (DLP) for this visit:  841 mGy-cm   This examination, like all CT scans performed in [facility], was performed utilizing techniques to minimize radiation dose exposure, including the use of iterative reconstruction and automated exposure control  IV Contrast:  100 mL of iohexol (OMNIPAQUE)     There are portal venous and delayed images available  Enteric Contrast: Enteric contrast was administered  FINDINGS: CHEST LUNGS:  Lungs are clear  There is no tracheal or endobronchial lesion  PLEURA:  Unremarkable  HEART/GREAT VESSELS:  Coronary artery calcifications  No pericardial effusion  MEDIASTINUM AND SANDRA:  No adenopathy  Relatively long segment of circumferential wall thickening of esophagus  CHEST WALL AND LOWER NECK:   Unremarkable  ABDOMEN LIVER/BILIARY TREE:  Development of a small hypodense nodule within the medial aspect of the right hepatic lobe image 6/58  Measures 1 1 x 0 7 cm  Most likely this is a small amount of fluid which has developed within a preexistent fissure given the elongated shape but should be carefully reassessed on follow-up  No other focal abnormalities  GALLBLADDER:  Gallbladder is surgically absent  SPLEEN:  Unremarkable  PANCREAS:  Postsurgical changes as previously seen with pancreatic duct no longer appearing distended  No evidence of enlarging mass appreciated  Somewhat poorly defined borders of the peripancreatic tissue, likely related to nonspecific inflammatory change/soft tissue stranding for example image 6/58  Pancreatic duct no longer dilated  ADRENAL GLANDS:  Unremarkable  KIDNEYS/URETERS:  Unremarkable  No hydronephrosis  STOMACH AND BOWEL:  Prior study the stomach was significantly dilated  Stomach is now normal in size and there is no evidence of small bowel obstruction  There are nonspecific inflammatory changes noted in the region of the nicky hepatis, with small amount of retroperitoneal fluid for example image 58,  55 in the region of the nicky hepatis  No organized fluid collections  Nonspecific mild inflammatory changes of bowel in the region of the gastric jejunal anastomosis APPENDIX:  No findings to suggest appendicitis  ABDOMINOPELVIC CAVITY:  Trace perihepatic ascites, for example image 6/53  No free air  No suspicious adenopathy VESSELS:  Portal vein remains patent    No AAA PELVIS REPRODUCTIVE ORGANS:  Limited assessment secondary to artifact from bilateral hip prostheses URINARY BLADDER:  Also with limited assessment ABDOMINAL WALL/INGUINAL REGIONS:  Unremarkable  OSSEOUS STRUCTURES:  No acute fracture or destructive osseous lesion  Impression: 1  No evidence of metastatic disease in the thorax  Long segment of mild esophageal wall thickening which should be correlated for suspicion of esophagitis  2  Inflammatory changes /small amount of fluid noted in the region of the nicky hepatis and surgical bed without evidence to suggest enlarging pancreatic head mass  The pancreatic duct is no longer dilated  3  Stomach is no longer distended  Continued findings suggesting nonspecific inflammatory changes in the region of the gastrojejunal anastomosis 4  Hepatic steatosis  Hepatic hypodense nodule likely fluid within a fissure which should be carefully reassessed during follow-up  No discrete evidence of hepatic metastasis 5  Trace amount of perihepatic ascites  No suspicious adenopathy Workstation performed: [id number]     I reviewed the above laboratory and imaging data  Discussion/Summary:  History of pancreas cancer, status post chemotherapy, with procedure, chemo radiation, and chemotherapy  She is doing well  Plan on follow-up in 4 months for surveillance  Will order scan and blood work for that visit

## 2021-04-13 ENCOUNTER — OFFICE VISIT (OUTPATIENT)
Dept: HEMATOLOGY ONCOLOGY | Facility: CLINIC | Age: 66
End: 2021-04-13
Payer: MEDICARE

## 2021-04-13 VITALS
DIASTOLIC BLOOD PRESSURE: 76 MMHG | TEMPERATURE: 97.7 F | RESPIRATION RATE: 16 BRPM | WEIGHT: 102 LBS | HEIGHT: 59 IN | SYSTOLIC BLOOD PRESSURE: 118 MMHG | HEART RATE: 78 BPM | OXYGEN SATURATION: 98 % | BODY MASS INDEX: 20.56 KG/M2

## 2021-04-13 DIAGNOSIS — T45.1X5A CHEMOTHERAPY INDUCED NEUTROPENIA (HCC): ICD-10-CM

## 2021-04-13 DIAGNOSIS — D70.1 CHEMOTHERAPY INDUCED NEUTROPENIA (HCC): ICD-10-CM

## 2021-04-13 DIAGNOSIS — C25.9 PANCREATIC ADENOCARCINOMA (HCC): Primary | ICD-10-CM

## 2021-04-13 PROCEDURE — 99214 OFFICE O/P EST MOD 30 MIN: CPT | Performed by: INTERNAL MEDICINE

## 2021-04-13 NOTE — PROGRESS NOTES
Hematology/Oncology Outpatient Follow- up Note  Benn Landau 74 y o  female MRN: @ Encounter: [id number]        Date:  4/13/2021    Presenting Complaint/Diagnosis :    HPI:     Benn Landau is a 74 year female with pancreatic cancer  She was seen for an initial consultation on 6/18/20  She presented with painless jaundice  Workup was done including a CT scan of the chest abdomen pelvis along with an MRI which showed an isolated pancreatic head mass which was biopsied and 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   The patient's bilirubin which was elevated at 12 2 at its peak has now come down to 1 0   She was seen by our colleagues in Surgical Oncology and they recommended neoadjuvant chemotherapy prior to consideration of a resection  She was started on FOLFIRINOX  every 2 weeks with Neulasta growth factor support; first dose was given on 6/30/20    She had this for approximately 3 cycles but then had a reaction to the CPT 11 so this was discontinued   She then proceeded to receive dose adjusted modified FOLFOX 6  She finished up 6 cycles and went for surgery  Whipple procedure/pancreaticoduodenectomy completed on 10/20/20   Surgery revealed 1 positive lymph node along with residual tumor   She has completed concurrent chemoradiation in the adjuvant setting    She then got 4 cycles of modified FOLFOX 6      Previous Hematologic/ Oncologic History:    Oncology History Overview Note   Benn Landau is a 57-year-old woman with a history of 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 status post FOLFOX neoadjuvant chemotherapy (Camptosar stopped after cycle 3) with partial response followed by Whipple resection on 10/20/2020  Pathology was significant for tumor invading into the submucosa of the duodenum and (+) 1/14 lymph nodes    She completed a course of adjuvant chemo radiation 1/26/21 2/20/21   AST 50 (was 30 on 2/6/21)  ALT 81 (was 35)  Alk phos 226 (was 140)    4/5/21 CT chest, abdomen and pelvis scheduled by Dr Griffin Malone     Pancreatic adenocarcinoma [facility])   6/2/2020 Biopsy    EUS- Dr Iris Pappas:  Pancreas, uncinate mass:      - Malignant ([facility] Category VI)      - Compatible with adenocarcinoma with mucinous features  6/30/2020 - 9/16/2020 Chemotherapy    fluorouracil (ADRUCIL) injection 585 mg, 400 mg/m2 = 585 mg, Intravenous, Once, 7 of 12 cycles  Administration: 585 mg (6/30/2020), 585 mg (7/14/2020), 585 mg (7/28/2020), 585 mg (8/11/2020), 585 mg (9/8/2020), 585 mg (8/25/2020)  pegfilgrastim (NEULASTA ONPRO) subcutaneous injection kit 6 mg, 6 mg, Subcutaneous, Once, 7 of 12 cycles  Administration: 6 mg (7/2/2020), 6 mg (7/16/2020), 6 mg (7/30/2020), 6 mg (8/13/2020), 6 mg (8/27/2020), 6 mg (9/10/2020)  fosaprepitant (EMEND) 150 mg in sodium chloride 0 9 % 250 mL IVPB, 150 mg, Intravenous, Once, 7 of 12 cycles  Administration: 150 mg (6/30/2020), 150 mg (7/14/2020), 150 mg (7/28/2020), 150 mg (8/11/2020), 150 mg (9/8/2020), 150 mg (8/25/2020)  irinotecan (CAMPTOSAR) 263 mg in dextrose 5 % 500 mL chemo infusion, 180 mg/m2 = 263 mg, Intravenous, Once, 3 of 3 cycles  Administration: 263 mg (6/30/2020), 263 mg (7/14/2020), 263 mg (7/28/2020)  leucovorin 584 mg in dextrose 5 % 250 mL IVPB, 400 mg/m2 = 584 mg (100 % of original dose 400 mg/m2), Intravenous, Once, 2 of 7 cycles  Dose modification: 400 mg/m2 (original dose 400 mg/m2, Cycle 6)  Administration: 584 mg (9/8/2020)  oxaliplatin (ELOXATIN) 124 1 mg in dextrose 5 % 250 mL chemo infusion, 85 mg/m2 = 124 1 mg, Intravenous, Once, 7 of 12 cycles  Administration: 124 1 mg (6/30/2020), 124 1 mg (7/14/2020), 124 1 mg (7/28/2020), 124 1 mg (8/11/2020), 124 1 mg (9/8/2020), 124 1 mg (8/25/2020)     10/20/2020 Surgery    Whipple:  - Residual invasive adenocarcinoma with mucinous features, 2 3 cm   - Metastatic carcinoma present in one of fourteen lymph nodes (1/14)  - All margins are negative for tumor          12/21/2020 - 1/24/2021 Chemotherapy    [No matching medication found in this treatment plan]     12/21/2020 - 1/26/2021 Radiation    5000 cGy in 25 fractions to high risk areas  Dr Enedina Sanchez     2/8/2021 -  Chemotherapy    fluorouracil (ADRUCIL) injection 560 mg, 400 mg/m2 = 560 mg, Intravenous, Once, 4 of 4 cycles  Administration: 560 mg (2/8/2021), 560 mg (2/22/2021), 560 mg (3/8/2021), 560 mg (3/22/2021)  pegfilgrastim (NEULASTA ONPRO) subcutaneous injection kit 6 mg, 6 mg, Subcutaneous, Once, 4 of 4 cycles  Administration: 6 mg (2/10/2021), 6 mg (2/24/2021), 6 mg (3/10/2021), 6 mg (3/24/2021)  fosaprepitant (EMEND) 150 mg in sodium chloride 0 9 % 250 mL IVPB, 150 mg, Intravenous, Once, 4 of 4 cycles  Administration: 150 mg (2/8/2021), 150 mg (2/22/2021), 150 mg (3/8/2021), 150 mg (3/22/2021)  leucovorin 560 mg in dextrose 5 % 250 mL IVPB, 400 mg/m2 = 560 mg, Intravenous, Once, 4 of 4 cycles  Administration: 560 mg (2/8/2021), 560 mg (2/22/2021), 560 mg (3/8/2021), 560 mg (3/22/2021)  oxaliplatin (ELOXATIN) 119 mg in dextrose 5 % 250 mL chemo infusion, 85 mg/m2 = 119 mg, Intravenous, Once, 4 of 4 cycles  Administration: 119 mg (2/8/2021), 119 mg (2/22/2021), 119 mg (3/8/2021), 119 mg (3/22/2021)        6 cycles of FOLFIRINOX   surgery   concurrent chemoradiation   4 cycles of modified FOLFOX 6    Current Hematologic/ Oncologic Treatment:     observation    Interval History:     the patient returns for follow-up visit  She has finished up her chemotherapy and her most recent imaging shows no evidence of metastatic disease  She had some inflammatory changes/small amount of fluid noted in the region of the nicky hepaticus in surgical bed without evidence to suggest enlarging pancreatic head mass  Stomach was no longer dilated  There was hepatic steatosis with trace amount of perihepatic ascites  Far symptoms are concerned she is slowly returning to baseline  Neuropathy is improving  She occasionally has some diarrhea  Imodium helps with this  Denies any nausea denies any vomiting  The rest of her 14 point review of systems today was negative  Cancer Staging:  Cancer Staging  Pancreatic adenocarcinoma [facility])  Staging form: Pancreas, AJCC 8th Edition  - Clinical: Stage IIB (cT2, cN1, cM0) - Unsigned  Histologic grade (G): G2  Histologic grading system: 3 grade system    Test Results:    Imaging: Ct Chest Abdomen Pelvis W Contrast    Result Date: 4/9/2021  Narrative: CT CHEST, ABDOMEN AND PELVIS WITH IV CONTRAST INDICATION:   C25 9: Malignant neoplasm of pancreas, unspecified  Surveillance COMPARISON:  10/30/2020 TECHNIQUE: CT examination of the chest, abdomen and pelvis was performed  Axial, sagittal, and coronal 2D reformatted images were created from the source data and submitted for interpretation  Radiation dose length product (DLP) for this visit:  841 mGy-cm   This examination, like all CT scans performed in the [facility], was performed utilizing techniques to minimize radiation dose exposure, including the use of iterative reconstruction and automated exposure control  IV Contrast:  100 mL of iohexol (OMNIPAQUE)     There are portal venous and delayed images available  Enteric Contrast: Enteric contrast was administered  FINDINGS: CHEST LUNGS:  Lungs are clear  There is no tracheal or endobronchial lesion  PLEURA:  Unremarkable  HEART/GREAT VESSELS:  Coronary artery calcifications  No pericardial effusion  MEDIASTINUM AND SANDRA:  No adenopathy  Relatively long segment of circumferential wall thickening of esophagus  CHEST WALL AND LOWER NECK:   Unremarkable  ABDOMEN LIVER/BILIARY TREE:  Development of a small hypodense nodule within the medial aspect of the right hepatic lobe image 6/58  Measures 1 1 x 0 7 cm  Most likely this is a small amount of fluid which has developed within a preexistent fissure given the elongated shape but should be carefully reassessed on follow-up    No other focal abnormalities  GALLBLADDER:  Gallbladder is surgically absent  SPLEEN:  Unremarkable  PANCREAS:  Postsurgical changes as previously seen with pancreatic duct no longer appearing distended  No evidence of enlarging mass appreciated  Somewhat poorly defined borders of the peripancreatic tissue, likely related to nonspecific inflammatory change/soft tissue stranding for example image 6/58  Pancreatic duct no longer dilated  ADRENAL GLANDS:  Unremarkable  KIDNEYS/URETERS:  Unremarkable  No hydronephrosis  STOMACH AND BOWEL:  Prior study the stomach was significantly dilated  Stomach is now normal in size and there is no evidence of small bowel obstruction  There are nonspecific inflammatory changes noted in the region of the nicky hepatis, with small amount of retroperitoneal fluid for example image 58,  55 in the region of the nicky hepatis  No organized fluid collections  Nonspecific mild inflammatory changes of bowel in the region of the gastric jejunal anastomosis APPENDIX:  No findings to suggest appendicitis  ABDOMINOPELVIC CAVITY:  Trace perihepatic ascites, for example image 6/53  No free air  No suspicious adenopathy VESSELS:  Portal vein remains patent  No AAA PELVIS REPRODUCTIVE ORGANS:  Limited assessment secondary to artifact from bilateral hip prostheses URINARY BLADDER:  Also with limited assessment ABDOMINAL WALL/INGUINAL REGIONS:  Unremarkable  OSSEOUS STRUCTURES:  No acute fracture or destructive osseous lesion  Impression: 1  No evidence of metastatic disease in the thorax  Long segment of mild esophageal wall thickening which should be correlated for suspicion of esophagitis  2  Inflammatory changes /small amount of fluid noted in the region of the nicky hepatis and surgical bed without evidence to suggest enlarging pancreatic head mass  The pancreatic duct is no longer dilated  3  Stomach is no longer distended    Continued findings suggesting nonspecific inflammatory changes in the region of the gastrojejunal anastomosis 4  Hepatic steatosis  Hepatic hypodense nodule likely fluid within a fissure which should be carefully reassessed during follow-up  No discrete evidence of hepatic metastasis 5  Trace amount of perihepatic ascites  No suspicious adenopathy Workstation performed: [id number]       Labs:   Lab Results   Component Value Date    WBC 6 88 2021    HGB 13 1 2021    HCT 40 2 2021    MCV 97 2021     (L) 2021     Lab Results   Component Value Date    K 4 3 04/10/2021     04/10/2021    CO2 30 04/10/2021    BUN 11 04/10/2021    CREATININE 1 03 04/10/2021    GLUCOSE 153 (H) 10/20/2020    GLUF 135 (H) 04/10/2021    CALCIUM 9 3 04/10/2021    CORRECTEDCA 9 5 2021    AST 28 2021    ALT 31 2021    ALKPHOS 235 (H) 2021    EGFR 56 04/10/2021       Lab Results   Component Value Date    [id number] 637 2019       ROS: As stated in the history of present illness otherwise his 14 point review of systems today was negative        Active Problems:   Patient Active Problem List   Diagnosis    Hyperlipidemia    Essential hypertension    Type 2 diabetes mellitus without complication, without long-term current use of insulin (HCC)    Pruritus    Transaminitis    Pancreatic adenocarcinoma ([address] )    Port-A-Cath in place    Therapeutic opioid-induced constipation (OIC)    Anxiety    Functional diarrhea    Poor appetite    Chemotherapy induced neutropenia (HCC)       Past Medical History:   Past Medical History:   Diagnosis Date    Chemotherapy induced neutropenia ([address] ) 2/10/2021    Diabetes mellitus ([address] )     Hypertension     Lactic acidosis 2020    Neuropathy     Obstructive jaundice 2020       Surgical History:   Past Surgical History:   Procedure Laterality Date    BREAST SURGERY Left     calcifications     SECTION      CHOLECYSTECTOMY N/A 10/20/2020    Procedure: CHOLECYSTECTOMY;  Surgeon: Fern Jain MD;  [facility];  Service: Surgical Oncology    COLONOSCOPY      ECTOPIC PREGNANCY SURGERY      partial ovary removed    FL GUIDED CENTRAL VENOUS ACCESS DEVICE INSERTION  6/23/2020    JOINT REPLACEMENT Bilateral     LAPAROTOMY N/A 10/20/2020    Procedure: LAPAROTOMY EXPLORATORY; INTRAOPERATIVE ULTRASOUND;  Surgeon: Fern Jain MD;  [facility];  Service: Surgical Oncology    [address]    NOSE SURGERY      deviated septum    REPLACEMENT TOTAL HIP W/  RESURFACING IMPLANTS Bilateral 2012    right hip done July 2012; left hip done September 2012    TONSILLECTOMY  1957    TUNNELED VENOUS PORT PLACEMENT Left 6/23/2020    Procedure: INSERTION VENOUS PORT (PORT-A-CATH), left;  Surgeon: Fern Jain MD;  [facility];  Service: Surgical Oncology    WHIPPLE PROCEDURE/PANCREATICO-DUODENECTOMY N/A 10/20/2020    Procedure: WHIPPLE PROCEDURE/PANCREATICO-DUODENECTOMY;  Surgeon: Fern Jain MD;  [facility];  Service: Surgical Oncology       Family History:    Family History   Problem Relation Age of Onset    Diabetes Mother     Heart disease Mother     Heart disease Father     Breast cancer additional onset Sister     Breast cancer additional onset Maternal Aunt     Stroke Maternal Grandfather        Cancer-related family history is not on file      Social History:   Social History     Socioeconomic History    Marital status: /Civil Union     Spouse name: Not on file    Number of children: Not on file    Years of education: Not on file    Highest education level: Not on file   Occupational History    Not on file   Social Needs    Financial resource strain: Not on file    Food insecurity     Worry: Not on file     Inability: Not on file   [employer] needs     Medical: Not on file     Non-medical: Not on file   Tobacco Use    Smoking status: Former Smoker    Smokeless tobacco: Never Used    Tobacco comment: quit 40 years ago   Substance and Sexual Activity    Alcohol use: Not Currently     Frequency: Monthly or less    Drug use: Never    Sexual activity: Not Currently   Lifestyle    Physical activity     Days per week: Not on file     Minutes per session: Not on file    Stress: Not on file   Relationships    Social connections     Talks on phone: Not on file     Gets together: Not on file     Attends [employer] service: Not on file     Active member of club or organization: Not on file     Attends meetings of clubs or organizations: Not on file     Relationship status: Not on file    Intimate partner violence     Fear of current or ex partner: Not on file     Emotionally abused: Not on file     Physically abused: Not on file     Forced sexual activity: Not on file   Other Topics Concern    Not on file   Social History Narrative    Not on file       Current Medications:   Current Outpatient Medications   Medication Sig Dispense Refill    amLODIPine (NORVASC) 2 5 mg tablet Take 2 5 mg by mouth daily      dicyclomine (BENTYL) 20 mg tablet TAKE 1 TABLET BY MOUTH TWICE A  tablet 1    diphenhydrAMINE (BENADRYL) 25 mg capsule Take 25 mg by mouth every 6 (six) hours as needed for itching      diphenoxylate-atropine (LOMOTIL) 2 5-0 025 mg per tablet Take 1 tablet by mouth 4 (four) times a day as needed for diarrhea DO NOT EXCEED 4 DOSES IN 1 DAY 30 tablet 0    Empagliflozin (Jardiance) 10 MG TABS Take 10 mg by mouth every morning      gabapentin (NEURONTIN) 300 mg capsule Take 300 mg by mouth 3 (three) times a day      hydrOXYzine HCL (ATARAX) 10 mg tablet Take 1 tablet (10 mg total) by mouth every 8 (eight) hours as needed for itching 30 tablet 0    LORazepam (ATIVAN) 0 5 mg tablet Take 1 tablet (0 5 mg total) by mouth 2 (two) times a day as needed for anxiety NO FURTHER REFILLS WITHOUT CLINIC VISIT WITH PALLIATIVE CARE 30 tablet 0    mirtazapine (REMERON) 15 mg tablet Take 1 tablet (15 mg total) by mouth daily at bedtime 30 tablet 3    omeprazole (PriLOSEC) 20 mg delayed release capsule TAKE 1 CAPSULE BY MOUTH EVERY DAY 90 capsule 3    prochlorperazine (COMPAZINE) 10 mg tablet Take 1 tablet (10 mg total) by mouth every 6 (six) hours as needed for nausea or vomiting 45 tablet 3     No current facility-administered medications for this visit  Allergies: Allergies   Allergen Reactions    Betadine [Povidone Iodine] Rash     Also itching from betadine    Other Throat Swelling     Pt unsure which chemotherapy drug caused tongue swelling and locked jaw  Please review in epic notes Folfirinox or possible Neulasta  NEED to review       Physical Exam:    Body surface area is 1 39 meters squared  Wt Readings from Last 3 Encounters:   04/13/21 46 3 kg (102 lb)   04/12/21 46 3 kg (102 lb)   03/23/21 47 6 kg (105 lb)        Temp Readings from Last 3 Encounters:   04/13/21 97 7 °F (36 5 °C) (Temporal)   03/24/21 97 6 °F (36 4 °C) (Temporal)   03/23/21 98 3 °F (36 8 °C) (Temporal)        BP Readings from Last 3 Encounters:   04/13/21 118/76   04/12/21 152/78   03/23/21 130/70         Pulse Readings from Last 3 Encounters:   04/13/21 78   04/12/21 95   03/23/21 93         Physical Exam     Constitutional   General appearance: No acute distress, well appearing and well nourished  Eyes   Conjunctiva and lids: No swelling, erythema or discharge  Pupils and irises: Equal, round and reactive to light  Ears, Nose, Mouth, and Throat   External inspection of ears and nose: Normal     Nasal mucosa, septum, and turbinates: Normal without edema or erythema  Oropharynx: Normal with no erythema, edema, exudate or lesions  Pulmonary   Respiratory effort: No increased work of breathing or signs of respiratory distress  Auscultation of lungs: Clear to auscultation  Cardiovascular   Palpation of heart: Normal PMI, no thrills      Auscultation of heart: Normal rate and rhythm, normal S1 and S2, without murmurs  Examination of extremities for edema and/or varicosities: Normal     Carotid pulses: Normal     Abdomen   Abdomen: Non-tender, no masses  Liver and spleen: No hepatomegaly or splenomegaly  Lymphatic   Palpation of lymph nodes in neck: No lymphadenopathy  Musculoskeletal   Gait and station: Normal     Digits and nails: Normal without clubbing or cyanosis  Inspection/palpation of joints, bones, and muscles: Normal     Skin   Skin and subcutaneous tissue: Normal without rashes or lesions  Neurologic   Cranial nerves: Cranial nerves 2-12 intact  Sensation: No sensory loss  Psychiatric   Orientation to person, place, and time: Normal     Mood and affect: Normal         Assessment / Plan:      The patient  is a 74 year female with pancreatic cancer  She was seen for an initial consultation on 6/18/20  She presented with painless jaundice  Workup was done including a CT scan of the chest abdomen pelvis along with an MRI which showed an isolated pancreatic head mass which was biopsied and 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   The patient's bilirubin which was elevated at 12 2 at its peak has now come down to 1 0   She was seen by our colleagues in Surgical Oncology and they recommended neoadjuvant chemotherapy prior to consideration of a resection  She was started on FOLFIRINOX  every 2 weeks with Neulasta growth factor support; first dose was given on 6/30/20    She had this for approximately 3 cycles but then had a reaction to the CPT 11 so this was discontinued   She then proceeded to receive dose adjusted modified FOLFOX 6  She finished up 6 cycles and went for surgery  Whipple procedure/pancreaticoduodenectomy completed on 10/20/20   Surgery revealed 1 positive lymph node along with residual tumor     She  completed concurrent chemoradiation with 5 FU 225 mg/m2 CIVI  Monday through Friday for a total of 6 weeks in the adjuvant setting   She tolerated this well      She was then set up for 4 cycles of modified FOLFOX 6 which she finished up  She is now on observation  Her most recent imaging shows no evidence of metastatic disease  I will see her back in 4 months with repeat imaging  Goals and Barriers:  Current Goal:  Prolong Survival from   Pancreatic cancer  Barriers: None  Patient's Capacity to Self Care:  Patient  able to self care  Portions of the record may have been created with voice recognition software   Occasional wrong word or "sound a like" substitutions may have occurred due to the inherent limitations of voice recognition software   Read the chart carefully and recognize, using context, where substitutions have occurred

## 2021-04-14 ENCOUNTER — IMMUNIZATIONS (OUTPATIENT)
Dept: FAMILY MEDICINE CLINIC | Facility: HOSPITAL | Age: 66
End: 2021-04-14

## 2021-04-14 DIAGNOSIS — Z23 ENCOUNTER FOR IMMUNIZATION: Primary | ICD-10-CM

## 2021-04-14 PROCEDURE — 91301 SARS-COV-2 / COVID-19 MRNA VACCINE (MODERNA) 100 MCG: CPT

## 2021-04-14 PROCEDURE — 0012A SARS-COV-2 / COVID-19 MRNA VACCINE (MODERNA) 100 MCG: CPT

## 2021-05-19 ENCOUNTER — HOSPITAL ENCOUNTER (OUTPATIENT)
Dept: INFUSION CENTER | Facility: CLINIC | Age: 66
Discharge: HOME | End: 2021-05-19
Payer: MEDICARE

## 2021-05-19 VITALS — TEMPERATURE: 96.4 F

## 2021-05-19 DIAGNOSIS — Z95.828 PORT-A-CATH IN PLACE: Primary | ICD-10-CM

## 2021-05-19 PROCEDURE — 96523 IRRIG DRUG DELIVERY DEVICE: CPT

## 2021-05-19 NOTE — PROGRESS NOTES
Pt to clinic for a port flush, offers no complaints today, tolerated procedure without complications, making next appointment at scheduling when exiting, avs declined

## 2021-05-19 NOTE — PLAN OF CARE
Problem: Potential for Falls  Goal: Patient will remain free of falls  Description: INTERVENTIONS:  - Assess patient frequently for physical needs  -  Identify cognitive and physical deficits and behaviors that affect risk of falls  -  [place] fall precautions as indicated by assessment   - Educate patient/family on patient safety including physical limitations  - Instruct patient to call for assistance with activity based on assessment  - Modify environment to reduce risk of injury  - Consider OT/PT consult to assist with strengthening/mobility  Outcome: Progressing     Problem: SAFETY ADULT  Goal: Patient will remain free of falls  Description: INTERVENTIONS:  - Assess patient frequently for physical needs  -  Identify cognitive and physical deficits and behaviors that affect risk of falls  -  [place] fall precautions as indicated by assessment   - Educate patient/family on patient safety including physical limitations  - Instruct patient to call for assistance with activity based on assessment  - Modify environment to reduce risk of injury  - Consider OT/PT consult to assist with strengthening/mobility  Outcome: Progressing     Problem: Knowledge Deficit  Goal: Patient/family/caregiver demonstrates understanding of disease process, treatment plan, medications, and discharge instructions  Description: Complete learning assessment and assess knowledge base    Interventions:  - Provide teaching at level of understanding  - Provide teaching via preferred learning methods  Outcome: Progressing

## 2021-05-24 ENCOUNTER — TELEPHONE (OUTPATIENT)
Dept: HEMATOLOGY ONCOLOGY | Facility: CLINIC | Age: 66
End: 2021-05-24

## 2021-05-24 DIAGNOSIS — F41.9 ANXIETY: ICD-10-CM

## 2021-05-24 DIAGNOSIS — C25.9 PANCREATIC ADENOCARCINOMA (HCC): Chronic | ICD-10-CM

## 2021-05-24 RX ORDER — MIRTAZAPINE 15 MG/1
15 TABLET, FILM COATED ORAL
Qty: 30 TABLET | Refills: 3 | Status: SHIPPED | OUTPATIENT
Start: 2021-05-24 | End: 2021-08-18

## 2021-05-24 NOTE — TELEPHONE ENCOUNTER
Medication Refill     Who is Calling  Patient   Medication  mirtazapine (REMERON) 15 mg tablet       How many pills left 7   Preferred [facility] / [facility]   Call back number  [phone]   Relevant Information

## 2021-05-24 NOTE — TELEPHONE ENCOUNTER
Script last filled by Maki Leach in January  Confirmed patient does have a follow up appointment scheduled with Dr Brian Kennedy  Will pend Rx to [facility] for signature

## 2021-06-22 ENCOUNTER — OFFICE VISIT (OUTPATIENT)
Dept: GASTROENTEROLOGY | Facility: CLINIC | Age: 66
End: 2021-06-22
Payer: MEDICARE

## 2021-06-22 VITALS
HEIGHT: 59 IN | WEIGHT: 101.8 LBS | HEART RATE: 76 BPM | DIASTOLIC BLOOD PRESSURE: 74 MMHG | BODY MASS INDEX: 20.52 KG/M2 | SYSTOLIC BLOOD PRESSURE: 134 MMHG

## 2021-06-22 DIAGNOSIS — R19.5 FAT IN STOOL: ICD-10-CM

## 2021-06-22 DIAGNOSIS — R14.1 ABDOMINAL GAS PAIN: ICD-10-CM

## 2021-06-22 DIAGNOSIS — K21.9 GASTROESOPHAGEAL REFLUX DISEASE WITHOUT ESOPHAGITIS: Primary | ICD-10-CM

## 2021-06-22 PROCEDURE — 99213 OFFICE O/P EST LOW 20 MIN: CPT | Performed by: PHYSICIAN ASSISTANT

## 2021-06-22 RX ORDER — OMEPRAZOLE 20 MG/1
20 CAPSULE, DELAYED RELEASE ORAL 2 TIMES DAILY
Qty: 60 CAPSULE | Refills: 0 | Status: SHIPPED | OUTPATIENT
Start: 2021-06-22 | End: 2021-07-16

## 2021-06-22 NOTE — LETTER
June 22, 2021     Santa Welch DO  [address] [address]    Patient: Konstantin Cummings   YOB: 1955   Date of Visit: 6/22/2021       Dear Dr Dallas Wayne: Thank you for referring Konstantin Cummings to me for evaluation  Below are my notes for this consultation  If you have questions, please do not hesitate to call me  I look forward to following your patient along with you  Sincerely,        Deepti Lacey PA-C        CC: No Recipients  Jhon France  6/22/2021  9:59 AM  Sign when Signing Visit  [facility] - Outpatient Follow-up Note  Konstantin Cummings 74 y o  female MRN: [id number]  Encounter: [id number]          ASSESSMENT AND PLAN:      1  Gastroesophageal reflux disease without esophagitis  2  Abdominal gas pain  3  Fat in stool  -Will increase omeprazole 20 mg to b i d  dosage for the next 4 weeks     -Will start patient on align probiotic daily     -Patient will start fiber supplementation daily     -Patient will be cautious of the fat that she eats in her diet     -Follow up in 6 weeks  ______________________________________________________________________    SUBJECTIVE:    68-year-old female presents to the office today for follow-up of gastroesophageal reflux disease, abdominal gas pain, increased fat her stool  Patient has had a very complicated year after being diagnosed with pancreatic adenocarcinoma and undergoing an exploratory laparotomy with a Whipple procedure  Patient reports that recently she is experiencing worsening acid reflux  She is on omeprazole 20 mg daily but she reports she takes this after breakfast   She reports she knows she is supposed to take it before but she has not been doing so  She reports occasional fatty stools  Unfortunately patient cannot tolerate Creon therapy as a cause severe abdominal pain  She reports she has a very healthy diet    She reports she is maintaining her weight but not gaining  She denies any nausea or vomiting  She denies any melena or rectal bleeding  Overall she reports she is feeling quite well  She is due for repeat abdominal scan in August     REVIEW OF SYSTEMS IS OTHERWISE NEGATIVE  Historical Information   Past Medical History:   Diagnosis Date    Chemotherapy induced neutropenia ([facility] [address] ) 2/10/2021    Diabetes mellitus ([facility] [address] )     Hypertension     Lactic acidosis 2020    Neuropathy     Obstructive jaundice 2020     Past Surgical History:   Procedure Laterality Date    BREAST SURGERY Left     calcifications     SECTION      CHOLECYSTECTOMY N/A 10/20/2020    Procedure: CHOLECYSTECTOMY;  Surgeon: Kat Patel MD;  [facility];  Service: Surgical Oncology    COLONOSCOPY      ECTOPIC PREGNANCY SURGERY      partial ovary removed    FL GUIDED CENTRAL VENOUS ACCESS DEVICE INSERTION  2020    JOINT REPLACEMENT Bilateral     LAPAROTOMY N/A 10/20/2020    Procedure: LAPAROTOMY EXPLORATORY; INTRAOPERATIVE ULTRASOUND;  Surgeon: Kat Patel MD;  [facility];  Service: Surgical Oncology    [address] [place] [address] Street    NOSE SURGERY      deviated septum    REPLACEMENT TOTAL HIP W/  RESURFACING IMPLANTS Bilateral     right hip done 2012; left hip done 2012    TONSILLECTOMY      TUNNELED VENOUS PORT PLACEMENT Left 2020    Procedure: INSERTION VENOUS PORT (PORT-A-CATH), left;  Surgeon: Kat Patel MD;  [facility];  Service: Surgical Oncology    WHIPPLE PROCEDURE/PANCREATICO-DUODENECTOMY N/A 10/20/2020    Procedure:  WHIPPLE PROCEDURE/PANCREATICO-DUODENECTOMY;  Surgeon: Kat Patel MD;  [facility];  Service: Surgical Oncology     Social History   Social History     Substance and Sexual Activity   Alcohol Use Not Currently     Social History     Substance and Sexual Activity   Drug Use Never     Social History     Tobacco Use   Smoking Status Former Smoker   Smokeless Tobacco Never Used   Tobacco Comment    quit 40 years ago     Family History   Problem Relation Age of Onset    Diabetes Mother     Heart disease Mother     Heart disease Father     Breast cancer additional onset Sister     Breast cancer additional onset Maternal Aunt     Stroke Maternal Grandfather        Meds/Allergies       Current Outpatient Medications:     amLODIPine (NORVASC) 2 5 mg tablet    dicyclomine (BENTYL) 20 mg tablet    diphenhydrAMINE (BENADRYL) 25 mg capsule    diphenoxylate-atropine (LOMOTIL) 2 5-0 025 mg per tablet    Empagliflozin (Jardiance) 10 MG TABS    gabapentin (NEURONTIN) 300 mg capsule    LORazepam (ATIVAN) 0 5 mg tablet    omeprazole (PriLOSEC) 20 mg delayed release capsule    prochlorperazine (COMPAZINE) 10 mg tablet    hydrOXYzine HCL (ATARAX) 10 mg tablet    mirtazapine (REMERON) 15 mg tablet    Allergies   Allergen Reactions    Betadine [Povidone Iodine] Rash     Also itching from betadine    Other Throat Swelling     Pt unsure which chemotherapy drug caused tongue swelling and locked jaw  Please review in epic notes Folfirinox or possible Neulasta  NEED to review           Objective     Blood pressure 134/74, pulse 76, height 4' 11" (1 499 m), weight 46 2 kg (101 lb 12 8 oz)  Body mass index is 20 56 kg/m²  PHYSICAL EXAM:      General Appearance:   Alert, cooperative, no distress   HEENT:   Normocephalic, atraumatic, anicteric      Neck:  Supple, symmetrical, trachea midline   Lungs:   Clear to auscultation bilaterally; no rales, rhonchi or wheezing; respirations unlabored    Heart[de-identified]   Regular rate and rhythm; no murmur, rub, or gallop     Abdomen:   Soft, non-tender, non-distended; normal bowel sounds; no masses, no organomegaly    Genitalia:   Deferred    Rectal:   Deferred    Extremities:  No cyanosis, clubbing or edema    Pulses:  2+ and symmetric    Skin:  No jaundice, rashes, or lesions    Lymph nodes:  No palpable cervical lymphadenopathy        Lab Results:   No visits with results within 1 Day(s) from this visit  Latest known visit with results is:   Appointment on 04/10/2021   Component Date Value    Sodium 04/10/2021 145     Potassium 04/10/2021 4 3     Chloride 04/10/2021 108     CO2 04/10/2021 30     ANION GAP 04/10/2021 7     BUN 04/10/2021 11     Creatinine 04/10/2021 1 03     Glucose, Fasting 04/10/2021 135*    Calcium 04/10/2021 9 3     eGFR 04/10/2021 56     CA 19-9 04/10/2021 23          Radiology Results:   No results found

## 2021-06-22 NOTE — PROGRESS NOTES
[facility] - Outpatient Follow-up Note  Tali Keith 74 y o  female MRN: [id number]  Encounter: [id number]          ASSESSMENT AND PLAN:      1  Gastroesophageal reflux disease without esophagitis  2  Abdominal gas pain  3  Fat in stool  -Will increase omeprazole 20 mg to b i d  dosage for the next 4 weeks     -Will start patient on align probiotic daily     -Patient will start fiber supplementation daily     -Patient will be cautious of the fat that she eats in her diet     -Follow up in 6 weeks  ______________________________________________________________________    SUBJECTIVE:    55-year-old female presents to the office today for follow-up of gastroesophageal reflux disease, abdominal gas pain, increased fat her stool  Patient has had a very complicated year after being diagnosed with pancreatic adenocarcinoma and undergoing an exploratory laparotomy with a Whipple procedure  Patient reports that recently she is experiencing worsening acid reflux  She is on omeprazole 20 mg daily but she reports she takes this after breakfast   She reports she knows she is supposed to take it before but she has not been doing so  She reports occasional fatty stools  Unfortunately patient cannot tolerate Creon therapy as a cause severe abdominal pain  She reports she has a very healthy diet  She reports she is maintaining her weight but not gaining  She denies any nausea or vomiting  She denies any melena or rectal bleeding  Overall she reports she is feeling quite well  She is due for repeat abdominal scan in August     REVIEW OF SYSTEMS IS OTHERWISE NEGATIVE        Historical Information   Past Medical History:   Diagnosis Date    Chemotherapy induced neutropenia ([facility] [address] ) 2/10/2021    Diabetes mellitus ([facility] [address] )     Hypertension     Lactic acidosis 5/30/2020    Neuropathy     Obstructive jaundice 6/1/2020     Past Surgical History:   Procedure Laterality Date    BREAST SURGERY Left     calcifications     SECTION      CHOLECYSTECTOMY N/A 10/20/2020    Procedure: CHOLECYSTECTOMY;  Surgeon: Julianne Romo MD;  [facility];  Service: Surgical Oncology    COLONOSCOPY      ECTOPIC PREGNANCY SURGERY      partial ovary removed    FL GUIDED CENTRAL VENOUS ACCESS DEVICE INSERTION  2020    JOINT REPLACEMENT Bilateral     LAPAROTOMY N/A 10/20/2020    Procedure: LAPAROTOMY EXPLORATORY; INTRAOPERATIVE ULTRASOUND;  Surgeon: Julianne Romo MD;  [facility];  Service: Surgical Oncology    [address]    NOSE SURGERY      deviated septum    REPLACEMENT TOTAL HIP W/  RESURFACING IMPLANTS Bilateral     right hip done 2012; left hip done 2012    TONSILLECTOMY  195    TUNNELED VENOUS PORT PLACEMENT Left 2020    Procedure: INSERTION VENOUS PORT (PORT-A-CATH), left;  Surgeon: Julianne Romo MD;  [facility];  Service: Surgical Oncology    WHIPPLE PROCEDURE/PANCREATICO-DUODENECTOMY N/A 10/20/2020    Procedure:  WHIPPLE PROCEDURE/PANCREATICO-DUODENECTOMY;  Surgeon: Julianne Romo MD;  [facility];  Service: Surgical Oncology     Social History   Social History     Substance and Sexual Activity   Alcohol Use Not Currently     Social History     Substance and Sexual Activity   Drug Use Never     Social History     Tobacco Use   Smoking Status Former Smoker   Smokeless Tobacco Never Used   Tobacco Comment    quit 36 years ago     Family History   Problem Relation Age of Onset    Diabetes Mother     Heart disease Mother     Heart disease Father     Breast cancer additional onset Sister     Breast cancer additional onset Maternal Aunt     Stroke Maternal Grandfather        Meds/Allergies       Current Outpatient Medications:     amLODIPine (NORVASC) 2 5 mg tablet    dicyclomine (BENTYL) 20 mg tablet    diphenhydrAMINE (BENADRYL) 25 mg capsule    diphenoxylate-atropine (LOMOTIL) 2 5-0 025 mg per tablet    Empagliflozin (Jardiance) 10 MG TABS    gabapentin (NEURONTIN) 300 mg capsule    LORazepam (ATIVAN) 0 5 mg tablet    omeprazole (PriLOSEC) 20 mg delayed release capsule    prochlorperazine (COMPAZINE) 10 mg tablet    hydrOXYzine HCL (ATARAX) 10 mg tablet    mirtazapine (REMERON) 15 mg tablet    Allergies   Allergen Reactions    Betadine [Povidone Iodine] Rash     Also itching from betadine    Other Throat Swelling     Pt unsure which chemotherapy drug caused tongue swelling and locked jaw  Please review in epic notes Folfirinox or possible Neulasta  NEED to review           Objective     Blood pressure 134/74, pulse 76, height 4' 11" (1 499 m), weight 46 2 kg (101 lb 12 8 oz)  Body mass index is 20 56 kg/m²  PHYSICAL EXAM:      General Appearance:   Alert, cooperative, no distress   HEENT:   Normocephalic, atraumatic, anicteric      Neck:  Supple, symmetrical, trachea midline   Lungs:   Clear to auscultation bilaterally; no rales, rhonchi or wheezing; respirations unlabored    Heart[de-identified]   Regular rate and rhythm; no murmur, rub, or gallop  Abdomen:   Soft, non-tender, non-distended; normal bowel sounds; no masses, no organomegaly    Genitalia:   Deferred    Rectal:   Deferred    Extremities:  No cyanosis, clubbing or edema    Pulses:  2+ and symmetric    Skin:  No jaundice, rashes, or lesions    Lymph nodes:  No palpable cervical lymphadenopathy        Lab Results:   No visits with results within 1 Day(s) from this visit  Latest known visit with results is:   Appointment on 04/10/2021   Component Date Value    Sodium 04/10/2021 145     Potassium 04/10/2021 4 3     Chloride 04/10/2021 108     CO2 04/10/2021 30     ANION GAP 04/10/2021 7     BUN 04/10/2021 11     Creatinine 04/10/2021 1 03     Glucose, Fasting 04/10/2021 135*    Calcium 04/10/2021 9 3     eGFR 04/10/2021 56     CA 19-9 04/10/2021 23          Radiology Results:   No results found

## 2021-06-22 NOTE — PATIENT INSTRUCTIONS
Abdominal Pain   WHAT YOU NEED TO KNOW:   Abdominal pain can be dull, achy, or sharp  You may have pain in one area of your abdomen, or in your entire abdomen  Your pain may be caused by a condition such as constipation, food sensitivity or poisoning, infection, or a blockage  Abdominal pain can also be from a hernia, appendicitis, or an ulcer  Liver, gallbladder, or kidney conditions can also cause abdominal pain  The cause of your abdominal pain may be unknown  DISCHARGE INSTRUCTIONS:   Return to the emergency department if:   · You have new chest pain or shortness of breath  · You have pulsing pain in your upper abdomen or lower back that suddenly becomes constant  · Your pain is in the right lower abdominal area and worsens with movement  · You have a fever over 100 4°F (38°C) or shaking chills  · You are vomiting and cannot keep food or liquids down  · Your pain does not improve or gets worse over the next 8 to 12 hours  · You see blood in your vomit or bowel movements, or they look black and tarry  · Your skin or the whites of your eyes turn yellow  · You are a woman and have a large amount of vaginal bleeding that is not your monthly period  Contact your healthcare provider if:   · You have pain in your lower back  · You are a man and have pain in your testicles  · You have pain when you urinate  · You have questions or concerns about your condition or care  Follow up with your healthcare provider within 24 hours or as directed:  Write down your questions so you remember to ask them during your visits  Medicines:   · Medicines  may be given to calm your stomach and prevent vomiting or to decrease pain  Ask how to take pain medicine safely  · Take your medicine as directed  Contact your healthcare provider if you think your medicine is not helping or if you have side effects  Tell him of her if you are allergic to any medicine   Keep a list of the medicines, vitamins, and herbs you take  Include the amounts, and when and why you take them  Bring the list or the pill bottles to follow-up visits  Carry your medicine list with you in case of an emergency  © Copyright [employer] Information is for End User's use only and may not be sold, redistributed or otherwise used for commercial purposes  All illustrations and images included in CareNotes® are the copyrighted property of [employer]  or [facility] Izabela Vaca   The above information is an  only  It is not intended as medical advice for individual conditions or treatments  Talk to your doctor, nurse or pharmacist before following any medical regimen to see if it is safe and effective for you

## 2021-07-14 ENCOUNTER — HOSPITAL ENCOUNTER (OUTPATIENT)
Dept: INFUSION CENTER | Facility: CLINIC | Age: 66
Discharge: HOME | End: 2021-07-14
Payer: MEDICARE

## 2021-07-14 DIAGNOSIS — Z95.828 PORT-A-CATH IN PLACE: Primary | ICD-10-CM

## 2021-07-14 PROCEDURE — 96523 IRRIG DRUG DELIVERY DEVICE: CPT

## 2021-07-14 NOTE — PROGRESS NOTES
Pt presented for central line flush, offering no complaints  Port was accessed with positive blood return, port was flushed and de-accessed without incident  Pt declined AVS and was discharged in stable condition

## 2021-07-16 DIAGNOSIS — K21.9 GASTROESOPHAGEAL REFLUX DISEASE WITHOUT ESOPHAGITIS: ICD-10-CM

## 2021-07-16 RX ORDER — OMEPRAZOLE 20 MG/1
CAPSULE, DELAYED RELEASE ORAL
Qty: 60 CAPSULE | Refills: 0 | Status: SHIPPED | OUTPATIENT
Start: 2021-07-16 | End: 2021-08-11

## 2021-07-19 ENCOUNTER — OFFICE VISIT (OUTPATIENT)
Dept: OBGYN CLINIC | Facility: CLINIC | Age: 66
End: 2021-07-19
Payer: MEDICARE

## 2021-07-19 VITALS
HEIGHT: 59 IN | BODY MASS INDEX: 20.72 KG/M2 | WEIGHT: 102.8 LBS | SYSTOLIC BLOOD PRESSURE: 108 MMHG | DIASTOLIC BLOOD PRESSURE: 74 MMHG

## 2021-07-19 DIAGNOSIS — Z12.31 ENCOUNTER FOR SCREENING MAMMOGRAM FOR MALIGNANT NEOPLASM OF BREAST: ICD-10-CM

## 2021-07-19 DIAGNOSIS — Z01.419 ENCOUNTER FOR ANNUAL ROUTINE GYNECOLOGICAL EXAMINATION: Primary | ICD-10-CM

## 2021-07-19 PROCEDURE — G0101 CA SCREEN;PELVIC/BREAST EXAM: HCPCS | Performed by: STUDENT IN AN ORGANIZED HEALTH CARE EDUCATION/TRAINING PROGRAM

## 2021-07-19 NOTE — PROGRESS NOTES
Assessment/Plan:    75 yo [id number] - annual exam      Problem List Items Addressed This Visit    Visit Diagnoses     Encounter for annual routine gynecological examination    -  Primary  > 65 - Pap not indicated  Consider repeating next year given recent immunosuppression from chemo  Declines colonoscopy for now - continues to follow with GI  Discussed avoiding bladder irritants + kegels to help with urgency  Will notify us if interfering with QOL  Encounter for screening mammogram for malignant neoplasm of breast        Relevant Orders    Mammo screening bilateral w 3d & cad            Subjective:      Patient ID: Vika Howard is a 74 y o  female  This is a 74 y o  postmenopausal F who presents for annual exam  She was diagnosed with pancreatic cancer last year  She is s/p whipple + 6 months of chemotherapy/radiation and most recent scan showed no evidence of recurrence!! She denies vaginal bleeding, discharge, or pelvic pain  She does have some urinary urgency + bowel urgency since her surgery  Her bowel movements have changed and she is having difficulty gaining weight  She sees a GI provider regularly  She is sexually active and reports some pain + a few spots of blood with intercourse  Reports they were not frequently sexually active during her treatment course and  also having healthy issues  Last pap smear: Feb 2019 negative  2017 negative  No h/o abnormal    Last mammogram: Feb 2020 negative  Colon Cancer screening: several years - working with GI  DEXA: has Rx from PCP    Family history:  Breast cancer: sister + maternal aunt  Negative genetic testing  Previously worked as  at [employer] in [place]!         The following portions of the patient's history were reviewed and updated as appropriate: allergies, current medications, past family history, past medical history, past social history, past surgical history and problem list     Review of Systems   Constitutional: Negative  HENT: Negative  Eyes: Negative  Respiratory: Negative  Cardiovascular: Negative  Gastrointestinal: Negative  Endocrine: Negative  Genitourinary: Negative for dyspareunia, dysuria, frequency, menstrual problem, pelvic pain, vaginal discharge and vaginal pain  Musculoskeletal: Negative  Skin: Negative  Allergic/Immunologic: Negative  Neurological: Negative  Hematological: Negative  Psychiatric/Behavioral: Negative  Objective:      Ht 4' 11" (1 499 m)   Wt 46 6 kg (102 lb 12 8 oz)   BMI 20 76 kg/m²          Physical Exam  Vitals reviewed  Exam conducted with a chaperone present  Cardiovascular:      Rate and Rhythm: Normal rate  Pulmonary:      Effort: Pulmonary effort is normal    Chest:      Breasts: Breasts are symmetrical          Right: No mass, nipple discharge, skin change or tenderness  Left: No mass, nipple discharge, skin change or tenderness  Abdominal:      Palpations: Abdomen is soft  Genitourinary:     Labia:         Right: No rash  Left: No rash  Vagina: Normal  No signs of injury  Cervix: No cervical motion tenderness, discharge or lesion  Uterus: Not deviated, not enlarged, not fixed and not tender  Adnexa:         Right: No mass, tenderness or fullness  Left: No mass, tenderness or fullness  Comments: atrophy  Musculoskeletal:      Cervical back: Normal range of motion  Skin:     General: Skin is warm and dry  Neurological:      Mental Status: She is alert and oriented to person, place, and time

## 2021-08-03 ENCOUNTER — OFFICE VISIT (OUTPATIENT)
Dept: GASTROENTEROLOGY | Facility: CLINIC | Age: 66
End: 2021-08-03
Payer: MEDICARE

## 2021-08-03 VITALS
SYSTOLIC BLOOD PRESSURE: 124 MMHG | WEIGHT: 102 LBS | HEIGHT: 59 IN | HEART RATE: 70 BPM | BODY MASS INDEX: 20.56 KG/M2 | DIASTOLIC BLOOD PRESSURE: 70 MMHG

## 2021-08-03 DIAGNOSIS — R63.0 POOR APPETITE: ICD-10-CM

## 2021-08-03 DIAGNOSIS — K59.1 FUNCTIONAL DIARRHEA: ICD-10-CM

## 2021-08-03 DIAGNOSIS — K21.9 GASTROESOPHAGEAL REFLUX DISEASE WITHOUT ESOPHAGITIS: Primary | ICD-10-CM

## 2021-08-03 DIAGNOSIS — R14.1 ABDOMINAL GAS PAIN: ICD-10-CM

## 2021-08-03 DIAGNOSIS — R19.5 FAT IN STOOL: ICD-10-CM

## 2021-08-03 DIAGNOSIS — C25.9 PANCREATIC ADENOCARCINOMA (HCC): Chronic | ICD-10-CM

## 2021-08-03 PROCEDURE — 99213 OFFICE O/P EST LOW 20 MIN: CPT | Performed by: PHYSICIAN ASSISTANT

## 2021-08-03 RX ORDER — DIPHENOXYLATE HYDROCHLORIDE AND ATROPINE SULFATE 2.5; .025 MG/1; MG/1
1 TABLET ORAL 4 TIMES DAILY PRN
Qty: 30 TABLET | Refills: 0 | Status: SHIPPED | OUTPATIENT
Start: 2021-08-03

## 2021-08-03 NOTE — LETTER
August 3, 2021     Anna Ernandez DO  [address] [address]    Patient: Gen Ch   YOB: 1955   Date of Visit: 8/3/2021       Dear Dr Bhavana Neely: Thank you for referring Gen Ch to me for evaluation  Below are my notes for this consultation  If you have questions, please do not hesitate to call me  I look forward to following your patient along with you  Sincerely,        Carolann Wayne PA-C        CC: No Recipients  Jhon Andrade  8/3/2021  9:26 AM  Sign when Signing Visit  [facility] - Outpatient Follow-up Note  Gen Ch 74 y o  female MRN: [id number]  Encounter: [id number]          ASSESSMENT AND PLAN:      1  Gastroesophageal reflux disease without esophagitis  2  Fat in stool  3  Abdominal gas pain  -Will continue omeprazole 20 mg daily     -Will continue fiber supplementation and probiotic indefinitely     -Will follow up with CT scan laboratories in the middle of August   -Follow-up in 3 months   ______________________________________________________________________    SUBJECTIVE:    56-year-old female who presents to the office today for follow-up of gastroesophageal reflux disease, fat in her stool, abdominal gas pain  Since patient's last appointment she did start taking fiber supplementation as well as probiotics daily  She is reporting significant improvement in symptoms  She reports that for several weeks she did increase her omeprazole 20 mg to b i d  dosage  She reports she is not always consistent with taking this twice a day  She does report that she is feeling better and her acid reflux has improved significantly  Patient's weight is stable  Patient reports she is increased her caloric intake to about 1500 calories a day  Patient is still reporting 3-4 stools a day  She denies any melena or rectal bleeding    Patient does have follow-up with her oncology team in August   Patient will be going for a CT scan as well as laboratories  REVIEW OF SYSTEMS IS OTHERWISE NEGATIVE  Historical Information   Past Medical History:   Diagnosis Date    Chemotherapy induced neutropenia ([facility] [address] ) 2/10/2021    Diabetes mellitus ([facility] [address] )     Hypertension     Lactic acidosis 2020    Neuropathy     Obstructive jaundice 2020     Past Surgical History:   Procedure Laterality Date    BREAST SURGERY Left     calcifications     SECTION      CHOLECYSTECTOMY N/A 10/20/2020    Procedure: CHOLECYSTECTOMY;  Surgeon: Apolonia Huston MD;  [facility];  Service: Surgical Oncology    COLONOSCOPY      ECTOPIC PREGNANCY SURGERY      partial ovary removed    FL GUIDED CENTRAL VENOUS ACCESS DEVICE INSERTION  2020    JOINT REPLACEMENT Bilateral     LAPAROTOMY N/A 10/20/2020    Procedure: LAPAROTOMY EXPLORATORY; INTRAOPERATIVE ULTRASOUND;  Surgeon: Apolonia Huston MD;  [facility];  Service: Surgical Oncology    [address] [place] [address]    NOSE SURGERY      deviated septum    REPLACEMENT TOTAL HIP W/  RESURFACING IMPLANTS Bilateral     right hip done 2012; left hip done 2012    TONSILLECTOMY      TUNNELED VENOUS PORT PLACEMENT Left 2020    Procedure: INSERTION VENOUS PORT (PORT-A-CATH), left;  Surgeon: Apolonia Huston MD;  [facility];  Service: Surgical Oncology    WHIPPLE PROCEDURE/PANCREATICO-DUODENECTOMY N/A 10/20/2020    Procedure:  WHIPPLE PROCEDURE/PANCREATICO-DUODENECTOMY;  Surgeon: Apolonia Huston MD;  [facility];  Service: Surgical Oncology     Social History   Social History     Substance and Sexual Activity   Alcohol Use Not Currently     Social History     Substance and Sexual Activity   Drug Use Never     Social History     Tobacco Use   Smoking Status Former Smoker   Smokeless Tobacco Never Used   Tobacco Comment    quit 40 years ago     Family History   Problem Relation Age of Onset    Diabetes Mother     Heart disease Mother     Heart disease Father     Breast cancer additional onset Sister    Troy Mitchell Breast cancer Sister     Breast cancer additional onset Maternal Aunt     Breast cancer Maternal Aunt     Stroke Maternal Grandfather        Meds/Allergies       Current Outpatient Medications:     amLODIPine (NORVASC) 2 5 mg tablet    dicyclomine (BENTYL) 20 mg tablet    diphenhydrAMINE (BENADRYL) 25 mg capsule    diphenoxylate-atropine (LOMOTIL) 2 5-0 025 mg per tablet    Empagliflozin (Jardiance) 10 MG TABS    gabapentin (NEURONTIN) 300 mg capsule    LORazepam (ATIVAN) 0 5 mg tablet    mirtazapine (REMERON) 15 mg tablet    omeprazole (PriLOSEC) 20 mg delayed release capsule    prochlorperazine (COMPAZINE) 10 mg tablet    hydrOXYzine HCL (ATARAX) 10 mg tablet    Allergies   Allergen Reactions    Betadine [Povidone Iodine] Rash     Also itching from betadine    Other Throat Swelling     Pt unsure which chemotherapy drug caused tongue swelling and locked jaw  Please review in epic notes Folfirinox or possible Neulasta  NEED to review           Objective     Blood pressure 124/70, pulse 70, height 4' 11" (1 499 m), weight 46 3 kg (102 lb)  Body mass index is 20 6 kg/m²  PHYSICAL EXAM:      General Appearance:   Alert, cooperative, no distress   HEENT:   Normocephalic, atraumatic, anicteric      Neck:  Supple, symmetrical, trachea midline   Lungs:   Clear to auscultation bilaterally; no rales, rhonchi or wheezing; respirations unlabored    Heart[de-identified]   Regular rate and rhythm; no murmur, rub, or gallop  Abdomen:   Soft, non-tender, non-distended; normal bowel sounds; no masses, no organomegaly    Genitalia:   Deferred    Rectal:   Deferred    Extremities:  No cyanosis, clubbing or edema    Pulses:  2+ and symmetric    Skin:  No jaundice, rashes, or lesions    Lymph nodes:  No palpable cervical lymphadenopathy        Lab Results:   No visits with results within 1 Day(s) from this visit  Latest known visit with results is:   Appointment on 04/10/2021   Component Date Value    Sodium 04/10/2021 145     Potassium 04/10/2021 4 3     Chloride 04/10/2021 108     CO2 04/10/2021 30     ANION GAP 04/10/2021 7     BUN 04/10/2021 11     Creatinine 04/10/2021 1 03     Glucose, Fasting 04/10/2021 135*    Calcium 04/10/2021 9 3     eGFR 04/10/2021 56     CA 19-9 04/10/2021 23          Radiology Results:   No results found

## 2021-08-03 NOTE — PATIENT INSTRUCTIONS
Gastroesophageal Reflux Disease   WHAT YOU NEED TO KNOW:   Gastroesophageal reflux disease (GERD) occurs when stomach acid and food in the stomach reflux (back up) into the esophagus  WHILE YOU ARE HERE:   Informed consent  is a legal document that explains the tests, treatments, or procedures that you may need  Informed consent means you understand what will be done and can make decisions about what you want  You give your permission when you sign the consent form  You can have someone sign this form for you if you are not able to sign it  You have the right to understand your medical care in words you know  Before you sign the consent form, understand the risks and benefits of what will be done  Make sure all your questions are answered  Soft food diet: You may be allowed to eat soft foods  Some examples are applesauce, baby food, bananas, cooked cereal, cottage cheese, eggs, gelatin, pudding, and yogurt  Medicines:   · Antacids  decrease the stomach acid that can irritate your esophagus and stomach  · Histamine type-2 receptor blockers,  also called H2 blockers, block acid production in the stomach  · Proton pump inhibitors,  also called PPIs, block acid production in the stomach  · Promotility agents  help the lower esophageal sphincter and stomach contract (tighten) more  Tests:   · Esophageal pH monitoring  is used to place a small probe inside your esophagus and stomach to check the amount of acid  · An endoscopy  is a procedure used to look at the inside of your esophagus and stomach  An endoscope is a bendable tube with a light and camera on the end  Your healthcare provider may remove a small sample of tissue and send it to a lab for tests  · Upper GI x-rays  are done to take pictures of your stomach and intestines (bowel)  You may be given a chalky liquid to drink before the pictures are taken  This liquid helps your stomach and intestines show up better on the x-rays  · Esophageal manometry  is a test that shows how your esophagus pushes food and fluid to your stomach  It also shows the pressures in your esophagus and stomach  It may show a hiatal hernia  Treatment:  Surgery is done to wrap the upper part of the stomach around the esophageal sphincter  This will strengthen the sphincter and prevent reflux  RISKS:   You may bleed too much or develop an infection after surgery  Surgery to treat GERD can also make you feel bloated after meals  GERD may increase your risk for ulcers and bleeding in your stomach  GERD may also increase your risk for cancer or other health conditions  CARE AGREEMENT:   You have the right to help plan your care  Learn about your health condition and how it may be treated  Discuss treatment options with your caregivers to decide what care you want to receive  You always have the right to refuse treatment  © 2017 [employer] is for End User's use only and may not be sold, redistributed or otherwise used for commercial purposes  All illustrations and images included in CareNotes® are the copyrighted property of A [employer] M [employer]  or Jordi Chow  The above information is an  only  It is not intended as medical advice for individual conditions or treatments  Talk to your doctor, nurse or pharmacist before following any medical regimen to see if it is safe and effective for you

## 2021-08-03 NOTE — TELEPHONE ENCOUNTER
Susan Blake - patient called forgot to request refill  lomotial 2 5-0 025 mg per tablet   1 tablet 4 times daily for diarrhea   Please call [facility] at [phone] ty

## 2021-08-03 NOTE — PROGRESS NOTES
[facility] - Outpatient Follow-up Note  Nik Stewart 74 y o  female MRN: [id number]  Encounter: [id number]          ASSESSMENT AND PLAN:      1  Gastroesophageal reflux disease without esophagitis  2  Fat in stool  3  Abdominal gas pain  -Will continue omeprazole 20 mg daily     -Will continue fiber supplementation and probiotic indefinitely     -Will follow up with CT scan laboratories in the middle of August   -Follow-up in 3 months   ______________________________________________________________________    SUBJECTIVE:    58-year-old female who presents to the office today for follow-up of gastroesophageal reflux disease, fat in her stool, abdominal gas pain  Since patient's last appointment she did start taking fiber supplementation as well as probiotics daily  She is reporting significant improvement in symptoms  She reports that for several weeks she did increase her omeprazole 20 mg to b i d  dosage  She reports she is not always consistent with taking this twice a day  She does report that she is feeling better and her acid reflux has improved significantly  Patient's weight is stable  Patient reports she is increased her caloric intake to about 1500 calories a day  Patient is still reporting 3-4 stools a day  She denies any melena or rectal bleeding  Patient does have follow-up with her oncology team in August   Patient will be going for a CT scan as well as laboratories  REVIEW OF SYSTEMS IS OTHERWISE NEGATIVE        Historical Information   Past Medical History:   Diagnosis Date    Chemotherapy induced neutropenia ([facility] [address] ) 2/10/2021    Diabetes mellitus ([facility] [address] )     Hypertension     Lactic acidosis 2020    Neuropathy     Obstructive jaundice 2020     Past Surgical History:   Procedure Laterality Date    BREAST SURGERY Left     calcifications     SECTION      CHOLECYSTECTOMY N/A 10/20/2020    Procedure: CHOLECYSTECTOMY;  Surgeon: Adriel Flores Dav James MD;  [facility];  Service: Surgical Oncology    COLONOSCOPY      ECTOPIC PREGNANCY SURGERY      partial ovary removed    FL GUIDED CENTRAL VENOUS ACCESS DEVICE INSERTION  6/23/2020    JOINT REPLACEMENT Bilateral     LAPAROTOMY N/A 10/20/2020    Procedure: LAPAROTOMY EXPLORATORY; INTRAOPERATIVE ULTRASOUND;  Surgeon: Yong Cramer MD;  [facility];  Service: Surgical Oncology    [address]    NOSE SURGERY      deviated septum    REPLACEMENT TOTAL HIP W/  RESURFACING IMPLANTS Bilateral 2012    right hip done July 2012; left hip done September 2012    TONSILLECTOMY  1957    TUNNELED VENOUS PORT PLACEMENT Left 6/23/2020    Procedure: INSERTION VENOUS PORT (PORT-A-CATH), left;  Surgeon: Yong Cramer MD;  [facility];  Service: Surgical Oncology    WHIPPLE PROCEDURE/PANCREATICO-DUODENECTOMY N/A 10/20/2020    Procedure:  WHIPPLE PROCEDURE/PANCREATICO-DUODENECTOMY;  Surgeon: Yong Cramer MD;  [facility];  Service: Surgical Oncology     Social History   Social History     Substance and Sexual Activity   Alcohol Use Not Currently     Social History     Substance and Sexual Activity   Drug Use Never     Social History     Tobacco Use   Smoking Status Former Smoker   Smokeless Tobacco Never Used   Tobacco Comment    quit 36 years ago     Family History   Problem Relation Age of Onset    Diabetes Mother     Heart disease Mother     Heart disease Father     Breast cancer additional onset Sister    Orlando Taylor Breast cancer Sister     Breast cancer additional onset Maternal Aunt     Breast cancer Maternal Aunt     Stroke Maternal Grandfather        Meds/Allergies       Current Outpatient Medications:     amLODIPine (NORVASC) 2 5 mg tablet    dicyclomine (BENTYL) 20 mg tablet    diphenhydrAMINE (BENADRYL) 25 mg capsule    diphenoxylate-atropine (LOMOTIL) 2 5-0 025 mg per tablet    Empagliflozin (Jardiance) 10 MG TABS    gabapentin (NEURONTIN) 300 mg capsule    LORazepam (ATIVAN) 0 5 mg tablet    mirtazapine (REMERON) 15 mg tablet    omeprazole (PriLOSEC) 20 mg delayed release capsule    prochlorperazine (COMPAZINE) 10 mg tablet    hydrOXYzine HCL (ATARAX) 10 mg tablet    Allergies   Allergen Reactions    Betadine [Povidone Iodine] Rash     Also itching from betadine    Other Throat Swelling     Pt unsure which chemotherapy drug caused tongue swelling and locked jaw  Please review in epic notes Folfirinox or possible Neulasta  NEED to review           Objective     Blood pressure 124/70, pulse 70, height 4' 11" (1 499 m), weight 46 3 kg (102 lb)  Body mass index is 20 6 kg/m²  PHYSICAL EXAM:      General Appearance:   Alert, cooperative, no distress   HEENT:   Normocephalic, atraumatic, anicteric      Neck:  Supple, symmetrical, trachea midline   Lungs:   Clear to auscultation bilaterally; no rales, rhonchi or wheezing; respirations unlabored    Heart[de-identified]   Regular rate and rhythm; no murmur, rub, or gallop  Abdomen:   Soft, non-tender, non-distended; normal bowel sounds; no masses, no organomegaly    Genitalia:   Deferred    Rectal:   Deferred    Extremities:  No cyanosis, clubbing or edema    Pulses:  2+ and symmetric    Skin:  No jaundice, rashes, or lesions    Lymph nodes:  No palpable cervical lymphadenopathy        Lab Results:   No visits with results within 1 Day(s) from this visit  Latest known visit with results is:   Appointment on 04/10/2021   Component Date Value    Sodium 04/10/2021 145     Potassium 04/10/2021 4 3     Chloride 04/10/2021 108     CO2 04/10/2021 30     ANION GAP 04/10/2021 7     BUN 04/10/2021 11     Creatinine 04/10/2021 1 03     Glucose, Fasting 04/10/2021 135*    Calcium 04/10/2021 9 3     eGFR 04/10/2021 56     CA 19-9 04/10/2021 23          Radiology Results:   No results found

## 2021-08-11 DIAGNOSIS — K21.9 GASTROESOPHAGEAL REFLUX DISEASE WITHOUT ESOPHAGITIS: ICD-10-CM

## 2021-08-11 RX ORDER — OMEPRAZOLE 20 MG/1
CAPSULE, DELAYED RELEASE ORAL
Qty: 60 CAPSULE | Refills: 0 | Status: SHIPPED | OUTPATIENT
Start: 2021-08-11 | End: 2021-09-07

## 2021-08-12 ENCOUNTER — APPOINTMENT (OUTPATIENT)
Dept: LAB | Facility: HOSPITAL | Age: 66
End: 2021-08-12
Attending: SURGERY
Payer: MEDICARE

## 2021-08-12 DIAGNOSIS — C25.9 PANCREATIC ADENOCARCINOMA (HCC): Chronic | ICD-10-CM

## 2021-08-12 LAB
ANION GAP SERPL CALCULATED.3IONS-SCNC: 10 MMOL/L (ref 4–13)
BUN SERPL-MCNC: 11 MG/DL (ref 5–25)
CALCIUM SERPL-MCNC: 8.8 MG/DL (ref 8.3–10.1)
CHLORIDE SERPL-SCNC: 105 MMOL/L (ref 100–108)
CO2 SERPL-SCNC: 28 MMOL/L (ref 21–32)
CREAT SERPL-MCNC: 1.08 MG/DL (ref 0.6–1.3)
GFR SERPL CREATININE-BSD FRML MDRD: 53 ML/MIN/1.73SQ M
GLUCOSE P FAST SERPL-MCNC: 121 MG/DL (ref 65–99)
POTASSIUM SERPL-SCNC: 3.7 MMOL/L (ref 3.5–5.3)
SODIUM SERPL-SCNC: 143 MMOL/L (ref 136–145)

## 2021-08-12 PROCEDURE — 80048 BASIC METABOLIC PNL TOTAL CA: CPT

## 2021-08-12 PROCEDURE — 36415 COLL VENOUS BLD VENIPUNCTURE: CPT

## 2021-08-12 PROCEDURE — 86301 IMMUNOASSAY TUMOR CA 19-9: CPT

## 2021-08-13 LAB — CANCER AG19-9 SERPL-ACNC: 2 U/ML (ref 0–35)

## 2021-08-16 ENCOUNTER — HOSPITAL ENCOUNTER (OUTPATIENT)
Dept: CT IMAGING | Facility: HOSPITAL | Age: 66
Discharge: HOME | End: 2021-08-16
Attending: SURGERY
Payer: MEDICARE

## 2021-08-16 DIAGNOSIS — C25.9 PANCREATIC ADENOCARCINOMA (HCC): Chronic | ICD-10-CM

## 2021-08-16 PROCEDURE — 71260 CT THORAX DX C+: CPT

## 2021-08-16 PROCEDURE — 74177 CT ABD & PELVIS W/CONTRAST: CPT

## 2021-08-16 PROCEDURE — G1004 CDSM NDSC: HCPCS

## 2021-08-16 RX ADMIN — IOHEXOL 100 ML: 350 INJECTION, SOLUTION INTRAVENOUS at 08:29

## 2021-08-18 DIAGNOSIS — F41.9 ANXIETY: ICD-10-CM

## 2021-08-18 DIAGNOSIS — C25.9 PANCREATIC ADENOCARCINOMA (HCC): Chronic | ICD-10-CM

## 2021-08-18 PROBLEM — Z85.07 HISTORY OF PANCREATIC CANCER: Status: ACTIVE | Noted: 2020-05-30

## 2021-08-18 PROBLEM — Z85.07 ENCOUNTER FOR FOLLOW-UP SURVEILLANCE OF PANCREATIC CANCER: Status: ACTIVE | Noted: 2021-08-18

## 2021-08-18 PROBLEM — Z08 ENCOUNTER FOR FOLLOW-UP SURVEILLANCE OF PANCREATIC CANCER: Status: ACTIVE | Noted: 2021-08-18

## 2021-08-18 RX ORDER — MIRTAZAPINE 15 MG/1
TABLET, FILM COATED ORAL
Qty: 90 TABLET | Refills: 1 | Status: SHIPPED | OUTPATIENT
Start: 2021-08-18 | End: 2021-12-14 | Stop reason: SDUPTHER

## 2021-08-20 ENCOUNTER — RADIATION ONCOLOGY FOLLOW-UP (OUTPATIENT)
Dept: RADIATION ONCOLOGY | Facility: CLINIC | Age: 66
End: 2021-08-20
Attending: RADIOLOGY
Payer: MEDICARE

## 2021-08-20 VITALS
RESPIRATION RATE: 20 BRPM | BODY MASS INDEX: 20.42 KG/M2 | WEIGHT: 104 LBS | HEIGHT: 60 IN | TEMPERATURE: 98.1 F | DIASTOLIC BLOOD PRESSURE: 76 MMHG | SYSTOLIC BLOOD PRESSURE: 124 MMHG | HEART RATE: 70 BPM | OXYGEN SATURATION: 98 %

## 2021-08-20 DIAGNOSIS — Z85.07 ENCOUNTER FOR FOLLOW-UP SURVEILLANCE OF PANCREATIC CANCER: Primary | ICD-10-CM

## 2021-08-20 DIAGNOSIS — Z85.07 HISTORY OF PANCREATIC CANCER: Primary | ICD-10-CM

## 2021-08-20 DIAGNOSIS — Z08 ENCOUNTER FOR FOLLOW-UP SURVEILLANCE OF PANCREATIC CANCER: Primary | ICD-10-CM

## 2021-08-20 PROCEDURE — 99211 OFF/OP EST MAY X REQ PHY/QHP: CPT | Performed by: RADIOLOGY

## 2021-08-20 PROCEDURE — 99213 OFFICE O/P EST LOW 20 MIN: CPT | Performed by: RADIOLOGY

## 2021-08-20 NOTE — PROGRESS NOTES
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 status post Whipple resection on 10/20/2020  She completed a course of adjuvant chemo radiation 1/26/21  She returns for follow-up evaluation      3/23/21- [facility]  Final cycle of chemo with %FU infusion on 3/22      3/19/21 LFTs were within normal limits except elevated alk phos of 235 which was stable since February 2020 4/5/21 CT CAP  IMPRESSION:  1  No evidence of metastatic disease in the thorax   Long segment of mild esophageal wall thickening which should be correlated for suspicion of esophagitis  2  Inflammatory changes /small amount of fluid noted in the region of the nicky hepatis and surgical bed without evidence to suggest enlarging pancreatic head mass   The pancreatic duct is no longer dilated  3  Stomach is no longer distended   Continued findings suggesting nonspecific inflammatory changes in the region of the gastrojejunal anastomosis  4  Hepatic steatosis   Hepatic hypodense nodule likely fluid within a fissure which should be carefully reassessed during follow-up   No discrete evidence of hepatic metastasis  5  Trace amount of perihepatic ascites   No suspicious adenopathy     6/22/21- [facility]  -F/u for Gastroesophageal reflux, Abdominal gas pain and Fat in stool  Increase Omeprazole and start probiotics f/u in 6 weeks     8/12/21 CA 19-9 is 2 (down from 23 on 4/10/21 and high of 61 on 9/5/21)  8/16/21 - CT CAP - results pending    The patient states that she feels well overall    Her appetite has improved  She notes fatigue, but this is improving  Her main complains is with  difficulty with diet related to "oily stools" and gas and periodic diarrhea  She tried Creon enzymes twice and both times had significant abdominal pain, constipation, and bloating that improved once medication was stopped  She denies abdominal or pelvic pain  She denies rectal bleeding  She is now focusing on diet changes alone with GI  She has occasional mild nausea, but no vomiting        Upcoming appts:  8/23/21 - Dr David Delgado  10/28/21 - Dr Desire Ambriz - GI  12/14/21 - Dr Tamica Rodriguez   Oncology History Overview Note             History of pancreatic cancer   6/2/2020 Biopsy    EUS- Dr Parker Mole:  Pancreas, uncinate mass:      - Malignant ([facility] Category VI)      - Compatible with adenocarcinoma with mucinous features       6/30/2020 - 9/16/2020 Chemotherapy    fluorouracil (ADRUCIL) injection 585 mg, 400 mg/m2 = 585 mg, Intravenous, Once, 7 of 12 cycles  Administration: 585 mg (6/30/2020), 585 mg (7/14/2020), 585 mg (7/28/2020), 585 mg (8/11/2020), 585 mg (9/8/2020), 585 mg (8/25/2020)  pegfilgrastim (NEULASTA ONPRO) subcutaneous injection kit 6 mg, 6 mg, Subcutaneous, Once, 7 of 12 cycles  Administration: 6 mg (7/2/2020), 6 mg (7/16/2020), 6 mg (7/30/2020), 6 mg (8/13/2020), 6 mg (8/27/2020), 6 mg (9/10/2020)  fosaprepitant (EMEND) 150 mg in sodium chloride 0 9 % 250 mL IVPB, 150 mg, Intravenous, Once, 7 of 12 cycles  Administration: 150 mg (6/30/2020), 150 mg (7/14/2020), 150 mg (7/28/2020), 150 mg (8/11/2020), 150 mg (9/8/2020), 150 mg (8/25/2020)  irinotecan (CAMPTOSAR) 263 mg in dextrose 5 % 500 mL chemo infusion, 180 mg/m2 = 263 mg, Intravenous, Once, 3 of 3 cycles  Administration: 263 mg (6/30/2020), 263 mg (7/14/2020), 263 mg (7/28/2020)  leucovorin 584 mg in dextrose 5 % 250 mL IVPB, 400 mg/m2 = 584 mg (100 % of original dose 400 mg/m2), Intravenous, Once, 2 of 7 cycles  Dose modification: 400 mg/m2 (original dose 400 mg/m2, Cycle 6)  Administration: 584 mg (9/8/2020)  oxaliplatin (ELOXATIN) 124 1 mg in dextrose 5 % 250 mL chemo infusion, 85 mg/m2 = 124 1 mg, Intravenous, Once, 7 of 12 cycles  Administration: 124 1 mg (6/30/2020), 124 1 mg (7/14/2020), 124 1 mg (7/28/2020), 124 1 mg (8/11/2020), 124 1 mg (9/8/2020), 124 1 mg (8/25/2020)     10/20/2020 Surgery    Whipple:  - Residual invasive adenocarcinoma with mucinous features, 2 3 cm   - Metastatic carcinoma present in one of fourteen lymph nodes (1/14)  - All margins are negative for tumor          12/21/2020 - 1/24/2021 Chemotherapy    [No matching medication found in this treatment plan]     12/21/2020 - 1/26/2021 Radiation    5000 cGy in 25 fractions to high risk areas  Dr Marlon Jackson     2/8/2021 - 4/4/2021 Chemotherapy    fluorouracil (ADRUCIL) injection 560 mg, 400 mg/m2 = 560 mg, Intravenous, Once, 4 of 4 cycles  Administration: 560 mg (2/8/2021), 560 mg (2/22/2021), 560 mg (3/8/2021), 560 mg (3/22/2021)  pegfilgrastim (NEULASTA ONPRO) subcutaneous injection kit 6 mg, 6 mg, Subcutaneous, Once, 4 of 4 cycles  Administration: 6 mg (2/10/2021), 6 mg (2/24/2021), 6 mg (3/10/2021), 6 mg (3/24/2021)  fosaprepitant (EMEND) 150 mg in sodium chloride 0 9 % 250 mL IVPB, 150 mg, Intravenous, Once, 4 of 4 cycles  Administration: 150 mg (2/8/2021), 150 mg (2/22/2021), 150 mg (3/8/2021), 150 mg (3/22/2021)  leucovorin 560 mg in dextrose 5 % 250 mL IVPB, 400 mg/m2 = 560 mg, Intravenous, Once, 4 of 4 cycles  Administration: 560 mg (2/8/2021), 560 mg (2/22/2021), 560 mg (3/8/2021), 560 mg (3/22/2021)  oxaliplatin (ELOXATIN) 119 mg in dextrose 5 % 250 mL chemo infusion, 85 mg/m2 = 119 mg, Intravenous, Once, 4 of 4 cycles  Administration: 119 mg (2/8/2021), 119 mg (2/22/2021), 119 mg (3/8/2021), 119 mg (3/22/2021)     Encounter for follow-up surveillance of pancreatic cancer   8/18/2021 Initial Diagnosis    Encounter for follow-up surveillance of pancreatic cancer         Past Medical History:   Diagnosis Date    Chemotherapy induced neutropenia ([facility] [address] ) 2/10/2021    Diabetes mellitus ([facility] [address] )     Hypertension     Lactic acidosis 2020    Neuropathy     Obstructive jaundice 2020     Past Surgical History:   Procedure Laterality Date    BREAST SURGERY Left     calcifications     SECTION      CHOLECYSTECTOMY N/A 10/20/2020    Procedure: CHOLECYSTECTOMY;  Surgeon: Jacqueline Rivera MD;  [facility];  Service: Surgical Oncology    COLONOSCOPY      ECTOPIC PREGNANCY SURGERY      partial ovary removed    FL GUIDED CENTRAL VENOUS ACCESS DEVICE INSERTION  2020    JOINT REPLACEMENT Bilateral     LAPAROTOMY N/A 10/20/2020    Procedure: LAPAROTOMY EXPLORATORY; INTRAOPERATIVE ULTRASOUND;  Surgeon: Jacqueline Rivera MD;  [facility];  Service: Surgical Oncology    [address]    NOSE SURGERY      deviated septum    REPLACEMENT TOTAL HIP W/  RESURFACING IMPLANTS Bilateral     right hip done 2012; left hip done 2012    TONSILLECTOMY      TUNNELED VENOUS PORT PLACEMENT Left 2020    Procedure: INSERTION VENOUS PORT (PORT-A-CATH), left;  Surgeon: Jacqueline Rivera MD;  [facility];  Service: Surgical Oncology    WHIPPLE PROCEDURE/PANCREATICO-DUODENECTOMY N/A 10/20/2020    Procedure:  WHIPPLE PROCEDURE/PANCREATICO-DUODENECTOMY;  Surgeon: Jacqueline Rivera MD;  [facility];  Service: Surgical Oncology       Social History   Social History     Substance and Sexual Activity   Alcohol Use Not Currently     Social History     Substance and Sexual Activity   Drug Use Never     Social History     Tobacco Use   Smoking Status Former Smoker   Smokeless Tobacco Never Used   Tobacco Comment    quit 40 years ago         Meds/Allergies     Current Outpatient Medications:     amLODIPine (NORVASC) 2 5 mg tablet, Take 2 5 mg by mouth daily, Disp: , Rfl:     dicyclomine (BENTYL) 20 mg tablet, TAKE 1 TABLET BY MOUTH TWICE A DAY, Disp: 180 tablet, Rfl: 1    diphenhydrAMINE (BENADRYL) 25 mg capsule, Take 25 mg by mouth every 6 (six) hours as needed for itching, Disp: , Rfl:     diphenoxylate-atropine (LOMOTIL) 2 5-0 025 mg per tablet, Take 1 tablet by mouth 4 (four) times a day as needed for diarrhea DO NOT EXCEED 4 DOSES IN 1 DAY, Disp: 30 tablet, Rfl: 0    Empagliflozin (Jardiance) 10 MG TABS, Take 10 mg by mouth every morning, Disp: , Rfl:     gabapentin (NEURONTIN) 300 mg capsule, Take 300 mg by mouth 3 (three) times a day, Disp: , Rfl:     LORazepam (ATIVAN) 0 5 mg tablet, Take 1 tablet (0 5 mg total) by mouth 2 (two) times a day as needed for anxiety NO FURTHER REFILLS WITHOUT CLINIC VISIT WITH PALLIATIVE CARE, Disp: 30 tablet, Rfl: 0    mirtazapine (REMERON) 15 mg tablet, TAKE 1 TABLET BY MOUTH DAILY AT BEDTIME, Disp: 90 tablet, Rfl: 1    omeprazole (PriLOSEC) 20 mg delayed release capsule, TAKE 1 CAPSULE BY MOUTH TWICE A DAY, Disp: 60 capsule, Rfl: 0    prochlorperazine (COMPAZINE) 10 mg tablet, Take 1 tablet (10 mg total) by mouth every 6 (six) hours as needed for nausea or vomiting, Disp: 45 tablet, Rfl: 3    hydrOXYzine HCL (ATARAX) 10 mg tablet, Take 1 tablet (10 mg total) by mouth every 8 (eight) hours as needed for itching (Patient not taking: Reported on 6/22/2021), Disp: 30 tablet, Rfl: 0  No current facility-administered medications for this visit  Allergies   Allergen Reactions    Betadine [Povidone Iodine] Rash     Also itching from betadine    Other Throat Swelling     Pt unsure which chemotherapy drug caused tongue swelling and locked jaw  Please review in epic notes Folfirinox or possible Neulasta  NEED to review       Review of Systems    Constitutional: Negative  HENT: Negative  Eyes: Negative  Respiratory: Negative  Cardiovascular: Negative  Gastrointestinal: Positive for diarrhea and nausea          Loose stools and has to avoid oily foods   Endocrine: Positive for cold intolerance  Genitourinary: Negative  Musculoskeletal: Negative  Skin: Negative  Allergic/Immunologic: Negative  Neurological: Negative  Hematological: Negative  Psychiatric/Behavioral: Negative  OBJECTIVE:   /76   Pulse 70   Temp 98 1 °F (36 7 °C)   Resp 20   Ht 5' (1 524 m)   Wt 47 2 kg (104 lb)   SpO2 98%   BMI 20 31 kg/m²   Karnofsky: 80 - Normal activity with effort; some signs or symptoms of disease    Physical Exam  Vitals and nursing note reviewed  Constitutional:       General: She is not in acute distress  Appearance: She is well-developed  Eyes:      General: No scleral icterus  Conjunctiva/sclera: Conjunctivae normal    Cardiovascular:      Rate and Rhythm: Normal rate and regular rhythm  Heart sounds: Normal heart sounds  No murmur heard  Pulmonary:      Effort: Pulmonary effort is normal  No respiratory distress  Breath sounds: Normal breath sounds  No wheezing or rales  Abdominal:      General: There is no distension  Palpations: Abdomen is soft  Tenderness: There is no abdominal tenderness  Lymphadenopathy:      Cervical: No cervical adenopathy  Upper Body:      Right upper body: No supraclavicular adenopathy  Left upper body: No supraclavicular adenopathy  Skin:     General: Skin is warm and dry  Neurological:      Mental Status: She is alert and oriented to person, place, and time     Psychiatric:         Behavior: Behavior normal             RESULTS    Lab Results:   Recent Results (from the past 672 hour(s))   Cancer antigen 19-9    Collection Time: 08/12/21  7:50 AM   Result Value Ref Range    CA 19-9 2 0 - 35 U/mL   Basic metabolic panel    Collection Time: 08/12/21  7:50 AM   Result Value Ref Range    Sodium 143 136 - 145 mmol/L    Potassium 3 7 3 5 - 5 3 mmol/L    Chloride 105 100 - 108 mmol/L    CO2 28 21 - 32 mmol/L    ANION GAP 10 4 - 13 mmol/L    BUN 11 5 - 25 mg/dL    Creatinine 1 08 0 60 - 1 30 mg/dL    Glucose, Fasting 121 (H) 65 - 99 mg/dL    Calcium 8 8 8 3 - 10 1 mg/dL    eGFR 53 ml/min/1 73sq m         Assessment/Plan:  Jarvis Conway is a 74y o  year old female with a history of 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 status post Whipple resection on 10/20/2020  She completed a course of adjuvant chemo radiation 1/26/21 and completed subsequent chemotherapy on 3/23/21  Surveillance CT from 8/16/21 report is pending  Our office has called requesting official read  She has an appointment with Dr Sarah Don on Monday and it should be available for review by that time  Her CA 19-9 has normalized to 2  Her imaging from April 2021 showed no evidence of recurrence  She is clinically SATISH pending report from most recent CT  She has recovered well from treatment, but 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  She does not tolerate Creon enzymes and has been working with GI on diet modification  I recommended continued follow-up with GI and to inform Dr Sarah Don that she does not tolerate enzymes to see if any other options are available  We discussed that she may need permanent diet modifications going forward and the patient states that she was told this by GI as well and that she can accept these changes  I offered referral to Nutrition for further assistance in diet modification and to ensure adequate nutrition, but patient deferred as she feels she is adapting and will work with GI  I have asked that she return for follow-up in 6 months  We will see her sooner should the need arise  She will follow with her oncology team in the interim      Catrina Mckinnon MD  8/20/2021,9:36 AM    Portions of the record may have been created with voice recognition software   Occasional wrong word or "sound a like" substitutions may have occurred due to the inherent limitations of voice recognition software   Read the chart carefully and recognize, using context, where substitutions have occurred

## 2021-08-20 NOTE — PROGRESS NOTES
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 status post Whipple resection on 10/20/2020  She completed a course of adjuvant chemo radiation 1/26/21  Last seen via telemedicine on 2/24/21    3/23/21- Teo Song-G[facility] Hem Onc  Final cycle of chemo on 3/22  5FU currently infusing via CADD without any difficulty  4/5/21 CT CAP  IMPRESSION:  1  No evidence of metastatic disease in the thorax  Long segment of mild esophageal wall thickening which should be correlated for suspicion of esophagitis  2  Inflammatory changes /small amount of fluid noted in the region of the nicky hepatis and surgical bed without evidence to suggest enlarging pancreatic head mass  The pancreatic duct is no longer dilated  3  Stomach is no longer distended  Continued findings suggesting nonspecific inflammatory changes in the region of the gastrojejunal anastomosis  4  Hepatic steatosis  Hepatic hypodense nodule likely fluid within a fissure which should be carefully reassessed during follow-up  No discrete evidence of hepatic metastasis  5  Trace amount of perihepatic ascites  No suspicious adenopathy    6/22/21- Shweta Wiseman-CRISTIANA Gastro  -F/u for Gastroesophageal reflux, Abdominal gas pain and Fat in stool  Increase Omeprazole and start probiotics f/u in 6 weeks      8/16/21 - CT CAP - results pending    Upcoming appts:  8/23/21 - Dr Raman Delacruz  10/28/21 - Dr Rose Garcia - GI  12/14/21 - Dr Shaniqua Allen      Follow up visit       Oncology History Overview Note             History of pancreatic cancer   6/2/2020 Biopsy    [facility]- Dr Angeles Reap:  Pancreas, uncinate mass:      - Malignant ([facility] Category VI)      - Compatible with adenocarcinoma with mucinous features       6/30/2020 - 9/16/2020 Chemotherapy    fluorouracil (ADRUCIL) injection 585 mg, 400 mg/m2 = 585 mg, Intravenous, Once, 7 of 12 cycles  Administration: 585 mg (6/30/2020), 585 mg (7/14/2020), 585 mg (7/28/2020), 585 mg (8/11/2020), 585 mg (9/8/2020), 585 mg (8/25/2020)  pegfilgrastim (NEULASTA ONPRO) subcutaneous injection kit 6 mg, 6 mg, Subcutaneous, Once, 7 of 12 cycles  Administration: 6 mg (7/2/2020), 6 mg (7/16/2020), 6 mg (7/30/2020), 6 mg (8/13/2020), 6 mg (8/27/2020), 6 mg (9/10/2020)  fosaprepitant (EMEND) 150 mg in sodium chloride 0 9 % 250 mL IVPB, 150 mg, Intravenous, Once, 7 of 12 cycles  Administration: 150 mg (6/30/2020), 150 mg (7/14/2020), 150 mg (7/28/2020), 150 mg (8/11/2020), 150 mg (9/8/2020), 150 mg (8/25/2020)  irinotecan (CAMPTOSAR) 263 mg in dextrose 5 % 500 mL chemo infusion, 180 mg/m2 = 263 mg, Intravenous, Once, 3 of 3 cycles  Administration: 263 mg (6/30/2020), 263 mg (7/14/2020), 263 mg (7/28/2020)  leucovorin 584 mg in dextrose 5 % 250 mL IVPB, 400 mg/m2 = 584 mg (100 % of original dose 400 mg/m2), Intravenous, Once, 2 of 7 cycles  Dose modification: 400 mg/m2 (original dose 400 mg/m2, Cycle 6)  Administration: 584 mg (9/8/2020)  oxaliplatin (ELOXATIN) 124 1 mg in dextrose 5 % 250 mL chemo infusion, 85 mg/m2 = 124 1 mg, Intravenous, Once, 7 of 12 cycles  Administration: 124 1 mg (6/30/2020), 124 1 mg (7/14/2020), 124 1 mg (7/28/2020), 124 1 mg (8/11/2020), 124 1 mg (9/8/2020), 124 1 mg (8/25/2020)     10/20/2020 Surgery    Whipple:  - Residual invasive adenocarcinoma with mucinous features, 2 3 cm   - Metastatic carcinoma present in one of fourteen lymph nodes (1/14)  - All margins are negative for tumor          12/21/2020 - 1/24/2021 Chemotherapy    [No matching medication found in this treatment plan]     12/21/2020 - 1/26/2021 Radiation    5000 cGy in 25 fractions to high risk areas  Dr Shannan Gallardo     2/8/2021 - 4/4/2021 Chemotherapy    fluorouracil (ADRUCIL) injection 560 mg, 400 mg/m2 = 560 mg, Intravenous, Once, 4 of 4 cycles  Administration: 560 mg (2/8/2021), 560 mg (2/22/2021), 560 mg (3/8/2021), 560 mg (3/22/2021)  pegfilgrastim (NEULASTA ONPRO) subcutaneous injection kit 6 mg, 6 mg, Subcutaneous, Once, 4 of 4 cycles  Administration: 6 mg (2/10/2021), 6 mg (2/24/2021), 6 mg (3/10/2021), 6 mg (3/24/2021)  fosaprepitant (EMEND) 150 mg in sodium chloride 0 9 % 250 mL IVPB, 150 mg, Intravenous, Once, 4 of 4 cycles  Administration: 150 mg (2/8/2021), 150 mg (2/22/2021), 150 mg (3/8/2021), 150 mg (3/22/2021)  leucovorin 560 mg in dextrose 5 % 250 mL IVPB, 400 mg/m2 = 560 mg, Intravenous, Once, 4 of 4 cycles  Administration: 560 mg (2/8/2021), 560 mg (2/22/2021), 560 mg (3/8/2021), 560 mg (3/22/2021)  oxaliplatin (ELOXATIN) 119 mg in dextrose 5 % 250 mL chemo infusion, 85 mg/m2 = 119 mg, Intravenous, Once, 4 of 4 cycles  Administration: 119 mg (2/8/2021), 119 mg (2/22/2021), 119 mg (3/8/2021), 119 mg (3/22/2021)     Encounter for follow-up surveillance of pancreatic cancer   8/18/2021 Initial Diagnosis    Encounter for follow-up surveillance of pancreatic cancer         Clinical Trial: no      Health Maintenance   Topic Date Due    Medicare Annual Wellness Visit (AWV)  Never done    Diabetic Foot Exam  Never done    DM Eye Exam  Never done    URINE MICROALBUMIN  Never done    Colorectal Cancer Screening  Never done    Fall Risk  Never done    Breast Cancer Screening: Mammogram  02/20/2020    HEMOGLOBIN A1C  05/25/2021    Influenza Vaccine (1) 09/01/2021    Depression Screening PHQ  11/23/2021    BMI: Adult  08/03/2022    DTaP,Tdap,and Td Vaccines (2 - Td or Tdap) 10/31/2028    Hepatitis C Screening  Completed    Pneumococcal Vaccine: 65+ Years  Completed    COVID-19 Vaccine  Completed    HIB Vaccine  Aged Out    Hepatitis B Vaccine  Aged Out    IPV Vaccine  Aged Out    Hepatitis A Vaccine  Aged Out    Meningococcal ACWY Vaccine  Aged Out    HPV Vaccine  Aged Out       Patient Active Problem List   Diagnosis    Hyperlipidemia    Essential hypertension    Type 2 diabetes mellitus without complication, without long-term current use of insulin ([address] )    Pruritus    Transaminitis    History of pancreatic cancer    Port-A-Cath in place    Therapeutic opioid-induced constipation (OIC)    Anxiety    Functional diarrhea    Poor appetite    Chemotherapy induced neutropenia ([facility] )    Encounter for follow-up surveillance of pancreatic cancer     Past Medical History:   Diagnosis Date    Chemotherapy induced neutropenia ([facility] ) 2/10/2021    Diabetes mellitus ([facility] )     Hypertension     Lactic acidosis 2020    Neuropathy     Obstructive jaundice 2020     Past Surgical History:   Procedure Laterality Date    BREAST SURGERY Left     calcifications     SECTION      CHOLECYSTECTOMY N/A 10/20/2020    Procedure: CHOLECYSTECTOMY;  Surgeon: Austin Gabriel MD;  [facility];  Service: Surgical Oncology    COLONOSCOPY      ECTOPIC PREGNANCY SURGERY      partial ovary removed    [facility] [address]  2020    JOINT REPLACEMENT Bilateral     LAPAROTOMY N/A 10/20/2020    Procedure: LAPAROTOMY EXPLORATORY; INTRAOPERATIVE ULTRASOUND;  Surgeon: Austin Gabriel MD;  [facility];  Service: Surgical Oncology    [address] [place] [address]    NOSE SURGERY      deviated septum    REPLACEMENT TOTAL HIP W/  RESURFACING IMPLANTS Bilateral     right hip done 2012; left hip done 2012    TONSILLECTOMY      TUNNELED VENOUS PORT PLACEMENT Left 2020    Procedure: INSERTION VENOUS PORT (PORT-A-CATH), left;  Surgeon: Austin Gabriel MD;  [facility];  Service: Surgical Oncology    WHIPPLE PROCEDURE/PANCREATICO-DUODENECTOMY N/A 10/20/2020    Procedure:  WHIPPLE PROCEDURE/PANCREATICO-DUODENECTOMY;  Surgeon: Austin Gabriel MD;  [facility];  Service: Surgical Oncology     Family History   Problem Relation Age of Onset    Diabetes Mother     Heart disease Mother     Heart disease Father     Breast cancer additional onset Sister    Kristie Curtis Breast cancer Sister     Breast cancer additional onset Maternal Aunt     Breast cancer Maternal Aunt     Stroke Maternal Grandfather      Social History     Socioeconomic History    Marital status: /Civil Union     Spouse name: Not on file    Number of children: Not on file    Years of education: Not on file    Highest education level: Not on file   Occupational History    Not on file   Tobacco Use    Smoking status: Former Smoker    Smokeless tobacco: Never Used    Tobacco comment: quit 40 years ago   Vaping Use    Vaping Use: Never used   Substance and Sexual Activity    Alcohol use: Not Currently    Drug use: Never    Sexual activity: Not Currently   Other Topics Concern    Not on file   Social History Narrative    Not on file     Social Determinants of Health     Financial Resource Strain:     Difficulty of Paying Living Expenses:    Food Insecurity:     Worried About [address] [employer] in the Last Year:     [employer] in the Last Year:    Transportation Needs:     Lack of Transportation (Medical):      Lack of Transportation (Non-Medical):    Physical Activity:     Days of Exercise per Week:     Minutes of Exercise per Session:    Stress:     Feeling of Stress :    Social Connections:     Frequency of Communication with Friends and Family:     Frequency of Social Gatherings with Friends and Family:     Attends [employer] Services:     Active Member of Clubs or Organizations:     Attends Club or Organization Meetings:     Marital Status:    Intimate Partner Violence:     Fear of Current or Ex-Partner:     Emotionally Abused:     Physically Abused:     Sexually Abused:        Current Outpatient Medications:     amLODIPine (NORVASC) 2 5 mg tablet, Take 2 5 mg by mouth daily, Disp: , Rfl:     dicyclomine (BENTYL) 20 mg tablet, TAKE 1 TABLET BY MOUTH TWICE A DAY, Disp: 180 tablet, Rfl: 1    diphenhydrAMINE (BENADRYL) 25 mg capsule, Take 25 mg by mouth every 6 (six) hours as needed for itching, Disp: , Rfl:   diphenoxylate-atropine (LOMOTIL) 2 5-0 025 mg per tablet, Take 1 tablet by mouth 4 (four) times a day as needed for diarrhea DO NOT EXCEED 4 DOSES IN 1 DAY, Disp: 30 tablet, Rfl: 0    Empagliflozin (Jardiance) 10 MG TABS, Take 10 mg by mouth every morning, Disp: , Rfl:     gabapentin (NEURONTIN) 300 mg capsule, Take 300 mg by mouth 3 (three) times a day, Disp: , Rfl:     LORazepam (ATIVAN) 0 5 mg tablet, Take 1 tablet (0 5 mg total) by mouth 2 (two) times a day as needed for anxiety NO FURTHER REFILLS WITHOUT CLINIC VISIT WITH PALLIATIVE CARE, Disp: 30 tablet, Rfl: 0    mirtazapine (REMERON) 15 mg tablet, TAKE 1 TABLET BY MOUTH DAILY AT BEDTIME, Disp: 90 tablet, Rfl: 1    omeprazole (PriLOSEC) 20 mg delayed release capsule, TAKE 1 CAPSULE BY MOUTH TWICE A DAY, Disp: 60 capsule, Rfl: 0    prochlorperazine (COMPAZINE) 10 mg tablet, Take 1 tablet (10 mg total) by mouth every 6 (six) hours as needed for nausea or vomiting, Disp: 45 tablet, Rfl: 3    hydrOXYzine HCL (ATARAX) 10 mg tablet, Take 1 tablet (10 mg total) by mouth every 8 (eight) hours as needed for itching (Patient not taking: Reported on 6/22/2021), Disp: 30 tablet, Rfl: 0  No current facility-administered medications for this visit  Allergies   Allergen Reactions    Betadine [Povidone Iodine] Rash     Also itching from betadine    Other Throat Swelling     Pt unsure which chemotherapy drug caused tongue swelling and locked jaw  Please review in epic notes Folfirinox or possible Neulasta  NEED to review       Review of Systems:  Review of Systems   Constitutional: Negative  HENT: Negative  Eyes: Negative  Respiratory: Negative  Cardiovascular: Negative  Gastrointestinal: Positive for diarrhea and nausea  Loose stools and has to avoid oily foods   Endocrine: Positive for cold intolerance  Genitourinary: Negative  Musculoskeletal: Negative  Skin: Negative  Allergic/Immunologic: Negative      Neurological: Negative  Hematological: Negative  Psychiatric/Behavioral: Negative  Vitals:    08/20/21 0916   BP: 124/76   Pulse: 70   Resp: 20   Temp: 98 1 °F (36 7 °C)   SpO2: 98%   Weight: 47 2 kg (104 lb)   Height: 5' (1 524 m)        Pain Score: 0-No pain    Imaging:No results found      Teaching - yes

## 2021-08-23 ENCOUNTER — OFFICE VISIT (OUTPATIENT)
Dept: SURGICAL ONCOLOGY | Facility: CLINIC | Age: 66
End: 2021-08-23
Payer: MEDICARE

## 2021-08-23 VITALS
OXYGEN SATURATION: 98 % | TEMPERATURE: 98.1 F | WEIGHT: 102 LBS | SYSTOLIC BLOOD PRESSURE: 132 MMHG | HEART RATE: 70 BPM | HEIGHT: 59 IN | RESPIRATION RATE: 16 BRPM | DIASTOLIC BLOOD PRESSURE: 68 MMHG | BODY MASS INDEX: 20.56 KG/M2

## 2021-08-23 DIAGNOSIS — E44.1 MILD PROTEIN-CALORIE MALNUTRITION (HCC): ICD-10-CM

## 2021-08-23 DIAGNOSIS — C77.2 SECONDARY AND UNSPECIFIED MALIGNANT NEOPLASM OF INTRA-ABDOMINAL LYMPH NODES (HCC): ICD-10-CM

## 2021-08-23 DIAGNOSIS — Z78.9 NEED FOR FOLLOW-UP BY SOCIAL WORKER: ICD-10-CM

## 2021-08-23 DIAGNOSIS — Z85.07 HISTORY OF PANCREATIC CANCER: ICD-10-CM

## 2021-08-23 DIAGNOSIS — Z08 ENCOUNTER FOR FOLLOW-UP SURVEILLANCE OF PANCREATIC CANCER: Primary | ICD-10-CM

## 2021-08-23 DIAGNOSIS — Z85.07 HISTORY OF PANCREATIC CANCER: Primary | ICD-10-CM

## 2021-08-23 DIAGNOSIS — Z85.07 ENCOUNTER FOR FOLLOW-UP SURVEILLANCE OF PANCREATIC CANCER: Primary | ICD-10-CM

## 2021-08-23 PROCEDURE — 99214 OFFICE O/P EST MOD 30 MIN: CPT | Performed by: SURGERY

## 2021-08-23 NOTE — PROGRESS NOTES
Surgical Oncology Follow Up       [address] [facility] CANCER [facility] [place]  [address] [facility] [place]  [address] [facility] [address] [phone]    Rosette Chirinos  1955  [id number]  [address] [facility] CANCER CARE SURGICAL ONCOLOGY [place] [address] [address] [phone]    Chief Complaint   Patient presents with    Follow-up       Assessment/Plan:    No problem-specific Assessment & Plan notes found for this encounter  Diagnoses and all orders for this visit:    Encounter for follow-up surveillance of pancreatic cancer    History of pancreatic cancer        Advance Care Planning/Advance Directives:  Discussed disease status, cancer treatment plans and/or cancer treatment goals with the patient  Oncology History Overview Note             History of pancreatic cancer   6/2/2020 Biopsy    [facility]- Dr Yanira Brar:  Pancreas, uncinate mass:      - Malignant ([facility] Category VI)      - Compatible with adenocarcinoma with mucinous features       6/30/2020 - 9/16/2020 Chemotherapy    fluorouracil (ADRUCIL) injection 585 mg, 400 mg/m2 = 585 mg, Intravenous, Once, 7 of 12 cycles  Administration: 585 mg (6/30/2020), 585 mg (7/14/2020), 585 mg (7/28/2020), 585 mg (8/11/2020), 585 mg (9/8/2020), 585 mg (8/25/2020)  pegfilgrastim (NEULASTA ONPRO) subcutaneous injection kit 6 mg, 6 mg, Subcutaneous, Once, 7 of 12 cycles  Administration: 6 mg (7/2/2020), 6 mg (7/16/2020), 6 mg (7/30/2020), 6 mg (8/13/2020), 6 mg (8/27/2020), 6 mg (9/10/2020)  fosaprepitant (EMEND) 150 mg in sodium chloride 0 9 % 250 mL IVPB, 150 mg, Intravenous, Once, 7 of 12 cycles  Administration: 150 mg (6/30/2020), 150 mg (7/14/2020), 150 mg (7/28/2020), 150 mg (8/11/2020), 150 mg (9/8/2020), 150 mg (8/25/2020)  irinotecan (CAMPTOSAR) 263 mg in dextrose 5 % 500 mL chemo infusion, 180 mg/m2 = 263 mg, Intravenous, Once, 3 of 3 cycles  Administration: 263 mg (6/30/2020), 263 mg (7/14/2020), 263 mg (7/28/2020)  leucovorin 584 mg in dextrose 5 % 250 mL IVPB, 400 mg/m2 = 584 mg (100 % of original dose 400 mg/m2), Intravenous, Once, 2 of 7 cycles  Dose modification: 400 mg/m2 (original dose 400 mg/m2, Cycle 6)  Administration: 584 mg (9/8/2020)  oxaliplatin (ELOXATIN) 124 1 mg in dextrose 5 % 250 mL chemo infusion, 85 mg/m2 = 124 1 mg, Intravenous, Once, 7 of 12 cycles  Administration: 124 1 mg (6/30/2020), 124 1 mg (7/14/2020), 124 1 mg (7/28/2020), 124 1 mg (8/11/2020), 124 1 mg (9/8/2020), 124 1 mg (8/25/2020)     10/20/2020 Surgery    Whipple:  - Residual invasive adenocarcinoma with mucinous features, 2 3 cm   - Metastatic carcinoma present in one of fourteen lymph nodes (1/14)  - All margins are negative for tumor          12/21/2020 - 1/24/2021 Chemotherapy    [No matching medication found in this treatment plan]     12/21/2020 - 1/26/2021 Radiation    5000 cGy in 25 fractions to high risk areas  Dr Princess Ceron     2/8/2021 - 4/4/2021 Chemotherapy    fluorouracil (ADRUCIL) injection 560 mg, 400 mg/m2 = 560 mg, Intravenous, Once, 4 of 4 cycles  Administration: 560 mg (2/8/2021), 560 mg (2/22/2021), 560 mg (3/8/2021), 560 mg (3/22/2021)  pegfilgrastim (NEULASTA ONPRO) subcutaneous injection kit 6 mg, 6 mg, Subcutaneous, Once, 4 of 4 cycles  Administration: 6 mg (2/10/2021), 6 mg (2/24/2021), 6 mg (3/10/2021), 6 mg (3/24/2021)  fosaprepitant (EMEND) 150 mg in sodium chloride 0 9 % 250 mL IVPB, 150 mg, Intravenous, Once, 4 of 4 cycles  Administration: 150 mg (2/8/2021), 150 mg (2/22/2021), 150 mg (3/8/2021), 150 mg (3/22/2021)  leucovorin 560 mg in dextrose 5 % 250 mL IVPB, 400 mg/m2 = 560 mg, Intravenous, Once, 4 of 4 cycles  Administration: 560 mg (2/8/2021), 560 mg (2/22/2021), 560 mg (3/8/2021), 560 mg (3/22/2021)  oxaliplatin (ELOXATIN) 119 mg in dextrose 5 % 250 mL chemo infusion, 85 mg/m2 = 119 mg, Intravenous, Once, 4 of 4 cycles  Administration: 119 mg (2/8/2021), 119 mg (2/22/2021), 119 mg (3/8/2021), 119 mg (3/22/2021)     Encounter for follow-up surveillance of pancreatic cancer   2021 Initial Diagnosis    Encounter for follow-up surveillance of pancreatic cancer    AJCC Prognostic Stage Group (8th Ed ):  At least Stage IIB - ypT2, yN1, MX, G2          History of Present Illness:   Patient is a history of stage II pancreas cancer here for surveillance visit   -Interval History:  No major complaints to report  She follows with GI team as well  She had blood work and CT scan done in anticipation of today's visit  Review of Systems:  Review of Systems   Constitutional: Negative  HENT: Negative  Eyes: Negative  Respiratory: Negative  Cardiovascular: Negative  Gastrointestinal: Negative  Negative for abdominal distention, abdominal pain, nausea and vomiting  Endocrine: Negative  Genitourinary: Negative  Musculoskeletal: Negative  Skin: Negative  Allergic/Immunologic: Negative  Neurological: Negative  Hematological: Negative  Psychiatric/Behavioral: Negative          Patient Active Problem List   Diagnosis    Hyperlipidemia    Essential hypertension    Type 2 diabetes mellitus without complication, without long-term current use of insulin (HCC)    Pruritus    Transaminitis    History of pancreatic cancer    Port-A-Cath in place    Therapeutic opioid-induced constipation (OIC)    Anxiety    Functional diarrhea    Poor appetite    Chemotherapy induced neutropenia ([facility] [address] )    Encounter for follow-up surveillance of pancreatic cancer     Past Medical History:   Diagnosis Date    Chemotherapy induced neutropenia ([facility] [address] ) 2/10/2021    Diabetes mellitus ([facility] [address] )     Hypertension     Lactic acidosis 2020    Neuropathy     Obstructive jaundice 2020     Past Surgical History:   Procedure Laterality Date    BREAST SURGERY Left     calcifications     SECTION      CHOLECYSTECTOMY N/A 10/20/2020    Procedure: CHOLECYSTECTOMY;  Surgeon: Jamar Ramos MD;  [facility];  Service: Surgical Oncology    COLONOSCOPY      ECTOPIC PREGNANCY SURGERY      partial ovary removed    FL GUIDED CENTRAL VENOUS ACCESS DEVICE INSERTION  6/23/2020    JOINT REPLACEMENT Bilateral     LAPAROTOMY N/A 10/20/2020    Procedure: LAPAROTOMY EXPLORATORY; INTRAOPERATIVE ULTRASOUND;  Surgeon: Jamar Ramos MD;  [facility];  Service: Surgical Oncology    [address]    NOSE SURGERY      deviated septum    REPLACEMENT TOTAL HIP W/  RESURFACING IMPLANTS Bilateral 2012    right hip done July 2012; left hip done September 2012    TONSILLECTOMY  1957    TUNNELED VENOUS PORT PLACEMENT Left 6/23/2020    Procedure: INSERTION VENOUS PORT (PORT-A-CATH), left;  Surgeon: Jamar aRmos MD;  [facility];  Service: Surgical Oncology    WHIPPLE PROCEDURE/PANCREATICO-DUODENECTOMY N/A 10/20/2020    Procedure:  WHIPPLE PROCEDURE/PANCREATICO-DUODENECTOMY;  Surgeon: Jamar Ramos MD;  [facility];  Service: Surgical Oncology     Family History   Problem Relation Age of Onset    Diabetes Mother     Heart disease Mother     Heart disease Father     Breast cancer additional onset Sister    Areta Emmer Breast cancer Sister     Breast cancer additional onset Maternal Aunt     Breast cancer Maternal Aunt     Stroke Maternal Grandfather      Social History     Socioeconomic History    Marital status: /Civil Union     Spouse name: Not on file    Number of children: Not on file    Years of education: Not on file    Highest education level: Not on file   Occupational History    Not on file   Tobacco Use    Smoking status: Former Smoker    Smokeless tobacco: Never Used    Tobacco comment: quit 40 years ago   Vaping Use    Vaping Use: Never used   Substance and Sexual Activity    Alcohol use: Not Currently    Drug use: Never    Sexual activity: Not Currently   Other Topics Concern    Not on file   Social History Narrative    Not on file     Social Determinants of Health     Financial Resource Strain:     Difficulty of Paying Living Expenses:    Food Insecurity:     Worried About Running Out of Food in the Last Year:     [address] in the Last Year:    Transportation Needs:     Lack of Transportation (Medical):      Lack of Transportation (Non-Medical):    Physical Activity:     Days of Exercise per Week:     Minutes of Exercise per Session:    Stress:     Feeling of Stress :    Social Connections:     Frequency of Communication with Friends and Family:     Frequency of Social Gatherings with Friends and Family:     Attends Taoist Services:     Active Member of Clubs or Organizations:     Attends Club or Organization Meetings:     Marital Status:    Intimate Partner Violence:     Fear of Current or Ex-Partner:     Emotionally Abused:     Physically Abused:     Sexually Abused:        Current Outpatient Medications:     amLODIPine (NORVASC) 2 5 mg tablet, Take 2 5 mg by mouth daily, Disp: , Rfl:     dicyclomine (BENTYL) 20 mg tablet, TAKE 1 TABLET BY MOUTH TWICE A DAY, Disp: 180 tablet, Rfl: 1    diphenhydrAMINE (BENADRYL) 25 mg capsule, Take 25 mg by mouth every 6 (six) hours as needed for itching, Disp: , Rfl:     diphenoxylate-atropine (LOMOTIL) 2 5-0 025 mg per tablet, Take 1 tablet by mouth 4 (four) times a day as needed for diarrhea DO NOT EXCEED 4 DOSES IN 1 DAY, Disp: 30 tablet, Rfl: 0    Empagliflozin (Jardiance) 10 MG TABS, Take 10 mg by mouth every morning, Disp: , Rfl:     gabapentin (NEURONTIN) 300 mg capsule, Take 300 mg by mouth 3 (three) times a day, Disp: , Rfl:     hydrOXYzine HCL (ATARAX) 10 mg tablet, Take 1 tablet (10 mg total) by mouth every 8 (eight) hours as needed for itching, Disp: 30 tablet, Rfl: 0    LORazepam (ATIVAN) 0 5 mg tablet, Take 1 tablet (0 5 mg total) by mouth 2 (two) times a day as needed for anxiety NO FURTHER REFILLS WITHOUT CLINIC VISIT WITH PALLIATIVE CARE, Disp: 30 tablet, Rfl: 0    mirtazapine (REMERON) 15 mg tablet, TAKE 1 TABLET BY MOUTH DAILY AT BEDTIME, Disp: 90 tablet, Rfl: 1    omeprazole (PriLOSEC) 20 mg delayed release capsule, TAKE 1 CAPSULE BY MOUTH TWICE A DAY, Disp: 60 capsule, Rfl: 0    prochlorperazine (COMPAZINE) 10 mg tablet, Take 1 tablet (10 mg total) by mouth every 6 (six) hours as needed for nausea or vomiting, Disp: 45 tablet, Rfl: 3  Allergies   Allergen Reactions    Betadine [Povidone Iodine] Rash     Also itching from betadine    Other Throat Swelling     Pt unsure which chemotherapy drug caused tongue swelling and locked jaw  Please review in epic notes Folfirinox or possible Neulasta  NEED to review     Vitals:    08/23/21 1053   BP: 132/68   Pulse: 70   Resp: 16   Temp: 98 1 °F (36 7 °C)   SpO2: 98%       Physical Exam  Constitutional:       Appearance: Normal appearance  HENT:      Head: Normocephalic and atraumatic  Right Ear: External ear normal       Nose: Nose normal    Eyes:      General: No scleral icterus  Extraocular Movements: Extraocular movements intact  Pupils: Pupils are equal, round, and reactive to light  Cardiovascular:      Rate and Rhythm: Normal rate and regular rhythm  Heart sounds: Normal heart sounds  Pulmonary:      Effort: Pulmonary effort is normal       Breath sounds: Normal breath sounds  Abdominal:      General: Abdomen is flat  Bowel sounds are normal  There is no distension  Palpations: Abdomen is soft  There is no mass  Tenderness: There is no abdominal tenderness  There is no guarding or rebound  Hernia: No hernia is present  Musculoskeletal:         General: Normal range of motion  Cervical back: Normal range of motion and neck supple  Skin:     General: Skin is warm and dry  Neurological:      General: No focal deficit present  Mental Status: She is alert and oriented to person, place, and time     Psychiatric:         Mood and Affect: Mood normal          Behavior: Behavior normal            Results:  Labs:   Ref Range & Units 8/12/21  7:50 AM   CA 19-9 0 - 35 U/mL 2         Imaging  CT chest abdomen pelvis w contrast    Result Date: 8/20/2021  Narrative: CT CHEST, ABDOMEN AND PELVIS WITH IV CONTRAST INDICATION:   C25 9: Malignant neoplasm of pancreas, unspecified  59-year-old female with history of pancreatic cancer, status post Whipple procedure, chemotherapy and adjuvant chemoradiation (completed on 1/26/2021)  COMPARISON:  CT of the chest, abdomen and pelvis from April 5, 2021 TECHNIQUE: CT examination of the chest, abdomen and pelvis was performed  Axial, sagittal, and coronal 2D reformatted images were created from the source data and submitted for interpretation  Radiation dose length product (DLP) for this visit:  1220 mGy-cm   This examination, like all CT scans performed in the [facility], was performed utilizing techniques to minimize radiation dose exposure, including the use of iterative reconstruction and automated exposure control  IV Contrast:  100 mL of iohexol (OMNIPAQUE) Enteric Contrast: Enteric contrast was administered  FINDINGS: CHEST LUNGS:  Lungs are clear  There is no tracheal or endobronchial lesion  PLEURA:  Unremarkable  HEART/GREAT VESSELS:  Unremarkable for patient's age  Tip of Port-A-Cath near cavoatrial junction  MEDIASTINUM AND SANDRA: No lymphadenopathy or mass  Diffuse mural thickening of the esophagus, consistent with esophagitis, similar in appearance to the last CT  Trachea and main stem bronchi normal  CHEST WALL AND LOWER NECK:   Unremarkable  ABDOMEN LIVER/BILIARY TREE:  Liver unremarkable  Previously demonstrated small water attenuation structure in the right lobe no longer evident Small amount of postoperative pneumobilia  Bile ducts normal in caliber  GALLBLADDER:  Postcholecystectomy  SPLEEN:  Unremarkable  PANCREAS:  Post Whipple procedure    Decrease in the extent of increased attenuation of the fat at the surgical bed, consistent with resolving postoperative/post radiation edema  No evidence of local tumor recurrence  Remaining pancreas unremarkable  Small amount of postoperative air in the remaining downstream portion of a nondilated main pancreatic duct  ADRENAL GLANDS:  Unremarkable  KIDNEYS/URETERS:  Unremarkable  No hydronephrosis  STOMACH AND BOWEL:  Postoperative changes in the stomach and proximal small intestine  Otherwise unremarkable  APPENDIX:  Normal  ABDOMINOPELVIC CAVITY: No lymphadenopathy or mass  No ascites or discrete fluid collection  No extraluminal gas  VESSELS:  Unremarkable for patient's age  PELVIS REPRODUCTIVE ORGANS:  Obscured by artifact from bilateral hip prostheses  URINARY BLADDER:  Obscured by artifact from bilateral hip prostheses  ABDOMINAL WALL/INGUINAL REGIONS:  Unremarkable  OSSEOUS STRUCTURES:  Bilateral hip arthroplasties  No evidence of destructive lesion or recent fracture  Impression: 1  Status post Whipple procedure with resolving postradiation edema in the surgical bed  2   Diffuse esophageal thickening, most likely due to esophagitis, similar to the CT from 4/5/2021  3   No evidence of local recurrence of pancreatic cancer  4   No evidence of metastases in the chest, abdomen, pelvis or included portions of the skeleton  Workstation performed: [id number]     I reviewed the above laboratory and imaging data  Discussion/Summary:  47-year-old woman, history of stage II pancreas cancer, presently and ED  Plan on follow-up in 6 months with scan and blood work at that time for surveillance

## 2021-08-23 NOTE — LETTER
August 23, 2021     Jazlyn Marie DO  [address] [address]    Patient: Grant Dockery   YOB: 1955   Date of Visit: 8/23/2021       Dear Dr Vishal Dotson: Thank you for referring Grant Dockery to me for evaluation  Below are my notes for this consultation  If you have questions, please do not hesitate to call me  I look forward to following your patient along with you  Sincerely,        Julia Hill MD        CC: JOANA Zacarias MD Keller Poisson, MD Moody Mend, RD Kathrene Lipps, PA-C Randie Early, MD  8/23/2021 11:32 AM  Sign when Signing Visit     Surgical Oncology Follow Up       [address] [facility] [address] [address] [phone]    Grant Dockery  1955  [id number]  [address] Author Goes  Elodia Dial [address] [place] [address]  [phone]    Chief Complaint   Patient presents with    Follow-up       Assessment/Plan:    No problem-specific Assessment & Plan notes found for this encounter  Diagnoses and all orders for this visit:    Encounter for follow-up surveillance of pancreatic cancer    History of pancreatic cancer        Advance Care Planning/Advance Directives:  Discussed disease status, cancer treatment plans and/or cancer treatment goals with the patient  Oncology History Overview Note             History of pancreatic cancer   6/2/2020 Biopsy    [facility]- Dr Cally Echols:  Pancreas, uncinate mass:      - Malignant ([facility] Category VI)      - Compatible with adenocarcinoma with mucinous features       6/30/2020 - 9/16/2020 Chemotherapy    fluorouracil (ADRUCIL) injection 585 mg, 400 mg/m2 = 585 mg, Intravenous, Once, 7 of 12 cycles  Administration: 585 mg (6/30/2020), 585 mg (7/14/2020), 585 mg (7/28/2020), 585 mg (8/11/2020), 585 mg (9/8/2020), 585 mg (8/25/2020)  pegfilgrastim (NEULASTA ONPRO) subcutaneous injection kit 6 mg, 6 mg, Subcutaneous, Once, 7 of 12 cycles  Administration: 6 mg (7/2/2020), 6 mg (7/16/2020), 6 mg (7/30/2020), 6 mg (8/13/2020), 6 mg (8/27/2020), 6 mg (9/10/2020)  fosaprepitant (EMEND) 150 mg in sodium chloride 0 9 % 250 mL IVPB, 150 mg, Intravenous, Once, 7 of 12 cycles  Administration: 150 mg (6/30/2020), 150 mg (7/14/2020), 150 mg (7/28/2020), 150 mg (8/11/2020), 150 mg (9/8/2020), 150 mg (8/25/2020)  irinotecan (CAMPTOSAR) 263 mg in dextrose 5 % 500 mL chemo infusion, 180 mg/m2 = 263 mg, Intravenous, Once, 3 of 3 cycles  Administration: 263 mg (6/30/2020), 263 mg (7/14/2020), 263 mg (7/28/2020)  leucovorin 584 mg in dextrose 5 % 250 mL IVPB, 400 mg/m2 = 584 mg (100 % of original dose 400 mg/m2), Intravenous, Once, 2 of 7 cycles  Dose modification: 400 mg/m2 (original dose 400 mg/m2, Cycle 6)  Administration: 584 mg (9/8/2020)  oxaliplatin (ELOXATIN) 124 1 mg in dextrose 5 % 250 mL chemo infusion, 85 mg/m2 = 124 1 mg, Intravenous, Once, 7 of 12 cycles  Administration: 124 1 mg (6/30/2020), 124 1 mg (7/14/2020), 124 1 mg (7/28/2020), 124 1 mg (8/11/2020), 124 1 mg (9/8/2020), 124 1 mg (8/25/2020)     10/20/2020 Surgery    Whipple:  - Residual invasive adenocarcinoma with mucinous features, 2 3 cm   - Metastatic carcinoma present in one of fourteen lymph nodes (1/14)  - All margins are negative for tumor          12/21/2020 - 1/24/2021 Chemotherapy    [No matching medication found in this treatment plan]     12/21/2020 - 1/26/2021 Radiation    5000 cGy in 25 fractions to high risk areas  Dr Maddison Luu     2/8/2021 - 4/4/2021 Chemotherapy    fluorouracil (ADRUCIL) injection 560 mg, 400 mg/m2 = 560 mg, Intravenous, Once, 4 of 4 cycles  Administration: 560 mg (2/8/2021), 560 mg (2/22/2021), 560 mg (3/8/2021), 560 mg (3/22/2021)  pegfilgrastim (NEULASTA ONPRO) subcutaneous injection kit 6 mg, 6 mg, Subcutaneous, Once, 4 of 4 cycles  Administration: 6 mg (2/10/2021), 6 mg (2/24/2021), 6 mg (3/10/2021), 6 mg (3/24/2021)  fosaprepitant (EMEND) 150 mg in sodium chloride 0 9 % 250 mL IVPB, 150 mg, Intravenous, Once, 4 of 4 cycles  Administration: 150 mg (2/8/2021), 150 mg (2/22/2021), 150 mg (3/8/2021), 150 mg (3/22/2021)  leucovorin 560 mg in dextrose 5 % 250 mL IVPB, 400 mg/m2 = 560 mg, Intravenous, Once, 4 of 4 cycles  Administration: 560 mg (2/8/2021), 560 mg (2/22/2021), 560 mg (3/8/2021), 560 mg (3/22/2021)  oxaliplatin (ELOXATIN) 119 mg in dextrose 5 % 250 mL chemo infusion, 85 mg/m2 = 119 mg, Intravenous, Once, 4 of 4 cycles  Administration: 119 mg (2/8/2021), 119 mg (2/22/2021), 119 mg (3/8/2021), 119 mg (3/22/2021)     Encounter for follow-up surveillance of pancreatic cancer   8/18/2021 Initial Diagnosis    Encounter for follow-up surveillance of pancreatic cancer    AJCC Prognostic Stage Group (8th Ed ):  At least Stage IIB - ypT2, yN1, MX, G2          History of Present Illness:   Patient is a history of stage II pancreas cancer here for surveillance visit   -Interval History:  No major complaints to report  She follows with GI team as well  She had blood work and CT scan done in anticipation of today's visit  Review of Systems:  Review of Systems   Constitutional: Negative  HENT: Negative  Eyes: Negative  Respiratory: Negative  Cardiovascular: Negative  Gastrointestinal: Negative  Negative for abdominal distention, abdominal pain, nausea and vomiting  Endocrine: Negative  Genitourinary: Negative  Musculoskeletal: Negative  Skin: Negative  Allergic/Immunologic: Negative  Neurological: Negative  Hematological: Negative  Psychiatric/Behavioral: Negative          Patient Active Problem List   Diagnosis    Hyperlipidemia    Essential hypertension    Type 2 diabetes mellitus without complication, without long-term current use of insulin (HCC)    Pruritus    Transaminitis    History of pancreatic cancer    Port-A-Cath in place    Therapeutic opioid-induced constipation (OIC)    Anxiety    Functional diarrhea    Poor appetite    Chemotherapy induced neutropenia ([facility] )    Encounter for follow-up surveillance of pancreatic cancer     Past Medical History:   Diagnosis Date    Chemotherapy induced neutropenia ([facility] ) 2/10/2021    Diabetes mellitus ([facility] )     Hypertension     Lactic acidosis 2020    Neuropathy     Obstructive jaundice 2020     Past Surgical History:   Procedure Laterality Date    BREAST SURGERY Left     calcifications     SECTION      CHOLECYSTECTOMY N/A 10/20/2020    Procedure: CHOLECYSTECTOMY;  Surgeon: Josiah West MD;  [facility];  Service: Surgical Oncology    COLONOSCOPY      ECTOPIC PREGNANCY SURGERY      partial ovary removed    FL [address] [facility] [address]  2020    JOINT REPLACEMENT Bilateral     LAPAROTOMY N/A 10/20/2020    Procedure: LAPAROTOMY EXPLORATORY; INTRAOPERATIVE ULTRASOUND;  Surgeon: Josiah West MD;  [facility];  Service: Surgical Oncology    [address] [place] [address]    NOSE SURGERY      deviated septum    REPLACEMENT TOTAL HIP W/  RESURFACING IMPLANTS Bilateral     right hip done 2012; left hip done 2012    TONSILLECTOMY      TUNNELED VENOUS PORT PLACEMENT Left 2020    Procedure: INSERTION VENOUS PORT (PORT-A-CATH), left;  Surgeon: Josiah West MD;  [facility];  Service: Surgical Oncology    WHIPPLE PROCEDURE/PANCREATICO-DUODENECTOMY N/A 10/20/2020    Procedure:  WHIPPLE PROCEDURE/PANCREATICO-DUODENECTOMY;  Surgeon: Josiah West MD;  [facility];  Service: Surgical Oncology     Family History   Problem Relation Age of Onset    Diabetes Mother     Heart disease Mother     Heart disease Father     Breast cancer additional onset Sister     Breast cancer Sister     Breast cancer additional onset Maternal Aunt     Breast cancer Maternal Aunt     Stroke Maternal Grandfather      Social History     Socioeconomic History    Marital status: /Civil Union     Spouse name: Not on file    Number of children: Not on file    Years of education: Not on file    Highest education level: Not on file   Occupational History    Not on file   Tobacco Use    Smoking status: Former Smoker    Smokeless tobacco: Never Used    Tobacco comment: quit 40 years ago   Vaping Use    Vaping Use: Never used   Substance and Sexual Activity    Alcohol use: Not Currently    Drug use: Never    Sexual activity: Not Currently   Other Topics Concern    Not on file   Social History Narrative    Not on file     Social Determinants of Health     Financial Resource Strain:     Difficulty of Paying Living Expenses:    Food Insecurity:     Worried About [address] in the Last Year:     [address] [employer] in the Last Year:    Transportation Needs:     Lack of Transportation (Medical):      Lack of Transportation (Non-Medical):    Physical Activity:     Days of Exercise per Week:     Minutes of Exercise per Session:    Stress:     Feeling of Stress :    Social Connections:     Frequency of Communication with Friends and Family:     Frequency of Social Gatherings with Friends and Family:     Attends Ca[employer] Services:     Active Member of Clubs or Organizations:     Attends Club or Organization Meetings:     Marital Status:    Intimate Partner Violence:     Fear of Current or Ex-Partner:     Emotionally Abused:     Physically Abused:     Sexually Abused:        Current Outpatient Medications:     amLODIPine (NORVASC) 2 5 mg tablet, Take 2 5 mg by mouth daily, Disp: , Rfl:     dicyclomine (BENTYL) 20 mg tablet, TAKE 1 TABLET BY MOUTH TWICE A DAY, Disp: 180 tablet, Rfl: 1    diphenhydrAMINE (BENADRYL) 25 mg capsule, Take 25 mg by mouth every 6 (six) hours as needed for itching, Disp: , Rfl:     diphenoxylate-atropine (LOMOTIL) 2 5-0 025 mg per tablet, Take 1 tablet by mouth 4 (four) times a day as needed for diarrhea DO NOT EXCEED 4 DOSES IN 1 DAY, Disp: 30 tablet, Rfl: 0    Empagliflozin (Jardiance) 10 MG TABS, Take 10 mg by mouth every morning, Disp: , Rfl:     gabapentin (NEURONTIN) 300 mg capsule, Take 300 mg by mouth 3 (three) times a day, Disp: , Rfl:     hydrOXYzine HCL (ATARAX) 10 mg tablet, Take 1 tablet (10 mg total) by mouth every 8 (eight) hours as needed for itching, Disp: 30 tablet, Rfl: 0    LORazepam (ATIVAN) 0 5 mg tablet, Take 1 tablet (0 5 mg total) by mouth 2 (two) times a day as needed for anxiety NO FURTHER REFILLS WITHOUT CLINIC VISIT WITH PALLIATIVE CARE, Disp: 30 tablet, Rfl: 0    mirtazapine (REMERON) 15 mg tablet, TAKE 1 TABLET BY MOUTH DAILY AT BEDTIME, Disp: 90 tablet, Rfl: 1    omeprazole (PriLOSEC) 20 mg delayed release capsule, TAKE 1 CAPSULE BY MOUTH TWICE A DAY, Disp: 60 capsule, Rfl: 0    prochlorperazine (COMPAZINE) 10 mg tablet, Take 1 tablet (10 mg total) by mouth every 6 (six) hours as needed for nausea or vomiting, Disp: 45 tablet, Rfl: 3  Allergies   Allergen Reactions    Betadine [Povidone Iodine] Rash     Also itching from betadine    Other Throat Swelling     Pt unsure which chemotherapy drug caused tongue swelling and locked jaw  Please review in epic notes Folfirinox or possible Neulasta  NEED to review     Vitals:    08/23/21 1053   BP: 132/68   Pulse: 70   Resp: 16   Temp: 98 1 °F (36 7 °C)   SpO2: 98%       Physical Exam  Constitutional:       Appearance: Normal appearance  HENT:      Head: Normocephalic and atraumatic  Right Ear: External ear normal       Nose: Nose normal    Eyes:      General: No scleral icterus  Extraocular Movements: Extraocular movements intact  Pupils: Pupils are equal, round, and reactive to light  Cardiovascular:      Rate and Rhythm: Normal rate and regular rhythm  Heart sounds: Normal heart sounds     Pulmonary:      Effort: Pulmonary effort is normal       Breath sounds: Normal breath sounds  Abdominal:      General: Abdomen is flat  Bowel sounds are normal  There is no distension  Palpations: Abdomen is soft  There is no mass  Tenderness: There is no abdominal tenderness  There is no guarding or rebound  Hernia: No hernia is present  Musculoskeletal:         General: Normal range of motion  Cervical back: Normal range of motion and neck supple  Skin:     General: Skin is warm and dry  Neurological:      General: No focal deficit present  Mental Status: She is alert and oriented to person, place, and time  Psychiatric:         Mood and Affect: Mood normal          Behavior: Behavior normal            Results:  Labs:   Ref Range & Units 8/12/21  7:50 AM   CA 19-9 0 - 35 U/mL 2         Imaging  CT chest abdomen pelvis w contrast    Result Date: 8/20/2021  Narrative: CT CHEST, ABDOMEN AND PELVIS WITH IV CONTRAST INDICATION:   C25 9: Malignant neoplasm of pancreas, unspecified  42-year-old female with history of pancreatic cancer, status post Whipple procedure, chemotherapy and adjuvant chemoradiation (completed on 1/26/2021)  COMPARISON:  CT of the chest, abdomen and pelvis from April 5, 2021 TECHNIQUE: CT examination of the chest, abdomen and pelvis was performed  Axial, sagittal, and coronal 2D reformatted images were created from the source data and submitted for interpretation  Radiation dose length product (DLP) for this visit:  1220 mGy-cm   This examination, like all CT scans performed in the [facility], was performed utilizing techniques to minimize radiation dose exposure, including the use of iterative reconstruction and automated exposure control  IV Contrast:  100 mL of iohexol (OMNIPAQUE) Enteric Contrast: Enteric contrast was administered  FINDINGS: CHEST LUNGS:  Lungs are clear  There is no tracheal or endobronchial lesion  PLEURA:  Unremarkable   HEART/GREAT VESSELS:  Unremarkable for patient's age  Tip of Port-A-Cath near cavoatrial junction  MEDIASTINUM AND SANDRA: No lymphadenopathy or mass  Diffuse mural thickening of the esophagus, consistent with esophagitis, similar in appearance to the last CT  Trachea and main stem bronchi normal  CHEST WALL AND LOWER NECK:   Unremarkable  ABDOMEN LIVER/BILIARY TREE:  Liver unremarkable  Previously demonstrated small water attenuation structure in the right lobe no longer evident Small amount of postoperative pneumobilia  Bile ducts normal in caliber  GALLBLADDER:  Postcholecystectomy  SPLEEN:  Unremarkable  PANCREAS:  Post Whipple procedure  Decrease in the extent of increased attenuation of the fat at the surgical bed, consistent with resolving postoperative/post radiation edema  No evidence of local tumor recurrence  Remaining pancreas unremarkable  Small amount of postoperative air in the remaining downstream portion of a nondilated main pancreatic duct  ADRENAL GLANDS:  Unremarkable  KIDNEYS/URETERS:  Unremarkable  No hydronephrosis  STOMACH AND BOWEL:  Postoperative changes in the stomach and proximal small intestine  Otherwise unremarkable  APPENDIX:  Normal  ABDOMINOPELVIC CAVITY: No lymphadenopathy or mass  No ascites or discrete fluid collection  No extraluminal gas  VESSELS:  Unremarkable for patient's age  PELVIS REPRODUCTIVE ORGANS:  Obscured by artifact from bilateral hip prostheses  URINARY BLADDER:  Obscured by artifact from bilateral hip prostheses  ABDOMINAL WALL/INGUINAL REGIONS:  Unremarkable  OSSEOUS STRUCTURES:  Bilateral hip arthroplasties  No evidence of destructive lesion or recent fracture  Impression: 1  Status post Whipple procedure with resolving postradiation edema in the surgical bed  2   Diffuse esophageal thickening, most likely due to esophagitis, similar to the CT from 4/5/2021  3   No evidence of local recurrence of pancreatic cancer   4   No evidence of metastases in the chest, abdomen, pelvis or included portions of the skeleton  Workstation performed: [id number]     I reviewed the above laboratory and imaging data  Discussion/Summary:  64-year-old woman, history of stage II pancreas cancer, presently and ED  Plan on follow-up in 6 months with scan and blood work at that time for surveillance

## 2021-08-25 ENCOUNTER — PATIENT OUTREACH (OUTPATIENT)
Dept: HEMATOLOGY ONCOLOGY | Facility: CLINIC | Age: 66
End: 2021-08-25

## 2021-08-25 ENCOUNTER — HOSPITAL ENCOUNTER (OUTPATIENT)
Dept: INFUSION CENTER | Facility: CLINIC | Age: 66
Discharge: HOME | End: 2021-08-25
Payer: MEDICARE

## 2021-08-25 VITALS — TEMPERATURE: 96.4 F

## 2021-08-25 DIAGNOSIS — Z95.828 PORT-A-CATH IN PLACE: Primary | ICD-10-CM

## 2021-08-25 PROCEDURE — 96523 IRRIG DRUG DELIVERY DEVICE: CPT

## 2021-08-25 NOTE — PROGRESS NOTES
MSW received a Distress Thermometer from Surg Onc, where the pt self scored a 5 to indicate her level of stress  The pt marked off family health issues, fears, nervousness and worry as her psychosocial stressors  She indicated 1 out of 21 physical stressors  MSW made outreach to the pt this day and received her voicemail  A detailed message was left, along with a contact number and the pt was encouraged to return the call

## 2021-08-26 ENCOUNTER — PATIENT OUTREACH (OUTPATIENT)
Dept: HEMATOLOGY ONCOLOGY | Facility: CLINIC | Age: 66
End: 2021-08-26

## 2021-08-26 NOTE — PROGRESS NOTES
Biopsychosocial and Barriers Assessment    Cancer Diagnosis: Pancreatic  Home/Cell Phone: [phone]  Emergency Contact: Andrea-Spouse  Marital Status:   Interpretation concerns, speaks another language, preferred language: Angolan  Cultural concerns: N/A  Ability to read or write: Yes    Caregiver/Support: Pt describes herself as having good support  Children: 2 children, a son who lives 25 minutes away and a daughter in [place]  Child/Elder care: The pt shared how her  has cardiac issues  He had an ablation to his heart the same day that the pt started her first radiation treatment  He has A-fib and wears a life vest, which prohibits him from driving  The pt has to do all of the driving, which she states has been difficult for her  MSW spoke about [employer] and explained the program   At this time, she is not interested but will keep the information should she change her mind  Housing: Lives in a 2 story home  Home Setup: Reports no issues getting around  Lives With: Spouse  Daily Living Activities: Enjoys crafts  Durable Medical Equipment: No need at this time  Ambulation: Ambulates independently    [facility]erred [facility]acy[facility] co-pays with insurance: N/A  High co-pays with medication coverage: N/A  No medication coverage: N/A    Primary Care Provider: Angelia Branch  Hx of [address]: Yes after her whipple  Hx of Short term rehab: N/A  Mental Health Hx: N/A  Substance Abuse Hx: N/A  Employment: Retired   Ability to pay bills: No issues  POA/LW/AD: Yes  Transportation Plan/Concerns: See above      What do you know about your Cancer Diagnosis    What has your doctor told you about your cancer diagnosis: When the pt learned she had pancreatic cancer, she was immediately frightened  She explained how the Doctor assured her this was curable  The pt had a whipple procedure, chemo and radiation and has been cancer free       What has your doctor told you about your cancer treatment:  The pt is aware that she will have to go for routine scans and she has a level of anxiety about this  She does try to keep this in perspective and not worry about it all the time  She feels as though she has gotten better about this  Her mary and family are her strengths  What specific concerns do you have about your diagnosis and treatment:  The pt remains concerned about recurrence but through this conversation, appears to have that concern in a healthy perspective  Do you have an understanding of clinical trials: N/A    Hair Loss:    Additional Comments: The pt shared that the past yeas was challenging with receiving her diagnosis, going through treatment, having surgery, following up with treatment, her 's health issues and then COVID  Additionally, she explained how 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

## 2021-09-04 DIAGNOSIS — K21.9 GASTROESOPHAGEAL REFLUX DISEASE WITHOUT ESOPHAGITIS: ICD-10-CM

## 2021-09-07 RX ORDER — OMEPRAZOLE 20 MG/1
CAPSULE, DELAYED RELEASE ORAL
Qty: 60 CAPSULE | Refills: 0 | Status: SHIPPED | OUTPATIENT
Start: 2021-09-07 | End: 2021-10-01

## 2021-10-01 DIAGNOSIS — K21.9 GASTROESOPHAGEAL REFLUX DISEASE WITHOUT ESOPHAGITIS: ICD-10-CM

## 2021-10-01 RX ORDER — OMEPRAZOLE 20 MG/1
CAPSULE, DELAYED RELEASE ORAL
Qty: 60 CAPSULE | Refills: 0 | Status: SHIPPED | OUTPATIENT
Start: 2021-10-01 | End: 2021-10-28

## 2021-10-11 ENCOUNTER — APPOINTMENT (OUTPATIENT)
Dept: LAB | Facility: HOSPITAL | Age: 66
End: 2021-10-11
Payer: MEDICARE

## 2021-10-11 DIAGNOSIS — E53.8 VITAMIN B12 DEFICIENCY (NON ANEMIC): ICD-10-CM

## 2021-10-11 DIAGNOSIS — E11.9 TYPE 2 DIABETES MELLITUS WITHOUT COMPLICATION, WITHOUT LONG-TERM CURRENT USE OF INSULIN (HCC): Primary | ICD-10-CM

## 2021-10-11 DIAGNOSIS — C25.0 MALIGNANT NEOPLASM OF HEAD OF PANCREAS (HCC): ICD-10-CM

## 2021-10-11 LAB
25(OH)D3 SERPL-MCNC: 29.1 NG/ML (ref 30–100)
ALBUMIN SERPL BCP-MCNC: 3.9 G/DL (ref 3.5–5)
ALP SERPL-CCNC: 146 U/L (ref 46–116)
ALT SERPL W P-5'-P-CCNC: 34 U/L (ref 12–78)
ANION GAP SERPL CALCULATED.3IONS-SCNC: 9 MMOL/L (ref 4–13)
AST SERPL W P-5'-P-CCNC: 26 U/L (ref 5–45)
BASOPHILS # BLD AUTO: 0.02 THOUSANDS/ΜL (ref 0–0.1)
BASOPHILS NFR BLD AUTO: 1 % (ref 0–1)
BILIRUB SERPL-MCNC: 0.59 MG/DL (ref 0.2–1)
BUN SERPL-MCNC: 9 MG/DL (ref 5–25)
CALCIUM SERPL-MCNC: 9.2 MG/DL (ref 8.3–10.1)
CHLORIDE SERPL-SCNC: 104 MMOL/L (ref 100–108)
CHOLEST SERPL-MCNC: 183 MG/DL (ref 50–200)
CO2 SERPL-SCNC: 30 MMOL/L (ref 21–32)
CREAT SERPL-MCNC: 1.09 MG/DL (ref 0.6–1.3)
EOSINOPHIL # BLD AUTO: 0.08 THOUSAND/ΜL (ref 0–0.61)
EOSINOPHIL NFR BLD AUTO: 2 % (ref 0–6)
ERYTHROCYTE [DISTWIDTH] IN BLOOD BY AUTOMATED COUNT: 12.7 % (ref 11.6–15.1)
EST. AVERAGE GLUCOSE BLD GHB EST-MCNC: 134 MG/DL
GFR SERPL CREATININE-BSD FRML MDRD: 52 ML/MIN/1.73SQ M
GLUCOSE P FAST SERPL-MCNC: 120 MG/DL (ref 65–99)
HBA1C MFR BLD: 6.3 %
HCT VFR BLD AUTO: 48.5 % (ref 34.8–46.1)
HDLC SERPL-MCNC: 36 MG/DL
HGB BLD-MCNC: 15.9 G/DL (ref 11.5–15.4)
IMM GRANULOCYTES # BLD AUTO: 0.02 THOUSAND/UL (ref 0–0.2)
IMM GRANULOCYTES NFR BLD AUTO: 1 % (ref 0–2)
LDLC SERPL CALC-MCNC: 67 MG/DL (ref 0–100)
LYMPHOCYTES # BLD AUTO: 0.75 THOUSANDS/ΜL (ref 0.6–4.47)
LYMPHOCYTES NFR BLD AUTO: 17 % (ref 14–44)
MCH RBC QN AUTO: 31.6 PG (ref 26.8–34.3)
MCHC RBC AUTO-ENTMCNC: 32.8 G/DL (ref 31.4–37.4)
MCV RBC AUTO: 96 FL (ref 82–98)
MONOCYTES # BLD AUTO: 0.38 THOUSAND/ΜL (ref 0.17–1.22)
MONOCYTES NFR BLD AUTO: 9 % (ref 4–12)
NEUTROPHILS # BLD AUTO: 3.15 THOUSANDS/ΜL (ref 1.85–7.62)
NEUTS SEG NFR BLD AUTO: 70 % (ref 43–75)
NONHDLC SERPL-MCNC: 147 MG/DL
NRBC BLD AUTO-RTO: 0 /100 WBCS
PLATELET # BLD AUTO: 185 THOUSANDS/UL (ref 149–390)
PMV BLD AUTO: 10.3 FL (ref 8.9–12.7)
POTASSIUM SERPL-SCNC: 3.6 MMOL/L (ref 3.5–5.3)
PROT SERPL-MCNC: 7.4 G/DL (ref 6.4–8.2)
RBC # BLD AUTO: 5.03 MILLION/UL (ref 3.81–5.12)
SODIUM SERPL-SCNC: 143 MMOL/L (ref 136–145)
TRIGL SERPL-MCNC: 398 MG/DL
TSH SERPL DL<=0.05 MIU/L-ACNC: 2.19 UIU/ML (ref 0.36–3.74)
VIT B12 SERPL-MCNC: 345 PG/ML (ref 100–900)
WBC # BLD AUTO: 4.4 THOUSAND/UL (ref 4.31–10.16)

## 2021-10-11 PROCEDURE — 82306 VITAMIN D 25 HYDROXY: CPT

## 2021-10-11 PROCEDURE — 80061 LIPID PANEL: CPT

## 2021-10-11 PROCEDURE — 82607 VITAMIN B-12: CPT

## 2021-10-11 PROCEDURE — 83036 HEMOGLOBIN GLYCOSYLATED A1C: CPT

## 2021-10-11 PROCEDURE — 36415 COLL VENOUS BLD VENIPUNCTURE: CPT

## 2021-10-11 PROCEDURE — 80053 COMPREHEN METABOLIC PANEL: CPT

## 2021-10-11 PROCEDURE — 85025 COMPLETE CBC W/AUTO DIFF WBC: CPT

## 2021-10-11 PROCEDURE — 84443 ASSAY THYROID STIM HORMONE: CPT

## 2021-10-15 ENCOUNTER — HOSPITAL ENCOUNTER (OUTPATIENT)
Dept: INFUSION CENTER | Facility: CLINIC | Age: 66
Discharge: HOME | End: 2021-10-15
Payer: MEDICARE

## 2021-10-15 ENCOUNTER — HOSPITAL ENCOUNTER (OUTPATIENT)
Dept: MAMMOGRAPHY | Facility: CLINIC | Age: 66
Discharge: HOME | End: 2021-10-15
Payer: MEDICARE

## 2021-10-15 VITALS — HEIGHT: 59 IN | WEIGHT: 102 LBS | BODY MASS INDEX: 20.56 KG/M2

## 2021-10-15 DIAGNOSIS — Z95.828 PORT-A-CATH IN PLACE: Primary | ICD-10-CM

## 2021-10-15 DIAGNOSIS — Z12.31 ENCOUNTER FOR SCREENING MAMMOGRAM FOR MALIGNANT NEOPLASM OF BREAST: ICD-10-CM

## 2021-10-15 PROCEDURE — 77063 BREAST TOMOSYNTHESIS BI: CPT

## 2021-10-15 PROCEDURE — 77067 SCR MAMMO BI INCL CAD: CPT

## 2021-10-28 ENCOUNTER — OFFICE VISIT (OUTPATIENT)
Dept: GASTROENTEROLOGY | Facility: CLINIC | Age: 66
End: 2021-10-28
Payer: MEDICARE

## 2021-10-28 VITALS
SYSTOLIC BLOOD PRESSURE: 122 MMHG | HEART RATE: 72 BPM | HEIGHT: 59 IN | WEIGHT: 103.6 LBS | DIASTOLIC BLOOD PRESSURE: 82 MMHG | BODY MASS INDEX: 20.88 KG/M2

## 2021-10-28 DIAGNOSIS — K21.9 GASTROESOPHAGEAL REFLUX DISEASE WITHOUT ESOPHAGITIS: ICD-10-CM

## 2021-10-28 DIAGNOSIS — K59.1 FUNCTIONAL DIARRHEA: ICD-10-CM

## 2021-10-28 DIAGNOSIS — K21.9 GASTROESOPHAGEAL REFLUX DISEASE WITHOUT ESOPHAGITIS: Primary | ICD-10-CM

## 2021-10-28 PROCEDURE — 99213 OFFICE O/P EST LOW 20 MIN: CPT | Performed by: PHYSICIAN ASSISTANT

## 2021-10-28 RX ORDER — OMEPRAZOLE 20 MG/1
CAPSULE, DELAYED RELEASE ORAL
Qty: 60 CAPSULE | Refills: 0 | Status: SHIPPED | OUTPATIENT
Start: 2021-10-28 | End: 2021-12-01

## 2021-11-24 ENCOUNTER — HOSPITAL ENCOUNTER (OUTPATIENT)
Dept: INFUSION CENTER | Facility: CLINIC | Age: 66
Discharge: HOME | End: 2021-11-24
Payer: MEDICARE

## 2021-11-24 DIAGNOSIS — Z95.828 PORT-A-CATH IN PLACE: Primary | ICD-10-CM

## 2021-11-24 PROCEDURE — 96523 IRRIG DRUG DELIVERY DEVICE: CPT

## 2021-11-30 DIAGNOSIS — K21.9 GASTROESOPHAGEAL REFLUX DISEASE WITHOUT ESOPHAGITIS: ICD-10-CM

## 2021-12-01 ENCOUNTER — IMMUNIZATIONS (OUTPATIENT)
Dept: FAMILY MEDICINE CLINIC | Facility: HOSPITAL | Age: 66
End: 2021-12-01

## 2021-12-01 DIAGNOSIS — Z23 ENCOUNTER FOR IMMUNIZATION: Primary | ICD-10-CM

## 2021-12-01 PROCEDURE — 0064A COVID-19 MODERNA VACC 0.25 ML BOOSTER: CPT

## 2021-12-01 PROCEDURE — 91306 COVID-19 MODERNA VACC 0.25 ML BOOSTER: CPT

## 2021-12-01 RX ORDER — OMEPRAZOLE 20 MG/1
CAPSULE, DELAYED RELEASE ORAL
Qty: 60 CAPSULE | Refills: 0 | Status: SHIPPED | OUTPATIENT
Start: 2021-12-01 | End: 2021-12-30

## 2021-12-10 ENCOUNTER — TELEPHONE (OUTPATIENT)
Dept: HEMATOLOGY ONCOLOGY | Facility: HOSPITAL | Age: 66
End: 2021-12-10

## 2021-12-11 ENCOUNTER — APPOINTMENT (OUTPATIENT)
Dept: LAB | Facility: HOSPITAL | Age: 66
End: 2021-12-11
Payer: MEDICARE

## 2021-12-11 DIAGNOSIS — D70.1 CHEMOTHERAPY INDUCED NEUTROPENIA (HCC): ICD-10-CM

## 2021-12-11 DIAGNOSIS — C25.9 PANCREATIC ADENOCARCINOMA (HCC): ICD-10-CM

## 2021-12-11 DIAGNOSIS — T45.1X5A CHEMOTHERAPY INDUCED NEUTROPENIA (HCC): ICD-10-CM

## 2021-12-11 LAB
ALBUMIN SERPL BCP-MCNC: 3.8 G/DL (ref 3.5–5)
ALP SERPL-CCNC: 128 U/L (ref 46–116)
ALT SERPL W P-5'-P-CCNC: 32 U/L (ref 12–78)
ANION GAP SERPL CALCULATED.3IONS-SCNC: 8 MMOL/L (ref 4–13)
AST SERPL W P-5'-P-CCNC: 31 U/L (ref 5–45)
BASOPHILS # BLD AUTO: 0.03 THOUSANDS/ΜL (ref 0–0.1)
BASOPHILS NFR BLD AUTO: 1 % (ref 0–1)
BILIRUB SERPL-MCNC: 0.53 MG/DL (ref 0.2–1)
BUN SERPL-MCNC: 10 MG/DL (ref 5–25)
CALCIUM SERPL-MCNC: 9.2 MG/DL (ref 8.3–10.1)
CHLORIDE SERPL-SCNC: 103 MMOL/L (ref 100–108)
CO2 SERPL-SCNC: 32 MMOL/L (ref 21–32)
CREAT SERPL-MCNC: 1.08 MG/DL (ref 0.6–1.3)
EOSINOPHIL # BLD AUTO: 0.12 THOUSAND/ΜL (ref 0–0.61)
EOSINOPHIL NFR BLD AUTO: 3 % (ref 0–6)
ERYTHROCYTE [DISTWIDTH] IN BLOOD BY AUTOMATED COUNT: 12.4 % (ref 11.6–15.1)
GFR SERPL CREATININE-BSD FRML MDRD: 53 ML/MIN/1.73SQ M
GLUCOSE P FAST SERPL-MCNC: 147 MG/DL (ref 65–99)
HCT VFR BLD AUTO: 46.9 % (ref 34.8–46.1)
HGB BLD-MCNC: 15.8 G/DL (ref 11.5–15.4)
IMM GRANULOCYTES # BLD AUTO: 0.03 THOUSAND/UL (ref 0–0.2)
IMM GRANULOCYTES NFR BLD AUTO: 1 % (ref 0–2)
LYMPHOCYTES # BLD AUTO: 1.18 THOUSANDS/ΜL (ref 0.6–4.47)
LYMPHOCYTES NFR BLD AUTO: 24 % (ref 14–44)
MCH RBC QN AUTO: 32.2 PG (ref 26.8–34.3)
MCHC RBC AUTO-ENTMCNC: 33.7 G/DL (ref 31.4–37.4)
MCV RBC AUTO: 96 FL (ref 82–98)
MONOCYTES # BLD AUTO: 0.55 THOUSAND/ΜL (ref 0.17–1.22)
MONOCYTES NFR BLD AUTO: 11 % (ref 4–12)
NEUTROPHILS # BLD AUTO: 2.97 THOUSANDS/ΜL (ref 1.85–7.62)
NEUTS SEG NFR BLD AUTO: 60 % (ref 43–75)
NRBC BLD AUTO-RTO: 0 /100 WBCS
PLATELET # BLD AUTO: 209 THOUSANDS/UL (ref 149–390)
PMV BLD AUTO: 10 FL (ref 8.9–12.7)
POTASSIUM SERPL-SCNC: 3.6 MMOL/L (ref 3.5–5.3)
PROT SERPL-MCNC: 7.2 G/DL (ref 6.4–8.2)
RBC # BLD AUTO: 4.9 MILLION/UL (ref 3.81–5.12)
SODIUM SERPL-SCNC: 143 MMOL/L (ref 136–145)
WBC # BLD AUTO: 4.88 THOUSAND/UL (ref 4.31–10.16)

## 2021-12-11 PROCEDURE — 80053 COMPREHEN METABOLIC PANEL: CPT

## 2021-12-11 PROCEDURE — 36415 COLL VENOUS BLD VENIPUNCTURE: CPT

## 2021-12-11 PROCEDURE — 86301 IMMUNOASSAY TUMOR CA 19-9: CPT

## 2021-12-11 PROCEDURE — 85025 COMPLETE CBC W/AUTO DIFF WBC: CPT

## 2021-12-12 LAB — CANCER AG19-9 SERPL-ACNC: 3 U/ML (ref 0–35)

## 2021-12-14 ENCOUNTER — OFFICE VISIT (OUTPATIENT)
Dept: HEMATOLOGY ONCOLOGY | Facility: CLINIC | Age: 66
End: 2021-12-14
Payer: MEDICARE

## 2021-12-14 VITALS
SYSTOLIC BLOOD PRESSURE: 120 MMHG | HEIGHT: 59 IN | RESPIRATION RATE: 14 BRPM | HEART RATE: 67 BPM | WEIGHT: 107 LBS | BODY MASS INDEX: 21.57 KG/M2 | OXYGEN SATURATION: 99 % | TEMPERATURE: 97.6 F | DIASTOLIC BLOOD PRESSURE: 82 MMHG

## 2021-12-14 DIAGNOSIS — C25.9 PANCREATIC ADENOCARCINOMA (HCC): Primary | ICD-10-CM

## 2021-12-14 DIAGNOSIS — F41.9 ANXIETY: ICD-10-CM

## 2021-12-14 PROCEDURE — 99214 OFFICE O/P EST MOD 30 MIN: CPT | Performed by: INTERNAL MEDICINE

## 2021-12-14 RX ORDER — PROCHLORPERAZINE MALEATE 10 MG
10 TABLET ORAL EVERY 6 HOURS PRN
Qty: 45 TABLET | Refills: 3 | Status: SHIPPED | OUTPATIENT
Start: 2021-12-14

## 2021-12-14 RX ORDER — MIRTAZAPINE 15 MG/1
15 TABLET, FILM COATED ORAL
Qty: 90 TABLET | Refills: 3 | Status: SHIPPED | OUTPATIENT
Start: 2021-12-14

## 2021-12-25 NOTE — PROGRESS NOTES
Patient tolerated the rest of her irinotecan infusion and chemotherapy push without incident  Patient CADD pump connected, running and flashing green prior to discharge  Patient provided AVS  Ambulated off unit in no signs of distress 
Pt to clinic for FOLFIRINOX, about 1 hour and 3 minutes into irinotecan pt c/o throat and jaw tightening, difficulty swallowing, and tingling in fingers, no SOB, VSS, denies itchiness, infusion stopped and Dr Jluis Tavarez office called, spoke with Starla Garay RN regarding pt's s/s, Tricia Nava stated she would place an order for IV Benadryl and we should wait 30 minutes and see how pt feels, right after hanging up the phone pt c/o chest tightness, no SOB, VSS, palms slightly reddened, hypersensitivity protocol initiated, pt stated chest tightness went away at 1500 and throat and jaw tightness were improving, palm redness resolved, spoke with Tricia Nava again to update on pt's status, Tricia Nava stated per Dr Adan Calvin we are to give remainder of irinotecan over 1 hour and run it concurrently with Yudith Membreno, pt currently tolerated remainder of irinotecan, will continue to monitor 
normal bilaterally

## 2021-12-30 DIAGNOSIS — K21.9 GASTROESOPHAGEAL REFLUX DISEASE WITHOUT ESOPHAGITIS: ICD-10-CM

## 2021-12-30 RX ORDER — OMEPRAZOLE 20 MG/1
CAPSULE, DELAYED RELEASE ORAL
Qty: 60 CAPSULE | Refills: 0 | Status: SHIPPED | OUTPATIENT
Start: 2021-12-30 | End: 2022-01-05

## 2022-01-05 ENCOUNTER — HOSPITAL ENCOUNTER (OUTPATIENT)
Dept: INFUSION CENTER | Facility: CLINIC | Age: 67
Discharge: HOME | End: 2022-01-05
Payer: MEDICARE

## 2022-01-05 VITALS — TEMPERATURE: 96.9 F

## 2022-01-05 DIAGNOSIS — K21.9 GASTROESOPHAGEAL REFLUX DISEASE WITHOUT ESOPHAGITIS: ICD-10-CM

## 2022-01-05 DIAGNOSIS — Z95.828 PORT-A-CATH IN PLACE: Primary | ICD-10-CM

## 2022-01-05 PROCEDURE — 96523 IRRIG DRUG DELIVERY DEVICE: CPT

## 2022-01-05 RX ORDER — OMEPRAZOLE 20 MG/1
CAPSULE, DELAYED RELEASE ORAL
Qty: 180 CAPSULE | Refills: 1 | Status: SHIPPED | OUTPATIENT
Start: 2022-01-05 | End: 2022-07-02

## 2022-01-05 NOTE — PROGRESS NOTES
Pt here for port flush  Port accessed with positive blood return noted, flushed and de-accessed without incident  AVS declined  Toya Joshua out in stable condition

## 2022-02-17 ENCOUNTER — APPOINTMENT (OUTPATIENT)
Dept: LAB | Facility: HOSPITAL | Age: 67
End: 2022-02-17
Payer: MEDICARE

## 2022-02-17 DIAGNOSIS — Z85.07 ENCOUNTER FOR FOLLOW-UP SURVEILLANCE OF PANCREATIC CANCER: ICD-10-CM

## 2022-02-17 DIAGNOSIS — Z08 ENCOUNTER FOR FOLLOW-UP SURVEILLANCE OF PANCREATIC CANCER: ICD-10-CM

## 2022-02-17 LAB
ANION GAP SERPL CALCULATED.3IONS-SCNC: 6 MMOL/L (ref 4–13)
BUN SERPL-MCNC: 11 MG/DL (ref 5–25)
CALCIUM SERPL-MCNC: 9.3 MG/DL (ref 8.3–10.1)
CHLORIDE SERPL-SCNC: 101 MMOL/L (ref 100–108)
CO2 SERPL-SCNC: 31 MMOL/L (ref 21–32)
CREAT SERPL-MCNC: 1.26 MG/DL (ref 0.6–1.3)
GFR SERPL CREATININE-BSD FRML MDRD: 43 ML/MIN/1.73SQ M
GLUCOSE P FAST SERPL-MCNC: 146 MG/DL (ref 65–99)
POTASSIUM SERPL-SCNC: 4 MMOL/L (ref 3.5–5.3)
SODIUM SERPL-SCNC: 138 MMOL/L (ref 136–145)

## 2022-02-17 PROCEDURE — 36415 COLL VENOUS BLD VENIPUNCTURE: CPT

## 2022-02-17 PROCEDURE — 80048 BASIC METABOLIC PNL TOTAL CA: CPT

## 2022-02-17 PROCEDURE — 86301 IMMUNOASSAY TUMOR CA 19-9: CPT

## 2022-02-18 ENCOUNTER — CLINICAL SUPPORT (OUTPATIENT)
Dept: RADIATION ONCOLOGY | Facility: CLINIC | Age: 67
End: 2022-02-18
Attending: RADIOLOGY
Payer: MEDICARE

## 2022-02-18 VITALS
DIASTOLIC BLOOD PRESSURE: 78 MMHG | WEIGHT: 106 LBS | TEMPERATURE: 97.2 F | BODY MASS INDEX: 21.41 KG/M2 | SYSTOLIC BLOOD PRESSURE: 130 MMHG | OXYGEN SATURATION: 100 % | HEART RATE: 77 BPM | RESPIRATION RATE: 16 BRPM

## 2022-02-18 DIAGNOSIS — Z85.07 HISTORY OF PANCREATIC CANCER: Primary | ICD-10-CM

## 2022-02-18 LAB — CANCER AG19-9 SERPL-ACNC: <2 U/ML (ref 0–35)

## 2022-02-18 PROCEDURE — 99211 OFF/OP EST MAY X REQ PHY/QHP: CPT | Performed by: RADIOLOGY

## 2022-02-18 PROCEDURE — 99213 OFFICE O/P EST LOW 20 MIN: CPT | Performed by: RADIOLOGY

## 2022-02-18 NOTE — PROGRESS NOTES
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 status post Whipple resection on 10/20/2020  She completed a course of adjuvant chemo radiation 1/26/21   She returns for follow-up evaluation        The patient states that overall she feels that "I am making the transition from sick person to healthy person"  She notes improved energy and activity level  She has a good appetite  She has chronic digestion issues a she is intolerant to Creon enzymes  She averages 3-4 loose bowel movements a day and has frequent oily stools  This has been stable since her last visit in August and she manages her symptoms with diet  She has increased gas and bloating, but this is stable to improved  She takes fiber and probiotics and follows GI for management of her symptoms  She has occasional nausea controlled with compazine, but no vomiting  She denies abdominal pain, rectal bleeding, shortness of breath, or cough  She has increased fasting glucose levels for which she takes medication as well as practicing diet modification  2/23/22 CT is scheduled  2/17/22 CA 19-9 <2    Upcoming:  3/8/21 Dr Yeni Sigala, Surgical Oncology  6/14/22 Dr Sharon Boxer, Medical Oncology      Historical Information   Oncology History   History of pancreatic cancer   6/2/2020 Biopsy    EUS- Dr STOREY [facility] [place]:  Pancreas, uncinate mass:      - Malignant ([facility] Category VI)      - Compatible with adenocarcinoma with mucinous features       6/30/2020 - 9/16/2020 Chemotherapy    fluorouracil (ADRUCIL) injection 585 mg, 400 mg/m2 = 585 mg, Intravenous, Once, 7 of 12 cycles  Administration: 585 mg (6/30/2020), 585 mg (7/14/2020), 585 mg (7/28/2020), 585 mg (8/11/2020), 585 mg (9/8/2020), 585 mg (8/25/2020)  pegfilgrastim (NEULASTA ONPRO) subcutaneous injection kit 6 mg, 6 mg, Subcutaneous, Once, 7 of 12 cycles  Administration: 6 mg (7/2/2020), 6 mg (7/16/2020), 6 mg (7/30/2020), 6 mg (8/13/2020), 6 mg (8/27/2020), 6 mg (9/10/2020)  fosaprepitant (EMEND) 150 mg in sodium chloride 0 9 % 250 mL IVPB, 150 mg, Intravenous, Once, 7 of 12 cycles  Administration: 150 mg (6/30/2020), 150 mg (7/14/2020), 150 mg (7/28/2020), 150 mg (8/11/2020), 150 mg (9/8/2020), 150 mg (8/25/2020)  irinotecan (CAMPTOSAR) 263 mg in dextrose 5 % 500 mL chemo infusion, 180 mg/m2 = 263 mg, Intravenous, Once, 3 of 3 cycles  Administration: 263 mg (6/30/2020), 263 mg (7/14/2020), 263 mg (7/28/2020)  leucovorin 584 mg in dextrose 5 % 250 mL IVPB, 400 mg/m2 = 584 mg (100 % of original dose 400 mg/m2), Intravenous, Once, 2 of 7 cycles  Dose modification: 400 mg/m2 (original dose 400 mg/m2, Cycle 6)  Administration: 584 mg (9/8/2020)  oxaliplatin (ELOXATIN) 124 1 mg in dextrose 5 % 250 mL chemo infusion, 85 mg/m2 = 124 1 mg, Intravenous, Once, 7 of 12 cycles  Administration: 124 1 mg (6/30/2020), 124 1 mg (7/14/2020), 124 1 mg (7/28/2020), 124 1 mg (8/11/2020), 124 1 mg (9/8/2020), 124 1 mg (8/25/2020)     10/20/2020 Surgery    Whipple:  - Residual invasive adenocarcinoma with mucinous features, 2 3 cm   - Metastatic carcinoma present in one of fourteen lymph nodes (1/14)  - All margins are negative for tumor          12/21/2020 - 1/24/2021 Chemotherapy    [No matching medication found in this treatment plan]     12/21/2020 - 1/26/2021 Radiation    5000 cGy in 25 fractions to high risk areas  Dr Avinash Snider     2/8/2021 - 4/4/2021 Chemotherapy    fluorouracil (ADRUCIL) injection 560 mg, 400 mg/m2 = 560 mg, Intravenous, Once, 4 of 4 cycles  Administration: 560 mg (2/8/2021), 560 mg (2/22/2021), 560 mg (3/8/2021), 560 mg (3/22/2021)  pegfilgrastim (NEULASTA ONPRO) subcutaneous injection kit 6 mg, 6 mg, Subcutaneous, Once, 4 of 4 cycles  Administration: 6 mg (2/10/2021), 6 mg (2021), 6 mg (3/10/2021), 6 mg (3/24/2021)  fosaprepitant (EMEND) 150 mg in sodium chloride 0 9 % 250 mL IVPB, 150 mg, Intravenous, Once, 4 of 4 cycles  Administration: 150 mg (2021), 150 mg (2021), 150 mg (3/8/2021), 150 mg (3/22/2021)  leucovorin 560 mg in dextrose 5 % 250 mL IVPB, 400 mg/m2 = 560 mg, Intravenous, Once, 4 of 4 cycles  Administration: 560 mg (2021), 560 mg (2021), 560 mg (3/8/2021), 560 mg (3/22/2021)  oxaliplatin (ELOXATIN) 119 mg in dextrose 5 % 250 mL chemo infusion, 85 mg/m2 = 119 mg, Intravenous, Once, 4 of 4 cycles  Administration: 119 mg (2021), 119 mg (2021), 119 mg (3/8/2021), 119 mg (3/22/2021)     Encounter for follow-up surveillance of pancreatic cancer   2021 Initial Diagnosis    Encounter for follow-up surveillance of pancreatic cancer         Past Medical History:   Diagnosis Date    BRCA1 negative     BRCA2 negative     Chemotherapy induced neutropenia ([facility] ) 2/10/2021    Diabetes mellitus ([facility] [facility]     Hypertension     Lactic acidosis 2020    Neuropathy     Obstructive jaundice 2020    Pancreas cancer ([facility] [address] )      Past Surgical History:   Procedure Laterality Date    BREAST BIOPSY Left 2009    neg    BREAST SURGERY Left     calcifications     SECTION      CHOLECYSTECTOMY N/A 10/20/2020    Procedure: CHOLECYSTECTOMY;  Surgeon: Francisco Javier Mercado MD;  [facility];  Service: Surgical Oncology    COLONOSCOPY      ECTOPIC PREGNANCY SURGERY      partial ovary removed    FL GUIDED CENTRAL VENOUS ACCESS DEVICE INSERTION  2020    JOINT REPLACEMENT Bilateral     LAPAROTOMY N/A 10/20/2020    Procedure: LAPAROTOMY EXPLORATORY; INTRAOPERATIVE ULTRASOUND;  Surgeon: Francisco Javier Mercado MD;  [facility];  Service: Surgical Oncology    MANDIBLE FRACTURE SURGERY  1972    NOSE SURGERY      deviated septum    REPLACEMENT TOTAL HIP W/  RESURFACING IMPLANTS Bilateral 2012    right hip done July 2012; left hip done September 2012    TONSILLECTOMY  1957    TUNNELED VENOUS PORT PLACEMENT Left 6/23/2020    Procedure: INSERTION VENOUS PORT (PORT-A-CATH), left;  Surgeon: Mónica Heath MD;  [facility];  Service: Surgical Oncology    WHIPPLE PROCEDURE/PANCREATICO-DUODENECTOMY N/A 10/20/2020    Procedure:  WHIPPLE PROCEDURE/PANCREATICO-DUODENECTOMY;  Surgeon: Mónica Heath MD;  [facility];  Service: Surgical Oncology       Social History   Social History     Substance and Sexual Activity   Alcohol Use Not Currently     Social History     Substance and Sexual Activity   Drug Use Never     Social History     Tobacco Use   Smoking Status Former Smoker   Smokeless Tobacco Never Used   Tobacco Comment    quit 40 years ago         Meds/Allergies     Current Outpatient Medications:     amLODIPine (NORVASC) 2 5 mg tablet, Take 2 5 mg by mouth daily, Disp: , Rfl:     dicyclomine (BENTYL) 20 mg tablet, TAKE 1 TABLET BY MOUTH TWICE A DAY, Disp: 180 tablet, Rfl: 1    diphenhydrAMINE (BENADRYL) 25 mg capsule, Take 25 mg by mouth every 6 (six) hours as needed for itching, Disp: , Rfl:     diphenoxylate-atropine (LOMOTIL) 2 5-0 025 mg per tablet, Take 1 tablet by mouth 4 (four) times a day as needed for diarrhea DO NOT EXCEED 4 DOSES IN 1 DAY, Disp: 30 tablet, Rfl: 0    Empagliflozin (Jardiance) 10 MG TABS, Take 10 mg by mouth every morning, Disp: , Rfl:     gabapentin (NEURONTIN) 300 mg capsule, Take 300 mg by mouth 3 (three) times a day, Disp: , Rfl:     LORazepam (ATIVAN) 0 5 mg tablet, Take 1 tablet (0 5 mg total) by mouth 2 (two) times a day as needed for anxiety NO FURTHER REFILLS WITHOUT CLINIC VISIT WITH PALLIATIVE CARE, Disp: 30 tablet, Rfl: 0    mirtazapine (REMERON) 15 mg tablet, Take 1 tablet (15 mg total) by mouth daily at bedtime, Disp: 90 tablet, Rfl: 3    omeprazole (PriLOSEC) 20 mg delayed release capsule, TAKE 1 CAPSULE BY MOUTH TWICE A DAY, Disp: 180 capsule, Rfl: 1    prochlorperazine (COMPAZINE) 10 mg tablet, Take 1 tablet (10 mg total) by mouth every 6 (six) hours as needed for nausea or vomiting, Disp: 45 tablet, Rfl: 3    hydrOXYzine HCL (ATARAX) 10 mg tablet, Take 1 tablet (10 mg total) by mouth every 8 (eight) hours as needed for itching (Patient not taking: Reported on 12/14/2021 ), Disp: 30 tablet, Rfl: 0  Allergies   Allergen Reactions    Betadine [Povidone Iodine] Rash     Also itching from betadine    Other Throat Swelling     Pt unsure which chemotherapy drug caused tongue swelling and locked jaw  Please review in epic notes Folfirinox or possible Neulasta  NEED to review         Review of Systems   Constitutional: Negative  HENT: Negative  Eyes: Negative  Respiratory: Negative  Cardiovascular: Negative  Gastrointestinal: Positive for diarrhea (with certain foods)  Negative for nausea  Loose and oily stools   Endocrine: Positive for cold intolerance  Genitourinary: Negative  Musculoskeletal: Negative  Skin: Negative  Allergic/Immunologic: Negative  Neurological: Negative  Hematological: Negative  Psychiatric/Behavioral: Negative  Negative for sleep disturbance  OBJECTIVE:   /78   Pulse 77   Temp (!) 97 2 °F (36 2 °C)   Resp 16   Wt 48 1 kg (106 lb)   SpO2 100%   BMI 21 41 kg/m²   Karnofsky: 80 - Normal activity with effort; some signs or symptoms of disease    Physical Exam  Vitals and nursing note reviewed  Constitutional:       General: She is not in acute distress  Appearance: She is well-developed  Eyes:      General: No scleral icterus  Cardiovascular:      Rate and Rhythm: Normal rate and regular rhythm  Heart sounds: No murmur heard  Pulmonary:      Effort: No respiratory distress  Breath sounds: No wheezing, rhonchi or rales  Chest:   Breasts:      Right: No supraclavicular adenopathy  Left: No supraclavicular adenopathy  Abdominal:      General: There is no distension  Palpations: Abdomen is soft  Tenderness: There is no abdominal tenderness  Musculoskeletal:      Right lower leg: No edema  Left lower leg: No edema  Lymphadenopathy:      Cervical: No cervical adenopathy  Upper Body:      Right upper body: No supraclavicular adenopathy  Left upper body: No supraclavicular adenopathy  Neurological:      Mental Status: She is alert and oriented to person, place, and time  Gait: Gait normal           Assessment/Plan:  Kyler Carpenter is a 74y o  year old female with a history of stage IIB, 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 status post Whipple resection on 10/20/2020  She completed adjuvant chemoradiation 1/26/21 and completed subsequent chemotherapy on 3/23/21  She is currently clinically SATISH  Repeat imaging is scheduled in the next 2 weeks with appointment with Dr Corey Canavan to follow  The patient suffers from some pancreatic dysfunction, but is overall managing her symptoms well  She is accepting of her new diet restrictions and continues to work to ensure adequate Nutrition  She is compliant with her oncology team and GI team follow-up appointments      I have asked that she return for follow-up in 6 months  We will see her sooner should the need arise  Betito Rodgers MD  2/18/2022,10:57 AM    Portions of the record may have been created with voice recognition software   Occasional wrong word or "sound a like" substitutions may have occurred due to the inherent limitations of voice recognition software   Read the chart carefully and recognize, using context, where substitutions have occurred

## 2022-02-18 NOTE — PROGRESS NOTES
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 status post Whipple resection on 10/20/2020  She completed a course of adjuvant chemo radiation 21  She returns for follow-up evaluation  She was last seen in follow up 21 Dr Cole Reyna, Surgical Oncology  Follow-up in 6 months with scan and blood work at that time for surveillance  21 Dr Georgina Nathan, Medical Oncology  Most recent imaging shows no evidence of metastatic disease  Follow up in 4 months with repeat imaging  Upcomin22 CT  3/8/21 Dr Cole Reyna, Surgical Oncology  22 Dr Georgina Nathan, Medical Oncology               Follow up visit     Oncology History   History of pancreatic cancer   2020 Biopsy    EUS- Dr Yang Rice:  Pancreas, uncinate mass:      - Malignant ([facility] Category VI)      - Compatible with adenocarcinoma with mucinous features       2020 - 2020 Chemotherapy    fluorouracil (ADRUCIL) injection 585 mg, 400 mg/m2 = 585 mg, Intravenous, Once, 7 of 12 cycles  Administration: 585 mg (2020), 585 mg (2020), 585 mg (2020), 585 mg (2020), 585 mg (2020), 585 mg (2020)  pegfilgrastim (NEULASTA ONPRO) subcutaneous injection kit 6 mg, 6 mg, Subcutaneous, Once, 7 of 12 cycles  Administration: 6 mg (2020), 6 mg (2020), 6 mg (2020), 6 mg (2020), 6 mg (2020), 6 mg (9/10/2020)  fosaprepitant (EMEND) 150 mg in sodium chloride 0 9 % 250 mL IVPB, 150 mg, Intravenous, Once, 7 of 12 cycles  Administration: 150 mg (2020), 150 mg (2020), 150 mg (2020), 150 mg (2020), 150 mg (2020), 150 mg (2020)  irinotecan (CAMPTOSAR) 263 mg in dextrose 5 % 500 mL chemo infusion, 180 mg/m2 = 263 mg, Intravenous, Once, 3 of 3 cycles  Administration: 263 mg (2020), 263 mg (2020), 263 mg (2020)  leucovorin 584 mg in dextrose 5 % 250 mL IVPB, 400 mg/m2 = 584 mg (100 % of original dose 400 mg/m2), Intravenous, Once, 2 of 7 cycles  Dose modification: 400 mg/m2 (original dose 400 mg/m2, Cycle 6)  Administration: 584 mg (9/8/2020)  oxaliplatin (ELOXATIN) 124 1 mg in dextrose 5 % 250 mL chemo infusion, 85 mg/m2 = 124 1 mg, Intravenous, Once, 7 of 12 cycles  Administration: 124 1 mg (6/30/2020), 124 1 mg (7/14/2020), 124 1 mg (7/28/2020), 124 1 mg (8/11/2020), 124 1 mg (9/8/2020), 124 1 mg (8/25/2020)     10/20/2020 Surgery    Whipple:  - Residual invasive adenocarcinoma with mucinous features, 2 3 cm   - Metastatic carcinoma present in one of fourteen lymph nodes (1/14)  - All margins are negative for tumor          12/21/2020 - 1/24/2021 Chemotherapy    [No matching medication found in this treatment plan]     12/21/2020 - 1/26/2021 Radiation    5000 cGy in 25 fractions to high risk areas  Dr Avinash Snider     2/8/2021 - 4/4/2021 Chemotherapy    fluorouracil (ADRUCIL) injection 560 mg, 400 mg/m2 = 560 mg, Intravenous, Once, 4 of 4 cycles  Administration: 560 mg (2/8/2021), 560 mg (2/22/2021), 560 mg (3/8/2021), 560 mg (3/22/2021)  pegfilgrastim (NEULASTA ONPRO) subcutaneous injection kit 6 mg, 6 mg, Subcutaneous, Once, 4 of 4 cycles  Administration: 6 mg (2/10/2021), 6 mg (2/24/2021), 6 mg (3/10/2021), 6 mg (3/24/2021)  fosaprepitant (EMEND) 150 mg in sodium chloride 0 9 % 250 mL IVPB, 150 mg, Intravenous, Once, 4 of 4 cycles  Administration: 150 mg (2/8/2021), 150 mg (2/22/2021), 150 mg (3/8/2021), 150 mg (3/22/2021)  leucovorin 560 mg in dextrose 5 % 250 mL IVPB, 400 mg/m2 = 560 mg, Intravenous, Once, 4 of 4 cycles  Administration: 560 mg (2/8/2021), 560 mg (2/22/2021), 560 mg (3/8/2021), 560 mg (3/22/2021)  oxaliplatin (ELOXATIN) 119 mg in dextrose 5 % 250 mL chemo infusion, 85 mg/m2 = 119 mg, Intravenous, Once, 4 of 4 cycles  Administration: 119 mg (2/8/2021), 119 mg (2/22/2021), 119 mg (3/8/2021), 119 mg (3/22/2021)     Encounter for follow-up surveillance of pancreatic cancer   8/18/2021 Initial Diagnosis    Encounter for follow-up surveillance of pancreatic cancer         Review of Systems:  Review of Systems   Constitutional: Negative  HENT: Negative  Eyes: Negative  Respiratory: Negative  Cardiovascular: Negative  Gastrointestinal: Positive for diarrhea (with certain foods)  Negative for nausea  Loose and oily stools   Endocrine: Positive for cold intolerance  Genitourinary: Negative  Musculoskeletal: Negative  Skin: Negative  Allergic/Immunologic: Negative  Neurological: Negative  Hematological: Negative  Psychiatric/Behavioral: Negative  Negative for sleep disturbance         Clinical Trial: no      Covid Vaccine Status yes + booster    Health Maintenance   Topic Date Due    Medicare Annual Wellness Visit (AWV)  Never done    Diabetic Foot Exam  Never done    DM Eye Exam  Never done    URINE MICROALBUMIN  Never done    Osteoporosis Screening  Never done    Colorectal Cancer Screening  Never done    Fall Risk  Never done    Depression Screening  11/23/2021    COVID-19 Vaccine (3 - Moderna risk 4-dose series) 12/29/2021    HEMOGLOBIN A1C  04/11/2022    Breast Cancer Screening: Mammogram  10/15/2022    BMI: Adult  12/14/2022    DTaP,Tdap,and Td Vaccines (2 - Td or Tdap) 10/31/2028    Hepatitis C Screening  Completed    Pneumococcal Vaccine: 65+ Years  Completed    Influenza Vaccine  Completed    HIB Vaccine  Aged Out    Hepatitis B Vaccine  Aged Out    IPV Vaccine  Aged Out    Hepatitis A Vaccine  Aged Out    Meningococcal ACWY Vaccine  Aged Out    HPV Vaccine  Aged Out     Patient Active Problem List   Diagnosis    Hyperlipidemia    Essential hypertension    Type 2 diabetes mellitus without complication, without long-term current use of insulin ([address] )    Pruritus    Transaminitis    History of pancreatic cancer    Port-A-Cath in place    Therapeutic opioid-induced constipation (OIC)    Anxiety    Functional diarrhea    Poor appetite    Chemotherapy induced neutropenia ([facility] [address] [facility]    Encounter for follow-up surveillance of pancreatic cancer    Secondary and unspecified malignant neoplasm of intra-abdominal lymph nodes ([facility] [address] )    Mild protein-calorie malnutrition ([facility] [address] )     Past Medical History:   Diagnosis Date    BRCA1 negative     BRCA2 negative     Chemotherapy induced neutropenia ([facility] [address] ) 2/10/2021    Diabetes mellitus ([facility] [address] )     Hypertension     Lactic acidosis 2020    Neuropathy     Obstructive jaundice 2020    Pancreas cancer ([facility] [address] )      Past Surgical History:   Procedure Laterality Date    BREAST BIOPSY Left 2009    neg    BREAST SURGERY Left     calcifications     SECTION  1983    CHOLECYSTECTOMY N/A 10/20/2020    Procedure: CHOLECYSTECTOMY;  Surgeon: Juan Ramires MD;  [facility];  Service: Surgical Oncology    COLONOSCOPY      ECTOPIC PREGNANCY SURGERY      partial ovary removed    [place] [id number]  2020    JOINT REPLACEMENT Bilateral     LAPAROTOMY N/A 10/20/2020    Procedure: LAPAROTOMY EXPLORATORY; INTRAOPERATIVE ULTRASOUND;  Surgeon: Juan Ramires MD;  [facility];  Service: Surgical Oncology    [address] [place] [address]    NOSE SURGERY      deviated septum    REPLACEMENT TOTAL HIP W/  RESURFACING IMPLANTS Bilateral     right hip done 2012; left hip done 2012    TONSILLECTOMY      TUNNELED VENOUS PORT PLACEMENT Left 2020    Procedure: INSERTION VENOUS PORT (PORT-A-CATH), left;  Surgeon: Juan Ramires MD;  [facility];  Service: Surgical Oncology    WHIPPLE PROCEDURE/PANCREATICO-DUODENECTOMY N/A 10/20/2020    Procedure:  WHIPPLE PROCEDURE/PANCREATICO-DUODENECTOMY;  Surgeon: Juan Ramires MD;  [facility];  Service: Surgical Oncology     Family History   Problem Relation Age of Onset    Diabetes Mother     Heart disease Mother     Heart disease Father     Breast cancer additional onset Sister    Horacio Pearson Breast cancer Sister 47    Breast cancer additional onset Maternal Aunt     Breast cancer Maternal Aunt     Stroke Maternal Grandfather      Social History     Socioeconomic History    Marital status: /Civil Union     Spouse name: Not on file    Number of children: Not on file    Years of education: Not on file    Highest education level: Not on file   Occupational History    Not on file   Tobacco Use    Smoking status: Former Smoker    Smokeless tobacco: Never Used    Tobacco comment: quit 40 years ago   Vaping Use    Vaping Use: Never used   Substance and Sexual Activity    Alcohol use: Not Currently    Drug use: Never    Sexual activity: Not Currently   Other Topics Concern    Not on file   Social History Narrative    Not on file     Social Determinants of Health     Financial Resource Strain: Not on file   Food Insecurity: Not on file   Transportation Needs: Not on file   Physical Activity: Not on file   Stress: Not on file   Social Connections: Not on file   Intimate Partner Violence: Not on file   Housing Stability: Not on file       Current Outpatient Medications:     amLODIPine (NORVASC) 2 5 mg tablet, Take 2 5 mg by mouth daily, Disp: , Rfl:     dicyclomine (BENTYL) 20 mg tablet, TAKE 1 TABLET BY MOUTH TWICE A DAY, Disp: 180 tablet, Rfl: 1    diphenhydrAMINE (BENADRYL) 25 mg capsule, Take 25 mg by mouth every 6 (six) hours as needed for itching, Disp: , Rfl:     diphenoxylate-atropine (LOMOTIL) 2 5-0 025 mg per tablet, Take 1 tablet by mouth 4 (four) times a day as needed for diarrhea DO NOT EXCEED 4 DOSES IN 1 DAY, Disp: 30 tablet, Rfl: 0    Empagliflozin (Jardiance) 10 MG TABS, Take 10 mg by mouth every morning, Disp: , Rfl:     gabapentin (NEURONTIN) 300 mg capsule, Take 300 mg by mouth 3 (three) times a day, Disp: , Rfl:     hydrOXYzine HCL (ATARAX) 10 mg tablet, Take 1 tablet (10 mg total) by mouth every 8 (eight) hours as needed for itching (Patient not taking: Reported on 12/14/2021 ), Disp: 30 tablet, Rfl: 0    LORazepam (ATIVAN) 0 5 mg tablet, Take 1 tablet (0 5 mg total) by mouth 2 (two) times a day as needed for anxiety NO FURTHER REFILLS WITHOUT CLINIC VISIT WITH PALLIATIVE CARE, Disp: 30 tablet, Rfl: 0    mirtazapine (REMERON) 15 mg tablet, Take 1 tablet (15 mg total) by mouth daily at bedtime, Disp: 90 tablet, Rfl: 3    omeprazole (PriLOSEC) 20 mg delayed release capsule, TAKE 1 CAPSULE BY MOUTH TWICE A DAY, Disp: 180 capsule, Rfl: 1    prochlorperazine (COMPAZINE) 10 mg tablet, Take 1 tablet (10 mg total) by mouth every 6 (six) hours as needed for nausea or vomiting, Disp: 45 tablet, Rfl: 3  Allergies   Allergen Reactions    Betadine [Povidone Iodine] Rash     Also itching from betadine    Other Throat Swelling     Pt unsure which chemotherapy drug caused tongue swelling and locked jaw  Please review in epic notes Folfirinox or possible Neulasta  NEED to review     There were no vitals filed for this visit

## 2022-02-23 ENCOUNTER — HOSPITAL ENCOUNTER (OUTPATIENT)
Dept: CT IMAGING | Facility: HOSPITAL | Age: 67
Discharge: HOME | End: 2022-02-23
Attending: SURGERY
Payer: MEDICARE

## 2022-02-23 DIAGNOSIS — Z85.07 HISTORY OF PANCREATIC CANCER: ICD-10-CM

## 2022-02-23 PROCEDURE — 74177 CT ABD & PELVIS W/CONTRAST: CPT

## 2022-02-23 PROCEDURE — 71260 CT THORAX DX C+: CPT

## 2022-02-23 PROCEDURE — G1004 CDSM NDSC: HCPCS

## 2022-02-23 RX ADMIN — IOHEXOL 100 ML: 350 INJECTION, SOLUTION INTRAVENOUS at 07:45

## 2022-03-02 ENCOUNTER — HOSPITAL ENCOUNTER (OUTPATIENT)
Dept: INFUSION CENTER | Facility: CLINIC | Age: 67
Discharge: HOME | End: 2022-03-02
Payer: MEDICARE

## 2022-03-02 DIAGNOSIS — Z95.828 PORT-A-CATH IN PLACE: Primary | ICD-10-CM

## 2022-03-02 PROCEDURE — 96523 IRRIG DRUG DELIVERY DEVICE: CPT

## 2022-03-02 NOTE — PROGRESS NOTES
Pt presents for port flush offering no complaints  Port flushed per protocol, good blood return noted  AVS declined  Pt discharged in stable condition

## 2022-03-08 ENCOUNTER — OFFICE VISIT (OUTPATIENT)
Dept: SURGICAL ONCOLOGY | Facility: CLINIC | Age: 67
End: 2022-03-08
Payer: MEDICARE

## 2022-03-08 VITALS
BODY MASS INDEX: 21.37 KG/M2 | OXYGEN SATURATION: 98 % | SYSTOLIC BLOOD PRESSURE: 120 MMHG | WEIGHT: 106 LBS | RESPIRATION RATE: 16 BRPM | DIASTOLIC BLOOD PRESSURE: 78 MMHG | HEART RATE: 68 BPM | HEIGHT: 59 IN | TEMPERATURE: 98.5 F

## 2022-03-08 DIAGNOSIS — Z85.07 ENCOUNTER FOR FOLLOW-UP SURVEILLANCE OF PANCREATIC CANCER: Primary | ICD-10-CM

## 2022-03-08 DIAGNOSIS — Z85.07 HISTORY OF PANCREATIC CANCER: ICD-10-CM

## 2022-03-08 DIAGNOSIS — Z08 ENCOUNTER FOR FOLLOW-UP SURVEILLANCE OF PANCREATIC CANCER: Primary | ICD-10-CM

## 2022-03-08 DIAGNOSIS — E44.1 MILD PROTEIN-CALORIE MALNUTRITION (HCC): ICD-10-CM

## 2022-03-08 DIAGNOSIS — C77.2 SECONDARY AND UNSPECIFIED MALIGNANT NEOPLASM OF INTRA-ABDOMINAL LYMPH NODES (HCC): ICD-10-CM

## 2022-03-08 PROCEDURE — 99214 OFFICE O/P EST MOD 30 MIN: CPT | Performed by: SURGERY

## 2022-03-08 NOTE — PROGRESS NOTES
Surgical Oncology Follow Up       [address] [facility] CANCER [facility] [address]  [place] [address] [phone]    Jonel Gonzales  1955  [id number]  [address] [facility] CANCER [facility] SURGICAL ONCOLOGY [address] [address] [phone]    Chief Complaint   Patient presents with    Follow-up       Assessment/Plan:    No problem-specific Assessment & Plan notes found for this encounter  Diagnoses and all orders for this visit:    Encounter for follow-up surveillance of pancreatic cancer    History of pancreatic cancer        Advance Care Planning/Advance Directives:  Discussed disease status, cancer treatment plans and/or cancer treatment goals with the patient  Oncology History   History of pancreatic cancer   6/2/2020 Biopsy    [facility]- Dr Raghavendra Nevarez:  Pancreas, uncinate mass:      - Malignant ([facility] Category VI)      - Compatible with adenocarcinoma with mucinous features       6/30/2020 - 9/16/2020 Chemotherapy    fluorouracil (ADRUCIL) injection 585 mg, 400 mg/m2 = 585 mg, Intravenous, Once, 7 of 12 cycles  Administration: 585 mg (6/30/2020), 585 mg (7/14/2020), 585 mg (7/28/2020), 585 mg (8/11/2020), 585 mg (9/8/2020), 585 mg (8/25/2020)  pegfilgrastim (NEULASTA ONPRO) subcutaneous injection kit 6 mg, 6 mg, Subcutaneous, Once, 7 of 12 cycles  Administration: 6 mg (7/2/2020), 6 mg (7/16/2020), 6 mg (7/30/2020), 6 mg (8/13/2020), 6 mg (8/27/2020), 6 mg (9/10/2020)  fosaprepitant (EMEND) 150 mg in sodium chloride 0 9 % 250 mL IVPB, 150 mg, Intravenous, Once, 7 of 12 cycles  Administration: 150 mg (6/30/2020), 150 mg (7/14/2020), 150 mg (7/28/2020), 150 mg (8/11/2020), 150 mg (9/8/2020), 150 mg (8/25/2020)  irinotecan (CAMPTOSAR) 263 mg in dextrose 5 % 500 mL chemo infusion, 180 mg/m2 = 263 mg, Intravenous, Once, 3 of 3 cycles  Administration: 263 mg (6/30/2020), 263 mg (7/14/2020), 263 mg (7/28/2020)  leucovorin 584 mg in dextrose 5 % 250 mL IVPB, 400 mg/m2 = 584 mg (100 % of original dose 400 mg/m2), Intravenous, Once, 2 of 7 cycles  Dose modification: 400 mg/m2 (original dose 400 mg/m2, Cycle 6)  Administration: 584 mg (9/8/2020)  oxaliplatin (ELOXATIN) 124 1 mg in dextrose 5 % 250 mL chemo infusion, 85 mg/m2 = 124 1 mg, Intravenous, Once, 7 of 12 cycles  Administration: 124 1 mg (6/30/2020), 124 1 mg (7/14/2020), 124 1 mg (7/28/2020), 124 1 mg (8/11/2020), 124 1 mg (9/8/2020), 124 1 mg (8/25/2020)     10/20/2020 Surgery    Whipple:  - Residual invasive adenocarcinoma with mucinous features, 2 3 cm   - Metastatic carcinoma present in one of fourteen lymph nodes (1/14)  - All margins are negative for tumor          12/21/2020 - 1/26/2021 Radiation    5000 cGy in 25 fractions to high risk areas  Dr Guyann Castleman     2/8/2021 - 4/4/2021 Chemotherapy    fluorouracil (ADRUCIL) injection 560 mg, 400 mg/m2 = 560 mg, Intravenous, Once, 4 of 4 cycles  Administration: 560 mg (2/8/2021), 560 mg (2/22/2021), 560 mg (3/8/2021), 560 mg (3/22/2021)  pegfilgrastim (NEULASTA ONPRO) subcutaneous injection kit 6 mg, 6 mg, Subcutaneous, Once, 4 of 4 cycles  Administration: 6 mg (2/10/2021), 6 mg (2/24/2021), 6 mg (3/10/2021), 6 mg (3/24/2021)  fosaprepitant (EMEND) 150 mg in sodium chloride 0 9 % 250 mL IVPB, 150 mg, Intravenous, Once, 4 of 4 cycles  Administration: 150 mg (2/8/2021), 150 mg (2/22/2021), 150 mg (3/8/2021), 150 mg (3/22/2021)  leucovorin 560 mg in dextrose 5 % 250 mL IVPB, 400 mg/m2 = 560 mg, Intravenous, Once, 4 of 4 cycles  Administration: 560 mg (2/8/2021), 560 mg (2/22/2021), 560 mg (3/8/2021), 560 mg (3/22/2021)  oxaliplatin (ELOXATIN) 119 mg in dextrose 5 % 250 mL chemo infusion, 85 mg/m2 = 119 mg, Intravenous, Once, 4 of 4 cycles  Administration: 119 mg (2/8/2021), 119 mg (2/22/2021), 119 mg (3/8/2021), 119 mg (3/22/2021)     Encounter for follow-up surveillance of pancreatic cancer       History of Present Illness:  Patient is a 42-year-old woman status post Whipple procedure for malignancy 1 1/2 years ago   -Interval History:  She comes in for surveillance visit with no major complaints report  She is eating without difficulty  She does have some oily stools occasionally  She is on Creon initially but stopped due to side effects  Review of Systems:  Review of Systems   Constitutional: Negative  HENT: Negative  Eyes: Negative  Respiratory: Negative  Cardiovascular: Negative  Gastrointestinal: Negative  Endocrine: Negative  Genitourinary: Negative  Musculoskeletal: Negative  Skin: Negative  Allergic/Immunologic: Negative  Neurological: Negative  Hematological: Negative  Psychiatric/Behavioral: Negative  All other systems reviewed and are negative        Patient Active Problem List   Diagnosis    Hyperlipidemia    Essential hypertension    Type 2 diabetes mellitus without complication, without long-term current use of insulin ([address] )    Pruritus    Transaminitis    History of pancreatic cancer    Port-A-Cath in place    Therapeutic opioid-induced constipation (OIC)    Anxiety    Functional diarrhea    Poor appetite    Chemotherapy induced neutropenia ([address] )    Encounter for follow-up surveillance of pancreatic cancer    Secondary and unspecified malignant neoplasm of intra-abdominal lymph nodes ([facility] [address]    Mild protein-calorie malnutrition ([address] )     Past Medical History:   Diagnosis Date    BRCA1 negative     BRCA2 negative     Chemotherapy induced neutropenia ([address] ) 2/10/2021    Diabetes mellitus ([address] )     Hypertension     Lactic acidosis 2020    Neuropathy     Obstructive jaundice 2020    Pancreas cancer ([address] )      Past Surgical History:   Procedure Laterality Date    BREAST BIOPSY Left 2009    neg    BREAST SURGERY Left     calcifications     SECTION  1983    CHOLECYSTECTOMY N/A 10/20/2020    Procedure: CHOLECYSTECTOMY; Surgeon: Lu Mendieta MD;  [facility];  Service: Surgical Oncology    COLONOSCOPY      ECTOPIC PREGNANCY SURGERY      partial ovary removed    FL GUIDED CENTRAL VENOUS ACCESS DEVICE INSERTION  6/23/2020    JOINT REPLACEMENT Bilateral     LAPAROTOMY N/A 10/20/2020    Procedure: LAPAROTOMY EXPLORATORY; INTRAOPERATIVE ULTRASOUND;  Surgeon: Lu Mendieta MD;  [facility];  Service: Surgical Oncology    [facility]    NOSE SURGERY      deviated septum    REPLACEMENT TOTAL HIP W/  RESURFACING IMPLANTS Bilateral 2012    right hip done July 2012; left hip done September 2012    TONSILLECTOMY  1957    TUNNELED VENOUS PORT PLACEMENT Left 6/23/2020    Procedure: INSERTION VENOUS PORT (PORT-A-CATH), left;  Surgeon: Lu Mendieta MD;  [facility];  Service: Surgical Oncology    WHIPPLE PROCEDURE/PANCREATICO-DUODENECTOMY N/A 10/20/2020    Procedure:  WHIPPLE PROCEDURE/PANCREATICO-DUODENECTOMY;  Surgeon: Lu Mendieta MD;  [facility];  Service: Surgical Oncology     Family History   Problem Relation Age of Onset    Diabetes Mother     Heart disease Mother     Heart disease Father     Breast cancer additional onset Sister     Breast cancer Sister 47    Breast cancer additional onset Maternal Aunt     Breast cancer Maternal Aunt     Stroke Maternal Grandfather      Social History     Socioeconomic History    Marital status: /Civil Union     Spouse name: Not on file    Number of children: Not on file    Years of education: Not on file    Highest education level: Not on file   Occupational History    Not on file   Tobacco Use    Smoking status: Former Smoker    Smokeless tobacco: Never Used    Tobacco comment: quit 40 years ago   Vaping Use    Vaping Use: Never used   Substance and Sexual Activity    Alcohol use: Not Currently    Drug use: Never    Sexual activity: Not Currently   Other Topics Concern    Not on file   Social History Narrative    Not on file     Social Determinants of Health     Financial Resource Strain: Not on file   Food Insecurity: Not on file   Transportation Needs: Not on file   Physical Activity: Not on file   Stress: Not on file   Social Connections: Not on file   Intimate Partner Violence: Not on file   Housing Stability: Not on file       Current Outpatient Medications:     amLODIPine (NORVASC) 2 5 mg tablet, Take 2 5 mg by mouth daily, Disp: , Rfl:     dicyclomine (BENTYL) 20 mg tablet, TAKE 1 TABLET BY MOUTH TWICE A DAY (Patient taking differently: as needed  ), Disp: 180 tablet, Rfl: 1    diphenhydrAMINE (BENADRYL) 25 mg capsule, Take 25 mg by mouth every 6 (six) hours as needed for itching, Disp: , Rfl:     diphenoxylate-atropine (LOMOTIL) 2 5-0 025 mg per tablet, Take 1 tablet by mouth 4 (four) times a day as needed for diarrhea DO NOT EXCEED 4 DOSES IN 1 DAY, Disp: 30 tablet, Rfl: 0    Empagliflozin (Jardiance) 10 MG TABS, Take 10 mg by mouth every morning, Disp: , Rfl:     gabapentin (NEURONTIN) 300 mg capsule, Take 300 mg by mouth 3 (three) times a day, Disp: , Rfl:     LORazepam (ATIVAN) 0 5 mg tablet, Take 1 tablet (0 5 mg total) by mouth 2 (two) times a day as needed for anxiety NO FURTHER REFILLS WITHOUT CLINIC VISIT WITH PALLIATIVE CARE, Disp: 30 tablet, Rfl: 0    mirtazapine (REMERON) 15 mg tablet, Take 1 tablet (15 mg total) by mouth daily at bedtime, Disp: 90 tablet, Rfl: 3    omeprazole (PriLOSEC) 20 mg delayed release capsule, TAKE 1 CAPSULE BY MOUTH TWICE A DAY, Disp: 180 capsule, Rfl: 1    prochlorperazine (COMPAZINE) 10 mg tablet, Take 1 tablet (10 mg total) by mouth every 6 (six) hours as needed for nausea or vomiting, Disp: 45 tablet, Rfl: 3    hydrOXYzine HCL (ATARAX) 10 mg tablet, Take 1 tablet (10 mg total) by mouth every 8 (eight) hours as needed for itching (Patient not taking: Reported on 12/14/2021 ), Disp: 30 tablet, Rfl: 0  Allergies   Allergen Reactions    Betadine [Povidone Iodine] Rash     Also itching from betadine    Other Throat Swelling     Pt unsure which chemotherapy drug caused tongue swelling and locked jaw  Please review in epic notes Folfirinox or possible Neulasta  NEED to review     Vitals:    03/08/22 1015   BP: 120/78   Pulse: 68   Resp: 16   Temp: 98 5 °F (36 9 °C)   SpO2: 98%       Physical Exam  Constitutional:       Appearance: Normal appearance  She is normal weight  HENT:      Head: Normocephalic and atraumatic  Nose: Nose normal    Eyes:      Extraocular Movements: Extraocular movements intact  Pupils: Pupils are equal, round, and reactive to light  Cardiovascular:      Rate and Rhythm: Normal rate and regular rhythm  Heart sounds: Normal heart sounds  Pulmonary:      Effort: Pulmonary effort is normal       Breath sounds: Normal breath sounds  Abdominal:      General: Abdomen is flat  There is no distension  Palpations: Abdomen is soft  There is no mass  Tenderness: There is no abdominal tenderness  There is no guarding or rebound  Hernia: No hernia is present  Musculoskeletal:         General: Normal range of motion  Cervical back: Normal range of motion and neck supple  Skin:     General: Skin is warm and dry  Neurological:      General: No focal deficit present  Mental Status: She is alert and oriented to person, place, and time  Psychiatric:         Mood and Affect: Mood normal          Behavior: Behavior normal          Thought Content:  Thought content normal          Judgment: Judgment normal            Results:  Labs:  Component Ref Range & Units 2/17/22  7:35 AM 12/11/21  7:33 AM 8/12/21  7:50 AM 4/10/21  8:47 AM 9/5/20  8:28 AM 8/10/20  7:57 AM 7/27/20  8:31 AM   CA 19-9 0 - 35 U/mL <2                Imaging  CT chest abdomen pelvis w contrast    Result Date: 2/25/2022  Narrative: CT CHEST, ABDOMEN AND PELVIS WITH IV CONTRAST INDICATION:   Z85 07: Personal history of malignant neoplasm of pancreas  "Nico Lambert 1955 is a 74 y o  female with a history of 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 status post Whipple resection on 10/20/2020  She completed a course of adjuvant chemo radiation 1/26/21  She returns for follow-up evaluation  COMPARISON:  CT chest abdomen pelvis 8/16/2021  TECHNIQUE: CT examination of the chest, abdomen and pelvis was performed  Axial, sagittal, and coronal 2D reformatted images were created from the source data and submitted for interpretation  Radiation dose length product (DLP) for this visit:  1220 mGy-cm   This examination, like all CT scans performed in the [facility], was performed utilizing techniques to minimize radiation dose exposure, including the use of iterative reconstruction and automated exposure control  IV Contrast:  100 mL of iohexol (OMNIPAQUE) Enteric Contrast: Enteric contrast was administered  FINDINGS: CHEST LUNGS:  Lungs are clear  There is no tracheal or endobronchial lesion  PLEURA:  Unremarkable  HEART/GREAT VESSELS: Heart is unremarkable for patient's age  No thoracic aortic aneurysm  MEDIASTINUM AND SANDRA:  Chronic diffuse esophageal circumferential thickening in keeping with a nonspecific esophagitis  CHEST WALL AND LOWER NECK:   Left-sided chest port with catheter tip located at the cavoatrial junction  ABDOMEN LIVER/BILIARY TREE:  Unchanged  Small amount of left pneumobilia in keeping with biliary sphincter incompetence  GALLBLADDER:  Gallbladder is surgically absent  SPLEEN:  Unremarkable  PANCREAS:  Status post Whipple  No apparent recurrent surgical bed tumor  ADRENAL GLANDS:  Unremarkable  KIDNEYS/URETERS:  Unremarkable  No hydronephrosis  STOMACH AND BOWEL:  Status post Whipple  Potential diffuse gastric thickening possibly representing gastritis  Prominent colonic stool without bowel obstruction  Mild chronic circumferential thickening of the sigmoid colon and rectum  APPENDIX:  No findings to suggest appendicitis  ABDOMINOPELVIC CAVITY:  No ascites  No pneumoperitoneum  No lymphadenopathy  VESSELS:  Unremarkable for patient's age  PELVIS REPRODUCTIVE ORGANS:  Obscured by streak artifact from bilateral hip prostheses  URINARY BLADDER:  Also predominantly obscured  ABDOMINAL WALL/INGUINAL REGIONS:  Unremarkable  OSSEOUS STRUCTURES:  No acute fracture or destructive osseous lesion  Bilateral partially imaged hip prostheses  Impression: Status post Whipple resection of the pancreatic head malignancy without signs of metastatic disease within the chest, abdomen or pelvis  Potential gastritis  Persistent chronic esophagitis  The study was marked in EPIC for significant notification  Workstation performed: [id number]     I reviewed the above laboratory and imaging data  Discussion/Summary:  Status post Whipple procedure  She is doing well  Plan of follow-up in 6 months for surveillance with skin blood work at that time  All questions answered and copy as report given her for records

## 2022-03-23 ENCOUNTER — OFFICE VISIT (OUTPATIENT)
Dept: GASTROENTEROLOGY | Facility: CLINIC | Age: 67
End: 2022-03-23
Payer: MEDICARE

## 2022-03-23 VITALS
HEIGHT: 59 IN | DIASTOLIC BLOOD PRESSURE: 68 MMHG | OXYGEN SATURATION: 99 % | WEIGHT: 110 LBS | BODY MASS INDEX: 22.18 KG/M2 | HEART RATE: 73 BPM | SYSTOLIC BLOOD PRESSURE: 130 MMHG

## 2022-03-23 DIAGNOSIS — K29.70 GASTRITIS WITHOUT BLEEDING, UNSPECIFIED CHRONICITY, UNSPECIFIED GASTRITIS TYPE: Primary | ICD-10-CM

## 2022-03-23 DIAGNOSIS — K20.90 ESOPHAGITIS: ICD-10-CM

## 2022-03-23 DIAGNOSIS — Z12.11 SCREENING FOR MALIGNANT NEOPLASM OF COLON: ICD-10-CM

## 2022-03-23 PROCEDURE — 99213 OFFICE O/P EST LOW 20 MIN: CPT | Performed by: PHYSICIAN ASSISTANT

## 2022-03-23 NOTE — LETTER
March 23, 2022     Char Grier DO  [address]    Patient: Laura Carrera   YOB: 1955   Date of Visit: 3/23/2022       Dear Dr Kathy Isbell: Thank you for referring Laura Carrera to me for evaluation  Below are my notes for this consultation  If you have questions, please do not hesitate to call me  I look forward to following your patient along with you  Sincerely,        Guerita Do PA-C        CC: No Recipients  Jhon Szymanski  3/23/2022  3:11 PM  Sign when Signing Visit  [facility] - Outpatient Follow-up Note  Laura November 69 y o  female MRN: [id number]  Encounter: [id number]          ASSESSMENT AND PLAN:      1  Gastritis without bleeding, unspecified chronicity, unspecified gastritis type  2  Esophagitis  3  Screening for malignant neoplasm of colon  -Patient will be scheduled for an EGD and colonoscopy  -Patient will continue PPI b i d  -Further recommendations are made after patient's endoscopic evaluation  ______________________________________________________________________    SUBJECTIVE:    61-year-old female presents the office today for follow-up  Patient reports she had her routine follow-up CT scan of the abdomen and pelvis ordered by Oncology for surveillance of her pancreatic adenocarcinoma  Her CT scan showed evidence of esophagitis and colitis  Patient reports she is generally maintained on PPI therapy daily  After she found out about the gastritis she actually increased her PPI to twice a day  She denies any heartburn  She denies any nausea vomiting  Her weight is going up  She denies any alarm symptoms  Patient is also due for routine screening colonoscopy  REVIEW OF SYSTEMS IS OTHERWISE NEGATIVE        Historical Information   Past Medical History:   Diagnosis Date    BRCA1 negative     BRCA2 negative     Chemotherapy induced neutropenia [facility] [address] ) 2/10/2021    Diabetes mellitus [facility] [address] [facility]     Hypertension     Lactic acidosis 2020    Neuropathy     Obstructive jaundice 2020    Pancreas cancer ([facility] )      Past Surgical History:   Procedure Laterality Date    BREAST BIOPSY Left 2009    neg    BREAST SURGERY Left     calcifications     SECTION  1983    CHOLECYSTECTOMY N/A 10/20/2020    Procedure: CHOLECYSTECTOMY;  Surgeon: Jaleesa Denise MD;  [facility];  Service: Surgical Oncology    COLONOSCOPY      ECTOPIC PREGNANCY SURGERY      partial ovary removed    FL GUIDED CENTRAL VENOUS ACCESS DEVICE INSERTION  2020    JOINT REPLACEMENT Bilateral     LAPAROTOMY N/A 10/20/2020    Procedure: LAPAROTOMY EXPLORATORY; INTRAOPERATIVE ULTRASOUND;  Surgeon: Jaleesa Denise MD;  [facility];  Service: Surgical Oncology    [address]    NOSE SURGERY      deviated septum    REPLACEMENT TOTAL HIP W/  RESURFACING IMPLANTS Bilateral     right hip done 2012; left hip done 2012    TONSILLECTOMY      TUNNELED VENOUS PORT PLACEMENT Left 2020    Procedure: INSERTION VENOUS PORT (PORT-A-CATH), left;  Surgeon: Jaleesa Denise MD;  [facility];  Service: Surgical Oncology    WHIPPLE PROCEDURE/PANCREATICO-DUODENECTOMY N/A 10/20/2020    Procedure:  WHIPPLE PROCEDURE/PANCREATICO-DUODENECTOMY;  Surgeon: Jaleesa Denise MD;  [facility];  Service: Surgical Oncology     Social History   Social History     Substance and Sexual Activity   Alcohol Use Not Currently     Social History     Substance and Sexual Activity   Drug Use Never     Social History     Tobacco Use   Smoking Status Former Smoker   Smokeless Tobacco Never Used   Tobacco Comment    quit 36 years ago     Family History   Problem Relation Age of Onset    Diabetes Mother     Heart disease Mother     Heart disease Father     Breast cancer additional onset Sister     Breast cancer Sister 47    Breast cancer additional onset Maternal Aunt     Breast cancer Maternal Aunt     Stroke Maternal Grandfather        Meds/Allergies       Current Outpatient Medications:     dicyclomine (BENTYL) 20 mg tablet    diphenhydrAMINE (BENADRYL) 25 mg capsule    diphenoxylate-atropine (LOMOTIL) 2 5-0 025 mg per tablet    Empagliflozin (Jardiance) 10 MG TABS    gabapentin (NEURONTIN) 300 mg capsule    LORazepam (ATIVAN) 0 5 mg tablet    mirtazapine (REMERON) 15 mg tablet    omeprazole (PriLOSEC) 20 mg delayed release capsule    prochlorperazine (COMPAZINE) 10 mg tablet    amLODIPine (NORVASC) 2 5 mg tablet    Allergies   Allergen Reactions    Betadine [Povidone Iodine] Rash     Also itching from betadine    Other Throat Swelling     Pt unsure which chemotherapy drug caused tongue swelling and locked jaw  Please review in epic notes Folfirinox or possible Neulasta  NEED to review           Objective     Blood pressure 130/68, pulse 73, height 4' 11" (1 499 m), weight 49 9 kg (110 lb), SpO2 99 %  Body mass index is 22 22 kg/m²  PHYSICAL EXAM:      General Appearance:   Alert, cooperative, no distress   HEENT:   Normocephalic, atraumatic, anicteric      Neck:  Supple, symmetrical, trachea midline   Lungs:   Clear to auscultation bilaterally; no rales, rhonchi or wheezing; respirations unlabored    Heart[de-identified]   Regular rate and rhythm; no murmur, rub, or gallop  Abdomen:   Soft, non-tender, non-distended; normal bowel sounds; no masses, no organomegaly    Genitalia:   Deferred    Rectal:   Deferred    Extremities:  No cyanosis, clubbing or edema    Pulses:  2+ and symmetric    Skin:  No jaundice, rashes, or lesions    Lymph nodes:  No palpable cervical lymphadenopathy        Lab Results:   No visits with results within 1 Day(s) from this visit     Latest known visit with results is:   Appointment on 02/17/2022   Component Date Value    CA 19-9 02/17/2022 <2     Sodium 02/17/2022 138     Potassium 02/17/2022 4 0     Chloride 02/17/2022 101     CO2 02/17/2022 31     ANION GAP 02/17/2022 6     BUN 02/17/2022 11     Creatinine 02/17/2022 1 26     Glucose, Fasting 02/17/2022 146*    Calcium 02/17/2022 9 3     eGFR 02/17/2022 43          Radiology Results:   CT chest abdomen pelvis w contrast    Result Date: 2/25/2022  Narrative: CT CHEST, ABDOMEN AND PELVIS WITH IV CONTRAST INDICATION:   Z85 07: Personal history of malignant neoplasm of pancreas  "Jonel Gonzales 1955 is a 74 y o  female with a history of 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 status post Whipple resection on 10/20/2020  She completed a course of adjuvant chemo radiation 1/26/21  She returns for follow-up evaluation  COMPARISON:  CT chest abdomen pelvis 8/16/2021  TECHNIQUE: CT examination of the chest, abdomen and pelvis was performed  Axial, sagittal, and coronal 2D reformatted images were created from the source data and submitted for interpretation  Radiation dose length product (DLP) for this visit:  1220 mGy-cm   This examination, like all CT scans performed in the [facility], was performed utilizing techniques to minimize radiation dose exposure, including the use of iterative reconstruction and automated exposure control  IV Contrast:  100 mL of iohexol (OMNIPAQUE) Enteric Contrast: Enteric contrast was administered  FINDINGS: CHEST LUNGS:  Lungs are clear  There is no tracheal or endobronchial lesion  PLEURA:  Unremarkable  HEART/GREAT VESSELS: Heart is unremarkable for patient's age  No thoracic aortic aneurysm  MEDIASTINUM AND SANDRA:  Chronic diffuse esophageal circumferential thickening in keeping with a nonspecific esophagitis  CHEST WALL AND LOWER NECK:   Left-sided chest port with catheter tip located at the cavoatrial junction  ABDOMEN LIVER/BILIARY TREE:  Unchanged  Small amount of left pneumobilia in keeping with biliary sphincter incompetence   GALLBLADDER:  Gallbladder is surgically absent  SPLEEN:  Unremarkable  PANCREAS:  Status post Whipple  No apparent recurrent surgical bed tumor  ADRENAL GLANDS:  Unremarkable  KIDNEYS/URETERS:  Unremarkable  No hydronephrosis  STOMACH AND BOWEL:  Status post Whipple  Potential diffuse gastric thickening possibly representing gastritis  Prominent colonic stool without bowel obstruction  Mild chronic circumferential thickening of the sigmoid colon and rectum  APPENDIX:  No findings to suggest appendicitis  ABDOMINOPELVIC CAVITY:  No ascites  No pneumoperitoneum  No lymphadenopathy  VESSELS:  Unremarkable for patient's age  PELVIS REPRODUCTIVE ORGANS:  Obscured by streak artifact from bilateral hip prostheses  URINARY BLADDER:  Also predominantly obscured  ABDOMINAL WALL/INGUINAL REGIONS:  Unremarkable  OSSEOUS STRUCTURES:  No acute fracture or destructive osseous lesion  Bilateral partially imaged hip prostheses  Impression: Status post Whipple resection of the pancreatic head malignancy without signs of metastatic disease within the chest, abdomen or pelvis  Potential gastritis  Persistent chronic esophagitis  The study was marked in EPIC for significant notification   Workstation performed: [id number]

## 2022-03-23 NOTE — PROGRESS NOTES
[facility] - Outpatient Follow-up Note  Katy Berg 69 y o  female MRN: [id number]  Encounter: [id number]          ASSESSMENT AND PLAN:      1  Gastritis without bleeding, unspecified chronicity, unspecified gastritis type  2  Esophagitis  3  Screening for malignant neoplasm of colon  -Patient will be scheduled for an EGD and colonoscopy  -Patient will continue PPI b i d  -Further recommendations are made after patient's endoscopic evaluation  ______________________________________________________________________    SUBJECTIVE:    70-year-old female presents the office today for follow-up  Patient reports she had her routine follow-up CT scan of the abdomen and pelvis ordered by Oncology for surveillance of her pancreatic adenocarcinoma  Her CT scan showed evidence of esophagitis and colitis  Patient reports she is generally maintained on PPI therapy daily  After she found out about the gastritis she actually increased her PPI to twice a day  She denies any heartburn  She denies any nausea vomiting  Her weight is going up  She denies any alarm symptoms  Patient is also due for routine screening colonoscopy  REVIEW OF SYSTEMS IS OTHERWISE NEGATIVE        Historical Information   Past Medical History:   Diagnosis Date    BRCA1 negative     BRCA2 negative     Chemotherapy induced neutropenia ([address] ) 2/10/2021    Diabetes mellitus ([address] )     Hypertension     Lactic acidosis 2020    Neuropathy     Obstructive jaundice 2020    Pancreas cancer ([address] )      Past Surgical History:   Procedure Laterality Date    BREAST BIOPSY Left 2009    neg    BREAST SURGERY Left     calcifications     SECTION      CHOLECYSTECTOMY N/A 10/20/2020    Procedure: CHOLECYSTECTOMY;  Surgeon: Omid Flores MD;  [facility];  Service: Surgical Oncology    COLONOSCOPY      ECTOPIC PREGNANCY SURGERY      partial ovary removed    FL GUIDED CENTRAL VENOUS ACCESS DEVICE INSERTION  6/23/2020    JOINT REPLACEMENT Bilateral     LAPAROTOMY N/A 10/20/2020    Procedure: LAPAROTOMY EXPLORATORY; INTRAOPERATIVE ULTRASOUND;  Surgeon: Izabel Robb MD;  [facility];  Service: Surgical Oncology    [address] [place] [address]    NOSE SURGERY      deviated septum    REPLACEMENT TOTAL HIP W/  RESURFACING IMPLANTS Bilateral 2012    right hip done July 2012; left hip done September 2012    TONSILLECTOMY  1957    TUNNELED VENOUS PORT PLACEMENT Left 6/23/2020    Procedure: INSERTION VENOUS PORT (PORT-A-CATH), left;  Surgeon: Izabel Robb MD;  [facility];  Service: Surgical Oncology    WHIPPLE PROCEDURE/PANCREATICO-DUODENECTOMY N/A 10/20/2020    Procedure:  WHIPPLE PROCEDURE/PANCREATICO-DUODENECTOMY;  Surgeon: Izabel Robb MD;  [facility];  Service: Surgical Oncology     Social History   Social History     Substance and Sexual Activity   Alcohol Use Not Currently     Social History     Substance and Sexual Activity   Drug Use Never     Social History     Tobacco Use   Smoking Status Former Smoker   Smokeless Tobacco Never Used   Tobacco Comment    quit 40 years ago     Family History   Problem Relation Age of Onset    Diabetes Mother     Heart disease Mother     Heart disease Father     Breast cancer additional onset Sister     Breast cancer Sister 47    Breast cancer additional onset Maternal Aunt     Breast cancer Maternal Aunt     Stroke Maternal Grandfather        Meds/Allergies       Current Outpatient Medications:     dicyclomine (BENTYL) 20 mg tablet    diphenhydrAMINE (BENADRYL) 25 mg capsule    diphenoxylate-atropine (LOMOTIL) 2 5-0 025 mg per tablet    Empagliflozin (Jardiance) 10 MG TABS    gabapentin (NEURONTIN) 300 mg capsule    LORazepam (ATIVAN) 0 5 mg tablet    mirtazapine (REMERON) 15 mg tablet    omeprazole (PriLOSEC) 20 mg delayed release capsule    prochlorperazine (COMPAZINE) 10 mg tablet   amLODIPine (NORVASC) 2 5 mg tablet    Allergies   Allergen Reactions    Betadine [Povidone Iodine] Rash     Also itching from betadine    Other Throat Swelling     Pt unsure which chemotherapy drug caused tongue swelling and locked jaw  Please review in epic notes Folfirinox or possible Neulasta  NEED to review           Objective     Blood pressure 130/68, pulse 73, height 4' 11" (1 499 m), weight 49 9 kg (110 lb), SpO2 99 %  Body mass index is 22 22 kg/m²  PHYSICAL EXAM:      General Appearance:   Alert, cooperative, no distress   HEENT:   Normocephalic, atraumatic, anicteric      Neck:  Supple, symmetrical, trachea midline   Lungs:   Clear to auscultation bilaterally; no rales, rhonchi or wheezing; respirations unlabored    Heart[de-identified]   Regular rate and rhythm; no murmur, rub, or gallop  Abdomen:   Soft, non-tender, non-distended; normal bowel sounds; no masses, no organomegaly    Genitalia:   Deferred    Rectal:   Deferred    Extremities:  No cyanosis, clubbing or edema    Pulses:  2+ and symmetric    Skin:  No jaundice, rashes, or lesions    Lymph nodes:  No palpable cervical lymphadenopathy        Lab Results:   No visits with results within 1 Day(s) from this visit  Latest known visit with results is:   Appointment on 02/17/2022   Component Date Value    CA 19-9 02/17/2022 <2     Sodium 02/17/2022 138     Potassium 02/17/2022 4 0     Chloride 02/17/2022 101     CO2 02/17/2022 31     ANION GAP 02/17/2022 6     BUN 02/17/2022 11     Creatinine 02/17/2022 1 26     Glucose, Fasting 02/17/2022 146*    Calcium 02/17/2022 9 3     eGFR 02/17/2022 43          Radiology Results:   CT chest abdomen pelvis w contrast    Result Date: 2/25/2022  Narrative: CT CHEST, ABDOMEN AND PELVIS WITH IV CONTRAST INDICATION:   Z85 07: Personal history of malignant neoplasm of pancreas     "Lissa Frausto 1955 is a 74 y o  female with a history of 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 status post Whipple resection on 10/20/2020  She completed a course of adjuvant chemo radiation 1/26/21  She returns for follow-up evaluation  COMPARISON:  CT chest abdomen pelvis 8/16/2021  TECHNIQUE: CT examination of the chest, abdomen and pelvis was performed  Axial, sagittal, and coronal 2D reformatted images were created from the source data and submitted for interpretation  Radiation dose length product (DLP) for this visit:  1220 mGy-cm   This examination, like all CT scans performed in the [facility], was performed utilizing techniques to minimize radiation dose exposure, including the use of iterative reconstruction and automated exposure control  IV Contrast:  100 mL of iohexol (OMNIPAQUE) Enteric Contrast: Enteric contrast was administered  FINDINGS: CHEST LUNGS:  Lungs are clear  There is no tracheal or endobronchial lesion  PLEURA:  Unremarkable  HEART/GREAT VESSELS: Heart is unremarkable for patient's age  No thoracic aortic aneurysm  MEDIASTINUM AND SANDRA:  Chronic diffuse esophageal circumferential thickening in keeping with a nonspecific esophagitis  CHEST WALL AND LOWER NECK:   Left-sided chest port with catheter tip located at the cavoatrial junction  ABDOMEN LIVER/BILIARY TREE:  Unchanged  Small amount of left pneumobilia in keeping with biliary sphincter incompetence  GALLBLADDER:  Gallbladder is surgically absent  SPLEEN:  Unremarkable  PANCREAS:  Status post Whipple  No apparent recurrent surgical bed tumor  ADRENAL GLANDS:  Unremarkable  KIDNEYS/URETERS:  Unremarkable  No hydronephrosis  STOMACH AND BOWEL:  Status post Whipple  Potential diffuse gastric thickening possibly representing gastritis  Prominent colonic stool without bowel obstruction  Mild chronic circumferential thickening of the sigmoid colon and rectum  APPENDIX:  No findings to suggest appendicitis  ABDOMINOPELVIC CAVITY:  No ascites  No pneumoperitoneum  No lymphadenopathy   VESSELS: Unremarkable for patient's age  PELVIS REPRODUCTIVE ORGANS:  Obscured by streak artifact from bilateral hip prostheses  URINARY BLADDER:  Also predominantly obscured  ABDOMINAL WALL/INGUINAL REGIONS:  Unremarkable  OSSEOUS STRUCTURES:  No acute fracture or destructive osseous lesion  Bilateral partially imaged hip prostheses  Impression: Status post Whipple resection of the pancreatic head malignancy without signs of metastatic disease within the chest, abdomen or pelvis  Potential gastritis  Persistent chronic esophagitis  The study was marked in EPIC for significant notification   Workstation performed: [id number]

## 2022-03-23 NOTE — PATIENT INSTRUCTIONS
Gastritis   WHAT YOU NEED TO KNOW:   Gastritis is inflammation or irritation of the lining of your stomach  DISCHARGE INSTRUCTIONS:   Call 911 for any of the following:   · You develop chest pain or shortness of breath  Return to the emergency department if:   · You vomit blood  · You have black or bloody bowel movements  · You have severe stomach or back pain  Contact your healthcare provider if:   · You have a fever  · You have new or worsening symptoms, even after treatment  · You have questions or concerns about your condition or care  Medicines:   · Medicines  may be given to help treat a bacterial infection or decrease stomach acid  · Take your medicine as directed  Contact your healthcare provider if you think your medicine is not helping or if you have side effects  Tell him or her if you are allergic to any medicine  Keep a list of the medicines, vitamins, and herbs you take  Include the amounts, and when and why you take them  Bring the list or the pill bottles to follow-up visits  Carry your medicine list with you in case of an emergency  Manage or prevent gastritis:   · Do not smoke  Nicotine and other chemicals in cigarettes and cigars can make your symptoms worse and cause lung damage  Ask your healthcare provider for information if you currently smoke and need help to quit  E-cigarettes or smokeless tobacco still contain nicotine  Talk to your healthcare provider before you use these products  · Do not drink alcohol  Alcohol can prevent healing and make your gastritis worse  Talk to your healthcare provider if you need help to stop drinking  · Do not take NSAIDs or aspirin unless directed  These and similar medicines can cause irritation  If your healthcare provider says it is okay to take NSAIDs, take them with food  · Do not eat foods that cause irritation  Foods such as oranges and salsa can cause burning or pain  Eat a variety of healthy foods  Examples include fruits (not citrus), vegetables, low-fat dairy products, beans, whole-grain breads, and lean meats and fish  Try to eat small meals, and drink water with your meals  Do not eat for at least 3 hours before you go to bed  · Find ways to relax and decrease stress  Stress can increase stomach acid and make gastritis worse  Activities such as yoga, meditation, or listening to music can help you relax  Spend time with friends, or do things you enjoy  Follow up with your healthcare provider as directed: You may need ongoing tests or treatment, or referral to a gastroenterologist  Write down your questions so you remember to ask them during your visits  © Copyright [employer] 2022 Information is for End User's use only and may not be sold, redistributed or otherwise used for commercial purposes  All illustrations and images included in CareNotes® are the copyrighted property of [employer]  or [employer]   The above information is an  only  It is not intended as medical advice for individual conditions or treatments  Talk to your doctor, nurse or pharmacist before following any medical regimen to see if it is safe and effective for you      Scheduled date of EGD/colonoscopy (as of today):4/22/22  Physician performing EGD/colonoscopy:Patience  Location of EGD/colonoscopy:[facility] bowel prep reviewed with patient:miralax/dulcolax  Instructions reviewed with patient by:Abigail QUICK  Clearances:  none

## 2022-03-24 PROBLEM — Z45.2 ENCOUNTER FOR REMOVAL OF TUNNELED CENTRAL VENOUS CATHETER (CVC) WITH PORT: Status: ACTIVE | Noted: 2022-03-24

## 2022-03-25 ENCOUNTER — TELEPHONE (OUTPATIENT)
Dept: NUTRITION | Facility: CLINIC | Age: 67
End: 2022-03-25

## 2022-03-25 NOTE — TELEPHONE ENCOUNTER
Contacted Sorin Ward to touch base in regards to her nutrition after receiving notification from [address] [place] [address] [address] that Sorin Ward is requesting to follow up with RD  Spoke with Sorin Ward today who is interested in setting up a follow up with oncology nutrition   Apt scheduled for Monday 3/28/22 at 10am

## 2022-03-28 ENCOUNTER — NUTRITION (OUTPATIENT)
Dept: NUTRITION | Facility: CLINIC | Age: 67
End: 2022-03-28

## 2022-03-28 DIAGNOSIS — Z71.3 NUTRITIONAL COUNSELING: Primary | ICD-10-CM

## 2022-03-28 NOTE — PATIENT INSTRUCTIONS
Nutrition Rx & Recommendations: Follow a Carbohydrate Controlled Diet:     Foods with carbohydrates make your blood glucose level go up  Learning how to count carbohydrates can help you control your blood glucose levels  First, identify the foods you eat that contain carbohydrates   Foods that commonly contain carbohydrates include: grains (breads, pasta, cereal, rice), fruit, starchy vegetables (potatoes, corn, peas), snack foods (chips, pretzels, crackers), dairy products, sweets/desserts   Foods that do not usually contain carbohydrates: chicken, pork, beef, fish, seafood, eggs, tofu, cheese, butter, sour cream, avocado, nuts, seeds, olives, mayonnaise, water, black coffee, unsweetened tea, and zero-calorie drinks  Vegetables with no or low carbohydrate include green beans, cauliflower, tomatoes, and onions  Meal Planning:    Choose your protein source (refer to Protein Sources handout for examples)  Protein goal is 50-60g daily   Plan for half of your plate to be non-starchy vegetables   Try to limit your carbohydrate portion to 1/4 of the plate   Aim for 2-3 Carb servings at each meal, and 1-2 servings at each snack (refer to Choose your Foods list for examples of carbohydrates and their serving sizes)  Tips:   Choose foods with fiber, including: bran cereal, quinoa, oats, whole wheat bread, beans (kidney, black beans, chickpeas, lentils), fruit (apples with peel, berries, banana, dried fruit, prunes), vegetables (broccoli, spinach, potato with skins, peas), nuts and seeds   Maintain adequate hydration with at least 64 oz non caffeine beverages daily  o Avoid sugar sweetened beverages  Water is best     Diabetes can increase your risk for heart disease  Choose healthy fats to help protect your heart  Examples include: olive oil, nuts/seeds, avocado, and fish   o Consume less saturated fat found in foods like butter, cream, fatty meats  o Avoid trans fats      Goals:  1500 calories  50-60 g protein  64 oz fluid  30-45 g carbohydrate at meals, 20g carbohydrate at snacks        Follow Up Plan: PRN per patient request  Recommend Referral to Other Providers: none at this time

## 2022-03-28 NOTE — PROGRESS NOTES
Outpatient Oncology Nutrition Consultation   Type of Consult: Follow Up  Care Location: Office Visit; pt and her      Reason for referral:  Joan Thomas on 11/23/20 (reason for referral: Malnutrition Screening Tool (MST) Triggers: scored a 2 indicating 14-23# (6 4-10 5 kg) recent wt loss and is not eating poorly due to a decreased appetite  (Date of MST: 11/23/20) and pt request)  Nutrition Assessment:   Oncology Diagnosis & Treatments: Pancreatic cancer diagnosed 6/2020  · Chemotherapy (adrucil, neulasta, emend, camptosar, eloxatin) 6/30/20-9/16/20  · S/p whipple procedure 10/20/20  · RT to the abdomen 12/21/20-1/26/21  · Chemotherapy (fluorouracil, oxaliplatin, adrucil, emend) started 2/8/21-4/4/21  Oncology History   History of pancreatic cancer   6/2/2020 Biopsy    [facility]- Dr Celia Boston:  Pancreas, uncinate mass:      - Malignant ([facility] Category VI)      - Compatible with adenocarcinoma with mucinous features       6/30/2020 - 9/16/2020 Chemotherapy    fluorouracil (ADRUCIL) injection 585 mg, 400 mg/m2 = 585 mg, Intravenous, Once, 7 of 12 cycles  Administration: 585 mg (6/30/2020), 585 mg (7/14/2020), 585 mg (7/28/2020), 585 mg (8/11/2020), 585 mg (9/8/2020), 585 mg (8/25/2020)  pegfilgrastim (NEULASTA ONPRO) subcutaneous injection kit 6 mg, 6 mg, Subcutaneous, Once, 7 of 12 cycles  Administration: 6 mg (7/2/2020), 6 mg (7/16/2020), 6 mg (7/30/2020), 6 mg (8/13/2020), 6 mg (8/27/2020), 6 mg (9/10/2020)  fosaprepitant (EMEND) 150 mg in sodium chloride 0 9 % 250 mL IVPB, 150 mg, Intravenous, Once, 7 of 12 cycles  Administration: 150 mg (6/30/2020), 150 mg (7/14/2020), 150 mg (7/28/2020), 150 mg (8/11/2020), 150 mg (9/8/2020), 150 mg (8/25/2020)  irinotecan (CAMPTOSAR) 263 mg in dextrose 5 % 500 mL chemo infusion, 180 mg/m2 = 263 mg, Intravenous, Once, 3 of 3 cycles  Administration: 263 mg (6/30/2020), 263 mg (7/14/2020), 263 mg (7/28/2020)  leucovorin 584 mg in dextrose 5 % 250 mL IVPB, 400 mg/m2 = 584 mg (100 % of original dose 400 mg/m2), Intravenous, Once, 2 of 7 cycles  Dose modification: 400 mg/m2 (original dose 400 mg/m2, Cycle 6)  Administration: 584 mg (9/8/2020)  oxaliplatin (ELOXATIN) 124 1 mg in dextrose 5 % 250 mL chemo infusion, 85 mg/m2 = 124 1 mg, Intravenous, Once, 7 of 12 cycles  Administration: 124 1 mg (6/30/2020), 124 1 mg (7/14/2020), 124 1 mg (7/28/2020), 124 1 mg (8/11/2020), 124 1 mg (9/8/2020), 124 1 mg (8/25/2020)     10/20/2020 Surgery    Whipple:  - Residual invasive adenocarcinoma with mucinous features, 2 3 cm   - Metastatic carcinoma present in one of fourteen lymph nodes (1/14)  - All margins are negative for tumor          12/21/2020 - 1/26/2021 Radiation    5000 cGy in 25 fractions to high risk areas  Dr Cricket Kwong     2/8/2021 - 4/4/2021 Chemotherapy    fluorouracil (ADRUCIL) injection 560 mg, 400 mg/m2 = 560 mg, Intravenous, Once, 4 of 4 cycles  Administration: 560 mg (2/8/2021), 560 mg (2/22/2021), 560 mg (3/8/2021), 560 mg (3/22/2021)  pegfilgrastim (NEULASTA ONPRO) subcutaneous injection kit 6 mg, 6 mg, Subcutaneous, Once, 4 of 4 cycles  Administration: 6 mg (2/10/2021), 6 mg (2/24/2021), 6 mg (3/10/2021), 6 mg (3/24/2021)  fosaprepitant (EMEND) 150 mg in sodium chloride 0 9 % 250 mL IVPB, 150 mg, Intravenous, Once, 4 of 4 cycles  Administration: 150 mg (2/8/2021), 150 mg (2/22/2021), 150 mg (3/8/2021), 150 mg (3/22/2021)  leucovorin 560 mg in dextrose 5 % 250 mL IVPB, 400 mg/m2 = 560 mg, Intravenous, Once, 4 of 4 cycles  Administration: 560 mg (2/8/2021), 560 mg (2/22/2021), 560 mg (3/8/2021), 560 mg (3/22/2021)  oxaliplatin (ELOXATIN) 119 mg in dextrose 5 % 250 mL chemo infusion, 85 mg/m2 = 119 mg, Intravenous, Once, 4 of 4 cycles  Administration: 119 mg (2/8/2021), 119 mg (2/22/2021), 119 mg (3/8/2021), 119 mg (3/22/2021)     Encounter for follow-up surveillance of pancreatic cancer     Past Medical & Surgical Hx: DM, HLD, HTN  Patient Active Problem List   Diagnosis    Hyperlipidemia  Essential hypertension    Type 2 diabetes mellitus without complication, without long-term current use of insulin (HCC)    Pruritus    Transaminitis    History of pancreatic cancer    Port-A-Cath in place    Therapeutic opioid-induced constipation ([facility]IC)    Anxiety    Functional diarrhea    Poor appetite    Chemotherapy induced neutropenia (HCC)    Encounter for follow-up surveillance of pancreatic cancer    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)    Mild protein-calorie malnutrition ([address] )    Encounter for removal of tunneled central venous catheter (CVC) with port     Past Medical History:   Diagnosis Date    BRCA1 negative     BRCA2 negative     Chemotherapy induced neutropenia [facility] [address] ) 2/10/2021    Diabetes mellitus ([facility] [address] )     Hypertension     Lactic acidosis 2020    Neuropathy     Obstructive jaundice 2020    Pancreas cancer ([facility] [address] )      Past Surgical History:   Procedure Laterality Date    BREAST BIOPSY Left     neg    BREAST SURGERY Left     calcifications     SECTION      CHOLECYSTECTOMY N/A 10/20/2020    Procedure: CHOLECYSTECTOMY;  Surgeon: Indu Fernandez MD;  [facility];  Service: Surgical Oncology    COLONOSCOPY      ECTOPIC PREGNANCY SURGERY      partial ovary removed    FL GUIDED CENTRAL VENOUS ACCESS DEVICE INSERTION  2020    JOINT REPLACEMENT Bilateral     LAPAROTOMY N/A 10/20/2020    Procedure: LAPAROTOMY EXPLORATORY; INTRAOPERATIVE ULTRASOUND;  Surgeon: Indu Fernandez MD;  [facility];  Service: Surgical Oncology    [facility]    NOSE SURGERY      deviated septum    REPLACEMENT TOTAL HIP W/  RESURFACING IMPLANTS Bilateral     right hip done 2012; left hip done 2012    TONSILLECTOMY      TUNNELED VENOUS PORT PLACEMENT Left 2020    Procedure: INSERTION VENOUS PORT (PORT-A-CATH), left;  Surgeon: Indu Fernandez MD; [facility];  Service: Surgical Oncology    WHIPPLE PROCEDURE/PANCREATICO-DUODENECTOMY N/A 10/20/2020    Procedure:  WHIPPLE PROCEDURE/PANCREATICO-DUODENECTOMY;  Surgeon: Juan Ramires MD;  [facility];  Service: Surgical Oncology       Review of Medications:   Vitamins, Supplements and Herbals: Med List Reviewed & pt is only taking: Benefiber QD in the morning     Current Outpatient Medications:     amLODIPine (NORVASC) 2 5 mg tablet, Take 2 5 mg by mouth daily, Disp: , Rfl:     dicyclomine (BENTYL) 20 mg tablet, TAKE 1 TABLET BY MOUTH TWICE A DAY (Patient taking differently: as needed  ), Disp: 180 tablet, Rfl: 1    diphenhydrAMINE (BENADRYL) 25 mg capsule, Take 25 mg by mouth every 6 (six) hours as needed for itching, Disp: , Rfl:     diphenoxylate-atropine (LOMOTIL) 2 5-0 025 mg per tablet, Take 1 tablet by mouth 4 (four) times a day as needed for diarrhea DO NOT EXCEED 4 DOSES IN 1 DAY, Disp: 30 tablet, Rfl: 0    Empagliflozin (Jardiance) 10 MG TABS, Take 10 mg by mouth every morning, Disp: , Rfl:     gabapentin (NEURONTIN) 300 mg capsule, Take 300 mg by mouth 3 (three) times a day, Disp: , Rfl:     LORazepam (ATIVAN) 0 5 mg tablet, Take 1 tablet (0 5 mg total) by mouth 2 (two) times a day as needed for anxiety NO FURTHER REFILLS WITHOUT CLINIC VISIT WITH PALLIATIVE CARE, Disp: 30 tablet, Rfl: 0    mirtazapine (REMERON) 15 mg tablet, Take 1 tablet (15 mg total) by mouth daily at bedtime, Disp: 90 tablet, Rfl: 3    omeprazole (PriLOSEC) 20 mg delayed release capsule, TAKE 1 CAPSULE BY MOUTH TWICE A DAY, Disp: 180 capsule, Rfl: 1    prochlorperazine (COMPAZINE) 10 mg tablet, Take 1 tablet (10 mg total) by mouth every 6 (six) hours as needed for nausea or vomiting, Disp: 45 tablet, Rfl: 3    Most Recent Lab Results: Checks BG at home every morning: recent readings 116mg/dL, 130mg/dL, 200mg/dL    Lab Results   Component Value Date    WBC 4 88 12/11/2021    CHOLESTEROL 183 10/11/2021 TRIG 398 (H) 10/11/2021    HDL 36 (L) 10/11/2021    LDLCALC 67 10/11/2021    ALT 32 12/11/2021    AST 31 12/11/2021    ALB 3 8 12/11/2021    SODIUM 138 02/17/2022    SODIUM 143 12/11/2021    K 4 0 02/17/2022    K 3 6 12/11/2021     02/17/2022    BUN 11 02/17/2022    BUN 10 12/11/2021    CREATININE 1 26 02/17/2022    CREATININE 1 08 12/11/2021    EGFR 43 02/17/2022    PHOS 2 8 11/01/2020    PHOS 4 8 (H) 10/31/2020    GLUCOSE 153 (H) 10/20/2020    POCGLU 143 (H) 11/02/2020    GLUF 146 (H) 02/17/2022    GLUF 147 (H) 12/11/2021    GLUC 239 (H) 01/16/2021    HGBA1C 6 3 (H) 10/11/2021    HGBA1C 6 0 (H) 11/25/2020    HGBA1C 6 4 (H) 10/08/2020    CALCIUM 9 3 02/17/2022    MG 1 8 11/01/2020       Anthropometric Measurements:   Height: 59"  Ht Readings from Last 1 Encounters:   03/23/22 4' 11" (1 499 m)     Wt Readings from Last 20 Encounters:   03/23/22 49 9 kg (110 lb)   03/08/22 48 1 kg (106 lb)   02/18/22 48 1 kg (106 lb)   12/14/21 48 5 kg (107 lb)   10/28/21 47 kg (103 lb 9 6 oz)   10/15/21 46 3 kg (102 lb)   08/23/21 46 3 kg (102 lb)   08/20/21 47 2 kg (104 lb)   08/03/21 46 3 kg (102 lb)   07/19/21 46 6 kg (102 lb 12 8 oz)   06/22/21 46 2 kg (101 lb 12 8 oz)   04/13/21 46 3 kg (102 lb)   04/12/21 46 3 kg (102 lb)   03/23/21 47 6 kg (105 lb)   03/22/21 47 2 kg (104 lb 0 9 oz)   03/08/21 46 6 kg (102 lb 11 8 oz)   02/22/21 48 kg (105 lb 13 1 oz)   02/08/21 48 6 kg (107 lb 2 3 oz)   02/02/21 48 3 kg (106 lb 8 oz)   01/18/21 47 4 kg (104 lb 8 oz)     Weight History:    Usual Weight: 145#   Varian: (12/28/20) 108#, (1/4/21) 108 5#, (1/7/21) 106#, (1/11/21) 106#, (1/14/21) 104 5#, (1/18/21) 105#, (1/21/21) 104 5#, (1/25/21) 106 5#    Home weight: (12/21/20) 107#, (3/28/22) 104#     Oncology Nutrition-Anthropometrics      Nutrition from 3/28/2022 in [address] [place] [address] Nutrition from 12/21/2020 in [facility] Sushant   Patient age (years): 77 years 77 years   Patient (female) height (in): 59 in 61 in   Current Weight to be used for anthropometric calculations (lbs) 110 lbs 111 1 lbs   Current Weight to be used for anthropometric calculations (kg) 50 kg 51 kg   BMI: 22 2 --   IBW female: 95 lbs 95 lbs   IBW (kg) female: 43 2 kg 43 2 kg   IBW % (female) 115 8 % 116 9 %   Adjusted BW (female): 98 8 lbs 99 lbs   Adjusted BW kg (female): 44 9 kg 45 kg   % weight change after 1 month: 3 8 % --   Weight change after 1 month (lbs) 4 lbs 4 lbs   % weight change after 3 months: 2 8 % --   Weight change after 3 months (lbs) 3 lbs 0 lbs   % weight change after 1 year: 4 8 % --          Nutrition-Focused Physical Findings: none observed    Food/Nutrition-Related History & Client/Social History:    Current Nutrition Impact Symptoms:  [] Nausea  [] Reduced Appetite  [] Acid Reflux    [] Vomiting  [] Unintended Wt Loss  [] Malabsorption -when eating foods with high fat content    [] Diarrhea  [] Unintended Wt Gain  [] Dumping Syndrome    [] Constipation  [] Thick Mucous/Secretions  [] Abdominal Pain    [] Dysgeusia (Altered Taste)  [] Xerostomia (Dry Mouth)  [] Gas    [] Dysosmia (Altered Smell)  [] Thrush  [] Difficulty Chewing    [] Oral Mucositis (Sore Mouth)  [] Fatigue   [x] Hyperglycemia:  hba1c 6 3% on 10/11/21; BG 146mg/dL  on 2/17/22   [] Odynophagia   [] Esophagitis  [] Other:    [] Dysphagia  [] Early Satiety  [x] No Problems Eating      Food Allergies: no  Food Intolerances: no    Current Diet: CHO-Controlled  Current Nutrition Intake: Unchanged from usual  Appetite: Good  Nutrition Route: PO  Meal planning/preparation mainly done by: Self and Spouse/Partner  Oral Care: brushes BID  Activity level: ADL  Exercising 5-6 days per week: rowing machine for 12 mins  Plans to walk in the neighborhood when weather gets warmer       24 Hr Diet Recall:   Breakfast: cottage cheese 4% with: walnuts, coconut flakes, raisins OR omelette OR shredded wheat cereal with almond milk  Snack: pretzels or Triscuit crackers   Lunch: ramen noodles OR chicken/tuna salad on a wrap OR  salad    Snack: popcorn OR fruit: apple, banana, or avocado OR baby carrots with hummus   Dinner: meat and vegetable (asparagus, broccoli, Beaver Sprouts)     Beverages: coffee with creamer (12oz x1), hot tea with lemon and honey (12oz x1), water (less than 16oz), SF lemonade (8oz x0-1)   Supplements:    None      Oncology Nutrition-Estimated Needs      Nutrition from 3/28/2022 in [address] [place] [address] Nutrition from 2020 in [place] [address] Oncology Dietitian Sushant   Weight type used Actual weight Actual weight   Weight in kilograms (kg) used for estimated needs 50 kg --   Weight in kilograms (kg) used for estimated needs -- 50 5 kg   Energy needs formula:  25-30 kcal/kg 30-35 kcal/kg   Energy needs based on 25 kcal/k kcal --   Energy needs based on 30 kcal/k kcal --   Energy needs based on 30 kcal/kg: -- 1515 kcal   Energy needs based on 35 kcal/kg: -- 1768 kcal   Protein needs formula: 1-1 2 g/kg 1 2-1 5 g/kg   Protein needs based on 1 g/kg 50 g --   Protein needs based on 1 2 g/k g --   Protein needs based on 1 2 g/kg: -- 61 g   Protein needs based on 1 5 g/kg -- 76 g   Fluid needs formula: 30-35 mL/kg 30-35 mL/kg   Fluid needs based on 30 mL/kg 1500 mL 1515 mL   Fluid needs in ounces 51 oz 51 oz   Fluid needs based on 35 mL/kg 1750 mL 1768 mL   Fluid needs in ounces 59 oz 60 oz           Discussion & Intervention:   Susan Nunez was evaluated today for an RD follow up regarding pt request for diet guidance to control BG  Susan Nunez has completed tx for pancreatic cancer  Today she is looking for diet guidance to help regulate her BG levels  Reviewed 24 hour recall, which revealed an adequate po intake  Reviewed a diabetic diet & food choices to include at all meals & snacks and spreading carbohydrate intake throughout the day & counting carbohydrates for adequate glycemic control   Provided carbohydrate goals for each meal  Reviewed protein sources, protein goals, and pairing proteins and carbohydrates  Reviewed the importance of hydration with at least 64oz non caffeine beverages daily  Discussed increasing fiber intake, foods sources, and the importance of hydration with increased fiber intake  Moving forward, Rafael Gandara will make efforts to follow a CHO consistent diet  Materials Provided: Carbohydrate Counting for People with Diabetes, 1500kcal sample menu, Carbohydate Controlled Snack Ideas, Fiber hadnout, Protein Sources handout   All questions and concerns addressed during todays visit  Rafael Gandara has RD contact information  Nutrition Diagnosis:    Food and nutrition related knowledge deficit related to Lack of prior exposure to accurate nutrition related information as evidenced by No prior knowledge of need for food and nutrition related recommendations  Monitoring & Evaluation:   Goals:  · weight maintenance/stabilization  · pt to meet >/=75% estimated nutrition needs daily  · adequate glycemic control    · Progress Towards Goals: Progressing    Nutrition Rx & Recommendations: Follow a Carbohydrate Controlled Diet:     Foods with carbohydrates make your blood glucose level go up  Learning how to count carbohydrates can help you control your blood glucose levels  First, identify the foods you eat that contain carbohydrates   Foods that commonly contain carbohydrates include: grains (breads, pasta, cereal, rice), fruit, starchy vegetables (potatoes, corn, peas), snack foods (chips, pretzels, crackers), dairy products, sweets/desserts   Foods that do not usually contain carbohydrates: chicken, pork, beef, fish, seafood, eggs, tofu, cheese, butter, sour cream, avocado, nuts, seeds, olives, mayonnaise, water, black coffee, unsweetened tea, and zero-calorie drinks  Vegetables with no or low carbohydrate include green beans, cauliflower, tomatoes, and onions     Meal Planning:    Choose your protein source (refer to Protein Sources handout for examples)  Protein goal is 50-60g daily   Plan for half of your plate to be non-starchy vegetables   Try to limit your carbohydrate portion to 1/4 of the plate   Aim for 2-3 Carb servings at each meal, and 1-2 servings at each snack (refer to Choose your Foods list for examples of carbohydrates and their serving sizes)  Tips:   Choose foods with fiber, including: bran cereal, quinoa, oats, whole wheat bread, beans (kidney, black beans, chickpeas, lentils), fruit (apples with peel, berries, banana, dried fruit, prunes), vegetables (broccoli, spinach, potato with skins, peas), nuts and seeds   Maintain adequate hydration with at least 64 oz non caffeine beverages daily  o Avoid sugar sweetened beverages  Water is best     Diabetes can increase your risk for heart disease  Choose healthy fats to help protect your heart  Examples include: olive oil, nuts/seeds, avocado, and fish   o Consume less saturated fat found in foods like butter, cream, fatty meats  o Avoid trans fats  Goals:  1500 calories  50-60 g protein  64 oz fluid  30-45 g carbohydrate at meals, 20g carbohydrate at snacks        Follow Up Plan: PRN per patient request  Recommend Referral to Other Providers: none at this time

## 2022-03-29 ENCOUNTER — PROCEDURE VISIT (OUTPATIENT)
Dept: SURGICAL ONCOLOGY | Facility: CLINIC | Age: 67
End: 2022-03-29
Payer: MEDICARE

## 2022-03-29 VITALS
OXYGEN SATURATION: 98 % | BODY MASS INDEX: 20.56 KG/M2 | WEIGHT: 102 LBS | TEMPERATURE: 96.8 F | RESPIRATION RATE: 16 BRPM | SYSTOLIC BLOOD PRESSURE: 136 MMHG | DIASTOLIC BLOOD PRESSURE: 84 MMHG | HEIGHT: 59 IN | HEART RATE: 77 BPM

## 2022-03-29 DIAGNOSIS — Z45.2 ENCOUNTER FOR REMOVAL OF TUNNELED CENTRAL VENOUS CATHETER (CVC) WITH PORT: Primary | ICD-10-CM

## 2022-03-29 PROCEDURE — 36590 REMOVAL TUNNELED CV CATH: CPT | Performed by: SURGERY

## 2022-03-29 NOTE — PROGRESS NOTES
Removal of port-a-cath     Date/Time 3/29/2022 10:49 AM     Performed by  Gregoria De MD     Authorized by Gregoria De MD      Universal Protocol   Consent: Verbal consent obtained  Written consent obtained  Consent given by: patient        Local anesthesia used: yes      Anesthesia: local infiltration     Anesthesia   Local anesthesia used: yes  Local Anesthetic: lidocaine 1% without epinephrine  Anesthetic total: 30 mL     Sedation   Patient sedated: no        Specimen: no   Procedure Details   Procedure Notes: The port site was prepped with alcohol and Betadine, then anesthetized with local anesthesia  Following this, an incision was made over the previous scar  Cautery was used to dissect through the dermis and subcutaneous tissue down to the port capsule  The port capsule was then entered, exposing the port  Anchoring sutures were then cut  The port was then removed easily in its entirety  Pressure was held for hemostasis as needed  Cautery was also used for hemostasis  Once we were sure of hemostasis, closure was performed using 3-0 Vicryl to close the subcutaneous fat, followed by 3-0 Vicryl to close the dermis in interrupted fashion, followed by 4-0 Monocryl placed in running subcuticular fashion to close the skin  Benzoin and Steri-Strips were applied followed by a 4 x 4 dressing and Tegaderm gauze  The patient tolerated the procedure well without any difficulties    Patient tolerance: patient tolerated the procedure well with no immediate complications

## 2022-04-22 ENCOUNTER — ANESTHESIA (OUTPATIENT)
Dept: GASTROENTEROLOGY | Facility: HOSPITAL | Age: 67
End: 2022-04-22

## 2022-04-22 ENCOUNTER — HOSPITAL ENCOUNTER (OUTPATIENT)
Dept: GASTROENTEROLOGY | Facility: HOSPITAL | Age: 67
Setting detail: OUTPATIENT SURGERY
Discharge: HOME | End: 2022-04-22
Admitting: PHYSICIAN ASSISTANT
Payer: MEDICARE

## 2022-04-22 ENCOUNTER — ANESTHESIA EVENT (OUTPATIENT)
Dept: GASTROENTEROLOGY | Facility: HOSPITAL | Age: 67
End: 2022-04-22

## 2022-04-22 VITALS
BODY MASS INDEX: 20 KG/M2 | SYSTOLIC BLOOD PRESSURE: 132 MMHG | OXYGEN SATURATION: 97 % | HEART RATE: 74 BPM | DIASTOLIC BLOOD PRESSURE: 89 MMHG | WEIGHT: 99.21 LBS | TEMPERATURE: 97.3 F | RESPIRATION RATE: 22 BRPM | HEIGHT: 59 IN

## 2022-04-22 DIAGNOSIS — K20.90 ESOPHAGITIS: ICD-10-CM

## 2022-04-22 DIAGNOSIS — K29.70 GASTRITIS WITHOUT BLEEDING, UNSPECIFIED CHRONICITY, UNSPECIFIED GASTRITIS TYPE: ICD-10-CM

## 2022-04-22 DIAGNOSIS — Z12.11 SCREENING FOR MALIGNANT NEOPLASM OF COLON: ICD-10-CM

## 2022-04-22 LAB — GLUCOSE SERPL-MCNC: 144 MG/DL (ref 65–140)

## 2022-04-22 PROCEDURE — 88305 TISSUE EXAM BY PATHOLOGIST: CPT | Performed by: PATHOLOGY

## 2022-04-22 PROCEDURE — 43239 EGD BIOPSY SINGLE/MULTIPLE: CPT | Performed by: INTERNAL MEDICINE

## 2022-04-22 PROCEDURE — 45380 COLONOSCOPY AND BIOPSY: CPT | Performed by: INTERNAL MEDICINE

## 2022-04-22 PROCEDURE — 82948 REAGENT STRIP/BLOOD GLUCOSE: CPT

## 2022-04-22 RX ORDER — PROPOFOL 10 MG/ML
INJECTION, EMULSION INTRAVENOUS AS NEEDED
Status: DISCONTINUED | OUTPATIENT
Start: 2022-04-22 | End: 2022-04-22

## 2022-04-22 RX ORDER — LIDOCAINE HYDROCHLORIDE 20 MG/ML
INJECTION, SOLUTION EPIDURAL; INFILTRATION; INTRACAUDAL; PERINEURAL AS NEEDED
Status: DISCONTINUED | OUTPATIENT
Start: 2022-04-22 | End: 2022-04-22

## 2022-04-22 RX ORDER — SODIUM CHLORIDE, SODIUM LACTATE, POTASSIUM CHLORIDE, CALCIUM CHLORIDE 600; 310; 30; 20 MG/100ML; MG/100ML; MG/100ML; MG/100ML
INJECTION, SOLUTION INTRAVENOUS CONTINUOUS PRN
Status: DISCONTINUED | OUTPATIENT
Start: 2022-04-22 | End: 2022-04-22

## 2022-04-22 RX ADMIN — PROPOFOL 100 MG: 10 INJECTION, EMULSION INTRAVENOUS at 07:46

## 2022-04-22 RX ADMIN — PROPOFOL 20 MG: 10 INJECTION, EMULSION INTRAVENOUS at 08:05

## 2022-04-22 RX ADMIN — PROPOFOL 20 MG: 10 INJECTION, EMULSION INTRAVENOUS at 08:02

## 2022-04-22 RX ADMIN — PROPOFOL 20 MG: 10 INJECTION, EMULSION INTRAVENOUS at 08:00

## 2022-04-22 RX ADMIN — PROPOFOL 20 MG: 10 INJECTION, EMULSION INTRAVENOUS at 07:47

## 2022-04-22 RX ADMIN — LIDOCAINE HYDROCHLORIDE 40 MG: 20 INJECTION, SOLUTION EPIDURAL; INFILTRATION; INTRACAUDAL; PERINEURAL at 07:43

## 2022-04-22 RX ADMIN — PROPOFOL 20 MG: 10 INJECTION, EMULSION INTRAVENOUS at 07:49

## 2022-04-22 RX ADMIN — PROPOFOL 20 MG: 10 INJECTION, EMULSION INTRAVENOUS at 07:51

## 2022-04-22 RX ADMIN — PROPOFOL 20 MG: 10 INJECTION, EMULSION INTRAVENOUS at 08:09

## 2022-04-22 RX ADMIN — PROPOFOL 20 MG: 10 INJECTION, EMULSION INTRAVENOUS at 07:58

## 2022-04-22 RX ADMIN — LIDOCAINE HYDROCHLORIDE 40 MG: 20 INJECTION, SOLUTION EPIDURAL; INFILTRATION; INTRACAUDAL; PERINEURAL at 07:46

## 2022-04-22 RX ADMIN — PROPOFOL 20 MG: 10 INJECTION, EMULSION INTRAVENOUS at 07:56

## 2022-04-22 RX ADMIN — SODIUM CHLORIDE, SODIUM LACTATE, POTASSIUM CHLORIDE, AND CALCIUM CHLORIDE: .6; .31; .03; .02 INJECTION, SOLUTION INTRAVENOUS at 07:35

## 2022-04-22 RX ADMIN — PROPOFOL 20 MG: 10 INJECTION, EMULSION INTRAVENOUS at 07:54

## 2022-04-22 NOTE — ANESTHESIA POSTPROCEDURE EVALUATION
Post-Op Assessment Note    CV Status:  Stable  Pain Score: 0    Pain management: adequate     Mental Status:  Arousable and sleepy   Hydration Status:  Euvolemic   PONV Controlled:  Controlled   Airway Patency:  Patent      Post Op Vitals Reviewed: Yes      Staff: CRNA         No complications documented      BP   102/66   Temp     Pulse 70   Resp 18   SpO2 98% RA

## 2022-04-22 NOTE — ANESTHESIA PREPROCEDURE EVALUATION
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)        Physical Exam    Airway    Mallampati score: II  TM Distance: >3 FB  Neck ROM: full     Dental   No notable dental hx     Cardiovascular  Cardiovascular exam normal    Pulmonary  Pulmonary exam normal     Other Findings        Anesthesia Plan  ASA Score- 3     Anesthesia Type- IV sedation with anesthesia with ASA Monitors  Additional Monitors:   Airway Plan:           Plan Factors-    Chart reviewed  Patient summary reviewed  Induction- intravenous  Postoperative Plan-     Informed Consent- Anesthetic plan and risks discussed with patient  I personally reviewed this patient with the CRNA  Discussed and agreed on the Anesthesia Plan with the CRNA  Valorie Smith

## 2022-05-04 ENCOUNTER — TELEPHONE (OUTPATIENT)
Dept: GASTROENTEROLOGY | Facility: CLINIC | Age: 67
End: 2022-05-04

## 2022-05-04 NOTE — TELEPHONE ENCOUNTER
----- Message from Lyle Montes DO sent at 5/4/2022  7:26 AM EDT -----  Please call the patient with the biopsy results  Biopsies the stomach were benign and negative for Helicobacter pylori or for cancer  Biopsies of the colon were benign and negative for his microscopic colitis or for cancer

## 2022-05-19 ENCOUNTER — APPOINTMENT (OUTPATIENT)
Dept: LAB | Facility: HOSPITAL | Age: 67
End: 2022-05-19
Payer: MEDICARE

## 2022-05-24 ENCOUNTER — TELEPHONE (OUTPATIENT)
Dept: HEMATOLOGY ONCOLOGY | Facility: CLINIC | Age: 67
End: 2022-05-24

## 2022-06-15 RX ORDER — BLOOD SUGAR DIAGNOSTIC
STRIP MISCELLANEOUS DAILY
COMMUNITY
Start: 2022-04-23

## 2022-06-16 ENCOUNTER — TELEMEDICINE (OUTPATIENT)
Dept: GASTROENTEROLOGY | Facility: CLINIC | Age: 67
End: 2022-06-16
Payer: MEDICARE

## 2022-06-16 DIAGNOSIS — Z12.11 SCREENING FOR MALIGNANT NEOPLASM OF COLON: Primary | ICD-10-CM

## 2022-06-16 DIAGNOSIS — K21.9 GASTROESOPHAGEAL REFLUX DISEASE WITHOUT ESOPHAGITIS: ICD-10-CM

## 2022-06-16 PROCEDURE — 99213 OFFICE O/P EST LOW 20 MIN: CPT | Performed by: PHYSICIAN ASSISTANT

## 2022-06-16 PROCEDURE — 1124F ACP DISCUSS-NO DSCNMKR DOCD: CPT | Performed by: PHYSICIAN ASSISTANT

## 2022-06-16 NOTE — PROGRESS NOTES
Virtual Regular Visit    Verification of patient location:    Patient is located in the following state in which I hold an active license [place]      Assessment/Plan:    Problem List Items Addressed This Visit    None       1  Screening for malignant neoplasm of colon  2  Gastroesophageal reflux disease without esophagitis  Patient is due for follow-up colonoscopy in 10 years  Patient will continue pantoprazole 40 mg daily  Patient will follow-up as needed  Reason for visit is   Chief Complaint   Patient presents with    Virtual Regular Visit        Encounter provider Mathieu Mondragon PA-C    Provider located at [address] [facility] [address] [facility] [address] [facility] [address] [phone]      Recent Visits  No visits were found meeting these conditions  Showing recent visits within past 7 days and meeting all other requirements  Today's Visits  Date Type Provider Dept   06/16/22 Telemedicine Mathieu Mondragon PA-C [facility]   Showing today's visits and meeting all other requirements  Future Appointments  No visits were found meeting these conditions  Showing future appointments within next 150 days and meeting all other requirements       The patient was identified by name and date of birth  Ree Painting was informed that this is a telemedicine visit and that the visit is being conducted through [facility] and patient was informed this is a secure, HIPAA-complaint platform  She agrees to proceed     My office door was closed  No one else was in the room  She acknowledged consent and understanding of privacy and security of the video platform  The patient has agreed to participate and understands they can discontinue the visit at any time  Patient is aware this is a billable service  Solo Painting is a 69 y o  female who presents for follow up after her EGD and colonoscopy      Patients EGD and colonoscopy from April 22, 2022 was essentially normal   Patient is currently maintained on pantoprazole 40 mg daily and she is doing quite well  Patient reports as long as she takes the pantoprazole daily she feels great  She denies any significant episodes of nausea or vomiting  She denies any diarrhea constipation  She denies any melena or rectal bleeding  HPI     Past Medical History:   Diagnosis Date    BRCA1 negative     BRCA2 negative     Chemotherapy induced neutropenia ([facility] [address] ) 2/10/2021    Diabetes mellitus ([facility] )     Hypertension     Lactic acidosis 2020    Neuropathy     Obstructive jaundice 2020    Pancreas cancer ([facility] )        Past Surgical History:   Procedure Laterality Date    BREAST BIOPSY Left 2009    neg    BREAST SURGERY Left     calcifications     SECTION      CHOLECYSTECTOMY N/A 10/20/2020    Procedure: CHOLECYSTECTOMY;  Surgeon: Yaneth Willett MD;  [facility];  Service: Surgical Oncology    COLONOSCOPY      ECTOPIC PREGNANCY SURGERY      partial ovary removed    FL GUIDED CENTRAL VENOUS ACCESS DEVICE INSERTION  2020    JOINT REPLACEMENT Bilateral     LAPAROTOMY N/A 10/20/2020    Procedure: LAPAROTOMY EXPLORATORY; INTRAOPERATIVE ULTRASOUND;  Surgeon: Yaneth Willett MD;  [facility];  Service: Surgical Oncology    [address] [place] [address]    NOSE SURGERY      deviated septum    REPLACEMENT TOTAL HIP W/  RESURFACING IMPLANTS Bilateral     right hip done 2012; left hip done 2012    TONSILLECTOMY      TUNNELED VENOUS PORT PLACEMENT Left 2020    Procedure: INSERTION VENOUS PORT (PORT-A-CATH), left;  Surgeon: Yaneth Willett MD;  [facility];  Service: Surgical Oncology    WHIPPLE PROCEDURE/PANCREATICO-DUODENECTOMY N/A 10/20/2020    Procedure:  WHIPPLE PROCEDURE/PANCREATICO-DUODENECTOMY;  Surgeon: Yaneth Willett MD;  [facility];  Service: Surgical Oncology       Current Outpatient Medications Medication Sig Dispense Refill    amLODIPine (NORVASC) 2 5 mg tablet Take 2 5 mg by mouth daily      dicyclomine (BENTYL) 20 mg tablet TAKE 1 TABLET BY MOUTH TWICE A DAY (Patient taking differently: as needed  ) 180 tablet 1    diphenhydrAMINE (BENADRYL) 25 mg capsule Take 25 mg by mouth every 6 (six) hours as needed for itching      diphenoxylate-atropine (LOMOTIL) 2 5-0 025 mg per tablet Take 1 tablet by mouth 4 (four) times a day as needed for diarrhea DO NOT EXCEED 4 DOSES IN 1 DAY 30 tablet 0    Empagliflozin (Jardiance) 10 MG TABS Take 10 mg by mouth every morning      gabapentin (NEURONTIN) 300 mg capsule Take 300 mg by mouth 3 (three) times a day      LORazepam (ATIVAN) 0 5 mg tablet Take 1 tablet (0 5 mg total) by mouth 2 (two) times a day as needed for anxiety NO FURTHER REFILLS WITHOUT CLINIC VISIT WITH PALLIATIVE CARE 30 tablet 0    mirtazapine (REMERON) 15 mg tablet Take 1 tablet (15 mg total) by mouth daily at bedtime 90 tablet 3    omeprazole (PriLOSEC) 20 mg delayed release capsule TAKE 1 CAPSULE BY MOUTH TWICE A  capsule 1    OneTouch Verio test strip daily Test      prochlorperazine (COMPAZINE) 10 mg tablet Take 1 tablet (10 mg total) by mouth every 6 (six) hours as needed for nausea or vomiting 45 tablet 3     No current facility-administered medications for this visit  Allergies   Allergen Reactions    Betadine [Povidone Iodine] Rash     Also itching from betadine    Other Throat Swelling     Pt unsure which chemotherapy drug caused tongue swelling and locked jaw  Please review in epic notes Folfirinox or possible Neulasta  NEED to review       Review of Systems    Video Exam    There were no vitals filed for this visit      Physical Exam     I spent 10 minutes with patient today in which greater than 50% of the time was spent in counseling/coordination of care regarding GERD    VIRTUAL VISIT [id number] Buchanan verbally agrees to participate in Virtual Care Services  Pt is aware that [employer] could be limited without vital signs or the ability to perform a full hands-on physical Riya Mcginnis understands she or the provider may request at any time to terminate the video visit and request the patient to seek care or treatment in person

## 2022-07-02 DIAGNOSIS — K21.9 GASTROESOPHAGEAL REFLUX DISEASE WITHOUT ESOPHAGITIS: ICD-10-CM

## 2022-07-02 RX ORDER — OMEPRAZOLE 20 MG/1
CAPSULE, DELAYED RELEASE ORAL
Qty: 180 CAPSULE | Refills: 1 | Status: SHIPPED | OUTPATIENT
Start: 2022-07-02

## 2022-07-11 ENCOUNTER — APPOINTMENT (OUTPATIENT)
Dept: LAB | Facility: HOSPITAL | Age: 67
End: 2022-07-11
Payer: MEDICARE

## 2022-07-11 DIAGNOSIS — C25.9 PANCREATIC ADENOCARCINOMA (HCC): ICD-10-CM

## 2022-07-11 LAB
ALBUMIN SERPL BCP-MCNC: 4 G/DL (ref 3.5–5)
ALP SERPL-CCNC: 95 U/L (ref 46–116)
ALT SERPL W P-5'-P-CCNC: 27 U/L (ref 12–78)
ANION GAP SERPL CALCULATED.3IONS-SCNC: 10 MMOL/L (ref 4–13)
AST SERPL W P-5'-P-CCNC: 31 U/L (ref 5–45)
BASOPHILS # BLD AUTO: 0.02 THOUSANDS/ΜL (ref 0–0.1)
BASOPHILS NFR BLD AUTO: 0 % (ref 0–1)
BILIRUB SERPL-MCNC: 0.54 MG/DL (ref 0.2–1)
BUN SERPL-MCNC: 12 MG/DL (ref 5–25)
CALCIUM SERPL-MCNC: 9.1 MG/DL (ref 8.3–10.1)
CHLORIDE SERPL-SCNC: 102 MMOL/L (ref 100–108)
CO2 SERPL-SCNC: 28 MMOL/L (ref 21–32)
CREAT SERPL-MCNC: 1.18 MG/DL (ref 0.6–1.3)
EOSINOPHIL # BLD AUTO: 0.09 THOUSAND/ΜL (ref 0–0.61)
EOSINOPHIL NFR BLD AUTO: 2 % (ref 0–6)
ERYTHROCYTE [DISTWIDTH] IN BLOOD BY AUTOMATED COUNT: 12.4 % (ref 11.6–15.1)
GFR SERPL CREATININE-BSD FRML MDRD: 47 ML/MIN/1.73SQ M
GLUCOSE P FAST SERPL-MCNC: 135 MG/DL (ref 65–99)
HCT VFR BLD AUTO: 46.8 % (ref 34.8–46.1)
HGB BLD-MCNC: 15.5 G/DL (ref 11.5–15.4)
IMM GRANULOCYTES # BLD AUTO: 0.01 THOUSAND/UL (ref 0–0.2)
IMM GRANULOCYTES NFR BLD AUTO: 0 % (ref 0–2)
LYMPHOCYTES # BLD AUTO: 0.94 THOUSANDS/ΜL (ref 0.6–4.47)
LYMPHOCYTES NFR BLD AUTO: 21 % (ref 14–44)
MCH RBC QN AUTO: 31.8 PG (ref 26.8–34.3)
MCHC RBC AUTO-ENTMCNC: 33.1 G/DL (ref 31.4–37.4)
MCV RBC AUTO: 96 FL (ref 82–98)
MONOCYTES # BLD AUTO: 0.38 THOUSAND/ΜL (ref 0.17–1.22)
MONOCYTES NFR BLD AUTO: 9 % (ref 4–12)
NEUTROPHILS # BLD AUTO: 3.01 THOUSANDS/ΜL (ref 1.85–7.62)
NEUTS SEG NFR BLD AUTO: 68 % (ref 43–75)
NRBC BLD AUTO-RTO: 0 /100 WBCS
PLATELET # BLD AUTO: 167 THOUSANDS/UL (ref 149–390)
PMV BLD AUTO: 10.2 FL (ref 8.9–12.7)
POTASSIUM SERPL-SCNC: 4.1 MMOL/L (ref 3.5–5.3)
PROT SERPL-MCNC: 7.2 G/DL (ref 6.4–8.2)
RBC # BLD AUTO: 4.87 MILLION/UL (ref 3.81–5.12)
SODIUM SERPL-SCNC: 140 MMOL/L (ref 136–145)
WBC # BLD AUTO: 4.45 THOUSAND/UL (ref 4.31–10.16)

## 2022-07-11 PROCEDURE — 86301 IMMUNOASSAY TUMOR CA 19-9: CPT

## 2022-07-11 PROCEDURE — 80053 COMPREHEN METABOLIC PANEL: CPT

## 2022-07-11 PROCEDURE — 85025 COMPLETE CBC W/AUTO DIFF WBC: CPT

## 2022-07-11 PROCEDURE — 36415 COLL VENOUS BLD VENIPUNCTURE: CPT

## 2022-07-12 LAB — CANCER AG19-9 SERPL-ACNC: <2 U/ML (ref 0–35)

## 2022-07-13 NOTE — PROGRESS NOTES
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   The patient's bilirubin which was elevated at 12 2 at its peak has now come down to 1 0   She was seen by our colleagues in Surgical Oncology and they recommended neoadjuvant chemotherapy prior to consideration of a resection  She was started on FOLFIRINOX  every 2 weeks with Neulasta growth factor support; first dose was given on 6/30/20    She had this for approximately 3 cycles but then had a reaction to the CPT 11 so this was discontinued  She then proceeded to receive dose adjusted modified FOLFOX 6  She finished up 6 cycles and went for surgery  Whipple procedure/pancreaticoduodenectomy completed on 10/20/20   Surgery revealed 1 positive lymph node along with residual tumor   She completed concurrent chemoradiation in the adjuvant setting followed by 4 cycles of modified FOLFOX 6    She is on observation    Previous Hematologic/ Oncologic History:    Oncology History   History of pancreatic cancer   6/2/2020 Biopsy    [facility]- Dr Catalina Herr:  Pancreas, uncinate mass:      - Malignant ([facility] Category VI)      - Compatible with adenocarcinoma with mucinous features       6/30/2020 - 9/16/2020 Chemotherapy    fluorouracil (ADRUCIL) injection 585 mg, 400 mg/m2 = 585 mg, Intravenous, Once, 7 of 12 cycles  Administration: 585 mg (6/30/2020), 585 mg (7/14/2020), 585 mg (7/28/2020), 585 mg (8/11/2020), 585 mg (9/8/2020), 585 mg (8/25/2020)  pegfilgrastim (NEULASTA ONPRO) subcutaneous injection kit 6 mg, 6 mg, Subcutaneous, Once, 7 of 12 cycles  Administration: 6 mg (7/2/2020), 6 mg (7/16/2020), 6 mg (7/30/2020), 6 mg (8/13/2020), 6 mg (8/27/2020), 6 mg (9/10/2020)  fosaprepitant (EMEND) 150 mg in sodium chloride 0 9 % 250 mL IVPB, 150 mg, Intravenous, Once, 7 of 12 cycles  Administration: 150 mg (6/30/2020), 150 mg (7/14/2020), 150 mg (7/28/2020), 150 mg (8/11/2020), 150 mg (9/8/2020), 150 mg (8/25/2020)  irinotecan (CAMPTOSAR) 263 mg in dextrose 5 % 500 mL chemo infusion, 180 mg/m2 = 263 mg, Intravenous, Once, 3 of 3 cycles  Administration: 263 mg (6/30/2020), 263 mg (7/14/2020), 263 mg (7/28/2020)  leucovorin 584 mg in dextrose 5 % 250 mL IVPB, 400 mg/m2 = 584 mg (100 % of original dose 400 mg/m2), Intravenous, Once, 2 of 7 cycles  Dose modification: 400 mg/m2 (original dose 400 mg/m2, Cycle 6)  Administration: 584 mg (9/8/2020)  oxaliplatin (ELOXATIN) 124 1 mg in dextrose 5 % 250 mL chemo infusion, 85 mg/m2 = 124 1 mg, Intravenous, Once, 7 of 12 cycles  Administration: 124 1 mg (6/30/2020), 124 1 mg (7/14/2020), 124 1 mg (7/28/2020), 124 1 mg (8/11/2020), 124 1 mg (9/8/2020), 124 1 mg (8/25/2020)     10/20/2020 Surgery    Whipple:  - Residual invasive adenocarcinoma with mucinous features, 2 3 cm   - Metastatic carcinoma present in one of fourteen lymph nodes (1/14)  - All margins are negative for tumor          12/21/2020 - 1/26/2021 Radiation    5000 cGy in 25 fractions to high risk areas  Dr Lydia Riddle     2/8/2021 - 4/4/2021 Chemotherapy    fluorouracil (ADRUCIL) injection 560 mg, 400 mg/m2 = 560 mg, Intravenous, Once, 4 of 4 cycles  Administration: 560 mg (2/8/2021), 560 mg (2/22/2021), 560 mg (3/8/2021), 560 mg (3/22/2021)  pegfilgrastim (NEULASTA ONPRO) subcutaneous injection kit 6 mg, 6 mg, Subcutaneous, Once, 4 of 4 cycles  Administration: 6 mg (2/10/2021), 6 mg (2/24/2021), 6 mg (3/10/2021), 6 mg (3/24/2021)  fosaprepitant (EMEND) 150 mg in sodium chloride 0 9 % 250 mL IVPB, 150 mg, Intravenous, Once, 4 of 4 cycles  Administration: 150 mg (2021), 150 mg (2021), 150 mg (3/8/2021), 150 mg (3/22/2021)  leucovorin 560 mg in dextrose 5 % 250 mL IVPB, 400 mg/m2 = 560 mg, Intravenous, Once, 4 of 4 cycles  Administration: 560 mg (2021), 560 mg (2021), 560 mg (3/8/2021), 560 mg (3/22/2021)  oxaliplatin (ELOXATIN) 119 mg in dextrose 5 % 250 mL chemo infusion, 85 mg/m2 = 119 mg, Intravenous, Once, 4 of 4 cycles  Administration: 119 mg (2021), 119 mg (2021), 119 mg (3/8/2021), 119 mg (3/22/2021)     Encounter for follow-up surveillance of pancreatic cancer       Current Hematologic/ Oncologic Treatment:    Observation    ECO - Asymptomatic    Interval History:   The patient presents for routine follow up  She was last seen by Dr Dexter Bamberger on 21  She was last seen by her surgical oncologist in March  She had her port removed  Imaging completed at the end of February was negative for any recurrent or metastatic disease  Most recent blood work completed on  was reviewed  Her Hgb is very mildly 15 5, hematocrit 46 8  Remainder of CBC and differential are normal   CMP is normal with exception of a very mild elevated blood glucose  CA 19-9 undetectable <2    Patient appears well  She reports she feels great  She is eating well  Denies any nausea/vomiting  No abdominal pain  She is maintaining her weight  Patient is active and doing things she enjoys  She denies any concerning symptoms today        Cancer Staging:  Cancer Staging  History of pancreatic cancer  Staging form: Pancreas, AJCC 8th Edition  - Clinical: Stage IIB (cT2, cN1, cM0) - Unsigned  Histologic grade (G): G2  Histologic grading system: 3 grade system      Molecular Testing:             Test Results:    Imaging: No results found      Labs:   Lab Results   Component Value Date    WBC 4 45 2022    HGB 15 5 (H) 2022    HCT 46 8 (H) 2022    MCV 96 2022     2022     Lab Results   Component Value Date    K 4 1 2022     2022    CO2 28 2022    BUN 12 2022    CREATININE 1 18 2022    GLUCOSE 153 (H) 10/20/2020    GLUF 135 (H) 2022    CALCIUM 9 1 2022    CORRECTEDCA 9 5 2021    AST 31 2022    ALT 27 2022    ALKPHOS 95 2022    EGFR 47 2022           Review of Systems   All other systems reviewed and are negative          Active Problems:   Patient Active Problem List   Diagnosis    Hyperlipidemia    Essential hypertension    Type 2 diabetes mellitus without complication, without long-term current use of insulin (HCC)    Pruritus    Transaminitis    History of pancreatic cancer    Port-A-Cath in place    Therapeutic opioid-induced constipation (OIC)    Anxiety    Functional diarrhea    Poor appetite    Chemotherapy induced neutropenia ([facility] [address] )    Encounter for follow-up surveillance of pancreatic cancer    Secondary and unspecified malignant neoplasm of intra-abdominal lymph nodes ([facility] [address] )    Mild protein-calorie malnutrition ([facility] [address] )    Encounter for removal of tunneled central venous catheter (CVC) with port       Past Medical History:   Past Medical History:   Diagnosis Date    BRCA1 negative     BRCA2 negative     Chemotherapy induced neutropenia ([facility] [address] ) 2/10/2021    Diabetes mellitus ([address]     Hypertension     Lactic acidosis 2020    Neuropathy     Obstructive jaundice 2020    Pancreas cancer ([facility] [address] )        Surgical History:   Past Surgical History:   Procedure Laterality Date    BREAST BIOPSY Left 2009    neg    BREAST SURGERY Left     calcifications     SECTION  1983    CHOLECYSTECTOMY N/A 10/20/2020    Procedure: CHOLECYSTECTOMY;  Surgeon: Milton Gates MD;  [facility];  Service: Surgical Oncology    COLONOSCOPY      ECTOPIC PREGNANCY SURGERY      partial ovary removed    FL GUIDED CENTRAL VENOUS ACCESS DEVICE INSERTION  2020    JOINT REPLACEMENT Bilateral     LAPAROTOMY N/A 10/20/2020    Procedure: LAPAROTOMY EXPLORATORY; INTRAOPERATIVE ULTRASOUND;  Surgeon: Lu Mendieta MD;  [facility];  Service: Surgical Oncology    [address]    NOSE SURGERY      deviated septum    REPLACEMENT TOTAL HIP W/  RESURFACING IMPLANTS Bilateral 2012    right hip done July 2012; left hip done September 2012    TONSILLECTOMY  1957    TUNNELED VENOUS PORT PLACEMENT Left 6/23/2020    Procedure: INSERTION VENOUS PORT (PORT-A-CATH), left;  Surgeon: Lu Mendieta MD;  [facility];  Service: Surgical Oncology    WHIPPLE PROCEDURE/PANCREATICO-DUODENECTOMY N/A 10/20/2020    Procedure: WHIPPLE PROCEDURE/PANCREATICO-DUODENECTOMY;  Surgeon: Lu Mendieta MD;  [facility];  Service: Surgical Oncology       Family History:    Family History   Problem Relation Age of Onset    Diabetes Mother     Heart disease Mother     Heart disease Father     Breast cancer additional onset Sister     Breast cancer Sister 47    Breast cancer additional onset Maternal Aunt     Breast cancer Maternal Aunt     Stroke Maternal Grandfather        Cancer-related family history includes Breast cancer in her maternal aunt; Breast cancer (age of onset: 47) in her sister  Social History:   Social History     Socioeconomic History    Marital status: /Civil Union     Spouse name: Not on file    Number of children: Not on file    Years of education: Not on file    Highest education level: Not on file   Occupational History    Not on file   Tobacco Use    Smoking status: Former Smoker    Smokeless tobacco: Never Used    Tobacco comment: quit 40 years ago   Vaping Use    Vaping Use: Never used   Substance and Sexual Activity    Alcohol use:  Yes     Alcohol/week: 1 0 standard drink     Types: 1 Glasses of wine per week     Comment: WINE OCCASIONALLY    Drug use: Never    Sexual activity: Not Currently   Other Topics Concern    Not on file   Social History Narrative    Not on file     Social Determinants of Health     Financial Resource Strain: Not on file   Food Insecurity: Not on file   Transportation Needs: Not on file   Physical Activity: Not on file   Stress: Not on file   Social Connections: Not on file   Intimate Partner Violence: Not on file   Housing Stability: Not on file       Current Medications:   Current Outpatient Medications   Medication Sig Dispense Refill    amLODIPine (NORVASC) 2 5 mg tablet Take 2 5 mg by mouth daily      dicyclomine (BENTYL) 20 mg tablet TAKE 1 TABLET BY MOUTH TWICE A DAY (Patient taking differently: as needed) 180 tablet 1    diphenoxylate-atropine (LOMOTIL) 2 5-0 025 mg per tablet Take 1 tablet by mouth 4 (four) times a day as needed for diarrhea DO NOT EXCEED 4 DOSES IN 1 DAY 30 tablet 0    Empagliflozin (Jardiance) 10 MG TABS Take 10 mg by mouth every morning      gabapentin (NEURONTIN) 300 mg capsule Take 300 mg by mouth 3 (three) times a day      LORazepam (ATIVAN) 0 5 mg tablet Take 1 tablet (0 5 mg total) by mouth 2 (two) times a day as needed for anxiety NO FURTHER REFILLS WITHOUT CLINIC VISIT WITH PALLIATIVE CARE 30 tablet 0    mirtazapine (REMERON) 15 mg tablet Take 1 tablet (15 mg total) by mouth daily at bedtime 90 tablet 3    omeprazole (PriLOSEC) 20 mg delayed release capsule TAKE 1 CAPSULE BY MOUTH TWICE A  capsule 1    OneTouch Verio test strip daily Test      prochlorperazine (COMPAZINE) 10 mg tablet Take 1 tablet (10 mg total) by mouth every 6 (six) hours as needed for nausea or vomiting 45 tablet 3    diphenhydrAMINE (BENADRYL) 25 mg capsule Take 25 mg by mouth every 6 (six) hours as needed for itching (Patient not taking: Reported on 7/14/2022)       No current facility-administered medications for this visit  Allergies:    Allergies   Allergen Reactions    Betadine [Povidone Iodine] Rash     Also itching from betadine    Other Throat Swelling     Pt unsure which chemotherapy drug caused tongue swelling and locked jaw  Please review in epic notes Folfirinox or possible Neulasta  NEED to review       Physical Exam:  /80 (BP Location: Right arm, Patient Position: Sitting, Cuff Size: Adult)   Pulse 70   Temp (!) 97 2 °F (36 2 °C) (Tympanic)   Resp 16   Ht 4' 11" (1 499 m)   Wt 46 3 kg (102 lb)   SpO2 99%   BMI 20 60 kg/m²   Body surface area is 1 39 meters squared  Wt Readings from Last 3 Encounters:   07/14/22 46 3 kg (102 lb)   04/22/22 45 kg (99 lb 3 3 oz)   03/29/22 46 3 kg (102 lb)           Physical Exam  Constitutional:       General: She is not in acute distress  Appearance: Normal appearance  HENT:      Head: Normocephalic and atraumatic  Eyes:      General: No scleral icterus  Right eye: No discharge  Left eye: No discharge  Conjunctiva/sclera: Conjunctivae normal    Cardiovascular:      Rate and Rhythm: Normal rate and regular rhythm  Pulmonary:      Effort: Pulmonary effort is normal  No respiratory distress  Breath sounds: Normal breath sounds  Chest:   Breasts:      Right: No axillary adenopathy or supraclavicular adenopathy  Left: No axillary adenopathy or supraclavicular adenopathy  Abdominal:      General: Bowel sounds are normal  There is no distension  Palpations: Abdomen is soft  There is no mass  Tenderness: There is no abdominal tenderness  Musculoskeletal:         General: Normal range of motion  Lymphadenopathy:      Cervical: No cervical adenopathy  Upper Body:      Right upper body: No supraclavicular, axillary or pectoral adenopathy  Left upper body: No supraclavicular, axillary or pectoral adenopathy  Skin:     General: Skin is warm and dry  Neurological:      General: No focal deficit present  Mental Status: She is alert and oriented to person, place, and time     Psychiatric:         Mood and Affect: Mood normal          Behavior: Behavior normal          Assessment / Plan:    1  Pancreatic adenocarcinoma [facility])      The patient is a very pleasant 69 year female with a history of pancreatic cancer status post Whipple procedure  She was treated with neoadjuvant chemotherapy with FOLFIRINOX for 3 cycles  She then had a reaction to CPT 11 so this was discontinued  Her treatment regimen was changed to modified FOLFOX 6  She completed 6 cycles and then went for surgery  Whipple procedure/pancreaticoduodenectomy completed on 10/20/20   Surgery revealed 1 positive lymph node along with residual tumor     She completed concurrent chemoradiation in the adjuvant setting followed by 4 cycles of modified FOLFOX 6  She finished adjuvant treatment in 3/2021  She has been on observation  Most recent imaging completed in 2/2022 was negative for recurrent or metastatic disease  Her most recent blood work shows no concerns  Her tumor marker is undetectable  Clinically, patient appears well without any evidence of disease recurrence on exam     She will continue on observation  She is already scheduled for surveillance imaging in September and follow up with her surgeon Dr Jasper Hidalgo  She will return for follow-up visit with Medical Oncology in 6 months with repeat blood work  Patient was in agreement with this plan of care  She knows to call at any time with questions or concerning symptoms prior to her next visit    Goals and Barriers:  Current Goal:  Prolong Survival from pancreatic cancer   Barriers: None  Patient's Capacity to Self Care:  Patient able to self care  Portions of the record may have been created with voice recognition software  Occasional wrong word or "sound a like" substitutions may have occurred due to the inherent limitations of voice recognition software  Read the chart carefully and recognize, using context, where substitutions have occurred

## 2022-07-14 ENCOUNTER — OFFICE VISIT (OUTPATIENT)
Dept: HEMATOLOGY ONCOLOGY | Facility: CLINIC | Age: 67
End: 2022-07-14
Payer: MEDICARE

## 2022-07-14 VITALS
SYSTOLIC BLOOD PRESSURE: 120 MMHG | OXYGEN SATURATION: 99 % | DIASTOLIC BLOOD PRESSURE: 80 MMHG | WEIGHT: 102 LBS | RESPIRATION RATE: 16 BRPM | TEMPERATURE: 97.2 F | HEIGHT: 59 IN | BODY MASS INDEX: 20.56 KG/M2 | HEART RATE: 70 BPM

## 2022-07-14 DIAGNOSIS — C25.9 PANCREATIC ADENOCARCINOMA (HCC): Primary | ICD-10-CM

## 2022-07-14 PROCEDURE — 99214 OFFICE O/P EST MOD 30 MIN: CPT | Performed by: NURSE PRACTITIONER

## 2022-07-29 ENCOUNTER — TELEPHONE (OUTPATIENT)
Dept: OBGYN CLINIC | Facility: CLINIC | Age: 67
End: 2022-07-29

## 2022-07-29 DIAGNOSIS — Z12.31 ENCOUNTER FOR SCREENING MAMMOGRAM FOR MALIGNANT NEOPLASM OF BREAST: Primary | ICD-10-CM

## 2022-07-29 NOTE — TELEPHONE ENCOUNTER
Pt is concerned as she was scheduled multiple times for her yearly exam that was supposed to be in July  She did have cancer and isn't able to be rescheduled until October  We did order her mammogram so she could schedule that before her yearly

## 2022-08-12 NOTE — TELEPHONE ENCOUNTER
Left message for Rafael Gandara to call back if she would like to discuss appointment concerns  I apologized for all of the schedule changes due to a provider being out and the surgery and L&D schedules changing

## 2022-08-26 ENCOUNTER — CLINICAL SUPPORT (OUTPATIENT)
Dept: RADIATION ONCOLOGY | Facility: CLINIC | Age: 67
End: 2022-08-26
Attending: RADIOLOGY
Payer: MEDICARE

## 2022-08-26 VITALS
OXYGEN SATURATION: 99 % | TEMPERATURE: 97.1 F | WEIGHT: 101 LBS | DIASTOLIC BLOOD PRESSURE: 76 MMHG | HEART RATE: 70 BPM | BODY MASS INDEX: 20.4 KG/M2 | RESPIRATION RATE: 16 BRPM | SYSTOLIC BLOOD PRESSURE: 144 MMHG

## 2022-08-26 DIAGNOSIS — Z85.07 HISTORY OF PANCREATIC CANCER: Primary | ICD-10-CM

## 2022-08-26 PROCEDURE — 99211 OFF/OP EST MAY X REQ PHY/QHP: CPT | Performed by: RADIOLOGY

## 2022-08-26 PROCEDURE — 99213 OFFICE O/P EST LOW 20 MIN: CPT | Performed by: RADIOLOGY

## 2022-08-26 NOTE — PROGRESS NOTES
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 status post Whipple resection on 10/20/2020  She completed a course of adjuvant chemo radiation 21  She returns for follow-up evaluation  22 CT C/A/P w contrast  Status post Whipple resection of the pancreatic head malignancy without signs of metastatic disease within the chest, abdomen or pelvis  Potential gastritis  Persistent chronic esophagitis  3/8/22 Dr Sav Bales  She is doing well     follow-up in 6 months for surveillance     3/29/22 port-a-cath removal      Component CA 19-9   Latest Ref Rng & Units 0 - 35 U/mL   2021 3   2022 <2   2022 <2         22 Medical Oncology  will continue on observation  Most recent imaging completed in 2022 was negative for recurrent or metastatic disease  Her most recent blood work shows no concerns  Follow up 6 months    Upcomin22 CT C/A/P  22 Surgical Oncology      Follow up visit     Oncology History   History of pancreatic cancer   2020 Biopsy    [facility]- Dr Ramila Mays:  Pancreas, uncinate mass:      - Malignant ([facility] Category VI)      - Compatible with adenocarcinoma with mucinous features       2020 - 2020 Chemotherapy    fluorouracil (ADRUCIL) injection 585 mg, 400 mg/m2 = 585 mg, Intravenous, Once, 7 of 12 cycles  Administration: 585 mg (2020), 585 mg (2020), 585 mg (2020), 585 mg (2020), 585 mg (2020), 585 mg (2020)  pegfilgrastim (NEULASTA ONPRO) subcutaneous injection kit 6 mg, 6 mg, Subcutaneous, Once, 7 of 12 cycles  Administration: 6 mg (2020), 6 mg (2020), 6 mg (2020), 6 mg (2020), 6 mg (2020), 6 mg (9/10/2020)  fosaprepitant (EMEND) 150 mg in sodium chloride 0 9 % 250 mL IVPB, 150 mg, Intravenous, Once, 7 of 12 cycles  Administration: 150 mg (2020), 150 mg (2020), 150 mg (2020), 150 mg (8/11/2020), 150 mg (9/8/2020), 150 mg (8/25/2020)  irinotecan (CAMPTOSAR) 263 mg in dextrose 5 % 500 mL chemo infusion, 180 mg/m2 = 263 mg, Intravenous, Once, 3 of 3 cycles  Administration: 263 mg (6/30/2020), 263 mg (7/14/2020), 263 mg (7/28/2020)  leucovorin 584 mg in dextrose 5 % 250 mL IVPB, 400 mg/m2 = 584 mg (100 % of original dose 400 mg/m2), Intravenous, Once, 2 of 7 cycles  Dose modification: 400 mg/m2 (original dose 400 mg/m2, Cycle 6)  Administration: 584 mg (9/8/2020)  oxaliplatin (ELOXATIN) 124 1 mg in dextrose 5 % 250 mL chemo infusion, 85 mg/m2 = 124 1 mg, Intravenous, Once, 7 of 12 cycles  Administration: 124 1 mg (6/30/2020), 124 1 mg (7/14/2020), 124 1 mg (7/28/2020), 124 1 mg (8/11/2020), 124 1 mg (9/8/2020), 124 1 mg (8/25/2020)     10/20/2020 Surgery    Whipple:  - Residual invasive adenocarcinoma with mucinous features, 2 3 cm   - Metastatic carcinoma present in one of fourteen lymph nodes (1/14)  - All margins are negative for tumor          12/21/2020 - 1/26/2021 Radiation    5000 cGy in 25 fractions to high risk areas  Dr Henny Champion     2/8/2021 - 4/4/2021 Chemotherapy    fluorouracil (ADRUCIL) injection 560 mg, 400 mg/m2 = 560 mg, Intravenous, Once, 4 of 4 cycles  Administration: 560 mg (2/8/2021), 560 mg (2/22/2021), 560 mg (3/8/2021), 560 mg (3/22/2021)  pegfilgrastim (NEULASTA ONPRO) subcutaneous injection kit 6 mg, 6 mg, Subcutaneous, Once, 4 of 4 cycles  Administration: 6 mg (2/10/2021), 6 mg (2/24/2021), 6 mg (3/10/2021), 6 mg (3/24/2021)  fosaprepitant (EMEND) 150 mg in sodium chloride 0 9 % 250 mL IVPB, 150 mg, Intravenous, Once, 4 of 4 cycles  Administration: 150 mg (2/8/2021), 150 mg (2/22/2021), 150 mg (3/8/2021), 150 mg (3/22/2021)  leucovorin 560 mg in dextrose 5 % 250 mL IVPB, 400 mg/m2 = 560 mg, Intravenous, Once, 4 of 4 cycles  Administration: 560 mg (2/8/2021), 560 mg (2/22/2021), 560 mg (3/8/2021), 560 mg (3/22/2021)  oxaliplatin (ELOXATIN) 119 mg in dextrose 5 % 250 mL chemo infusion, 85 mg/m2 = 119 mg, Intravenous, Once, 4 of 4 cycles  Administration: 119 mg (2/8/2021), 119 mg (2/22/2021), 119 mg (3/8/2021), 119 mg (3/22/2021)     Encounter for follow-up surveillance of pancreatic cancer       Review of Systems:  Review of Systems   Constitutional: Negative  HENT: Negative  Eyes:        Wears glasses   Respiratory: Negative  Cardiovascular: Negative  Gastrointestinal: Positive for diarrhea (with certain foods)  Negative for nausea  Loose and oily stools   Endocrine: Positive for cold intolerance  Genitourinary: Negative  Musculoskeletal: Positive for arthralgias (intermittent B/L shoulder pain)  Skin: Negative  Allergic/Immunologic: Negative  Neurological: Negative  Hematological: Negative  Psychiatric/Behavioral: Positive for sleep disturbance          Stress due to 's recent surgery and recovery       Clinical Trial: no        Covid Vaccine Status yes    Health Maintenance   Topic Date Due    Medicare Annual Wellness Visit (AWV)  Never done    Diabetic Foot Exam  Never done    DM Eye Exam  Never done    Osteoporosis Screening  Never done    Fall Risk  Never done    Urinary Incontinence Screening  Never done    HEMOGLOBIN A1C  04/11/2022    Influenza Vaccine (1) 09/01/2022    Breast Cancer Screening: Mammogram  10/15/2022    Depression Screening  02/18/2023    COVID-19 Vaccine (4 - Booster for Moderna series) 11/02/2022    URINE MICROALBUMIN  05/19/2023    BMI: Adult  07/14/2023    Colorectal Cancer Screening  04/19/2032    Hepatitis C Screening  Completed    Pneumococcal Vaccine: 65+ Years  Completed    HIB Vaccine  Aged Out    Hepatitis B Vaccine  Aged Out    IPV Vaccine  Aged Out    Hepatitis A Vaccine  Aged Out    Meningococcal ACWY Vaccine  Aged Out    HPV Vaccine  Aged Out     Patient Active Problem List   Diagnosis    Hyperlipidemia    Essential hypertension    Type 2 diabetes mellitus without complication, without long-term current use of insulin (HCC)    Pruritus    Transaminitis    History of pancreatic cancer    Port-A-Cath in place    Therapeutic opioid-induced constipation (OIC)    Anxiety    Functional diarrhea    Poor appetite    Chemotherapy induced neutropenia (HCC)    Encounter for follow-up surveillance of pancreatic cancer    Secondary and unspecified malignant neoplasm of intra-abdominal lymph nodes ([facility] )    Mild protein-calorie malnutrition ([facility] )    Encounter for removal of tunneled central venous catheter (CVC) with port     Past Medical History:   Diagnosis Date    BRCA1 negative     BRCA2 negative     Chemotherapy induced neutropenia ([place] [address] ) 2/10/2021    Diabetes mellitus ([place] [address] )     Hypertension     Lactic acidosis 2020    Neuropathy     Obstructive jaundice 2020    Pancreas cancer ([place] [address] )      Past Surgical History:   Procedure Laterality Date    BREAST BIOPSY Left     neg    BREAST SURGERY Left     calcifications     SECTION      CHOLECYSTECTOMY N/A 10/20/2020    Procedure: CHOLECYSTECTOMY;  Surgeon: Morgan Ortiz MD;  [facility];  Service: Surgical Oncology    COLONOSCOPY      ECTOPIC PREGNANCY SURGERY      partial ovary removed    FL GUIDED CENTRAL VENOUS ACCESS DEVICE INSERTION  2020    JOINT REPLACEMENT Bilateral     LAPAROTOMY N/A 10/20/2020    Procedure: LAPAROTOMY EXPLORATORY; INTRAOPERATIVE ULTRASOUND;  Surgeon: Morgan Ortiz MD;  [facility];  Service: Surgical Oncology    [facility]    NOSE SURGERY      deviated septum    REPLACEMENT TOTAL HIP W/  RESURFACING IMPLANTS Bilateral     right hip done 2012; left hip done 2012    TONSILLECTOMY      TUNNELED VENOUS PORT PLACEMENT Left 2020    Procedure: INSERTION VENOUS PORT (PORT-A-CATH), left;  Surgeon: Morgan Ortiz MD;  [facility];  Service: Surgical Oncology    WHIPPLE PROCEDURE/PANCREATICO-DUODENECTOMY N/A 10/20/2020    Procedure: WHIPPLE PROCEDURE/PANCREATICO-DUODENECTOMY;  Surgeon: Mónica Heath MD;  [facility];  Service: Surgical Oncology     Family History   Problem Relation Age of Onset    Diabetes Mother     Heart disease Mother     Heart disease Father     Breast cancer additional onset Sister     Breast cancer Sister 47    Breast cancer additional onset Maternal Aunt     Breast cancer Maternal Aunt     Stroke Maternal Grandfather      Social History     Socioeconomic History    Marital status: /Civil Union     Spouse name: Not on file    Number of children: Not on file    Years of education: Not on file    Highest education level: Not on file   Occupational History    Not on file   Tobacco Use    Smoking status: Former Smoker    Smokeless tobacco: Never Used    Tobacco comment: quit 40 years ago   Vaping Use    Vaping Use: Never used   Substance and Sexual Activity    Alcohol use:  Yes     Alcohol/week: 1 0 standard drink     Types: 1 Glasses of wine per week     Comment: WINE OCCASIONALLY    Drug use: Never    Sexual activity: Not Currently   Other Topics Concern    Not on file   Social History Narrative    Not on file     Social Determinants of Health     Financial Resource Strain: Not on file   Food Insecurity: Not on file   Transportation Needs: Not on file   Physical Activity: Not on file   Stress: Not on file   Social Connections: Not on file   Intimate Partner Violence: Not on file   Housing Stability: Not on file       Current Outpatient Medications:     amLODIPine (NORVASC) 2 5 mg tablet, Take 2 5 mg by mouth daily, Disp: , Rfl:     dicyclomine (BENTYL) 20 mg tablet, TAKE 1 TABLET BY MOUTH TWICE A DAY (Patient taking differently: as needed), Disp: 180 tablet, Rfl: 1    diphenhydrAMINE (BENADRYL) 25 mg capsule, Take 25 mg by mouth every 6 (six) hours as needed for itching (Patient not taking: Reported on 7/14/2022), Disp: , Rfl:     diphenoxylate-atropine (LOMOTIL) 2 5-0 025 mg per tablet, Take 1 tablet by mouth 4 (four) times a day as needed for diarrhea DO NOT EXCEED 4 DOSES IN 1 DAY, Disp: 30 tablet, Rfl: 0    Empagliflozin (Jardiance) 10 MG TABS, Take 10 mg by mouth every morning, Disp: , Rfl:     gabapentin (NEURONTIN) 300 mg capsule, Take 300 mg by mouth 3 (three) times a day, Disp: , Rfl:     LORazepam (ATIVAN) 0 5 mg tablet, Take 1 tablet (0 5 mg total) by mouth 2 (two) times a day as needed for anxiety NO FURTHER REFILLS WITHOUT CLINIC VISIT WITH PALLIATIVE CARE, Disp: 30 tablet, Rfl: 0    mirtazapine (REMERON) 15 mg tablet, Take 1 tablet (15 mg total) by mouth daily at bedtime, Disp: 90 tablet, Rfl: 3    omeprazole (PriLOSEC) 20 mg delayed release capsule, TAKE 1 CAPSULE BY MOUTH TWICE A DAY, Disp: 180 capsule, Rfl: 1    OneTouch Verio test strip, daily Test, Disp: , Rfl:     prochlorperazine (COMPAZINE) 10 mg tablet, Take 1 tablet (10 mg total) by mouth every 6 (six) hours as needed for nausea or vomiting, Disp: 45 tablet, Rfl: 3  Allergies   Allergen Reactions    Betadine [Povidone Iodine] Rash     Also itching from betadine    Other Throat Swelling     Pt unsure which chemotherapy drug caused tongue swelling and locked jaw  Please review in epic notes Folfirinox or possible Neulasta  NEED to review     There were no vitals filed for this visit

## 2022-08-26 NOTE — PROGRESS NOTES
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 status post Whipple resection on 10/20/2020  She completed a course of adjuvant chemo radiation 21   She returns for follow-up evaluation       The patient offers no new complaints  She has difficulty tolerating certain foods  If she eats certain foods, then she experiences diarrhea  This is stable and she is aware that it is related to diet  She states that discussing her goals with Nutrition was very helpful  Her weight is stable  She denies nausea or vomiting  She denies abdominal pain or cramping  She denies regular diarrhea or rectal bleeding  22 CT C/A/P w contrast  Status post Whipple resection of the pancreatic head malignancy without signs of metastatic disease within the chest, abdomen or pelvis  Potential gastritis  Persistent chronic esophagitis     3/8/22 Dr Bruna Braun follow-up was unremarkable  3/29/22 port-a-cath removal    The patient underwent EGD and colonoscopy based on CT imaging findings 22 that were both normal        Component CA 19-9   Latest Ref Rng & Units 0 - 35 U/mL   2021 3   2022 <2   2022 <2      22 Medical Oncology follow-up was unremarkable      Upcomin22 CT C/A/P  22 Surg Onc follow-up        Historical Information   Oncology History   History of pancreatic cancer   2020 Biopsy    [facility]- Dr Elizabeth Pozo:  Pancreas, uncinate mass:      - Malignant ([facility] Category VI)      - Compatible with adenocarcinoma with mucinous features       2020 - 2020 Chemotherapy fluorouracil (ADRUCIL) injection 585 mg, 400 mg/m2 = 585 mg, Intravenous, Once, 7 of 12 cycles  Administration: 585 mg (6/30/2020), 585 mg (7/14/2020), 585 mg (7/28/2020), 585 mg (8/11/2020), 585 mg (9/8/2020), 585 mg (8/25/2020)  pegfilgrastim (NEULASTA ONPRO) subcutaneous injection kit 6 mg, 6 mg, Subcutaneous, Once, 7 of 12 cycles  Administration: 6 mg (7/2/2020), 6 mg (7/16/2020), 6 mg (7/30/2020), 6 mg (8/13/2020), 6 mg (8/27/2020), 6 mg (9/10/2020)  fosaprepitant (EMEND) 150 mg in sodium chloride 0 9 % 250 mL IVPB, 150 mg, Intravenous, Once, 7 of 12 cycles  Administration: 150 mg (6/30/2020), 150 mg (7/14/2020), 150 mg (7/28/2020), 150 mg (8/11/2020), 150 mg (9/8/2020), 150 mg (8/25/2020)  irinotecan (CAMPTOSAR) 263 mg in dextrose 5 % 500 mL chemo infusion, 180 mg/m2 = 263 mg, Intravenous, Once, 3 of 3 cycles  Administration: 263 mg (6/30/2020), 263 mg (7/14/2020), 263 mg (7/28/2020)  leucovorin 584 mg in dextrose 5 % 250 mL IVPB, 400 mg/m2 = 584 mg (100 % of original dose 400 mg/m2), Intravenous, Once, 2 of 7 cycles  Dose modification: 400 mg/m2 (original dose 400 mg/m2, Cycle 6)  Administration: 584 mg (9/8/2020)  oxaliplatin (ELOXATIN) 124 1 mg in dextrose 5 % 250 mL chemo infusion, 85 mg/m2 = 124 1 mg, Intravenous, Once, 7 of 12 cycles  Administration: 124 1 mg (6/30/2020), 124 1 mg (7/14/2020), 124 1 mg (7/28/2020), 124 1 mg (8/11/2020), 124 1 mg (9/8/2020), 124 1 mg (8/25/2020)     10/20/2020 Surgery    Whipple:  - Residual invasive adenocarcinoma with mucinous features, 2 3 cm   - Metastatic carcinoma present in one of fourteen lymph nodes (1/14)  - All margins are negative for tumor          12/21/2020 - 1/26/2021 Radiation    5000 cGy in 25 fractions to high risk areas  Dr Erin Colindres     2/8/2021 - 4/4/2021 Chemotherapy    fluorouracil (ADRUCIL) injection 560 mg, 400 mg/m2 = 560 mg, Intravenous, Once, 4 of 4 cycles  Administration: 560 mg (2/8/2021), 560 mg (2/22/2021), 560 mg (3/8/2021), 560 mg (3/22/2021)  pegfilgrastim (NEULASTA ONPRO) subcutaneous injection kit 6 mg, 6 mg, Subcutaneous, Once, 4 of 4 cycles  Administration: 6 mg (2/10/2021), 6 mg (2021), 6 mg (3/10/2021), 6 mg (3/24/2021)  fosaprepitant (EMEND) 150 mg in sodium chloride 0 9 % 250 mL IVPB, 150 mg, Intravenous, Once, 4 of 4 cycles  Administration: 150 mg (2021), 150 mg (2021), 150 mg (3/8/2021), 150 mg (3/22/2021)  leucovorin 560 mg in dextrose 5 % 250 mL IVPB, 400 mg/m2 = 560 mg, Intravenous, Once, 4 of 4 cycles  Administration: 560 mg (2021), 560 mg (2021), 560 mg (3/8/2021), 560 mg (3/22/2021)  oxaliplatin (ELOXATIN) 119 mg in dextrose 5 % 250 mL chemo infusion, 85 mg/m2 = 119 mg, Intravenous, Once, 4 of 4 cycles  Administration: 119 mg (2021), 119 mg (2021), 119 mg (3/8/2021), 119 mg (3/22/2021)     Encounter for follow-up surveillance of pancreatic cancer       Past Medical History:   Diagnosis Date    BRCA1 negative     BRCA2 negative     Chemotherapy induced [facility]rop[facility] ) 2/10/2021    Diabetes mellitus ([facility] )     Hypertension     Lactic acidosis 2020    Neuropathy     Obstructive jaundice 2020    Pancreas cancer ([facility]      Past Surgical History:   Procedure Laterality Date    BREAST BIOPSY Left 2009    neg    BREAST SURGERY Left     calcifications     SECTION      CHOLECYSTECTOMY N/A 10/20/2020    Procedure: CHOLECYSTECTOMY;  Surgeon: Mónica Heath MD;  [facility];  Service: Surgical Oncology    COLONOSCOPY      ECTOPIC PREGNANCY SURGERY      partial ovary removed    FL GUIDED CENTRAL VENOUS ACCESS DEVICE INSERTION  2020    JOINT REPLACEMENT Bilateral     LAPAROTOMY N/A 10/20/2020    Procedure: LAPAROTOMY EXPLORATORY; INTRAOPERATIVE ULTRASOUND;  Surgeon: Mónica Heath MD;  [facility];  Service: Surgical Oncology    MANDIBLE FRACTURE SURGERY  1972    NOSE SURGERY      deviated septum    REPLACEMENT TOTAL HIP W/  RESURFACING IMPLANTS Bilateral 2012    right hip done July 2012; left hip done September 2012    TONSILLECTOMY  1957    TUNNELED VENOUS PORT PLACEMENT Left 6/23/2020    Procedure: INSERTION VENOUS PORT (PORT-A-CATH), left;  Surgeon: Patricio Mccloud MD;  [facility];  Service: Surgical Oncology    WHIPPLE PROCEDURE/PANCREATICO-DUODENECTOMY N/A 10/20/2020    Procedure:  WHIPPLE PROCEDURE/PANCREATICO-DUODENECTOMY;  Surgeon: Patricio Mccloud MD;  [facility];  Service: Surgical Oncology       Social History   Social History     Substance and Sexual Activity   Alcohol Use Yes    Alcohol/week: 1 0 standard drink    Types: 1 Glasses of wine per week    Comment: WINE OCCASIONALLY     Social History     Substance and Sexual Activity   Drug Use Never     Social History     Tobacco Use   Smoking Status Former Smoker   Smokeless Tobacco Never Used   Tobacco Comment    quit 40 years ago         Meds/Allergies     Current Outpatient Medications:     amLODIPine (NORVASC) 2 5 mg tablet, Take 2 5 mg by mouth daily, Disp: , Rfl:     dicyclomine (BENTYL) 20 mg tablet, TAKE 1 TABLET BY MOUTH TWICE A DAY (Patient taking differently: as needed), Disp: 180 tablet, Rfl: 1    diphenhydrAMINE (BENADRYL) 25 mg capsule, Take 25 mg by mouth every 6 (six) hours as needed for itching, Disp: , Rfl:     diphenoxylate-atropine (LOMOTIL) 2 5-0 025 mg per tablet, Take 1 tablet by mouth 4 (four) times a day as needed for diarrhea DO NOT EXCEED 4 DOSES IN 1 DAY, Disp: 30 tablet, Rfl: 0    Empagliflozin (Jardiance) 10 MG TABS, Take 10 mg by mouth every morning, Disp: , Rfl:     gabapentin (NEURONTIN) 300 mg capsule, Take 300 mg by mouth 3 (three) times a day, Disp: , Rfl:     LORazepam (ATIVAN) 0 5 mg tablet, Take 1 tablet (0 5 mg total) by mouth 2 (two) times a day as needed for anxiety NO FURTHER REFILLS WITHOUT CLINIC VISIT WITH PALLIATIVE CARE, Disp: 30 tablet, Rfl: 0    mirtazapine (REMERON) 15 mg tablet, Take 1 tablet (15 mg total) by mouth daily at bedtime, Disp: 90 tablet, Rfl: 3    omeprazole (PriLOSEC) 20 mg delayed release capsule, TAKE 1 CAPSULE BY MOUTH TWICE A DAY, Disp: 180 capsule, Rfl: 1    OneTouch Verio test strip, daily Test, Disp: , Rfl:     prochlorperazine (COMPAZINE) 10 mg tablet, Take 1 tablet (10 mg total) by mouth every 6 (six) hours as needed for nausea or vomiting, Disp: 45 tablet, Rfl: 3  Allergies   Allergen Reactions    Betadine [Povidone Iodine] Rash     Also itching from betadine    Other Throat Swelling     Pt unsure which chemotherapy drug caused tongue swelling and locked jaw  Please review in epic notes Folfirinox or possible Neulasta  NEED to review       Review of Systems   Constitutional: Negative  HENT: Negative  Eyes:        Wears glasses   Respiratory: Negative  Cardiovascular: Negative  Gastrointestinal: Positive for diarrhea (with certain foods)  Negative for nausea  Loose and oily stools   Endocrine: Positive for cold intolerance  Genitourinary: Negative  Musculoskeletal: Positive for arthralgias (intermittent B/L shoulder pain)  Skin: Negative  Allergic/Immunologic: Negative  Neurological: Negative  Hematological: Negative  Psychiatric/Behavioral: Positive for sleep disturbance  Stress due to 's recent surgery and recovery        OBJECTIVE:   /76   Pulse 70   Temp (!) 97 1 °F (36 2 °C)   Resp 16   Wt 45 8 kg (101 lb)   SpO2 99%   BMI 20 40 kg/m²   Karnofsky: 80 - Normal activity with effort; some signs or symptoms of disease    Physical Exam  Vitals and nursing note reviewed  Constitutional:       General: She is not in acute distress  Appearance: She is well-developed  Eyes:      General: No scleral icterus  Cardiovascular:      Rate and Rhythm: Normal rate and regular rhythm  Pulmonary:      Breath sounds: No wheezing, rhonchi or rales  Chest:   Breasts:      Right: No supraclavicular adenopathy        Left: No supraclavicular adenopathy  Abdominal:      General: There is no distension  Palpations: Abdomen is soft  Tenderness: There is no abdominal tenderness  Comments: Well-healed central abdominal wound   Musculoskeletal:      Right lower leg: No edema  Left lower leg: No edema  Lymphadenopathy:      Cervical: No cervical adenopathy  Upper Body:      Right upper body: No supraclavicular adenopathy  Left upper body: No supraclavicular adenopathy  Neurological:      Mental Status: She is alert and oriented to person, place, and time  Gait: Gait normal           Assessment/Plan:  Nico Lambert is a 69y o  year old female with a history of 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 status post Whipple resection on 10/20/2020  She completed a course of adjuvant chemo radiation 1/26/21   She remains clinically SATISH 1 year and 7 months post completion radiation  Her performance status is excellent and she has adapted her diet successfully post treatment  She is scheduled for continued surveillance imaging next month with Surgical Oncology follow-up afterwards  She has no progressive or new toxicity related to radiation  She is compliant with her follow-ups  I offered her a follow-up in 6 months versus as needed  She elected to return as needed  We will be happy to see her again should the need arise  Rhonda Johnson MD  0/91/1620,8:52 PM    Portions of the record may have been created with voice recognition software   Occasional wrong word or "sound a like" substitutions may have occurred due to the inherent limitations of voice recognition software   Read the chart carefully and recognize, using context, where substitutions have occurred

## 2022-09-07 ENCOUNTER — APPOINTMENT (OUTPATIENT)
Dept: LAB | Facility: HOSPITAL | Age: 67
End: 2022-09-07
Payer: MEDICARE

## 2022-09-07 DIAGNOSIS — Z08 ENCOUNTER FOR FOLLOW-UP SURVEILLANCE OF PANCREATIC CANCER: ICD-10-CM

## 2022-09-07 DIAGNOSIS — Z85.07 ENCOUNTER FOR FOLLOW-UP SURVEILLANCE OF PANCREATIC CANCER: ICD-10-CM

## 2022-09-07 LAB
ANION GAP SERPL CALCULATED.3IONS-SCNC: 9 MMOL/L (ref 4–13)
BUN SERPL-MCNC: 11 MG/DL (ref 5–25)
CALCIUM SERPL-MCNC: 9.1 MG/DL (ref 8.3–10.1)
CHLORIDE SERPL-SCNC: 102 MMOL/L (ref 96–108)
CO2 SERPL-SCNC: 30 MMOL/L (ref 21–32)
CREAT SERPL-MCNC: 1.08 MG/DL (ref 0.6–1.3)
GFR SERPL CREATININE-BSD FRML MDRD: 52 ML/MIN/1.73SQ M
GLUCOSE P FAST SERPL-MCNC: 129 MG/DL (ref 65–99)
POTASSIUM SERPL-SCNC: 4.6 MMOL/L (ref 3.5–5.3)
SODIUM SERPL-SCNC: 141 MMOL/L (ref 135–147)

## 2022-09-07 PROCEDURE — 36415 COLL VENOUS BLD VENIPUNCTURE: CPT

## 2022-09-07 PROCEDURE — 86301 IMMUNOASSAY TUMOR CA 19-9: CPT

## 2022-09-07 PROCEDURE — 80048 BASIC METABOLIC PNL TOTAL CA: CPT

## 2022-09-08 LAB — CANCER AG19-9 SERPL-ACNC: <2 U/ML (ref 0–35)

## 2022-09-09 ENCOUNTER — HOSPITAL ENCOUNTER (OUTPATIENT)
Dept: CT IMAGING | Facility: HOSPITAL | Age: 67
End: 2022-09-09
Attending: SURGERY
Payer: MEDICARE

## 2022-09-09 DIAGNOSIS — Z85.07 ENCOUNTER FOR FOLLOW-UP SURVEILLANCE OF PANCREATIC CANCER: ICD-10-CM

## 2022-09-09 DIAGNOSIS — Z08 ENCOUNTER FOR FOLLOW-UP SURVEILLANCE OF PANCREATIC CANCER: ICD-10-CM

## 2022-09-09 PROCEDURE — 74177 CT ABD & PELVIS W/CONTRAST: CPT

## 2022-09-09 PROCEDURE — 71260 CT THORAX DX C+: CPT

## 2022-09-09 RX ADMIN — IOHEXOL 70 ML: 350 INJECTION, SOLUTION INTRAVENOUS at 09:05

## 2022-09-12 ENCOUNTER — OFFICE VISIT (OUTPATIENT)
Dept: SURGICAL ONCOLOGY | Facility: CLINIC | Age: 67
End: 2022-09-12
Payer: MEDICARE

## 2022-09-12 VITALS
WEIGHT: 100 LBS | DIASTOLIC BLOOD PRESSURE: 72 MMHG | RESPIRATION RATE: 18 BRPM | OXYGEN SATURATION: 100 % | TEMPERATURE: 97.2 F | BODY MASS INDEX: 20.16 KG/M2 | HEIGHT: 59 IN | HEART RATE: 73 BPM | SYSTOLIC BLOOD PRESSURE: 118 MMHG

## 2022-09-12 DIAGNOSIS — Z85.07 HISTORY OF PANCREATIC CANCER: ICD-10-CM

## 2022-09-12 DIAGNOSIS — Z08 ENCOUNTER FOR FOLLOW-UP SURVEILLANCE OF PANCREATIC CANCER: Primary | ICD-10-CM

## 2022-09-12 DIAGNOSIS — Z85.07 ENCOUNTER FOR FOLLOW-UP SURVEILLANCE OF PANCREATIC CANCER: Primary | ICD-10-CM

## 2022-09-12 PROCEDURE — 99213 OFFICE O/P EST LOW 20 MIN: CPT

## 2022-09-12 NOTE — PROGRESS NOTES
Surgical Oncology Follow Up       [facility]  [facility]  [address] [facility] [address]  [address] [place] [id number] [address] [place]  1955  [phone]  [facility] [place]  [address] [facility] 203  [facility] [address] [phone]    Diagnoses and all orders for this visit:    History of pancreatic cancer  -     Cancer antigen 19-9; Future  -     BUN; Future  -     Creatinine, serum; Future  -     CT chest abdomen pelvis w contrast; Future        Chief Complaint   Patient presents with    Follow-up     6 month Panc CA follow-up       Return in about 6 months (around 3/12/2023) for Office Visit, Imaging - See orders, Labs - See Treatment Plan  Oncology History   History of pancreatic cancer   6/2/2020 Biopsy    [facility]- Dr Martell Arriola:  Pancreas, uncinate mass:      - Malignant ([facility] Category VI)      - Compatible with adenocarcinoma with mucinous features       6/30/2020 - 9/16/2020 Chemotherapy    fluorouracil (ADRUCIL) injection 585 mg, 400 mg/m2 = 585 mg, Intravenous, Once, 7 of 12 cycles  Administration: 585 mg (6/30/2020), 585 mg (7/14/2020), 585 mg (7/28/2020), 585 mg (8/11/2020), 585 mg (9/8/2020), 585 mg (8/25/2020)  pegfilgrastim (NEULASTA ONPRO) subcutaneous injection kit 6 mg, 6 mg, Subcutaneous, Once, 7 of 12 cycles  Administration: 6 mg (7/2/2020), 6 mg (7/16/2020), 6 mg (7/30/2020), 6 mg (8/13/2020), 6 mg (8/27/2020), 6 mg (9/10/2020)  fosaprepitant (EMEND) 150 mg in sodium chloride 0 9 % 250 mL IVPB, 150 mg, Intravenous, Once, 7 of 12 cycles  Administration: 150 mg (6/30/2020), 150 mg (7/14/2020), 150 mg (7/28/2020), 150 mg (8/11/2020), 150 mg (9/8/2020), 150 mg (8/25/2020)  irinotecan (CAMPTOSAR) 263 mg in dextrose 5 % 500 mL chemo infusion, 180 mg/m2 = 263 mg, Intravenous, Once, 3 of 3 cycles  Administration: 263 mg (6/30/2020), 263 mg (7/14/2020), 263 mg (7/28/2020)  leucovorin 584 mg in dextrose 5 % 250 mL IVPB, 400 mg/m2 = 584 mg (100 % of original dose 400 mg/m2), Intravenous, Once, 2 of 7 cycles  Dose modification: 400 mg/m2 (original dose 400 mg/m2, Cycle 6)  Administration: 584 mg (9/8/2020)  oxaliplatin (ELOXATIN) 124 1 mg in dextrose 5 % 250 mL chemo infusion, 85 mg/m2 = 124 1 mg, Intravenous, Once, 7 of 12 cycles  Administration: 124 1 mg (6/30/2020), 124 1 mg (7/14/2020), 124 1 mg (7/28/2020), 124 1 mg (8/11/2020), 124 1 mg (9/8/2020), 124 1 mg (8/25/2020)     10/20/2020 Surgery    Whipple:  - Residual invasive adenocarcinoma with mucinous features, 2 3 cm   - Metastatic carcinoma present in one of fourteen lymph nodes (1/14)  - All margins are negative for tumor          12/21/2020 - 1/26/2021 Radiation    5000 cGy in 25 fractions to high risk areas  Dr Sara Tomas     2/8/2021 - 4/4/2021 Chemotherapy    fluorouracil (ADRUCIL) injection 560 mg, 400 mg/m2 = 560 mg, Intravenous, Once, 4 of 4 cycles  Administration: 560 mg (2/8/2021), 560 mg (2/22/2021), 560 mg (3/8/2021), 560 mg (3/22/2021)  pegfilgrastim (NEULASTA ONPRO) subcutaneous injection kit 6 mg, 6 mg, Subcutaneous, Once, 4 of 4 cycles  Administration: 6 mg (2/10/2021), 6 mg (2/24/2021), 6 mg (3/10/2021), 6 mg (3/24/2021)  fosaprepitant (EMEND) 150 mg in sodium chloride 0 9 % 250 mL IVPB, 150 mg, Intravenous, Once, 4 of 4 cycles  Administration: 150 mg (2/8/2021), 150 mg (2/22/2021), 150 mg (3/8/2021), 150 mg (3/22/2021)  leucovorin 560 mg in dextrose 5 % 250 mL IVPB, 400 mg/m2 = 560 mg, Intravenous, Once, 4 of 4 cycles  Administration: 560 mg (2/8/2021), 560 mg (2/22/2021), 560 mg (3/8/2021), 560 mg (3/22/2021)  oxaliplatin (ELOXATIN) 119 mg in dextrose 5 % 250 mL chemo infusion, 85 mg/m2 = 119 mg, Intravenous, Once, 4 of 4 cycles  Administration: 119 mg (2/8/2021), 119 mg (2/22/2021), 119 mg (3/8/2021), 119 mg (3/22/2021)     Encounter for follow-up surveillance of pancreatic cancer       Staging: [id number] Pancreas cancer October 2020  Treatment history: Whipple procedure, October 2020  Neoadjuvant chemotherapy   Adjuvant chemoradiation  Current treatment: Observation  Disease status: SATISH    History of Present Illness: The patient returns today in follow-up for her history of pancreas cancer  She is SATISH at 2 years from surgery, after receiving both neoadjuvant and adjuvant therapy  She reports she feels very well  She has a great appetite and lots of energy  She does complain of digestive issues when eating fatty foods, but was not able to tolerate the side effects from the pancreas enzymes  She also saw a dietician recently for increasing blood sugar, and has been very attentive to her diet  She reports some occasional nausea, but denies vomiting, weight loss, abdominal pain, SOB, cough, headaches or dizziness  She had upper and lower endoscopy performed earlier this year  CT of the chest, abdomen and pelvis was performed on September 9, 2022, and she had tumor marker drawn as well  I have reviewed these results myself and discussed them with the patient today  She is concerned about findings of atherosclerosis on the imaging report  Review of Systems   Constitutional: Negative for activity change, appetite change, fatigue and unexpected weight change  HENT: Negative  Eyes: Negative  Respiratory: Negative  Negative for cough and shortness of breath  Cardiovascular: Negative  Gastrointestinal: Positive for diarrhea (occasional) and nausea (occasional)  Negative for abdominal distention, abdominal pain and vomiting  Endocrine: Negative  Genitourinary: Negative  Musculoskeletal: Negative  Skin: Negative  Negative for color change  Allergic/Immunologic: Negative  Neurological: Negative  Negative for dizziness and headaches  Hematological: Negative  Negative for adenopathy  Psychiatric/Behavioral: Negative                Patient Active Problem List   Diagnosis    Hyperlipidemia    Essential hypertension    Type 2 diabetes mellitus without complication, without long-term current use of insulin (HCC)    Pruritus    Transaminitis    History of pancreatic cancer    Port-A-Cath in place    Therapeutic opioid-induced constipation (OIC)    Anxiety    Functional diarrhea    Poor appetite    Chemotherapy induced neutropenia (HCC)    Encounter for follow-up surveillance of pancreatic cancer    Secondary and unspecified malignant neoplasm of intra-abdominal lymph nodes ([facility] )    Mild protein-calorie malnutrition ([facility] )    Encounter for removal of tunneled central venous catheter (CVC) with port     Past Medical History:   Diagnosis Date    BRCA1 negative     BRCA2 negative     Chemotherapy induced neutropenia ([facility] ) 2/10/2021    Diabetes mellitus ([facility] )     Hypertension     Lactic acidosis 2020    Neuropathy     Obstructive jaundice 2020    Pancreas cancer ([facility] )      Past Surgical History:   Procedure Laterality Date    BREAST BIOPSY Left     neg    BREAST SURGERY Left     calcifications     SECTION      CHOLECYSTECTOMY N/A 10/20/2020    Procedure: CHOLECYSTECTOMY;  Surgeon: Pa Barrett MD;  [facility];  Service: Surgical Oncology    COLONOSCOPY      ECTOPIC PREGNANCY SURGERY      partial ovary removed    FL GUIDED CENTRAL VENOUS ACCESS DEVICE INSERTION  2020    JOINT REPLACEMENT Bilateral     LAPAROTOMY N/A 10/20/2020    Procedure: LAPAROTOMY EXPLORATORY; INTRAOPERATIVE ULTRASOUND;  Surgeon: Pa Barrett MD;  [facility];  Service: Surgical Oncology    [facility]    NOSE SURGERY      deviated septum    REPLACEMENT TOTAL HIP W/  RESURFACING IMPLANTS Bilateral 2012    right hip done 2012; left hip done 2012    TONSILLECTOMY      TUNNELED VENOUS PORT PLACEMENT Left 2020    Procedure: INSERTION VENOUS PORT (PORT-A-CATH), left;  Surgeon: Wojciech Aguirre MD;  [facility];  Service: Surgical Oncology    WHIPPLE PROCEDURE/PANCREATICO-DUODENECTOMY N/A 10/20/2020    Procedure: WHIPPLE PROCEDURE/PANCREATICO-DUODENECTOMY;  Surgeon: Wojciech Aguirre MD;  [facility];  Service: Surgical Oncology     Family History   Problem Relation Age of Onset    Diabetes Mother     Heart disease Mother     Heart disease Father     Breast cancer additional onset Sister     Breast cancer Sister 47    Breast cancer additional onset Maternal Aunt     Breast cancer Maternal Aunt     Stroke Maternal Grandfather      Social History     Socioeconomic History    Marital status: /Civil Union     Spouse name: Not on file    Number of children: Not on file    Years of education: Not on file    Highest education level: Not on file   Occupational History    Not on file   Tobacco Use    Smoking status: Former Smoker    Smokeless tobacco: Never Used    Tobacco comment: quit 40 years ago   Vaping Use    Vaping Use: Never used   Substance and Sexual Activity    Alcohol use:  Yes     Alcohol/week: 1 0 standard drink     Types: 1 Glasses of wine per week     Comment: WINE OCCASIONALLY    Drug use: Never    Sexual activity: Not Currently   Other Topics Concern    Not on file   Social History Narrative    Not on file     Social Determinants of Health     Financial Resource Strain: Not on file   Food Insecurity: Not on file   Transportation Needs: Not on file   Physical Activity: Not on file   Stress: Not on file   Social Connections: Not on file   Intimate Partner Violence: Not on file   Housing Stability: Not on file       Current Outpatient Medications:     dicyclomine (BENTYL) 20 mg tablet, TAKE 1 TABLET BY MOUTH TWICE A DAY (Patient taking differently: as needed), Disp: 180 tablet, Rfl: 1    diphenhydrAMINE (BENADRYL) 25 mg capsule, Take 25 mg by mouth every 6 (six) hours as needed for itching, Disp: , Rfl:   diphenoxylate-atropine (LOMOTIL) 2 5-0 025 mg per tablet, Take 1 tablet by mouth 4 (four) times a day as needed for diarrhea DO NOT EXCEED 4 DOSES IN 1 DAY, Disp: 30 tablet, Rfl: 0    Empagliflozin (Jardiance) 10 MG TABS, Take 10 mg by mouth every morning, Disp: , Rfl:     gabapentin (NEURONTIN) 300 mg capsule, Take 300 mg by mouth 3 (three) times a day, Disp: , Rfl:     LORazepam (ATIVAN) 0 5 mg tablet, Take 1 tablet (0 5 mg total) by mouth 2 (two) times a day as needed for anxiety NO FURTHER REFILLS WITHOUT CLINIC VISIT WITH PALLIATIVE CARE, Disp: 30 tablet, Rfl: 0    mirtazapine (REMERON) 15 mg tablet, Take 1 tablet (15 mg total) by mouth daily at bedtime, Disp: 90 tablet, Rfl: 3    omeprazole (PriLOSEC) 20 mg delayed release capsule, TAKE 1 CAPSULE BY MOUTH TWICE A DAY, Disp: 180 capsule, Rfl: 1    OneTouch Verio test strip, daily Test, Disp: , Rfl:     prochlorperazine (COMPAZINE) 10 mg tablet, Take 1 tablet (10 mg total) by mouth every 6 (six) hours as needed for nausea or vomiting, Disp: 45 tablet, Rfl: 3    amLODIPine (NORVASC) 2 5 mg tablet, Take 2 5 mg by mouth daily, Disp: , Rfl:   No current facility-administered medications for this visit  Facility-Administered Medications Ordered in Other Visits:     barium (READI-CAT 2) suspension 900 mL, 900 mL, Oral, Once in imaging, Lalit Heath MD  Allergies   Allergen Reactions    Betadine [Povidone Iodine] Rash     Also itching from betadine    Other Throat Swelling     Pt unsure which chemotherapy drug caused tongue swelling and locked jaw  Please review in epic notes Folfirinox or possible Neulasta  NEED to review     Vitals:    09/12/22 1132   BP: 118/72   Pulse: 73   Resp: 18   Temp: (!) 97 2 °F (36 2 °C)   SpO2: 100%       Physical Exam  Vitals reviewed  Constitutional:       General: She is not in acute distress  Appearance: Normal appearance  She is normal weight  She is not ill-appearing or toxic-appearing     HENT:      Head: Normocephalic and atraumatic  Eyes:      General: No scleral icterus  Extraocular Movements: Extraocular movements intact  Conjunctiva/sclera: Conjunctivae normal       Pupils: Pupils are equal, round, and reactive to light  Cardiovascular:      Rate and Rhythm: Normal rate and regular rhythm  Pulmonary:      Effort: Pulmonary effort is normal       Breath sounds: Normal breath sounds  Abdominal:      General: Abdomen is flat  There is no distension  Palpations: Abdomen is soft  There is no mass  Tenderness: There is no abdominal tenderness  There is no guarding  Musculoskeletal:         General: Normal range of motion  Cervical back: Normal range of motion and neck supple  No tenderness  Lymphadenopathy:      Cervical: No cervical adenopathy  Skin:     General: Skin is warm and dry  Coloration: Skin is not jaundiced  Neurological:      General: No focal deficit present  Mental Status: She is alert and oriented to person, place, and time  Psychiatric:         Mood and Affect: Mood normal          Behavior: Behavior normal          Judgment: Judgment normal            Labs:   Collected 9/7/2022  7:58 AM      Component Ref Range & Units 5 d ago    CA 19-9 0 - 35 U/mL <2             Imaging  CT chest abdomen pelvis w contrast    Result Date: 9/9/2022  Narrative: CT CHEST, ABDOMEN AND PELVIS WITH IV CONTRAST INDICATION:   Z08: Encounter for follow-up examination after completed treatment for malignant neoplasm Z85 07: Personal history of malignant neoplasm of pancreas  Pancreatic cancer surveillance  History of chemotherapy and Whipple procedure  COMPARISON:  2/23/2022 TECHNIQUE: CT examination of the chest, abdomen and pelvis was performed  Scanning through the abdomen was performed in arterial, venous and delayed phases according a protocol spefically designed to evaluate upper abdominal viscera    Axial, sagittal, and coronal 2D reformatted images were created from the source data and submitted for interpretation  Radiation dose length product (DLP) for this visit:  1211 mGy-cm   This examination, like all CT scans performed in the [facility], was performed utilizing techniques to minimize radiation dose exposure, including the use of iterative reconstruction and automated exposure control  IV Contrast:  70 mL of iohexol (OMNIPAQUE) Enteric Contrast: Enteric contrast was administered  FINDINGS: CHEST LUNGS:  Mild dependent atelectasis  0 2 cm left upper lobe peripheral nodule (5/21, stable compared to at least 9/14/2020  Patent airways  PLEURA:  Within normal limits  HEART/GREAT VESSELS:  Similar atherosclerosis  MEDIASTINUM AND SANDRA:  Persistent diffuse esophageal wall thickening without discrete mass, narrowing or dilation  No lymphadenopathy  CHEST WALL AND LOWER NECK:   Within normal limits  ABDOMEN Artifact from bilateral hip arthroplasties obscures visualization in the pelvis  LIVER/BILIARY TREE:  The liver is within normal limits  Chronic pneumobilia without bile duct dilation  Biliary stent no longer visualized  GALLBLADDER:  Cholecystectomy SPLEEN:  Within normal limits  PANCREAS:  Chronic atrophy of the remaining pancreas  Air in the proximal pancreatic duct  No suspicious findings in the surgical bed  ADRENAL GLANDS:  Within normal limits  KIDNEYS/URETERS:  Within normal limits  STOMACH AND BOWEL:  Similar proximal small bowel surgical changes from Whipple procedure and mild left colon wall thickening  No obstruction, oral contrast has reached the rectum  No acute change  APPENDIX:  Within normal limits  ABDOMINOPELVIC CAVITY:  No abnormal air, fluid or enlarged lymph nodes  VESSELS:  Similar atherosclerosis  Fat is preserved around the mesenteric vessels  No acute findings  PELVIS: REPRODUCTIVE ORGANS:  Limited evaluation secondary to artifact  No adnexal mass identified  URINARY BLADDER:  Limited evaluation due to artifact    No evidence of acute pathology  ABDOMINAL WALL/INGUINAL REGIONS:  Within normal limits  OSSEOUS STRUCTURES:  Degenerative changes and hip arthroplasties  No new suspicious bone lesions  Impression: Stable appearance of the chest, abdomen and pelvis  No evidence of recurrent or metastatic malignancy  Chronic findings above  Workstation performed: [id number]     I reviewed the above laboratory and imaging data  Discussion/Summary: This is a very pleasant 68 y/o female who presents today for continued pancreatic cancer surveillance  She is doing extremely well, and there is no evidence of disease recurrence by imaging, blood work or symptomatology  She is adjusting her diet to help manage her digestive symptoms and blood sugar, and will continue to make changes as needed  I have advised her to make sure she is eating at least small snacks every 2-3 hours  With regard to cardiovascular findings on the CT, I did review her labs from last year, which showed high triglycerides and low HDLs  She is scheduled for repeat bloodwork ordered by her PCP, and I have suggested considering medication for dyslipidemia if labs are abnormal again  We will see her again in 6 months with repeat CT and Ca 19-9  She has been instructed to call the office with any new or worrisome symptoms, such as weight loss, poor appetite, worsening nausea or abdominal pain  She is agreeable to the plan, all questions have been answered

## 2022-10-11 ENCOUNTER — ANNUAL EXAM (OUTPATIENT)
Dept: OBGYN CLINIC | Facility: CLINIC | Age: 67
End: 2022-10-11

## 2022-10-11 VITALS
BODY MASS INDEX: 20.72 KG/M2 | HEIGHT: 59 IN | DIASTOLIC BLOOD PRESSURE: 80 MMHG | WEIGHT: 102.8 LBS | SYSTOLIC BLOOD PRESSURE: 132 MMHG

## 2022-10-11 DIAGNOSIS — Z01.419 ENCOUNTER FOR ANNUAL ROUTINE GYNECOLOGICAL EXAMINATION: Primary | ICD-10-CM

## 2022-10-11 DIAGNOSIS — Z12.4 SCREENING FOR CERVICAL CANCER: ICD-10-CM

## 2022-10-11 PROCEDURE — G0145 SCR C/V CYTO,THINLAYER,RESCR: HCPCS

## 2022-10-11 PROCEDURE — G0476 HPV COMBO ASSAY CA SCREEN: HCPCS

## 2022-10-11 NOTE — PROGRESS NOTES
Diagnoses and all orders for this visit:    Encounter for annual routine gynecological examination  ASCCP guidelines reviewed- advised patient paps may be d/c'd as per ASCCP guidelines  She requested one today  SBE, daily exercise and healthy diet with adequate calcium and vitamin D encouraged  Weight bearing exercises a minimum of 150 minutes/week advised  Yearly mammograms advised  Advised to call with any issues, all concerns & questions addressed  See provided information in your after visit summary     Screening for cervical cancer  -     Liquid-based pap, screening    F/U annually or Biannual if Medicare     Results will be released to [employer], if abnormal will call or message to review and discuss treatment plan  Health Maintenance:    Last PAP: not on file  Patient reports it has been several years and would like one done today  Last Mammogram: 10/15/2021  Results were: Negative  Next Mammogram: scheduled next week     Last Colonoscopy: 04/22/2022  - repeat recommended in 10 years  DXA Scan: Not on file  Patient has order from PCP  Will schedule  Subjective    CC: Yearly Exam     Marianne Silva is a 69 y o  postmenopausal female [id number], here for annual exam    GYN hx includes: Denies history of abnormal pap smears or mammograms  Denies any post menopausal bleeding, breast concerns, vaginal issues, pelvic pain, dyspareunia, urinary symptoms, symptoms of pelvic organ prolapse, urinary, or fecal incontinence today  She is not sexually active  Monogamous relationship  Denies intimate partner violence  STD testing:  She does not want STD testing today        Past Medical History:   Diagnosis Date   • BRCA1 negative    • BRCA2 negative    • Chemotherapy induced neutropenia ([facility] ) 2/10/2021   • Diabetes mellitus ([facility] [address] )    • Hypertension    • Lactic acidosis 5/30/2020   • Neuropathy    • Obstructive jaundice 6/1/2020   • Pancreas cancer ([facility] [address] ) 2020     Past Surgical History: Procedure Laterality Date   • BREAST BIOPSY Left     neg   • BREAST SURGERY Left     calcifications   •  SECTION     • CHOLECYSTECTOMY N/A 10/20/2020    Procedure: CHOLECYSTECTOMY;  Surgeon: Juan Ramires MD;  Location: [facility];  Service: Surgical Oncology   • COLONOSCOPY     • ECTOPIC PREGNANCY SURGERY      partial ovary removed   • FL GUIDED CENTRAL VENOUS ACCESS DEVICE INSERTION  2020   • JOINT REPLACEMENT Bilateral    • LAPAROTOMY N/A 10/20/2020    Procedure: LAPAROTOMY EXPLORATORY; INTRAOPERATIVE ULTRASOUND;  Surgeon: Juan Ramires MD;  Location: [facility];  Service: Surgical Oncology   • MANDIBLE FRACTURE SURGERY     • NOSE SURGERY      deviated septum   • REPLACEMENT TOTAL HIP W/  RESURFACING IMPLANTS Bilateral     right hip done 2012; left hip done 2012   • TONSILLECTOMY     • TUNNELED VENOUS PORT PLACEMENT Left 2020    Procedure: INSERTION VENOUS PORT (PORT-A-CATH), left;  Surgeon: Juan Ramires MD;  Location: [facility];  Service: Surgical Oncology   • WHIPPLE PROCEDURE/PANCREATICO-DUODENECTOMY N/A 10/20/2020    Procedure: WHIPPLE PROCEDURE/PANCREATICO-DUODENECTOMY;  Surgeon: Juan Ramires MD;  Location: [facility];  Service: Surgical Oncology      Family History   Problem Relation Age of Onset   • Diabetes Mother    • Heart disease Mother    • Heart disease Father    • Breast cancer additional onset Sister    • Breast cancer Sister 47   • Stroke Maternal Grandfather    • Breast cancer additional onset Maternal Aunt    • Breast cancer Maternal Aunt    • Colon cancer Neg Hx    • Ovarian cancer Neg Hx    • Uterine cancer Neg Hx    • Cervical cancer Neg Hx      Social History     Tobacco Use   • Smoking status: Former Smoker   • Smokeless tobacco: Never Used   • Tobacco comment: quit 40 years ago   Vaping Use   • Vaping Use: Never used   Substance Use Topics   • Alcohol use:  Yes     Alcohol/week: 1 0 standard drink     Types: 1 Glasses of wine per week     Comment: WINE OCCASIONALLY   • Drug use: Never       Current Outpatient Medications:   •  amLODIPine (NORVASC) 2 5 mg tablet, Take 2 5 mg by mouth daily, Disp: , Rfl:   •  dicyclomine (BENTYL) 20 mg tablet, TAKE 1 TABLET BY MOUTH TWICE A DAY (Patient taking differently: as needed), Disp: 180 tablet, Rfl: 1  •  diphenhydrAMINE (BENADRYL) 25 mg capsule, Take 25 mg by mouth every 6 (six) hours as needed for itching, Disp: , Rfl:   •  diphenoxylate-atropine (LOMOTIL) 2 5-0 025 mg per tablet, Take 1 tablet by mouth 4 (four) times a day as needed for diarrhea DO NOT EXCEED 4 DOSES IN 1 DAY, Disp: 30 tablet, Rfl: 0  •  Empagliflozin (Jardiance) 10 MG TABS, Take 10 mg by mouth every morning, Disp: , Rfl:   •  gabapentin (NEURONTIN) 300 mg capsule, Take 300 mg by mouth 3 (three) times a day, Disp: , Rfl:   •  LORazepam (ATIVAN) 0 5 mg tablet, Take 1 tablet (0 5 mg total) by mouth 2 (two) times a day as needed for anxiety NO FURTHER REFILLS WITHOUT CLINIC VISIT WITH PALLIATIVE CARE, Disp: 30 tablet, Rfl: 0  •  mirtazapine (REMERON) 15 mg tablet, Take 1 tablet (15 mg total) by mouth daily at bedtime, Disp: 90 tablet, Rfl: 3  •  omeprazole (PriLOSEC) 20 mg delayed release capsule, TAKE 1 CAPSULE BY MOUTH TWICE A DAY (Patient taking differently: daily), Disp: 180 capsule, Rfl: 1  •  OneTouch Verio test strip, daily Test, Disp: , Rfl:   •  prochlorperazine (COMPAZINE) 10 mg tablet, Take 1 tablet (10 mg total) by mouth every 6 (six) hours as needed for nausea or vomiting, Disp: 45 tablet, Rfl: 3  Patient Active Problem List    Diagnosis Date Noted   • Encounter for removal of tunneled central venous catheter (CVC) with port 03/24/2022   • 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/23/2021   • Mild protein-calorie malnutrition ([address] ) 08/23/2021   • Encounter for follow-up surveillance of pancreatic cancer 08/18/2021   • Chemotherapy induced neutropenia ([facility] [address] ) 02/10/2021   • Functional diarrhea 2020   • Poor appetite 2020   • Anxiety 2020   • Therapeutic opioid-induced constipation (OIC) 2020   • Port-A-Cath in place 2020   • Pruritus 2020   • Transaminitis 2020   • History of pancreatic cancer 2020   • Hyperlipidemia 2018   • Essential hypertension 2018   • Type 2 diabetes mellitus without complication, without long-term current use of insulin ([facility] ) 2018   • Hip joint replacement status 2012       Allergies   Allergen Reactions   • Betadine [Povidone Iodine] Rash     Also itching from betadine   • Other Throat Swelling     Pt unsure which chemotherapy drug caused tongue swelling and locked jaw  Please review in epic notes Folfirinox or possible Neulasta  NEED to review       OB History    Para Term  AB Living   4 3 2 1   2   SAB IAB Ectopic Multiple Live Births           2      # Outcome Date GA Lbr Chris/2nd Weight Sex Delivery Anes PTL Lv   4             3 Term            2 Term      Vag-Spont      1       CS-Unspec          Vitals:    10/11/22 0801   BP: 132/80   BP Location: Right arm   Patient Position: Sitting   Cuff Size: Standard   Weight: 46 6 kg (102 lb 12 8 oz)   Height: 4' 11" (1 499 m)     Body mass index is 20 76 kg/m²  Review of Systems   Constitutional: Negative for chills, fatigue, fever and unexpected weight change  Gastrointestinal: Negative for abdominal pain, nausea and vomiting  Endocrine: Negative  Genitourinary: Negative for difficulty urinating, dyspareunia, dysuria, frequency, genital sores, hematuria, pelvic pain, urgency, vaginal bleeding, vaginal discharge and vaginal pain  Musculoskeletal: Negative for back pain and myalgias  Skin: Negative for pallor and rash  Neurological: Negative for headaches  Psychiatric/Behavioral: Negative for dysphoric mood  All other systems reviewed and are negative          Physical Exam  Constitutional: General: She is not in acute distress  Appearance: Normal appearance  She is not ill-appearing  Genitourinary:      Bladder and urethral meatus normal       No lesions in the vagina  Right Labia: No rash, tenderness, lesions, skin changes or Bartholin's cyst      Left Labia: No tenderness, lesions, skin changes, Bartholin's cyst or rash  No inguinal adenopathy present in the right or left side  No vaginal discharge, erythema, tenderness, bleeding, ulceration or granulation tissue  No vaginal prolapse present  Mild vaginal atrophy present  Right Adnexa: not palpable  Left Adnexa: not palpable  Cervix is nulliparous  No cervical motion tenderness, discharge, friability, lesion, polyp, nabothian cyst, eversion or elongation  Uterus is not enlarged, fixed, tender or prolapsed  No uterine mass detected  No urethral prolapse, tenderness or discharge present  Pelvic exam was performed with patient in the lithotomy position  Breasts:      Right: No swelling, inverted nipple, mass, nipple discharge, skin change, tenderness, axillary adenopathy or supraclavicular adenopathy  Left: No swelling, inverted nipple, mass, nipple discharge, skin change, tenderness, axillary adenopathy or supraclavicular adenopathy  HENT:      Head: Normocephalic and atraumatic  Eyes:      Conjunctiva/sclera: Conjunctivae normal    Pulmonary:      Effort: Pulmonary effort is normal    Abdominal:      General: There is no distension  Palpations: Abdomen is soft  Tenderness: There is no abdominal tenderness  Musculoskeletal:         General: Normal range of motion  Cervical back: Neck supple  Lymphadenopathy:      Upper Body:      Right upper body: No supraclavicular or axillary adenopathy  Left upper body: No supraclavicular or axillary adenopathy  Lower Body: No right inguinal adenopathy  No left inguinal adenopathy     Neurological: Mental Status: She is alert and oriented to person, place, and time  Skin:     General: Skin is warm and dry  Psychiatric:         Mood and Affect: Mood normal          Behavior: Behavior normal          Thought Content: Thought content normal    Vitals and nursing note reviewed

## 2022-10-11 NOTE — PROGRESS NOTES
Patient is here today for a gynecological annual exam    No questions or concerns today  LMP: Postmenopausal   Menopausal age: 44      Last pap: Not on file     Hx of abnormal paps?  No  Last Mammo: 10/15/2021 Scheduled for 10/17/2022  Last Colonoscopy: 2022  Last Dexa: Not on file

## 2022-10-14 LAB
HPV HR 12 DNA CVX QL NAA+PROBE: NEGATIVE
HPV16 DNA CVX QL NAA+PROBE: NEGATIVE
HPV18 DNA CVX QL NAA+PROBE: NEGATIVE

## 2022-10-17 ENCOUNTER — HOSPITAL ENCOUNTER (OUTPATIENT)
Dept: MAMMOGRAPHY | Facility: CLINIC | Age: 67
Discharge: HOME | End: 2022-10-17
Payer: MEDICARE

## 2022-10-17 VITALS — WEIGHT: 102 LBS | BODY MASS INDEX: 20.56 KG/M2 | HEIGHT: 59 IN

## 2022-10-17 DIAGNOSIS — Z12.31 ENCOUNTER FOR SCREENING MAMMOGRAM FOR MALIGNANT NEOPLASM OF BREAST: ICD-10-CM

## 2022-10-17 PROCEDURE — 77063 BREAST TOMOSYNTHESIS BI: CPT

## 2022-10-17 PROCEDURE — 77067 SCR MAMMO BI INCL CAD: CPT

## 2022-10-18 LAB
LAB AP GYN PRIMARY INTERPRETATION: NORMAL
Lab: NORMAL

## 2022-12-30 ENCOUNTER — TELEPHONE (OUTPATIENT)
Dept: HEMATOLOGY ONCOLOGY | Facility: CLINIC | Age: 67
End: 2022-12-30

## 2022-12-30 NOTE — TELEPHONE ENCOUNTER
Called patient and rescheduled appt to 3/20/23 after CT due to Dr Juarez Landaverde' schedule for original appt, patient prefers to be seen with Dr Juarez Landaverde

## 2023-01-02 DIAGNOSIS — K21.9 GASTROESOPHAGEAL REFLUX DISEASE WITHOUT ESOPHAGITIS: ICD-10-CM

## 2023-01-04 RX ORDER — OMEPRAZOLE 20 MG/1
CAPSULE, DELAYED RELEASE ORAL
Qty: 180 CAPSULE | Refills: 1 | Status: SHIPPED | OUTPATIENT
Start: 2023-01-04

## 2023-01-10 DIAGNOSIS — F41.9 ANXIETY: ICD-10-CM

## 2023-01-10 DIAGNOSIS — C25.9 PANCREATIC ADENOCARCINOMA (HCC): ICD-10-CM

## 2023-01-10 RX ORDER — MIRTAZAPINE 15 MG/1
TABLET, FILM COATED ORAL
Qty: 90 TABLET | Refills: 3 | Status: SHIPPED | OUTPATIENT
Start: 2023-01-10

## 2023-01-12 ENCOUNTER — APPOINTMENT (OUTPATIENT)
Dept: LAB | Facility: HOSPITAL | Age: 68
End: 2023-01-12

## 2023-01-12 DIAGNOSIS — E55.9 VITAMIN D DEFICIENCY: ICD-10-CM

## 2023-01-12 DIAGNOSIS — Z11.59 NEED FOR HEPATITIS C SCREENING TEST: Primary | ICD-10-CM

## 2023-01-12 DIAGNOSIS — E11.9 TYPE 2 DIABETES MELLITUS WITHOUT COMPLICATION, WITHOUT LONG-TERM CURRENT USE OF INSULIN (HCC): ICD-10-CM

## 2023-01-12 DIAGNOSIS — Z85.07 HISTORY OF PANCREATIC CANCER: ICD-10-CM

## 2023-01-12 DIAGNOSIS — C25.9 PANCREATIC ADENOCARCINOMA (HCC): ICD-10-CM

## 2023-01-12 DIAGNOSIS — E78.5 HYPERLIPIDEMIA, UNSPECIFIED HYPERLIPIDEMIA TYPE: ICD-10-CM

## 2023-01-12 DIAGNOSIS — E53.8 VITAMIN B12 DEFICIENCY: ICD-10-CM

## 2023-01-12 LAB
25(OH)D3 SERPL-MCNC: 13.2 NG/ML (ref 30–100)
ALBUMIN SERPL BCP-MCNC: 4.1 G/DL (ref 3.5–5)
ALP SERPL-CCNC: 95 U/L (ref 46–116)
ALT SERPL W P-5'-P-CCNC: 27 U/L (ref 12–78)
ANION GAP SERPL CALCULATED.3IONS-SCNC: 12 MMOL/L (ref 4–13)
AST SERPL W P-5'-P-CCNC: 30 U/L (ref 5–45)
BASOPHILS # BLD AUTO: 0.02 THOUSANDS/ÂΜL (ref 0–0.1)
BASOPHILS NFR BLD AUTO: 1 % (ref 0–1)
BILIRUB SERPL-MCNC: 0.64 MG/DL (ref 0.2–1)
BUN SERPL-MCNC: 16 MG/DL (ref 5–25)
CALCIUM SERPL-MCNC: 9.1 MG/DL (ref 8.3–10.1)
CHLORIDE SERPL-SCNC: 101 MMOL/L (ref 96–108)
CHOLEST SERPL-MCNC: 202 MG/DL
CO2 SERPL-SCNC: 27 MMOL/L (ref 21–32)
CREAT SERPL-MCNC: 1.11 MG/DL (ref 0.6–1.3)
EOSINOPHIL # BLD AUTO: 0.07 THOUSAND/ÂΜL (ref 0–0.61)
EOSINOPHIL NFR BLD AUTO: 2 % (ref 0–6)
ERYTHROCYTE [DISTWIDTH] IN BLOOD BY AUTOMATED COUNT: 11.9 % (ref 11.6–15.1)
EST. AVERAGE GLUCOSE BLD GHB EST-MCNC: 126 MG/DL
GFR SERPL CREATININE-BSD FRML MDRD: 50 ML/MIN/1.73SQ M
GLUCOSE P FAST SERPL-MCNC: 125 MG/DL (ref 65–99)
HBA1C MFR BLD: 6 %
HCT VFR BLD AUTO: 48.4 % (ref 34.8–46.1)
HCV AB SER QL: NORMAL
HDLC SERPL-MCNC: 45 MG/DL
HGB BLD-MCNC: 15.9 G/DL (ref 11.5–15.4)
IMM GRANULOCYTES # BLD AUTO: 0.01 THOUSAND/UL (ref 0–0.2)
IMM GRANULOCYTES NFR BLD AUTO: 0 % (ref 0–2)
LDLC SERPL CALC-MCNC: 94 MG/DL (ref 0–100)
LYMPHOCYTES # BLD AUTO: 1.03 THOUSANDS/ÂΜL (ref 0.6–4.47)
LYMPHOCYTES NFR BLD AUTO: 27 % (ref 14–44)
MCH RBC QN AUTO: 31.3 PG (ref 26.8–34.3)
MCHC RBC AUTO-ENTMCNC: 32.9 G/DL (ref 31.4–37.4)
MCV RBC AUTO: 95 FL (ref 82–98)
MONOCYTES # BLD AUTO: 0.35 THOUSAND/ÂΜL (ref 0.17–1.22)
MONOCYTES NFR BLD AUTO: 9 % (ref 4–12)
NEUTROPHILS # BLD AUTO: 2.4 THOUSANDS/ÂΜL (ref 1.85–7.62)
NEUTS SEG NFR BLD AUTO: 61 % (ref 43–75)
NONHDLC SERPL-MCNC: 157 MG/DL
NRBC BLD AUTO-RTO: 0 /100 WBCS
PLATELET # BLD AUTO: 179 THOUSANDS/UL (ref 149–390)
PMV BLD AUTO: 11.1 FL (ref 8.9–12.7)
POTASSIUM SERPL-SCNC: 3.6 MMOL/L (ref 3.5–5.3)
PROT SERPL-MCNC: 7.7 G/DL (ref 6.4–8.4)
RBC # BLD AUTO: 5.08 MILLION/UL (ref 3.81–5.12)
SODIUM SERPL-SCNC: 140 MMOL/L (ref 135–147)
TRIGL SERPL-MCNC: 313 MG/DL
TSH SERPL DL<=0.05 MIU/L-ACNC: 2.63 UIU/ML (ref 0.45–4.5)
VIT B12 SERPL-MCNC: 369 PG/ML (ref 100–900)
WBC # BLD AUTO: 3.88 THOUSAND/UL (ref 4.31–10.16)

## 2023-01-13 LAB — CANCER AG19-9 SERPL-ACNC: <2 U/ML (ref 0–35)

## 2023-01-18 NOTE — PROGRESS NOTES
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   The patient's bilirubin which was elevated at 12 2 at its peak has now come down to 1 0   She was seen by our colleagues in Surgical Oncology and they recommended neoadjuvant chemotherapy prior to consideration of a resection  She was started on FOLFIRINOX  every 2 weeks with Neulasta growth factor support; first dose was given on 6/30/20    She had this for approximately 3 cycles but then had a reaction to the CPT 11 so this was discontinued  She then proceeded to receive dose adjusted modified FOLFOX 6  She finished up 6 cycles and went for surgery  Whipple procedure/pancreaticoduodenectomy completed on 10/20/20   Surgery revealed 1 positive lymph node along with residual tumor   She completed concurrent chemoradiation in the adjuvant setting followed by 4 cycles of modified FOLFOX 6    She is on observation    Previous Hematologic/ Oncologic History:    Oncology History   History of pancreatic cancer   6/2/2020 Biopsy    [facility]- Dr Cesar Menendez:  Pancreas, uncinate mass:      - Malignant ([facility] Category VI)      - Compatible with adenocarcinoma with mucinous features       6/30/2020 - 9/16/2020 Chemotherapy    fluorouracil (ADRUCIL) injection 585 mg, 400 mg/m2 = 585 mg, Intravenous, Once, 7 of 12 cycles  Administration: 585 mg (6/30/2020), 585 mg (7/14/2020), 585 mg (7/28/2020), 585 mg (8/11/2020), 585 mg (9/8/2020), 585 mg (8/25/2020)  pegfilgrastim (NEULASTA ONPRO) subcutaneous injection kit 6 mg, 6 mg, Subcutaneous, Once, 7 of 12 cycles  Administration: 6 mg (7/2/2020), 6 mg (7/16/2020), 6 mg (7/30/2020), 6 mg (8/13/2020), 6 mg (8/27/2020), 6 mg (9/10/2020)  fosaprepitant (EMEND) 150 mg in sodium chloride 0 9 % 250 mL IVPB, 150 mg, Intravenous, Once, 7 of 12 cycles  Administration: 150 mg (6/30/2020), 150 mg (7/14/2020), 150 mg (7/28/2020), 150 mg (8/11/2020), 150 mg (9/8/2020), 150 mg (8/25/2020)  irinotecan (CAMPTOSAR) 263 mg in dextrose 5 % 500 mL chemo infusion, 180 mg/m2 = 263 mg, Intravenous, Once, 3 of 3 cycles  Administration: 263 mg (6/30/2020), 263 mg (7/14/2020), 263 mg (7/28/2020)  leucovorin 584 mg in dextrose 5 % 250 mL IVPB, 400 mg/m2 = 584 mg (100 % of original dose 400 mg/m2), Intravenous, Once, 2 of 7 cycles  Dose modification: 400 mg/m2 (original dose 400 mg/m2, Cycle 6)  Administration: 584 mg (9/8/2020)  oxaliplatin (ELOXATIN) 124 1 mg in dextrose 5 % 250 mL chemo infusion, 85 mg/m2 = 124 1 mg, Intravenous, Once, 7 of 12 cycles  Administration: 124 1 mg (6/30/2020), 124 1 mg (7/14/2020), 124 1 mg (7/28/2020), 124 1 mg (8/11/2020), 124 1 mg (9/8/2020), 124 1 mg (8/25/2020)     10/20/2020 Surgery    Whipple:  - Residual invasive adenocarcinoma with mucinous features, 2 3 cm   - Metastatic carcinoma present in one of fourteen lymph nodes (1/14)  - All margins are negative for tumor          12/21/2020 - 1/26/2021 Radiation    5000 cGy in 25 fractions to high risk areas  Dr Damon Ahuja     2/8/2021 - 4/4/2021 Chemotherapy    fluorouracil (ADRUCIL) injection 560 mg, 400 mg/m2 = 560 mg, Intravenous, Once, 4 of 4 cycles  Administration: 560 mg (2/8/2021), 560 mg (2/22/2021), 560 mg (3/8/2021), 560 mg (3/22/2021)  pegfilgrastim (NEULASTA ONPRO) subcutaneous injection kit 6 mg, 6 mg, Subcutaneous, Once, 4 of 4 cycles  Administration: 6 mg (2/10/2021), 6 mg (2/24/2021), 6 mg (3/10/2021), 6 mg (3/24/2021)  fosaprepitant (EMEND) 150 mg in sodium chloride 0 9 % 250 mL IVPB, 150 mg, Intravenous, Once, 4 of 4 cycles  Administration: 150 mg (2021), 150 mg (2021), 150 mg (3/8/2021), 150 mg (3/22/2021)  leucovorin 560 mg in dextrose 5 % 250 mL IVPB, 400 mg/m2 = 560 mg, Intravenous, Once, 4 of 4 cycles  Administration: 560 mg (2021), 560 mg (2021), 560 mg (3/8/2021), 560 mg (3/22/2021)  oxaliplatin (ELOXATIN) 119 mg in dextrose 5 % 250 mL chemo infusion, 85 mg/m2 = 119 mg, Intravenous, Once, 4 of 4 cycles  Administration: 119 mg (2021), 119 mg (2021), 119 mg (3/8/2021), 119 mg (3/22/2021)     Encounter for follow-up surveillance of pancreatic cancer       Current Hematologic/ Oncologic Treatment:    Observation    ECO - Asymptomatic    Interval History:   The patient presents for routine follow up  She was last seen on 2022  She was last seen by her surgical oncology team in September  Imaging completed on 2022 was negative for any recurrent or metastatic disease  Most recent blood work completed on 2023 was reviewed and without any concern  CA 19-9 undetectable <2    Patient reports she feels well  Denies any constitutional symptoms  Appetite is good, weight stable  Denies any abdominal pain  No nausea or vomiting  She remains active  Enjoys crafting  Denies any concerning symptoms today      Cancer Staging:  Cancer Staging  History of pancreatic cancer  Staging form: Pancreas, AJCC 8th Edition  - Clinical: Stage IIB (cT2, cN1, cM0) - Unsigned  Histologic grade (G): G2  Histologic grading system: 3 grade system      Molecular Testing:       Test Results:    Imaging: No results found      Labs:   Lab Results   Component Value Date    WBC 3 88 (L) 2023    HGB 15 9 (H) 2023    HCT 48 4 (H) 2023    MCV 95 2023     2023     Lab Results   Component Value Date    K 3 6 2023     2023    CO2 27 2023    BUN 16 2023    CREATININE 1 11 2023    GLUCOSE 153 (H) 10/20/2020    GLUF 125 (H) 2023    CALCIUM 9 1 2023    CORRECTEDCA 9 5 2021    AST 30 2023    ALT 27 2023    ALKPHOS 95 2023    EGFR 50 2023           Review of Systems   Psychiatric/Behavioral: The patient is nervous/anxious  All other systems reviewed and are negative          Active Problems:   Patient Active Problem List   Diagnosis   • Hyperlipidemia   • Essential hypertension   • Type 2 diabetes mellitus without complication, without long-term current use of insulin (HCC)   • Pruritus   • Transaminitis   • History of pancreatic cancer   • Port-A-Cath in place   • Therapeutic opioid-induced constipation (OIC)   • Anxiety   • Functional diarrhea   • Poor appetite   • Chemotherapy induced neutropenia (HCC)   • Encounter for follow-up surveillance of pancreatic cancer   • Secondary and unspecified malignant neoplasm of intra-abdominal lymph nodes ([address] )   • Mild protein-calorie malnutrition ([address] )   • Encounter for removal of tunneled central venous catheter (CVC) with port   • Hip joint replacement status       Past Medical History:   Past Medical History:   Diagnosis Date   • BRCA1 negative    • BRCA2 negative    • Chemotherapy induced neutropenia ([address] ) 2/10/2021   • Diabetes mellitus ([address] )    • Hypertension    • Lactic acidosis 2020   • Neuropathy    • Obstructive jaundice 2020   • Pancreas cancer ([address] )        Surgical History:   Past Surgical History:   Procedure Laterality Date   • BREAST BIOPSY Left     neg   • BREAST SURGERY Left     calcifications   •  SECTION     • CHOLECYSTECTOMY N/A 10/20/2020    Procedure: CHOLECYSTECTOMY;  Surgeon: Gemini Haney MD;  Location: [facility];  Service: Surgical Oncology   • COLONOSCOPY     • ECTOPIC PREGNANCY SURGERY      partial ovary removed   • FL GUIDED CENTRAL VENOUS ACCESS DEVICE INSERTION  2020   • JOINT REPLACEMENT Bilateral    • LAPAROTOMY N/A 10/20/2020    Procedure: LAPAROTOMY EXPLORATORY; INTRAOPERATIVE ULTRASOUND;  Surgeon: Jb Salas MD;  Location: [facility];  Service: Surgical Oncology   • MANDIBLE FRACTURE SURGERY  1972   • NOSE SURGERY      deviated septum   • REPLACEMENT TOTAL HIP W/  RESURFACING IMPLANTS Bilateral 2012    right hip done July 2012; left hip done September 2012   • TONSILLECTOMY  1957   • TUNNELED VENOUS PORT PLACEMENT Left 6/23/2020    Procedure: INSERTION VENOUS PORT (PORT-A-CATH), left;  Surgeon: Jb Salas MD;  Location: [facility];  Service: Surgical Oncology   • WHIPPLE PROCEDURE/PANCREATICO-DUODENECTOMY N/A 10/20/2020    Procedure: WHIPPLE PROCEDURE/PANCREATICO-DUODENECTOMY;  Surgeon: Jb Salas MD;  Location: [facility];  Service: Surgical Oncology       Family History:    Family History   Problem Relation Age of Onset   • Diabetes Mother    • Heart disease Mother    • Heart disease Father    • Breast cancer additional onset Sister    • Breast cancer Sister 47   • Stroke Maternal Grandfather    • Breast cancer additional onset Maternal Aunt    • Breast cancer Maternal Aunt    • Colon cancer Neg Hx    • Ovarian cancer Neg Hx    • Uterine cancer Neg Hx    • Cervical cancer Neg Hx        Cancer-related family history includes Breast cancer in her maternal aunt; Breast cancer (age of onset: 47) in her sister  There is no history of Colon cancer, Ovarian cancer, Uterine cancer, or Cervical cancer  Social History:   Social History     Socioeconomic History   • Marital status: /Civil Union     Spouse name: Not on file   • Number of children: Not on file   • Years of education: Not on file   • Highest education level: Not on file   Occupational History   • Not on file   Tobacco Use   • Smoking status: Former   • Smokeless tobacco: Never   • Tobacco comments:     quit 40 years ago   Vaping Use   • Vaping Use: Never used   Substance and Sexual Activity   • Alcohol use:  Yes     Alcohol/week: 1 0 standard drink     Types: 1 Glasses of wine per week     Comment: WINE OCCASIONALLY   • Drug use: Never   • Sexual activity: Not Currently   Other Topics Concern   • Not on file   Social History Narrative   • Not on file     Social Determinants of Health     Financial Resource Strain: Not on file   Food Insecurity: Not on file   Transportation Needs: Not on file   Physical Activity: Not on file   Stress: Not on file   Social Connections: Not on file   Intimate Partner Violence: Not on file   Housing Stability: Not on file       Current Medications:   Current Outpatient Medications   Medication Sig Dispense Refill   • dicyclomine (BENTYL) 20 mg tablet TAKE 1 TABLET BY MOUTH TWICE A DAY (Patient taking differently: as needed) 180 tablet 1   • diphenhydrAMINE (BENADRYL) 25 mg capsule Take 25 mg by mouth every 6 (six) hours as needed for itching     • diphenoxylate-atropine (LOMOTIL) 2 5-0 025 mg per tablet Take 1 tablet by mouth 4 (four) times a day as needed for diarrhea DO NOT EXCEED 4 DOSES IN 1 DAY 30 tablet 0   • Empagliflozin (Jardiance) 10 MG TABS Take 10 mg by mouth every morning     • gabapentin (NEURONTIN) 300 mg capsule Take 300 mg by mouth 3 (three) times a day     • LORazepam (ATIVAN) 0 5 mg tablet Take 1 tablet (0 5 mg total) by mouth 2 (two) times a day as needed for anxiety NO FURTHER REFILLS WITHOUT CLINIC VISIT WITH PALLIATIVE CARE 30 tablet 0   • mirtazapine (REMERON) 15 mg tablet TAKE 1 TABLET BY MOUTH DAILY AT BEDTIME 90 tablet 3   • omeprazole (PriLOSEC) 20 mg delayed release capsule TAKE 1 CAPSULE BY MOUTH TWICE A  capsule 1   • OneTouch Verio test strip daily Test     • prochlorperazine (COMPAZINE) 10 mg tablet Take 1 tablet (10 mg total) by mouth every 6 (six) hours as needed for nausea or vomiting 45 tablet 3   • amLODIPine (NORVASC) 2 5 mg tablet Take 2 5 mg by mouth daily       No current facility-administered medications for this visit  Allergies:    Allergies   Allergen Reactions   • Betadine [Povidone Iodine] Rash     Also itching from betadine • Other Throat Swelling     Pt unsure which chemotherapy drug caused tongue swelling and locked jaw  Please review in epic notes Folfirinox or possible Neulasta  NEED to review       Physical Exam:  /78 (BP Location: Right arm, Patient Position: Sitting, Cuff Size: Adult)   Pulse 74   Temp (!) 96 7 °F (35 9 °C) (Temporal)   Resp 16   Ht 4' 11" (1 499 m)   Wt 46 7 kg (103 lb)   SpO2 98%   BMI 20 80 kg/m²   Body surface area is 1 39 meters squared  Wt Readings from Last 3 Encounters:   01/19/23 46 7 kg (103 lb)   10/17/22 46 3 kg (102 lb)   10/11/22 46 6 kg (102 lb 12 8 oz)           Physical Exam  Constitutional:       General: She is not in acute distress  Appearance: Normal appearance  HENT:      Head: Normocephalic and atraumatic  Eyes:      General: No scleral icterus  Right eye: No discharge  Left eye: No discharge  Conjunctiva/sclera: Conjunctivae normal    Cardiovascular:      Rate and Rhythm: Normal rate and regular rhythm  Pulmonary:      Effort: Pulmonary effort is normal  No respiratory distress  Breath sounds: Normal breath sounds  Abdominal:      General: Bowel sounds are normal  There is no distension  Palpations: Abdomen is soft  There is no mass  Tenderness: There is no abdominal tenderness  Musculoskeletal:         General: Normal range of motion  Lymphadenopathy:      Cervical: No cervical adenopathy  Upper Body:      Right upper body: No supraclavicular, axillary or pectoral adenopathy  Left upper body: No supraclavicular, axillary or pectoral adenopathy  Skin:     General: Skin is warm and dry  Neurological:      General: No focal deficit present  Mental Status: She is alert and oriented to person, place, and time  Psychiatric:         Mood and Affect: Mood normal          Behavior: Behavior normal          Assessment / Plan:    1   History of pancreatic cancer      The patient is a very pleasant 69 year female with a history of pancreatic cancer status post Whipple procedure  She was treated with neoadjuvant chemotherapy with FOLFIRINOX for 3 cycles  She then had a reaction to CPT 11 so this was discontinued  Her treatment regimen was changed to modified FOLFOX 6  She completed 6 cycles and then went for surgery  Whipple procedure/pancreaticoduodenectomy completed on 10/20/20   Surgery revealed 1 positive lymph node along with residual tumor     She completed concurrent chemoradiation in the adjuvant setting followed by 4 cycles of modified FOLFOX 6  She finished adjuvant treatment in 3/2021  She has been on observation  Most recent imaging completed in 9/2022 was negative for recurrent or metastatic disease  Her most recent blood work shows no concerns  Her tumor marker is undetectable  Clinically, patient appears well without any evidence of disease recurrence on exam     She will continue on observation  She is already scheduled for surveillance imaging in March and follow up with her surgeon Dr Charlyn Riedel  She will return for follow-up visit with Medical Oncology in 6 months with repeat blood work  Patient was in agreement with this plan of care  She knows to call at any time with questions or concerning symptoms prior to her next visit    Goals and Barriers:  Current Goal:  Prolong Survival from pancreatic cancer   Barriers: None  Patient's Capacity to Self Care:  Patient able to self care  Portions of the record may have been created with voice recognition software  Occasional wrong word or "sound a like" substitutions may have occurred due to the inherent limitations of voice recognition software  Read the chart carefully and recognize, using context, where substitutions have occurred

## 2023-01-19 ENCOUNTER — OFFICE VISIT (OUTPATIENT)
Dept: HEMATOLOGY ONCOLOGY | Facility: CLINIC | Age: 68
End: 2023-01-19

## 2023-01-19 VITALS
HEIGHT: 59 IN | DIASTOLIC BLOOD PRESSURE: 78 MMHG | RESPIRATION RATE: 16 BRPM | WEIGHT: 103 LBS | TEMPERATURE: 96.7 F | HEART RATE: 74 BPM | SYSTOLIC BLOOD PRESSURE: 130 MMHG | BODY MASS INDEX: 20.76 KG/M2 | OXYGEN SATURATION: 98 %

## 2023-01-19 DIAGNOSIS — C77.2 SECONDARY AND UNSPECIFIED MALIGNANT NEOPLASM OF INTRA-ABDOMINAL LYMPH NODES (HCC): ICD-10-CM

## 2023-01-19 DIAGNOSIS — Z85.07 HISTORY OF PANCREATIC CANCER: Primary | ICD-10-CM

## 2023-03-03 ENCOUNTER — APPOINTMENT (OUTPATIENT)
Dept: LAB | Facility: HOSPITAL | Age: 68
End: 2023-03-03

## 2023-03-03 DIAGNOSIS — Z85.07 PERSONAL HISTORY OF MALIGNANT NEOPLASM OF PANCREAS: ICD-10-CM

## 2023-03-03 LAB
BUN SERPL-MCNC: 12 MG/DL (ref 5–25)
CREAT SERPL-MCNC: 1.12 MG/DL (ref 0.6–1.3)
GFR SERPL CREATININE-BSD FRML MDRD: 50 ML/MIN/1.73SQ M

## 2023-03-04 LAB — CANCER AG19-9 SERPL-ACNC: <2 U/ML (ref 0–35)

## 2023-03-10 ENCOUNTER — HOSPITAL ENCOUNTER (OUTPATIENT)
Dept: CT IMAGING | Facility: HOSPITAL | Age: 68
End: 2023-03-10

## 2023-03-10 DIAGNOSIS — Z85.07 HISTORY OF PANCREATIC CANCER: ICD-10-CM

## 2023-03-10 RX ADMIN — IOHEXOL 100 ML: 350 INJECTION, SOLUTION INTRAVENOUS at 09:08

## 2023-03-15 ENCOUNTER — TELEPHONE (OUTPATIENT)
Dept: HEMATOLOGY ONCOLOGY | Facility: CLINIC | Age: 68
End: 2023-03-15

## 2023-03-15 NOTE — TELEPHONE ENCOUNTER
Call Transfer   Reason for patient call? Patient is calling in requesting to speak to 163Gayle Rivas regarding results she received     If someone other than patient is calling, are they listed on the communication consent? N/A   Who is the patients Primary Oncologist? Dr Clarice Vick    Person/Department that the call was transferred to? Time that call was transferred? Wiliam Rivas / 09:20am     Informed patient that the message will be forwarded to the team and someone will get back to them as soon as possible  Did you relay this information to the patient?  Yes

## 2023-03-16 ENCOUNTER — TELEPHONE (OUTPATIENT)
Age: 68
End: 2023-03-16

## 2023-03-16 NOTE — TELEPHONE ENCOUNTER
Spoke to patient and let her know that Dr Linda Nuñez looked at the scan and contacted radiology to correct the report  Patient verbalized understanding and thanks

## 2023-03-16 NOTE — TELEPHONE ENCOUNTER
Called patient and spoke to patient  Patient confirmed rescheduled appointment with another provider, due to provider not seeing patients at the location any longer

## 2023-03-17 ENCOUNTER — OFFICE VISIT (OUTPATIENT)
Dept: SURGICAL ONCOLOGY | Facility: CLINIC | Age: 68
End: 2023-03-17

## 2023-03-17 VITALS
BODY MASS INDEX: 20.36 KG/M2 | SYSTOLIC BLOOD PRESSURE: 128 MMHG | HEIGHT: 59 IN | OXYGEN SATURATION: 97 % | RESPIRATION RATE: 16 BRPM | DIASTOLIC BLOOD PRESSURE: 78 MMHG | WEIGHT: 101 LBS | HEART RATE: 72 BPM | TEMPERATURE: 97.7 F

## 2023-03-17 DIAGNOSIS — Z08 ENCOUNTER FOR FOLLOW-UP SURVEILLANCE OF PANCREATIC CANCER: Primary | ICD-10-CM

## 2023-03-17 DIAGNOSIS — E44.1 MILD PROTEIN-CALORIE MALNUTRITION (HCC): ICD-10-CM

## 2023-03-17 DIAGNOSIS — Z85.07 HISTORY OF PANCREATIC CANCER: ICD-10-CM

## 2023-03-17 DIAGNOSIS — Z85.07 ENCOUNTER FOR FOLLOW-UP SURVEILLANCE OF PANCREATIC CANCER: Primary | ICD-10-CM

## 2023-03-17 RX ORDER — ERGOCALCIFEROL 1.25 MG/1
50000 CAPSULE ORAL
COMMUNITY

## 2023-03-17 NOTE — PROGRESS NOTES
Surgical Oncology Follow Up       [address] [facility] SURGICAL ONCOLOGY JOSE CTERRELL  [facility] [address]    Ryanne   1955  [id number]  [facility] SURGICAL ONCOLOGY [place]  [place] [id number]    Chief Complaint   Patient presents with   • Follow-up       Assessment/Plan:    No problem-specific Assessment & Plan notes found for this encounter  Diagnoses and all orders for this visit:    Encounter for follow-up surveillance of pancreatic cancer    History of pancreatic cancer    Other orders  -     ergocalciferol (VITAMIN D2) 50,000 units; Take 50,000 Units by mouth every 14 (fourteen) days        Advance Care Planning/Advance Directives:  Discussed disease status, cancer treatment plans and/or cancer treatment goals with the patient  Oncology History   History of pancreatic cancer   6/2/2020 Biopsy    [facility]- Dr Martínez First:  Pancreas, uncinate mass:      - Malignant ([facility] Category VI)      - Compatible with adenocarcinoma with mucinous features       6/30/2020 - 9/16/2020 Chemotherapy    fluorouracil (ADRUCIL) injection 585 mg, 400 mg/m2 = 585 mg, Intravenous, Once, 7 of 12 cycles  Administration: 585 mg (6/30/2020), 585 mg (7/14/2020), 585 mg (7/28/2020), 585 mg (8/11/2020), 585 mg (9/8/2020), 585 mg (8/25/2020)  pegfilgrastim (NEULASTA ONPRO) subcutaneous injection kit 6 mg, 6 mg, Subcutaneous, Once, 7 of 12 cycles  Administration: 6 mg (7/2/2020), 6 mg (7/16/2020), 6 mg (7/30/2020), 6 mg (8/13/2020), 6 mg (8/27/2020), 6 mg (9/10/2020)  fosaprepitant (EMEND) 150 mg in sodium chloride 0 9 % 250 mL IVPB, 150 mg, Intravenous, Once, 7 of 12 cycles  Administration: 150 mg (6/30/2020), 150 mg (7/14/2020), 150 mg (7/28/2020), 150 mg (8/11/2020), 150 mg (9/8/2020), 150 mg (8/25/2020)  irinotecan (CAMPTOSAR) 263 mg in dextrose 5 % 500 mL chemo infusion, 180 mg/m2 = 263 mg, Intravenous, Once, 3 of 3 cycles  Administration: 263 mg (6/30/2020), 263 mg (7/14/2020), 263 mg (7/28/2020)  leucovorin 584 mg in dextrose 5 % 250 mL IVPB, 400 mg/m2 = 584 mg (100 % of original dose 400 mg/m2), Intravenous, Once, 2 of 7 cycles  Dose modification: 400 mg/m2 (original dose 400 mg/m2, Cycle 6)  Administration: 584 mg (9/8/2020)  oxaliplatin (ELOXATIN) 124 1 mg in dextrose 5 % 250 mL chemo infusion, 85 mg/m2 = 124 1 mg, Intravenous, Once, 7 of 12 cycles  Administration: 124 1 mg (6/30/2020), 124 1 mg (7/14/2020), 124 1 mg (7/28/2020), 124 1 mg (8/11/2020), 124 1 mg (9/8/2020), 124 1 mg (8/25/2020)     10/20/2020 Surgery    Whipple:  - Residual invasive adenocarcinoma with mucinous features, 2 3 cm   - Metastatic carcinoma present in one of fourteen lymph nodes (1/14)  - All margins are negative for tumor          12/21/2020 - 1/26/2021 Radiation    5000 cGy in 25 fractions to high risk areas  Dr Aleta Rausch     2/8/2021 - 4/4/2021 Chemotherapy    fluorouracil (ADRUCIL) injection 560 mg, 400 mg/m2 = 560 mg, Intravenous, Once, 4 of 4 cycles  Administration: 560 mg (2/8/2021), 560 mg (2/22/2021), 560 mg (3/8/2021), 560 mg (3/22/2021)  pegfilgrastim (NEULASTA ONPRO) subcutaneous injection kit 6 mg, 6 mg, Subcutaneous, Once, 4 of 4 cycles  Administration: 6 mg (2/10/2021), 6 mg (2/24/2021), 6 mg (3/10/2021), 6 mg (3/24/2021)  fosaprepitant (EMEND) 150 mg in sodium chloride 0 9 % 250 mL IVPB, 150 mg, Intravenous, Once, 4 of 4 cycles  Administration: 150 mg (2/8/2021), 150 mg (2/22/2021), 150 mg (3/8/2021), 150 mg (3/22/2021)  leucovorin 560 mg in dextrose 5 % 250 mL IVPB, 400 mg/m2 = 560 mg, Intravenous, Once, 4 of 4 cycles  Administration: 560 mg (2/8/2021), 560 mg (2/22/2021), 560 mg (3/8/2021), 560 mg (3/22/2021)  oxaliplatin (ELOXATIN) 119 mg in dextrose 5 % 250 mL chemo infusion, 85 mg/m2 = 119 mg, Intravenous, Once, 4 of 4 cycles  Administration: 119 mg (2/8/2021), 119 mg (2/22/2021), 119 mg (3/8/2021), 119 mg (3/22/2021)     Encounter for follow-up surveillance of pancreatic cancer       History of Present Illness: 61-year-old woman here for surveillance visit  History of pancreas cancer post Whipple and chemotherapy   -Interval History: She is doing well  She has no limitations  She is eating without difficulty and is gaining appropriate amount of weight since her last visit  CAT scan and blood work done in anticipation of today's visit  Review of Systems:  Review of Systems   Constitutional: Negative  HENT: Negative  Eyes: Negative  Respiratory: Negative  Cardiovascular: Negative  Gastrointestinal: Negative  Endocrine: Negative  Genitourinary: Negative  Musculoskeletal: Negative  Skin: Negative  Allergic/Immunologic: Negative  Neurological: Negative  Hematological: Negative  Psychiatric/Behavioral: Negative  All other systems reviewed and are negative        Patient Active Problem List   Diagnosis   • Hyperlipidemia   • Essential hypertension   • Type 2 diabetes mellitus without complication, without long-term current use of insulin (HCC)   • Pruritus   • Transaminitis   • History of pancreatic cancer   • Port-A-Cath in place   • Therapeutic opioid-induced constipation (OIC)   • Anxiety   • Functional diarrhea   • Poor appetite   • Chemotherapy induced neutropenia (HCC)   • Encounter for follow-up surveillance of pancreatic cancer   • Secondary and unspecified malignant neoplasm of intra-abdominal lymph nodes ([address] )   • Mild protein-calorie malnutrition ([address] )   • Encounter for removal of tunneled central venous catheter (CVC) with port   • Hip joint replacement status     Past Medical History:   Diagnosis Date   • BRCA1 negative    • BRCA2 negative    • Chemotherapy induced neutropenia ([address] ) 2/10/2021   • Diabetes mellitus ([address] )    • Hypertension    • Lactic acidosis 5/30/2020   • Neuropathy    • Obstructive jaundice 6/1/2020   • Pancreas cancer ([address] ) 2020     Past Surgical History:   Procedure Laterality Date   • BREAST BIOPSY Left 2009    neg   • BREAST SURGERY Left     calcifications   •  SECTION     • CHOLECYSTECTOMY N/A 10/20/2020    Procedure: CHOLECYSTECTOMY;  Surgeon: Celina Lee MD;  Location: [facility];  Service: Surgical Oncology   • COLONOSCOPY     • ECTOPIC PREGNANCY SURGERY      partial ovary removed   • FL GUIDED CENTRAL VENOUS ACCESS DEVICE INSERTION  2020   • JOINT REPLACEMENT Bilateral    • LAPAROTOMY N/A 10/20/2020    Procedure: LAPAROTOMY EXPLORATORY; INTRAOPERATIVE ULTRASOUND;  Surgeon: Celina Lee MD;  Location: [facility];  Service: Surgical Oncology   • MANDIBLE FRACTURE SURGERY     • NOSE SURGERY      deviated septum   • REPLACEMENT TOTAL HIP W/  RESURFACING IMPLANTS Bilateral     right hip done 2012; left hip done 2012   • TONSILLECTOMY     • TUNNELED VENOUS PORT PLACEMENT Left 2020    Procedure: INSERTION VENOUS PORT (PORT-A-CATH), left;  Surgeon: Celina Lee MD;  Location: [facility];  Service: Surgical Oncology   • WHIPPLE PROCEDURE/PANCREATICO-DUODENECTOMY N/A 10/20/2020    Procedure:  WHIPPLE PROCEDURE/PANCREATICO-DUODENECTOMY;  Surgeon: Celina Lee MD;  Location: [facility];  Service: Surgical Oncology     Family History   Problem Relation Age of Onset   • Diabetes Mother    • Heart disease Mother    • Heart disease Father    • Breast cancer additional onset Sister    • Breast cancer Sister 47   • Stroke Maternal Grandfather    • Breast cancer additional onset Maternal Aunt    • Breast cancer Maternal Aunt    • Colon cancer Neg Hx    • Ovarian cancer Neg Hx    • Uterine cancer Neg Hx    • Cervical cancer Neg Hx      Social History     Socioeconomic History   • Marital status: /Civil Union     Spouse name: Not on file   • Number of children: Not on file   • Years of education: Not on file   • Highest education level: Not on file   Occupational History   • Not on file   Tobacco Use   • Smoking status: Former   • Smokeless tobacco: Never   • Tobacco comments:     quit 40 years ago   Vaping Use   • Vaping Use: Never used   Substance and Sexual Activity   • Alcohol use:  Yes     Alcohol/week: 1 0 standard drink     Types: 1 Glasses of wine per week     Comment: WINE OCCASIONALLY   • Drug use: Never   • Sexual activity: Not Currently   Other Topics Concern   • Not on file   Social History Narrative   • Not on file     Social Determinants of Health     Financial Resource Strain: Not on file   Food Insecurity: Not on file   Transportation Needs: Not on file   Physical Activity: Not on file   Stress: Not on file   Social Connections: Not on file   Intimate Partner Violence: Not on file   Housing Stability: Not on file       Current Outpatient Medications:   •  amLODIPine (NORVASC) 2 5 mg tablet, Take 2 5 mg by mouth daily, Disp: , Rfl:   •  dicyclomine (BENTYL) 20 mg tablet, TAKE 1 TABLET BY MOUTH TWICE A DAY (Patient taking differently: as needed), Disp: 180 tablet, Rfl: 1  •  diphenhydrAMINE (BENADRYL) 25 mg capsule, Take 25 mg by mouth every 6 (six) hours as needed for itching, Disp: , Rfl:   •  diphenoxylate-atropine (LOMOTIL) 2 5-0 025 mg per tablet, Take 1 tablet by mouth 4 (four) times a day as needed for diarrhea DO NOT EXCEED 4 DOSES IN 1 DAY, Disp: 30 tablet, Rfl: 0  •  Empagliflozin (Jardiance) 10 MG TABS, Take 10 mg by mouth every morning, Disp: , Rfl:   •  ergocalciferol (VITAMIN D2) 50,000 units, Take 50,000 Units by mouth every 14 (fourteen) days, Disp: , Rfl:   •  gabapentin (NEURONTIN) 300 mg capsule, Take 300 mg by mouth 3 (three) times a day, Disp: , Rfl:   •  LORazepam (ATIVAN) 0 5 mg tablet, Take 1 tablet (0 5 mg total) by mouth 2 (two) times a day as needed for anxiety NO FURTHER REFILLS WITHOUT CLINIC VISIT WITH PALLIATIVE CARE, Disp: 30 tablet, Rfl: 0  •  mirtazapine (REMERON) 15 mg tablet, TAKE 1 TABLET BY MOUTH DAILY AT BEDTIME, Disp: 90 tablet, Rfl: 3  •  omeprazole (PriLOSEC) 20 mg delayed release capsule, TAKE 1 CAPSULE BY MOUTH TWICE A DAY, Disp: 180 capsule, Rfl: 1  •  OneTouch Verio test strip, daily Test, Disp: , Rfl:   •  prochlorperazine (COMPAZINE) 10 mg tablet, Take 1 tablet (10 mg total) by mouth every 6 (six) hours as needed for nausea or vomiting, Disp: 45 tablet, Rfl: 3  Allergies   Allergen Reactions   • Betadine [Povidone Iodine] Rash     Also itching from betadine   • Other Throat Swelling     Pt unsure which chemotherapy drug caused tongue swelling and locked jaw  Please review in epic notes Folfirinox or possible Neulasta  NEED to review     Vitals:    03/17/23 0905   BP: 128/78   Pulse: 72   Resp: 16   Temp: 97 7 °F (36 5 °C)   SpO2: 97%       Physical Exam  Vitals reviewed  Constitutional:       Appearance: Normal appearance  HENT:      Head: Normocephalic and atraumatic  Right Ear: External ear normal       Left Ear: External ear normal       Nose: Nose normal    Eyes:      Extraocular Movements: Extraocular movements intact  Pupils: Pupils are equal, round, and reactive to light  Cardiovascular:      Rate and Rhythm: Regular rhythm  Pulses: Normal pulses  Heart sounds: Normal heart sounds  Pulmonary:      Effort: Pulmonary effort is normal       Breath sounds: Normal breath sounds  Abdominal:      General: Abdomen is flat  Palpations: Abdomen is soft  Musculoskeletal:         General: Normal range of motion  Cervical back: Normal range of motion and neck supple  Skin:     General: Skin is warm and dry  Neurological:      General: No focal deficit present  Mental Status: She is alert and oriented to person, place, and time     Psychiatric:         Mood and Affect: Mood normal            Results:  Labs:  Component Ref Range & Units 3/3/23  8:08 AM 1/12/23  7:53 AM 9/7/22  7:58 AM 7/11/22  7:54 AM 2/17/22  7:35 AM 12/11/21  7:33 AM 8/12/21  7:50 AM   CA 19-9 0 - 35 U/mL <2               Imaging  CT chest abdomen pelvis w contrast    Addendum Date: 3/16/2023 Addendum:   ADDENDUM: Corrected report: The gallbladder is surgically absent  Result Date: 3/16/2023  Narrative: CT CHEST, ABDOMEN AND PELVIS WITH IV CONTRAST INDICATION:   [id number] [id number]: Personal history of malignant neoplasm of pancreas  COMPARISON:  September 9, 2022: Previous colonoscopy in March 23, 2022 TECHNIQUE: CT examination of the chest, abdomen and pelvis was performed  Axial, sagittal, and coronal 2D reformatted images were created from the source data and submitted for interpretation  Radiation dose length product (DLP) for this visit:  857 mGy-cm   This examination, like all CT scans performed in the [facility], was performed utilizing techniques to minimize radiation dose exposure, including the use of iterative reconstruction and automated exposure control  IV Contrast:  100 mL of iohexol (OMNIPAQUE) Enteric Contrast: Enteric contrast was administered  FINDINGS: CHEST LUNGS:  Right lower lobe granuloma seen no new measurable lung nodules PLEURA:  Unremarkable  HEART/GREAT VESSELS: Heart is unremarkable for patient's age  Ascending aorta measures 3 4 cm MEDIASTINUM AND SANDRA:  Esophageal wall thickening seen CHEST WALL AND LOWER NECK:  No significant injury lymph node enlargement seen A small right thyroid nodule does not meet the size criteria for further assessment with ultrasound ABDOMEN LIVER/BILIARY TREE:  No focal liver lesion GALLBLADDER:  No calcified gallstones  No pericholecystic inflammatory change  SPLEEN:  Unremarkable  PANCREAS:  Postsurgical changes from prior Whipple The pancreatic stump remains unchanged Choledochojejunostomy remains stable No new pancreatic ductal dilatation ADRENAL GLANDS:  Unremarkable  KIDNEYS/URETERS:  Unremarkable  No hydronephrosis   STOMACH AND BOWEL: There are 3 short segment area of narrowing in the sigmoid colon, seen in image 190 series 2, and image 187 series 2 Gastrojejunostomy seen with thickening of its proximal aspect, stable APPENDIX:  No findings to suggest appendicitis  ABDOMINOPELVIC CAVITY:  No ascites  No pneumoperitoneum  No lymphadenopathy  VESSELS:  Celiac trunk, SMA are patent CANDICE patent celiac vessels patent PELVIS REPRODUCTIVE ORGANS:  Unremarkable for patient's age  URINARY BLADDER:  Dilation limited ABDOMINAL WALL/INGUINAL REGIONS:  Unremarkable  OSSEOUS STRUCTURES:  No acute compression collapse vertebra No gross lytic lesion     Impression: No new metastatic disease seen  There are 3 short segment area of narrowing noted within the sigmoid colon,( image 190 series 2, image 187 series 2) these may be due to focal lesion or peristalsis, this can be either evaluated with colonoscopy or with a short interval follow-up  The study was marked in EPIC for significant notification  Workstation performed: [id number]     I reviewed the above laboratory and imaging data  Discussion/Summary: History of pancreas cancer 3 years post Whipple procedure  She is doing well  Follow-up in 6 months for surveillance with blood work and scan at that time

## 2023-04-18 NOTE — H&P
DATE: 23    PATIENT: Karthik Bhakta  MRN:  [id number]  :  1964    REASON FOR VISIT:  Karthik Bhakta is an established patient her today for   Chief Complaint   Patient presents with   • Office Visit   • Follow-up         HISTORY OF PRESENT ILLNESS:  GENERAL:  Mr. Bhakta is a very nice 58 year old gentleman with past medical history of hypertension, hyperlipidemia, and diabetes mellitus.  The patient is in the office today for his scheduled follow up appointment    HYPERTENSION:  The patient has a history of hypertension. He is currently on lisinopril-hydrochlorothiazide 20-12.5 mg daily, hydralazine 25 mg in the morning and 50 mg in the afternoon, amlodipine 10 mg daily    DIABETES MELLITUS:  The patient has a history of type 2 diabetes mellitus, currently on Ozempic 1 mg weekly, metformin 1000 g twice a day, and Jardiance 10 mg daily.  Most recent blood work showed:  Hemoglobin A1C (%)   Date Value   2023 7.1 (H)     PSA:  The patient has a history of elevated prostate specific antigen, currently under the care of neurology service  Most recent blood work showed:  Lab Results   Component Value Date    PSA 4.96 (H) 2022         REVIEW OF SYSTEM:  Const: No fever, no chills, no fatigue feeling and no anorexia.   Allergy & Immunology: No angioedema and no urticaria.   Eyes: No eye pain, no red eyes, no itching of the eyes, no blurred vision and no change in vision.   ENT: No nasal passage blockage, no nasal discharge, no earache, no discharge from the ears, no sore throat, no hoarsens, and no dysphagia.  CV: No chest pain, no palpitations, no lightheadedness, no dyspnea on exertion, and no orthopnea.   Resp: No cough, no short of breath and no wheezing.   GI: No abdominal pain, no nausea, no vomiting, no diarrhea, no constipation, no bright red blood per rectum, no melena and no bowel habit changes.   : No change in urinary frequency, no feelings of urinary urgency, no dysuria, no  History and Physical - [facility]  Janusz Mercado 69 y o  female MRN: [id number]      HPI: Janusz Mercado is a 69y o  year old female who presents for evaluation of an abnormal CT scan showing esophagitis and colitis, gastritis and for screening colonoscopy      REVIEW OF SYSTEMS: Per the HPI, and otherwise unremarkable  Historical Information   Past Medical History:   Diagnosis Date    BRCA1 negative     BRCA2 negative     Chemotherapy induced neutropenia ([facility] [address] ) 2/10/2021    Diabetes mellitus ([facility] )     Hypertension     Lactic acidosis 2020    Neuropathy     Obstructive jaundice 2020    Pancreas cancer ([facility] [address] )      Past Surgical History:   Procedure Laterality Date    BREAST BIOPSY Left 2009    neg    BREAST SURGERY Left     calcifications     SECTION      CHOLECYSTECTOMY N/A 10/20/2020    Procedure: CHOLECYSTECTOMY;  Surgeon: Nilda Patel MD;  [facility]: [facility];  Service: Surgical Oncology    COLONOSCOPY      ECTOPIC PREGNANCY SURGERY      partial ovary removed    FL GUIDED CENTRAL VENOUS ACCESS DEVICE INSERTION  2020    JOINT REPLACEMENT Bilateral     LAPAROTOMY N/A 10/20/2020    Procedure: LAPAROTOMY EXPLORATORY; INTRAOPERATIVE ULTRASOUND;  Surgeon: Nilda Patel MD;  [facility];  Service: Surgical Oncology    [address] [place] [address]    NOSE SURGERY      deviated septum    REPLACEMENT TOTAL HIP W/  RESURFACING IMPLANTS Bilateral     right hip done 2012; left hip done 2012    TONSILLECTOMY      TUNNELED VENOUS PORT PLACEMENT Left 2020    Procedure: INSERTION VENOUS PORT (PORT-A-CATH), left;  Surgeon: Nilda Patel MD;  [facility];  Service: Surgical Oncology    WHIPPLE PROCEDURE/PANCREATICO-DUODENECTOMY N/A 10/20/2020    Procedure:  WHIPPLE PROCEDURE/PANCREATICO-DUODENECTOMY;  Surgeon: Nilda Patel MD;  [facility];  Service: Surgical Oncology     Social History   Social History     Substance and Sexual Activity   Alcohol Use Not Currently     Social History     Substance and Sexual Activity   Drug Use Never     Social History     Tobacco Use   Smoking Status Former Smoker   Smokeless Tobacco Never Used   Tobacco Comment    quit 40 years ago     Family History   Problem Relation Age of Onset    Diabetes Mother     Heart disease Mother     Heart disease Father     Breast cancer additional onset Sister    Emaline Folds Breast cancer Sister 47    Breast cancer additional onset Maternal Aunt     Breast cancer Maternal Aunt     Stroke Maternal Grandfather        Meds/Allergies     (Not in a hospital admission)      Allergies   Allergen Reactions    Betadine [Povidone Iodine] Rash     Also itching from betadine    Other Throat Swelling     Pt unsure which chemotherapy drug caused tongue swelling and locked jaw  Please review in epic notes Folfirinox or possible Neulasta  NEED to review       Objective     Blood pressure 162/87, pulse 78, temperature (!) 97 °F (36 1 °C), temperature source Temporal, resp  rate 12, height 4' 11" (1 499 m), weight 45 kg (99 lb 3 3 oz), SpO2 100 %  PHYSICAL EXAM    Gen: NAD  CV: RRR  CHEST: Clear  ABD: soft, NT/ND  EXT: no edema      ASSESSMENT/PLAN:  This is a 69y o  year old female here for EGD with possible biopsy, colonoscopy, and she is stable and optimized for her procedure  hematuria, no nocturia, and no discharge.   Endo: No generalized decrease in strength, no polyuria, no polydipsia and no intolerance to cold.   Heme/Lymph: No anemia, no tendency for easy bleeding and no tendency for easy bruising.   Musc: No joint pain, no joint swelling, no joint stiffness, no back pain, no muscle aches and no muscle swelling.   Neuro: No confusion, no difficulty walking, no dizziness, no fainting, no headache, no memory lapses or loss, no numbness and no tremors.   Psych: No anxiety, no depression and no insomnia.   Skin: No bruising, no pruritus, no skin lesions and no rash.      ALLERGIES:  No Known Allergies    Current Outpatient Medications   Medication Sig Dispense Refill   • hydrALAZINE (APRESOLINE) 50 MG tablet Take 1 tablet by mouth in the morning and 1 tablet in the evening. 180 tablet 3   • lisinopril-hydroCHLOROthiazide (ZESTORETIC) 20-12.5 MG per tablet Take 2 tablets by mouth daily.     • amLODIPine (NORVASC) 10 MG tablet Take 1 tablet by mouth daily.     • Ozempic, 1 MG/DOSE, 4 MG/3ML Solution Pen-injector Inject 1 mg into the skin 1 day a week.     • metformin (GLUCOPHAGE) 1000 MG tablet Take 1 tablet by mouth in the morning and 1 tablet in the evening. Take with meals.     • Jardiance 10 MG tablet Take 1 tablet by mouth daily.     • atorvastatin (LIPITOR) 80 MG tablet Take 1 tablet by mouth daily.     • Continuous Blood Gluc  (FreeStyle Kerri 2 Claiborne) Device      • Continuous Blood Gluc Sensor (FreeStyle Kerri 14 Day Sensor) Misc CHANGE SENSOR EVERY 14 DAYS AS DIRECTED     • aspirin 81 MG tablet      • Insulin Syringe 31G X 5/16\" 1 ML Misc USE AS DIRECTED THREE TIMES DAILY 100 each 3     No current facility-administered medications for this visit.     PAST MEDICAL HISTORY:  Hypertension  Hyperlipidemia  Diabetes mellitus type 2  Obesity  Elevated prostatic specific antigen    PAST SURGICAL HISTORY:  Prostate biopsy: July 2021  Colonoscopy: 2014  Quadriceps tendon  repair.  Rotator cuff repair    FAMILY HISTORY:  Father: , Age 74, CVA  Mother: , Age 70, Diabetes Mellitus  Brother: , Diabetes Mellitus, Cancer  Sister: Alive, Dementia    SOCIAL HISTORY:  Tobacco: Never a smoker  Alcohol: No alcohol consumption  Caffeine: Occasional caffeine consumption  Drugs: Does not use illicit drugs  Marital status:   Children: Has one daughter, and 3 sons  Work: residential for high school  Residence: Lives independently with spouse  Exercise: Active but no formal exercise  Assistive device: None  Durable Power of : N/A    Visit Vitals  /64 (BP Location: RUE - Right upper extremity, Patient Position: Sitting)   Pulse 76   Temp 96.9 °F (36.1 °C)   Resp 18   Ht 5' 7\" (1.702 m)   Wt 102.7 kg (226 lb 6.6 oz)   SpO2 98%   BMI 35.46 kg/m²     PHYSICAL EXAM:  Constitutional: Alert and in no acute distress.   Head and Face: Atraumatic, normocephalic.   Eyes: No discharge, normal conjunctiva, no eyelid swelling, sclerae were normal, and extraocular movements were intact.   ENT:  Normal appearing nose. no nasal discharge. Normal appearing outer ear,normal lips. oral mucosa pink and moist, no oral lesions, normal appearing pharynx, normal appearing tongue.   Neck: Normal appearing neck and supple neck. thyroid not enlarged.   Lymphatic: No lymphadenopathy.   Chest: Normal chest appearance.   Pulmonary: No respiratory distress. breath sounds clear to auscultation bilaterally, without rales or wheezing.  Cardiovascular: Normal rate, and regular rhythm. No murmurs were heard  No carotid bruit bilaterally  Right posterior tibialis 2+ right dorsalis pedis 2+   Left posterior tibialis 2+ left dorsalis pedis 2+   Edema was not present in the lower extremities.   Abdomen: Ventral Hernia  Soft, nontender, nondistended, normal bowel sounds and no abdominal mass. no hepatomegaly and no splenomegaly.   Musculoskeletal: No joint swelling seen and no joint tenderness was  elicited.   Neurologic: No sensory deficits noted. no coordination deficits. normal gait.   Psychiatric: Alert and awake, interactive and mood/affect were appropriate.   Dermatology: Normal skin color and pigmentation. no skin lesions.       ASSESSMENT AND PLAN:  I reviewed and discussed current active medical problems, findings of physical examination, explained treatment options, and plan of care. The patient was given the opportunity to ask questions.  The following diagnoses were addressed during this visit:    1. Benign essential hypertension  Despite having a relatively controlled blood pressure, additional adjustment was made to bring blood pressure further down  -Increase hydrALAZINE (APRESOLINE) 50 MG tablet; Take 1 tablet by mouth in the morning and 1 tablet in the evening.  Dispense: 180 tablet; Refill: 3  -Continue  lisinopril-hydroCHLOROthiazide (ZESTORETIC) 20-12.5 MG per tablet; Take 2 tablets by mouth daily.  -Continue amLODIPine (NORVASC) 10 MG tablet; Take 1 tablet by mouth daily.    2. 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  Continue Ozempic, metformin, and Jardiance  -Repeat basic Metabolic Panel; Future  -Repeat glycohemoglobin; Future    3. Mixed hyperlipidemia  Doing well with atorvastatin 80 mg daily    4. Class 1 obesity due to excess calories with serious comorbidity and body mass index (BMI) of 34.0 to 34.9 in adult  Once again discussed the importance of diet, exercise, and weight loss.      Return in about 3 months (around 7/18/2023) for follow up, and sooner for any urgent health needs.    Rosario Mohan MD

## 2023-04-18 NOTE — PROGRESS NOTES
Per Enid Tyler RN, ok to continue with treatment on 3/22 with potassium level 2 9  Redraw potassium level on disconnect  (E4) spontaneous

## 2023-07-14 ENCOUNTER — APPOINTMENT (OUTPATIENT)
Dept: LAB | Facility: HOSPITAL | Age: 68
End: 2023-07-14
Payer: MEDICARE

## 2023-07-14 DIAGNOSIS — E11.9 DIABETES MELLITUS WITHOUT COMPLICATION (HCC): ICD-10-CM

## 2023-07-14 DIAGNOSIS — E78.5 HYPERLIPIDEMIA, UNSPECIFIED HYPERLIPIDEMIA TYPE: ICD-10-CM

## 2023-07-14 DIAGNOSIS — Z85.07 HISTORY OF PANCREATIC CANCER: ICD-10-CM

## 2023-07-14 DIAGNOSIS — Z00.00 ROUTINE GENERAL MEDICAL EXAMINATION AT A HEALTH CARE FACILITY: ICD-10-CM

## 2023-07-14 LAB
ALBUMIN SERPL BCP-MCNC: 4.1 G/DL (ref 3.5–5)
ALP SERPL-CCNC: 70 U/L (ref 34–104)
ALT SERPL W P-5'-P-CCNC: 15 U/L (ref 7–52)
ANION GAP SERPL CALCULATED.3IONS-SCNC: 6 MMOL/L
AST SERPL W P-5'-P-CCNC: 21 U/L (ref 13–39)
BASOPHILS # BLD AUTO: 0.02 THOUSANDS/ÂΜL (ref 0–0.1)
BASOPHILS NFR BLD AUTO: 0 % (ref 0–1)
BILIRUB SERPL-MCNC: 0.59 MG/DL (ref 0.2–1)
BUN SERPL-MCNC: 13 MG/DL (ref 5–25)
CALCIUM SERPL-MCNC: 9.5 MG/DL (ref 8.4–10.2)
CHLORIDE SERPL-SCNC: 102 MMOL/L (ref 96–108)
CHOLEST SERPL-MCNC: 148 MG/DL
CO2 SERPL-SCNC: 30 MMOL/L (ref 21–32)
CREAT SERPL-MCNC: 1.22 MG/DL (ref 0.6–1.3)
EOSINOPHIL # BLD AUTO: 0.12 THOUSAND/ÂΜL (ref 0–0.61)
EOSINOPHIL NFR BLD AUTO: 3 % (ref 0–6)
ERYTHROCYTE [DISTWIDTH] IN BLOOD BY AUTOMATED COUNT: 12.6 % (ref 11.6–15.1)
EST. AVERAGE GLUCOSE BLD GHB EST-MCNC: 126 MG/DL
GFR SERPL CREATININE-BSD FRML MDRD: 44 ML/MIN/1.73SQ M
GLUCOSE P FAST SERPL-MCNC: 135 MG/DL (ref 65–99)
HBA1C MFR BLD: 6 %
HCT VFR BLD AUTO: 45.5 % (ref 34.8–46.1)
HDLC SERPL-MCNC: 34 MG/DL
HGB BLD-MCNC: 14.9 G/DL (ref 11.5–15.4)
IMM GRANULOCYTES # BLD AUTO: 0.01 THOUSAND/UL (ref 0–0.2)
IMM GRANULOCYTES NFR BLD AUTO: 0 % (ref 0–2)
LDLC SERPL CALC-MCNC: 72 MG/DL (ref 0–100)
LYMPHOCYTES # BLD AUTO: 1.3 THOUSANDS/ÂΜL (ref 0.6–4.47)
LYMPHOCYTES NFR BLD AUTO: 27 % (ref 14–44)
MCH RBC QN AUTO: 31.6 PG (ref 26.8–34.3)
MCHC RBC AUTO-ENTMCNC: 32.7 G/DL (ref 31.4–37.4)
MCV RBC AUTO: 96 FL (ref 82–98)
MONOCYTES # BLD AUTO: 0.43 THOUSAND/ÂΜL (ref 0.17–1.22)
MONOCYTES NFR BLD AUTO: 9 % (ref 4–12)
NEUTROPHILS # BLD AUTO: 3.01 THOUSANDS/ÂΜL (ref 1.85–7.62)
NEUTS SEG NFR BLD AUTO: 61 % (ref 43–75)
NONHDLC SERPL-MCNC: 114 MG/DL
NRBC BLD AUTO-RTO: 0 /100 WBCS
PLATELET # BLD AUTO: 194 THOUSANDS/UL (ref 149–390)
PMV BLD AUTO: 10.8 FL (ref 8.9–12.7)
POTASSIUM SERPL-SCNC: 4.3 MMOL/L (ref 3.5–5.3)
PROT SERPL-MCNC: 6.9 G/DL (ref 6.4–8.4)
RBC # BLD AUTO: 4.72 MILLION/UL (ref 3.81–5.12)
SODIUM SERPL-SCNC: 138 MMOL/L (ref 135–147)
TRIGL SERPL-MCNC: 212 MG/DL
WBC # BLD AUTO: 4.89 THOUSAND/UL (ref 4.31–10.16)

## 2023-07-14 PROCEDURE — 80061 LIPID PANEL: CPT

## 2023-07-14 PROCEDURE — 83036 HEMOGLOBIN GLYCOSYLATED A1C: CPT

## 2023-07-14 PROCEDURE — 85025 COMPLETE CBC W/AUTO DIFF WBC: CPT

## 2023-07-14 PROCEDURE — 80053 COMPREHEN METABOLIC PANEL: CPT

## 2023-07-14 PROCEDURE — 36415 COLL VENOUS BLD VENIPUNCTURE: CPT

## 2023-07-14 PROCEDURE — 86301 IMMUNOASSAY TUMOR CA 19-9: CPT

## 2023-07-15 LAB — CANCER AG19-9 SERPL-ACNC: <2 U/ML (ref 0–35)

## 2023-07-18 ENCOUNTER — OFFICE VISIT (OUTPATIENT)
Dept: HEMATOLOGY ONCOLOGY | Facility: CLINIC | Age: 68
End: 2023-07-18
Payer: MEDICARE

## 2023-07-18 VITALS
WEIGHT: 101 LBS | BODY MASS INDEX: 20.36 KG/M2 | DIASTOLIC BLOOD PRESSURE: 88 MMHG | TEMPERATURE: 97.3 F | RESPIRATION RATE: 17 BRPM | HEIGHT: 59 IN | OXYGEN SATURATION: 99 % | SYSTOLIC BLOOD PRESSURE: 138 MMHG | HEART RATE: 62 BPM

## 2023-07-18 DIAGNOSIS — K90.3 PANCREATIC STEATORRHEA: ICD-10-CM

## 2023-07-18 DIAGNOSIS — E46 PROTEIN-CALORIE MALNUTRITION, UNSPECIFIED SEVERITY (HCC): ICD-10-CM

## 2023-07-18 DIAGNOSIS — C25.9 PANCREATIC ADENOCARCINOMA (HCC): Primary | ICD-10-CM

## 2023-07-18 PROCEDURE — 99215 OFFICE O/P EST HI 40 MIN: CPT | Performed by: PHYSICIAN ASSISTANT

## 2023-07-18 NOTE — PATIENT INSTRUCTIONS
[facility]  ACUITY [facility] [place] - [phone]    Your Team Members:  Advanced Practitioner:  Ale Chauhan PA-C  Oncology Nurse:   Angela Pope RN ([phone]) M-F 8am - 4:30pm    Please answer Private and Unavailable Calls - this may be your team(s) contacting you. I  If you have medical questions/concerns/issues - contact us either by (1) My Chart (2) [facility] Line

## 2023-07-18 NOTE — PROGRESS NOTES
[place] [id number]  Oncology Progress Note  Pama Standard, 1955, [id number]  7/18/2023    Assessment/Plan:   1. Pancreatic adenocarcinoma ([address])  10/20/2020: Stage IIB (ypT2, pN1, cM0)   Stage prefix: Post-therapy  Response to neoadjuvant therapy: Partial response  Total positive nodes: 1  Total nodes examined: 14  Histologic grade (G): G2  Histologic grading system: 3 grade system  Residual tumor (R): R0 - None  Tumor size (mm): 23  Lymph-vascular invasion (LVI): LVI not present (absent)/not identified  Carbohydrate antigen 19-9 (CA 19-9) (U/mL): 61    This is a 75-year-old female with history of the above. Patient completed therapy in April 2021. Patient has been under observation with surgical oncology as well as medical oncology. Patient continues to get CT imaging through surgical oncology and has a CT scan scheduled for September 2023. CA 19-9's have been stable. No increase in this tumor marker. Patient is sensitive to CA 19-9. Blood work does not demonstrate any prolonged effects from chemotherapy/radiation. We will continue to monitor the patient closely. Follow-up in 6 months.     - CBC and differential; Future  - Comprehensive metabolic panel; Future  - Cancer antigen 19-9; Future    2. 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 Patient had trialed pancreatic enzymes previously and states that she had toxicity however, at the time she tried this was following surgery, she had other locations of surgery that also contributed to abdominal pain which she previously blamed on the pancreatic enzymes. At this time, patient was to follow-up with nutrition/GI however in the future, I would consider reevaluation with GI for prescription of pancreatic enzymes.     Of note, discussed this case with nutritionist briefly. Consider Banatrol in the future-over-the-counter and can help thicken stool to prevent diarrhea. .   - CBC and differential; Future  - Methylmalonic acid, serum; Future  - Vitamin B12; Future  - Folate; Future  - Iron Panel (Includes Ferritin, Iron Sat%, Iron, and TIBC); Future    3. Protein-calorie malnutrition, unspecified severity ([address])  She lost a significant amount of weight after diagnosis. Patient is now hovering around 100 pounds. Patient has not noted any significant weight loss over the past couple months however, we will continue to monitor.  - Iron Panel (Includes Ferritin, Iron Sat%, Iron, and TIBC); Future    The patient is scheduled for follow-up in approximately 6 months. Patient voiced agreement and understanding to the above. Patient knows to call the Hematology/Oncology office with any questions and concerns regarding the above. Goals and Barriers:    Current Goal:   Prolong Survival from Cancer. Barriers: None. Patient's Capacity to Self Care:  Patient able to self care. Eboni Macias PA-C  Medical Oncology/Hematology  [address] [place] [address]  ______________________________________________________________________________________________________________    Subjective     Chief Complaint   Patient presents with   • Follow-up       History of present illness/Cancer History:   Oncology History   History of pancreatic cancer (Resolved)   Encounter for follow-up surveillance of pancreatic cancer   Pancreatic adenocarcinoma ([address] )   5/29/2020 Initial Diagnosis    Patient had new onset of painless jaundice and right upper quadrant this underwent ultrasound of the gallbladder which demonstrated a 2 x 2 centimeter soft tissue mass in the head of the pancreas with a dilated common bile duct.     5/29/2020 CT chest abdomen and pelvis with contrast   • Suspected mass at the pancreatic head measuring approximately 2.4 cm resulting in distal biliary obstruction. Differential considerations include pancreatic adenocarcinoma. 6/1/2020 MRI ABDw w/o:  1.  1.8 x 2.1 cm solid mass in the pancreatic head with dilatation of the main pancreatic duct. 2.  Obstruction of the CBD causing intrahepatic and extrahepatic bile duct dilatation. 3.  No evidence of peripancreatic lymphadenopathy or other intra-abdominal metastases. 6/2/2020 Biopsy    EUS- Dr. Mark Anthony Sears:  Pancreas, uncinate mass:      - Malignant ([address] Category VI)      - Compatible with adenocarcinoma with mucinous features. 6/30/2020 - 8/10/2020 Chemotherapy    Folfirinox + neulasta  Completed 3 cycles before becoming intolarant of irinotecan- infusion reaction. 8/11/2020 - 9/10/2020 Chemotherapy    Neoadjuvant Folfox + neulasta  x 3 more cycles. 10/20/2020 Surgery    Whipple:  - Residual invasive adenocarcinoma with mucinous features, 2.3 cm.  - Metastatic carcinoma present in one of fourteen lymph nodes (1/14). - All margins are negative for tumor. 10/20/2020 -  Cancer Staged    Staging form: Pancreas, AJCC 8th Edition  - Pathologic stage from 10/20/2020: Stage IIB (ypT2, pN1, cM0) - Signed by Freya Mondragon PA-C on 7/21/2023  Stage prefix: Post-therapy  Response to neoadjuvant therapy: Partial response  Total positive nodes: 1  Total nodes examined: 14  Histologic grade (G): G2  Histologic grading system: 3 grade system  Residual tumor (R): R0 - None  Tumor size (mm): 23  Lymph-vascular invasion (LVI): LVI not present (absent)/not identified  Carbohydrate antigen 19-9 (CA 19-9) (U/mL): 61       12/21/2020 - 1/26/2021 Radiation    5000 cGy in 25 fractions to high risk areas  Dr Alvin Saint     2/8/2021 - 4/4/2021 Chemotherapy    Folfox + Neulasta  X 4 more cycles. 4/5/2021 Remission    CT CAP  1. No evidence of metastatic disease in the thorax.   Long segment of mild esophageal wall thickening which should be correlated for suspicion of esophagitis. 2. Inflammatory changes /small amount of fluid noted in the region of the nicky hepatis and surgical bed without evidence to suggest enlarging pancreatic head mass. The pancreatic duct is no longer dilated. 3. Stomach is no longer distended. Continued findings suggesting nonspecific inflammatory changes in the region of the gastrojejunal anastomosis  4. Hepatic steatosis. Hepatic hypodense nodule likely fluid within a fissure which should be carefully reassessed during follow-up. No discrete evidence of hepatic metastasis  5. Trace amount of perihepatic ascites. No suspicious adenopathy    CA 19-9 trends:  • Presurgery: 9/5/2020-61  • Post surgery/post chemo: 4/10/2021-23  • 8/12/2021: 2  • 12/11/2021: 3  • 2/17/22-7/14/2023: <2           Lab Results   Component Value Date    WBC 4.89 07/14/2023    HGB 14.9 07/14/2023    HCT 45.5 07/14/2023    MCV 96 07/14/2023     07/14/2023     Lab Results   Component Value Date    SODIUM 138 07/14/2023    K 4.3 07/14/2023     07/14/2023    CO2 30 07/14/2023    AGAP 6 07/14/2023    BUN 13 07/14/2023    CREATININE 1.22 07/14/2023    GLUC 239 (H) 01/16/2021    GLUF 135 (H) 07/14/2023    CALCIUM 9.5 07/14/2023    AST 21 07/14/2023    ALT 15 07/14/2023    ALKPHOS 70 07/14/2023    TP 6.9 07/14/2023    TBILI 0.59 07/14/2023    EGFR 44 07/14/2023       Interval history: Is a 38-year-old female with history of pancreatic cancer status posttreatment now on observation. Patient continues to have some diarrhea type symptoms which is likely secondary to malabsorption/pancreatic dysfunction. Patient trialed     Review of Systems   Constitutional: Negative for activity change, appetite change, fatigue, fever and unexpected weight change. HENT: Negative for nosebleeds. Respiratory: Negative for cough, choking and shortness of breath. Negative hemoptysis. Cardiovascular: Negative for chest pain, palpitations and leg swelling.    Gastrointestinal: Positive for diarrhea. Negative for abdominal distention, abdominal pain, anal bleeding, blood in stool, constipation, nausea and vomiting. Endocrine: Negative. Negative for cold intolerance. Genitourinary: Negative. Negative for hematuria, menstrual problem, vaginal bleeding, vaginal discharge and vaginal pain. Musculoskeletal: Negative. Negative for arthralgias, myalgias, neck pain and neck stiffness. Skin: Negative. Negative for color change, pallor and rash. Allergic/Immunologic: Negative. Negative for immunocompromised state. Neurological: Negative. Negative for weakness and headaches. Hematological: Negative for adenopathy. Does not bruise/bleed easily. All other systems reviewed and are negative.       Current Outpatient Medications:   •  dicyclomine (BENTYL) 20 mg tablet, TAKE 1 TABLET BY MOUTH TWICE A DAY (Patient taking differently: as needed), Disp: 180 tablet, Rfl: 1  •  diphenhydrAMINE (BENADRYL) 25 mg capsule, Take 25 mg by mouth every 6 (six) hours as needed for itching, Disp: , Rfl:   •  diphenoxylate-atropine (LOMOTIL) 2.5-0.025 mg per tablet, Take 1 tablet by mouth 4 (four) times a day as needed for diarrhea DO NOT EXCEED 4 DOSES IN 1 DAY, Disp: 30 tablet, Rfl: 0  •  Empagliflozin (Jardiance) 10 MG TABS, Take 10 mg by mouth every morning, Disp: , Rfl:   •  ergocalciferol (VITAMIN D2) 50,000 units, Take 50,000 Units by mouth every 14 (fourteen) days, Disp: , Rfl:   •  gabapentin (NEURONTIN) 300 mg capsule, Take 300 mg by mouth 3 (three) times a day, Disp: , Rfl:   •  LORazepam (ATIVAN) 0.5 mg tablet, Take 1 tablet (0.5 mg total) by mouth 2 (two) times a day as needed for anxiety NO FURTHER REFILLS WITHOUT CLINIC VISIT WITH PALLIATIVE CARE, Disp: 30 tablet, Rfl: 0  •  mirtazapine (REMERON) 15 mg tablet, TAKE 1 TABLET BY MOUTH DAILY AT BEDTIME, Disp: 90 tablet, Rfl: 3  •  omeprazole (PriLOSEC) 20 mg delayed release capsule, TAKE 1 CAPSULE BY MOUTH TWICE A DAY, Disp: 180 capsule, Rfl: 1  •  OneTouch Verio test strip, daily Test, Disp: , Rfl:   •  prochlorperazine (COMPAZINE) 10 mg tablet, Take 1 tablet (10 mg total) by mouth every 6 (six) hours as needed for nausea or vomiting, Disp: 45 tablet, Rfl: 3  •  amLODIPine (NORVASC) 2.5 mg tablet, Take 2.5 mg by mouth daily, Disp: , Rfl:   Allergies   Allergen Reactions   • Betadine [Povidone Iodine] Rash     Also itching from betadine   • Other Throat Swelling     Pt unsure which chemotherapy drug caused tongue swelling and locked jaw. Please review in epic notes Folfirinox or possible Neulasta. NEED to review       Advance Directive and Living Will:            Objective   /88 (BP Location: Left arm, Patient Position: Sitting, Cuff Size: Adult)   Pulse 62   Temp (!) 97.3 °F (36.3 °C) (Temporal)   Resp 17   Ht 4' 11" (1.499 m)   Wt 45.8 kg (101 lb)   SpO2 99%   BMI 20.40 kg/m²   Wt Readings from Last 6 Encounters:   07/18/23 45.8 kg (101 lb)   03/17/23 45.8 kg (101 lb)   01/19/23 46.7 kg (103 lb)   10/17/22 46.3 kg (102 lb)   10/11/22 46.6 kg (102 lb 12.8 oz)   09/12/22 45.4 kg (100 lb)     Physical Exam  Constitutional:       General: She is not in acute distress. Appearance: She is well-developed. HENT:      Head: Normocephalic and atraumatic. Eyes:      General: No scleral icterus. Pupils: Pupils are equal, round, and reactive to light. Cardiovascular:      Rate and Rhythm: Normal rate and regular rhythm. Heart sounds: No murmur heard. Pulmonary:      Effort: Pulmonary effort is normal. No respiratory distress. Skin:     General: Skin is warm. Coloration: Skin is not pale. Findings: No rash. Neurological:      Mental Status: She is alert and oriented to person, place, and time. Psychiatric:         Thought Content:  Thought content normal.         Pertinent Laboratory Results and Imaging Review:  Transcribe Orders on 07/14/2023   Component Date Value Ref Range Status   • Creatinine, Ur 07/14/2023 118.0  mg/dL Final   • Albumin,U,Random 07/14/2023 14.1  0.0 - 20.0 mg/L Final   • Albumin Creat Ratio 07/14/2023 12  0 - 30 mg/g creatinine Final   Appointment on 07/14/2023   Component Date Value Ref Range Status   • CA 19-9 07/14/2023 <2  0 - 35 U/mL Final    Roche [employer] Electrochemiluminescence Immunoassay (ECLIA)  Values obtained with different assay methods or kits cannot be  used interchangeably. Results cannot be interpreted as absolute  evidence of the presence or absence of malignant disease.    • WBC 07/14/2023 4.89  4.31 - 10.16 Thousand/uL Final   • RBC 07/14/2023 4.72  3.81 - 5.12 Million/uL Final   • Hemoglobin 07/14/2023 14.9  11.5 - 15.4 g/dL Final   • Hematocrit 07/14/2023 45.5  34.8 - 46.1 % Final   • MCV 07/14/2023 96  82 - 98 fL Final   • MCH 07/14/2023 31.6  26.8 - 34.3 pg Final   • MCHC 07/14/2023 32.7  31.4 - 37.4 g/dL Final   • RDW 07/14/2023 12.6  11.6 - 15.1 % Final   • MPV 07/14/2023 10.8  8.9 - 12.7 fL Final   • Platelets 97/21/8000 194  149 - 390 Thousands/uL Final   • nRBC 07/14/2023 0  /100 WBCs Final   • Neutrophils Relative 07/14/2023 61  43 - 75 % Final   • Immat GRANS % 07/14/2023 0  0 - 2 % Final   • Lymphocytes Relative 07/14/2023 27  14 - 44 % Final   • Monocytes Relative 07/14/2023 9  4 - 12 % Final   • Eosinophils Relative 07/14/2023 3  0 - 6 % Final   • Basophils Relative 07/14/2023 0  0 - 1 % Final   • Neutrophils Absolute 07/14/2023 3.01  1.85 - 7.62 Thousands/µL Final   • Immature Grans Absolute 07/14/2023 0.01  0.00 - 0.20 Thousand/uL Final   • Lymphocytes Absolute 07/14/2023 1.30  0.60 - 4.47 Thousands/µL Final   • Monocytes Absolute 07/14/2023 0.43  0.17 - 1.22 Thousand/µL Final   • Eosinophils Absolute 07/14/2023 0.12  0.00 - 0.61 Thousand/µL Final   • Basophils Absolute 07/14/2023 0.02  0.00 - 0.10 Thousands/µL Final   • Sodium 07/14/2023 138  135 - 147 mmol/L Final   • Potassium 07/14/2023 4.3  3.5 - 5.3 mmol/L Final   • Chloride 07/14/2023 102  96 - 108 mmol/L Final   • CO2 07/14/2023 30  21 - 32 mmol/L Final   • ANION GAP 07/14/2023 6  mmol/L Final   • BUN 07/14/2023 13  5 - 25 mg/dL Final   • Creatinine 07/14/2023 1.22  0.60 - 1.30 mg/dL Final    Standardized to IDMS reference method   • Glucose, Fasting 07/14/2023 135 (H)  65 - 99 mg/dL Final   • Calcium 07/14/2023 9.5  8.4 - 10.2 mg/dL Final   • AST 07/14/2023 21  13 - 39 U/L Final   • ALT 07/14/2023 15  7 - 52 U/L Final    Specimen collection should occur prior to Sulfasalazine administration due to the potential for falsely depressed results. • Alkaline Phosphatase 07/14/2023 70  34 - 104 U/L Final   • Total Protein 07/14/2023 6.9  6.4 - 8.4 g/dL Final   • Albumin 07/14/2023 4.1  3.5 - 5.0 g/dL Final   • Total Bilirubin 07/14/2023 0.59  0.20 - 1.00 mg/dL Final    Use of this assay is not recommended for patients undergoing treatment with eltrombopag due to the potential for falsely elevated results. N-acetyl-p-benzoquinone imine (metabolite of Acetaminophen) will generate erroneously low results in samples for patients that have taken an overdose of Acetaminophen. • eGFR 07/14/2023 44  ml/min/1.73sq m Final   • Cholesterol 07/14/2023 148  See Comment mg/dL Final    Cholesterol:         Pediatric <18 Years        Desirable          <170 mg/dL      Borderline High    170-199 mg/dL      High               >=200 mg/dL        Adult >=18 Years            Desirable         <200 mg/dL      Borderline High   200-239 mg/dL      High              >239 mg/dL     • Triglycerides 07/14/2023 212 (H)  See Comment mg/dL Final    Triglyceride:     0-9Y            <75mg/dL     10Y-17Y         <90 mg/dL       >=18Y     Normal          <150 mg/dL     Borderline High 150-199 mg/dL     High            200-499 mg/dL        Very High       >499 mg/dL    Specimen collection should occur prior to Metamizole administration due to the potential for falsely depressed results.    • HDL, Direct 07/14/2023 34 (L)  >=50 mg/dL Final   • LDL Calculated 2023 72  0 - 100 mg/dL Final    LDL Cholesterol:     Optimal           <100 mg/dl     Near Optimal      100-129 mg/dl     Above Optimal       Borderline High 130-159 mg/dl       High            160-189 mg/dl       Very High       >189 mg/dl         This screening LDL is a calculated result. It does not have the accuracy of the Direct Measured LDL in the monitoring of patients with hyperlipidemia and/or statin therapy. Direct Measure LDL (EOP937) must be ordered separately in these patients. • Non-HDL-Chol (CHOL-HDL) 2023 114  mg/dl Final   • Hemoglobin A1C 2023 6.0 (H)  Normal 3.8-5.6%; PreDiabetic 5.7-6.4%;  Diabetic >=6.5%; Glycemic control for adults with diabetes <7.0% % Final   • EAG 2023 126  mg/dl Final         The following historical data was reviewed:  Past Medical History:   Diagnosis Date   • BRCA1 negative    • BRCA2 negative    • Chemotherapy induced neutropenia ([address]) 2/10/2021   • Chemotherapy induced neutropenia ([address]) 2/10/2021   • Diabetes mellitus ([address])    • Hypertension    • Lactic acidosis 2020   • Neuropathy    • Obstructive jaundice 2020   • Pancreas cancer ([address])    • Port-A-Cath in place 2020     Past Surgical History:   Procedure Laterality Date   • BREAST BIOPSY Left     neg   • BREAST SURGERY Left     calcifications   •  SECTION     • CHOLECYSTECTOMY N/A 10/20/2020    Procedure: CHOLECYSTECTOMY;  Surgeon: Ileana Butler MD;  Location: [facility];  Service: Surgical Oncology   • COLONOSCOPY     • ECTOPIC PREGNANCY SURGERY      partial ovary removed   • FL GUIDED CENTRAL VENOUS ACCESS DEVICE INSERTION  2020   • JOINT REPLACEMENT Bilateral    • LAPAROTOMY N/A 10/20/2020    Procedure: LAPAROTOMY EXPLORATORY; INTRAOPERATIVE ULTRASOUND;  Surgeon: Ileana Butler MD;  Location: [facility];  Service: Surgical Oncology   • MANDIBLE FRACTURE SURGERY     • NOSE SURGERY      deviated septum   • REPLACEMENT TOTAL HIP W/  RESURFACING IMPLANTS Bilateral 2012    right hip done July 2012; left hip done September 2012   • TONSILLECTOMY  1957   • TUNNELED VENOUS PORT PLACEMENT Left 6/23/2020    Procedure: INSERTION VENOUS PORT (PORT-A-CATH), left;  Surgeon: Link Silva MD;  Location: [facility];  Service: Surgical Oncology   • WHIPPLE PROCEDURE/PANCREATICO-DUODENECTOMY N/A 10/20/2020    Procedure: WHIPPLE PROCEDURE/PANCREATICO-DUODENECTOMY;  Surgeon: Link Silva MD;  Location: [facility];  Service: Surgical Oncology     Social History     Socioeconomic History   • Marital status: /Civil Union     Spouse name: Not on file   • Number of children: Not on file   • Years of education: Not on file   • Highest education level: Not on file   Occupational History   • Not on file   Tobacco Use   • Smoking status: Former   • Smokeless tobacco: Never   • Tobacco comments:     quit 40 years ago   Vaping Use   • Vaping Use: Never used   Substance and Sexual Activity   • Alcohol use:  Yes     Alcohol/week: 1.0 standard drink of alcohol     Types: 1 Glasses of wine per week     Comment: WINE OCCASIONALLY   • Drug use: Never   • Sexual activity: Not Currently   Other Topics Concern   • Not on file   Social History Narrative   • Not on file     Social Determinants of Health     Financial Resource Strain: Not on file   Food Insecurity: Not on file   Transportation Needs: Not on file   Physical Activity: Not on file   Stress: Not on file   Social Connections: Not on file   Intimate Partner Violence: Not on file   Housing Stability: Not on file     Family History   Problem Relation Age of Onset   • Diabetes Mother    • Heart disease Mother    • Heart disease Father    • Breast cancer additional onset Sister    • Breast cancer Sister 47   • Stroke Maternal Grandfather    • Breast cancer additional onset Maternal Aunt    • Breast cancer Maternal Aunt    • Colon cancer Neg Hx    • Ovarian cancer Neg Hx    • Uterine cancer Neg Hx    • Cervical cancer Neg Hx        Please note: This report has been generated by a voice recognition software system. Therefore there may be syntax, spelling, and/or grammatical errors. Please call if you have any questions.

## 2023-07-21 PROBLEM — C77.2 SECONDARY AND UNSPECIFIED MALIGNANT NEOPLASM OF INTRA-ABDOMINAL LYMPH NODES (HCC): Status: RESOLVED | Noted: 2021-08-23 | Resolved: 2023-07-21

## 2023-07-21 PROBLEM — Z95.828 PORT-A-CATH IN PLACE: Status: RESOLVED | Noted: 2020-06-18 | Resolved: 2023-07-21

## 2023-07-21 PROBLEM — C25.9 PANCREATIC ADENOCARCINOMA (HCC): Status: ACTIVE | Noted: 2023-07-21

## 2023-07-21 PROBLEM — Z85.07 HISTORY OF PANCREATIC CANCER: Status: RESOLVED | Noted: 2020-05-30 | Resolved: 2023-07-21

## 2023-07-21 PROBLEM — D70.1 CHEMOTHERAPY INDUCED NEUTROPENIA (HCC): Status: RESOLVED | Noted: 2021-02-10 | Resolved: 2023-07-21

## 2023-07-21 PROBLEM — T45.1X5A CHEMOTHERAPY INDUCED NEUTROPENIA (HCC): Status: RESOLVED | Noted: 2021-02-10 | Resolved: 2023-07-21

## 2023-08-02 NOTE — TELEPHONE ENCOUNTER
I phoned patient and left a message in regards to her potassium count from this morning 2 9  Called to assess vomiting, diarrhea, medication changes to warrant the decrease in the count and review any interventions with her if any of the above were happening  We will call him a potassium supplement for her and recheck this count when she is in for her treatment next week 
show

## 2023-09-11 ENCOUNTER — APPOINTMENT (OUTPATIENT)
Dept: LAB | Facility: HOSPITAL | Age: 68
End: 2023-09-11
Payer: MEDICARE

## 2023-09-11 DIAGNOSIS — Z85.07 ENCOUNTER FOR FOLLOW-UP SURVEILLANCE OF PANCREATIC CANCER: ICD-10-CM

## 2023-09-11 DIAGNOSIS — Z08 ENCOUNTER FOR FOLLOW-UP SURVEILLANCE OF PANCREATIC CANCER: ICD-10-CM

## 2023-09-11 LAB
ANION GAP SERPL CALCULATED.3IONS-SCNC: 5 MMOL/L
BUN SERPL-MCNC: 11 MG/DL (ref 5–25)
CALCIUM SERPL-MCNC: 9.6 MG/DL (ref 8.4–10.2)
CHLORIDE SERPL-SCNC: 102 MMOL/L (ref 96–108)
CO2 SERPL-SCNC: 30 MMOL/L (ref 21–32)
CREAT SERPL-MCNC: 1.32 MG/DL (ref 0.6–1.3)
GFR SERPL CREATININE-BSD FRML MDRD: 40 ML/MIN/1.73SQ M
GLUCOSE P FAST SERPL-MCNC: 138 MG/DL (ref 65–99)
POTASSIUM SERPL-SCNC: 4.2 MMOL/L (ref 3.5–5.3)
SODIUM SERPL-SCNC: 137 MMOL/L (ref 135–147)

## 2023-09-11 PROCEDURE — 36415 COLL VENOUS BLD VENIPUNCTURE: CPT

## 2023-09-11 PROCEDURE — 86301 IMMUNOASSAY TUMOR CA 19-9: CPT

## 2023-09-11 PROCEDURE — 80048 BASIC METABOLIC PNL TOTAL CA: CPT

## 2023-09-12 LAB — CANCER AG19-9 SERPL-ACNC: <2 U/ML (ref 0–35)

## 2023-09-18 ENCOUNTER — HOSPITAL ENCOUNTER (OUTPATIENT)
Dept: CT IMAGING | Facility: HOSPITAL | Age: 68
Discharge: HOME | End: 2023-09-18
Attending: SURGERY
Payer: MEDICARE

## 2023-09-18 DIAGNOSIS — Z08 ENCOUNTER FOR FOLLOW-UP SURVEILLANCE OF PANCREATIC CANCER: ICD-10-CM

## 2023-09-18 DIAGNOSIS — Z85.07 ENCOUNTER FOR FOLLOW-UP SURVEILLANCE OF PANCREATIC CANCER: ICD-10-CM

## 2023-09-18 PROCEDURE — G1004 CDSM NDSC: HCPCS

## 2023-09-18 PROCEDURE — 71260 CT THORAX DX C+: CPT

## 2023-09-18 PROCEDURE — 74177 CT ABD & PELVIS W/CONTRAST: CPT

## 2023-09-18 RX ADMIN — IOHEXOL 100 ML: 350 INJECTION, SOLUTION INTRAVENOUS at 13:26

## 2023-09-25 ENCOUNTER — APPOINTMENT (OUTPATIENT)
Dept: LAB | Facility: HOSPITAL | Age: 68
End: 2023-09-25
Attending: INTERNAL MEDICINE
Payer: MEDICARE

## 2023-09-25 ENCOUNTER — OFFICE VISIT (OUTPATIENT)
Dept: SURGICAL ONCOLOGY | Facility: CLINIC | Age: 68
End: 2023-09-25
Payer: MEDICARE

## 2023-09-25 VITALS
HEIGHT: 59 IN | BODY MASS INDEX: 20.24 KG/M2 | WEIGHT: 100.4 LBS | OXYGEN SATURATION: 100 % | DIASTOLIC BLOOD PRESSURE: 74 MMHG | SYSTOLIC BLOOD PRESSURE: 126 MMHG | HEART RATE: 64 BPM | TEMPERATURE: 97.2 F | RESPIRATION RATE: 16 BRPM

## 2023-09-25 DIAGNOSIS — N18.32 STAGE 3B CHRONIC KIDNEY DISEASE (HCC): ICD-10-CM

## 2023-09-25 DIAGNOSIS — N18.4 STAGE 4 CHRONIC KIDNEY DISEASE (HCC): Primary | ICD-10-CM

## 2023-09-25 DIAGNOSIS — C25.9 PANCREATIC ADENOCARCINOMA (HCC): ICD-10-CM

## 2023-09-25 DIAGNOSIS — Z85.07 ENCOUNTER FOR FOLLOW-UP SURVEILLANCE OF PANCREATIC CANCER: ICD-10-CM

## 2023-09-25 DIAGNOSIS — Z08 ENCOUNTER FOR FOLLOW-UP SURVEILLANCE OF PANCREATIC CANCER: ICD-10-CM

## 2023-09-25 DIAGNOSIS — Z85.07 HISTORY OF PANCREATIC CANCER: Primary | ICD-10-CM

## 2023-09-25 DIAGNOSIS — N18.4 STAGE 4 CHRONIC KIDNEY DISEASE (HCC): ICD-10-CM

## 2023-09-25 LAB
ALBUMIN SERPL BCP-MCNC: 4.6 G/DL (ref 3.5–5)
ALP SERPL-CCNC: 73 U/L (ref 34–104)
ALT SERPL W P-5'-P-CCNC: 17 U/L (ref 7–52)
ANION GAP SERPL CALCULATED.3IONS-SCNC: 8 MMOL/L
AST SERPL W P-5'-P-CCNC: 23 U/L (ref 13–39)
BACTERIA UR QL AUTO: ABNORMAL /HPF
BASOPHILS # BLD AUTO: 0.02 THOUSANDS/ÂΜL (ref 0–0.1)
BASOPHILS NFR BLD AUTO: 0 % (ref 0–1)
BILIRUB SERPL-MCNC: 0.56 MG/DL (ref 0.2–1)
BILIRUB UR QL STRIP: NEGATIVE
BUN SERPL-MCNC: 11 MG/DL (ref 5–25)
CALCIUM SERPL-MCNC: 9.7 MG/DL (ref 8.4–10.2)
CHLORIDE SERPL-SCNC: 102 MMOL/L (ref 96–108)
CLARITY UR: CLEAR
CO2 SERPL-SCNC: 30 MMOL/L (ref 21–32)
COLOR UR: COLORLESS
CREAT SERPL-MCNC: 1.16 MG/DL (ref 0.6–1.3)
CREAT UR-MCNC: 19.3 MG/DL
EOSINOPHIL # BLD AUTO: 0.06 THOUSAND/ÂΜL (ref 0–0.61)
EOSINOPHIL NFR BLD AUTO: 1 % (ref 0–6)
ERYTHROCYTE [DISTWIDTH] IN BLOOD BY AUTOMATED COUNT: 11.9 % (ref 11.6–15.1)
GFR SERPL CREATININE-BSD FRML MDRD: 47 ML/MIN/1.73SQ M
GLUCOSE P FAST SERPL-MCNC: 156 MG/DL (ref 65–99)
GLUCOSE UR STRIP-MCNC: ABNORMAL MG/DL
HCT VFR BLD AUTO: 48.1 % (ref 34.8–46.1)
HGB BLD-MCNC: 16 G/DL (ref 11.5–15.4)
HGB UR QL STRIP.AUTO: NEGATIVE
IMM GRANULOCYTES # BLD AUTO: 0.01 THOUSAND/UL (ref 0–0.2)
IMM GRANULOCYTES NFR BLD AUTO: 0 % (ref 0–2)
KETONES UR STRIP-MCNC: NEGATIVE MG/DL
LEUKOCYTE ESTERASE UR QL STRIP: ABNORMAL
LYMPHOCYTES # BLD AUTO: 1.42 THOUSANDS/ÂΜL (ref 0.6–4.47)
LYMPHOCYTES NFR BLD AUTO: 20 % (ref 14–44)
MCH RBC QN AUTO: 31.4 PG (ref 26.8–34.3)
MCHC RBC AUTO-ENTMCNC: 33.3 G/DL (ref 31.4–37.4)
MCV RBC AUTO: 94 FL (ref 82–98)
MONOCYTES # BLD AUTO: 0.61 THOUSAND/ÂΜL (ref 0.17–1.22)
MONOCYTES NFR BLD AUTO: 9 % (ref 4–12)
NEUTROPHILS # BLD AUTO: 4.88 THOUSANDS/ÂΜL (ref 1.85–7.62)
NEUTS SEG NFR BLD AUTO: 70 % (ref 43–75)
NITRITE UR QL STRIP: NEGATIVE
NON-SQ EPI CELLS URNS QL MICRO: ABNORMAL /HPF
NRBC BLD AUTO-RTO: 0 /100 WBCS
PH UR STRIP.AUTO: 6 [PH]
PHOSPHATE SERPL-MCNC: 4.2 MG/DL (ref 2.3–4.1)
PLATELET # BLD AUTO: 209 THOUSANDS/UL (ref 149–390)
PMV BLD AUTO: 10.5 FL (ref 8.9–12.7)
POTASSIUM SERPL-SCNC: 3.7 MMOL/L (ref 3.5–5.3)
PROT SERPL-MCNC: 7.4 G/DL (ref 6.4–8.4)
PROT UR STRIP-MCNC: NEGATIVE MG/DL
PROT UR-MCNC: <4 MG/DL
RBC # BLD AUTO: 5.1 MILLION/UL (ref 3.81–5.12)
RBC #/AREA URNS AUTO: ABNORMAL /HPF
SODIUM SERPL-SCNC: 140 MMOL/L (ref 135–147)
SP GR UR STRIP.AUTO: 1.01 (ref 1–1.03)
UROBILINOGEN UR STRIP-ACNC: <2 MG/DL
WBC # BLD AUTO: 7 THOUSAND/UL (ref 4.31–10.16)
WBC #/AREA URNS AUTO: ABNORMAL /HPF

## 2023-09-25 PROCEDURE — 80053 COMPREHEN METABOLIC PANEL: CPT

## 2023-09-25 PROCEDURE — 84100 ASSAY OF PHOSPHORUS: CPT

## 2023-09-25 PROCEDURE — 99213 OFFICE O/P EST LOW 20 MIN: CPT

## 2023-09-25 PROCEDURE — 82306 VITAMIN D 25 HYDROXY: CPT

## 2023-09-25 PROCEDURE — 84156 ASSAY OF PROTEIN URINE: CPT

## 2023-09-25 PROCEDURE — 85025 COMPLETE CBC W/AUTO DIFF WBC: CPT

## 2023-09-25 PROCEDURE — 36415 COLL VENOUS BLD VENIPUNCTURE: CPT

## 2023-09-25 PROCEDURE — 81001 URINALYSIS AUTO W/SCOPE: CPT

## 2023-09-25 PROCEDURE — 82570 ASSAY OF URINE CREATININE: CPT

## 2023-09-25 PROCEDURE — 83970 ASSAY OF PARATHORMONE: CPT

## 2023-09-25 RX ORDER — CYANOCOBALAMIN 1000 UG/ML
1000 INJECTION, SOLUTION INTRAMUSCULAR; SUBCUTANEOUS
COMMUNITY
Start: 2023-08-03

## 2023-09-25 NOTE — PROGRESS NOTES
Surgical Oncology Follow Up       [facility] [address]  [facility] [address]  [address]  1955  [phone]  [facility] [address]  [facility] [address] [phone]    Diagnoses and all orders for this visit:    History of pancreatic cancer  -     CT chest abdomen pelvis w contrast; Future  -     BUN; Future  -     Creatinine, serum; Future  -     Cancer antigen 19-9; Future    Stage 3b chronic kidney disease ([address])  -     Ambulatory Referral to Nephrology; Future    Encounter for follow-up surveillance of pancreatic cancer    Pancreatic adenocarcinoma ([address])    Other orders  -     cyanocobalamin 1,000 mcg/mL; Inject 1,000 mcg into a muscle every 30 (thirty) days        Chief Complaint   Patient presents with   • Follow-up       Return in about 6 months (around 3/25/2024) for Office Visit, Imaging - See orders, Labs - See Treatment Plan. Oncology History   History of pancreatic cancer (Resolved)   Encounter for follow-up surveillance of pancreatic cancer   Pancreatic adenocarcinoma ([address])   5/29/2020 Initial Diagnosis    Patient had new onset of painless jaundice and right upper quadrant this underwent ultrasound of the gallbladder which demonstrated a 2 x 2 centimeter soft tissue mass in the head of the pancreas with a dilated common bile duct. 5/29/2020 CT chest abdomen and pelvis with contrast   • Suspected mass at the pancreatic head measuring approximately 2.4 cm resulting in distal biliary obstruction. Differential considerations include pancreatic adenocarcinoma. 6/1/2020 MRI ABDw w/o:  1.  1.8 x 2.1 cm solid mass in the pancreatic head with dilatation of the main pancreatic duct.   2.  Obstruction of the CBD causing intrahepatic and extrahepatic bile duct dilatation. 3.  No evidence of peripancreatic lymphadenopathy or other intra-abdominal metastases. 6/2/2020 Biopsy    EUS- Dr. Perez Ala:  Pancreas, uncinate mass:      - Malignant ([address] Category VI)      - Compatible with adenocarcinoma with mucinous features. 6/30/2020 - 8/10/2020 Chemotherapy    Folfirinox + neulasta  Completed 3 cycles before becoming intolarant of irinotecan- infusion reaction. 8/11/2020 - 9/10/2020 Chemotherapy    Neoadjuvant Folfox + neulasta  x 3 more cycles. 10/20/2020 Surgery    Whipple:  - Residual invasive adenocarcinoma with mucinous features, 2.3 cm.  - Metastatic carcinoma present in one of fourteen lymph nodes (1/14). - All margins are negative for tumor. 10/20/2020 -  Cancer Staged    Staging form: Pancreas, AJCC 8th Edition  - Pathologic stage from 10/20/2020: Stage IIB (ypT2, pN1, cM0) - Signed by Zoya Zacarias PA-C on 7/21/2023  Stage prefix: Post-therapy  Response to neoadjuvant therapy: Partial response  Total positive nodes: 1  Total nodes examined: 14  Histologic grade (G): G2  Histologic grading system: 3 grade system  Residual tumor (R): R0 - None  Tumor size (mm): 23  Lymph-vascular invasion (LVI): LVI not present (absent)/not identified  Carbohydrate antigen 19-9 (CA 19-9) (U/mL): 61       12/21/2020 - 1/26/2021 Radiation    5000 cGy in 25 fractions to high risk areas  Dr Amarilys Cifuentes     2/8/2021 - 4/4/2021 Chemotherapy    Folfox + Neulasta  X 4 more cycles. 4/5/2021 Remission    CT CAP  1. No evidence of metastatic disease in the thorax. Long segment of mild esophageal wall thickening which should be correlated for suspicion of esophagitis. 2. Inflammatory changes /small amount of fluid noted in the region of the nicky hepatis and surgical bed without evidence to suggest enlarging pancreatic head mass. The pancreatic duct is no longer dilated. 3. Stomach is no longer distended.   Continued findings suggesting nonspecific inflammatory changes in the region of the gastrojejunal anastomosis  4. Hepatic steatosis. Hepatic hypodense nodule likely fluid within a fissure which should be carefully reassessed during follow-up. No discrete evidence of hepatic metastasis  5. Trace amount of perihepatic ascites. No suspicious adenopathy    CA 19-9 trends:  • Presurgery: 9/5/2020-61  • Post surgery/post chemo: 4/10/2021-23  • 8/12/2021: 2  • 12/11/2021: 3  • 2/17/22-7/14/2023: <2           History of Present Illness: The patient returns to the office today in follow-up for her history of pancreatic cancer. She is currently SATISH at 1 years and doing very well. She does report frequent stools, but denies any diarrhea, abdominal pain, appetite changes or jaundice. She has stopped using mirtazapine, and has lost a few pounds since her last visit, but states she thinks she is eating enough. She states she tries to avoid carbs due to her diabetes, and she tries not to eat fatty foods to avoid greasy stools. She has tried using Creon twice in the past, and both times has become very ill. She would prefer to avoid enzymes in the future if possible, but she is concerned that she may not not be getting enough nutrients in her diet. She denies any headaches, dizziness, shortness of breath or cough. CT of the chest abdomen and pelvis was performed on September 18, and she has had a recent Ca 19-9 level drawn. I have reviewed these results myself and discussed them with the patient and her  today. Review of Systems   Constitutional: Negative for activity change, appetite change and fatigue. HENT: Negative. Respiratory: Negative. Negative for cough and shortness of breath. Cardiovascular: Negative. Gastrointestinal: Negative. Negative for abdominal distention, abdominal pain, diarrhea, nausea and vomiting. Musculoskeletal: Negative. Skin: Negative. Negative for color change and wound. Neurological: Negative.   Negative for dizziness and headaches. Hematological: Negative. Negative for adenopathy. Psychiatric/Behavioral: Negative.               Patient Active Problem List   Diagnosis   • Hyperlipidemia   • Essential hypertension   • Type 2 diabetes mellitus without complication, without long-term current use of insulin (HCC)   • Pruritus   • Transaminitis   • Therapeutic opioid-induced constipation (OIC)   • Anxiety   • Functional diarrhea   • Poor appetite   • Encounter for follow-up surveillance of pancreatic cancer   • Mild protein-calorie malnutrition ([address])   • Encounter for removal of tunneled central venous catheter (CVC) with port   • Hip joint replacement status   • Pancreatic adenocarcinoma ([address])     Past Medical History:   Diagnosis Date   • BRCA1 negative    • BRCA2 negative    • Chemotherapy induced neutropenia ([address]) 2/10/2021   • Chemotherapy induced neutropenia ([address]) 2/10/2021   • Diabetes mellitus ([address])    • Hypertension    • Lactic acidosis 2020   • Neuropathy    • Obstructive jaundice 2020   • Pancreas cancer ([address])    • Port-A-Cath in place 2020     Past Surgical History:   Procedure Laterality Date   • BREAST BIOPSY Left     neg   • BREAST SURGERY Left     calcifications   •  SECTION     • CHOLECYSTECTOMY N/A 10/20/2020    Procedure: CHOLECYSTECTOMY;  Surgeon: Katy Street MD;  Location: [facility];  Service: Surgical Oncology   • COLONOSCOPY     • ECTOPIC PREGNANCY SURGERY      partial ovary removed   • FL GUIDED CENTRAL VENOUS ACCESS DEVICE INSERTION  2020   • JOINT REPLACEMENT Bilateral    • LAPAROTOMY N/A 10/20/2020    Procedure: LAPAROTOMY EXPLORATORY; INTRAOPERATIVE ULTRASOUND;  Surgeon: Katy Street MD;  Location: [facility];  Service: Surgical Oncology   • MANDIBLE FRACTURE SURGERY     • NOSE SURGERY      deviated septum   • REPLACEMENT TOTAL HIP W/  RESURFACING IMPLANTS Bilateral     right hip done 2012; left hip done 2012   • TONSILLECTOMY  1957   • TUNNELED VENOUS PORT PLACEMENT Left 6/23/2020    Procedure: INSERTION VENOUS PORT (PORT-A-CATH), left;  Surgeon: Jessie Trujillo MD;  Location: [facility];  Service: Surgical Oncology   • WHIPPLE PROCEDURE/PANCREATICO-DUODENECTOMY N/A 10/20/2020    Procedure: WHIPPLE PROCEDURE/PANCREATICO-DUODENECTOMY;  Surgeon: Jessie Trujillo MD;  Location: [facility];  Service: Surgical Oncology     Family History   Problem Relation Age of Onset   • Diabetes Mother    • Heart disease Mother    • Heart disease Father    • Breast cancer additional onset Sister    • Breast cancer Sister 47   • Stroke Maternal Grandfather    • Breast cancer additional onset Maternal Aunt    • Breast cancer Maternal Aunt    • Colon cancer Neg Hx    • Ovarian cancer Neg Hx    • Uterine cancer Neg Hx    • Cervical cancer Neg Hx      Social History     Socioeconomic History   • Marital status: /Civil Union     Spouse name: Not on file   • Number of children: Not on file   • Years of education: Not on file   • Highest education level: Not on file   Occupational History   • Not on file   Tobacco Use   • Smoking status: Former   • Smokeless tobacco: Never   • Tobacco comments:     quit 40 years ago   Vaping Use   • Vaping Use: Never used   Substance and Sexual Activity   • Alcohol use:  Yes     Alcohol/week: 1.0 standard drink of alcohol     Types: 1 Glasses of wine per week     Comment: WINE OCCASIONALLY   • Drug use: Never   • Sexual activity: Not Currently   Other Topics Concern   • Not on file   Social History Narrative   • Not on file     Social Determinants of Health     Financial Resource Strain: Not on file   Food Insecurity: Not on file   Transportation Needs: Not on file   Physical Activity: Not on file   Stress: Not on file   Social Connections: Not on file   Intimate Partner Violence: Not on file   Housing Stability: Not on file       Current Outpatient Medications:   •  cyanocobalamin 1,000 mcg/mL, Inject 1,000 mcg into a muscle every 30 (thirty) days, Disp: , Rfl:   •  dicyclomine (BENTYL) 20 mg tablet, TAKE 1 TABLET BY MOUTH TWICE A DAY (Patient taking differently: as needed), Disp: 180 tablet, Rfl: 1  •  diphenhydrAMINE (BENADRYL) 25 mg capsule, Take 25 mg by mouth every 6 (six) hours as needed for itching, Disp: , Rfl:   •  Empagliflozin (Jardiance) 10 MG TABS, Take 10 mg by mouth every morning, Disp: , Rfl:   •  ergocalciferol (VITAMIN D2) 50,000 units, Take 50,000 Units by mouth every 14 (fourteen) days, Disp: , Rfl:   •  gabapentin (NEURONTIN) 300 mg capsule, Take 300 mg by mouth 3 (three) times a day, Disp: , Rfl:   •  LORazepam (ATIVAN) 0.5 mg tablet, Take 1 tablet (0.5 mg total) by mouth 2 (two) times a day as needed for anxiety NO FURTHER REFILLS WITHOUT CLINIC VISIT WITH PALLIATIVE CARE, Disp: 30 tablet, Rfl: 0  •  omeprazole (PriLOSEC) 20 mg delayed release capsule, TAKE 1 CAPSULE BY MOUTH TWICE A DAY, Disp: 180 capsule, Rfl: 1  •  OneTouch Verio test strip, daily Test, Disp: , Rfl:   •  prochlorperazine (COMPAZINE) 10 mg tablet, Take 1 tablet (10 mg total) by mouth every 6 (six) hours as needed for nausea or vomiting, Disp: 45 tablet, Rfl: 3  •  amLODIPine (NORVASC) 2.5 mg tablet, Take 2.5 mg by mouth daily, Disp: , Rfl:   •  diphenoxylate-atropine (LOMOTIL) 2.5-0.025 mg per tablet, Take 1 tablet by mouth 4 (four) times a day as needed for diarrhea DO NOT EXCEED 4 DOSES IN 1 DAY (Patient not taking: Reported on 9/25/2023), Disp: 30 tablet, Rfl: 0  •  mirtazapine (REMERON) 15 mg tablet, TAKE 1 TABLET BY MOUTH DAILY AT BEDTIME (Patient not taking: Reported on 9/25/2023), Disp: 90 tablet, Rfl: 3  Allergies   Allergen Reactions   • Betadine [Povidone Iodine] Rash     Also itching from betadine   • Other Throat Swelling     Pt unsure which chemotherapy drug caused tongue swelling and locked jaw. Please review in epic notes Folfirinox or possible Neulasta.  NEED to review     Vitals:    09/25/23 1032   BP: 126/74 Pulse: 64   Resp: 16   Temp: (!) 97.2 °F (36.2 °C)   SpO2: 100%       Physical Exam  Vitals reviewed. Constitutional:       General: She is not in acute distress. Appearance: Normal appearance. She is not ill-appearing or toxic-appearing. HENT:      Head: Normocephalic and atraumatic. Nose: Nose normal.      Mouth/Throat:      Mouth: Mucous membranes are moist.   Eyes:      General: No scleral icterus. Conjunctiva/sclera: Conjunctivae normal.   Cardiovascular:      Rate and Rhythm: Normal rate. Pulmonary:      Effort: Pulmonary effort is normal.   Abdominal:      General: Abdomen is flat. A surgical scar is present. There is no distension. Palpations: Abdomen is soft. There is no mass. Tenderness: There is no abdominal tenderness. Comments: Well-healed midline abdominal scar without evidence of incisional hernia. Musculoskeletal:         General: Normal range of motion. Cervical back: Normal range of motion and neck supple. Skin:     General: Skin is warm and dry. Coloration: Skin is not jaundiced or pale. Neurological:      General: No focal deficit present. Mental Status: She is alert and oriented to person, place, and time. Psychiatric:         Mood and Affect: Mood normal.         Behavior: Behavior normal.         Thought Content:  Thought content normal.         Judgment: Judgment normal.           Labs:   Collected 9/11/2023  7:38 AM          Component Ref Range & Units 2 wk ago   CA 19-9 0 - 35 U/mL <2           Basic metabolic panel     Collected 9/11/2023  7:38 AM          Component Ref Range & Units 2 wk ago   Sodium 135 - 147 mmol/L 137    Potassium 3.5 - 5.3 mmol/L 4.2    Chloride 96 - 108 mmol/L 102    CO2 21 - 32 mmol/L 30    ANION GAP mmol/L 5    BUN 5 - 25 mg/dL 11    Creatinine 0.60 - 1.30 mg/dL 1.32 High     Comment: Standardized to IDMS reference method   Glucose, Fasting 65 - 99 mg/dL 138 High     Calcium 8.4 - 10.2 mg/dL 9.6    eGFR ml/min/1.73sq m 40                Imaging  CT chest abdomen pelvis w contrast    Result Date: 9/18/2023  Narrative: CT CHEST, ABDOMEN AND PELVIS WITH IV CONTRAST INDICATION:   Z08: Encounter for follow-up examination after completed treatment for malignant neoplasm Z85.07: Personal history of malignant neoplasm of pancreas. Excerpt from Surgical Oncology progress note dated 3/17/2023:  "History of pancreas cancer 3 years post Whipple procedure. She is doing well. Follow-up in 6 months for surveillance with blood work and scan at that time." COMPARISON: CT chest abdomen pelvis 3/10/2023. TECHNIQUE: CT examination of the chest, abdomen and pelvis was performed. Multiplanar 2D reformatted images were created from the source data. This examination, like all CT scans performed in the [facility], was performed utilizing techniques to minimize radiation dose exposure, including the use of iterative reconstruction and automated exposure control. Radiation dose length product (DLP) for this visit:  595 mGy-cm IV Contrast:  100 mL of iohexol (OMNIPAQUE) Enteric Contrast: Enteric contrast was administered. FINDINGS: CHEST LUNGS: No new findings. No endotracheal or endobronchial lesion. PLEURA:  Unremarkable. HEART/GREAT VESSELS: Heart is unremarkable for patient's age. No thoracic aortic aneurysm. MEDIASTINUM AND SANDRA: Probable small hiatal hernia. CHEST WALL AND LOWER NECK:  Unremarkable. ABDOMEN LIVER/BILIARY TREE: No hepatic mass. Reidentified intrahepatic pneumobilia. GALLBLADDER: Gallbladder is surgically absent. SPLEEN:  Unremarkable. PANCREAS: Status post Whipple. No recurrent pancreatic mass. No new pancreatic findings. ADRENAL GLANDS:  Unremarkable. KIDNEYS/URETERS: No hydronephrosis. Scattered bilateral cortical scarring STOMACH AND BOWEL: Distended stomach. Diffuse gastric submucosal thickening likely to indicate gastritis. Improved gastrojejunal anastomosis thickening.  Scattered colonic diverticula. No acute bowel findings. APPENDIX:  No findings to suggest appendicitis. ABDOMINOPELVIC CAVITY:  No ascites. No pneumoperitoneum. No lymphadenopathy. VESSELS:  Unremarkable for patient's age. PELVIS REPRODUCTIVE ORGANS: Predominantly obscured by streak artifact from bilateral hip prostheses. URINARY BLADDER: Also predominantly obscured. ABDOMINAL WALL/INGUINAL REGIONS:  Unremarkable. OSSEOUS STRUCTURES:  No acute fracture or destructive osseous lesion. Impression: No signs of metastatic disease in the chest, abdomen or pelvis. Probable gastritis. Limited evaluation of the pelvis secondary to advanced streak artifact from bilateral hip prostheses. The study was marked in EPIC for significant notification. Workstation performed: [id number]     I personally reviewed and interpreted the above laboratory and imaging data. Discussion/Summary: This is a 75-year-old female who presents to the office today for continued pancreatic cancer surveillance. She is doing very well and there is no evidence of disease recurrence on imaging, and her tumor marker is within normal limits. I have encouraged her to try to incorporate more protein and calorie dense foods into her diet, such as cottage cheese, peanut butter and Greek yogurt. I feel that as long as she can manage her GI symptoms with diet, I do not feel it is necessary to try the Creon again. I have encouraged her to take a high-quality multivitamin that includes fat-soluble vitamins and minerals. We will plan to see her again in 6 months with repeat imaging and tumor markers. Her most recent BMP does show a continued decline in kidney function, and she is now considered stage 3B CKD. I will refer her to nephrology at this time to discuss ways to optimize her kidney function. She is agreeable to the plan, all questions have been answered.

## 2023-09-26 ENCOUNTER — TELEPHONE (OUTPATIENT)
Dept: NEPHROLOGY | Facility: CLINIC | Age: 68
End: 2023-09-26

## 2023-09-26 LAB
25(OH)D3 SERPL-MCNC: 19.4 NG/ML (ref 30–100)
PTH-INTACT SERPL-MCNC: 41.8 PG/ML (ref 12–88)

## 2023-09-27 ENCOUNTER — CONSULT (OUTPATIENT)
Dept: NEPHROLOGY | Facility: CLINIC | Age: 68
End: 2023-09-27
Payer: MEDICARE

## 2023-09-27 VITALS
TEMPERATURE: 96.9 F | BODY MASS INDEX: 20.16 KG/M2 | WEIGHT: 100 LBS | HEIGHT: 59 IN | HEART RATE: 78 BPM | RESPIRATION RATE: 16 BRPM | SYSTOLIC BLOOD PRESSURE: 150 MMHG | OXYGEN SATURATION: 99 % | DIASTOLIC BLOOD PRESSURE: 90 MMHG

## 2023-09-27 DIAGNOSIS — N18.32 STAGE 3B CHRONIC KIDNEY DISEASE (HCC): ICD-10-CM

## 2023-09-27 PROCEDURE — 99204 OFFICE O/P NEW MOD 45 MIN: CPT | Performed by: INTERNAL MEDICINE

## 2023-09-27 RX ORDER — MELATONIN
5000 DAILY
COMMUNITY

## 2023-09-27 NOTE — LETTER
September 27, 2023     Rahul Issa, [address]  [address] [address]    Patient: Ti Aguilar   YOB: 1955   Date of Visit: 9/27/2023       Dear Dr. Caity Vega: Thank you for referring Ti Aguilar to me for evaluation. Below are my notes for this consultation. If you have questions, please do not hesitate to call me. I look forward to following your patient along with you. Sincerely,        Saintclair Saba, MD        CC: Luci Gomez MD  9/27/2023 11:36 AM  Incomplete  NEPHROLOGY OFFICE CONSULT  Ti Aguilar 77 y.o. female MRN: [id number]    Encounter: [id number] DATE: 9/27/2023    REASON FOR VISIT: Ti Aguilar is a 77 y. o.female who was referred by [facility] for evaluation. Renan Amanda HPI:    This is a 77years old female with past medical history of stage II pancreatic cancer status postchemotherapy and radiation therapy and now in remission, hypertension, diabetes mellitus type 2, neuropathy, chronic kidney disease stage IIIb who is referred to renal office by her oncologist for worsening renal parameters. Patient is aware of diagnosis of chronic kidney disease stage III as early as the year 2021. Prior to that her EGFR have been greater than 60 with serum creatinine less than 1.0 mg/dL. Patient presented with painless jaundice in the year 2020 and was diagnosed with pancreatic cancer upon performing endoscopic ultrasound. Patient denies use of NSAIDs. Patient has been exposed to multiple CT scan of abdomen pelvis with IV contrast.  She recently received contrast with CAT scan approximately 10 days ago. She does not check her blood pressure at home daily.   Admits to 15-year history of diabetes mellitus though control of glucose has been adequate        PAST MEDICAL HISTORY:  Past Medical History:   Diagnosis Date   • BRCA1 negative    • BRCA2 negative    • Chemotherapy induced neutropenia ([address]) 2/10/2021   • Chemotherapy induced neutropenia ([address]) 2/10/2021   • Diabetes mellitus ([address])    • Hypertension    • Lactic acidosis 2020   • Neuropathy    • Obstructive jaundice 2020   • Pancreas cancer ([address])    • Port-A-Cath in place 2020       PAST SURGICAL HISTORY:  Past Surgical History:   Procedure Laterality Date   • BREAST BIOPSY Left 2009    neg   • BREAST SURGERY Left     calcifications   •  SECTION     • CHOLECYSTECTOMY N/A 10/20/2020    Procedure: CHOLECYSTECTOMY;  Surgeon: Tera Cardenas MD;  [facility];  Service: Surgical Oncology   • COLONOSCOPY     • ECTOPIC PREGNANCY SURGERY      partial ovary removed   • FL GUIDED CENTRAL VENOUS ACCESS DEVICE INSERTION  2020   • JOINT REPLACEMENT Bilateral    • LAPAROTOMY N/A 10/20/2020    Procedure: LAPAROTOMY EXPLORATORY; INTRAOPERATIVE ULTRASOUND;  Surgeon: Tera Cardenas MD;  [facility];  Service: Surgical Oncology   • MANDIBLE FRACTURE SURGERY     • NOSE SURGERY      deviated septum   • REPLACEMENT TOTAL HIP W/  RESURFACING IMPLANTS Bilateral     right hip done 2012; left hip done 2012   • TONSILLECTOMY     • TUNNELED VENOUS PORT PLACEMENT Left 2020    Procedure: INSERTION VENOUS PORT (PORT-A-CATH), left;  Surgeon: Tera Cardenas MD;  [facility];  Service: Surgical Oncology   • WHIPPLE PROCEDURE/PANCREATICO-DUODENECTOMY N/A 10/20/2020    Procedure:  WHIPPLE PROCEDURE/PANCREATICO-DUODENECTOMY;  Surgeon: Tera Cardenas MD;  [facility];  Service: Surgical Oncology       SOCIAL HISTORY:  Social History     Substance and Sexual Activity   Alcohol Use Yes   • Alcohol/week: 1.0 standard drink of alcohol   • Types: 1 Glasses of wine per week    Comment: WINE OCCASIONALLY     Social History     Substance and Sexual Activity   Drug Use Never     Social History     Tobacco Use   Smoking Status Former   Smokeless Tobacco Never   Tobacco Comments    quit 40 years ago       FAMILY HISTORY:  Family History   Problem Relation Age of Onset   • Diabetes Mother    • Heart disease Mother    • Heart disease Father    • Breast cancer additional onset Sister    • Breast cancer Sister 47   • Stroke Maternal Grandfather    • Breast cancer additional onset Maternal Aunt    • Breast cancer Maternal Aunt    • Colon cancer Neg Hx    • Ovarian cancer Neg Hx    • Uterine cancer Neg Hx    • Cervical cancer Neg Hx        ALLERGY:  Allergies   Allergen Reactions   • Betadine [Povidone Iodine] Rash     Also itching from betadine   • Other Throat Swelling     Pt unsure which chemotherapy drug caused tongue swelling and locked jaw. Please review in epic notes Folfirinox or possible Neulasta.  NEED to review       MEDICATIONS:    Current Outpatient Medications:   •  amLODIPine (NORVASC) 2.5 mg tablet, Take 2.5 mg by mouth daily, Disp: , Rfl:   •  cholecalciferol (VITAMIN D3) 1,000 units tablet, Take 5,000 Units by mouth daily, Disp: , Rfl:   •  cyanocobalamin 1,000 mcg/mL, Inject 1,000 mcg into a muscle every 30 (thirty) days, Disp: , Rfl:   •  dicyclomine (BENTYL) 20 mg tablet, TAKE 1 TABLET BY MOUTH TWICE A DAY (Patient taking differently: as needed), Disp: 180 tablet, Rfl: 1  •  diphenhydrAMINE (BENADRYL) 25 mg capsule, Take 25 mg by mouth every 6 (six) hours as needed for itching, Disp: , Rfl:   •  Empagliflozin (Jardiance) 10 MG TABS, Take 10 mg by mouth every morning, Disp: , Rfl:   •  ergocalciferol (VITAMIN D2) 50,000 units, Take 50,000 Units by mouth every 14 (fourteen) days, Disp: , Rfl:   •  gabapentin (NEURONTIN) 300 mg capsule, Take 300 mg by mouth 3 (three) times a day, Disp: , Rfl:   •  LORazepam (ATIVAN) 0.5 mg tablet, Take 1 tablet (0.5 mg total) by mouth 2 (two) times a day as needed for anxiety NO FURTHER REFILLS WITHOUT CLINIC VISIT WITH PALLIATIVE CARE, Disp: 30 tablet, Rfl: 0  •  omeprazole (PriLOSEC) 20 mg delayed release capsule, TAKE 1 CAPSULE BY MOUTH TWICE A DAY, Disp: 180 capsule, Rfl: 1  •  OneTouch Verio test strip, daily Test, Disp: , Rfl:   •  prochlorperazine (COMPAZINE) 10 mg tablet, Take 1 tablet (10 mg total) by mouth every 6 (six) hours as needed for nausea or vomiting, Disp: 45 tablet, Rfl: 3    REVIEW OF SYSTEMS:    Review of Systems   Constitutional: Negative. HENT: Negative. Eyes: Negative. Respiratory: Negative. Cardiovascular: Negative. Gastrointestinal: Negative. Endocrine: Negative. Genitourinary: Negative. Musculoskeletal: Negative. Skin: Negative. Allergic/Immunologic: Negative. Neurological: Negative. Hematological: Negative. All other systems reviewed and are negative. PHYSICAL EXAM:  Vitals:    09/27/23 1022   BP: 150/90   Pulse: 78   Resp: 16   Temp: (!) 96.9 °F (36.1 °C)   TempSrc: Temporal   SpO2: 99%   Weight: 45.4 kg (100 lb)   Height: 4' 11" (1.499 m)     Body mass index is 20.2 kg/m². Physical Exam  Constitutional:       Appearance: She is well-developed. HENT:      Head: Normocephalic and atraumatic. Eyes:      Pupils: Pupils are equal, round, and reactive to light. Cardiovascular:      Rate and Rhythm: Normal rate and regular rhythm. Heart sounds: Normal heart sounds. Pulmonary:      Effort: Pulmonary effort is normal.   Abdominal:      General: Bowel sounds are normal.      Palpations: Abdomen is soft. Musculoskeletal:         General: Normal range of motion. Cervical back: Neck supple. Skin:     General: Skin is warm. Neurological:      Mental Status: She is alert and oriented to person, place, and time.          LAB RESULTS:  Results for orders placed or performed in visit on 09/25/23   Comprehensive metabolic panel   Result Value Ref Range    Sodium 140 135 - 147 mmol/L    Potassium 3.7 3.5 - 5.3 mmol/L    Chloride 102 96 - 108 mmol/L    CO2 30 21 - 32 mmol/L    ANION GAP 8 mmol/L    BUN 11 5 - 25 mg/dL    Creatinine 1.16 0.60 - 1.30 mg/dL    Glucose, Fasting 156 (H) 65 - 99 mg/dL    Calcium 9.7 8.4 - 10.2 mg/dL    AST 23 13 - 39 U/L    ALT 17 7 - 52 U/L    Alkaline Phosphatase 73 34 - 104 U/L    Total Protein 7.4 6.4 - 8.4 g/dL    Albumin 4.6 3.5 - 5.0 g/dL    Total Bilirubin 0.56 0.20 - 1.00 mg/dL    eGFR 47 ml/min/1.73sq m   CBC and differential   Result Value Ref Range    WBC 7.00 4.31 - 10.16 Thousand/uL    RBC 5.10 3.81 - 5.12 Million/uL    Hemoglobin 16.0 (H) 11.5 - 15.4 g/dL    Hematocrit 48.1 (H) 34.8 - 46.1 %    MCV 94 82 - 98 fL    MCH 31.4 26.8 - 34.3 pg    MCHC 33.3 31.4 - 37.4 g/dL    RDW 11.9 11.6 - 15.1 %    MPV 10.5 8.9 - 12.7 fL    Platelets 353 834 - 302 Thousands/uL    nRBC 0 /100 WBCs    Neutrophils Relative 70 43 - 75 %    Immat GRANS % 0 0 - 2 %    Lymphocytes Relative 20 14 - 44 %    Monocytes Relative 9 4 - 12 %    Eosinophils Relative 1 0 - 6 %    Basophils Relative 0 0 - 1 %    Neutrophils Absolute 4.88 1.85 - 7.62 Thousands/µL    Immature Grans Absolute 0.01 0.00 - 0.20 Thousand/uL    Lymphocytes Absolute 1.42 0.60 - 4.47 Thousands/µL    Monocytes Absolute 0.61 0.17 - 1.22 Thousand/µL    Eosinophils Absolute 0.06 0.00 - 0.61 Thousand/µL    Basophils Absolute 0.02 0.00 - 0.10 Thousands/µL   Phosphorus   Result Value Ref Range    Phosphorus 4.2 (H) 2.3 - 4.1 mg/dL   PTH, intact   Result Value Ref Range    PTH 41.8 12.0 - 88.0 pg/mL   Vitamin D 25 hydroxy   Result Value Ref Range    Vit D, 25-Hydroxy 19.4 (L) 30.0 - 100.0 ng/mL         ASSESSMENT and PLAN:  Twan Ortiz was seen today for chronic kidney disease and consult.     Diagnoses and all orders for this visit:    Stage 3b chronic kidney disease ([address])  -     Ambulatory Referral to Nephrology  -     Albumin; Standing  -     Calcium; Standing  -     CBC and differential; Standing  -     Comprehensive metabolic panel; Standing  -     Phosphorus; Standing  -     Protein / creatinine ratio, urine; Standing  -     PTH, intact; Standing  -     Urinalysis with microscopic; Standing  -     Vitamin D 25 hydroxy; Standing  -     Albumin  - Calcium  -     CBC and differential  -     Comprehensive metabolic panel  -     Phosphorus  -     Protein / creatinine ratio, urine  -     PTH, intact  -     Urinalysis with microscopic  -     Vitamin D 25 hydroxy      77years old female with past medical history of chronic kidney disease stage IIIb, hypertension, diabetes mellitus type 2, stage II pancreatic cancer status postchemotherapy and radiation therapy who presents to renal office for management of CKD    1. Chronic kidney disease stage IIIa: Etiology of CKD is likely hypertensive nephrosclerosis, diabetic lamellar sclerosis, renal artery vasoconstriction that may have occurred secondary to contrast exposure and age-related nephron loss  Labs obtained revealed creatinine of 1.16 mg/dL with estimate GFR 47 and stable  Recommended avoidance of contrast with CT scans however if necessary, patient will require pre and post hydration with contrast  Avoidance of NSAIDs reiterated. #2 hypertension: Blood pressure is elevated in the office though home blood pressure readings if obtained will be needed. Target blood pressure is less than 130/80    3. Diabetes mellitus type 2: Well controlled while on Jardiance. 4.  Stage II pancreatic cancer: Status postchemotherapy and had received oxaliplatin on other chemotherapeutic agents including RT. Now in remission. 5.  Bone mineral disorder: Noted low vitamin D levels. Patient is already taking ergocalciferol 50,000 units 1 tablet every 14 days and cholecalciferol 5000 units daily. Despite these, vitamin D level is 19 and low. Asked patient to take 2 tablets of vitamin D3 5000 units daily on top of ergocalciferol 50,000 units 1 tablet once every 14 days    6. Anemia due to CKD: Hemoglobin is within acceptable range. 7.  Nutrition: 64 ounces of fluids daily, low-salt moderate potassium and protein diet recommended  .

## 2023-09-27 NOTE — PROGRESS NOTES
NEPHROLOGY OFFICE CONSULT  Ryan House 77 y.o. female MRN: [id number]    Encounter: [id number] DATE: 2023    REASON FOR VISIT: Ryan House is a 77 y. o.female who was referred by [facility]I [facility] for evaluation. All Speaker HPI:    This is a 77years old female with past medical history of stage II pancreatic cancer status postchemotherapy and radiation therapy and now in remission, hypertension, diabetes mellitus type 2, neuropathy, chronic kidney disease stage IIIb who is referred to renal office by her oncologist for worsening renal parameters. Patient is aware of diagnosis of chronic kidney disease stage III as early as the year . Prior to that her EGFR have been greater than 60 with serum creatinine less than 1.0 mg/dL. Patient presented with painless jaundice in the year  and was diagnosed with pancreatic cancer upon performing endoscopic ultrasound. Patient denies use of NSAIDs. Patient has been exposed to multiple CT scan of abdomen pelvis with IV contrast.  She recently received contrast with CAT scan approximately 10 days ago. She does not check her blood pressure at home daily.   Admits to 15-year history of diabetes mellitus though control of glucose has been adequate        PAST MEDICAL HISTORY:  Past Medical History:   Diagnosis Date   • BRCA1 negative    • BRCA2 negative    • Chemotherapy induced neutropenia ([address]) 2/10/2021   • Chemotherapy induced neutropenia ([address]) 2/10/2021   • Diabetes mellitus ([address])    • Hypertension    • Lactic acidosis 2020   • Neuropathy    • Obstructive jaundice 2020   • Pancreas cancer ([address])    • Port-A-Cath in place 2020       PAST SURGICAL HISTORY:  Past Surgical History:   Procedure Laterality Date   • BREAST BIOPSY Left     neg   • BREAST SURGERY Left     calcifications   •  SECTION     • CHOLECYSTECTOMY N/A 10/20/2020    Procedure: CHOLECYSTECTOMY;  Surgeon: Cleveland Garrett MD;  [facility];  Service: Surgical Oncology   • COLONOSCOPY     • ECTOPIC PREGNANCY SURGERY      partial ovary removed   • FL GUIDED CENTRAL VENOUS ACCESS DEVICE INSERTION  6/23/2020   • JOINT REPLACEMENT Bilateral    • LAPAROTOMY N/A 10/20/2020    Procedure: LAPAROTOMY EXPLORATORY; INTRAOPERATIVE ULTRASOUND;  Surgeon: Favian Artis MD;  [facility];  Service: Surgical Oncology   • MANDIBLE FRACTURE SURGERY  1972   • NOSE SURGERY      deviated septum   • REPLACEMENT TOTAL HIP W/  RESURFACING IMPLANTS Bilateral 2012    right hip done July 2012; left hip done September 2012   • TONSILLECTOMY  1957   • TUNNELED VENOUS PORT PLACEMENT Left 6/23/2020    Procedure: INSERTION VENOUS PORT (PORT-A-CATH), left;  Surgeon: Favian Artis MD;  [facility];  Service: Surgical Oncology   • WHIPPLE PROCEDURE/PANCREATICO-DUODENECTOMY N/A 10/20/2020    Procedure:  WHIPPLE PROCEDURE/PANCREATICO-DUODENECTOMY;  Surgeon: Favian Artis MD;  [facility];  Service: Surgical Oncology       SOCIAL HISTORY:  Social History     Substance and Sexual Activity   Alcohol Use Yes   • Alcohol/week: 1.0 standard drink of alcohol   • Types: 1 Glasses of wine per week    Comment: WINE OCCASIONALLY     Social History     Substance and Sexual Activity   Drug Use Never     Social History     Tobacco Use   Smoking Status Former   Smokeless Tobacco Never   Tobacco Comments    quit 40 years ago       FAMILY HISTORY:  Family History   Problem Relation Age of Onset   • Diabetes Mother    • Heart disease Mother    • Heart disease Father    • Breast cancer additional onset Sister    • Breast cancer Sister 47   • Stroke Maternal Grandfather    • Breast cancer additional onset Maternal Aunt    • Breast cancer Maternal Aunt    • Colon cancer Neg Hx    • Ovarian cancer Neg Hx    • Uterine cancer Neg Hx    • Cervical cancer Neg Hx        ALLERGY:  Allergies   Allergen Reactions   • Betadine [Povidone Iodine] Rash     Also itching from betadine   • Other Throat Swelling Pt unsure which chemotherapy drug caused tongue swelling and locked jaw. Please review in epic notes Folfirinox or possible Neulasta. NEED to review       MEDICATIONS:    Current Outpatient Medications:   •  amLODIPine (NORVASC) 2.5 mg tablet, Take 2.5 mg by mouth daily, Disp: , Rfl:   •  cholecalciferol (VITAMIN D3) 1,000 units tablet, Take 5,000 Units by mouth daily, Disp: , Rfl:   •  cyanocobalamin 1,000 mcg/mL, Inject 1,000 mcg into a muscle every 30 (thirty) days, Disp: , Rfl:   •  dicyclomine (BENTYL) 20 mg tablet, TAKE 1 TABLET BY MOUTH TWICE A DAY (Patient taking differently: as needed), Disp: 180 tablet, Rfl: 1  •  diphenhydrAMINE (BENADRYL) 25 mg capsule, Take 25 mg by mouth every 6 (six) hours as needed for itching, Disp: , Rfl:   •  Empagliflozin (Jardiance) 10 MG TABS, Take 10 mg by mouth every morning, Disp: , Rfl:   •  ergocalciferol (VITAMIN D2) 50,000 units, Take 50,000 Units by mouth every 14 (fourteen) days, Disp: , Rfl:   •  gabapentin (NEURONTIN) 300 mg capsule, Take 300 mg by mouth 3 (three) times a day, Disp: , Rfl:   •  LORazepam (ATIVAN) 0.5 mg tablet, Take 1 tablet (0.5 mg total) by mouth 2 (two) times a day as needed for anxiety NO FURTHER REFILLS WITHOUT CLINIC VISIT WITH PALLIATIVE CARE, Disp: 30 tablet, Rfl: 0  •  omeprazole (PriLOSEC) 20 mg delayed release capsule, TAKE 1 CAPSULE BY MOUTH TWICE A DAY, Disp: 180 capsule, Rfl: 1  •  OneTouch Verio test strip, daily Test, Disp: , Rfl:   •  prochlorperazine (COMPAZINE) 10 mg tablet, Take 1 tablet (10 mg total) by mouth every 6 (six) hours as needed for nausea or vomiting, Disp: 45 tablet, Rfl: 3    REVIEW OF SYSTEMS:    Review of Systems   Constitutional: Negative. HENT: Negative. Eyes: Negative. Respiratory: Negative. Cardiovascular: Negative. Gastrointestinal: Negative. Endocrine: Negative. Genitourinary: Negative. Musculoskeletal: Negative. Skin: Negative. Allergic/Immunologic: Negative. Neurological: Negative. Hematological: Negative. All other systems reviewed and are negative. PHYSICAL EXAM:  Vitals:    09/27/23 1022   BP: 150/90   Pulse: 78   Resp: 16   Temp: (!) 96.9 °F (36.1 °C)   TempSrc: Temporal   SpO2: 99%   Weight: 45.4 kg (100 lb)   Height: 4' 11" (1.499 m)     Body mass index is 20.2 kg/m². Physical Exam  Constitutional:       Appearance: She is well-developed. HENT:      Head: Normocephalic and atraumatic. Eyes:      Pupils: Pupils are equal, round, and reactive to light. Cardiovascular:      Rate and Rhythm: Normal rate and regular rhythm. Heart sounds: Normal heart sounds. Pulmonary:      Effort: Pulmonary effort is normal.   Abdominal:      General: Bowel sounds are normal.      Palpations: Abdomen is soft. Musculoskeletal:         General: Normal range of motion. Cervical back: Neck supple. Skin:     General: Skin is warm. Neurological:      Mental Status: She is alert and oriented to person, place, and time.          LAB RESULTS:  Results for orders placed or performed in visit on 09/25/23   Comprehensive metabolic panel   Result Value Ref Range    Sodium 140 135 - 147 mmol/L    Potassium 3.7 3.5 - 5.3 mmol/L    Chloride 102 96 - 108 mmol/L    CO2 30 21 - 32 mmol/L    ANION GAP 8 mmol/L    BUN 11 5 - 25 mg/dL    Creatinine 1.16 0.60 - 1.30 mg/dL    Glucose, Fasting 156 (H) 65 - 99 mg/dL    Calcium 9.7 8.4 - 10.2 mg/dL    AST 23 13 - 39 U/L    ALT 17 7 - 52 U/L    Alkaline Phosphatase 73 34 - 104 U/L    Total Protein 7.4 6.4 - 8.4 g/dL    Albumin 4.6 3.5 - 5.0 g/dL    Total Bilirubin 0.56 0.20 - 1.00 mg/dL    eGFR 47 ml/min/1.73sq m   CBC and differential   Result Value Ref Range    WBC 7.00 4.31 - 10.16 Thousand/uL    RBC 5.10 3.81 - 5.12 Million/uL    Hemoglobin 16.0 (H) 11.5 - 15.4 g/dL    Hematocrit 48.1 (H) 34.8 - 46.1 %    MCV 94 82 - 98 fL    MCH 31.4 26.8 - 34.3 pg    MCHC 33.3 31.4 - 37.4 g/dL    RDW 11.9 11.6 - 15.1 %    MPV 10.5 8.9 - 12.7 fL    Platelets 040 266 - 689 Thousands/uL    nRBC 0 /100 WBCs    Neutrophils Relative 70 43 - 75 %    Immat GRANS % 0 0 - 2 %    Lymphocytes Relative 20 14 - 44 %    Monocytes Relative 9 4 - 12 %    Eosinophils Relative 1 0 - 6 %    Basophils Relative 0 0 - 1 %    Neutrophils Absolute 4.88 1.85 - 7.62 Thousands/µL    Immature Grans Absolute 0.01 0.00 - 0.20 Thousand/uL    Lymphocytes Absolute 1.42 0.60 - 4.47 Thousands/µL    Monocytes Absolute 0.61 0.17 - 1.22 Thousand/µL    Eosinophils Absolute 0.06 0.00 - 0.61 Thousand/µL    Basophils Absolute 0.02 0.00 - 0.10 Thousands/µL   Phosphorus   Result Value Ref Range    Phosphorus 4.2 (H) 2.3 - 4.1 mg/dL   PTH, intact   Result Value Ref Range    PTH 41.8 12.0 - 88.0 pg/mL   Vitamin D 25 hydroxy   Result Value Ref Range    Vit D, 25-Hydroxy 19.4 (L) 30.0 - 100.0 ng/mL         ASSESSMENT and PLAN:  Michael Joyner was seen today for chronic kidney disease and consult. Diagnoses and all orders for this visit:    Stage 3b chronic kidney disease ([address])  -     Ambulatory Referral to Nephrology  -     Albumin; Standing  -     Calcium; Standing  -     CBC and differential; Standing  -     Comprehensive metabolic panel; Standing  -     Phosphorus; Standing  -     Protein / creatinine ratio, urine; Standing  -     PTH, intact; Standing  -     Urinalysis with microscopic; Standing  -     Vitamin D 25 hydroxy; Standing  -     Albumin  -     Calcium  -     CBC and differential  -     Comprehensive metabolic panel  -     Phosphorus  -     Protein / creatinine ratio, urine  -     PTH, intact  -     Urinalysis with microscopic  -     Vitamin D 22 hydroxy      77years old female with past medical history of chronic kidney disease stage IIIb, hypertension, diabetes mellitus type 2, stage II pancreatic cancer status postchemotherapy and radiation therapy who presents to renal office for management of CKD    1.   Chronic kidney disease stage IIIa: Etiology of CKD is likely hypertensive nephrosclerosis, diabetic lamellar sclerosis, renal artery vasoconstriction that may have occurred secondary to contrast exposure and age-related nephron loss  Labs obtained revealed creatinine of 1.16 mg/dL with estimate GFR 47 and stable  Recommended avoidance of contrast with CT scans however if necessary, patient will require pre and post hydration with contrast  Avoidance of NSAIDs reiterated. #2 hypertension: Blood pressure is elevated in the office though home blood pressure readings if obtained will be needed. Target blood pressure is less than 130/80    3. Diabetes mellitus type 2: Well controlled while on Jardiance. 4.  Stage II pancreatic cancer: Status postchemotherapy and had received oxaliplatin on other chemotherapeutic agents including RT. Now in remission. 5.  Bone mineral disorder: Noted low vitamin D levels. Patient is already taking ergocalciferol 50,000 units 1 tablet every 14 days and cholecalciferol 5000 units daily. Despite these, vitamin D level is 19 and low. Asked patient to take 2 tablets of vitamin D3 5000 units daily on top of ergocalciferol 50,000 units 1 tablet once every 14 days    6. Anemia due to CKD: Hemoglobin is within acceptable range. 7.  Nutrition: 64 ounces of fluids daily, low-salt moderate potassium and protein diet recommended  .

## 2023-10-18 ENCOUNTER — HOSPITAL ENCOUNTER (OUTPATIENT)
Dept: MAMMOGRAPHY | Facility: CLINIC | Age: 68
Discharge: HOME | End: 2023-10-18
Payer: MEDICARE

## 2023-10-18 VITALS — BODY MASS INDEX: 20.16 KG/M2 | WEIGHT: 100 LBS | HEIGHT: 59 IN

## 2023-10-18 DIAGNOSIS — Z12.31 ENCOUNTER FOR SCREENING MAMMOGRAM FOR MALIGNANT NEOPLASM OF BREAST: ICD-10-CM

## 2023-10-18 PROCEDURE — 77067 SCR MAMMO BI INCL CAD: CPT

## 2023-10-18 PROCEDURE — 77063 BREAST TOMOSYNTHESIS BI: CPT

## 2023-11-06 ENCOUNTER — TELEPHONE (OUTPATIENT)
Dept: NEPHROLOGY | Facility: CLINIC | Age: 68
End: 2023-11-06

## 2023-11-06 NOTE — TELEPHONE ENCOUNTER
Dr Hima Sheppard pt called, Lm stating that would like to be seen by dietitian. Please referred her so we can schedule. Thank you.

## 2023-11-07 ENCOUNTER — TELEPHONE (OUTPATIENT)
Dept: NEPHROLOGY | Facility: CLINIC | Age: 68
End: 2023-11-07

## 2023-11-07 DIAGNOSIS — N18.32 STAGE 3B CHRONIC KIDNEY DISEASE (HCC): Primary | ICD-10-CM

## 2023-11-07 NOTE — TELEPHONE ENCOUNTER
LM stating that she was referred to see dietician. Asked pt to call us back regarding her appt. Informed pt that we have no appt with dietician for this year but she might want to got to [place] office. Asked pt to call us back.

## 2023-11-08 ENCOUNTER — TELEPHONE (OUTPATIENT)
Dept: NEPHROLOGY | Facility: CLINIC | Age: 68
End: 2023-11-08

## 2023-11-08 NOTE — TELEPHONE ENCOUNTER
Good Morning    HiStuart called back as per your message from yesterday. As per Al Finch she stated that she has schedule and appointment with the Dietician at the [address] [place] [address] for 12/21/2023. As per patient she stated she wanted you to know.     Thank you
9

## 2023-11-22 ENCOUNTER — DOCUMENTATION (OUTPATIENT)
Dept: HEMATOLOGY ONCOLOGY | Facility: MEDICAL CENTER | Age: 68
End: 2023-11-22

## 2023-11-22 NOTE — PROGRESS NOTES
I left Hilary Freeman a message that due to a change in the providers schedule her follow up with Fariha Tariq from [facility]. 1/23/24 at 10am has been rescheduled to Tues. 2/20/24 at 12:30pm.  I asked her to call back to confirm.

## 2023-12-21 ENCOUNTER — CLINICAL SUPPORT (OUTPATIENT)
Dept: NUTRITION | Facility: HOSPITAL | Age: 68
End: 2023-12-21
Attending: INTERNAL MEDICINE
Payer: MEDICARE

## 2023-12-21 VITALS — WEIGHT: 94.91 LBS | BODY MASS INDEX: 19.17 KG/M2

## 2023-12-21 DIAGNOSIS — N18.32 STAGE 3B CHRONIC KIDNEY DISEASE (HCC): ICD-10-CM

## 2023-12-21 PROCEDURE — 97802 MEDICAL NUTRITION INDIV IN: CPT

## 2023-12-21 NOTE — PROGRESS NOTES
" Nutrition Assessment Form    Patient Name: Manju Holland    YOB: 1955    Sex: Female     Assessment Date: 12/21/2023  Start Time: 9:00 AM  Stop Time: 10:10 AM  Total Minutes: 70     Data:  Present at session: self   Parent/Patient Concerns/reason for visit: \"I do not want to go on dialysis and I am afraid to eat the wrong foods.\"   Medical Dx/Reason for Referral: N18.32 (ICD-10-CM) - Stage 3b chronic kidney disease (HCC)   Past Medical History:   Diagnosis Date    BRCA1 negative     BRCA2 negative     Chemotherapy induced neutropenia  2/10/2021    Chemotherapy induced neutropenia  2/10/2021    Diabetes mellitus (HCC)     Hypertension     Lactic acidosis 5/30/2020    Neuropathy     Obstructive jaundice 6/1/2020    Pancreas cancer (HCC) 2020    Port-A-Cath in place 6/18/2020       Current Outpatient Medications   Medication Sig Dispense Refill    amLODIPine (NORVASC) 2.5 mg tablet Take 2.5 mg by mouth daily      cholecalciferol (VITAMIN D3) 1,000 units tablet Take 5,000 Units by mouth daily      cyanocobalamin 1,000 mcg/mL Inject 1,000 mcg into a muscle every 30 (thirty) days      dicyclomine (BENTYL) 20 mg tablet TAKE 1 TABLET BY MOUTH TWICE A DAY (Patient taking differently: as needed) 180 tablet 1    diphenhydrAMINE (BENADRYL) 25 mg capsule Take 25 mg by mouth every 6 (six) hours as needed for itching      Empagliflozin (Jardiance) 10 MG TABS Take 10 mg by mouth every morning      ergocalciferol (VITAMIN D2) 50,000 units Take 50,000 Units by mouth every 14 (fourteen) days      gabapentin (NEURONTIN) 300 mg capsule Take 300 mg by mouth 3 (three) times a day      LORazepam (ATIVAN) 0.5 mg tablet Take 1 tablet (0.5 mg total) by mouth 2 (two) times a day as needed for anxiety NO FURTHER REFILLS WITHOUT CLINIC VISIT WITH PALLIATIVE CARE 30 tablet 0    omeprazole (PriLOSEC) 20 mg delayed release capsule TAKE 1 CAPSULE BY MOUTH TWICE A  capsule 1    OneTouch Verio test strip daily Test      " "prochlorperazine (COMPAZINE) 10 mg tablet Take 1 tablet (10 mg total) by mouth every 6 (six) hours as needed for nausea or vomiting 45 tablet 3     No current facility-administered medications for this visit.        Additional Meds/Supplements: Stevie Godwin MVI, Fiber supplement - Metamucil  B12 Injections monthly, Hydration sessions   Special Learning Needs/barriers to learning/any new barriers None   Height: HC Readings from Last 5 Encounters:   No data found for HC      Weight: Wt Readings from Last 10 Encounters:   10/18/23 45.4 kg (100 lb)   09/27/23 45.4 kg (100 lb)   09/25/23 45.5 kg (100 lb 6.4 oz)   07/18/23 45.8 kg (101 lb)   03/17/23 45.8 kg (101 lb)   01/19/23 46.7 kg (103 lb)   10/17/22 46.3 kg (102 lb)   10/11/22 46.6 kg (102 lb 12.8 oz)   09/12/22 45.4 kg (100 lb)   08/26/22 45.8 kg (101 lb)     Estimated body mass index is 20.2 kg/m² as calculated from the following:    Height as of 10/18/23: 4' 11\" (1.499 m).    Weight as of 10/18/23: 45.4 kg (100 lb).   Recent Weight Change: [x]Yes     []No  Amount: - 5 lbs       Energy Needs: [place]- St. Jeor Equation:  1417-9303 kcal (1.3-1.5 stress factor), 34-43 g protein (0.8-1.0 g/kg IBW)   Allergies   Allergen Reactions    Betadine [Povidone Iodine] Rash     Also itching from betadine    Other Throat Swelling     Pt unsure which chemotherapy drug caused tongue swelling and locked jaw. Please review in epic notes Folfirinox or possible Neulasta. NEED to review    or intolerances    Social History     Substance and Sexual Activity   Alcohol Use Yes    Alcohol/week: 1.0 standard drink of alcohol    Types: 1 Glasses of wine per week    Comment: WINE OCCASIONALLY    0-1 x/wk or month  1 or 2 or 3 or 4 or____ drinks/session   Mixed drinks/ wine/ beer    Very rare, varies.    Social History     Tobacco Use   Smoking Status Former   Smokeless Tobacco Never   Tobacco Comments    quit 40 years ago       Who shops? patient   Who cooks/cooking methods/Eating out/take " out habits   patient    Once or twice per month - Chinese food, Pizza   Exercise: None     Other: ie: Sleep habits/ stress level/ work habits household-lives with ?/ food security Sleep: 4-5 hours  Stress: 5-7, normal stressors, health concerns  Work: None  Food security: Good   Prior Nutritional Counseling? [x]Yes     []No  When: Last 3 years     Why: DM Nutrition Education         Diet Hx:  Breakfast:    Light & Fit yogurt with fiber supplement  Corn squares  1 egg - scrambled  1 slice whole wheat toast with cream cheese  1 cup coffee with sugar free creamer - 1 Tbsp    Lunch:    Stir kline with Ramen noodles with frozen vegetables, and chicken thigh   1 Mandarin orange     Dinner:    Turkey burger with bun and BBQ sauce  [place] and onion rings    Snacks:    2 cups seltzers  2 cups water  1 package mini popcorn   Baby carrots with hummus   1 Cakester    Other Notes/ Initial Assessment: Manju presents with concerns about CKD Stage 3b and T2DM with her  at visit. States she has stress around what to eat and fear of beginning dialysis. Has hx of Pancreatic cancer with chemotherapy and a Whipple procedure. Reports she takes all of her medications and began following with Nephrology recently. Upon this visit, she was 95# with previous weight of 100#, she reports continually losing weight. After a review of a detailed 3-day dietary recall brought in by patient, she consumes 3 meals and 2 snacks daily with all food groups present. The family cooks with [place]obo seasoning, consumes beans in a sauce, and appears to have ~9-12 ounces of meat on some days, substitutions and alternatives were discussed in depth. We reviewed NIDDK handouts including sodium, protein, phosphorus, and potassium nutrition information. Emphasized that moderate protein is desired with ranges given as well as review of specific foods and portion sizes. Discussed reading food labels to identify phosphorus additives, and the importance of hydration  "was also mentioned. Manju and her  Andrea had many questions about current dietary intake and DM guidelines, all answered to the best of my ability. Teachback method was used and also review of specific food labels of food commonly purchased by patient. Handout given included NCM - NIDDK protein, sodium, phosphorus, and potassium guidelines. In addition, the Healthy Portions DM Booklet and CKD overview handout was given with notes and highlighted areas. Will follow up in March.       Updated assessment (Follow up note only):           Nutrition Diagnosis:   Food and nutrition related knowledge deficit  related to Lack of prior exposure to accurate nutrition related information as evidenced by New medical diagnosis or change in existing diagnosis or condition       Any change or new dx since previous visit:     Nutrition Diagnosis:         Medical Nutrition Therapy Intervention:  []Individualized Meal Plan [x]Understanding Lab Values: CKD-related lab values as available.    []Basic Pathophysiology of Disease []Food/Medication Interactions   []Food Diary []Exercise   [x]Lifestyle/Behavior Modification Techniques: Maintaining adequate intake due to pt's fear of \"consuming the wrong foods.\" Discussed the importance of eating 3 meals daily and not skipping, and how metabolism is affected. []Medication, Mechanism of Action   [x]Label Reading: CHO/Phos/Na/protein/K: Discussed in depth with NIDDK handouts and what foods to choose more/less often with portion sizes mentioned.  []Self Blood Glucose Monitoring   [x]Weight/BMI Goals: gain/lose/maintain: Manju has been losing weight ongoing per pt, maintain or gain as a goal. States weight prior to Pancreatic cancer was around 120#.  []Other -           Comprehension: []Excellent  []Very Good  [x]Good  []Fair   []Poor    Receptivity: []Excellent  []Very Good  [x]Good  []Fair   []Poor    Expected Compliance: []Excellent  []Very Good  [x]Good  []Fair   []Poor        Goals " "(initial)/ Progress made on previous goals/new goals:  1. Manju will meet goal of 1.5 G - 2 GM sodium daily for 4-5 days per week by our next visit.    2. Manju will consume 64 oz water daily for 4-5 days per week by our next visit.    3. Manju will read nutrition labels when food shopping (Na, Phos additives, etc.) 2-3 x by our next visit.        No follow-ups on file.  Labs:  CMP  Lab Results   Component Value Date    K 3.7 09/25/2023     09/25/2023    CO2 30 09/25/2023    BUN 11 09/25/2023    CREATININE 1.16 09/25/2023    GLUCOSE 153 (H) 10/20/2020    GLUF 156 (H) 09/25/2023    CALCIUM 9.7 09/25/2023    CORRECTEDCA 9.5 03/19/2021    AST 23 09/25/2023    ALT 17 09/25/2023    ALKPHOS 73 09/25/2023    EGFR 47 09/25/2023       BMP  Lab Results   Component Value Date    GLUCOSE 153 (H) 10/20/2020    CALCIUM 9.7 09/25/2023    K 3.7 09/25/2023    CO2 30 09/25/2023     09/25/2023    BUN 11 09/25/2023    CREATININE 1.16 09/25/2023       Lipids  No results found for: \"CHOL\"  Lab Results   Component Value Date    HDL 34 (L) 07/14/2023    HDL 45 (L) 01/12/2023    HDL 36 (L) 10/11/2021     Lab Results   Component Value Date    LDLCALC 72 07/14/2023    LDLCALC 94 01/12/2023    LDLCALC 67 10/11/2021     Lab Results   Component Value Date    TRIG 212 (H) 07/14/2023    TRIG 313 (H) 01/12/2023    TRIG 398 (H) 10/11/2021     No results found for: \"CHOLHDL\"    Hemoglobin A1C  Lab Results   Component Value Date    HGBA1C 6.0 (H) 07/14/2023       Fasting Glucose  Lab Results   Component Value Date    GLUF 156 (H) 09/25/2023       Insulin     Thyroid  No results found for: \"TSH\", \"O2UJUUQ\", \"K5SUPVO\", \"THYROIDAB\"    Hepatic Function Panel  Lab Results   Component Value Date    ALT 17 09/25/2023    AST 23 09/25/2023    ALKPHOS 73 09/25/2023       Celiac Disease Antibody Panel  No results found for: \"ENDOMYSIAL IGA\", \"GLIADIN IGA\", \"GLIADIN IGG\", \"IGA\", \"TISSUE TRANSGLUT AB\", \"TTG IGA\"   Iron  No results found for: \"IRON\", " "\"TIBC\", \"FERRITIN\"         Padmini Acosta RD  [facility]  [address] [place] [address]  JAGDISH BREWER [phone]    "

## 2024-01-08 ENCOUNTER — TELEPHONE (OUTPATIENT)
Dept: NEPHROLOGY | Facility: CLINIC | Age: 69
End: 2024-01-08

## 2024-01-08 NOTE — TELEPHONE ENCOUNTER
Good Morning    Dr. Esteban    Patient of yours called the office requesting to speak with you. As per patient she stated that  she saw the Dietician Padmini some time in December and Padmini the Nutritionist put patient on a Kidney diet and she has been in a Kidney diet for a couple of weeks already  as per patient she stated that she  is losing weight fast. Patient is concern    Patient contact number is [phone]    Thank you

## 2024-02-14 ENCOUNTER — APPOINTMENT (OUTPATIENT)
Dept: LAB | Facility: HOSPITAL | Age: 69
End: 2024-02-14
Payer: MEDICARE

## 2024-02-14 DIAGNOSIS — C25.9 PANCREATIC ADENOCARCINOMA (HCC): ICD-10-CM

## 2024-02-14 DIAGNOSIS — E46 PROTEIN-CALORIE MALNUTRITION, UNSPECIFIED SEVERITY (HCC): ICD-10-CM

## 2024-02-14 DIAGNOSIS — K90.3 PANCREATIC STEATORRHEA: ICD-10-CM

## 2024-02-14 LAB
25(OH)D3 SERPL-MCNC: 47.5 NG/ML (ref 30–100)
ALBUMIN SERPL BCP-MCNC: 4.1 G/DL (ref 3.5–5)
ALP SERPL-CCNC: 74 U/L (ref 34–104)
ALT SERPL W P-5'-P-CCNC: 16 U/L (ref 7–52)
ANION GAP SERPL CALCULATED.3IONS-SCNC: 5 MMOL/L
AST SERPL W P-5'-P-CCNC: 21 U/L (ref 13–39)
BACTERIA UR QL AUTO: ABNORMAL /HPF
BASOPHILS # BLD AUTO: 0.03 THOUSANDS/ÂΜL (ref 0–0.1)
BASOPHILS NFR BLD AUTO: 1 % (ref 0–1)
BILIRUB SERPL-MCNC: 0.66 MG/DL (ref 0.2–1)
BILIRUB UR QL STRIP: NEGATIVE
BUN SERPL-MCNC: 12 MG/DL (ref 5–25)
CALCIUM SERPL-MCNC: 9.5 MG/DL (ref 8.4–10.2)
CHLORIDE SERPL-SCNC: 102 MMOL/L (ref 96–108)
CLARITY UR: CLEAR
CO2 SERPL-SCNC: 29 MMOL/L (ref 21–32)
COLOR UR: ABNORMAL
CREAT SERPL-MCNC: 1.02 MG/DL (ref 0.6–1.3)
CREAT UR-MCNC: 94.6 MG/DL
EOSINOPHIL # BLD AUTO: 0.09 THOUSAND/ÂΜL (ref 0–0.61)
EOSINOPHIL NFR BLD AUTO: 2 % (ref 0–6)
ERYTHROCYTE [DISTWIDTH] IN BLOOD BY AUTOMATED COUNT: 13 % (ref 11.6–15.1)
FERRITIN SERPL-MCNC: 46 NG/ML (ref 11–307)
FOLATE SERPL-MCNC: >22.3 NG/ML
GFR SERPL CREATININE-BSD FRML MDRD: 55 ML/MIN/1.73SQ M
GLUCOSE P FAST SERPL-MCNC: 140 MG/DL (ref 65–99)
GLUCOSE UR STRIP-MCNC: ABNORMAL MG/DL
HCT VFR BLD AUTO: 48 % (ref 34.8–46.1)
HGB BLD-MCNC: 15.6 G/DL (ref 11.5–15.4)
HGB UR QL STRIP.AUTO: NEGATIVE
IMM GRANULOCYTES # BLD AUTO: 0.01 THOUSAND/UL (ref 0–0.2)
IMM GRANULOCYTES NFR BLD AUTO: 0 % (ref 0–2)
IRON SATN MFR SERPL: 24 % (ref 15–50)
IRON SERPL-MCNC: 73 UG/DL (ref 50–212)
KETONES UR STRIP-MCNC: NEGATIVE MG/DL
LEUKOCYTE ESTERASE UR QL STRIP: ABNORMAL
LYMPHOCYTES # BLD AUTO: 1.13 THOUSANDS/ÂΜL (ref 0.6–4.47)
LYMPHOCYTES NFR BLD AUTO: 21 % (ref 14–44)
MCH RBC QN AUTO: 31.2 PG (ref 26.8–34.3)
MCHC RBC AUTO-ENTMCNC: 32.5 G/DL (ref 31.4–37.4)
MCV RBC AUTO: 96 FL (ref 82–98)
MONOCYTES # BLD AUTO: 0.44 THOUSAND/ÂΜL (ref 0.17–1.22)
MONOCYTES NFR BLD AUTO: 8 % (ref 4–12)
NEUTROPHILS # BLD AUTO: 3.66 THOUSANDS/ÂΜL (ref 1.85–7.62)
NEUTS SEG NFR BLD AUTO: 68 % (ref 43–75)
NITRITE UR QL STRIP: NEGATIVE
NON-SQ EPI CELLS URNS QL MICRO: ABNORMAL /HPF
NRBC BLD AUTO-RTO: 0 /100 WBCS
PH UR STRIP.AUTO: 6 [PH]
PHOSPHATE SERPL-MCNC: 4.1 MG/DL (ref 2.3–4.1)
PLATELET # BLD AUTO: 191 THOUSANDS/UL (ref 149–390)
PMV BLD AUTO: 10.9 FL (ref 8.9–12.7)
POTASSIUM SERPL-SCNC: 3.8 MMOL/L (ref 3.5–5.3)
PROT SERPL-MCNC: 6.8 G/DL (ref 6.4–8.4)
PROT UR STRIP-MCNC: NEGATIVE MG/DL
PROT UR-MCNC: 9 MG/DL
PROT/CREAT UR: 0.1 MG/G{CREAT} (ref 0–0.1)
PTH-INTACT SERPL-MCNC: 32.3 PG/ML (ref 12–88)
RBC # BLD AUTO: 5 MILLION/UL (ref 3.81–5.12)
RBC #/AREA URNS AUTO: ABNORMAL /HPF
SODIUM SERPL-SCNC: 136 MMOL/L (ref 135–147)
SP GR UR STRIP.AUTO: 1.01 (ref 1–1.03)
TIBC SERPL-MCNC: 299 UG/DL (ref 250–450)
UIBC SERPL-MCNC: 226 UG/DL (ref 155–355)
UROBILINOGEN UR STRIP-ACNC: <2 MG/DL
VIT B12 SERPL-MCNC: 919 PG/ML (ref 180–914)
WBC # BLD AUTO: 5.36 THOUSAND/UL (ref 4.31–10.16)
WBC #/AREA URNS AUTO: ABNORMAL /HPF

## 2024-02-14 PROCEDURE — 83550 IRON BINDING TEST: CPT

## 2024-02-14 PROCEDURE — 82607 VITAMIN B-12: CPT

## 2024-02-14 PROCEDURE — 82746 ASSAY OF FOLIC ACID SERUM: CPT

## 2024-02-14 PROCEDURE — 86301 IMMUNOASSAY TUMOR CA 19-9: CPT

## 2024-02-14 PROCEDURE — 83540 ASSAY OF IRON: CPT

## 2024-02-14 PROCEDURE — 83918 ORGANIC ACIDS TOTAL QUANT: CPT

## 2024-02-14 PROCEDURE — 36415 COLL VENOUS BLD VENIPUNCTURE: CPT

## 2024-02-14 PROCEDURE — 82728 ASSAY OF FERRITIN: CPT

## 2024-02-15 LAB — CANCER AG19-9 SERPL-ACNC: <2 U/ML (ref 0–35)

## 2024-02-20 ENCOUNTER — OFFICE VISIT (OUTPATIENT)
Dept: HEMATOLOGY ONCOLOGY | Facility: CLINIC | Age: 69
End: 2024-02-20
Payer: MEDICARE

## 2024-02-20 ENCOUNTER — TELEPHONE (OUTPATIENT)
Dept: SURGICAL ONCOLOGY | Facility: CLINIC | Age: 69
End: 2024-02-20

## 2024-02-20 VITALS
TEMPERATURE: 97.8 F | RESPIRATION RATE: 16 BRPM | DIASTOLIC BLOOD PRESSURE: 72 MMHG | OXYGEN SATURATION: 99 % | SYSTOLIC BLOOD PRESSURE: 136 MMHG | HEIGHT: 59 IN | BODY MASS INDEX: 17.64 KG/M2 | WEIGHT: 87.5 LBS | HEART RATE: 66 BPM

## 2024-02-20 DIAGNOSIS — K90.3 PANCREATIC STEATORRHEA: ICD-10-CM

## 2024-02-20 DIAGNOSIS — C25.9 PANCREATIC ADENOCARCINOMA (HCC): Primary | ICD-10-CM

## 2024-02-20 DIAGNOSIS — N18.32 STAGE 3B CHRONIC KIDNEY DISEASE (HCC): ICD-10-CM

## 2024-02-20 DIAGNOSIS — D75.1 POLYCYTHEMIA: ICD-10-CM

## 2024-02-20 DIAGNOSIS — E46 PROTEIN-CALORIE MALNUTRITION, UNSPECIFIED SEVERITY (HCC): ICD-10-CM

## 2024-02-20 LAB — METHYLMALONATE SERPL-SCNC: 342 NMOL/L (ref 0–378)

## 2024-02-20 PROCEDURE — 99215 OFFICE O/P EST HI 40 MIN: CPT | Performed by: PHYSICIAN ASSISTANT

## 2024-02-20 PROCEDURE — G2211 COMPLEX E/M VISIT ADD ON: HCPCS | Performed by: PHYSICIAN ASSISTANT

## 2024-02-20 NOTE — TELEPHONE ENCOUNTER
Spoke to patient and made her aware that CT on 3/29 was moved to 12pm to accommodate pre and post hydration. Infusion center is holding a chair for 8am. She verbalized understanding that she will arrive at the infusion center, go to have the CT, and then go back to the infusion center.

## 2024-02-20 NOTE — PROGRESS NOTES
[address] [facility] [place]  [address] [facility] [address]  [place] [address]  Oncology Progress Note  Manju Holland, 1955, [id number]  7/18/2023    Assessment/Plan:   1. Pancreatic adenocarcinoma (HCC); 2. Pancreatic steatorrhea  10/20/2020: Stage IIB (ypT2, pN1, cM0)   Stage prefix: Post-therapy  Response to neoadjuvant therapy: Partial response  Total positive nodes: 1  Total nodes examined: 14  Histologic grade (G): G2  Histologic grading system: 3 grade system  Residual tumor (R): R0 - None  Tumor size (mm): 23  Lymph-vascular invasion (LVI): LVI not present (absent)/not identified  Carbohydrate antigen 19-9 (CA 19-9) (U/mL): 61    This is a 68-year-old female with history of the above.  Patient completed therapy in April 2021.  Patient has been under observation with surgical oncology as well as medical oncology.  Patient continues to get CT imaging through surgical oncology and has a CT scan scheduled for March 2024.  CA 19-9's have been stable.  No increase in this tumor marker.  Patient is sensitive to CA 19-9 w/ dx pf 61, presently less than 0.2.      Blood work does not demonstrate any prolonged effects from chemotherapy/radiation.  We will continue to monitor the patient closely.  Follow-up in 6 months.     - Iron Panel (Includes Ferritin, Iron Sat%, Iron, and TIBC); Future  - CBC and differential; Future  - Folate; Future  - Vitamin B12; Future  - Iron Panel (Includes Ferritin, Iron Sat%, Iron, and TIBC); Future  - Folate; Future  - Vitamin B12; Future    3. Stage 3b chronic kidney disease (HCC); 4. Protein-calorie malnutrition, unspecified severity (HCC)  Patient oscillates between stage IIIb and stage IIIa kidney disease.  Present, eGFR is significantly improved.    Patient has been following a protein restricted diet.  Unfortunately, the patient has lost 15 pounds.  Patient has concerns for malabsorptive condition connected to Whipple procedure/surgical  resection of pancreatic mass.  We discussed protein restriction in a way that balances the patient's nutritional needs for activity as well as help with underlying kidney disease.  Patient is seeing a nephrologist as well as a nutritionist to discuss further.  A 15 pound weight loss over the past several months secondary to severe calorie restriction is not ideal in this patient's history.  I have told the patient to monitor protein through animal products.       Copy of my note will be sent to nephrology.    Of note: Patient's ferritin is less than 100 however, iron saturation is sufficient.  I recommend observation with repeat iron panel in 3 months as well as in 6 months.  If patient's kidney functions remain stable and iron does not increase, IV iron should be considered.    - Iron Panel (Includes Ferritin, Iron Sat%, Iron, and TIBC); Future  - CBC and differential; Future  - Iron Panel (Includes Ferritin, Iron Sat%, Iron, and TIBC); Future    5. Polycythemia  Relative.   Recheck nonfasting.    - CBC and differential; Future    The patient is scheduled for follow-up in approximately 6 months.     Patient voiced agreement and understanding to the above.   Patient knows to call the Hematology/Oncology office with any questions and concerns regarding the above.    Goals and Barriers:    Current Goal:   Prolong Survival from Cancer.     Barriers: None.      Patient's Capacity to Self Care:  Patient able to self care.    Eboni Macias PA-C  Medical Oncology/Hematology  [facility]  ______________________________________________________________________________________________________________    Subjective     Chief Complaint   Patient presents with    Follow-up       History of present illness/Cancer History:   Oncology History   History of pancreatic cancer (Resolved)   Encounter for follow-up surveillance of pancreatic cancer   Pancreatic adenocarcinoma (HCC)   5/29/2020 Initial  Diagnosis    Patient had new onset of painless jaundice and right upper quadrant this underwent ultrasound of the gallbladder which demonstrated a 2 x 2 centimeter soft tissue mass in the head of the pancreas with a dilated common bile duct.    5/29/2020 CT chest abdomen and pelvis with contrast   Suspected mass at the pancreatic head measuring approximately 2.4 cm resulting in distal biliary obstruction.  Differential considerations include pancreatic adenocarcinoma.    6/1/2020 MRI ABDw w/o:  1.  1.8 x 2.1 cm solid mass in the pancreatic head with dilatation of the main pancreatic duct.  2.  Obstruction of the CBD causing intrahepatic and extrahepatic bile duct dilatation.  3.  No evidence of peripancreatic lymphadenopathy or other intra-abdominal metastases.       6/2/2020 Biopsy    EUS- Dr. Jett:  Pancreas, uncinate mass:      - Malignant (PSC Category VI)      - Compatible with adenocarcinoma with mucinous features.     6/30/2020 - 8/10/2020 Chemotherapy    Folfirinox + neulasta  Completed 3 cycles before becoming intolarant of irinotecan- infusion reaction.        8/11/2020 - 9/10/2020 Chemotherapy    Neoadjuvant Folfox + neulasta  x 3 more cycles.        10/20/2020 Surgery    Whipple:  - Residual invasive adenocarcinoma with mucinous features, 2.3 cm.  - Metastatic carcinoma present in one of fourteen lymph nodes (1/14).  - All margins are negative for tumor.        10/20/2020 -  Cancer Staged    Staging form: Pancreas, AJCC 8th Edition  - Pathologic stage from 10/20/2020: Stage IIB (ypT2, pN1, cM0) - Signed by Eboni Macias PA-C on 7/21/2023  Stage prefix: Post-therapy  Response to neoadjuvant therapy: Partial response  Total positive nodes: 1  Total nodes examined: 14  Histologic grade (G): G2  Histologic grading system: 3 grade system  Residual tumor (R): R0 - None  Tumor size (mm): 23  Lymph-vascular invasion (LVI): LVI not present (absent)/not identified  Carbohydrate antigen 19-9 (CA 19-9) (U/mL):  61       12/21/2020 - 1/26/2021 Radiation    5000 cGy in 25 fractions to high risk areas  Dr Aden     2/8/2021 - 4/4/2021 Chemotherapy    Folfox + Neulasta  X 4 more cycles.      4/5/2021 Remission    CT CAP  1. No evidence of metastatic disease in the thorax.  Long segment of mild esophageal wall thickening which should be correlated for suspicion of esophagitis.  2. Inflammatory changes /small amount of fluid noted in the region of the nicky hepatis and surgical bed without evidence to suggest enlarging pancreatic head mass.  The pancreatic duct is no longer dilated.  3. Stomach is no longer distended.  Continued findings suggesting nonspecific inflammatory changes in the region of the gastrojejunal anastomosis  4. Hepatic steatosis.  Hepatic hypodense nodule likely fluid within a fissure which should be carefully reassessed during follow-up.  No discrete evidence of hepatic metastasis  5. Trace amount of perihepatic ascites.  No suspicious adenopathy    CA 19-9 trends:  Presurgery: 9/5/2020-61  Post surgery/post chemo: 4/10/2021-23 8/12/2021: 2  12/11/2021: 3  2/17/22-7/14/2023: <2           Lab Results   Component Value Date    WBC 5.36 02/14/2024    HGB 15.6 (H) 02/14/2024    HCT 48.0 (H) 02/14/2024    MCV 96 02/14/2024     02/14/2024     Lab Results   Component Value Date    SODIUM 136 02/14/2024    K 3.8 02/14/2024     02/14/2024    CO2 29 02/14/2024    AGAP 5 02/14/2024    BUN 12 02/14/2024    CREATININE 1.02 02/14/2024    GLUC 239 (H) 01/16/2021    GLUF 140 (H) 02/14/2024    CALCIUM 9.5 02/14/2024    AST 21 02/14/2024    ALT 16 02/14/2024    ALKPHOS 74 02/14/2024    TP 6.8 02/14/2024    TBILI 0.66 02/14/2024    EGFR 55 02/14/2024     CA 19-9   Date Value Ref Range Status   02/14/2024 <2 0 - 35 U/mL Final     Comment:     [employer] Electrochemiluminescence Immunoassay (ECLIA)  Values obtained with different assay methods or kits cannot be  used interchangeably.  Results cannot be  interpreted as absolute  evidence of the presence or absence of malignant disease.   09/11/2023 <2 0 - 35 U/mL Final     Comment:     [employer] Electrochemiluminescence Immunoassay (ECLIA)  Values obtained with different assay methods or kits cannot be  used interchangeably.  Results cannot be interpreted as absolute  evidence of the presence or absence of malignant disease.   07/14/2023 <2 0 - 35 U/mL Final     Comment:     [employer] Electrochemiluminescence Immunoassay (ECLIA)  Values obtained with different assay methods or kits cannot be  used interchangeably.  Results cannot be interpreted as absolute  evidence of the presence or absence of malignant disease.   03/03/2023 <2 0 - 35 U/mL Final     Comment:     [employer] Electrochemiluminescence Immunoassay (ECLIA)  Values obtained with different assay methods or kits cannot be  used interchangeably.  Results cannot be interpreted as absolute  evidence of the presence or absence of malignant disease.       Interval history: 15 lb weight loss! Trying due to diet changes as recommended through nephrology of which the patient if following VERY STRICTLY. Calorie def. Still exercising.    Review of Systems   Constitutional:  Positive for activity change and unexpected weight change. Negative for appetite change, fatigue and fever.   HENT:  Negative for nosebleeds.    Respiratory:  Negative for cough, choking and shortness of breath.         Negative hemoptysis.   Cardiovascular:  Negative for chest pain, palpitations and leg swelling.   Gastrointestinal:  Negative for abdominal distention, abdominal pain, anal bleeding, blood in stool, constipation, diarrhea, nausea and vomiting.   Endocrine: Negative.  Negative for cold intolerance.   Genitourinary: Negative.  Negative for hematuria, menstrual problem, vaginal bleeding, vaginal discharge and vaginal pain.   Musculoskeletal: Negative.  Negative for arthralgias, myalgias, neck pain and neck  stiffness.   Skin: Negative.  Negative for color change, pallor and rash.   Allergic/Immunologic: Negative.  Negative for immunocompromised state.   Neurological: Negative.  Negative for weakness and headaches.   Hematological:  Negative for adenopathy. Does not bruise/bleed easily.   Psychiatric/Behavioral:  Positive for decreased concentration.    All other systems reviewed and are negative.      Current Outpatient Medications:     cholecalciferol (VITAMIN D3) 1,000 units tablet, Take 5,000 Units by mouth daily, Disp: , Rfl:     cyanocobalamin 1,000 mcg/mL, Inject 1,000 mcg into a muscle every 30 (thirty) days, Disp: , Rfl:     dicyclomine (BENTYL) 20 mg tablet, TAKE 1 TABLET BY MOUTH TWICE A DAY (Patient taking differently: as needed), Disp: 180 tablet, Rfl: 1    diphenhydrAMINE (BENADRYL) 25 mg capsule, Take 25 mg by mouth every 6 (six) hours as needed for itching, Disp: , Rfl:     Empagliflozin (Jardiance) 10 MG TABS, Take 10 mg by mouth every morning, Disp: , Rfl:     ergocalciferol (VITAMIN D2) 50,000 units, Take 50,000 Units by mouth every 14 (fourteen) days, Disp: , Rfl:     gabapentin (NEURONTIN) 300 mg capsule, Take 300 mg by mouth 3 (three) times a day, Disp: , Rfl:     LORazepam (ATIVAN) 0.5 mg tablet, Take 1 tablet (0.5 mg total) by mouth 2 (two) times a day as needed for anxiety NO FURTHER REFILLS WITHOUT CLINIC VISIT WITH PALLIATIVE CARE, Disp: 30 tablet, Rfl: 0    omeprazole (PriLOSEC) 20 mg delayed release capsule, TAKE 1 CAPSULE BY MOUTH TWICE A DAY, Disp: 180 capsule, Rfl: 1    OneTouch Verio test strip, daily Test, Disp: , Rfl:     prochlorperazine (COMPAZINE) 10 mg tablet, Take 1 tablet (10 mg total) by mouth every 6 (six) hours as needed for nausea or vomiting, Disp: 45 tablet, Rfl: 3    amLODIPine (NORVASC) 2.5 mg tablet, Take 2.5 mg by mouth daily, Disp: , Rfl:   Allergies   Allergen Reactions    Betadine [Povidone Iodine] Rash     Also itching from betadine    Other Throat Swelling     Pt  "unsure which chemotherapy drug caused tongue swelling and locked jaw. Please review in epic notes Folfirinox or possible Neulasta. NEED to review       Advance Directive and Living Will:            Objective   /72 (BP Location: Right arm, Patient Position: Sitting, Cuff Size: Adult)   Pulse 66   Temp 97.8 °F (36.6 °C) (Temporal)   Resp 16   Ht 4' 11\" (1.499 m)   Wt 39.7 kg (87 lb 8 oz)   SpO2 99%   BMI 17.67 kg/m²   Wt Readings from Last 6 Encounters:   02/20/24 39.7 kg (87 lb 8 oz)   12/21/23 43.1 kg (94 lb 14.5 oz)   10/18/23 45.4 kg (100 lb)   09/27/23 45.4 kg (100 lb)   09/25/23 45.5 kg (100 lb 6.4 oz)   07/18/23 45.8 kg (101 lb)     Physical Exam  Constitutional:       General: She is not in acute distress.     Appearance: She is well-developed.   HENT:      Head: Normocephalic and atraumatic.   Eyes:      General: No scleral icterus.     Pupils: Pupils are equal, round, and reactive to light.   Cardiovascular:      Rate and Rhythm: Normal rate and regular rhythm.      Heart sounds: No murmur heard.  Pulmonary:      Effort: Pulmonary effort is normal. No respiratory distress.   Skin:     General: Skin is warm.      Coloration: Skin is not pale.      Findings: No rash.   Neurological:      Mental Status: She is alert and oriented to person, place, and time.   Psychiatric:         Thought Content: Thought content normal.       Pertinent Laboratory Results and Imaging Review:  Appointment on 02/14/2024   Component Date Value Ref Range Status    CA 19-9 02/14/2024 <2  0 - 35 U/mL Final    Roche [employer]ostics Electrochemiluminescence Immunoassay (ECLIA)  Values obtained with different assay methods or kits cannot be  used interchangeably.  Results cannot be interpreted as absolute  evidence of the presence or absence of malignant disease.    Methylmalonic Acid, S 02/14/2024 342  0 - 378 nmol/L Final    Vitamin B-12 02/14/2024 919 (H)  180 - 914 pg/mL Final    Folate 02/14/2024 >22.3  >5.9 ng/mL Final    " The World Health Organization has determined deficient folate concentrations are considered to be <4.0 ng/mL.    Iron Saturation 2024 24  15 - 50 % Final    TIBC 2024 299  250 - 450 ug/dL Final    Iron 2024 73  50 - 212 ug/dL Final    Patients treated with metal-binding drugs (ie. Deferoxamine) may have depressed iron values.    UIBC 2024 226  155 - 355 ug/dL Final    Ferritin 2024 46  11 - 307 ng/mL Final         The following historical data was reviewed:  Past Medical History:   Diagnosis Date    BRCA1 negative     BRCA2 negative     Chemotherapy induced neutropenia  2/10/2021    Chemotherapy induced neutropenia  2/10/2021    Diabetes mellitus (HCC)     Hypertension     Lactic acidosis 2020    Neuropathy     Obstructive jaundice 2020    Pancreas cancer (HCC)     Port-A-Cath in place 2020     Past Surgical History:   Procedure Laterality Date    BREAST BIOPSY Left     neg    BREAST SURGERY Left     calcifications     SECTION      CHOLECYSTECTOMY N/A 10/20/2020    Procedure: CHOLECYSTECTOMY;  Surgeon: Burton Kapoor MD;  Location: [facility];  Service: Surgical Oncology    COLONOSCOPY      ECTOPIC PREGNANCY SURGERY      partial ovary removed    FL GUIDED CENTRAL VENOUS ACCESS DEVICE INSERTION  2020    JOINT REPLACEMENT Bilateral     LAPAROTOMY N/A 10/20/2020    Procedure: LAPAROTOMY EXPLORATORY; INTRAOPERATIVE ULTRASOUND;  Surgeon: Burton Kapoor MD;  Location: [facility];  Service: Surgical Oncology    MANDIBLE FRACTURE SURGERY      NOSE SURGERY      deviated septum    REPLACEMENT TOTAL HIP W/  RESURFACING IMPLANTS Bilateral     right hip done 2012; left hip done 2012    TONSILLECTOMY      TUNNELED VENOUS PORT PLACEMENT Left 2020    Procedure: INSERTION VENOUS PORT (PORT-A-CATH), left;  Surgeon: Burton Kapoor MD;  Location: [facility];  Service: Surgical Oncology    WHIPPLE PROCEDURE/PANCREATICO-DUODENECTOMY  N/A 10/20/2020    Procedure: WHIPPLE PROCEDURE/PANCREATICO-DUODENECTOMY;  Surgeon: Burton Kapoor MD;  Location: [facility];  Service: Surgical Oncology     Social History     Socioeconomic History    Marital status: /Civil Union     Spouse name: Not on file    Number of children: Not on file    Years of education: Not on file    Highest education level: Not on file   Occupational History    Not on file   Tobacco Use    Smoking status: Former    Smokeless tobacco: Never    Tobacco comments:     quit 40 years ago   Vaping Use    Vaping status: Never Used   Substance and Sexual Activity    Alcohol use: Yes     Alcohol/week: 1.0 standard drink of alcohol     Types: 1 Glasses of wine per week     Comment: WINE OCCASIONALLY    Drug use: Never    Sexual activity: Not Currently   Other Topics Concern    Not on file   Social History Narrative    Not on file     Social Determinants of Health     Financial Resource Strain: Low Risk  (2/14/2024)    Received from [facility]    Overall Financial Resource Strain (CARDIA)     Difficulty of Paying Living Expenses: Not hard at all   Food Insecurity: No Food Insecurity (2/14/2024)    Received from [facility]    Hunger Vital Sign     Worried About Running Out of Food in the Last Year: Never true     Ran Out of Food in the Last Year: Never true   Transportation Needs: No Transportation Needs (2/14/2024)    Received from [facility]    PRAPARE - Transportation     Lack of Transportation (Medical): No     Lack of Transportation (Non-Medical): No   Physical Activity: Not on file   Stress: Stress Concern Present (2/14/2024)    Received from [facility]    [place] [employer] - Occupational Stress Questionnaire     Feeling of Stress : To some extent   Social Connections: Not on file   Intimate Partner Violence: Not At Risk (2/14/2024)    Received from [facility]     Humiliation, Afraid, Rape, and Kick questionnaire     Fear of Current or Ex-Partner: No     Emotionally Abused: No     Physically Abused: No     Sexually Abused: No   Housing Stability: Low Risk  (2/14/2024)    Received from [facility]    Housing Stability Vital Sign     Unable to Pay for Housing in the Last Year: No     Number of Places Lived in the Last Year: 1     Unstable Housing in the Last Year: No     Family History   Problem Relation Age of Onset    Diabetes Mother     Heart disease Mother     Heart disease Father     Breast cancer additional onset Sister     Breast cancer Sister 54    Stroke Maternal Grandfather     Breast cancer additional onset Maternal Aunt     Breast cancer Maternal Aunt     Colon cancer Neg Hx     Ovarian cancer Neg Hx     Uterine cancer Neg Hx     Cervical cancer Neg Hx        Please note:  This report has been generated by a voice recognition software system. Therefore there may be syntax, spelling, and/or grammatical errors. Please call if you have any questions.

## 2024-02-20 NOTE — PATIENT INSTRUCTIONS
[facility] Medical Oncology and Hematology Team  [place] - [phone]    Your Team Members:  Advanced Practitioner:  Eboni Macias PA-C  Oncology Nurse:   JOANA Serra ([phone]) M-F 8am - 4:30pm    Please answer Private and Unavailable Calls - this may be your team(s) contacting you.  If you have medical questions/concerns/issues - contact us either by (1) My Chart (2) [facility]

## 2024-02-21 ENCOUNTER — TELEPHONE (OUTPATIENT)
Dept: SURGICAL ONCOLOGY | Facility: CLINIC | Age: 69
End: 2024-02-21

## 2024-02-21 RX ORDER — SODIUM CHLORIDE 9 MG/ML
150 INJECTION, SOLUTION INTRAVENOUS CONTINUOUS
OUTPATIENT
Start: 2024-03-29

## 2024-02-21 NOTE — TELEPHONE ENCOUNTER
Spoke to patient and let her know that MO infusion would like her to arrive at 7:30am on 3/29. She will bring her barium with her and knows to drink the first bottle before 9am.

## 2024-03-04 ENCOUNTER — APPOINTMENT (OUTPATIENT)
Dept: LAB | Facility: HOSPITAL | Age: 69
End: 2024-03-04
Payer: MEDICARE

## 2024-03-04 DIAGNOSIS — D75.1 POLYCYTHEMIA: ICD-10-CM

## 2024-03-04 LAB
BASOPHILS # BLD AUTO: 0.02 THOUSANDS/ÂΜL (ref 0–0.1)
BASOPHILS NFR BLD AUTO: 0 % (ref 0–1)
EOSINOPHIL # BLD AUTO: 0.05 THOUSAND/ÂΜL (ref 0–0.61)
EOSINOPHIL NFR BLD AUTO: 1 % (ref 0–6)
ERYTHROCYTE [DISTWIDTH] IN BLOOD BY AUTOMATED COUNT: 13.2 % (ref 11.6–15.1)
HCT VFR BLD AUTO: 45 % (ref 34.8–46.1)
HGB BLD-MCNC: 14.6 G/DL (ref 11.5–15.4)
IMM GRANULOCYTES # BLD AUTO: 0.01 THOUSAND/UL (ref 0–0.2)
IMM GRANULOCYTES NFR BLD AUTO: 0 % (ref 0–2)
LYMPHOCYTES # BLD AUTO: 1.45 THOUSANDS/ÂΜL (ref 0.6–4.47)
LYMPHOCYTES NFR BLD AUTO: 25 % (ref 14–44)
MCH RBC QN AUTO: 31.1 PG (ref 26.8–34.3)
MCHC RBC AUTO-ENTMCNC: 32.4 G/DL (ref 31.4–37.4)
MCV RBC AUTO: 96 FL (ref 82–98)
MONOCYTES # BLD AUTO: 0.5 THOUSAND/ÂΜL (ref 0.17–1.22)
MONOCYTES NFR BLD AUTO: 9 % (ref 4–12)
NEUTROPHILS # BLD AUTO: 3.77 THOUSANDS/ÂΜL (ref 1.85–7.62)
NEUTS SEG NFR BLD AUTO: 65 % (ref 43–75)
NRBC BLD AUTO-RTO: 0 /100 WBCS
PLATELET # BLD AUTO: 227 THOUSANDS/UL (ref 149–390)
PMV BLD AUTO: 10.5 FL (ref 8.9–12.7)
RBC # BLD AUTO: 4.69 MILLION/UL (ref 3.81–5.12)
WBC # BLD AUTO: 5.8 THOUSAND/UL (ref 4.31–10.16)

## 2024-03-04 PROCEDURE — 36415 COLL VENOUS BLD VENIPUNCTURE: CPT

## 2024-03-04 PROCEDURE — 85025 COMPLETE CBC W/AUTO DIFF WBC: CPT

## 2024-03-05 ENCOUNTER — TELEPHONE (OUTPATIENT)
Dept: NEPHROLOGY | Facility: CLINIC | Age: 69
End: 2024-03-05

## 2024-03-05 NOTE — TELEPHONE ENCOUNTER
I called Gurwinder @ [phone] to verify eligibility for a patient . Spoke with Shilpa and as per Shilpa patient has been active since 03/01/2027 with Medicare supplement plan G.. Reference number for this call is [id number]

## 2024-03-06 ENCOUNTER — OFFICE VISIT (OUTPATIENT)
Dept: NEPHROLOGY | Facility: CLINIC | Age: 69
End: 2024-03-06
Payer: MEDICARE

## 2024-03-06 VITALS
BODY MASS INDEX: 17.94 KG/M2 | HEART RATE: 73 BPM | WEIGHT: 89 LBS | HEIGHT: 59 IN | OXYGEN SATURATION: 99 % | SYSTOLIC BLOOD PRESSURE: 126 MMHG | DIASTOLIC BLOOD PRESSURE: 76 MMHG | RESPIRATION RATE: 16 BRPM | TEMPERATURE: 96.9 F

## 2024-03-06 DIAGNOSIS — E11.9 TYPE 2 DIABETES MELLITUS WITHOUT COMPLICATION, WITHOUT LONG-TERM CURRENT USE OF INSULIN (HCC): ICD-10-CM

## 2024-03-06 DIAGNOSIS — N18.31 STAGE 3A CHRONIC KIDNEY DISEASE (HCC): Primary | ICD-10-CM

## 2024-03-06 DIAGNOSIS — N18.4 STAGE 4 CHRONIC KIDNEY DISEASE (HCC): ICD-10-CM

## 2024-03-06 PROCEDURE — G2211 COMPLEX E/M VISIT ADD ON: HCPCS | Performed by: INTERNAL MEDICINE

## 2024-03-06 PROCEDURE — 99214 OFFICE O/P EST MOD 30 MIN: CPT | Performed by: INTERNAL MEDICINE

## 2024-03-06 NOTE — PROGRESS NOTES
NEPHROLOGY PROGRESS NOTE    Patient: Manju Holland               Sex: female          DOA: No admission date for patient encounter.   YOB: 1955        Age:  68 y.o.          3/6/2024        BACKGROUND     Patient is a 68 years old female with past medical history of stage II pancreatic cancer, hypertension, diabetes mellitus type 2, neuropathy, chronic kidney disease stage IIIa who has been following up with the renal office since 2023      SUBJECTIVE     Patient presents renal office after 5 months.  She has been drinking more water on a daily basis.  Patient was seen by dietitian who asked her to ingest no more than 40 g of protein daily.  Patient states that in doing so she has lost weight.  She had also restricted herself from eating diet rich in phosphorus and potassium.  Patient has been getting CT scans with IV contrast though has been receiving pre and post contrast fluids    REVIEW OF SYSTEMS     Review of Systems   Constitutional: Negative.    HENT: Negative.     Eyes: Negative.    Respiratory: Negative.     Cardiovascular: Negative.    Gastrointestinal: Negative.    Endocrine: Negative.    Genitourinary: Negative.    Musculoskeletal: Negative.    Skin: Negative.    Allergic/Immunologic: Negative.    Neurological: Negative.    Hematological: Negative.    All other systems reviewed and are negative.      OBJECTIVE     Current Weight: Weight - Scale: 40.4 kg (89 lb)  Vitals:    03/06/24 1427   BP: 126/76   Pulse: 73   Resp: 16   Temp: (!) 96.9 °F (36.1 °C)   SpO2: 99%     Body mass index is 17.98 kg/m².      CURRENT MEDICATIONS       Current Outpatient Medications:     amLODIPine (NORVASC) 2.5 mg tablet, Take 2.5 mg by mouth daily, Disp: , Rfl:     cholecalciferol (VITAMIN D3) 1,000 units tablet, Take 5,000 Units by mouth 2 (two) times a day, Disp: , Rfl:     cyanocobalamin 1,000 mcg/mL, Inject 1,000 mcg into a muscle every 2 (two) months, Disp: , Rfl:     dicyclomine (BENTYL) 20 mg  tablet, TAKE 1 TABLET BY MOUTH TWICE A DAY (Patient taking differently: as needed), Disp: 180 tablet, Rfl: 1    diphenhydrAMINE (BENADRYL) 25 mg capsule, Take 25 mg by mouth every 6 (six) hours as needed for itching, Disp: , Rfl:     Empagliflozin (Jardiance) 10 MG TABS, Take 10 mg by mouth every morning, Disp: , Rfl:     ergocalciferol (VITAMIN D2) 50,000 units, Take 50,000 Units by mouth every 14 (fourteen) days, Disp: , Rfl:     gabapentin (NEURONTIN) 300 mg capsule, Take 300 mg by mouth 3 (three) times a day, Disp: , Rfl:     LORazepam (ATIVAN) 0.5 mg tablet, Take 1 tablet (0.5 mg total) by mouth 2 (two) times a day as needed for anxiety NO FURTHER REFILLS WITHOUT CLINIC VISIT WITH PALLIATIVE CARE, Disp: 30 tablet, Rfl: 0    omeprazole (PriLOSEC) 20 mg delayed release capsule, TAKE 1 CAPSULE BY MOUTH TWICE A DAY, Disp: 180 capsule, Rfl: 1    OneTouch Verio test strip, daily Test, Disp: , Rfl:     prochlorperazine (COMPAZINE) 10 mg tablet, Take 1 tablet (10 mg total) by mouth every 6 (six) hours as needed for nausea or vomiting, Disp: 45 tablet, Rfl: 3      PHYSICAL EXAMINATION     Physical Exam  Constitutional:       Appearance: She is well-developed.   HENT:      Head: Normocephalic and atraumatic.   Eyes:      Pupils: Pupils are equal, round, and reactive to light.   Cardiovascular:      Rate and Rhythm: Normal rate and regular rhythm.      Heart sounds: Normal heart sounds.   Pulmonary:      Effort: Pulmonary effort is normal.   Abdominal:      General: Bowel sounds are normal.      Palpations: Abdomen is soft.   Musculoskeletal:         General: Normal range of motion.      Cervical back: Neck supple.   Skin:     General: Skin is warm.   Neurological:      Mental Status: She is alert and oriented to person, place, and time.           LAB RESULTS     Results from last 7 days   Lab Units 03/04/24  1408   WBC Thousand/uL 5.80   HEMOGLOBIN g/dL 14.6   HEMATOCRIT % 45.0   PLATELETS Thousands/uL 227            RADIOLOGY RESULTS      IMPRESSION:     No signs of metastatic disease in the chest, abdomen or pelvis.     Probable gastritis.     Limited evaluation of the pelvis secondary to advanced streak artifact from bilateral hip prostheses.    ASSESSMENT/PLAN     68 years old female with past medical history of stage II pancreatic cancer, chronic kidney disease stage IIIa, hypertension, diabetes mellitus type 2, neuropathy who is following up with the renal office    1.  Chronic kidney disease stage IIIa: Labs obtained recently revealing serum creatinine 1.02 mg/dL eGFR 55 and stable    2.  Nutrition: Recommended she increase her protein intake on a daily basis up to 60 to 80 g daily.  There should be no restriction in her intake of potassium or phosphorus given improved renal function    3.  Pancreatic cancer: Patient has been undergoing CT scans every 6 months to evaluate for recurrence.  No signs of worsening metastatic disease.    4.  Low vitamin D levels: Patient is noted to have 25 OH vitamin D level of 47 and improved.    5.  Diabetes mellitus type 2: On Jardiance with improved hemoglobin A1c noted.    6.  Proteinuria: No signs of proteinuria noted            Hoang Esteban MD  Nephrology  3/6/2024

## 2024-03-25 ENCOUNTER — APPOINTMENT (OUTPATIENT)
Dept: LAB | Facility: HOSPITAL | Age: 69
End: 2024-03-25
Payer: MEDICARE

## 2024-03-25 DIAGNOSIS — Z85.07 HISTORY OF PANCREATIC CANCER: ICD-10-CM

## 2024-03-25 LAB
BUN SERPL-MCNC: 10 MG/DL (ref 5–25)
CREAT SERPL-MCNC: 0.94 MG/DL (ref 0.6–1.3)
GFR SERPL CREATININE-BSD FRML MDRD: 61 ML/MIN/1.73SQ M

## 2024-03-25 PROCEDURE — 36415 COLL VENOUS BLD VENIPUNCTURE: CPT

## 2024-03-25 PROCEDURE — 82565 ASSAY OF CREATININE: CPT

## 2024-03-25 PROCEDURE — 86301 IMMUNOASSAY TUMOR CA 19-9: CPT

## 2024-03-25 PROCEDURE — 84520 ASSAY OF UREA NITROGEN: CPT

## 2024-03-26 LAB — CANCER AG19-9 SERPL-ACNC: <2 U/ML (ref 0–35)

## 2024-03-29 ENCOUNTER — HOSPITAL ENCOUNTER (OUTPATIENT)
Dept: INFUSION CENTER | Facility: CLINIC | Age: 69
End: 2024-03-29
Payer: MEDICARE

## 2024-03-29 ENCOUNTER — HOSPITAL ENCOUNTER (OUTPATIENT)
Dept: CT IMAGING | Facility: HOSPITAL | Age: 69
End: 2024-03-29
Payer: MEDICARE

## 2024-03-29 VITALS
SYSTOLIC BLOOD PRESSURE: 141 MMHG | HEART RATE: 72 BPM | TEMPERATURE: 98.2 F | RESPIRATION RATE: 18 BRPM | DIASTOLIC BLOOD PRESSURE: 67 MMHG

## 2024-03-29 DIAGNOSIS — Z85.07 ENCOUNTER FOR FOLLOW-UP SURVEILLANCE OF PANCREATIC CANCER: Primary | ICD-10-CM

## 2024-03-29 DIAGNOSIS — Z85.07 HISTORY OF PANCREATIC CANCER: ICD-10-CM

## 2024-03-29 DIAGNOSIS — Z08 ENCOUNTER FOR FOLLOW-UP SURVEILLANCE OF PANCREATIC CANCER: Primary | ICD-10-CM

## 2024-03-29 DIAGNOSIS — N18.32 STAGE 3B CHRONIC KIDNEY DISEASE (HCC): ICD-10-CM

## 2024-03-29 PROCEDURE — G1004 CDSM NDSC: HCPCS

## 2024-03-29 PROCEDURE — 71260 CT THORAX DX C+: CPT

## 2024-03-29 PROCEDURE — 74177 CT ABD & PELVIS W/CONTRAST: CPT

## 2024-03-29 RX ORDER — SODIUM CHLORIDE 9 MG/ML
150 INJECTION, SOLUTION INTRAVENOUS CONTINUOUS
Status: DISPENSED | OUTPATIENT
Start: 2024-03-29 | End: 2024-03-29

## 2024-03-29 RX ORDER — SODIUM CHLORIDE 9 MG/ML
150 INJECTION, SOLUTION INTRAVENOUS CONTINUOUS
Status: CANCELLED | OUTPATIENT
Start: 2024-03-29

## 2024-03-29 RX ADMIN — IOHEXOL 100 ML: 350 INJECTION, SOLUTION INTRAVENOUS at 11:43

## 2024-03-29 RX ADMIN — SODIUM CHLORIDE 150 ML/HR: 0.9 INJECTION, SOLUTION INTRAVENOUS at 07:56

## 2024-03-29 RX ADMIN — SODIUM CHLORIDE 150 ML/HR: 0.9 INJECTION, SOLUTION INTRAVENOUS at 11:58

## 2024-03-29 NOTE — PROGRESS NOTES
Patient arrives to infusion center for pre and post CT scan hydration. PIV established, patient tolerated entire infusion without complication. PIV removed. AVS declined.     Next appointment: no future appointments, pt will schedule when next CT is scheduled in 6 months.

## 2024-04-12 PROBLEM — Z85.07 PERSONAL HISTORY OF PANCREATIC CANCER: Status: ACTIVE | Noted: 2023-07-21

## 2024-04-15 ENCOUNTER — OFFICE VISIT (OUTPATIENT)
Dept: SURGICAL ONCOLOGY | Facility: CLINIC | Age: 69
End: 2024-04-15
Payer: MEDICARE

## 2024-04-15 VITALS
BODY MASS INDEX: 17.62 KG/M2 | DIASTOLIC BLOOD PRESSURE: 60 MMHG | HEIGHT: 59 IN | SYSTOLIC BLOOD PRESSURE: 124 MMHG | HEART RATE: 65 BPM | TEMPERATURE: 97.8 F | WEIGHT: 87.4 LBS | OXYGEN SATURATION: 98 %

## 2024-04-15 DIAGNOSIS — Z85.07 ENCOUNTER FOR FOLLOW-UP SURVEILLANCE OF PANCREATIC CANCER: Primary | ICD-10-CM

## 2024-04-15 DIAGNOSIS — Z85.07 PERSONAL HISTORY OF PANCREATIC CANCER: ICD-10-CM

## 2024-04-15 DIAGNOSIS — Z08 ENCOUNTER FOR FOLLOW-UP SURVEILLANCE OF PANCREATIC CANCER: Primary | ICD-10-CM

## 2024-04-15 PROCEDURE — 99214 OFFICE O/P EST MOD 30 MIN: CPT | Performed by: SURGERY

## 2024-04-15 NOTE — PROGRESS NOTES
Surgical Oncology Follow Up       [facility] [place]  [address] [facility] [address]  [phone]    Manju Gabrielnovas  1955  [id number]  [facility] [place]  [address] [facility] [address]  [phone]    Chief Complaint   Patient presents with    Follow-up       Assessment/Plan:    No problem-specific Assessment & Plan notes found for this encounter.       Diagnoses and all orders for this visit:    Encounter for follow-up surveillance of pancreatic cancer    Personal history of pancreatic cancer      Advance Care Planning/Advance Directives:  Discussed disease status, cancer treatment plans and/or cancer treatment goals with the patient.     Oncology History   History of pancreatic cancer (Resolved)   Encounter for follow-up surveillance of pancreatic cancer   Personal history of pancreatic cancer   5/29/2020 Initial Diagnosis    Patient had new onset of painless jaundice and right upper quadrant this underwent ultrasound of the gallbladder which demonstrated a 2 x 2 centimeter soft tissue mass in the head of the pancreas with a dilated common bile duct.    5/29/2020 CT chest abdomen and pelvis with contrast   Suspected mass at the pancreatic head measuring approximately 2.4 cm resulting in distal biliary obstruction.  Differential considerations include pancreatic adenocarcinoma.    6/1/2020 MRI ABDw w/o:  1.  1.8 x 2.1 cm solid mass in the pancreatic head with dilatation of the main pancreatic duct.  2.  Obstruction of the CBD causing intrahepatic and extrahepatic bile duct dilatation.  3.  No evidence of peripancreatic lymphadenopathy or other intra-abdominal metastases.       6/2/2020 Biopsy    EUS- Dr. Jett:  Pancreas, uncinate mass:      - Malignant (PSC Category VI)      - Compatible with adenocarcinoma with mucinous features.     6/30/2020 - 8/10/2020  Chemotherapy    Folfirinox + neulasta  Completed 3 cycles before becoming intolarant of irinotecan- infusion reaction.        8/11/2020 - 9/10/2020 Chemotherapy    Neoadjuvant Folfox + neulasta  x 3 more cycles.        10/20/2020 Surgery    Whipple:  - Residual invasive adenocarcinoma with mucinous features, 2.3 cm.  - Metastatic carcinoma present in one of fourteen lymph nodes (1/14).  - All margins are negative for tumor.        10/20/2020 -  Cancer Staged    Staging form: Pancreas, AJCC 8th Edition  - Pathologic stage from 10/20/2020: Stage IIB (ypT2, pN1, cM0) - Signed by Eboni Macias PA-C on 7/21/2023  Stage prefix: Post-therapy  Response to neoadjuvant therapy: Partial response  Total positive nodes: 1  Total nodes examined: 14  Histologic grade (G): G2  Histologic grading system: 3 grade system  Residual tumor (R): R0 - None  Tumor size (mm): 23  Lymph-vascular invasion (LVI): LVI not present (absent)/not identified  Carbohydrate antigen 19-9 (CA 19-9) (U/mL): 61       12/21/2020 - 1/26/2021 Radiation    5000 cGy in 25 fractions to high risk areas  Dr Aden     2/8/2021 - 4/4/2021 Chemotherapy    Folfox + Neulasta  X 4 more cycles.      4/5/2021 Remission    CT CAP  1. No evidence of metastatic disease in the thorax.  Long segment of mild esophageal wall thickening which should be correlated for suspicion of esophagitis.  2. Inflammatory changes /small amount of fluid noted in the region of the nicky hepatis and surgical bed without evidence to suggest enlarging pancreatic head mass.  The pancreatic duct is no longer dilated.  3. Stomach is no longer distended.  Continued findings suggesting nonspecific inflammatory changes in the region of the gastrojejunal anastomosis  4. Hepatic steatosis.  Hepatic hypodense nodule likely fluid within a fissure which should be carefully reassessed during follow-up.  No discrete evidence of hepatic metastasis  5. Trace amount of perihepatic ascites.  No suspicious  adenopathy    CA 19-9 trends:  Presurgery: 9/5/2020-61  Post surgery/post chemo: 4/10/2021-23  8/12/2021: 2  12/11/2021: 3  2/17/22-7/14/2023: <2         History of Present Illness: 69-year-old woman status post Whipple procedure 3 and half years ago for pancreas cancer here for surveillance visit.   -Interval History:  She is recently diagnosed with renal disease and is on a renal diet.  She is lost weight try to balance renal diet with a low-carb diet given her prediabetic state.  CAT scan and blood work done in his patient today's visit.    Review of Systems:  Review of Systems   Constitutional:  Positive for fatigue and unexpected weight change.   HENT: Negative.     Eyes: Negative.    Respiratory: Negative.     Cardiovascular: Negative.    Gastrointestinal: Negative.    Endocrine: Negative.    Genitourinary: Negative.    Musculoskeletal: Negative.    Skin: Negative.    Allergic/Immunologic: Negative.    Neurological: Negative.    Hematological: Negative.    Psychiatric/Behavioral: Negative.     All other systems reviewed and are negative.      Patient Active Problem List   Diagnosis    Hyperlipidemia    Essential hypertension    Type 2 diabetes mellitus without complication, without long-term current use of insulin (HCC)    Pruritus    Transaminitis    Therapeutic opioid-induced constipation (OIC)    Anxiety    Functional diarrhea    Poor appetite    Encounter for follow-up surveillance of pancreatic cancer    Mild protein-calorie malnutrition (HCC)    Encounter for removal of tunneled central venous catheter (CVC) with port    Hip joint replacement status    Personal history of pancreatic cancer    Stage 3b chronic kidney disease (HCC)    Stage 4 chronic kidney disease (HCC)     Past Medical History:   Diagnosis Date    BRCA1 negative     BRCA2 negative     Chemotherapy induced neutropenia (HCC) 2/10/2021    Chemotherapy induced neutropenia (HCC) 2/10/2021    Diabetes mellitus (HCC)     Hypertension     Lactic  acidosis 2020    Neuropathy     Obstructive jaundice 2020    Pancreas cancer (HCC)     Port-A-Cath in place 2020     Past Surgical History:   Procedure Laterality Date    BREAST BIOPSY Left 2009    neg    BREAST SURGERY Left     calcifications     SECTION      CHOLECYSTECTOMY N/A 10/20/2020    Procedure: CHOLECYSTECTOMY;  Surgeon: Burton Kapoor MD;  Location: [facility];  Service: Surgical Oncology    COLONOSCOPY      ECTOPIC PREGNANCY SURGERY      partial ovary removed    FL GUIDED CENTRAL VENOUS ACCESS DEVICE INSERTION  2020    JOINT REPLACEMENT Bilateral     LAPAROTOMY N/A 10/20/2020    Procedure: LAPAROTOMY EXPLORATORY; INTRAOPERATIVE ULTRASOUND;  Surgeon: Burton Kapoor MD;  Location: [facility];  Service: Surgical Oncology    MANDIBLE FRACTURE SURGERY      NOSE SURGERY      deviated septum    REPLACEMENT TOTAL HIP W/  RESURFACING IMPLANTS Bilateral     right hip done 2012; left hip done 2012    TONSILLECTOMY      TUNNELED VENOUS PORT PLACEMENT Left 2020    Procedure: INSERTION VENOUS PORT (PORT-A-CATH), left;  Surgeon: Burton Kapoor MD;  Location: [facility];  Service: Surgical Oncology    WHIPPLE PROCEDURE/PANCREATICO-DUODENECTOMY N/A 10/20/2020    Procedure: WHIPPLE PROCEDURE/PANCREATICO-DUODENECTOMY;  Surgeon: Burton Kapoor MD;  Location: [facility];  Service: Surgical Oncology     Family History   Problem Relation Age of Onset    Diabetes Mother     Heart disease Mother     Heart disease Father     Breast cancer additional onset Sister     Breast cancer Sister 54    Stroke Maternal Grandfather     Breast cancer additional onset Maternal Aunt     Breast cancer Maternal Aunt     Colon cancer Neg Hx     Ovarian cancer Neg Hx     Uterine cancer Neg Hx     Cervical cancer Neg Hx      Social History     Socioeconomic History    Marital status: /Civil Union     Spouse name: Not on file    Number of children: Not on file    Years  of education: Not on file    Highest education level: Not on file   Occupational History    Not on file   Tobacco Use    Smoking status: Former    Smokeless tobacco: Never    Tobacco comments:     quit 40 years ago   Vaping Use    Vaping status: Never Used   Substance and Sexual Activity    Alcohol use: Yes     Alcohol/week: 1.0 standard drink of alcohol     Types: 1 Glasses of wine per week     Comment: WINE OCCASIONALLY    Drug use: Never    Sexual activity: Not Currently   Other Topics Concern    Not on file   Social History Narrative    Not on file     Social Determinants of Health     Financial Resource Strain: Low Risk  (2/14/2024)    Received from [facility]    Overall Financial Resource Strain (CARDIA)     Difficulty of Paying Living Expenses: Not hard at all   Food Insecurity: No Food Insecurity (2/14/2024)    Received from [facility]    Hunger Vital Sign     Worried About Running Out of Food in the Last Year: Never true     Ran Out of Food in the Last Year: Never true   Transportation Needs: No Transportation Needs (2/14/2024)    Received from [facility]    PRAPARE - Transportation     Lack of Transportation (Medical): No     Lack of Transportation (Non-Medical): No   Physical Activity: Not on file   Stress: Stress Concern Present (2/14/2024)    Received from [facility]    [place] [employer] - Occupational Stress Questionnaire     Feeling of Stress : To some extent   Social Connections: Feeling Socially Integrated (2/14/2024)    Received from [facility]    OASIS : Social Isolation     How often do you feel lonely or isolated from those around you?: Never   Intimate Partner Violence: Not At Risk (2/14/2024)    Received from [facility]    Humiliation, Afraid, Rape, and Kick questionnaire     Fear of Current or Ex-Partner: No     Emotionally Abused: No     Physically Abused:  No     Sexually Abused: No   Housing Stability: Low Risk  (2/14/2024)    Received from [facility]    Housing Stability Vital Sign     Unable to Pay for Housing in the Last Year: No     Number of Places Lived in the Last Year: 1     Unstable Housing in the Last Year: No       Current Outpatient Medications:     amLODIPine (NORVASC) 2.5 mg tablet, Take 2.5 mg by mouth daily, Disp: , Rfl:     cholecalciferol (VITAMIN D3) 1,000 units tablet, Take 5,000 Units by mouth 2 (two) times a day, Disp: , Rfl:     cyanocobalamin 1,000 mcg/mL, Inject 1,000 mcg into a muscle every 2 (two) months, Disp: , Rfl:     dicyclomine (BENTYL) 20 mg tablet, TAKE 1 TABLET BY MOUTH TWICE A DAY (Patient taking differently: as needed), Disp: 180 tablet, Rfl: 1    diphenhydrAMINE (BENADRYL) 25 mg capsule, Take 25 mg by mouth every 6 (six) hours as needed for itching, Disp: , Rfl:     Empagliflozin (Jardiance) 10 MG TABS, Take 10 mg by mouth every morning, Disp: , Rfl:     ergocalciferol (VITAMIN D2) 50,000 units, Take 50,000 Units by mouth every 14 (fourteen) days, Disp: , Rfl:     gabapentin (NEURONTIN) 300 mg capsule, Take 300 mg by mouth 3 (three) times a day, Disp: , Rfl:     LORazepam (ATIVAN) 0.5 mg tablet, Take 1 tablet (0.5 mg total) by mouth 2 (two) times a day as needed for anxiety NO FURTHER REFILLS WITHOUT CLINIC VISIT WITH PALLIATIVE CARE, Disp: 30 tablet, Rfl: 0    omeprazole (PriLOSEC) 20 mg delayed release capsule, TAKE 1 CAPSULE BY MOUTH TWICE A DAY, Disp: 180 capsule, Rfl: 1    OneTouch Verio test strip, daily Test, Disp: , Rfl:     prochlorperazine (COMPAZINE) 10 mg tablet, Take 1 tablet (10 mg total) by mouth every 6 (six) hours as needed for nausea or vomiting, Disp: 45 tablet, Rfl: 3  Allergies   Allergen Reactions    Betadine [Povidone Iodine] Rash     Also itching from betadine    Other Throat Swelling     Pt unsure which chemotherapy drug caused tongue swelling and locked jaw. Please review in epic notes  Folfirinox or possible Neulasta. NEED to review     Vitals:    04/15/24 1058   BP: 124/60   Pulse: 65   Temp: 97.8 °F (36.6 °C)   SpO2: 98%       Physical Exam  Vitals reviewed.   Constitutional:       Appearance: Normal appearance.   HENT:      Head: Atraumatic.      Right Ear: External ear normal.      Left Ear: External ear normal.   Eyes:      Extraocular Movements: Extraocular movements intact.   Cardiovascular:      Rate and Rhythm: Regular rhythm.      Pulses: Normal pulses.      Heart sounds: Normal heart sounds.   Pulmonary:      Breath sounds: Normal breath sounds.   Abdominal:      General: Abdomen is flat. There is no distension.      Palpations: Abdomen is soft. There is no mass.      Tenderness: There is no abdominal tenderness. There is no guarding or rebound.      Hernia: No hernia is present.   Musculoskeletal:         General: Normal range of motion.      Cervical back: Normal range of motion and neck supple.   Skin:     General: Skin is warm and dry.   Neurological:      General: No focal deficit present.      Mental Status: She is alert and oriented to person, place, and time.   Psychiatric:         Mood and Affect: Mood normal.         Behavior: Behavior normal.           Results:  Labs:  Component  Ref Range & Units 3/25/24  7:17 AM 2/14/24  7:02 AM 9/11/23  7:38 AM 7/14/23  7:23 AM 3/3/23  8:08 AM 1/12/23  7:53 AM 9/7/22  7:58 AM   CA 19-9  0 - 35 U/mL <2             Imaging  CT chest abdomen pelvis w contrast    Result Date: 4/5/2024  Narrative: CT CHEST, ABDOMEN AND PELVIS WITH IV CONTRAST INDICATION: Z85.07: Personal history of malignant neoplasm of pancreas. COMPARISON: 9/18/2023 TECHNIQUE: CT examination of the chest, abdomen and pelvis was performed. In addition to portal venous phase postcontrast scanning through the abdomen and pelvis, delayed phase postcontrast scanning was performed through the upper abdominal viscera. Multiplanar 2D reformatted images were created from the source  data. This examination, like all CT scans performed in the [facility], was performed utilizing techniques to minimize radiation dose exposure, including the use of iterative reconstruction and automated exposure control. Radiation dose length product (DLP) for this visit: 1404 mGy-cm IV Contrast: 100 mL of iohexol (OMNIPAQUE) Enteric Contrast: Administered. FINDINGS: CHEST LUNGS: Lungs are clear. No tracheal or endobronchial lesion. PLEURA: Unremarkable. HEART/GREAT VESSELS: Heart is unremarkable for patient's age. No thoracic aortic aneurysm. MEDIASTINUM AND SANDRA: There is severe diffuse esophageal thickening, similar to the prior study. There is no mediastinal or hilar lymphadenopathy CHEST WALL AND LOWER NECK: Incidental discovery of one or more thyroid nodule(s) measuring less than 1.5 cm and without suspicious features is noted in this patient who is above 35 years old; according to guidelines published in the February 2015 white paper on incidental thyroid nodules in the Journal of the American College of Radiology (JACR), no further evaluation is recommended. . ABDOMEN LIVER/BILIARY TREE: There is pneumobilia. Otherwise unremarkable. GALLBLADDER: Post cholecystectomy. SPLEEN: Unremarkable. PANCREAS: The patient is status post Whipple. The remaining pancreas is atrophic. There is gas in the pancreatic duct consistent with surgery. No evidence of recurrent neoplasm. ADRENAL GLANDS: Unremarkable. KIDNEYS/URETERS: Unremarkable. No hydronephrosis. STOMACH AND BOWEL: Multiple foci of short segment narrowing throughout the descending and sigmoid colon are likely areas of peristalsis. There is mild fecal stasis. APPENDIX: No findings to suggest appendicitis. ABDOMINOPELVIC CAVITY: No ascites. No pneumoperitoneum. No lymphadenopathy. VESSELS: Unremarkable for patient's age. PELVIS Mildly limited by beam hardening artifact from bilateral hip arthroplasties. REPRODUCTIVE ORGANS: Unremarkable for  patient's age. URINARY BLADDER: Unremarkable. ABDOMINAL WALL/INGUINAL REGIONS: Unremarkable. BONES: No acute fracture or suspicious osseous lesion.     Impression: 1.  No evidence of metastatic disease in the chest, abdomen, or pelvis. 2.  Severe diffuse esophageal thickening, similar to the prior study. Clinical correlation for esophagitis recommended. 3.  Fecal stasis. Workstation performed: [id number]     I reviewed the above laboratory and imaging data.    Discussion/Summary: Status post Whipple procedure 3 Years ago.  Doing well.  She has had weight loss which may be renal related.  I recommended that she consider Creon or pancreatic enzyme replacement given atrophic pancreas on scan.  She states that she took this before but had belly pain afterwards.  She is not too keen on restarting this at this point.  I will plan to see him in 6 months with repeat blood work and CAT scan for surveillance.

## 2024-05-23 ENCOUNTER — APPOINTMENT (OUTPATIENT)
Dept: LAB | Facility: HOSPITAL | Age: 69
End: 2024-05-23
Payer: MEDICARE

## 2024-05-23 DIAGNOSIS — E46 PROTEIN-CALORIE MALNUTRITION, UNSPECIFIED SEVERITY (HCC): ICD-10-CM

## 2024-05-23 DIAGNOSIS — K90.3 PANCREATIC STEATORRHEA: ICD-10-CM

## 2024-05-23 DIAGNOSIS — N18.32 STAGE 3B CHRONIC KIDNEY DISEASE (HCC): ICD-10-CM

## 2024-05-23 LAB
BASOPHILS # BLD AUTO: 0.02 THOUSANDS/ÂΜL (ref 0–0.1)
BASOPHILS NFR BLD AUTO: 0 % (ref 0–1)
EOSINOPHIL # BLD AUTO: 0.04 THOUSAND/ÂΜL (ref 0–0.61)
EOSINOPHIL NFR BLD AUTO: 1 % (ref 0–6)
ERYTHROCYTE [DISTWIDTH] IN BLOOD BY AUTOMATED COUNT: 12.3 % (ref 11.6–15.1)
FERRITIN SERPL-MCNC: 41 NG/ML (ref 11–307)
FOLATE SERPL-MCNC: >22.3 NG/ML
HCT VFR BLD AUTO: 40.5 % (ref 34.8–46.1)
HGB BLD-MCNC: 13.4 G/DL (ref 11.5–15.4)
IMM GRANULOCYTES # BLD AUTO: 0.01 THOUSAND/UL (ref 0–0.2)
IMM GRANULOCYTES NFR BLD AUTO: 0 % (ref 0–2)
IRON SATN MFR SERPL: 18 % (ref 15–50)
IRON SERPL-MCNC: 45 UG/DL (ref 50–212)
LYMPHOCYTES # BLD AUTO: 1.23 THOUSANDS/ÂΜL (ref 0.6–4.47)
LYMPHOCYTES NFR BLD AUTO: 23 % (ref 14–44)
MCH RBC QN AUTO: 32.1 PG (ref 26.8–34.3)
MCHC RBC AUTO-ENTMCNC: 33.1 G/DL (ref 31.4–37.4)
MCV RBC AUTO: 97 FL (ref 82–98)
MONOCYTES # BLD AUTO: 0.53 THOUSAND/ÂΜL (ref 0.17–1.22)
MONOCYTES NFR BLD AUTO: 10 % (ref 4–12)
NEUTROPHILS # BLD AUTO: 3.43 THOUSANDS/ÂΜL (ref 1.85–7.62)
NEUTS SEG NFR BLD AUTO: 66 % (ref 43–75)
NRBC BLD AUTO-RTO: 0 /100 WBCS
PLATELET # BLD AUTO: 180 THOUSANDS/UL (ref 149–390)
PMV BLD AUTO: 10.1 FL (ref 8.9–12.7)
RBC # BLD AUTO: 4.18 MILLION/UL (ref 3.81–5.12)
TIBC SERPL-MCNC: 254 UG/DL (ref 250–450)
UIBC SERPL-MCNC: 209 UG/DL (ref 155–355)
VIT B12 SERPL-MCNC: 973 PG/ML (ref 180–914)
WBC # BLD AUTO: 5.26 THOUSAND/UL (ref 4.31–10.16)

## 2024-05-23 PROCEDURE — 82746 ASSAY OF FOLIC ACID SERUM: CPT

## 2024-05-23 PROCEDURE — 83540 ASSAY OF IRON: CPT

## 2024-05-23 PROCEDURE — 83550 IRON BINDING TEST: CPT

## 2024-05-23 PROCEDURE — 85025 COMPLETE CBC W/AUTO DIFF WBC: CPT

## 2024-05-23 PROCEDURE — 82607 VITAMIN B-12: CPT

## 2024-05-23 PROCEDURE — 82728 ASSAY OF FERRITIN: CPT

## 2024-05-23 PROCEDURE — 36415 COLL VENOUS BLD VENIPUNCTURE: CPT

## 2024-06-05 ENCOUNTER — TELEPHONE (OUTPATIENT)
Dept: SURGICAL ONCOLOGY | Facility: CLINIC | Age: 69
End: 2024-06-05

## 2024-06-05 RX ORDER — SODIUM CHLORIDE 9 MG/ML
150 INJECTION, SOLUTION INTRAVENOUS CONTINUOUS
OUTPATIENT
Start: 2024-06-05

## 2024-06-05 NOTE — TELEPHONE ENCOUNTER
Spoke to patient and made her aware that pre/post CT hydration was ordered and arranged for her. She needs to arrive at [facility] in [place] at 7:30 am on Oct 8, 2024. She verbalized understanding and thanks.

## 2024-06-12 DIAGNOSIS — K21.9 GASTROESOPHAGEAL REFLUX DISEASE WITHOUT ESOPHAGITIS: ICD-10-CM

## 2024-06-12 RX ORDER — OMEPRAZOLE 20 MG/1
20 CAPSULE, DELAYED RELEASE ORAL 2 TIMES DAILY
Qty: 180 CAPSULE | Refills: 0 | Status: SHIPPED | OUTPATIENT
Start: 2024-06-12

## 2024-07-09 ENCOUNTER — HOSPITAL ENCOUNTER (OUTPATIENT)
Age: 69
Discharge: HOME | End: 2024-07-09
Payer: MEDICARE

## 2024-07-09 VITALS — BODY MASS INDEX: 18.27 KG/M2 | HEIGHT: 58 IN

## 2024-07-09 DIAGNOSIS — Z78.0 ASYMPTOMATIC MENOPAUSAL STATE: ICD-10-CM

## 2024-07-09 PROCEDURE — 77080 DXA BONE DENSITY AXIAL: CPT

## 2024-08-14 ENCOUNTER — APPOINTMENT (OUTPATIENT)
Dept: LAB | Facility: HOSPITAL | Age: 69
End: 2024-08-14
Payer: MEDICARE

## 2024-08-14 DIAGNOSIS — C25.9 PANCREATIC ADENOCARCINOMA (HCC): ICD-10-CM

## 2024-08-14 DIAGNOSIS — N18.32 STAGE 3B CHRONIC KIDNEY DISEASE (HCC): ICD-10-CM

## 2024-08-14 DIAGNOSIS — E46 PROTEIN-CALORIE MALNUTRITION, UNSPECIFIED SEVERITY (HCC): ICD-10-CM

## 2024-08-14 DIAGNOSIS — K90.3 PANCREATIC STEATORRHEA: ICD-10-CM

## 2024-08-14 LAB
ALBUMIN SERPL BCG-MCNC: 3.7 G/DL (ref 3.5–5)
ALP SERPL-CCNC: 70 U/L (ref 34–104)
ALT SERPL W P-5'-P-CCNC: 22 U/L (ref 7–52)
ANION GAP SERPL CALCULATED.3IONS-SCNC: 8 MMOL/L (ref 4–13)
AST SERPL W P-5'-P-CCNC: 28 U/L (ref 13–39)
BASOPHILS # BLD AUTO: 0.03 THOUSANDS/ÂΜL (ref 0–0.1)
BASOPHILS NFR BLD AUTO: 1 % (ref 0–1)
BILIRUB SERPL-MCNC: 0.37 MG/DL (ref 0.2–1)
BUN SERPL-MCNC: 9 MG/DL (ref 5–25)
CALCIUM SERPL-MCNC: 8.8 MG/DL (ref 8.4–10.2)
CHLORIDE SERPL-SCNC: 99 MMOL/L (ref 96–108)
CO2 SERPL-SCNC: 28 MMOL/L (ref 21–32)
CREAT SERPL-MCNC: 0.93 MG/DL (ref 0.6–1.3)
EOSINOPHIL # BLD AUTO: 0.03 THOUSAND/ÂΜL (ref 0–0.61)
EOSINOPHIL NFR BLD AUTO: 1 % (ref 0–6)
ERYTHROCYTE [DISTWIDTH] IN BLOOD BY AUTOMATED COUNT: 12.3 % (ref 11.6–15.1)
FERRITIN SERPL-MCNC: 58 NG/ML (ref 11–307)
FOLATE SERPL-MCNC: >22.3 NG/ML
GFR SERPL CREATININE-BSD FRML MDRD: 62 ML/MIN/1.73SQ M
GLUCOSE P FAST SERPL-MCNC: 134 MG/DL (ref 65–99)
HCT VFR BLD AUTO: 45.6 % (ref 34.8–46.1)
HGB BLD-MCNC: 14.6 G/DL (ref 11.5–15.4)
IMM GRANULOCYTES # BLD AUTO: 0.02 THOUSAND/UL (ref 0–0.2)
IMM GRANULOCYTES NFR BLD AUTO: 0 % (ref 0–2)
IRON SATN MFR SERPL: 22 % (ref 15–50)
IRON SERPL-MCNC: 62 UG/DL (ref 50–212)
LYMPHOCYTES # BLD AUTO: 1.37 THOUSANDS/ÂΜL (ref 0.6–4.47)
LYMPHOCYTES NFR BLD AUTO: 24 % (ref 14–44)
MCH RBC QN AUTO: 31.9 PG (ref 26.8–34.3)
MCHC RBC AUTO-ENTMCNC: 32 G/DL (ref 31.4–37.4)
MCV RBC AUTO: 100 FL (ref 82–98)
MONOCYTES # BLD AUTO: 0.56 THOUSAND/ÂΜL (ref 0.17–1.22)
MONOCYTES NFR BLD AUTO: 10 % (ref 4–12)
NEUTROPHILS # BLD AUTO: 3.83 THOUSANDS/ÂΜL (ref 1.85–7.62)
NEUTS SEG NFR BLD AUTO: 64 % (ref 43–75)
NRBC BLD AUTO-RTO: 0 /100 WBCS
PLATELET # BLD AUTO: 226 THOUSANDS/UL (ref 149–390)
PMV BLD AUTO: 10.2 FL (ref 8.9–12.7)
POTASSIUM SERPL-SCNC: 4.7 MMOL/L (ref 3.5–5.3)
PROT SERPL-MCNC: 6.3 G/DL (ref 6.4–8.4)
RBC # BLD AUTO: 4.57 MILLION/UL (ref 3.81–5.12)
SODIUM SERPL-SCNC: 135 MMOL/L (ref 135–147)
TIBC SERPL-MCNC: 279 UG/DL (ref 250–450)
UIBC SERPL-MCNC: 217 UG/DL (ref 155–355)
VIT B12 SERPL-MCNC: 928 PG/ML (ref 180–914)
WBC # BLD AUTO: 5.84 THOUSAND/UL (ref 4.31–10.16)

## 2024-08-14 PROCEDURE — 83550 IRON BINDING TEST: CPT

## 2024-08-14 PROCEDURE — 82728 ASSAY OF FERRITIN: CPT

## 2024-08-14 PROCEDURE — 86301 IMMUNOASSAY TUMOR CA 19-9: CPT

## 2024-08-14 PROCEDURE — 36415 COLL VENOUS BLD VENIPUNCTURE: CPT

## 2024-08-14 PROCEDURE — 82607 VITAMIN B-12: CPT

## 2024-08-14 PROCEDURE — 83540 ASSAY OF IRON: CPT

## 2024-08-14 PROCEDURE — 85025 COMPLETE CBC W/AUTO DIFF WBC: CPT

## 2024-08-14 PROCEDURE — 82746 ASSAY OF FOLIC ACID SERUM: CPT

## 2024-08-14 PROCEDURE — 80053 COMPREHEN METABOLIC PANEL: CPT

## 2024-08-15 LAB — CANCER AG19-9 SERPL-ACNC: <2 U/ML (ref 0–35)

## 2024-08-20 ENCOUNTER — OFFICE VISIT (OUTPATIENT)
Dept: HEMATOLOGY ONCOLOGY | Facility: CLINIC | Age: 69
End: 2024-08-20
Payer: MEDICARE

## 2024-08-20 VITALS
WEIGHT: 87.5 LBS | HEIGHT: 58 IN | BODY MASS INDEX: 18.37 KG/M2 | DIASTOLIC BLOOD PRESSURE: 70 MMHG | SYSTOLIC BLOOD PRESSURE: 120 MMHG | TEMPERATURE: 98 F | OXYGEN SATURATION: 97 % | RESPIRATION RATE: 18 BRPM | HEART RATE: 65 BPM

## 2024-08-20 DIAGNOSIS — C25.9 PANCREATIC ADENOCARCINOMA (HCC): Primary | ICD-10-CM

## 2024-08-20 PROCEDURE — 99214 OFFICE O/P EST MOD 30 MIN: CPT | Performed by: PHYSICIAN ASSISTANT

## 2024-08-20 PROCEDURE — G2211 COMPLEX E/M VISIT ADD ON: HCPCS | Performed by: PHYSICIAN ASSISTANT

## 2024-08-20 NOTE — PROGRESS NOTES
[facility] [place]  [address] [facility] [place] [address]  Oncology Progress Note  Manju Holland, 1955, [id number]  7/18/2023    Assessment/Plan:   1. Pancreatic adenocarcinoma (HCC)  10/20/2020: Stage IIB (ypT2, pN1, cM0)   Stage prefix: Post-therapy  Response to neoadjuvant therapy: Partial response  Total positive nodes: 1  Total nodes examined: 14  Histologic grade (G): G2  Histologic grading system: 3 grade system  Residual tumor (R): R0 - None  Tumor size (mm): 23  Lymph-vascular invasion (LVI): LVI not present (absent)/not identified  Carbohydrate antigen 19-9 (CA 19-9) (U/mL): 61    This is a 69-year-old female with history of the above.  Patient completed therapy in April 2021.  Patient has been under observation with surgical oncology as well as medical oncology.  Patient continues to get CT imaging through surgical oncology and has a CT scan scheduled for March 2024.  CA 19-9's have been stable.  No increase in this tumor marker.  Patient is sensitive to CA 19-9 w/ dx pf 61, presently less than 0.2.      Blood work does not demonstrate any prolonged effects from chemotherapy/radiation.  We will continue to monitor the patient closely.  Follow-up in 6 months.     Discussed at length potential clinical trial at [facility]'s.  Given the patient's clinical course, it appears that she would be a candidate however, we do not have any NGS testing.  Patient understands that in order to be eligible for this vaccine trial she would have to have a KRAS mutation.  Patient would need the testing and I am unsure if it is covered by the clinical trial.  Patient will reach out if she decides she would like to pursue this measure.    - Cancer antigen 19-9; Future  - CBC and differential; Future  - Comprehensive metabolic panel; Future    The patient is scheduled for follow-up in approximately 6 months.     Patient voiced agreement and  understanding to the above.   Patient knows to call the Hematology/Oncology office with any questions and concerns regarding the above.    Goals and Barriers:    Current Goal:   Prolong Survival from Cancer.     Barriers: None.      Patient's Capacity to Self Care:  Patient able to self care.    Eboni Macias PA-C  Medical Oncology/Hematology  [facility]  ______________________________________________________________________________________________________________    Subjective     Chief Complaint   Patient presents with    Follow-up       History of present illness/Cancer History:   Oncology History   History of pancreatic cancer (Resolved)   Encounter for follow-up surveillance of pancreatic cancer   Personal history of pancreatic cancer   5/29/2020 Initial Diagnosis    Patient had new onset of painless jaundice and right upper quadrant this underwent ultrasound of the gallbladder which demonstrated a 2 x 2 centimeter soft tissue mass in the head of the pancreas with a dilated common bile duct.    5/29/2020 CT chest abdomen and pelvis with contrast   Suspected mass at the pancreatic head measuring approximately 2.4 cm resulting in distal biliary obstruction.  Differential considerations include pancreatic adenocarcinoma.    6/1/2020 MRI ABDw w/o:  1.  1.8 x 2.1 cm solid mass in the pancreatic head with dilatation of the main pancreatic duct.  2.  Obstruction of the CBD causing intrahepatic and extrahepatic bile duct dilatation.  3.  No evidence of peripancreatic lymphadenopathy or other intra-abdominal metastases.       6/2/2020 Biopsy    EUS- Dr. Jett:  Pancreas, uncinate mass:      - Malignant (PSC Category VI)      - Compatible with adenocarcinoma with mucinous features.     6/30/2020 - 8/10/2020 Chemotherapy    Folfirinox + neulasta  Completed 3 cycles before becoming intolarant of irinotecan- infusion reaction.        8/11/2020 - 9/10/2020 Chemotherapy    Neoadjuvant Folfox +  neulasta  x 3 more cycles.        10/20/2020 Surgery    Whipple:  - Residual invasive adenocarcinoma with mucinous features, 2.3 cm.  - Metastatic carcinoma present in one of fourteen lymph nodes (1/14).  - All margins are negative for tumor.        10/20/2020 -  Cancer Staged    Staging form: Pancreas, AJCC 8th Edition  - Pathologic stage from 10/20/2020: Stage IIB (ypT2, pN1, cM0) - Signed by Eboni Macias PA-C on 7/21/2023  Stage prefix: Post-therapy  Response to neoadjuvant therapy: Partial response  Total positive nodes: 1  Total nodes examined: 14  Histologic grade (G): G2  Histologic grading system: 3 grade system  Residual tumor (R): R0 - None  Tumor size (mm): 23  Lymph-vascular invasion (LVI): LVI not present (absent)/not identified  Carbohydrate antigen 19-9 (CA 19-9) (U/mL): 61       12/21/2020 - 1/26/2021 Radiation    5000 cGy in 25 fractions to high risk areas  Dr Aden     2/8/2021 - 4/4/2021 Chemotherapy    Folfox + Neulasta  X 4 more cycles.      4/5/2021 Remission    CT CAP  1. No evidence of metastatic disease in the thorax.  Long segment of mild esophageal wall thickening which should be correlated for suspicion of esophagitis.  2. Inflammatory changes /small amount of fluid noted in the region of the nicky hepatis and surgical bed without evidence to suggest enlarging pancreatic head mass.  The pancreatic duct is no longer dilated.  3. Stomach is no longer distended.  Continued findings suggesting nonspecific inflammatory changes in the region of the gastrojejunal anastomosis  4. Hepatic steatosis.  Hepatic hypodense nodule likely fluid within a fissure which should be carefully reassessed during follow-up.  No discrete evidence of hepatic metastasis  5. Trace amount of perihepatic ascites.  No suspicious adenopathy    CA 19-9 trends:  Presurgery: 9/5/2020-61  Post surgery/post chemo: 4/10/2021-23 8/12/2021: 2  12/11/2021: 3  2/17/22-7/14/2023: <2           Lab Results   Component Value Date     WBC 5.84 08/14/2024    HGB 14.6 08/14/2024    HCT 45.6 08/14/2024     (H) 08/14/2024     08/14/2024     Lab Results   Component Value Date    SODIUM 135 08/14/2024    K 4.7 08/14/2024    CL 99 08/14/2024    CO2 28 08/14/2024    AGAP 8 08/14/2024    BUN 9 08/14/2024    CREATININE 0.93 08/14/2024    GLUC 239 (H) 01/16/2021    GLUF 134 (H) 08/14/2024    CALCIUM 8.8 08/14/2024    AST 28 08/14/2024    ALT 22 08/14/2024    ALKPHOS 70 08/14/2024    TP 6.3 (L) 08/14/2024    TBILI 0.37 08/14/2024    EGFR 62 08/14/2024     CA 19-9   Date Value Ref Range Status   08/14/2024 <2 0 - 35 U/mL Final     Comment:     [employer] Electrochemiluminescence Immunoassay (ECLIA)  Values obtained with different assay methods or kits cannot be  used interchangeably.  Results cannot be interpreted as absolute  evidence of the presence or absence of malignant disease.   03/25/2024 <2 0 - 35 U/mL Final     Comment:     [employer] Electrochemiluminescence Immunoassay (ECLIA)  Values obtained with different assay methods or kits cannot be  used interchangeably.  Results cannot be interpreted as absolute  evidence of the presence or absence of malignant disease.   02/14/2024 <2 0 - 35 U/mL Final     Comment:     [employer] Electrochemiluminescence Immunoassay (ECLIA)  Values obtained with different assay methods or kits cannot be  used interchangeably.  Results cannot be interpreted as absolute  evidence of the presence or absence of malignant disease.   09/11/2023 <2 0 - 35 U/mL Final     Comment:     [employer] Electrochemiluminescence Immunoassay (ECLIA)  Values obtained with different assay methods or kits cannot be  used interchangeably.  Results cannot be interpreted as absolute  evidence of the presence or absence of malignant disease.     Interval history: Asks about clinical trials.       Review of Systems   Constitutional:  Positive for activity change and unexpected weight change. Negative  for appetite change, fatigue and fever.   HENT:  Negative for nosebleeds.    Respiratory:  Negative for cough, choking and shortness of breath.         Negative hemoptysis.   Cardiovascular:  Negative for chest pain, palpitations and leg swelling.   Gastrointestinal:  Negative for abdominal distention, abdominal pain, anal bleeding, blood in stool, constipation, diarrhea, nausea and vomiting.   Endocrine: Negative.  Negative for cold intolerance.   Genitourinary: Negative.  Negative for hematuria, menstrual problem, vaginal bleeding, vaginal discharge and vaginal pain.   Musculoskeletal: Negative.  Negative for arthralgias, myalgias, neck pain and neck stiffness.   Skin: Negative.  Negative for color change, pallor and rash.   Allergic/Immunologic: Negative.  Negative for immunocompromised state.   Neurological: Negative.  Negative for weakness and headaches.   Hematological:  Negative for adenopathy. Does not bruise/bleed easily.   Psychiatric/Behavioral:  Positive for decreased concentration.    All other systems reviewed and are negative.      Current Outpatient Medications:     amLODIPine (NORVASC) 2.5 mg tablet, Take 2.5 mg by mouth daily, Disp: , Rfl:     cholecalciferol (VITAMIN D3) 1,000 units tablet, Take 5,000 Units by mouth 2 (two) times a day, Disp: , Rfl:     cyanocobalamin 1,000 mcg/mL, Inject 1,000 mcg into a muscle every 2 (two) months, Disp: , Rfl:     dicyclomine (BENTYL) 20 mg tablet, TAKE 1 TABLET BY MOUTH TWICE A DAY (Patient taking differently: as needed), Disp: 180 tablet, Rfl: 1    diphenhydrAMINE (BENADRYL) 25 mg capsule, Take 25 mg by mouth every 6 (six) hours as needed for itching, Disp: , Rfl:     Empagliflozin (Jardiance) 10 MG TABS, Take 10 mg by mouth every morning, Disp: , Rfl:     ergocalciferol (VITAMIN D2) 50,000 units, Take 50,000 Units by mouth every 14 (fourteen) days, Disp: , Rfl:     gabapentin (NEURONTIN) 300 mg capsule, Take 300 mg by mouth 3 (three) times a day, Disp: ,  Rfl:     LORazepam (ATIVAN) 0.5 mg tablet, Take 1 tablet (0.5 mg total) by mouth 2 (two) times a day as needed for anxiety NO FURTHER REFILLS WITHOUT CLINIC VISIT WITH PALLIATIVE CARE, Disp: 30 tablet, Rfl: 0    omeprazole (PriLOSEC) 20 mg delayed release capsule, Take 1 capsule (20 mg total) by mouth 2 (two) times a day, Disp: 180 capsule, Rfl: 0    OneTouch Verio test strip, daily Test, Disp: , Rfl:     prochlorperazine (COMPAZINE) 10 mg tablet, Take 1 tablet (10 mg total) by mouth every 6 (six) hours as needed for nausea or vomiting, Disp: 45 tablet, Rfl: 3  Allergies   Allergen Reactions    Betadine [Povidone Iodine] Rash     Also itching from betadine    Other Throat Swelling     Pt unsure which chemotherapy drug caused tongue swelling and locked jaw. Please review in epic notes Folfirinox or possible Neulasta. NEED to review       Advance Directive and Living Will:            Objective   There were no vitals taken for this visit.  Wt Readings from Last 6 Encounters:   04/15/24 39.6 kg (87 lb 6.4 oz)   03/06/24 40.4 kg (89 lb)   02/20/24 39.7 kg (87 lb 8 oz)   12/21/23 43.1 kg (94 lb 14.5 oz)   10/18/23 45.4 kg (100 lb)   09/27/23 45.4 kg (100 lb)     Physical Exam  Constitutional:       General: She is not in acute distress.     Appearance: She is well-developed.   HENT:      Head: Normocephalic and atraumatic.   Eyes:      General: No scleral icterus.     Pupils: Pupils are equal, round, and reactive to light.   Cardiovascular:      Rate and Rhythm: Normal rate and regular rhythm.      Heart sounds: No murmur heard.  Pulmonary:      Effort: Pulmonary effort is normal. No respiratory distress.   Skin:     General: Skin is warm.      Coloration: Skin is not pale.      Findings: No rash.   Neurological:      Mental Status: She is alert and oriented to person, place, and time.   Psychiatric:         Thought Content: Thought content normal.       Pertinent Laboratory Results and Imaging Review:  Appointment on  08/14/2024   Component Date Value Ref Range Status    WBC 08/14/2024 5.84  4.31 - 10.16 Thousand/uL Final    RBC 08/14/2024 4.57  3.81 - 5.12 Million/uL Final    Hemoglobin 08/14/2024 14.6  11.5 - 15.4 g/dL Final    Hematocrit 08/14/2024 45.6  34.8 - 46.1 % Final    MCV 08/14/2024 100 (H)  82 - 98 fL Final    MCH 08/14/2024 31.9  26.8 - 34.3 pg Final    MCHC 08/14/2024 32.0  31.4 - 37.4 g/dL Final    RDW 08/14/2024 12.3  11.6 - 15.1 % Final    MPV 08/14/2024 10.2  8.9 - 12.7 fL Final    Platelets 08/14/2024 226  149 - 390 Thousands/uL Final    nRBC 08/14/2024 0  /100 WBCs Final    Segmented % 08/14/2024 64  43 - 75 % Final    Immature Grans % 08/14/2024 0  0 - 2 % Final    Lymphocytes % 08/14/2024 24  14 - 44 % Final    Monocytes % 08/14/2024 10  4 - 12 % Final    Eosinophils Relative 08/14/2024 1  0 - 6 % Final    Basophils Relative 08/14/2024 1  0 - 1 % Final    Absolute Neutrophils 08/14/2024 3.83  1.85 - 7.62 Thousands/µL Final    Absolute Immature Grans 08/14/2024 0.02  0.00 - 0.20 Thousand/uL Final    Absolute Lymphocytes 08/14/2024 1.37  0.60 - 4.47 Thousands/µL Final    Absolute Monocytes 08/14/2024 0.56  0.17 - 1.22 Thousand/µL Final    Eosinophils Absolute 08/14/2024 0.03  0.00 - 0.61 Thousand/µL Final    Basophils Absolute 08/14/2024 0.03  0.00 - 0.10 Thousands/µL Final    Sodium 08/14/2024 135  135 - 147 mmol/L Final    Potassium 08/14/2024 4.7  3.5 - 5.3 mmol/L Final    Chloride 08/14/2024 99  96 - 108 mmol/L Final    CO2 08/14/2024 28  21 - 32 mmol/L Final    ANION GAP 08/14/2024 8  4 - 13 mmol/L Final    BUN 08/14/2024 9  5 - 25 mg/dL Final    Creatinine 08/14/2024 0.93  0.60 - 1.30 mg/dL Final    Standardized to IDMS reference method    Glucose, Fasting 08/14/2024 134 (H)  65 - 99 mg/dL Final    Calcium 08/14/2024 8.8  8.4 - 10.2 mg/dL Final    AST 08/14/2024 28  13 - 39 U/L Final    ALT 08/14/2024 22  7 - 52 U/L Final    Specimen collection should occur prior to Sulfasalazine administration due to  the potential for falsely depressed results.     Alkaline Phosphatase 08/14/2024 70  34 - 104 U/L Final    Total Protein 08/14/2024 6.3 (L)  6.4 - 8.4 g/dL Final    Albumin 08/14/2024 3.7  3.5 - 5.0 g/dL Final    Total Bilirubin 08/14/2024 0.37  0.20 - 1.00 mg/dL Final    Use of this assay is not recommended for patients undergoing treatment with eltrombopag due to the potential for falsely elevated results.  N-acetyl-p-benzoquinone imine (metabolite of Acetaminophen) will generate erroneously low results in samples for patients that have taken an overdose of Acetaminophen.    eGFR 08/14/2024 62  ml/min/1.73sq m Final    CA 19-9 08/14/2024 <2  0 - 35 U/mL Final    Roche [employer]ostics Electrochemiluminescence Immunoassay (ECLIA)  Values obtained with different assay methods or kits cannot be  used interchangeably.  Results cannot be interpreted as absolute  evidence of the presence or absence of malignant disease.    Folate 08/14/2024 >22.3  >5.9 ng/mL Final    The World Health Organization has determined deficient folate concentrations are considered to be <4.0 ng/mL.    Vitamin B-12 08/14/2024 928 (H)  180 - 914 pg/mL Final    Iron Saturation 08/14/2024 22  15 - 50 % Final    TIBC 08/14/2024 279  250 - 450 ug/dL Final    Iron 08/14/2024 62  50 - 212 ug/dL Final    Patients treated with metal-binding drugs (ie. Deferoxamine) may have depressed iron values.    UIBC 08/14/2024 217  155 - 355 ug/dL Final    Ferritin 08/14/2024 58  11 - 307 ng/mL Final         The following historical data was reviewed:  Past Medical History:   Diagnosis Date    BRCA1 negative     BRCA2 negative     Chemotherapy induced neutropenia (HCC) 2/10/2021    Chemotherapy induced neutropenia (HCC) 2/10/2021    Diabetes mellitus (HCC)     Hypertension     Lactic acidosis 5/30/2020    Neuropathy     Obstructive jaundice 6/1/2020    Pancreas cancer (HCC) 2020    Port-A-Cath in place 6/18/2020     Past Surgical History:   Procedure Laterality Date     BREAST BIOPSY Left 2009    neg    BREAST SURGERY Left     calcifications     SECTION  1983    CHOLECYSTECTOMY N/A 10/20/2020    Procedure: CHOLECYSTECTOMY;  Surgeon: Burton Kapoor MD;  Location: [facility];  Service: Surgical Oncology    COLONOSCOPY      ECTOPIC PREGNANCY SURGERY      partial ovary removed    FL GUIDED CENTRAL VENOUS ACCESS DEVICE INSERTION  2020    JOINT REPLACEMENT Bilateral     LAPAROTOMY N/A 10/20/2020    Procedure: LAPAROTOMY EXPLORATORY; INTRAOPERATIVE ULTRASOUND;  Surgeon: Burton Kapoor MD;  Location: [facility];  Service: Surgical Oncology    MANDIBLE FRACTURE SURGERY  1972    NOSE SURGERY      deviated septum    REPLACEMENT TOTAL HIP W/  RESURFACING IMPLANTS Bilateral     right hip done 2012; left hip done 2012    TONSILLECTOMY      TUNNELED VENOUS PORT PLACEMENT Left 2020    Procedure: INSERTION VENOUS PORT (PORT-A-CATH), left;  Surgeon: Burton Kapoor MD;  Location: [facility];  Service: Surgical Oncology    WHIPPLE PROCEDURE/PANCREATICO-DUODENECTOMY N/A 10/20/2020    Procedure: WHIPPLE PROCEDURE/PANCREATICO-DUODENECTOMY;  Surgeon: Burton Kapoor MD;  Location: [facility];  Service: Surgical Oncology     Social History     Socioeconomic History    Marital status: /Civil Union     Spouse name: Not on file    Number of children: Not on file    Years of education: Not on file    Highest education level: Not on file   Occupational History    Not on file   Tobacco Use    Smoking status: Former    Smokeless tobacco: Never    Tobacco comments:     quit 40 years ago   Vaping Use    Vaping status: Never Used   Substance and Sexual Activity    Alcohol use: Yes     Alcohol/week: 1.0 standard drink of alcohol     Types: 1 Glasses of wine per week     Comment: WINE OCCASIONALLY    Drug use: Never    Sexual activity: Not Currently   Other Topics Concern    Not on file   Social History Narrative    Not on file     Social Determinants of Health      Financial Resource Strain: Low Risk  (2/14/2024)    [facility]    Overall Financial Resource Strain (CARDIA)     Difficulty of Paying Living Expenses: Not hard at all   Food Insecurity: No Food Insecurity (2/14/2024)    [facility]    Hunger Vital Sign     Worried About Running Out of Food in the Last Year: Never true     Ran Out of Food in the Last Year: Never true   Transportation Needs: No Transportation Needs (2/14/2024)    [facility] from [facility]    PRAPARE - Transportation     Lack of Transportation (Medical): No     Lack of Transportation (Non-Medical): No   Physical Activity: Not on file   Stress: Stress Concern Present (2/14/2024)    [facility] from [facility]    [employer] - Occupational Stress Questionnaire     Feeling of Stress : To some extent   Social Connections: Feeling Socially Integrated (2/14/2024)    [facility]    OASIS : Social Isolation     How often do you feel lonely or isolated from those around you?: Never   Intimate Partner Violence: Not At Risk (2/14/2024)    [facility] from [facility]    Humiliation, Afraid, Rape, and Kick questionnaire     Fear of Current or Ex-Partner: No     Emotionally Abused: No     Physically Abused: No     Sexually Abused: No   Housing Stability: Not on file     Family History   Problem Relation Age of Onset    Diabetes Mother     Heart disease Mother     Heart disease Father     Breast cancer additional onset Sister     Breast cancer Sister 54    Stroke Maternal Grandfather     Breast cancer additional onset Maternal Aunt     Breast cancer Maternal Aunt     Colon cancer Neg Hx     Ovarian cancer Neg Hx      Uterine cancer Neg Hx     Cervical cancer Neg Hx        Please note:  This report has been generated by a voice recognition software system. Therefore there may be syntax, spelling, and/or grammatical errors. Please call if you have any questions.

## 2024-08-22 ENCOUNTER — TELEPHONE (OUTPATIENT)
Age: 69
End: 2024-08-22

## 2024-08-22 NOTE — TELEPHONE ENCOUNTER
Pt called in stating that she is unsure whether or not she should be fasting for her labs. Pt is confused because one of her labs say fasting and another says non-fasting. Please give patient a arnaldo and advise. Thank you.

## 2024-08-23 NOTE — TELEPHONE ENCOUNTER
Called spoke with patient advised labs for Dr.Adeem Esteban are non-fasting. Patient verbalized understanding and is okay with it.

## 2024-08-29 PROBLEM — C25.9 PANCREATIC ADENOCARCINOMA (HCC): Status: ACTIVE | Noted: 2024-08-29

## 2024-08-30 RX ORDER — SODIUM CHLORIDE 9 MG/ML
150 INJECTION, SOLUTION INTRAVENOUS CONTINUOUS
OUTPATIENT
Start: 2024-10-08

## 2024-09-05 ENCOUNTER — APPOINTMENT (OUTPATIENT)
Dept: LAB | Facility: HOSPITAL | Age: 69
End: 2024-09-05
Payer: MEDICARE

## 2024-09-05 DIAGNOSIS — N18.31 STAGE 3A CHRONIC KIDNEY DISEASE (HCC): ICD-10-CM

## 2024-09-08 DIAGNOSIS — K21.9 GASTROESOPHAGEAL REFLUX DISEASE WITHOUT ESOPHAGITIS: ICD-10-CM

## 2024-09-30 ENCOUNTER — APPOINTMENT (OUTPATIENT)
Dept: LAB | Facility: HOSPITAL | Age: 69
End: 2024-09-30
Payer: MEDICARE

## 2024-09-30 DIAGNOSIS — Z08 ENCOUNTER FOR FOLLOW-UP SURVEILLANCE OF PANCREATIC CANCER: ICD-10-CM

## 2024-09-30 DIAGNOSIS — Z85.07 ENCOUNTER FOR FOLLOW-UP SURVEILLANCE OF PANCREATIC CANCER: ICD-10-CM

## 2024-09-30 LAB
ANION GAP SERPL CALCULATED.3IONS-SCNC: 4 MMOL/L (ref 4–13)
BUN SERPL-MCNC: 11 MG/DL (ref 5–25)
CALCIUM SERPL-MCNC: 9 MG/DL (ref 8.4–10.2)
CHLORIDE SERPL-SCNC: 99 MMOL/L (ref 96–108)
CO2 SERPL-SCNC: 32 MMOL/L (ref 21–32)
CREAT SERPL-MCNC: 0.91 MG/DL (ref 0.6–1.3)
GFR SERPL CREATININE-BSD FRML MDRD: 63 ML/MIN/1.73SQ M
GLUCOSE P FAST SERPL-MCNC: 133 MG/DL (ref 65–99)
POTASSIUM SERPL-SCNC: 4.4 MMOL/L (ref 3.5–5.3)
SODIUM SERPL-SCNC: 135 MMOL/L (ref 135–147)

## 2024-09-30 PROCEDURE — 36415 COLL VENOUS BLD VENIPUNCTURE: CPT

## 2024-09-30 PROCEDURE — 86301 IMMUNOASSAY TUMOR CA 19-9: CPT

## 2024-09-30 PROCEDURE — 80048 BASIC METABOLIC PNL TOTAL CA: CPT

## 2024-10-01 LAB — CANCER AG19-9 SERPL-ACNC: <2 U/ML (ref 0–35)

## 2024-10-08 ENCOUNTER — HOSPITAL ENCOUNTER (OUTPATIENT)
Dept: INFUSION CENTER | Facility: CLINIC | Age: 69
Discharge: HOME | End: 2024-10-08
Payer: MEDICARE

## 2024-10-08 ENCOUNTER — HOSPITAL ENCOUNTER (OUTPATIENT)
Dept: CT IMAGING | Facility: HOSPITAL | Age: 69
Discharge: HOME | End: 2024-10-08
Attending: SURGERY
Payer: MEDICARE

## 2024-10-08 VITALS
TEMPERATURE: 97.4 F | HEART RATE: 61 BPM | SYSTOLIC BLOOD PRESSURE: 139 MMHG | RESPIRATION RATE: 16 BRPM | DIASTOLIC BLOOD PRESSURE: 66 MMHG

## 2024-10-08 DIAGNOSIS — Z08 ENCOUNTER FOR FOLLOW-UP SURVEILLANCE OF PANCREATIC CANCER: Primary | ICD-10-CM

## 2024-10-08 DIAGNOSIS — Z85.07 ENCOUNTER FOR FOLLOW-UP SURVEILLANCE OF PANCREATIC CANCER: Primary | ICD-10-CM

## 2024-10-08 DIAGNOSIS — N18.32 STAGE 3B CHRONIC KIDNEY DISEASE (HCC): ICD-10-CM

## 2024-10-08 DIAGNOSIS — Z85.07 ENCOUNTER FOR FOLLOW-UP SURVEILLANCE OF PANCREATIC CANCER: ICD-10-CM

## 2024-10-08 DIAGNOSIS — Z08 ENCOUNTER FOR FOLLOW-UP SURVEILLANCE OF PANCREATIC CANCER: ICD-10-CM

## 2024-10-08 PROCEDURE — 74177 CT ABD & PELVIS W/CONTRAST: CPT

## 2024-10-08 PROCEDURE — 71260 CT THORAX DX C+: CPT

## 2024-10-08 RX ORDER — SODIUM CHLORIDE 9 MG/ML
150 INJECTION, SOLUTION INTRAVENOUS CONTINUOUS
Status: DISCONTINUED | OUTPATIENT
Start: 2024-10-08 | End: 2024-10-08 | Stop reason: HOSPADM

## 2024-10-08 RX ORDER — SODIUM CHLORIDE 9 MG/ML
150 INJECTION, SOLUTION INTRAVENOUS CONTINUOUS
Status: CANCELLED | OUTPATIENT
Start: 2024-10-08

## 2024-10-08 RX ORDER — SODIUM CHLORIDE 9 MG/ML
150 INJECTION, SOLUTION INTRAVENOUS CONTINUOUS
Status: DISCONTINUED | OUTPATIENT
Start: 2024-10-08 | End: 2024-10-08 | Stop reason: SDUPTHER

## 2024-10-08 RX ADMIN — SODIUM CHLORIDE 150 ML/HR: 0.9 INJECTION, SOLUTION INTRAVENOUS at 12:22

## 2024-10-08 RX ADMIN — IOHEXOL 100 ML: 350 INJECTION, SOLUTION INTRAVENOUS at 11:16

## 2024-10-08 RX ADMIN — SODIUM CHLORIDE 150 ML/HR: 0.9 INJECTION, SOLUTION INTRAVENOUS at 07:45

## 2024-10-08 NOTE — PROGRESS NOTES
Pt here today for pre and post  hydration, offers no complaints , infusion completed w/o complications, pt aware to F/U with her MD, AVS declined and pt D/C home.

## 2024-10-14 ENCOUNTER — OFFICE VISIT (OUTPATIENT)
Dept: SURGICAL ONCOLOGY | Facility: CLINIC | Age: 69
End: 2024-10-14
Payer: MEDICARE

## 2024-10-14 VITALS
BODY MASS INDEX: 18.77 KG/M2 | HEART RATE: 64 BPM | HEIGHT: 58 IN | SYSTOLIC BLOOD PRESSURE: 122 MMHG | OXYGEN SATURATION: 100 % | RESPIRATION RATE: 16 BRPM | TEMPERATURE: 97.3 F | DIASTOLIC BLOOD PRESSURE: 80 MMHG | WEIGHT: 89.4 LBS

## 2024-10-14 DIAGNOSIS — Z85.07 PERSONAL HISTORY OF PANCREATIC CANCER: Primary | ICD-10-CM

## 2024-10-14 PROCEDURE — G2211 COMPLEX E/M VISIT ADD ON: HCPCS

## 2024-10-14 PROCEDURE — 99213 OFFICE O/P EST LOW 20 MIN: CPT

## 2024-10-14 RX ORDER — MULTIVITAMIN WITH IRON
TABLET ORAL DAILY
COMMUNITY

## 2024-10-14 RX ORDER — OMEGA-3-ACID ETHYL ESTERS 1 G/1
2 CAPSULE, LIQUID FILLED ORAL 2 TIMES DAILY
COMMUNITY

## 2024-10-14 RX ORDER — MULTIVITAMIN
1 TABLET ORAL DAILY
COMMUNITY

## 2024-10-14 NOTE — PROGRESS NOTES
Surgical Oncology Follow Up       [facility]  [facility]  [address]  [address]  [place] [id number]  [phone]    Manju Holland  1955  [id number]  [facility]  [facility]  [address]  [address]  [place] [address]  [phone]    1. Personal history of pancreatic cancer  Assessment & Plan:  There is no evidence of disease recurrence on recent imaging, and her tumor marker remains low.  We will see her again in 6 months with repeat CT and Ca 19-9.    Orders:  -     CT chest abdomen pelvis w contrast; Future; Expected date: 04/14/2025  -     BUN; Future; Expected date: 04/01/2025  -     Creatinine, serum; Future; Expected date: 04/01/2025  -     Cancer antigen 19-9; Future; Expected date: 04/01/2025         Chief Complaint   Patient presents with    Follow-up       Return in about 6 months (around 4/14/2025) for Office Visit, Imaging - See orders, Labs - See Treatment Plan.      Oncology History   Encounter for follow-up surveillance of pancreatic cancer   Personal history of pancreatic cancer   5/29/2020 Initial Diagnosis    Patient had new onset of painless jaundice and right upper quadrant this underwent ultrasound of the gallbladder which demonstrated a 2 x 2 centimeter soft tissue mass in the head of the pancreas with a dilated common bile duct.    5/29/2020 CT chest abdomen and pelvis with contrast   Suspected mass at the pancreatic head measuring approximately 2.4 cm resulting in distal biliary obstruction.  Differential considerations include pancreatic adenocarcinoma.    6/1/2020 MRI ABDw w/o:  1.  1.8 x 2.1 cm solid mass in the pancreatic head with dilatation of the main pancreatic duct.  2.  Obstruction of the CBD causing intrahepatic and extrahepatic bile duct dilatation.  3.  No evidence of peripancreatic lymphadenopathy or  other intra-abdominal metastases.       6/2/2020 Biopsy    EUS- Dr. Jett:  Pancreas, uncinate mass:      - Malignant (PSC Category VI)      - Compatible with adenocarcinoma with mucinous features.     6/30/2020 - 8/10/2020 Chemotherapy    Folfirinox + neulasta  Completed 3 cycles before becoming intolarant of irinotecan- infusion reaction.        8/11/2020 - 9/10/2020 Chemotherapy    Neoadjuvant Folfox + neulasta  x 3 more cycles.        10/20/2020 Surgery    Whipple:  - Residual invasive adenocarcinoma with mucinous features, 2.3 cm.  - Metastatic carcinoma present in one of fourteen lymph nodes (1/14).  - All margins are negative for tumor.        10/20/2020 -  Cancer Staged    Staging form: Pancreas, AJCC 8th Edition  - Pathologic stage from 10/20/2020: Stage IIB (ypT2, pN1, cM0) - Signed by Eboni Macias PA-C on 7/21/2023  Stage prefix: Post-therapy  Response to neoadjuvant therapy: Partial response  Total positive nodes: 1  Total nodes examined: 14  Histologic grade (G): G2  Histologic grading system: 3 grade system  Residual tumor (R): R0 - None  Tumor size (mm): 23  Lymph-vascular invasion (LVI): LVI not present (absent)/not identified  Carbohydrate antigen 19-9 (CA 19-9) (U/mL): 61       12/21/2020 - 1/26/2021 Radiation    5000 cGy in 25 fractions to high risk areas  Dr Aden     2/8/2021 - 4/4/2021 Chemotherapy    Folfox + Neulasta  X 4 more cycles.      4/5/2021 Remission    CT CAP  1. No evidence of metastatic disease in the thorax.  Long segment of mild esophageal wall thickening which should be correlated for suspicion of esophagitis.  2. Inflammatory changes /small amount of fluid noted in the region of the nicky hepatis and surgical bed without evidence to suggest enlarging pancreatic head mass.  The pancreatic duct is no longer dilated.  3. Stomach is no longer distended.  Continued findings suggesting nonspecific inflammatory changes in the region of the gastrojejunal anastomosis  4. Hepatic  steatosis.  Hepatic hypodense nodule likely fluid within a fissure which should be carefully reassessed during follow-up.  No discrete evidence of hepatic metastasis  5. Trace amount of perihepatic ascites.  No suspicious adenopathy    CA 19-9 trends:  Presurgery: 9/5/2020-61  Post surgery/post chemo: 4/10/2021-23 8/12/2021: 2  12/11/2021: 3  2/17/22-7/14/2023: <2           History of Present Illness: This is a 70 y/o female who returns to the office today in follow-up for her history of pancreatic cancer.  She is currently SATISH at 4 years and doing quite well.  She denies any abdominal pain, nausea or vomiting.  She reports her appetite is good.  She started working with a dietician last year to try to find a better nutritional balance in light of her kidney issues and diabetes, but wound up losing quite a lot of weight, which she has not been able to gain back since resuming her prior diet.  She remains physically active and feels well overall.  CT was performed on October 8, and a Ca 19-9 was recently drawn.  I have reviewed these results in detail with the patient and her  today.      Review of Systems   Constitutional:  Negative for activity change, appetite change, fatigue and unexpected weight change.   HENT: Negative.     Respiratory: Negative.     Cardiovascular: Negative.    Gastrointestinal: Negative.  Negative for abdominal pain, nausea and vomiting.   Musculoskeletal: Negative.    Skin: Negative.  Negative for color change.   Neurological: Negative.  Negative for dizziness and headaches.   Hematological: Negative.  Negative for adenopathy.   Psychiatric/Behavioral: Negative.               Patient Active Problem List   Diagnosis    Hyperlipidemia    Essential hypertension    Type 2 diabetes mellitus without complication, without long-term current use of insulin (HCC)    Pruritus    Transaminitis    Therapeutic opioid-induced constipation (OIC)    Anxiety    Functional diarrhea    Poor appetite     Encounter for follow-up surveillance of pancreatic cancer    Mild protein-calorie malnutrition (HCC)    Encounter for removal of tunneled central venous catheter (CVC) with port    Hip joint replacement status    Personal history of pancreatic cancer    Stage 3b chronic kidney disease (HCC)    Stage 4 chronic kidney disease (HCC)    Pancreatic adenocarcinoma (HCC)     Past Medical History:   Diagnosis Date    BRCA1 negative     BRCA2 negative     Chemotherapy induced neutropenia (HCC) 2/10/2021    Chemotherapy induced neutropenia (HCC) 2/10/2021    Diabetes mellitus (HCC)     Hypertension     Lactic acidosis 2020    Neuropathy     Obstructive jaundice 2020    Pancreas cancer (HCC)     Port-A-Cath in place 2020     Past Surgical History:   Procedure Laterality Date    BREAST BIOPSY Left 2009    neg    BREAST SURGERY Left     calcifications     SECTION      CHOLECYSTECTOMY N/A 10/20/2020    Procedure: CHOLECYSTECTOMY;  Surgeon: Burton Kapoor MD;  Location: [facility];  Service: Surgical Oncology    COLONOSCOPY      ECTOPIC PREGNANCY SURGERY      partial ovary removed    FL GUIDED CENTRAL VENOUS ACCESS DEVICE INSERTION  2020    JOINT REPLACEMENT Bilateral     LAPAROTOMY N/A 10/20/2020    Procedure: LAPAROTOMY EXPLORATORY; INTRAOPERATIVE ULTRASOUND;  Surgeon: Burton Kapoor MD;  Location: [facility];  Service: Surgical Oncology    MANDIBLE FRACTURE SURGERY      NOSE SURGERY      deviated septum    REPLACEMENT TOTAL HIP W/  RESURFACING IMPLANTS Bilateral     right hip done 2012; left hip done 2012    TONSILLECTOMY      TUNNELED VENOUS PORT PLACEMENT Left 2020    Procedure: INSERTION VENOUS PORT (PORT-A-CATH), left;  Surgeon: Burton Kapoor MD;  Location: [facility];  Service: Surgical Oncology    WHIPPLE PROCEDURE/PANCREATICO-DUODENECTOMY N/A 10/20/2020    Procedure: WHIPPLE PROCEDURE/PANCREATICO-DUODENECTOMY;  Surgeon: Burton Kapoor MD;   Location: [facility];  Service: Surgical Oncology     Family History   Problem Relation Age of Onset    Diabetes Mother     Heart disease Mother     Heart disease Father     Breast cancer additional onset Sister     Breast cancer Sister 54    Stroke Maternal Grandfather     Breast cancer additional onset Maternal Aunt     Breast cancer Maternal Aunt     Colon cancer Neg Hx     Ovarian cancer Neg Hx     Uterine cancer Neg Hx     Cervical cancer Neg Hx      Social History     Socioeconomic History    Marital status: /Civil Union     Spouse name: Not on file    Number of children: Not on file    Years of education: Not on file    Highest education level: Not on file   Occupational History    Not on file   Tobacco Use    Smoking status: Former    Smokeless tobacco: Never    Tobacco comments:     quit 40 years ago   Vaping Use    Vaping status: Never Used   Substance and Sexual Activity    Alcohol use: Yes     Alcohol/week: 1.0 standard drink of alcohol     Types: 1 Glasses of wine per week     Comment: WINE OCCASIONALLY    Drug use: Never    Sexual activity: Not Currently   Other Topics Concern    Not on file   Social History Narrative    Not on file     Social Determinants of Health     Financial Resource Strain: Low Risk  (2/14/2024)    [facility]    Overall Financial Resource Strain (CARDIA)     Difficulty of Paying Living Expenses: Not hard at all   Food Insecurity: No Food Insecurity (2/14/2024)    [facility]    Hunger Vital Sign     Worried About Running Out of Food in the Last Year: Never true     Ran Out of Food in the Last Year: Never true   Transportation Needs: No Transportation Needs (2/14/2024)    [facility] from [facility]    PRAPARE - Transportation     Lack of Transportation (Medical): No     Lack of Transportation (Non-Medical):  No   Physical Activity: Not on file   Stress: Stress Concern Present (2/14/2024)    [facility]    [place] [employer] - Occupational Stress Questionnaire     Feeling of Stress : To some extent   Social Connections: Feeling Socially Integrated (2/14/2024)    [facility]    OASIS : Social Isolation     How often do you feel lonely or isolated from those around you?: Never   Intimate Partner Violence: Not At Risk (2/14/2024)    [facility]    Humiliation, Afraid, Rape, and Kick questionnaire     Fear of Current or Ex-Partner: No     Emotionally Abused: No     Physically Abused: No     Sexually Abused: No   Housing Stability: Not on file       Current Outpatient Medications:     cholecalciferol (VITAMIN D3) 1,000 units tablet, Take 5,000 Units by mouth 2 (two) times a day, Disp: , Rfl:     dicyclomine (BENTYL) 20 mg tablet, TAKE 1 TABLET BY MOUTH TWICE A DAY (Patient taking differently: as needed), Disp: 180 tablet, Rfl: 1    Empagliflozin (Jardiance) 10 MG TABS, Take 10 mg by mouth every morning, Disp: , Rfl:     ergocalciferol (VITAMIN D2) 50,000 units, Take 50,000 Units by mouth every 14 (fourteen) days, Disp: , Rfl:     gabapentin (NEURONTIN) 300 mg capsule, Take 300 mg by mouth 3 (three) times a day, Disp: , Rfl:     LORazepam (ATIVAN) 0.5 mg tablet, Take 1 tablet (0.5 mg total) by mouth 2 (two) times a day as needed for anxiety NO FURTHER REFILLS WITHOUT CLINIC VISIT WITH PALLIATIVE CARE, Disp: 30 tablet, Rfl: 0    Multiple Vitamin (multivitamin) tablet, Take 1 tablet by mouth daily, Disp: , Rfl:     omega-3-acid ethyl esters (LOVAZA) 1 g capsule, Take 2 g by mouth 2 (two) times a day FISH OIL, Disp: , Rfl:     omeprazole (PriLOSEC) 20 mg delayed release capsule, TAKE 1 CAPSULE BY MOUTH TWICE A DAY, Disp:  180 capsule, Rfl: 0    OneTouch Verio test strip, daily Test, Disp: , Rfl:     prochlorperazine (COMPAZINE) 10 mg tablet, Take 1 tablet (10 mg total) by mouth every 6 (six) hours as needed for nausea or vomiting, Disp: 45 tablet, Rfl: 3    vitamin B-12 (CYANOCOBALAMIN) 50 MCG tablet, Take by mouth daily Gummy form., Disp: , Rfl:     amLODIPine (NORVASC) 2.5 mg tablet, Take 2.5 mg by mouth daily, Disp: , Rfl:     cyanocobalamin 1,000 mcg/mL, Inject 1,000 mcg into a muscle every 2 (two) months (Patient not taking: Reported on 10/14/2024), Disp: , Rfl:     diphenhydrAMINE (BENADRYL) 25 mg capsule, Take 25 mg by mouth every 6 (six) hours as needed for itching (Patient not taking: Reported on 10/14/2024), Disp: , Rfl:   Allergies   Allergen Reactions    Betadine [Povidone Iodine] Rash     Also itching from betadine    Other Throat Swelling     Pt unsure which chemotherapy drug caused tongue swelling and locked jaw. Please review in epic notes Folfirinox or possible Neulasta. NEED to review     Vitals:    10/14/24 1425   BP: 122/80   Pulse: 64   Resp: 16   Temp: (!) 97.3 °F (36.3 °C)   SpO2: 100%       Physical Exam  Vitals reviewed.   Constitutional:       General: She is not in acute distress.     Appearance: Normal appearance. She is not ill-appearing or toxic-appearing.   HENT:      Head: Normocephalic and atraumatic.      Nose: Nose normal.      Mouth/Throat:      Mouth: Mucous membranes are moist.   Eyes:      General: No scleral icterus.  Cardiovascular:      Rate and Rhythm: Normal rate.   Pulmonary:      Effort: Pulmonary effort is normal.   Abdominal:      General: Abdomen is flat. A surgical scar is present. There is no distension.      Palpations: Abdomen is soft. There is no mass.      Tenderness: There is no abdominal tenderness. There is no guarding.   Musculoskeletal:         General: Normal range of motion.      Cervical back: Normal range of motion and neck supple.   Skin:     General: Skin is warm and  dry.      Coloration: Skin is not jaundiced.   Neurological:      General: No focal deficit present.      Mental Status: She is alert and oriented to person, place, and time.   Psychiatric:         Mood and Affect: Mood normal.         Behavior: Behavior normal.         Thought Content: Thought content normal.         Judgment: Judgment normal.           Labs:  Collected 9/30/2024       Component  Ref Range & Units 2 wk ago   CA 19-9  0 - 35 U/mL <2          Imaging  CT chest abdomen pelvis w contrast    Result Date: 10/11/2024  Narrative: CT CHEST, ABDOMEN AND PELVIS WITH IV CONTRAST INDICATION: Z08: Encounter for follow-up examination after completed treatment for malignant neoplasm Z85.07: Personal history of malignant neoplasm of pancreas. COMPARISON: 3/29/2024 - 9/9/2022 TECHNIQUE: CT examination of the chest, abdomen and pelvis was performed. Multiplanar 2D reformatted images were created from the source data. This examination, like all CT scans performed in the [facility], was performed utilizing techniques to minimize radiation dose exposure, including the use of iterative reconstruction and automated exposure control. Radiation dose length product (DLP) for this visit: 959 mGy-cm IV Contrast: 100 mL of iohexol (OMNIPAQUE) Enteric Contrast: Not administered. FINDINGS: CHEST LUNGS: Lungs are clear. No tracheal or endobronchial lesion. PLEURA: Unremarkable. HEART/GREAT VESSELS: Heart is unremarkable for patient's age. No thoracic aortic aneurysm. MEDIASTINUM AND SANDRA: There is severe diffuse esophageal thickening, similar to the prior study. There is no mediastinal or hilar lymphadenopathy CHEST WALL AND LOWER NECK: Subcentimeter right thyroid nodules, grossly unchanged. Incidental discovery of one or more thyroid nodule(s) measuring less than 1.5 cm and without suspicious features is noted in this patient who is above 35 years old; according to guidelines published in the February 2015 white  paper on incidental thyroid nodules in the Journal of the American College of Radiology (JACR), no further evaluation is recommended. . ABDOMEN LIVER/BILIARY TREE: Redemonstrated left hepatic lobe pneumobilia. Otherwise unremarkable. GALLBLADDER: Post cholecystectomy. SPLEEN: Unremarkable. PANCREAS: The patient is status post Whipple.  The remaining pancreas is atrophic. There is gas in the pancreatic duct related to anastomosis. ADRENAL GLANDS: Unremarkable. KIDNEYS/URETERS: No hydronephrosis or urinary tract calculi. Mild fullness of the right ureter. Subcentimeter hypoattenuating renal lesion(s), too small to characterize but statistically likely benign, which do not warrant follow-up (Radiology June 2019). STOMACH AND BOWEL: No evidence of bowel obstruction or gross inflammatory process. APPENDIX: No findings to suggest appendicitis. ABDOMINOPELVIC CAVITY: No ascites. No pneumoperitoneum. No lymphadenopathy. VESSELS: Unremarkable for patient's age. PELVIS Mildly limited by beam hardening artifact from bilateral hip arthroplasties. REPRODUCTIVE ORGANS: Unremarkable for patient's age. URINARY BLADDER: Obscured by streak artifact ABDOMINAL WALL/INGUINAL REGIONS: Unremarkable. BONES: No acute fracture or suspicious osseous lesion.     Impression: No findings of tumor recurrence or metastatic disease. Chronic findings, as per the body of the report. Workstation performed: [id number]     I personally reviewed and interpreted the above laboratory and imaging data.

## 2024-10-14 NOTE — ASSESSMENT & PLAN NOTE
There is no evidence of disease recurrence on recent imaging, and her tumor marker remains low.  We will see her again in 6 months with repeat CT and Ca 19-9.

## 2024-10-16 ENCOUNTER — ANNUAL EXAM (OUTPATIENT)
Dept: OBGYN CLINIC | Facility: CLINIC | Age: 69
End: 2024-10-16
Payer: MEDICARE

## 2024-10-16 VITALS
DIASTOLIC BLOOD PRESSURE: 76 MMHG | SYSTOLIC BLOOD PRESSURE: 138 MMHG | HEIGHT: 58 IN | BODY MASS INDEX: 18.39 KG/M2 | WEIGHT: 87.6 LBS

## 2024-10-16 DIAGNOSIS — Z78.0 ASYMPTOMATIC POSTMENOPAUSAL STATUS: ICD-10-CM

## 2024-10-16 DIAGNOSIS — Z01.419 ENCOUNTER FOR ANNUAL ROUTINE GYNECOLOGICAL EXAMINATION: Primary | ICD-10-CM

## 2024-10-16 DIAGNOSIS — R92.333 HETEROGENEOUSLY DENSE TISSUE OF BOTH BREASTS ON MAMMOGRAPHY: ICD-10-CM

## 2024-10-16 DIAGNOSIS — Z12.31 SCREENING MAMMOGRAM FOR BREAST CANCER: ICD-10-CM

## 2024-10-16 PROBLEM — R63.0 POOR APPETITE: Status: RESOLVED | Noted: 2020-08-06 | Resolved: 2024-10-16

## 2024-10-16 PROBLEM — L29.9 PRURITUS: Status: RESOLVED | Noted: 2020-05-31 | Resolved: 2024-10-16

## 2024-10-16 PROBLEM — E53.8 B12 DEFICIENCY: Status: ACTIVE | Noted: 2023-08-02

## 2024-10-16 PROCEDURE — G0101 CA SCREEN;PELVIC/BREAST EXAM: HCPCS | Performed by: NURSE PRACTITIONER

## 2024-10-16 NOTE — PROGRESS NOTES
Diagnoses and all orders for this visit:    Encounter for annual routine gynecological examination    Asymptomatic postmenopausal status    Heterogeneously dense tissue of both breasts on mammography    Screening mammogram for breast cancer  -     Mammo screening bilateral w 3d and cad; Future        Call as needed, encouraged calcium/vit D in her diet, call with any PMB, all questions answered          Pleasant 69 y.o. postmenopausal female here for annual exam. She denies postmenopausal bleeding. She denies history of abnormal pap smears, last Pap 10/11/2022 neg/neg A pap was NOT done today and is no longer indicated.  Multiple normal Paps seen in care everywhere.  She denies vaginal issues. She denies pelvic pain. She denies postmenopausal issues. She is not sexually active in years. Last colonoscopy done 2022, due again in 10 years. Last mammogram 10/18/2023 normal, heterogeneously dense breasts. Lifetime Tyrer-Cuzick: 13.2 % she would like to do the ABUS if her next mammogram is still dense.  DXA 2024 , +osteopenia, due again in 2 years.  She was diagnosed with pancreatic cancer in  and is seen every 6 months by her oncologist.    Past Medical History:   Diagnosis Date    BRCA1 negative     BRCA2 negative     Cancer (HCC) pancreatic    Cancer (HCC) Pancreatic    Chemotherapy induced neutropenia (HCC) 02/10/2021    Chemotherapy induced neutropenia (HCC) 02/10/2021    Chronic kidney disease     Diabetes mellitus (HCC)     Ectopic pregnancy     Gestational diabetes     Hypertension     Lactic acidosis 2020    Miscarriage     Neuropathy     Obstructive jaundice 2020    Pancreas cancer (HCC)     Port-A-Cath in place 2020     Past Surgical History:   Procedure Laterality Date    BREAST BIOPSY Left     neg    BREAST SURGERY Left     calcifications     SECTION      CHOLECYSTECTOMY N/A 10/20/2020    Procedure: CHOLECYSTECTOMY;  Surgeon: Burton Kapoor MD;   Location: [facility];  Service: Surgical Oncology    COLONOSCOPY      ECTOPIC PREGNANCY SURGERY      partial ovary removed    FL GUIDED CENTRAL VENOUS ACCESS DEVICE INSERTION  06/23/2020    JOINT REPLACEMENT Bilateral     LAPAROTOMY N/A 10/20/2020    Procedure: LAPAROTOMY EXPLORATORY; INTRAOPERATIVE ULTRASOUND;  Surgeon: Burton Kapoor MD;  Location: [facility];  Service: Surgical Oncology    MANDIBLE FRACTURE SURGERY  1972    NOSE SURGERY      deviated septum    HI CONIZATION CERVIX W/WO D&C RPR KNIFE/LASER  2005    REPLACEMENT TOTAL HIP W/  RESURFACING IMPLANTS Bilateral 2012    right hip done July 2012; left hip done September 2012    TONSILLECTOMY  1957    TUNNELED VENOUS PORT PLACEMENT Left 06/23/2020    Procedure: INSERTION VENOUS PORT (PORT-A-CATH), left;  Surgeon: Burton Kapoor MD;  Location: [facility];  Service: Surgical Oncology    WHIPPLE PROCEDURE/PANCREATICO-DUODENECTOMY N/A 10/20/2020    Procedure: WHIPPLE PROCEDURE/PANCREATICO-DUODENECTOMY;  Surgeon: Burton Kapoor MD;  Location: [facility];  Service: Surgical Oncology     Family History   Problem Relation Age of Onset    Diabetes Mother     Heart disease Mother     Heart disease Father     Breast cancer additional onset Sister     Breast cancer Sister 54    Stroke Maternal Grandfather     Breast cancer additional onset Maternal Aunt     Breast cancer Maternal Aunt     Colon cancer Neg Hx     Ovarian cancer Neg Hx     Uterine cancer Neg Hx     Cervical cancer Neg Hx      Social History     Tobacco Use    Smoking status: Former    Smokeless tobacco: Never    Tobacco comments:     quit 40 years ago   Vaping Use    Vaping status: Never Used   Substance Use Topics    Alcohol use: Not Currently     Alcohol/week: 1.0 standard drink of alcohol     Types: 1 Glasses of wine per week     Comment: WINE OCCASIONALLY    Drug use: Never       Current Outpatient Medications:     amLODIPine (NORVASC) 2.5 mg tablet, Take 2.5 mg by mouth daily, Disp: , Rfl:      cholecalciferol (VITAMIN D3) 1,000 units tablet, Take 5,000 Units by mouth 2 (two) times a day, Disp: , Rfl:     dicyclomine (BENTYL) 20 mg tablet, TAKE 1 TABLET BY MOUTH TWICE A DAY (Patient taking differently: as needed), Disp: 180 tablet, Rfl: 1    diphenhydrAMINE (BENADRYL) 25 mg capsule, Take 25 mg by mouth every 6 (six) hours as needed for itching, Disp: , Rfl:     Empagliflozin (Jardiance) 10 MG TABS, Take 10 mg by mouth every morning, Disp: , Rfl:     ergocalciferol (VITAMIN D2) 50,000 units, Take 50,000 Units by mouth every 14 (fourteen) days, Disp: , Rfl:     gabapentin (NEURONTIN) 300 mg capsule, Take 300 mg by mouth 3 (three) times a day, Disp: , Rfl:     LORazepam (ATIVAN) 0.5 mg tablet, Take 1 tablet (0.5 mg total) by mouth 2 (two) times a day as needed for anxiety NO FURTHER REFILLS WITHOUT CLINIC VISIT WITH PALLIATIVE CARE, Disp: 30 tablet, Rfl: 0    Multiple Vitamin (multivitamin) tablet, Take 1 tablet by mouth daily, Disp: , Rfl:     omega-3-acid ethyl esters (LOVAZA) 1 g capsule, Take 2 g by mouth 2 (two) times a day FISH OIL, Disp: , Rfl:     omeprazole (PriLOSEC) 20 mg delayed release capsule, TAKE 1 CAPSULE BY MOUTH TWICE A DAY, Disp: 180 capsule, Rfl: 0    OneTouch Verio test strip, daily Test, Disp: , Rfl:     prochlorperazine (COMPAZINE) 10 mg tablet, Take 1 tablet (10 mg total) by mouth every 6 (six) hours as needed for nausea or vomiting, Disp: 45 tablet, Rfl: 3    vitamin B-12 (CYANOCOBALAMIN) 50 MCG tablet, Take by mouth daily Gummy form., Disp: , Rfl:   Patient Active Problem List    Diagnosis Date Noted    Pancreatic adenocarcinoma (HCC) 08/29/2024    Stage 4 chronic kidney disease (HCC) 03/06/2024    Stage 3b chronic kidney disease (HCC) 02/20/2024    B12 deficiency 08/02/2023    Personal history of pancreatic cancer 07/21/2023    Encounter for removal of tunneled central venous catheter (CVC) with port 03/24/2022    Mild protein-calorie malnutrition (HCC) 08/23/2021    Encounter for  "follow-up surveillance of pancreatic cancer 2021    Functional diarrhea 2020    Anxiety 2020    Therapeutic opioid-induced constipation (OIC) 2020    Transaminitis 2020    Hyperlipidemia 2018    Essential hypertension 2018    Type 2 diabetes mellitus without complication, without long-term current use of insulin (HCC) 2018    Hip joint replacement status 2012       Allergies   Allergen Reactions    Betadine [Povidone Iodine] Rash     Also itching from betadine    Other Throat Swelling     Pt unsure which chemotherapy drug caused tongue swelling and locked jaw. Please review in epic notes Folfirinox or possible Neulasta. 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    Review of Systems   Constitutional: Negative for chills, fatigue, fever and unexpected weight change.   Respiratory: Negative for shortness of breath.    Gastrointestinal: Negative for anal bleeding, blood in stool, constipation and diarrhea.   Genitourinary: Negative for difficulty urinating, dysuria and hematuria.     Physical Exam   Constitutional: She appears well-developed and well-nourished. No distress.   HENT: atraumatic, EOMI  Head: Normocephalic.   Neck: Normal range of motion. Neck supple.   Pulmonary: Effort normal.  Breasts: bilateral without masses, skin changes or nipple discharge. Bilaterally soft and warm to touch. No areas of erythema or pain.  Abdominal: Soft.   Pelvic exam was performed with patient " supine. No labial fusion. There is no rash, tenderness, lesion or injury on the right labia. There is no rash, tenderness, lesion or injury on the left labia.  2  epidermal cysts noted on left labia, unchanged and not bothersome per pt.  Urethral meatus does not show any tenderness, inflammation or discharge. Palpation of midline bladder without pain or discomfort. Uterus is not deviated, not enlarged, not fixed and not tender. Cervix exhibits no motion tenderness, no discharge and no friability. Right adnexum displays no mass, no tenderness and no fullness. Left adnexum displays no mass, no tenderness and no fullness. No erythema or tenderness in the vagina. No foreign body in the vagina. No signs of injury around the vagina or anus. Perineum without lesions, signs of injury, erythema or swelling. No vaginal discharge found.   Lymphadenopathy:        Right: No inguinal adenopathy present.        Left: No inguinal adenopathy present.

## 2024-10-16 NOTE — PATIENT INSTRUCTIONS
Patient Education     Lowering Your Risk of Breast Cancer   About this topic   Breast cancer is a serious illness. Breast cancer is when abnormal cells grow and divide more quickly in your breast. These cells form a growth or tumor. The abnormal cells may enter nearby tissue and spread to other parts of the body. It is the type of cancer most often seen in women. Men can have breast cancer, but it is a rare condition.  General   Some things in your life may increase your risk of breast cancer. You may not be able to change some of these. Others you can control.  You are more likely to get breast cancer if you:  Have a mother, sister, or daughter who has had breast cancer  Have used hormones for menopause for more than 5 years  Have had radiation therapy to the breast or chest in the past  Are overweight or do not exercise  Had your first menstrual period before you were 11 years old  Went through menopause after age 53  Have never been pregnant or had your first child after age 35  Have had breast cancer before  Drink alcohol in any form  Have dense breasts  Are older in age  There is no certain way to prevent breast cancer. There are things you can do to lower your chances of having breast cancer.  Keep a healthy weight. Lose weight if you are overweight. Being overweight raises your chances of having breast cancer.  Eat a healthy diet to maintain a healthy weight, such as more fruits, vegetables, and lean cuts of meat. Decrease the amount of saturated fat in your diet.  Exercise. Being active helps you keep a healthy weight.  Limit your alcohol intake or do not drink alcohol. The more alcohol you drink, the higher your risk.  Do not smoke cigarettes. Smoking can increase your risk of many types of cancer.  Breastfeed your baby. This may help protect you. The longer you breastfeed, the more protection you have.  Talk with your doctor about:  Limiting or stopping hormone therapy.  Taking certain drugs to prevent  breast cancer. For women at high risk of having breast cancer, there are a few drugs that may lower your risk.  Surgery to prevent you from having breast cancer if you are very high risk.  When do I need to call the doctor?   Changes in your breasts  A lump or area in your breast that feels different  Discharge from your nipple  Skin on your breast is dimpled or indented  You have questions or concerns about your breasts  Helpful tips   Talk to your doctor about the best kind of breast cancer screening for you.  If you want to do self breast exams, have your doctor show you the right way to do them.  Tell your doctor of any abnormal finding.  Last Reviewed Date   2021-10-04  Consumer Information Use and Disclaimer   This generalized information is a limited summary of diagnosis, treatment, and/or medication information. It is not meant to be comprehensive and should be used as a tool to help the user understand and/or assess potential diagnostic and treatment options. It does NOT include all information about conditions, treatments, medications, side effects, or risks that may apply to a specific patient. It is not intended to be medical advice or a substitute for the medical advice, diagnosis, or treatment of a health care provider based on the health care provider's examination and assessment of a patient’s specific and unique circumstances. Patients must speak with a health care provider for complete information about their health, medical questions, and treatment options, including any risks or benefits regarding use of medications. This information does not endorse any treatments or medications as safe, effective, or approved for treating a specific patient. Up[employer], Inc[employer] and its affiliates disclaim any warranty or liability relating to this information or the use thereof. The use of this information is governed by the Terms of Use, available at  https://www.[employer].com/en/know/clinical-effectiveness-terms   Copyright   Copyright © 2024 [employer], Inc[employer] and its affiliates and/or licensors. All rights reserved.

## 2024-10-18 ENCOUNTER — TELEPHONE (OUTPATIENT)
Dept: SURGICAL ONCOLOGY | Facility: CLINIC | Age: 69
End: 2024-10-18

## 2024-10-18 NOTE — TELEPHONE ENCOUNTER
LM for patient letting her know that chair has been reserved at the [facility] for 4-14-25 for pre/post imaging hydration and orders have been placed. She should arrive at 8am.

## 2024-10-21 ENCOUNTER — HOSPITAL ENCOUNTER (OUTPATIENT)
Dept: MAMMOGRAPHY | Facility: CLINIC | Age: 69
Discharge: HOME | End: 2024-10-21
Payer: MEDICARE

## 2024-10-21 DIAGNOSIS — Z12.31 SCREENING MAMMOGRAM FOR BREAST CANCER: ICD-10-CM

## 2024-10-21 PROCEDURE — 77063 BREAST TOMOSYNTHESIS BI: CPT

## 2024-10-21 PROCEDURE — 77067 SCR MAMMO BI INCL CAD: CPT

## 2024-10-22 ENCOUNTER — TELEPHONE (OUTPATIENT)
Age: 69
End: 2024-10-22

## 2024-10-22 NOTE — TELEPHONE ENCOUNTER
Pt called requesting provider Melody call her back to discuss Mammo results that came back abnormal.

## 2024-10-23 ENCOUNTER — TELEPHONE (OUTPATIENT)
Dept: MAMMOGRAPHY | Facility: CLINIC | Age: 69
End: 2024-10-23

## 2024-10-24 NOTE — RESULT ENCOUNTER NOTE
I have never heard of this. I received a message from Jaylin Jaramillo, Breast Navigator, that this patient states she gets pre/post IV hydration from the infusion center when getting contrast due to her kidney function.  She is scheduled for contrast-enhanced mammogram. How is this ordered?  I have never done this.

## 2024-10-25 ENCOUNTER — TELEPHONE (OUTPATIENT)
Age: 69
End: 2024-10-25

## 2024-10-25 NOTE — TELEPHONE ENCOUNTER
Called and advised that only contrast she has to be concerned about is CT scan, not mammogram, per dr Esteban. Pt understood and was ok with that.

## 2024-10-25 NOTE — TELEPHONE ENCOUNTER
Patient states she had a mammogram done and they now ordered an additional mammogram with US and contrast. She states when she has CT w/ contrast she requires pre and post hydration. Will she need hydration for the mammo? She states no barium is involved.  Please advise

## 2024-10-28 ENCOUNTER — TELEPHONE (OUTPATIENT)
Dept: MAMMOGRAPHY | Facility: CLINIC | Age: 69
End: 2024-10-28

## 2024-10-28 NOTE — TELEPHONE ENCOUNTER
Sidney Esteban,     This patient reached out to you about needing IV hydration pre/post contrast prior to her contrast enhanced mammogram.  You stated she only needed hydration if she was receiving CT dye.  The contrast that we give for a contrast enhanced mammogram is the same exact contrast that they give for a CT.  So I just want to verify that she does or does not need IV hydration.  Her current GFR is 63.     Thank you,  Jaylin Jaramillo, MSN, RN, JOE, CN-BN  Breast Nurse Navigator

## 2024-11-05 ENCOUNTER — HOSPITAL ENCOUNTER (OUTPATIENT)
Dept: MAMMOGRAPHY | Facility: CLINIC | Age: 69
Discharge: HOME | End: 2024-11-05
Payer: MEDICARE

## 2024-11-05 ENCOUNTER — HOSPITAL ENCOUNTER (OUTPATIENT)
Dept: ULTRASOUND IMAGING | Facility: CLINIC | Age: 69
Discharge: HOME | End: 2024-11-05
Payer: MEDICARE

## 2024-11-05 VITALS — HEIGHT: 58 IN | BODY MASS INDEX: 18.26 KG/M2 | WEIGHT: 87 LBS

## 2024-11-05 DIAGNOSIS — R92.8 ABNORMAL MAMMOGRAM: ICD-10-CM

## 2024-11-05 PROCEDURE — 77066 DX MAMMO INCL CAD BI: CPT

## 2024-11-05 PROCEDURE — 76642 ULTRASOUND BREAST LIMITED: CPT

## 2024-11-05 RX ADMIN — IOHEXOL 60 ML: 350 INJECTION, SOLUTION INTRAVENOUS at 10:01

## 2024-11-05 NOTE — PROGRESS NOTES
IV removed, catheter intact, small dressing applied, no bleeding noted  Reviewed increasing hydration due to contrast with patient

## 2025-01-08 ENCOUNTER — APPOINTMENT (OUTPATIENT)
Dept: LAB | Facility: HOSPITAL | Age: 70
End: 2025-01-08
Payer: MEDICARE

## 2025-01-08 DIAGNOSIS — E13.69 OTHER SPECIFIED DIABETES MELLITUS WITH OTHER SPECIFIED COMPLICATION, UNSPECIFIED WHETHER LONG TERM INSULIN USE (HCC): ICD-10-CM

## 2025-01-08 DIAGNOSIS — Z13.6 SCREENING FOR ISCHEMIC HEART DISEASE: ICD-10-CM

## 2025-01-08 LAB
CHOLEST SERPL-MCNC: 174 MG/DL (ref ?–200)
CREAT UR-MCNC: 103.1 MG/DL
EST. AVERAGE GLUCOSE BLD GHB EST-MCNC: 143 MG/DL
HBA1C MFR BLD: 6.6 %
HDLC SERPL-MCNC: 39 MG/DL
LDLC SERPL CALC-MCNC: 93 MG/DL (ref 0–100)
MICROALBUMIN UR-MCNC: 12.4 MG/L
MICROALBUMIN/CREAT 24H UR: 12 MG/G CREATININE (ref 0–30)
NONHDLC SERPL-MCNC: 135 MG/DL
TRIGL SERPL-MCNC: 211 MG/DL (ref ?–150)

## 2025-01-08 PROCEDURE — 36415 COLL VENOUS BLD VENIPUNCTURE: CPT

## 2025-01-08 PROCEDURE — 82570 ASSAY OF URINE CREATININE: CPT

## 2025-01-08 PROCEDURE — 82043 UR ALBUMIN QUANTITATIVE: CPT

## 2025-01-08 PROCEDURE — 83036 HEMOGLOBIN GLYCOSYLATED A1C: CPT

## 2025-01-08 PROCEDURE — 80061 LIPID PANEL: CPT

## 2025-02-10 ENCOUNTER — APPOINTMENT (OUTPATIENT)
Dept: LAB | Facility: HOSPITAL | Age: 70
End: 2025-02-10
Payer: MEDICARE

## 2025-02-10 DIAGNOSIS — C25.9 PANCREATIC ADENOCARCINOMA (HCC): ICD-10-CM

## 2025-02-10 LAB
ALBUMIN SERPL BCG-MCNC: 4.2 G/DL (ref 3.5–5)
ALP SERPL-CCNC: 77 U/L (ref 34–104)
ALT SERPL W P-5'-P-CCNC: 30 U/L (ref 7–52)
ANION GAP SERPL CALCULATED.3IONS-SCNC: 5 MMOL/L (ref 4–13)
AST SERPL W P-5'-P-CCNC: 29 U/L (ref 13–39)
BASOPHILS # BLD AUTO: 0.03 THOUSANDS/ΜL (ref 0–0.1)
BASOPHILS NFR BLD AUTO: 1 % (ref 0–1)
BILIRUB SERPL-MCNC: 0.68 MG/DL (ref 0.2–1)
BUN SERPL-MCNC: 10 MG/DL (ref 5–25)
CALCIUM SERPL-MCNC: 9.2 MG/DL (ref 8.4–10.2)
CHLORIDE SERPL-SCNC: 101 MMOL/L (ref 96–108)
CO2 SERPL-SCNC: 30 MMOL/L (ref 21–32)
CREAT SERPL-MCNC: 0.88 MG/DL (ref 0.6–1.3)
EOSINOPHIL # BLD AUTO: 0.1 THOUSAND/ΜL (ref 0–0.61)
EOSINOPHIL NFR BLD AUTO: 2 % (ref 0–6)
ERYTHROCYTE [DISTWIDTH] IN BLOOD BY AUTOMATED COUNT: 12.4 % (ref 11.6–15.1)
GFR SERPL CREATININE-BSD FRML MDRD: 66 ML/MIN/1.73SQ M
GLUCOSE P FAST SERPL-MCNC: 133 MG/DL (ref 65–99)
HCT VFR BLD AUTO: 47.8 % (ref 34.8–46.1)
HGB BLD-MCNC: 15.5 G/DL (ref 11.5–15.4)
IMM GRANULOCYTES # BLD AUTO: 0.01 THOUSAND/UL (ref 0–0.2)
IMM GRANULOCYTES NFR BLD AUTO: 0 % (ref 0–2)
LYMPHOCYTES # BLD AUTO: 1.13 THOUSANDS/ΜL (ref 0.6–4.47)
LYMPHOCYTES NFR BLD AUTO: 20 % (ref 14–44)
MCH RBC QN AUTO: 30.9 PG (ref 26.8–34.3)
MCHC RBC AUTO-ENTMCNC: 32.4 G/DL (ref 31.4–37.4)
MCV RBC AUTO: 95 FL (ref 82–98)
MONOCYTES # BLD AUTO: 0.54 THOUSAND/ΜL (ref 0.17–1.22)
MONOCYTES NFR BLD AUTO: 10 % (ref 4–12)
NEUTROPHILS # BLD AUTO: 3.78 THOUSANDS/ΜL (ref 1.85–7.62)
NEUTS SEG NFR BLD AUTO: 67 % (ref 43–75)
NRBC BLD AUTO-RTO: 0 /100 WBCS
PLATELET # BLD AUTO: 219 THOUSANDS/UL (ref 149–390)
PMV BLD AUTO: 10.8 FL (ref 8.9–12.7)
POTASSIUM SERPL-SCNC: 4.3 MMOL/L (ref 3.5–5.3)
PROT SERPL-MCNC: 7 G/DL (ref 6.4–8.4)
RBC # BLD AUTO: 5.02 MILLION/UL (ref 3.81–5.12)
SODIUM SERPL-SCNC: 136 MMOL/L (ref 135–147)
WBC # BLD AUTO: 5.59 THOUSAND/UL (ref 4.31–10.16)

## 2025-02-10 PROCEDURE — 85025 COMPLETE CBC W/AUTO DIFF WBC: CPT

## 2025-02-10 PROCEDURE — 36415 COLL VENOUS BLD VENIPUNCTURE: CPT

## 2025-02-10 PROCEDURE — 80053 COMPREHEN METABOLIC PANEL: CPT

## 2025-02-10 PROCEDURE — 86301 IMMUNOASSAY TUMOR CA 19-9: CPT

## 2025-02-11 LAB — CANCER AG19-9 SERPL-ACNC: <2 U/ML (ref 0–35)

## 2025-02-17 ENCOUNTER — OFFICE VISIT (OUTPATIENT)
Dept: HEMATOLOGY ONCOLOGY | Facility: CLINIC | Age: 70
End: 2025-02-17
Payer: MEDICARE

## 2025-02-17 VITALS
TEMPERATURE: 98 F | OXYGEN SATURATION: 98 % | BODY MASS INDEX: 19.1 KG/M2 | DIASTOLIC BLOOD PRESSURE: 60 MMHG | HEIGHT: 58 IN | RESPIRATION RATE: 16 BRPM | HEART RATE: 78 BPM | WEIGHT: 91 LBS | SYSTOLIC BLOOD PRESSURE: 110 MMHG

## 2025-02-17 DIAGNOSIS — C25.9 PANCREATIC ADENOCARCINOMA (HCC): Primary | ICD-10-CM

## 2025-02-17 DIAGNOSIS — K90.3 PANCREATIC STEATORRHEA: ICD-10-CM

## 2025-02-17 PROCEDURE — 99215 OFFICE O/P EST HI 40 MIN: CPT | Performed by: PHYSICIAN ASSISTANT

## 2025-02-17 PROCEDURE — G2211 COMPLEX E/M VISIT ADD ON: HCPCS | Performed by: PHYSICIAN ASSISTANT

## 2025-02-17 PROCEDURE — G2212 PROLONG OUTPT/OFFICE VIS: HCPCS | Performed by: PHYSICIAN ASSISTANT

## 2025-02-17 RX ORDER — LANCETS 30 GAUGE
EACH MISCELLANEOUS DAILY
COMMUNITY
Start: 2024-12-02

## 2025-02-17 NOTE — PATIENT INSTRUCTIONS
[facility] Medical Oncology and Hematology Team  [place] - [phone]    Your Team Member:  Advanced Practitioner:  Eboni Macias PA-C    Please answer Private and Unavailable Calls - this may be your team(s) contacting you.  If you have medical questions/concerns/issues - contact us either by (1) My Chart (2) [facility]

## 2025-02-17 NOTE — PROGRESS NOTES
Name: Manju Holland      : 1955      MRN: [id number]  Encounter Provider: Eboni Macias PA-C  Encounter Date: 2025   Encounter department: [facility]  :  Assessment & Plan  Pancreatic adenocarcinoma (HCC)  Pathologic stage from 10/20/2020: Stage IIB (ypT2, pN1, cM0) - Signed by Eboni Macias PA-C on 2023  Stage prefix: Post-therapy  Response to neoadjuvant therapy: Partial response  Total positive nodes: 1  Total nodes examined: 14  Histologic grade (G): G2  Histologic grading system: 3 grade system  Residual tumor (R): R0 - None  Tumor size (mm): 23  Lymph-vascular invasion (LVI): LVI not present (absent)/not identified  Carbohydrate antigen 19-9 (CA 19-9) (U/mL): 61    This is a 69-year-old female with history of stage IIb pancreatic cancer status post been chemotherapy followed by Whipple procedure, followed by radiation and adjuvant chemotherapy to remission.  Patient continues to have some side effects of the procedure including pancreatic insufficiency for which she has adjusted her diet as previous pancreatic enzyme does not seem to help because abdominal pain.    Patient's CA 19-9 is less than 0.2 and has been throughout her survivorship.  Patient is approaching 5 years in 2025.  Recommendations are for the patient to follow-up in 6 months with blood work prior.  Imaging is being completed by surgical oncology for which the patient still follows with up in [place].  Patient will likely be moving in the next year down to [place] to follow her children and grandchildren.  I remain available to the patient and family if any questions arise during this move.    Orders:    CBC and differential; Future    Comprehensive metabolic panel; Future    Cancer antigen 19-9; Future    Pancreatic steatorrhea    Orders:    CBC and differential; Future    Comprehensive metabolic panel; Future    Cancer antigen 19-9; Future        Return  in about 6 months (around 8/17/2025) for Labs, [facility] Office Visit.    History of Present Illness   Chief Complaint   Patient presents with    Follow-up     Oncology History   Cancer Staging   Personal history of pancreatic cancer  Staging form: Pancreas, AJCC 8th Edition  - Pathologic stage from 10/20/2020: Stage IIB (ypT2, pN1, cM0) - Signed by Eboni Macias PA-C on 7/21/2023  Stage prefix: Post-therapy  Response to neoadjuvant therapy: Partial response  Total positive nodes: 1  Total nodes examined: 14  Histologic grade (G): G2  Histologic grading system: 3 grade system  Residual tumor (R): R0 - None  Tumor size (mm): 23  Lymph-vascular invasion (LVI): LVI not present (absent)/not identified  Carbohydrate antigen 19-9 (CA 19-9) (U/mL): 61  Oncology History   Encounter for follow-up surveillance of pancreatic cancer   Personal history of pancreatic cancer   5/29/2020 Initial Diagnosis    Patient had new onset of painless jaundice and right upper quadrant this underwent ultrasound of the gallbladder which demonstrated a 2 x 2 centimeter soft tissue mass in the head of the pancreas with a dilated common bile duct.    5/29/2020 CT chest abdomen and pelvis with contrast   Suspected mass at the pancreatic head measuring approximately 2.4 cm resulting in distal biliary obstruction.  Differential considerations include pancreatic adenocarcinoma.    6/1/2020 MRI ABDw w/o:  1.  1.8 x 2.1 cm solid mass in the pancreatic head with dilatation of the main pancreatic duct.  2.  Obstruction of the CBD causing intrahepatic and extrahepatic bile duct dilatation.  3.  No evidence of peripancreatic lymphadenopathy or other intra-abdominal metastases.       6/2/2020 Biopsy    EUS- Dr. Jett:  Pancreas, uncinate mass:      - Malignant (PSC Category VI)      - Compatible with adenocarcinoma with mucinous features.     6/30/2020 - 8/10/2020 Chemotherapy    Folfirinox + neulasta  Completed 3 cycles before becoming  intolarant of irinotecan- infusion reaction.        8/11/2020 - 9/10/2020 Chemotherapy    Neoadjuvant Folfox + neulasta  x 3 more cycles.        10/20/2020 Surgery    Whipple:  - Residual invasive adenocarcinoma with mucinous features, 2.3 cm.  - Metastatic carcinoma present in one of fourteen lymph nodes (1/14).  - All margins are negative for tumor.        10/20/2020 -  Cancer Staged    Staging form: Pancreas, AJCC 8th Edition  - Pathologic stage from 10/20/2020: Stage IIB (ypT2, pN1, cM0) - Signed by Eboni Macias PA-C on 7/21/2023  Stage prefix: Post-therapy  Response to neoadjuvant therapy: Partial response  Total positive nodes: 1  Total nodes examined: 14  Histologic grade (G): G2  Histologic grading system: 3 grade system  Residual tumor (R): R0 - None  Tumor size (mm): 23  Lymph-vascular invasion (LVI): LVI not present (absent)/not identified  Carbohydrate antigen 19-9 (CA 19-9) (U/mL): 61       12/21/2020 - 1/26/2021 Radiation    5000 cGy in 25 fractions to high risk areas  Dr Aden     2/8/2021 - 4/4/2021 Chemotherapy    Folfox + Neulasta  X 4 more cycles.      4/5/2021 Remission    CT CAP  1. No evidence of metastatic disease in the thorax.  Long segment of mild esophageal wall thickening which should be correlated for suspicion of esophagitis.  2. Inflammatory changes /small amount of fluid noted in the region of the nicky hepatis and surgical bed without evidence to suggest enlarging pancreatic head mass.  The pancreatic duct is no longer dilated.  3. Stomach is no longer distended.  Continued findings suggesting nonspecific inflammatory changes in the region of the gastrojejunal anastomosis  4. Hepatic steatosis.  Hepatic hypodense nodule likely fluid within a fissure which should be carefully reassessed during follow-up.  No discrete evidence of hepatic metastasis  5. Trace amount of perihepatic ascites.  No suspicious adenopathy    CA 19-9 trends:  Presurgery: 9/5/2020-61  Post surgery/post chemo:  "4/10/2021-23 8/12/2021: 2  12/11/2021: 3  2/17/22-7/14/2023: <2        Pertinent Medical History     02/17/25: Patient notes that she is feeling well.  No major issues.  Weight is stable, last weight during the summer was 87 pounds more consistent with her low end of normal.  Patient is eating, crafting, spending time with family and friends.  Patient is enjoying a good quality of life.     Review of Systems   Constitutional:  Negative for appetite change, fatigue, fever and unexpected weight change.   HENT:  Negative for nosebleeds.    Respiratory:  Negative for cough, choking and shortness of breath.         Negative hemoptysis.   Cardiovascular:  Negative for chest pain, palpitations and leg swelling.   Gastrointestinal: Negative.  Negative for abdominal distention, abdominal pain, anal bleeding, blood in stool, constipation, diarrhea, nausea and vomiting.   Endocrine: Negative.  Negative for cold intolerance.   Genitourinary: Negative.  Negative for hematuria, menstrual problem, vaginal bleeding, vaginal discharge and vaginal pain.   Musculoskeletal: Negative.  Negative for arthralgias, myalgias, neck pain and neck stiffness.   Skin: Negative.  Negative for color change, pallor and rash.   Allergic/Immunologic: Negative.  Negative for immunocompromised state.   Neurological: Negative.  Negative for weakness and headaches.   Hematological:  Negative for adenopathy. Does not bruise/bleed easily.   All other systems reviewed and are negative.      Objective   /60 (Patient Position: Sitting, Cuff Size: Adult)   Pulse 78   Temp 98 °F (36.7 °C) (Temporal)   Resp 16   Ht 4' 10\" (1.473 m)   Wt 41.3 kg (91 lb) Comment: 87 lbs per pt this morning  SpO2 98%   BMI 19.02 kg/m²     Pain Screening:  Pain Score: 0-No pain    Physical Exam  Constitutional:       General: She is not in acute distress.     Appearance: She is well-developed.   HENT:      Head: Normocephalic and atraumatic.   Eyes:      General: No " scleral icterus.     Conjunctiva/sclera: Conjunctivae normal.   Cardiovascular:      Rate and Rhythm: Normal rate.   Pulmonary:      Effort: Pulmonary effort is normal. No respiratory distress.   Abdominal:      General: Abdomen is flat. Bowel sounds are increased.      Tenderness: There is abdominal tenderness.   Skin:     General: Skin is warm.      Coloration: Skin is not pale.      Findings: No rash.   Neurological:      Mental Status: She is alert and oriented to person, place, and time.   Psychiatric:         Thought Content: Thought content normal.         Labs: I have reviewed the following labs:    CA 19-9   Date Value Ref Range Status   02/10/2025 <2 0 - 35 U/mL Final     Comment:     [employer] Electrochemiluminescence Immunoassay (ECLIA)  Values obtained with different assay methods or kits cannot be  used interchangeably.  Results cannot be interpreted as absolute  evidence of the presence or absence of malignant disease.   09/30/2024 <2 0 - 35 U/mL Final     Comment:     [employer] Electrochemiluminescence Immunoassay (ECLIA)  Values obtained with different assay methods or kits cannot be  used interchangeably.  Results cannot be interpreted as absolute  evidence of the presence or absence of malignant disease.   08/14/2024 <2 0 - 35 U/mL Final     Comment:     [employer] Electrochemiluminescence Immunoassay (ECLIA)  Values obtained with different assay methods or kits cannot be  used interchangeably.  Results cannot be interpreted as absolute  evidence of the presence or absence of malignant disease.   03/25/2024 <2 0 - 35 U/mL Final     Comment:     [employer] Electrochemiluminescence Immunoassay (ECLIA)  Values obtained with different assay methods or kits cannot be  used interchangeably.  Results cannot be interpreted as absolute  evidence of the presence or absence of malignant disease.       Results for orders placed or performed in visit on 02/10/25   Cancer antigen  19-9   Result Value Ref Range    CA 19-9 <2 0 - 35 U/mL   CBC and differential   Result Value Ref Range    WBC 5.59 4.31 - 10.16 Thousand/uL    RBC 5.02 3.81 - 5.12 Million/uL    Hemoglobin 15.5 (H) 11.5 - 15.4 g/dL    Hematocrit 47.8 (H) 34.8 - 46.1 %    MCV 95 82 - 98 fL    MCH 30.9 26.8 - 34.3 pg    MCHC 32.4 31.4 - 37.4 g/dL    RDW 12.4 11.6 - 15.1 %    MPV 10.8 8.9 - 12.7 fL    Platelets 219 149 - 390 Thousands/uL    nRBC 0 /100 WBCs    Segmented % 67 43 - 75 %    Immature Grans % 0 0 - 2 %    Lymphocytes % 20 14 - 44 %    Monocytes % 10 4 - 12 %    Eosinophils Relative 2 0 - 6 %    Basophils Relative 1 0 - 1 %    Absolute Neutrophils 3.78 1.85 - 7.62 Thousands/µL    Absolute Immature Grans 0.01 0.00 - 0.20 Thousand/uL    Absolute Lymphocytes 1.13 0.60 - 4.47 Thousands/µL    Absolute Monocytes 0.54 0.17 - 1.22 Thousand/µL    Eosinophils Absolute 0.10 0.00 - 0.61 Thousand/µL    Basophils Absolute 0.03 0.00 - 0.10 Thousands/µL   Comprehensive metabolic panel   Result Value Ref Range    Sodium 136 135 - 147 mmol/L    Potassium 4.3 3.5 - 5.3 mmol/L    Chloride 101 96 - 108 mmol/L    CO2 30 21 - 32 mmol/L    ANION GAP 5 4 - 13 mmol/L    BUN 10 5 - 25 mg/dL    Creatinine 0.88 0.60 - 1.30 mg/dL    Glucose, Fasting 133 (H) 65 - 99 mg/dL    Calcium 9.2 8.4 - 10.2 mg/dL    AST 29 13 - 39 U/L    ALT 30 7 - 52 U/L    Alkaline Phosphatase 77 34 - 104 U/L    Total Protein 7.0 6.4 - 8.4 g/dL    Albumin 4.2 3.5 - 5.0 g/dL    Total Bilirubin 0.68 0.20 - 1.00 mg/dL    eGFR 66 ml/min/1.73sq m      Administrative Statements   Encounter provider Eboni Macias PA-C  I have spent a total time of 65 minutes in caring for this patient on the day of the visit/encounter including Patient and family education, Counseling / Coordination of care, Documenting in the medical record, Reviewing/placing orders in the medical record (including tests, medications, and/or procedures), and Obtaining or reviewing history  .

## 2025-02-17 NOTE — ASSESSMENT & PLAN NOTE
Pathologic stage from 10/20/2020: Stage IIB (ypT2, pN1, cM0) - Signed by Eboni Macias PA-C on 7/21/2023  Stage prefix: Post-therapy  Response to neoadjuvant therapy: Partial response  Total positive nodes: 1  Total nodes examined: 14  Histologic grade (G): G2  Histologic grading system: 3 grade system  Residual tumor (R): R0 - None  Tumor size (mm): 23  Lymph-vascular invasion (LVI): LVI not present (absent)/not identified  Carbohydrate antigen 19-9 (CA 19-9) (U/mL): 61    This is a 69-year-old female with history of stage IIb pancreatic cancer status post been chemotherapy followed by Whipple procedure, followed by radiation and adjuvant chemotherapy to remission.  Patient continues to have some side effects of the procedure including pancreatic insufficiency for which she has adjusted her diet as previous pancreatic enzyme does not seem to help because abdominal pain.    Patient's CA 19-9 is less than 0.2 and has been throughout her survivorship.  Patient is approaching 5 years in October 2025.  Recommendations are for the patient to follow-up in 6 months with blood work prior.  Imaging is being completed by surgical oncology for which the patient still follows with up in [place].  Patient will likely be moving in the next year down to [place] to follow her children and grandchildren.  I remain available to the patient and family if any questions arise during this move.    Orders:    CBC and differential; Future    Comprehensive metabolic panel; Future    Cancer antigen 19-9; Future

## 2025-02-19 ENCOUNTER — TELEPHONE (OUTPATIENT)
Dept: NEPHROLOGY | Facility: CLINIC | Age: 70
End: 2025-02-19

## 2025-02-28 ENCOUNTER — APPOINTMENT (OUTPATIENT)
Dept: LAB | Facility: HOSPITAL | Age: 70
End: 2025-02-28
Payer: MEDICARE

## 2025-02-28 DIAGNOSIS — N18.31 STAGE 3A CHRONIC KIDNEY DISEASE (HCC): ICD-10-CM

## 2025-02-28 LAB
ALBUMIN SERPL BCG-MCNC: 4.2 G/DL (ref 3.5–5)
ALP SERPL-CCNC: 69 U/L (ref 34–104)
ALT SERPL W P-5'-P-CCNC: 31 U/L (ref 7–52)
ANION GAP SERPL CALCULATED.3IONS-SCNC: 6 MMOL/L (ref 4–13)
AST SERPL W P-5'-P-CCNC: 25 U/L (ref 13–39)
BASOPHILS # BLD AUTO: 0.02 THOUSANDS/ÂΜL (ref 0–0.1)
BASOPHILS NFR BLD AUTO: 0 % (ref 0–1)
BILIRUB SERPL-MCNC: 0.45 MG/DL (ref 0.2–1)
BUN SERPL-MCNC: 17 MG/DL (ref 5–25)
CALCIUM SERPL-MCNC: 9.6 MG/DL (ref 8.4–10.2)
CHLORIDE SERPL-SCNC: 96 MMOL/L (ref 96–108)
CO2 SERPL-SCNC: 32 MMOL/L (ref 21–32)
CREAT SERPL-MCNC: 0.76 MG/DL (ref 0.6–1.3)
EOSINOPHIL # BLD AUTO: 0.1 THOUSAND/ÂΜL (ref 0–0.61)
EOSINOPHIL NFR BLD AUTO: 2 % (ref 0–6)
ERYTHROCYTE [DISTWIDTH] IN BLOOD BY AUTOMATED COUNT: 12.1 % (ref 11.6–15.1)
GFR SERPL CREATININE-BSD FRML MDRD: 79 ML/MIN/1.73SQ M
GLUCOSE P FAST SERPL-MCNC: 130 MG/DL (ref 65–99)
HCT VFR BLD AUTO: 46 % (ref 34.8–46.1)
HGB BLD-MCNC: 14.8 G/DL (ref 11.5–15.4)
IMM GRANULOCYTES # BLD AUTO: 0.01 THOUSAND/UL (ref 0–0.2)
IMM GRANULOCYTES NFR BLD AUTO: 0 % (ref 0–2)
LYMPHOCYTES # BLD AUTO: 1.36 THOUSANDS/ÂΜL (ref 0.6–4.47)
LYMPHOCYTES NFR BLD AUTO: 20 % (ref 14–44)
MCH RBC QN AUTO: 30.5 PG (ref 26.8–34.3)
MCHC RBC AUTO-ENTMCNC: 32.2 G/DL (ref 31.4–37.4)
MCV RBC AUTO: 95 FL (ref 82–98)
MONOCYTES # BLD AUTO: 0.66 THOUSAND/ÂΜL (ref 0.17–1.22)
MONOCYTES NFR BLD AUTO: 10 % (ref 4–12)
NEUTROPHILS # BLD AUTO: 4.67 THOUSANDS/ÂΜL (ref 1.85–7.62)
NEUTS SEG NFR BLD AUTO: 68 % (ref 43–75)
NRBC BLD AUTO-RTO: 0 /100 WBCS
PHOSPHATE SERPL-MCNC: 3.7 MG/DL (ref 2.3–4.1)
PLATELET # BLD AUTO: 215 THOUSANDS/UL (ref 149–390)
PMV BLD AUTO: 10.8 FL (ref 8.9–12.7)
POTASSIUM SERPL-SCNC: 4.4 MMOL/L (ref 3.5–5.3)
PROT SERPL-MCNC: 6.8 G/DL (ref 6.4–8.4)
RBC # BLD AUTO: 4.86 MILLION/UL (ref 3.81–5.12)
SODIUM SERPL-SCNC: 134 MMOL/L (ref 135–147)
WBC # BLD AUTO: 6.82 THOUSAND/UL (ref 4.31–10.16)

## 2025-02-28 PROCEDURE — 80053 COMPREHEN METABOLIC PANEL: CPT

## 2025-02-28 PROCEDURE — 36415 COLL VENOUS BLD VENIPUNCTURE: CPT

## 2025-02-28 PROCEDURE — 83970 ASSAY OF PARATHORMONE: CPT

## 2025-02-28 PROCEDURE — 85025 COMPLETE CBC W/AUTO DIFF WBC: CPT

## 2025-02-28 PROCEDURE — 84100 ASSAY OF PHOSPHORUS: CPT

## 2025-02-28 PROCEDURE — 82306 VITAMIN D 25 HYDROXY: CPT

## 2025-03-01 LAB
25(OH)D3 SERPL-MCNC: 44.8 NG/ML (ref 30–100)
PTH-INTACT SERPL-MCNC: 31.6 PG/ML (ref 12–88)

## 2025-03-05 ENCOUNTER — TELEPHONE (OUTPATIENT)
Dept: NEPHROLOGY | Facility: CLINIC | Age: 70
End: 2025-03-05

## 2025-03-05 NOTE — TELEPHONE ENCOUNTER
[facility] @ [phone] and spoke with Chalino POTTS (customer Service) and as per Chalino POTTS patient is currently active since 03/01/2028 and still active. Reference # for this call is [id number]

## 2025-03-05 NOTE — TELEPHONE ENCOUNTER
I called [employer] @ [phone] to check eligible for the patient and as of 3/5/2025 the patient has current active coverage as of 3/1/2018. According to Laura () [employer] is a Medicare Supplement Plan G and they follow Medicare Guidelines; if Medicare pays then [employer] Supplement plan will pay; if Medicare denies then [employer] will deny. The reference number for this call is: [id number]. Flaca Rodriguez, .

## 2025-03-06 ENCOUNTER — OFFICE VISIT (OUTPATIENT)
Dept: NEPHROLOGY | Facility: CLINIC | Age: 70
End: 2025-03-06
Payer: MEDICARE

## 2025-03-06 VITALS
BODY MASS INDEX: 18.14 KG/M2 | HEART RATE: 64 BPM | DIASTOLIC BLOOD PRESSURE: 74 MMHG | HEIGHT: 59 IN | OXYGEN SATURATION: 99 % | SYSTOLIC BLOOD PRESSURE: 110 MMHG | WEIGHT: 90 LBS | TEMPERATURE: 96.9 F | RESPIRATION RATE: 16 BRPM

## 2025-03-06 DIAGNOSIS — N18.2 CKD (CHRONIC KIDNEY DISEASE) STAGE 2, GFR 60-89 ML/MIN: Primary | ICD-10-CM

## 2025-03-06 DIAGNOSIS — C25.9 PANCREATIC ADENOCARCINOMA (HCC): ICD-10-CM

## 2025-03-06 DIAGNOSIS — E13.69 OTHER SPECIFIED DIABETES MELLITUS WITH OTHER SPECIFIED COMPLICATION, UNSPECIFIED WHETHER LONG TERM INSULIN USE (HCC): ICD-10-CM

## 2025-03-06 DIAGNOSIS — N18.6 END STAGE RENAL DISEASE (HCC): ICD-10-CM

## 2025-03-06 DIAGNOSIS — I10 ESSENTIAL HYPERTENSION: ICD-10-CM

## 2025-03-06 PROCEDURE — G2211 COMPLEX E/M VISIT ADD ON: HCPCS | Performed by: INTERNAL MEDICINE

## 2025-03-06 PROCEDURE — 99214 OFFICE O/P EST MOD 30 MIN: CPT | Performed by: INTERNAL MEDICINE

## 2025-03-06 NOTE — PROGRESS NOTES
Name: Manju Holland      : 1955      MRN: [id number]  Encounter Provider: Hoang Esteban MD  Encounter Date: 3/6/2025   Encounter department: [facility]  :  Assessment & Plan  CKD (chronic kidney disease) stage 2, GFR 60-89 ml/min  Lab Results   Component Value Date    EGFR 79 2025    EGFR 66 02/10/2025    EGFR 63 2024    CREATININE 0.76 2025    CREATININE 0.88 02/10/2025    CREATININE 0.91 2024       Orders:    Albumin; Standing    Albumin / creatinine urine ratio; Standing    Calcium; Standing    Comprehensive metabolic panel; Standing    Urinalysis with microscopic; Future    Etiology of CKD has been hypertensive kidney disease, diabetic glomerulosclerosis, multiple contrast exposures in her lifetime.  Fortunately her renal function has improved with EGFR status 79.  Serum creatinine 0.76 mg/dL  Imaging of the kidney had revealed probable simple cyst in the kidney  Essential hypertension  Blood pressure is 110/74 while on amlodipine 2.5 mg daily.       Pancreatic adenocarcinoma (HCC)  Diagnosed with pancreatic cancer approximately 5 years ago and is status post Whipple's procedure followed by chemotherapy and radiation therapy.           History of Present Illness   HPI  Manju Holland is a 69 y.o. female who presents to renal office for management of elevated renal parameters.  Patient remains pancreatic cancer recurrence free.  She had been receiving CT scan with IV contrast and had been receiving pre and post hydration.  History obtained from: patient    Review of Systems   Constitutional: Negative.    HENT: Negative.     Eyes: Negative.    Respiratory: Negative.     Cardiovascular: Negative.    Gastrointestinal: Negative.    Endocrine: Negative.    Genitourinary: Negative.    Musculoskeletal: Negative.    Skin: Negative.    Allergic/Immunologic: Negative.    Neurological: Negative.    Hematological: Negative.    All other systems  "reviewed and are negative.    Medical History Reviewed by provider this encounter:     .     Objective   /74 (Patient Position: Sitting, Cuff Size: Standard)   Pulse 64   Temp (!) 96.9 °F (36.1 °C) (Temporal)   Resp 16   Ht 4' 11\" (1.499 m)   Wt 40.8 kg (90 lb)   SpO2 99%   BMI 18.18 kg/m²      Physical Exam  Constitutional:       Appearance: She is well-developed.   HENT:      Head: Normocephalic and atraumatic.   Eyes:      Pupils: Pupils are equal, round, and reactive to light.   Cardiovascular:      Rate and Rhythm: Normal rate and regular rhythm.      Heart sounds: Normal heart sounds.   Pulmonary:      Effort: Pulmonary effort is normal.   Abdominal:      General: Bowel sounds are normal.      Palpations: Abdomen is soft.   Musculoskeletal:         General: Normal range of motion.      Cervical back: Neck supple.   Skin:     General: Skin is warm.   Neurological:      Mental Status: She is alert and oriented to person, place, and time.         Administrative Statements   I have spent a total time of 43 minutes in caring for this patient on the day of the visit/encounter including Diagnostic results, Prognosis, Risks and benefits of tx options, Instructions for management, Patient and family education, Importance of tx compliance, Risk factor reductions, Impressions, Counseling / Coordination of care, Documenting in the medical record, and Obtaining or reviewing history  .  "

## 2025-03-06 NOTE — ASSESSMENT & PLAN NOTE
Diagnosed with pancreatic cancer approximately 5 years ago and is status post Whipple's procedure followed by chemotherapy and radiation therapy.

## 2025-04-08 ENCOUNTER — APPOINTMENT (OUTPATIENT)
Dept: LAB | Facility: HOSPITAL | Age: 70
End: 2025-04-08
Payer: MEDICARE

## 2025-04-08 DIAGNOSIS — Z85.07 PERSONAL HISTORY OF PANCREATIC CANCER: ICD-10-CM

## 2025-04-08 LAB
BUN SERPL-MCNC: 19 MG/DL (ref 5–25)
CREAT SERPL-MCNC: 0.88 MG/DL (ref 0.6–1.3)
GFR SERPL CREATININE-BSD FRML MDRD: 65 ML/MIN/1.73SQ M

## 2025-04-08 PROCEDURE — 84520 ASSAY OF UREA NITROGEN: CPT

## 2025-04-08 PROCEDURE — 36415 COLL VENOUS BLD VENIPUNCTURE: CPT

## 2025-04-08 PROCEDURE — 86301 IMMUNOASSAY TUMOR CA 19-9: CPT

## 2025-04-08 PROCEDURE — 82565 ASSAY OF CREATININE: CPT

## 2025-04-09 LAB — CANCER AG19-9 SERPL-ACNC: <2 U/ML (ref 0–35)

## 2025-04-10 RX ORDER — SODIUM CHLORIDE 9 MG/ML
150 INJECTION, SOLUTION INTRAVENOUS CONTINUOUS
Status: CANCELLED | OUTPATIENT
Start: 2025-04-14

## 2025-04-14 ENCOUNTER — HOSPITAL ENCOUNTER (OUTPATIENT)
Dept: CT IMAGING | Facility: HOSPITAL | Age: 70
Discharge: HOME | End: 2025-04-14
Payer: MEDICARE

## 2025-04-14 ENCOUNTER — HOSPITAL ENCOUNTER (OUTPATIENT)
Dept: INFUSION CENTER | Facility: CLINIC | Age: 70
Discharge: HOME | End: 2025-04-14
Payer: MEDICARE

## 2025-04-14 VITALS
TEMPERATURE: 98.6 F | SYSTOLIC BLOOD PRESSURE: 127 MMHG | HEART RATE: 65 BPM | DIASTOLIC BLOOD PRESSURE: 59 MMHG | RESPIRATION RATE: 18 BRPM

## 2025-04-14 DIAGNOSIS — Z85.07 ENCOUNTER FOR FOLLOW-UP SURVEILLANCE OF PANCREATIC CANCER: Primary | ICD-10-CM

## 2025-04-14 DIAGNOSIS — Z08 ENCOUNTER FOR FOLLOW-UP SURVEILLANCE OF PANCREATIC CANCER: Primary | ICD-10-CM

## 2025-04-14 DIAGNOSIS — C25.9 PANCREATIC ADENOCARCINOMA (HCC): ICD-10-CM

## 2025-04-14 DIAGNOSIS — Z85.07 PERSONAL HISTORY OF PANCREATIC CANCER: ICD-10-CM

## 2025-04-14 DIAGNOSIS — N18.32 STAGE 3B CHRONIC KIDNEY DISEASE (HCC): ICD-10-CM

## 2025-04-14 PROCEDURE — 71260 CT THORAX DX C+: CPT

## 2025-04-14 PROCEDURE — 74177 CT ABD & PELVIS W/CONTRAST: CPT

## 2025-04-14 RX ORDER — SODIUM CHLORIDE 9 MG/ML
150 INJECTION, SOLUTION INTRAVENOUS CONTINUOUS
Status: DISPENSED | OUTPATIENT
Start: 2025-04-14 | End: 2025-04-14

## 2025-04-14 RX ORDER — SODIUM CHLORIDE 9 MG/ML
150 INJECTION, SOLUTION INTRAVENOUS CONTINUOUS
Status: CANCELLED | OUTPATIENT
Start: 2025-04-14

## 2025-04-14 RX ORDER — SODIUM CHLORIDE 9 MG/ML
150 INJECTION, SOLUTION INTRAVENOUS CONTINUOUS
Status: DISCONTINUED | OUTPATIENT
Start: 2025-04-14 | End: 2025-04-14 | Stop reason: HOSPADM

## 2025-04-14 RX ADMIN — IOHEXOL 85 ML: 350 INJECTION, SOLUTION INTRAVENOUS at 11:38

## 2025-04-14 RX ADMIN — SODIUM CHLORIDE 150 ML/HR: 0.9 INJECTION, SOLUTION INTRAVENOUS at 11:55

## 2025-04-14 RX ADMIN — SODIUM CHLORIDE 150 ML/HR: 0.9 INJECTION, SOLUTION INTRAVENOUS at 08:15

## 2025-04-14 NOTE — PROGRESS NOTES
Patient arrives to infusion center for Pre and Post imaging hydration. Patient offers no complaints today. PIV established. Patient tolerated entire infusion without complication. PIV removed. AVS offered.     Next appointment: no future appointments patient has completed treatment

## 2025-04-28 ENCOUNTER — OFFICE VISIT (OUTPATIENT)
Dept: SURGICAL ONCOLOGY | Facility: CLINIC | Age: 70
End: 2025-04-28
Payer: MEDICARE

## 2025-04-28 VITALS
TEMPERATURE: 97.3 F | HEART RATE: 53 BPM | SYSTOLIC BLOOD PRESSURE: 124 MMHG | DIASTOLIC BLOOD PRESSURE: 80 MMHG | OXYGEN SATURATION: 99 % | BODY MASS INDEX: 18.87 KG/M2 | WEIGHT: 93.6 LBS | RESPIRATION RATE: 15 BRPM | HEIGHT: 59 IN

## 2025-04-28 DIAGNOSIS — Z08 ENCOUNTER FOR FOLLOW-UP SURVEILLANCE OF PANCREATIC CANCER: Primary | ICD-10-CM

## 2025-04-28 DIAGNOSIS — Z85.07 PERSONAL HISTORY OF PANCREATIC CANCER: ICD-10-CM

## 2025-04-28 DIAGNOSIS — Z85.07 ENCOUNTER FOR FOLLOW-UP SURVEILLANCE OF PANCREATIC CANCER: Primary | ICD-10-CM

## 2025-04-28 PROCEDURE — 99213 OFFICE O/P EST LOW 20 MIN: CPT

## 2025-04-28 PROCEDURE — G2211 COMPLEX E/M VISIT ADD ON: HCPCS

## 2025-04-28 NOTE — LETTER
2025     Burton Kapoor MD  [address] [facility]  2nd Floor  [facility] [place] [address]    Patient: Manju Holland   YOB: 1955   Date of Visit: 2025       Dear Dr. Burton Kapoor MD:    Thank you for referring Manju Holland to me for evaluation. Below are my notes for this consultation.    If you have questions, please do not hesitate to call me. I look forward to following your patient along with you.         Sincerely,        GREG Cody        CC: No Recipients    GREG Cody  2025 10:35 AM  Sign when Signing Visit  Name: Manju Holland      : 1955      MRN: [id number]  Encounter Provider: GREG Cody  Encounter Date: 2025   Encounter department: [facility] SURGICAL ONCOLOGY [facility]  :  Assessment & Plan  Encounter for follow-up surveillance of pancreatic cancer         Personal history of pancreatic cancer  Patient is doing well and there is no evidence of disease recurrence on imaging, and her tumor marker remains low.  She has several stable findings on imaging, which I did review with the patient and her  today. I will see her again in 6 months with repeat CT and Ca 19-9 level.  With regard to her GI complaints, she does appear to be experiencing pancreatic insufficiency. I have provided her with information for an OTC enzyme, Lypogold, which should be affordable for her and will hopefully improve her symptoms.      Orders:  •  CT chest abdomen pelvis w contrast; Future  •  BUN; Future  •  Creatinine, serum; Future  •  Cancer antigen 19-9; Future        History of Present Illness  Chief Complaint   Patient presents with   • Follow-up     Return in about 6 months (around 10/28/2025) for Office Visit, Imaging - See orders, Labs - See Treatment Plan.    Pertinent Medical History  This is a 69 y/o female who returns to the office today in follow-up for her history of pancreas cancer.  She is currently SATISH  at 4-1/2 years and doing very well.  She reports her appetite is good and she has been able to gain a few pounds. She does report some occasional digestive issues, such as oily stools and fecal urgency.  She has tried Creon in the past but was unable to tolerate the side effects. She also reports the prescription enzymes were extremely expensive and not affordable for her. She does not currently take any enzymes.  She denies headaches, dizziness, SOB or cough.  CT was performed on April 14, and a Ca 19-9 level has been drawn. I have reviewed these results with the patient and her  today.      Oncology History  Cancer Staging   Personal history of pancreatic cancer  Staging form: Pancreas, AJCC 8th Edition  - Pathologic stage from 10/20/2020: Stage IIB (ypT2, pN1, cM0) - Signed by Eboni Macias PA-C on 7/21/2023  Stage prefix: Post-therapy  Response to neoadjuvant therapy: Partial response  Total positive nodes: 1  Total nodes examined: 14  Histologic grade (G): G2  Histologic grading system: 3 grade system  Residual tumor (R): R0 - None  Tumor size (mm): 23  Lymph-vascular invasion (LVI): LVI not present (absent)/not identified  Carbohydrate antigen 19-9 (CA 19-9) (U/mL): 61  Oncology History   Encounter for follow-up surveillance of pancreatic cancer   Personal history of pancreatic cancer   5/29/2020 Initial Diagnosis    Patient had new onset of painless jaundice and right upper quadrant this underwent ultrasound of the gallbladder which demonstrated a 2 x 2 centimeter soft tissue mass in the head of the pancreas with a dilated common bile duct.    5/29/2020 CT chest abdomen and pelvis with contrast   Suspected mass at the pancreatic head measuring approximately 2.4 cm resulting in distal biliary obstruction.  Differential considerations include pancreatic adenocarcinoma.    6/1/2020 MRI ABDw w/o:  1.  1.8 x 2.1 cm solid mass in the pancreatic head with dilatation of the main pancreatic duct.  2.   Obstruction of the CBD causing intrahepatic and extrahepatic bile duct dilatation.  3.  No evidence of peripancreatic lymphadenopathy or other intra-abdominal metastases.       6/2/2020 Biopsy    EUS- Dr. Jett:  Pancreas, uncinate mass:      - Malignant (PSC Category VI)      - Compatible with adenocarcinoma with mucinous features.     6/30/2020 - 8/10/2020 Chemotherapy    Folfirinox + neulasta  Completed 3 cycles before becoming intolarant of irinotecan- infusion reaction.        8/11/2020 - 9/10/2020 Chemotherapy    Neoadjuvant Folfox + neulasta  x 3 more cycles.        10/20/2020 Surgery    Whipple:  - Residual invasive adenocarcinoma with mucinous features, 2.3 cm.  - Metastatic carcinoma present in one of fourteen lymph nodes (1/14).  - All margins are negative for tumor.        10/20/2020 -  Cancer Staged    Staging form: Pancreas, AJCC 8th Edition  - Pathologic stage from 10/20/2020: Stage IIB (ypT2, pN1, cM0) - Signed by Eboni Macias PA-C on 7/21/2023  Stage prefix: Post-therapy  Response to neoadjuvant therapy: Partial response  Total positive nodes: 1  Total nodes examined: 14  Histologic grade (G): G2  Histologic grading system: 3 grade system  Residual tumor (R): R0 - None  Tumor size (mm): 23  Lymph-vascular invasion (LVI): LVI not present (absent)/not identified  Carbohydrate antigen 19-9 (CA 19-9) (U/mL): 61       12/21/2020 - 1/26/2021 Radiation    5000 cGy in 25 fractions to high risk areas  Dr Aden     2/8/2021 - 4/4/2021 Chemotherapy    Folfox + Neulasta  X 4 more cycles.      4/5/2021 Remission    CT CAP  1. No evidence of metastatic disease in the thorax.  Long segment of mild esophageal wall thickening which should be correlated for suspicion of esophagitis.  2. Inflammatory changes /small amount of fluid noted in the region of the nicky hepatis and surgical bed without evidence to suggest enlarging pancreatic head mass.  The pancreatic duct is no longer dilated.  3. Stomach is no longer  distended.  Continued findings suggesting nonspecific inflammatory changes in the region of the gastrojejunal anastomosis  4. Hepatic steatosis.  Hepatic hypodense nodule likely fluid within a fissure which should be carefully reassessed during follow-up.  No discrete evidence of hepatic metastasis  5. Trace amount of perihepatic ascites.  No suspicious adenopathy    CA 19-9 trends:  Presurgery: 9/5/2020-61  Post surgery/post chemo: 4/10/2021-23 8/12/2021: 2  12/11/2021: 3  2/17/22-7/14/2023: <2         Review of Systems A complete review of systems is negative other than that noted above in the HPI.       Current Outpatient Medications   Medication Sig Dispense Refill   • cholecalciferol (VITAMIN D3) 1,000 units tablet Take 5,000 Units by mouth 2 (two) times a day     • dicyclomine (BENTYL) 20 mg tablet TAKE 1 TABLET BY MOUTH TWICE A  tablet 1   • diphenhydrAMINE (BENADRYL) 25 mg capsule Take 25 mg by mouth every 6 (six) hours as needed for itching     • Empagliflozin (Jardiance) 10 MG TABS Take 10 mg by mouth every morning     • ergocalciferol (VITAMIN D2) 50,000 units Take 50,000 Units by mouth every 14 (fourteen) days     • gabapentin (NEURONTIN) 300 mg capsule Take 300 mg by mouth 3 (three) times a day     • Lancets (OneTouch Delica Plus [id number]) MISC daily Test     • LORazepam (ATIVAN) 0.5 mg tablet Take 1 tablet (0.5 mg total) by mouth 2 (two) times a day as needed for anxiety NO FURTHER REFILLS WITHOUT CLINIC VISIT WITH PALLIATIVE CARE 30 tablet 0   • Multiple Vitamin (multivitamin) tablet Take 1 tablet by mouth daily     • omega-3-acid ethyl esters (LOVAZA) 1 g capsule Take 2 g by mouth daily FISH OIL     • omeprazole (PriLOSEC) 20 mg delayed release capsule TAKE 1 CAPSULE BY MOUTH TWICE A  capsule 0   • OneTouch Verio test strip daily Test     • prochlorperazine (COMPAZINE) 10 mg tablet Take 1 tablet (10 mg total) by mouth every 6 (six) hours as needed for nausea or vomiting 45 tablet 3  "  • vitamin B-12 (CYANOCOBALAMIN) 50 MCG tablet Take by mouth 2 (two) times a day Gummy form.     • amLODIPine (NORVASC) 2.5 mg tablet Take 2.5 mg by mouth daily       No current facility-administered medications for this visit.      Objective  /80 (BP Location: Left arm, Patient Position: Sitting, Cuff Size: Standard)   Pulse (!) 53   Temp (!) 97.3 °F (36.3 °C) (Temporal)   Resp 15   Ht 4' 11\" (1.499 m)   Wt 42.5 kg (93 lb 9.6 oz)   SpO2 99%   BMI 18.90 kg/m²     Pain Screening:     ECOG    Physical Exam  Vitals reviewed.   Constitutional:       General: She is not in acute distress.     Appearance: Normal appearance. She is not ill-appearing or toxic-appearing.   HENT:      Head: Normocephalic and atraumatic.   Eyes:      General: No scleral icterus.  Cardiovascular:      Rate and Rhythm: Normal rate.   Pulmonary:      Effort: Pulmonary effort is normal.   Abdominal:      General: Abdomen is flat. A surgical scar is present. There is no distension.      Palpations: Abdomen is soft. There is no mass.      Tenderness: There is no abdominal tenderness. There is no guarding.      Hernia: No hernia is present.       Musculoskeletal:         General: Normal range of motion.      Cervical back: Normal range of motion and neck supple.   Skin:     General: Skin is warm and dry.      Coloration: Skin is not jaundiced.   Neurological:      General: No focal deficit present.      Mental Status: She is alert and oriented to person, place, and time.   Psychiatric:         Mood and Affect: Mood normal.         Behavior: Behavior normal.         Thought Content: Thought content normal.         Judgment: Judgment normal.              Labs: I have reviewed pertinent labs.   Results for orders placed or performed in visit on 04/08/25   Result Value Ref Range    BUN 19 5 - 25 mg/dL   Creatinine, serum   Result Value Ref Range    Creatinine 0.88 0.60 - 1.30 mg/dL    eGFR 65 ml/min/1.73sq m   Cancer antigen 19-9   Result " Value Ref Range    CA 19-9 <2 0 - 35 U/mL        Radiology Results Review: I have reviewed radiology reports from 4/14/2025 including: CT chest and CT abdomen/pelvis.    CT chest abdomen pelvis w contrast    Narrative & Impression   CT CHEST, ABDOMEN AND PELVIS WITH IV CONTRAST     INDICATION: Z85.07: Personal history of malignant neoplasm of pancreas.     COMPARISON: CT chest abdomen pelvis 10/8/2024 and 9/9/2022     TECHNIQUE: CT examination of the chest, abdomen and pelvis was performed. Multiplanar 2D reformatted images were created from the source data.     This examination, like all CT scans performed in the [facility], was performed utilizing techniques to minimize radiation dose exposure, including the use of iterative reconstruction and automated exposure control. Radiation dose length   product (DLP) for this visit: 585 mGy-cm     IV Contrast: 85 mL of iohexol  Enteric Contrast: Administered.     FINDINGS:     CHEST     LUNGS: No suspicious pulmonary nodule no suspicious pulmonary nodule. Tiny calcified granuloma in the right lower lobe. No infiltrate. Mild right lower lobe paraspinal scarring. Central airways are clear.     PLEURA: Unremarkable.     HEART/GREAT VESSELS: Heart is not enlarged. No pericardial effusion.  Aortic and coronary artery calcification.  No thoracic aortic aneurysm.     MEDIASTINUM AND SANDRA: Mild diffuse nonspecific esophageal thickening, similar. No lymphadenopathy.     CHEST WALL AND LOWER NECK: Incidental discovery of one or more thyroid nodule(s) measuring less than 1.5 cm and without suspicious features is noted in this patient who is above 35 years old; according to guidelines published in the February 2015 white   paper on incidental thyroid nodules in the Journal of the American College of radiology (JACR), no further evaluation is recommended.     ABDOMEN     LIVER/BILIARY TREE: Minimal left hepatic pneumobilia, similar. No biliary dilation. Stable 2 mm  right hepatic hypodensity too small to characterize statistically a simple cyst unchanged since at least September 2022 image 112 series 2. No suspicious   liver lesion. Otherwise unremarkable.     GALLBLADDER: Prior cholecystectomy.     SPLEEN: Unremarkable.     PANCREAS: Prior Whipple's procedure. Moderate diffuse atrophy of the remaining pancreas. Trace gas in the pancreatic duct, similar. No pancreatic duct dilation.     ADRENAL GLANDS: Unremarkable.     KIDNEYS/URETERS: Subcentimeter hypoattenuating renal lesion(s), too small to characterize but statistically likely benign, which do not warrant follow-up (Radiology June 2019).     STOMACH AND BOWEL: Altered anatomy related to prior Whipple procedure. No bowel obstruction.     APPENDIX: Normal.     ABDOMINOPELVIC CAVITY: No ascites. No pneumoperitoneum. No lymphadenopathy.     VESSELS: Atherosclerotic disease. No abdominal aortic aneurysm.     PELVIS     REPRODUCTIVE ORGANS: Visualized portions are grossly unremarkable for patient's age. Evaluation limited by artifact.     URINARY BLADDER: Visualized portions are grossly unremarkable. Evaluation limited by artifact.     ABDOMINAL WALL/INGUINAL REGIONS: Unremarkable.     BONES: No acute fracture or osseous destructive lesion identified. Multiple old healed right rib fractures. Degenerative changes of the spine, pubic symphysis, and multiple joints. Stable minimal grade 1 degenerative anterolisthesis of L4 on L5.   Partially visualized bilateral total hip prosthesis.     IMPRESSION:     CT chest:     No evidence of thoracic metastatic disease.     Chronic findings and negatives as above     CT abdomen and pelvis:     No evidence of recurrent or abdominopelvic metastatic disease.     Chronic findings and negatives as above.

## 2025-04-28 NOTE — PROGRESS NOTES
Name: Manju Holland      : 1955      MRN: [id number]  Encounter Provider: GREG Cody  Encounter Date: 2025   Encounter department: [facility]  :  Assessment & Plan  Encounter for follow-up surveillance of pancreatic cancer         Personal history of pancreatic cancer  Patient is doing well and there is no evidence of disease recurrence on imaging, and her tumor marker remains low.  She has several stable findings on imaging, which I did review with the patient and her  today. I will see her again in 6 months with repeat CT and Ca 19-9 level.  With regard to her GI complaints, she does appear to be experiencing pancreatic insufficiency. I have provided her with information for an OTC enzyme, Lypogold, which should be affordable for her and will hopefully improve her symptoms.      Orders:  •  CT chest abdomen pelvis w contrast; Future  •  BUN; Future  •  Creatinine, serum; Future  •  Cancer antigen 19-9; Future        History of Present Illness   Chief Complaint   Patient presents with   • Follow-up     Return in about 6 months (around 10/28/2025) for Office Visit, Imaging - See orders, Labs - See Treatment Plan.    Pertinent Medical History   This is a 71 y/o female who returns to the office today in follow-up for her history of pancreas cancer.  She is currently SATISH at 4-1/2 years and doing very well.  She reports her appetite is good and she has been able to gain a few pounds. She does report some occasional digestive issues, such as oily stools and fecal urgency.  She has tried Creon in the past but was unable to tolerate the side effects. She also reports the prescription enzymes were extremely expensive and not affordable for her. She does not currently take any enzymes.  She denies headaches, dizziness, SOB or cough.  CT was performed on , and a Ca 19-9 level has been drawn. I have reviewed these results with the  patient and her  today.       Oncology History   Cancer Staging   Personal history of pancreatic cancer  Staging form: Pancreas, AJCC 8th Edition  - Pathologic stage from 10/20/2020: Stage IIB (ypT2, pN1, cM0) - Signed by Eboni Macias PA-C on 7/21/2023  Stage prefix: Post-therapy  Response to neoadjuvant therapy: Partial response  Total positive nodes: 1  Total nodes examined: 14  Histologic grade (G): G2  Histologic grading system: 3 grade system  Residual tumor (R): R0 - None  Tumor size (mm): 23  Lymph-vascular invasion (LVI): LVI not present (absent)/not identified  Carbohydrate antigen 19-9 (CA 19-9) (U/mL): 61  Oncology History   Encounter for follow-up surveillance of pancreatic cancer   Personal history of pancreatic cancer   5/29/2020 Initial Diagnosis    Patient had new onset of painless jaundice and right upper quadrant this underwent ultrasound of the gallbladder which demonstrated a 2 x 2 centimeter soft tissue mass in the head of the pancreas with a dilated common bile duct.    5/29/2020 CT chest abdomen and pelvis with contrast   Suspected mass at the pancreatic head measuring approximately 2.4 cm resulting in distal biliary obstruction.  Differential considerations include pancreatic adenocarcinoma.    6/1/2020 MRI ABDw w/o:  1.  1.8 x 2.1 cm solid mass in the pancreatic head with dilatation of the main pancreatic duct.  2.  Obstruction of the CBD causing intrahepatic and extrahepatic bile duct dilatation.  3.  No evidence of peripancreatic lymphadenopathy or other intra-abdominal metastases.       6/2/2020 Biopsy    EUS- Dr. Jett:  Pancreas, uncinate mass:      - Malignant (PSC Category VI)      - Compatible with adenocarcinoma with mucinous features.     6/30/2020 - 8/10/2020 Chemotherapy    Folfirinox + neulasta  Completed 3 cycles before becoming intolarant of irinotecan- infusion reaction.        8/11/2020 - 9/10/2020 Chemotherapy    Neoadjuvant Folfox + neulasta  x 3 more cycles.         10/20/2020 Surgery    Whipple:  - Residual invasive adenocarcinoma with mucinous features, 2.3 cm.  - Metastatic carcinoma present in one of fourteen lymph nodes (1/14).  - All margins are negative for tumor.        10/20/2020 -  Cancer Staged    Staging form: Pancreas, AJCC 8th Edition  - Pathologic stage from 10/20/2020: Stage IIB (ypT2, pN1, cM0) - Signed by Eboni Macias PA-C on 7/21/2023  Stage prefix: Post-therapy  Response to neoadjuvant therapy: Partial response  Total positive nodes: 1  Total nodes examined: 14  Histologic grade (G): G2  Histologic grading system: 3 grade system  Residual tumor (R): R0 - None  Tumor size (mm): 23  Lymph-vascular invasion (LVI): LVI not present (absent)/not identified  Carbohydrate antigen 19-9 (CA 19-9) (U/mL): 61       12/21/2020 - 1/26/2021 Radiation    5000 cGy in 25 fractions to high risk areas  Dr Aden     2/8/2021 - 4/4/2021 Chemotherapy    Folfox + Neulasta  X 4 more cycles.      4/5/2021 Remission    CT CAP  1. No evidence of metastatic disease in the thorax.  Long segment of mild esophageal wall thickening which should be correlated for suspicion of esophagitis.  2. Inflammatory changes /small amount of fluid noted in the region of the nicky hepatis and surgical bed without evidence to suggest enlarging pancreatic head mass.  The pancreatic duct is no longer dilated.  3. Stomach is no longer distended.  Continued findings suggesting nonspecific inflammatory changes in the region of the gastrojejunal anastomosis  4. Hepatic steatosis.  Hepatic hypodense nodule likely fluid within a fissure which should be carefully reassessed during follow-up.  No discrete evidence of hepatic metastasis  5. Trace amount of perihepatic ascites.  No suspicious adenopathy    CA 19-9 trends:  Presurgery: 9/5/2020-61  Post surgery/post chemo: 4/10/2021-23  8/12/2021: 2  12/11/2021: 3  2/17/22-7/14/2023: <2          Review of Systems A complete review of systems is negative  "other than that noted above in the HPI.       Current Outpatient Medications   Medication Sig Dispense Refill   • cholecalciferol (VITAMIN D3) 1,000 units tablet Take 5,000 Units by mouth 2 (two) times a day     • dicyclomine (BENTYL) 20 mg tablet TAKE 1 TABLET BY MOUTH TWICE A  tablet 1   • diphenhydrAMINE (BENADRYL) 25 mg capsule Take 25 mg by mouth every 6 (six) hours as needed for itching     • Empagliflozin (Jardiance) 10 MG TABS Take 10 mg by mouth every morning     • ergocalciferol (VITAMIN D2) 50,000 units Take 50,000 Units by mouth every 14 (fourteen) days     • gabapentin (NEURONTIN) 300 mg capsule Take 300 mg by mouth 3 (three) times a day     • Lancets (OneTouch Delica Plus [id number]) MISC daily Test     • LORazepam (ATIVAN) 0.5 mg tablet Take 1 tablet (0.5 mg total) by mouth 2 (two) times a day as needed for anxiety NO FURTHER REFILLS WITHOUT CLINIC VISIT WITH PALLIATIVE CARE 30 tablet 0   • Multiple Vitamin (multivitamin) tablet Take 1 tablet by mouth daily     • omega-3-acid ethyl esters (LOVAZA) 1 g capsule Take 2 g by mouth daily FISH OIL     • omeprazole (PriLOSEC) 20 mg delayed release capsule TAKE 1 CAPSULE BY MOUTH TWICE A  capsule 0   • OneTouch Verio test strip daily Test     • prochlorperazine (COMPAZINE) 10 mg tablet Take 1 tablet (10 mg total) by mouth every 6 (six) hours as needed for nausea or vomiting 45 tablet 3   • vitamin B-12 (CYANOCOBALAMIN) 50 MCG tablet Take by mouth 2 (two) times a day Gummy form.     • amLODIPine (NORVASC) 2.5 mg tablet Take 2.5 mg by mouth daily       No current facility-administered medications for this visit.      Objective   /80 (BP Location: Left arm, Patient Position: Sitting, Cuff Size: Standard)   Pulse (!) 53   Temp (!) 97.3 °F (36.3 °C) (Temporal)   Resp 15   Ht 4' 11\" (1.499 m)   Wt 42.5 kg (93 lb 9.6 oz)   SpO2 99%   BMI 18.90 kg/m²     Pain Screening:     ECOG    Physical Exam  Vitals reviewed.   Constitutional:       " General: She is not in acute distress.     Appearance: Normal appearance. She is not ill-appearing or toxic-appearing.   HENT:      Head: Normocephalic and atraumatic.   Eyes:      General: No scleral icterus.  Cardiovascular:      Rate and Rhythm: Normal rate.   Pulmonary:      Effort: Pulmonary effort is normal.   Abdominal:      General: Abdomen is flat. A surgical scar is present. There is no distension.      Palpations: Abdomen is soft. There is no mass.      Tenderness: There is no abdominal tenderness. There is no guarding.      Hernia: No hernia is present.       Musculoskeletal:         General: Normal range of motion.      Cervical back: Normal range of motion and neck supple.   Skin:     General: Skin is warm and dry.      Coloration: Skin is not jaundiced.   Neurological:      General: No focal deficit present.      Mental Status: She is alert and oriented to person, place, and time.   Psychiatric:         Mood and Affect: Mood normal.         Behavior: Behavior normal.         Thought Content: Thought content normal.         Judgment: Judgment normal.              Labs: I have reviewed pertinent labs.   Results for orders placed or performed in visit on 04/08/25   Result Value Ref Range    BUN 19 5 - 25 mg/dL   Creatinine, serum   Result Value Ref Range    Creatinine 0.88 0.60 - 1.30 mg/dL    eGFR 65 ml/min/1.73sq m   Cancer antigen 19-9   Result Value Ref Range    CA 19-9 <2 0 - 35 U/mL        Radiology Results Review: I have reviewed radiology reports from 4/14/2025 including: CT chest and CT abdomen/pelvis.    CT chest abdomen pelvis w contrast    Narrative & Impression   CT CHEST, ABDOMEN AND PELVIS WITH IV CONTRAST     INDICATION: Z85.07: Personal history of malignant neoplasm of pancreas.     COMPARISON: CT chest abdomen pelvis 10/8/2024 and 9/9/2022     TECHNIQUE: CT examination of the chest, abdomen and pelvis was performed. Multiplanar 2D reformatted images were created from the source data.      This examination, like all CT scans performed in the [facility], was performed utilizing techniques to minimize radiation dose exposure, including the use of iterative reconstruction and automated exposure control. Radiation dose length   product (DLP) for this visit: 585 mGy-cm     IV Contrast: 85 mL of iohexol  Enteric Contrast: Administered.     FINDINGS:     CHEST     LUNGS: No suspicious pulmonary nodule no suspicious pulmonary nodule. Tiny calcified granuloma in the right lower lobe. No infiltrate. Mild right lower lobe paraspinal scarring. Central airways are clear.     PLEURA: Unremarkable.     HEART/GREAT VESSELS: Heart is not enlarged. No pericardial effusion.  Aortic and coronary artery calcification.  No thoracic aortic aneurysm.     MEDIASTINUM AND SANDRA: Mild diffuse nonspecific esophageal thickening, similar. No lymphadenopathy.     CHEST WALL AND LOWER NECK: Incidental discovery of one or more thyroid nodule(s) measuring less than 1.5 cm and without suspicious features is noted in this patient who is above 35 years old; according to guidelines published in the February 2015 white   paper on incidental thyroid nodules in the Journal of the American College of radiology (JACR), no further evaluation is recommended.     ABDOMEN     LIVER/BILIARY TREE: Minimal left hepatic pneumobilia, similar. No biliary dilation. Stable 2 mm right hepatic hypodensity too small to characterize statistically a simple cyst unchanged since at least September 2022 image 112 series 2. No suspicious   liver lesion. Otherwise unremarkable.     GALLBLADDER: Prior cholecystectomy.     SPLEEN: Unremarkable.     PANCREAS: Prior Whipple's procedure. Moderate diffuse atrophy of the remaining pancreas. Trace gas in the pancreatic duct, similar. No pancreatic duct dilation.     ADRENAL GLANDS: Unremarkable.     KIDNEYS/URETERS: Subcentimeter hypoattenuating renal lesion(s), too small to characterize but statistically  likely benign, which do not warrant follow-up (Radiology June 2019).     STOMACH AND BOWEL: Altered anatomy related to prior Whipple procedure. No bowel obstruction.     APPENDIX: Normal.     ABDOMINOPELVIC CAVITY: No ascites. No pneumoperitoneum. No lymphadenopathy.     VESSELS: Atherosclerotic disease. No abdominal aortic aneurysm.     PELVIS     REPRODUCTIVE ORGANS: Visualized portions are grossly unremarkable for patient's age. Evaluation limited by artifact.     URINARY BLADDER: Visualized portions are grossly unremarkable. Evaluation limited by artifact.     ABDOMINAL WALL/INGUINAL REGIONS: Unremarkable.     BONES: No acute fracture or osseous destructive lesion identified. Multiple old healed right rib fractures. Degenerative changes of the spine, pubic symphysis, and multiple joints. Stable minimal grade 1 degenerative anterolisthesis of L4 on L5.   Partially visualized bilateral total hip prosthesis.     IMPRESSION:     CT chest:     No evidence of thoracic metastatic disease.     Chronic findings and negatives as above     CT abdomen and pelvis:     No evidence of recurrent or abdominopelvic metastatic disease.     Chronic findings and negatives as above.

## 2025-04-28 NOTE — ASSESSMENT & PLAN NOTE
Patient is doing well and there is no evidence of disease recurrence on imaging, and her tumor marker remains low.  She has several stable findings on imaging, which I did review with the patient and her  today. I will see her again in 6 months with repeat CT and Ca 19-9 level.  With regard to her GI complaints, she does appear to be experiencing pancreatic insufficiency. I have provided her with information for an OTC enzyme, Lypogold, which should be affordable for her and will hopefully improve her symptoms.      Orders:  •  CT chest abdomen pelvis w contrast; Future  •  BUN; Future  •  Creatinine, serum; Future  •  Cancer antigen 19-9; Future

## 2025-05-10 ENCOUNTER — HOSPITAL ENCOUNTER (EMERGENCY)
Facility: HOSPITAL | Age: 70
Discharge: HOME | End: 2025-05-10
Attending: EMERGENCY MEDICINE | Admitting: EMERGENCY MEDICINE
Payer: MEDICARE

## 2025-05-10 ENCOUNTER — APPOINTMENT (EMERGENCY)
Dept: RADIOLOGY | Facility: HOSPITAL | Age: 70
End: 2025-05-10
Payer: MEDICARE

## 2025-05-10 VITALS
SYSTOLIC BLOOD PRESSURE: 132 MMHG | RESPIRATION RATE: 19 BRPM | TEMPERATURE: 97.3 F | OXYGEN SATURATION: 100 % | HEART RATE: 59 BPM | DIASTOLIC BLOOD PRESSURE: 60 MMHG
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
## 2025-05-10 DIAGNOSIS — S42.309A HUMERUS FRACTURE: ICD-10-CM

## 2025-05-10 LAB
ATRIAL RATE: 53 BPM
P AXIS: 62 DEGREES
PR INTERVAL: 138 MS
QRS AXIS: 25 DEGREES
QRSD INTERVAL: 66 MS
QT INTERVAL: 446 MS
QTC INTERVAL: 418 MS
T WAVE AXIS: 46 DEGREES
VENTRICULAR RATE: 53 BPM

## 2025-05-10 PROCEDURE — 73030 X-RAY EXAM OF SHOULDER: CPT

## 2025-05-10 PROCEDURE — 99285 EMERGENCY DEPT VISIT HI MDM: CPT | Performed by: EMERGENCY MEDICINE

## 2025-05-10 PROCEDURE — 93010 ELECTROCARDIOGRAM REPORT: CPT | Performed by: INTERNAL MEDICINE

## 2025-05-10 PROCEDURE — 99284 EMERGENCY DEPT VISIT MOD MDM: CPT

## 2025-05-10 PROCEDURE — 73060 X-RAY EXAM OF HUMERUS: CPT

## 2025-05-10 PROCEDURE — 93005 ELECTROCARDIOGRAM TRACING: CPT

## 2025-05-10 PROCEDURE — 96374 THER/PROPH/DIAG INJ IV PUSH: CPT

## 2025-05-10 RX ORDER — MORPHINE SULFATE 4 MG/ML
4 INJECTION, SOLUTION INTRAMUSCULAR; INTRAVENOUS ONCE
Status: COMPLETED | OUTPATIENT
Start: 2025-05-10 | End: 2025-05-10

## 2025-05-10 RX ORDER — OXYCODONE AND ACETAMINOPHEN 5; 325 MG/1; MG/1
1 TABLET ORAL EVERY 4 HOURS PRN
Qty: 20 TABLET | Refills: 0 | Status: SHIPPED | OUTPATIENT
Start: 2025-05-10

## 2025-05-10 RX ADMIN — MORPHINE SULFATE 4 MG: 4 INJECTION INTRAVENOUS at 11:56

## 2025-05-10 NOTE — ED PROVIDER NOTES
Time reflects when diagnosis was documented in both MDM as applicable and the Disposition within this note       Time User Action Codes Description Comment    5/10/2025 12:38 PM RobinNoah hill Add [W19.XXXA] Fall, initial encounter     5/10/2025 12:39 PM Noah Robin Add [S42.309A] Humerus fracture           ED Disposition       ED Disposition   Discharge    Condition   Stable    Date/Time   Sat May 10, 2025 12:38 PM    Comment   Manju Holland discharge to home/self care.                   Assessment & Plan       Medical Decision Making  Problems Addressed:  Fall, initial encounter: complicated acute illness or injury  Humerus fracture: complicated acute illness or injury     Details: With clinically suspect hemarthrosis of L shoulder. Sling placed. F/u ortho. Prn pain control. I have reasonably determined that electronically prescribing a controlled substance would be impractical for the patient to obtain the controlled substance prescribed by electronic prescription or would cause an untimely delay resulting in an adverse impact on the patient's medical condition.        Amount and/or Complexity of Data Reviewed  Radiology: ordered and independent interpretation performed. Decision-making details documented in ED Course.    Risk  Prescription drug management.             Medications   morphine injection 4 mg (4 mg Intravenous Given 5/10/25 1156)       ED Risk Strat Scores                    No data recorded        SBIRT 20yo+      Flowsheet Row Most Recent Value   Initial Alcohol Screen: US AUDIT-C     1. How often do you have a drink containing alcohol? 0 Filed at: 05/10/2025 1119   2. How many drinks containing alcohol do you have on a typical day you are drinking?  0 Filed at: 05/10/2025 1119   3b. FEMALE Any Age, or MALE 65+: How often do you have 4 or more drinks on one occassion? 0 Filed at: 05/10/2025 1119   Audit-C Score 0 Filed at: 05/10/2025 1119   ELLEN: How many times in the past year have you...   "  Used an illegal drug or used a prescription medication for non-medical reasons? Never Filed at: 05/10/2025 1119                            History of Present Illness       Chief Complaint   Patient presents with    Fall     Trip and fall onto left shoulder approximately 30 minutes prior to arrival. Denies head strike, denies thinners, daily ASA or LOC. Patient reports left shoulder and upper arm pain after fall, difficulty abducting left arm due to pain. 2+ radial pulses bilaterally. Patient reports left knee pain as well but states \"I think they are just scraped up\".        Past Medical History:   Diagnosis Date    BRCA1 negative     BRCA2 negative     Cancer (HCC) pancreatic    Cancer (HCC) Pancreatic    Chemotherapy induced neutropenia (HCC) 02/10/2021    Chemotherapy induced neutropenia (HCC) 02/10/2021    Chronic kidney disease     Diabetes mellitus (HCC)     Ectopic pregnancy     Gestational diabetes     Hypertension     Lactic acidosis 2020    Miscarriage     Neuropathy     Obstructive jaundice 2020    Pancreas cancer (HCC)     Port-A-Cath in place 2020      Past Surgical History:   Procedure Laterality Date    BREAST BIOPSY Left 2009    neg    BREAST SURGERY Left     calcifications     SECTION  1983    CHOLECYSTECTOMY N/A 10/20/2020    Procedure: CHOLECYSTECTOMY;  Surgeon: Burton Kapoor MD;  Location: [facility];  Service: Surgical Oncology    COLONOSCOPY      ECTOPIC PREGNANCY SURGERY      partial ovary removed    FL GUIDED CENTRAL VENOUS ACCESS DEVICE INSERTION  2020    JOINT REPLACEMENT Bilateral     LAPAROTOMY N/A 10/20/2020    Procedure: LAPAROTOMY EXPLORATORY; INTRAOPERATIVE ULTRASOUND;  Surgeon: Burton Kapoor MD;  Location: [facility];  Service: Surgical Oncology    MANDIBLE FRACTURE SURGERY  1972    NOSE SURGERY      deviated septum    WV CONIZATION CERVIX W/WO D&C RPR KNIFE/LASER      REPLACEMENT TOTAL HIP W/  RESURFACING IMPLANTS Bilateral     " right hip done July 2012; left hip done September 2012    TONSILLECTOMY  1957    TUNNELED VENOUS PORT PLACEMENT Left 06/23/2020    Procedure: INSERTION VENOUS PORT (PORT-A-CATH), left;  Surgeon: Burton Kapoor MD;  Location: [facility];  Service: Surgical Oncology    WHIPPLE PROCEDURE/PANCREATICO-DUODENECTOMY N/A 10/20/2020    Procedure: WHIPPLE PROCEDURE/PANCREATICO-DUODENECTOMY;  Surgeon: Burton Kapoor MD;  Location: [facility];  Service: Surgical Oncology      Family History   Problem Relation Age of Onset    Diabetes Mother     Heart disease Mother     Heart disease Father     Breast cancer additional onset Sister     Breast cancer Sister 54    Stroke Maternal Grandfather     Breast cancer additional onset Maternal Aunt     Breast cancer Maternal Aunt     Colon cancer Neg Hx     Ovarian cancer Neg Hx     Uterine cancer Neg Hx     Cervical cancer Neg Hx       Social History     Tobacco Use    Smoking status: Former    Smokeless tobacco: Never    Tobacco comments:     quit 40 years ago   Vaping Use    Vaping status: Never Used   Substance Use Topics    Alcohol use: Not Currently     Alcohol/week: 1.0 standard drink of alcohol     Types: 1 Glasses of wine per week     Comment: WINE OCCASIONALLY    Drug use: Never      E-Cigarette/Vaping    E-Cigarette Use Never User       E-Cigarette/Vaping Substances    Nicotine No     THC No     CBD No     Flavoring No     Other No     Unknown No       I have reviewed and agree with the history as documented.     Mechanical trip and fall, landed on OSH. Sudden onset L proximal arm and shoulder pain radiating to L lateral neck. No head strike, HA, neurologic complaint, LOC. No midline neck pain. No arm weakness or numbness. Pain moderate to severe, unable to move LUE due to same. No other injuries or concerns. Additional history from  at bedside.         Review of Systems   Musculoskeletal:  Positive for arthralgias.           Objective       ED Triage Vitals    Temperature Pulse Blood Pressure Respirations SpO2 Patient Position - Orthostatic VS   05/10/25 1105 05/10/25 1105 05/10/25 1105 05/10/25 1105 05/10/25 1105 05/10/25 1105   (!) 97.3 °F (36.3 °C) (!) 53 113/56 18 99 % Sitting      Temp Source Heart Rate Source BP Location FiO2 (%) Pain Score    05/10/25 1105 05/10/25 1105 05/10/25 1105 -- 05/10/25 1119    Temporal Monitor Right arm  8      Vitals      Date and Time Temp Pulse SpO2 Resp BP Pain Score FACES Pain Rating User   05/10/25 1226 -- -- -- -- -- 7 -- CC   05/10/25 1200 -- 59 100 % 19 132/60 -- --    05/10/25 1156 -- -- -- -- -- 10 - Worst Possible Pain --    05/10/25 1145 -- 57 100 % 18 132/60 -- --    05/10/25 1119 -- -- -- -- -- 8 --    05/10/25 1105 97.3 °F (36.3 °C) 53 99 % 18 113/56 -- -- NL            Physical Exam  Vitals and nursing note reviewed.   Constitutional:       General: She is not in acute distress.     Appearance: Normal appearance. She is well-developed. She is not ill-appearing, toxic-appearing or diaphoretic.   HENT:      Head: Normocephalic and atraumatic.   Eyes:      General:         Right eye: No discharge.         Left eye: No discharge.      Conjunctiva/sclera: Conjunctivae normal.      Pupils: Pupils are equal, round, and reactive to light.   Neck:      Vascular: No JVD.   Pulmonary:      Effort: Pulmonary effort is normal. No respiratory distress.      Breath sounds: No stridor.   Musculoskeletal:         General: Swelling, tenderness and signs of injury present. No deformity.      Cervical back: Normal range of motion and neck supple. No rigidity or tenderness.      Comments: Swelling and ttp L proximal arm. Skin intact. Distal perfusion and sensation normal.     Skin:     General: Skin is warm and dry.      Capillary Refill: Capillary refill takes less than 2 seconds.   Neurological:      Mental Status: She is alert and oriented to person, place, and time.      Cranial Nerves: No cranial nerve deficit.      Sensory: No  sensory deficit.      Motor: No abnormal muscle tone.      Coordination: Coordination normal.         Results Reviewed       None            XR humerus LEFT   ED Interpretation by Noah Robin MD (05/10 1238)   Proximal humerus fx      Final Interpretation by Jim Corcoran MD (05/10 1629)      Acute nondisplaced fracture of the left proximal humerus.         Computerized Assisted Algorithm (CAA) may have been used to analyze all applicable images.         Workstation performed: [id number]         XR shoulder 2+ views LEFT   ED Interpretation by Noah Robin MD (05/10 1238)   normal      Final Interpretation by Jim Corcoran MD (05/10 1629)      Acute nondisplaced fracture of the left proximal humerus.         Computerized Assisted Algorithm (CAA) may have been used to analyze all applicable images.         Workstation performed: [id number]             Procedures    ED Medication and Procedure Management   Prior to Admission Medications   Prescriptions Last Dose Informant Patient Reported? Taking?   Empagliflozin (Jardiance) 10 MG TABS  Self, Spouse/Significant Other Yes No   Sig: Take 10 mg by mouth every morning   LORazepam (ATIVAN) 0.5 mg tablet  Self, Spouse/Significant Other No No   Sig: Take 1 tablet (0.5 mg total) by mouth 2 (two) times a day as needed for anxiety NO FURTHER REFILLS WITHOUT CLINIC VISIT WITH PALLIATIVE CARE   Lancets (OneTouch Delica Plus [id number]) MISC  Self, Spouse/Significant Other Yes No   Sig: daily Test   Multiple Vitamin (multivitamin) tablet  Self, Spouse/Significant Other Yes No   Sig: Take 1 tablet by mouth daily   OneTouch Verio test strip  Self, Spouse/Significant Other Yes No   Sig: daily Test   amLODIPine (NORVASC) 2.5 mg tablet  Self, Spouse/Significant Other Yes No   Sig: Take 2.5 mg by mouth daily   cholecalciferol (VITAMIN D3) 1,000 units tablet  Self, Spouse/Significant Other Yes No   Sig: Take 5,000 Units by mouth 2 (two) times a day   dicyclomine (BENTYL) 20 mg tablet  Self,  Spouse/Significant Other No No   Sig: TAKE 1 TABLET BY MOUTH TWICE A DAY   diphenhydrAMINE (BENADRYL) 25 mg capsule  Self, Spouse/Significant Other Yes No   Sig: Take 25 mg by mouth every 6 (six) hours as needed for itching   ergocalciferol (VITAMIN D2) 50,000 units  Self, Spouse/Significant Other Yes No   Sig: Take 50,000 Units by mouth every 14 (fourteen) days   gabapentin (NEURONTIN) 300 mg capsule  Self, Spouse/Significant Other Yes No   Sig: Take 300 mg by mouth 3 (three) times a day   omega-3-acid ethyl esters (LOVAZA) 1 g capsule  Self, Spouse/Significant Other Yes No   Sig: Take 2 g by mouth daily FISH OIL   omeprazole (PriLOSEC) 20 mg delayed release capsule  Self, Spouse/Significant Other No No   Sig: TAKE 1 CAPSULE BY MOUTH TWICE A DAY   prochlorperazine (COMPAZINE) 10 mg tablet  Self, Spouse/Significant Other No No   Sig: Take 1 tablet (10 mg total) by mouth every 6 (six) hours as needed for nausea or vomiting   vitamin B-12 (CYANOCOBALAMIN) 50 MCG tablet  Self, Spouse/Significant Other Yes No   Sig: Take by mouth 2 (two) times a day Gummy form.      Facility-Administered Medications: None     Discharge Medication List as of 5/10/2025 12:40 PM        START taking these medications    Details   oxyCODONE-acetaminophen (PERCOCET) 5-325 mg per tablet Take 1 tablet by mouth every 4 (four) hours as needed for moderate pain for up to 20 doses Max Daily Amount: 6 tablets, Starting Sat 5/10/2025, Print           CONTINUE these medications which have NOT CHANGED    Details   amLODIPine (NORVASC) 2.5 mg tablet Take 2.5 mg by mouth daily, Starting Wed 1/20/2021, Until Thu 3/6/2025, Historical Med      cholecalciferol (VITAMIN D3) 1,000 units tablet Take 5,000 Units by mouth 2 (two) times a day, Historical Med      dicyclomine (BENTYL) 20 mg tablet TAKE 1 TABLET BY MOUTH TWICE A DAY, Normal      diphenhydrAMINE (BENADRYL) 25 mg capsule Take 25 mg by mouth every 6 (six) hours as needed for itching, Historical Med       Empagliflozin (Jardiance) 10 MG TABS Take 10 mg by mouth every morning, Historical Med      ergocalciferol (VITAMIN D2) 50,000 units Take 50,000 Units by mouth every 14 (fourteen) days, Historical Med      gabapentin (NEURONTIN) 300 mg capsule Take 300 mg by mouth 3 (three) times a day, Historical Med      Lancets (OneTouch Delica Plus [id number]) MISC daily Test, Starting Mon 12/2/2024, Historical Med      LORazepam (ATIVAN) 0.5 mg tablet Take 1 tablet (0.5 mg total) by mouth 2 (two) times a day as needed for anxiety NO FURTHER REFILLS WITHOUT CLINIC VISIT WITH PALLIATIVE CARE, Starting Mon 11/16/2020, Normal      Multiple Vitamin (multivitamin) tablet Take 1 tablet by mouth daily, Historical Med      omega-3-acid ethyl esters (LOVAZA) 1 g capsule Take 2 g by mouth daily FISH OIL, Historical Med      omeprazole (PriLOSEC) 20 mg delayed release capsule TAKE 1 CAPSULE BY MOUTH TWICE A DAY, Starting Sun 9/8/2024, Normal      OneTouch Verio test strip daily Test, Starting Sat 4/23/2022, Historical Med      prochlorperazine (COMPAZINE) 10 mg tablet Take 1 tablet (10 mg total) by mouth every 6 (six) hours as needed for nausea or vomiting, Starting Tue 12/14/2021, Normal      vitamin B-12 (CYANOCOBALAMIN) 50 MCG tablet Take by mouth 2 (two) times a day Gummy form., Historical Med             ED SEPSIS DOCUMENTATION   Time reflects when diagnosis was documented in both MDM as applicable and the Disposition within this note       Time User Action Codes Description Comment    5/10/2025 12:38 PM Noah Robin [W19.XXXA] Fall, initial encounter     5/10/2025 12:39 PM Noah Robin [S42.309A] Humerus fracture                  Noah Robin MD  05/10/25 [id number]

## 2025-05-10 NOTE — DISCHARGE INSTRUCTIONS
"Patient Education     Shoulder or upper arm fracture   The Basics   Written by the doctors and editors at [facility]   What is a shoulder or upper arm fracture? -- A \"fracture\" is another word for a broken bone. The shoulder joint has 3 bones (figure 1):   Upper arm bone (humerus), which goes from the shoulder to the elbow   Shoulder blade (scapula)   Collarbone (clavicle)  A shoulder fracture can involve any of the bones near the shoulder joint. Often, it means that there is a break in the humerus near the shoulder.  There are different types of fractures, depending on how the bone breaks. When a bone breaks, it might crack, break all of the way through, or shatter.  If a broken bone sticks out of the skin or can be seen through a wound, doctors call it an \"open\" fracture. If the bone does not stick out of the skin or cannot be seen through a wound, doctors call it a \"closed\" fracture.  A shoulder or upper arm fracture can happen because of:   A direct blow to the upper arm or shoulder   Falling on the shoulder   Falling on an outstretched arm  What are the symptoms of a shoulder or upper arm fracture? -- Symptoms depend on which bone breaks and the type of break it is. Common symptoms include:   Pain, swelling, or bruising over the area   The area looking abnormal, bent, or not the usual shape   Not being able to move the arm or lift something with the arm   Numbness in the area of the broken bone  If a fracture injures a nerve, this can also cause symptoms in nearby areas. For example, a break to the upper arm bone might cause pain, tingling, or weakness in the elbow and wrist.  Is there a test for a shoulder or upper arm fracture? -- Yes. The doctor or nurse will ask about your symptoms, do an exam, and take an X-ray.  They might also do other imaging tests, such as a CT, MRI, or ultrasound. Imaging tests create pictures of the inside of the body.  How are shoulder or upper arm fractures treated? -- Treatment " "depends, in part, on the type of fracture, which bone is broken, and how serious it is. The goal is to have the ends of the broken bone line up with each other so the bone can heal.  If the ends of the broken bone are already in line with each other, the doctor might use an arm sling or a shoulder immobilizer with straps to hold the arm and wrist in place. This limits movement of the shoulder and will keep the bone in the correct position so it can heal.  If the ends of the broken bone are not in line with each other, the doctor will need to line them up:   Sometimes, they can move the bone to the correct position without doing surgery, and then put a cast, splint, or brace on. This is called \"closed fracture reduction.\"   For more serious fractures, they might need to do surgery to put the bone back in the correct position. During surgery, they can use screws, pins, rods, or plates to fix the bone inside the body. This is called \"open fracture reduction.\"  How long do shoulder or upper arm fractures take to heal? -- Most upper arm fractures take weeks to months to heal. The doctor or nurse will talk to you about when to return to things like work, sports, or other activities.  Healing time also depends on the person. Healthy children usually heal much more quickly than older adults or adults with other medical problems.  How do I care for myself at home? -- To care for yourself or your child at home:   Follow the doctor's instructions for wearing the splint, cast, sling, or shoulder immobilizer. This supports and protects the bone as it heals.   Do not get a cast wet unless the doctor says that it is waterproof.   Follow instructions for limiting activity and movement until the bone is healed. The doctor or nurse will tell you what activities are safe to do. They might want you to do some gentle motion exercises to help prevent stiff joints.   You can sleep in a recliner or with the head of the bed elevated. This " keeps your injured shoulder or upper arm above the level of your heart. It might help lessen pain and swelling.   The doctor might recommend an over-the-counter pain medicine. These include acetaminophen (sample brand name: Tylenol) and ibuprofen (sample brand names: Advil, Motrin). Adults can also take naproxen (sample brand name: Aleve).   Some people get a prescription for stronger pain medicines to take for a short time. Follow the instructions for taking these medicines.   Ice can help with pain and swelling:   Put a cold gel pack, bag of ice, or bag of frozen vegetables on the injured area every 1 to 2 hours, for 15 minutes each time. Put a thin towel between the ice (or other cold object) and the skin.   Use the ice (or other cold object) for at least 6 hours after the injury. Some people find it helpful to ice longer, even up to 2 days after their injury.   Eat a healthy diet that includes plenty of calcium, vitamin D, and protein (figure 2).   If you smoke, try to stop. Broken bones take longer to heal if you smoke.   Some people need to work with a physical therapist (exercise expert) after their fracture heals. They will suggest exercises and stretches to strengthen your arm or shoulder muscles and keep them from getting stiff.  When should I call the doctor? -- Call for advice if:   There is weakness or less feeling or movement in your arm, hand, or fingers.   Your shoulder or arm becomes swollen or starts to hurt more.   Your skin becomes red or irritated around the cast, bandage, or shoulder immobilizer.   The splint, cast, bandage, or shoulder immobilizer feels too tight and uncomfortable, or your fingers turn pale, blue, or gray.   There is a bad smell or drainage coming from the splint or cast.   The cast feels too loose, you notice a crack in the cast, or the cast becomes soft.   The cast gets wet, and it is not supposed to get wet.  All topics are updated as new evidence becomes available and our  "peer review process is complete.  This topic retrieved from [employer] on: May 15, 2024.  Topic 228902 Version 1.0  Release: 32.4.3 - C32.134  © 2024 UpToDate, Inc. and/or its affiliates. All rights reserved.  figure 1: Shoulder anatomy     The humerus, clavicle, and scapula make up theshoulder joint.  Graphic 752125 Version 1.0  figure 2: Foods and drinks with calcium and vitamin D     Foods rich in calcium include ice cream, soy milk, breads, kale, broccoli, milk, cheese, cottage cheese, almonds, yogurt, ready-to-eat cereals, beans, and tofu. Foods rich in vitamin D include milk, fortified plant-based \"milks\" (soy, almond), canned tuna fish, cod liver oil, yogurt, ready-to-eat-cereals, cooked salmon, canned sardines, mackerel, and eggs. Some of these foods are rich in both.  Graphic 95636 Version 4.0  Consumer Information Use and Disclaimer   Disclaimer: This generalized information is a limited summary of diagnosis, treatment, and/or medication information. It is not meant to be comprehensive and should be used as a tool to help the user understand and/or assess potential diagnostic and treatment options. It does NOT include all information about conditions, treatments, medications, side effects, or risks that may apply to a specific patient. It is not intended to be medical advice or a substitute for the medical advice, diagnosis, or treatment of a health care provider based on the health care provider's examination and assessment of a patient's specific and unique circumstances. Patients must speak with a health care provider for complete information about their health, medical questions, and treatment options, including any risks or benefits regarding use of medications. This information does not endorse any treatments or medications as safe, effective, or approved for treating a specific patient. UpToDate, Inc. and its affiliates disclaim any warranty or liability relating to this information or the use " thereof.The use of this information is governed by the Terms of Use, available at https://www.wolterskluwer.com/en/know/clinical-effectiveness-terms. 2024© UpToDate, Inc. and its affiliates and/or licensors. All rights reserved.  Copyright   © 2024 [employer] and/or its affiliates. All rights reserved.

## 2025-05-12 ENCOUNTER — TELEPHONE (OUTPATIENT)
Age: 70
End: 2025-05-12

## 2025-05-12 ENCOUNTER — APPOINTMENT (OUTPATIENT)
Dept: RADIOLOGY | Facility: CLINIC | Age: 70
End: 2025-05-12
Attending: STUDENT IN AN ORGANIZED HEALTH CARE EDUCATION/TRAINING PROGRAM
Payer: MEDICARE

## 2025-05-12 ENCOUNTER — OFFICE VISIT (OUTPATIENT)
Dept: OBGYN CLINIC | Facility: CLINIC | Age: 70
End: 2025-05-12
Attending: EMERGENCY MEDICINE
Payer: MEDICARE

## 2025-05-12 VITALS — HEIGHT: 59 IN | RESPIRATION RATE: 18 BRPM | BODY MASS INDEX: 18.55 KG/M2 | WEIGHT: 92 LBS

## 2025-05-12 DIAGNOSIS — S42.295A OTHER CLOSED NONDISPLACED FRACTURE OF PROXIMAL END OF LEFT HUMERUS, INITIAL ENCOUNTER: ICD-10-CM

## 2025-05-12 PROCEDURE — 99203 OFFICE O/P NEW LOW 30 MIN: CPT | Performed by: STUDENT IN AN ORGANIZED HEALTH CARE EDUCATION/TRAINING PROGRAM

## 2025-05-12 PROCEDURE — 73030 X-RAY EXAM OF SHOULDER: CPT

## 2025-05-12 RX ORDER — ICOSAPENT ETHYL 1 G/1
1 CAPSULE ORAL DAILY
COMMUNITY
Start: 2025-04-29

## 2025-05-12 NOTE — PROGRESS NOTES
ASSESSMENT/PLAN:    Assessment & Plan  Other closed nondisplaced fracture of proximal end of left humerus, initial encounter  Discussed history, exam, and imaging with patient. Presentation most consistent with left proximal humerus fracture and we will plan for non-operative management at this time.  Discussed oral/topical medication regimen. Will plan for OTC tylenol as needed for symptom management  Proximal humerus protocol placed in AVS, continue sling for the next two weeks. Elbow and wrist motion at home as tolerated. Begin passive shoulder motion in 2 weeks  Discussed rehabilitation efforts. Will plan for home elbow and wrist motion, passive motion beginning in 2 weeks. Begin physical therapy in 2 weeks  Patient having significant discomfort with shoulder despite current medication regimen - as such, script placed for Tramadol.  Follow up 2 weeks for reevaluation and new left shoulder x-rays to ensure no interval displacement suggesting need for conversion of plan to operative intervention.     Return in about 2 weeks (around 5/26/2025).   _____________________________________________________  CHIEF COMPLAINT:  Chief Complaint   Patient presents with    Left Shoulder - Pain     Patient fell on Sat 5/10/25. Patient is right hand dominant . Hand feels a little stiff       SUBJECTIVE:  Manju Holland is a 70 y.o. year old female, RHD, who presents for evaluation of left shoulder pain after a fall on Saturday 5/10/2025. She was walking on the sidewalk, going to [employer] football game. Unsure why she fell. Has no history of falls. Was seen in the ED the same day when x-rays were performed and she was placed in the sling. Today, she reports persistent pain. Pain managed with Tylenol. Tried percocet, but she was unable to tolerate it. History of osteopenia in spine. She takes vitamin D supplementation.       PAST MEDICAL HISTORY:  Past Medical History:   Diagnosis Date    BRCA1 negative     BRCA2  negative     Cancer (HCC) pancreatic    Cancer (HCC) Pancreatic    Chemotherapy induced neutropenia (HCC) 02/10/2021    Chemotherapy induced neutropenia (HCC) 02/10/2021    Chronic kidney disease     Diabetes mellitus (HCC)     Ectopic pregnancy     Gestational diabetes     Hypertension     Lactic acidosis 2020    Miscarriage     Neuropathy     Obstructive jaundice 2020    Pancreas cancer (HCC)     Port-A-Cath in place 2020       PAST SURGICAL HISTORY:  Past Surgical History:   Procedure Laterality Date    BREAST BIOPSY Left 2009    neg    BREAST SURGERY Left     calcifications     SECTION  1983    CHOLECYSTECTOMY N/A 10/20/2020    Procedure: CHOLECYSTECTOMY;  Surgeon: Burton Kapoor MD;  Location: [facility];  Service: Surgical Oncology    COLONOSCOPY      ECTOPIC PREGNANCY SURGERY      partial ovary removed    FL GUIDED CENTRAL VENOUS ACCESS DEVICE INSERTION  2020    JOINT REPLACEMENT Bilateral     LAPAROTOMY N/A 10/20/2020    Procedure: LAPAROTOMY EXPLORATORY; INTRAOPERATIVE ULTRASOUND;  Surgeon: Burton Kapoor MD;  Location: [facility];  Service: Surgical Oncology    MANDIBLE FRACTURE SURGERY      NOSE SURGERY      deviated septum    GA CONIZATION CERVIX W/WO D&C RPR KNIFE/LASER      REPLACEMENT TOTAL HIP W/  RESURFACING IMPLANTS Bilateral     right hip done 2012; left hip done 2012    TONSILLECTOMY      TUNNELED VENOUS PORT PLACEMENT Left 2020    Procedure: INSERTION VENOUS PORT (PORT-A-CATH), left;  Surgeon: Burton Kapoor MD;  Location: [facility];  Service: Surgical Oncology    WHIPPLE PROCEDURE/PANCREATICO-DUODENECTOMY N/A 10/20/2020    Procedure: WHIPPLE PROCEDURE/PANCREATICO-DUODENECTOMY;  Surgeon: Burton Kapoor MD;  Location: [facility];  Service: Surgical Oncology       FAMILY HISTORY:  Family History   Problem Relation Age of Onset    Diabetes Mother     Heart disease Mother     Heart disease Father     Breast cancer  additional onset Sister     Breast cancer Sister 54    Stroke Maternal Grandfather     Breast cancer additional onset Maternal Aunt     Breast cancer Maternal Aunt     Colon cancer Neg Hx     Ovarian cancer Neg Hx     Uterine cancer Neg Hx     Cervical cancer Neg Hx        SOCIAL HISTORY:  Social History     Tobacco Use    Smoking status: Former    Smokeless tobacco: Never    Tobacco comments:     quit 40 years ago   Vaping Use    Vaping status: Never Used   Substance Use Topics    Alcohol use: Not Currently     Alcohol/week: 1.0 standard drink of alcohol     Types: 1 Glasses of wine per week     Comment: WINE OCCASIONALLY    Drug use: Never       MEDICATIONS:    Current Outpatient Medications:     amLODIPine (NORVASC) 2.5 mg tablet, Take 2.5 mg by mouth daily, Disp: , Rfl:     cholecalciferol (VITAMIN D3) 1,000 units tablet, Take 5,000 Units by mouth 2 (two) times a day, Disp: , Rfl:     dicyclomine (BENTYL) 20 mg tablet, TAKE 1 TABLET BY MOUTH TWICE A DAY (Patient taking differently: No sig reported), Disp: 180 tablet, Rfl: 1    diphenhydrAMINE (BENADRYL) 25 mg capsule, Take 25 mg by mouth every 6 (six) hours as needed for itching, Disp: , Rfl:     Empagliflozin (Jardiance) 10 MG TABS, Take 10 mg by mouth every morning, Disp: , Rfl:     ergocalciferol (VITAMIN D2) 50,000 units, Take 50,000 Units by mouth every 14 (fourteen) days, Disp: , Rfl:     gabapentin (NEURONTIN) 300 mg capsule, Take 300 mg by mouth 3 (three) times a day, Disp: , Rfl:     Lancets (OneTouch Delica Plus [id number]) MISC, daily Test, Disp: , Rfl:     LORazepam (ATIVAN) 0.5 mg tablet, Take 1 tablet (0.5 mg total) by mouth 2 (two) times a day as needed for anxiety NO FURTHER REFILLS WITHOUT CLINIC VISIT WITH PALLIATIVE CARE, Disp: 30 tablet, Rfl: 0    Multiple Vitamin (multivitamin) tablet, Take 1 tablet by mouth daily, Disp: , Rfl:     naloxone (NARCAN) 4 mg/0.1 mL nasal spray, Administer 1 spray into a nostril. If no response after 2-3 minutes,  give another dose in the other nostril using a new spray., Disp: 1 each, Rfl: 1    omega-3-acid ethyl esters (LOVAZA) 1 g capsule, Take 2 g by mouth daily FISH OIL, Disp: , Rfl:     prochlorperazine (COMPAZINE) 10 mg tablet, Take 1 tablet (10 mg total) by mouth every 6 (six) hours as needed for nausea or vomiting, Disp: 45 tablet, Rfl: 3    traMADol (Ultram) 50 mg tablet, Take 1 tablet (50 mg total) by mouth every 8 (eight) hours as needed for moderate pain or severe pain, Disp: 24 tablet, Rfl: 0    vitamin B-12 (CYANOCOBALAMIN) 50 MCG tablet, Take by mouth 2 (two) times a day Gummy form., Disp: , Rfl:     Icosapent Ethyl 1 g CAPS, Take 1 capsule by mouth daily (Patient not taking: Reported on 5/12/2025), Disp: , Rfl:     omeprazole (PriLOSEC) 20 mg delayed release capsule, TAKE 1 CAPSULE BY MOUTH TWICE A DAY (Patient not taking: Reported on 5/12/2025), Disp: 180 capsule, Rfl: 0    OneTouch Verio test strip, daily Test (Patient not taking: Reported on 5/12/2025), Disp: , Rfl:     oxyCODONE-acetaminophen (PERCOCET) 5-325 mg per tablet, Take 1 tablet by mouth every 4 (four) hours as needed for moderate pain for up to 20 doses Max Daily Amount: 6 tablets (Patient not taking: Reported on 5/12/2025), Disp: 20 tablet, Rfl: 0    ALLERGIES:  Allergies   Allergen Reactions    Betadine [Povidone Iodine] Rash     Also itching from betadine    Other Throat Swelling     Pt unsure which chemotherapy drug caused tongue swelling and locked jaw. Please review in epic notes Folfirinox or possible Neulasta. NEED to review       Review of systems:   Constitutional: Negative for fatigue, fever or loss of apetite.   HENT: Negative.    Respiratory: Negative for shortness of breath, dyspnea.    Cardiovascular: Negative for chest pain/tightness.   Gastrointestinal: Negative for abdominal pain, N/V.   Endocrine: Negative for cold/heat intolerance, unexplained weight loss/gain.   Genitourinary: Negative for flank pain, dysuria, hematuria.  "  Musculoskeletal: As in HPI  Skin: Negative for rash.    Neurological: Negative for numbness or tingling  Psychiatric/Behavioral: Negative for agitation.  _____________________________________________________  PHYSICAL EXAMINATION:    Resp. rate 18, height 4' 11\" (1.499 m), weight 41.7 kg (92 lb).    General: well developed, well nourished\", alert and oriented times 3, no acute distress  HEENT: Benign, normocephalic, atraumatic  Cardiovascular: regular rate    Pulmonary: No wheezing or stridor  Abdomen: Soft, Nontender  Skin: No open wounds, erythema, rash  Neurovascular: per examination below    MUSCULOSKELETAL EXAMINATION:  Left Shoulder exam  Bruising and ecchymosis over anterior arm  NonTTP cervical spine, clavicle, AC joint, acromion, scapular spine, posterior joint line, deltoid, anterior joint line, bicipital groove  TTP: proximal humerus  Active ROM deferred  Passive ROM  Forward flexion: 90  Strength Testing: deferred  SILT C5-T1  2+ Radial pulse, symmetric with contralateral      _____________________________________________________  STUDIES REVIEWED:  I have personally reviewed pertinent films in PACS and my interpretation is:   X-rays of the left shoulder and humerus obtained 5/10/2025 demonstrate minimally displaced proximal humerus fracture.     2v left shoulder XR obtained in office today show more overt long oblique fracture of proximal humerus with mild displacement.     Scribe Attestation      I,:  Priscilla Segovia am acting as a scribe while in the presence of the attending physician.:       I,:  Rajendra Hemphill MD personally performed the services described in this documentation    as scribed in my presence.:               "

## 2025-05-12 NOTE — TELEPHONE ENCOUNTER
Patient called in stating she received a call to schedule appt for 5/27, had already done that. Has appt for 5/27 at 9am, cancelled the one for 2pm

## 2025-05-12 NOTE — PATIENT INSTRUCTIONS
Nonoperative Proximal Humeral Fracture Management     PHASE 1   Weeks 0-2 Post-injury  Maintain sling at all times with no active/passive motion of shoulder.   It is okay to remove the sling to perform hygiene and to move the elbow, wrist, and fingers as tolerated.   No weightbearing allowed through upper extremity.     Weeks 2-4   Sling immobilization at all times except therapy (home or clinic) and personal hygiene.   No active use of the involved arm   NO rotation of the involved arm (internal or external)  PAIN-FREE PROM forward elevation - max 90 degrees elevation     GOALS:   Protect fracture site from movement to optimize healing environment.  Decrease risk for stiffness associated with immobilization.  Promote distal circulation of hand and forearm.  Education patient about activity guidelines and rehab progression/expectations     EXERCISES   Active , wrist flexion/extension; forearm pronation/supination;   Elbow flexion/extension  Scapular retraction/protraction as tolerated    Small [place] pendulum clockwise and counterclockwise.  Passive forward elevation to 90 degree maximum     CRITERIA TO PROGRESS TO PHASE 2: Pain not increased with passive elevation to 90 degrees AND Clearance based on radiographic evidence of lack of fracture fragment displacement at 2-3 week radiographic assessment       PHASE 2   Patient returns to the Shoulder Clinic at 3 weeks for radiographs    Sling immobilization at all times except therapy (home or clinic) and personal hygiene.   No active use of the involved arm   No internal rotation of the involved arm. Passive external rotation max: 40 degrees.  PAIN-FREE PROM forward elevation - max 140 degrees elevation     GOALS:   Protect fracture site with immobilization optimize healing environment.  Encourage motion in pain free range to decrease risk for stiffness associated with immobilization.  Promote distal circulation of hand and forearm.  Education patient about  activity guidelines and rehab progression/expectations     EXERCISES   Passive forward elevation up to max 140 (supine well arm assisted; table top step back; table top supported using well arm to slide).  Passive external rotation with arm at neutral (alongside of body) up to max 40 (seated well arm assisted; supine cane assisted with arm supported into scapular plane)  May begin aquatics for Basic UE program with slow speed of motions; avoid hook and rotate exercise and cross body adduction (hug yourself).  Continue pendulum, elbow, wrist, hand and scapular retraction     CRITERIA TO PROGRESS TO PHASE 3: Pain-free passive forward elevation to 140; ER to 40 s Clearance by MD based on evidence of early callus at 6 week radiograph assessment       PHASE 3   Patient returns to the Shoulder Clinic at 6 weeks for radiographs (6-12 weeks)     GENERAL GUIDELINES AND PRECAUTIONS  Wean from sling gradually at home first, then in community.  Lifting restrictions: no more than 5 lbs  Avoid weight bearing on affected arm       GOALS  Emphasis on restoring passive range of motion.   Restore full passive motion of the glenohumeral joint first, then progress to active assisted, then active motion through the full range.  Restore functional use of the arm for ADL’s below shoulder level (feeding, grooming…)   Protect healing fracture from stress overload     EXERCISES   PAIN-FREE Passive range of motion without range limits for elevation, ER(0); ER(90) and IR toward full motion in all planes.  Continue aquatic program in all planes and may gradually increase speed of motion.  Forward elevation progression: supine active assisted, active, to incline, to vertical supported, to vertical unsupported (after full passive range is established).  ER/IR AROM against gravity when full passive range is established.  Scapular protraction and retraction.  Active motion through short arc from balanced position and rhythmic stabilization in  balanced position (90 deg elevation in supine).    CRITERIA FOR RETURN TO WORK/SPORT: Per MD clearance based on demands, status of fracture healing, status of motion and strength - determined on a case by case basis.     PHASE 4  Begin functional progression as needed specific to sport and work demands

## 2025-05-13 ENCOUNTER — TELEPHONE (OUTPATIENT)
Age: 70
End: 2025-05-13

## 2025-05-13 RX ORDER — TRAMADOL HYDROCHLORIDE 50 MG/1
50 TABLET ORAL EVERY 8 HOURS PRN
Qty: 24 TABLET | Refills: 0 | Status: SHIPPED | OUTPATIENT
Start: 2025-05-13

## 2025-05-13 NOTE — TELEPHONE ENCOUNTER
Caller: Patient    Doctor: Dr. Hemphill    Reason for call: Tylenol not helping control her pain. Questioned if she could have Tramadol? Pain 6-7/10. Pain constant. Arm is in the sling. [facility]    Call back#: [phone] Andrea

## 2025-05-27 ENCOUNTER — OFFICE VISIT (OUTPATIENT)
Dept: OBGYN CLINIC | Facility: CLINIC | Age: 70
End: 2025-05-27
Payer: MEDICARE

## 2025-05-27 ENCOUNTER — APPOINTMENT (OUTPATIENT)
Dept: RADIOLOGY | Facility: CLINIC | Age: 70
End: 2025-05-27
Attending: STUDENT IN AN ORGANIZED HEALTH CARE EDUCATION/TRAINING PROGRAM
Payer: MEDICARE

## 2025-05-27 VITALS — WEIGHT: 92 LBS | RESPIRATION RATE: 18 BRPM | HEIGHT: 59 IN | BODY MASS INDEX: 18.55 KG/M2

## 2025-05-27 DIAGNOSIS — S42.295A OTHER CLOSED NONDISPLACED FRACTURE OF PROXIMAL END OF LEFT HUMERUS, INITIAL ENCOUNTER: Primary | ICD-10-CM

## 2025-05-27 DIAGNOSIS — S42.295A OTHER CLOSED NONDISPLACED FRACTURE OF PROXIMAL END OF LEFT HUMERUS, INITIAL ENCOUNTER: ICD-10-CM

## 2025-05-27 PROCEDURE — 73030 X-RAY EXAM OF SHOULDER: CPT

## 2025-05-27 PROCEDURE — 99213 OFFICE O/P EST LOW 20 MIN: CPT | Performed by: STUDENT IN AN ORGANIZED HEALTH CARE EDUCATION/TRAINING PROGRAM

## 2025-05-27 NOTE — PROGRESS NOTES
ASSESSMENT/PLAN:    Assessment & Plan  Other closed nondisplaced fracture of proximal end of left humerus, subsequent encounter         Discussed with patient history, exam and imaging with patient.  Presentation most consistent with left proximal humerus fracture, continue plan with nonoperative management at this time.  I explained the bruising of her arm which is migrating into her distal forearm and hand is normal and will decrease over time.  Her x-rays show signs of routine healing with no further displacement.  I explained the use of wearing her sling as tolerated however she must come out of the sling to perform exercises such as elbow and wrist range of motion as well as pendulums, 3 sets of 10 repetitions, per day  I explained she can wean out of the sling over the next week however she can continue to utilize it for comfort and safety for longer walks.  She will continue her efforts in formal physical therapy. Protocol included in AVS today.   She will follow-up in 3 weeks for reevaluation and x-rays on arrival.    Return in about 3 weeks (around 6/17/2025) for x rays on arrival .   _____________________________________________________  CHIEF COMPLAINT:  Chief Complaint   Patient presents with    Left Shoulder - Pain, Follow-up       SUBJECTIVE:    Interval History 5/27/2025:   Manju Celaya is a 68-year-old female RHD, who presents today for follow-up evaluation of her left nondisplaced fracture of proximal end of humerus.  Patient states she is feeling better, she experiences less pain of her shoulder.  She has noticed for bruising going down her arm into her forearm and hand.  She is compliant with wearing the sling however she will come out of the sling to perform elbow and wrist range of motion exercises as well as pendulum exercises.  She denies any distal paresthesias.  Her pain is well-managed.      Manju Gabrielnovas is a 70 y.o. year old female, RHD, who presents for evaluation of left shoulder  pain after a fall on Saturday 5/10/2025. She was walking on the sidewalk, going to [employer] football game. Unsure why she fell. Has no history of falls. Was seen in the ED the same day when x-rays were performed and she was placed in the sling. Today, she reports persistent pain. Pain managed with Tylenol. Tried percocet, but she was unable to tolerate it. History of osteopenia in spine. She takes vitamin D supplementation.       PAST MEDICAL HISTORY:  Past Medical History:   Diagnosis Date    BRCA1 negative     BRCA2 negative     Cancer (HCC) pancreatic    Cancer (HCC) Pancreatic    Chemotherapy induced neutropenia (HCC) 02/10/2021    Chemotherapy induced neutropenia (HCC) 02/10/2021    Chronic kidney disease     Diabetes mellitus (HCC)     Ectopic pregnancy     Gestational diabetes     Hypertension     Lactic acidosis 2020    Miscarriage     Neuropathy     Obstructive jaundice 2020    Pancreas cancer (HCC)     Port-A-Cath in place 2020       PAST SURGICAL HISTORY:  Past Surgical History:   Procedure Laterality Date    BREAST BIOPSY Left 2009    neg    BREAST SURGERY Left     calcifications     SECTION      CHOLECYSTECTOMY N/A 10/20/2020    Procedure: CHOLECYSTECTOMY;  Surgeon: Burton Kapoor MD;  Location: [facility];  Service: Surgical Oncology    COLONOSCOPY      ECTOPIC PREGNANCY SURGERY      partial ovary removed    FL GUIDED CENTRAL VENOUS ACCESS DEVICE INSERTION  2020    JOINT REPLACEMENT Bilateral     LAPAROTOMY N/A 10/20/2020    Procedure: LAPAROTOMY EXPLORATORY; INTRAOPERATIVE ULTRASOUND;  Surgeon: Burton Kapoor MD;  Location: [facility];  Service: Surgical Oncology    MANDIBLE FRACTURE SURGERY      NOSE SURGERY      deviated septum    DC CONIZATION CERVIX W/WO D&C RPR KNIFE/LASER      REPLACEMENT TOTAL HIP W/  RESURFACING IMPLANTS Bilateral     right hip done 2012; left hip done 2012    TONSILLECTOMY      TUNNELED  VENOUS PORT PLACEMENT Left 06/23/2020    Procedure: INSERTION VENOUS PORT (PORT-A-CATH), left;  Surgeon: Burton Kapoor MD;  Location: [facility];  Service: Surgical Oncology    WHIPPLE PROCEDURE/PANCREATICO-DUODENECTOMY N/A 10/20/2020    Procedure: WHIPPLE PROCEDURE/PANCREATICO-DUODENECTOMY;  Surgeon: Burton Kapoor MD;  Location: [facility];  Service: Surgical Oncology       FAMILY HISTORY:  Family History   Problem Relation Name Age of Onset    Diabetes Mother Bettye     Heart disease Mother Bettye     Heart disease Father Edward     Breast cancer additional onset Sister Ayana     Breast cancer Sister Ayana 54    Stroke Maternal Grandfather Cyril     Breast cancer additional onset Maternal Aunt      Breast cancer Maternal Aunt      Colon cancer Neg Hx      Ovarian cancer Neg Hx      Uterine cancer Neg Hx      Cervical cancer Neg Hx         SOCIAL HISTORY:  Social History     Tobacco Use    Smoking status: Former    Smokeless tobacco: Never    Tobacco comments:     quit 40 years ago   Vaping Use    Vaping status: Never Used   Substance Use Topics    Alcohol use: Not Currently     Alcohol/week: 1.0 standard drink of alcohol     Types: 1 Glasses of wine per week     Comment: WINE OCCASIONALLY    Drug use: Never       MEDICATIONS:    Current Outpatient Medications:     cholecalciferol (VITAMIN D3) 1,000 units tablet, Take 5,000 Units by mouth in the morning and 5,000 Units in the evening., Disp: , Rfl:     dicyclomine (BENTYL) 20 mg tablet, TAKE 1 TABLET BY MOUTH TWICE A DAY, Disp: 180 tablet, Rfl: 1    Empagliflozin (Jardiance) 10 MG TABS, Take 10 mg by mouth every morning, Disp: , Rfl:     ergocalciferol (VITAMIN D2) 50,000 units, Take 50,000 Units by mouth every 14 (fourteen) days, Disp: , Rfl:     gabapentin (NEURONTIN) 300 mg capsule, Take 300 mg by mouth in the morning and 300 mg in the evening and 300 mg before bedtime., Disp: , Rfl:     Lancets (OneTouch Delica Plus [id number]) MISC, in the  morning. Test., Disp: , Rfl:     LORazepam (ATIVAN) 0.5 mg tablet, Take 1 tablet (0.5 mg total) by mouth 2 (two) times a day as needed for anxiety NO FURTHER REFILLS WITHOUT CLINIC VISIT WITH PALLIATIVE CARE, Disp: 30 tablet, Rfl: 0    Multiple Vitamin (multivitamin) tablet, Take 1 tablet by mouth in the morning., Disp: , Rfl:     omega-3-acid ethyl esters (LOVAZA) 1 g capsule, Take 2 g by mouth in the morning. FISH OIL., Disp: , Rfl:     omeprazole (PriLOSEC) 20 mg delayed release capsule, TAKE 1 CAPSULE BY MOUTH TWICE A DAY (Patient taking differently: Take 20 mg by mouth 2 (two) times a day), Disp: 180 capsule, Rfl: 0    prochlorperazine (COMPAZINE) 10 mg tablet, Take 1 tablet (10 mg total) by mouth every 6 (six) hours as needed for nausea or vomiting, Disp: 45 tablet, Rfl: 3    traMADol (Ultram) 50 mg tablet, Take 1 tablet (50 mg total) by mouth every 8 (eight) hours as needed for moderate pain or severe pain, Disp: 24 tablet, Rfl: 0    vitamin B-12 (CYANOCOBALAMIN) 50 MCG tablet, Take by mouth in the morning and in the evening. Gummy form., Disp: , Rfl:     amLODIPine (NORVASC) 2.5 mg tablet, Take 2.5 mg by mouth daily, Disp: , Rfl:     diphenhydrAMINE (BENADRYL) 25 mg capsule, Take 25 mg by mouth every 6 (six) hours as needed for itching (Patient not taking: Reported on 5/27/2025), Disp: , Rfl:     Icosapent Ethyl 1 g CAPS, Take 1 capsule by mouth daily (Patient not taking: Reported on 5/12/2025), Disp: , Rfl:     naloxone (NARCAN) 4 mg/0.1 mL nasal spray, Administer 1 spray into a nostril. If no response after 2-3 minutes, give another dose in the other nostril using a new spray., Disp: 1 each, Rfl: 1    OneTouch Verio test strip, daily Test (Patient not taking: Reported on 5/12/2025), Disp: , Rfl:     oxyCODONE-acetaminophen (PERCOCET) 5-325 mg per tablet, Take 1 tablet by mouth every 4 (four) hours as needed for moderate pain for up to 20 doses Max Daily Amount: 6 tablets (Patient not taking: Reported on  "5/12/2025), Disp: 20 tablet, Rfl: 0    ALLERGIES:  Allergies   Allergen Reactions    Betadine [Povidone Iodine] Rash     Also itching from betadine    Other Throat Swelling     Pt unsure which chemotherapy drug caused tongue swelling and locked jaw. Please review in epic notes Folfirinox or possible Neulasta. NEED to review       Review of systems:   Constitutional: Negative for fatigue, fever or loss of apetite.   HENT: Negative.    Respiratory: Negative for shortness of breath, dyspnea.    Cardiovascular: Negative for chest pain/tightness.   Gastrointestinal: Negative for abdominal pain, N/V.   Endocrine: Negative for cold/heat intolerance, unexplained weight loss/gain.   Genitourinary: Negative for flank pain, dysuria, hematuria.   Musculoskeletal: As in HPI  Skin: Negative for rash.    Neurological: Negative for numbness or tingling  Psychiatric/Behavioral: Negative for agitation.  _____________________________________________________  PHYSICAL EXAMINATION:    Resp. rate 18, height 4' 11\" (1.499 m), weight 41.7 kg (92 lb).    General: well developed, well nourished\", alert and oriented times 3, no acute distress  HEENT: Benign, normocephalic, atraumatic  Cardiovascular: regular rate    Pulmonary: No wheezing or stridor  Abdomen: Soft, Nontender  Skin: No open wounds, erythema, rash  Neurovascular: per examination below    MUSCULOSKELETAL EXAMINATION:  Left Shoulder exam  Bruising and ecchymosis over anterior arm and forearm   NonTTP cervical spine, clavicle, AC joint, acromion, scapular spine, posterior joint line, deltoid, anterior joint line, bicipital groove  TTP: proximal humerus  Active ROM deferred  Passive ROM  Forward flexion: deferred   Strength Testing: deferred  SILT C5-T1  2+ Radial pulse, symmetric with contralateral      _____________________________________________________  STUDIES REVIEWED:  I have personally reviewed pertinent films and my interpretation is:   X-rays of the left shoulder and " humerus obtained 5/27/2025 2v left shoulder XR obtained in office today show more overt long oblique fracture of proximal humerus with mild displacement, no increase in displacement since most recent x-rays, signs of routine healing.  No lytic or blastic lesions.    Scribe Attestation      I,:  Tra Aguilera am acting as a scribe while in the presence of the attending physician.:       I,:  Rajendra Hemphill MD personally performed the services described in this documentation    as scribed in my presence.:

## 2025-05-27 NOTE — PATIENT INSTRUCTIONS
Nonoperative Proximal Humeral Fracture Management     PHASE 1   Weeks 0-2 Post-injury  Maintain sling at all times with no active/passive motion of shoulder.   It is okay to remove the sling to perform hygiene and to move the elbow, wrist, and fingers as tolerated.   No weightbearing allowed through upper extremity.     Weeks 2-4   Sling immobilization at all times except therapy (home or clinic) and personal hygiene.   No active use of the involved arm   NO rotation of the involved arm (internal or external)  PAIN-FREE PROM forward elevation - max 90 degrees elevation     GOALS:   Protect fracture site from movement to optimize healing environment.  Decrease risk for stiffness associated with immobilization.  Promote distal circulation of hand and forearm.  Education patient about activity guidelines and rehab progression/expectations     EXERCISES   Active , wrist flexion/extension; forearm pronation/supination;   Elbow flexion/extension  Scapular retraction/protraction as tolerated    Small [place] pendulum clockwise and counterclockwise.  Passive forward elevation to 90 degree maximum     CRITERIA TO PROGRESS TO PHASE 2: Pain not increased with passive elevation to 90 degrees AND Clearance based on radiographic evidence of lack of fracture fragment displacement at 2-3 week radiographic assessment       PHASE 2   Patient returns to the Shoulder Clinic at 3 weeks for radiographs    Sling immobilization at all times except therapy (home or clinic) and personal hygiene.   No active use of the involved arm   No internal rotation of the involved arm. Passive external rotation max: 40 degrees.  PAIN-FREE PROM forward elevation - max 140 degrees elevation     GOALS:   Protect fracture site with immobilization optimize healing environment.  Encourage motion in pain free range to decrease risk for stiffness associated with immobilization.  Promote distal circulation of hand and forearm.  Education patient about  activity guidelines and rehab progression/expectations     EXERCISES   Passive forward elevation up to max 140 (supine well arm assisted; table top step back; table top supported using well arm to slide).  Passive external rotation with arm at neutral (alongside of body) up to max 40 (seated well arm assisted; supine cane assisted with arm supported into scapular plane)  May begin aquatics for Basic UE program with slow speed of motions; avoid hook and rotate exercise and cross body adduction (hug yourself).  Continue pendulum, elbow, wrist, hand and scapular retraction     CRITERIA TO PROGRESS TO PHASE 3: Pain-free passive forward elevation to 140; ER to 40 s Clearance by MD based on evidence of early callus at 6 week radiograph assessment       PHASE 3   Patient returns to the Shoulder Clinic at 6 weeks for radiographs (6-12 weeks)     GENERAL GUIDELINES AND PRECAUTIONS  Wean from sling gradually at home first, then in community.  Lifting restrictions: no more than 5 lbs  Avoid weight bearing on affected arm       GOALS  Emphasis on restoring passive range of motion.   Restore full passive motion of the glenohumeral joint first, then progress to active assisted, then active motion through the full range.  Restore functional use of the arm for ADL’s below shoulder level (feeding, grooming…)   Protect healing fracture from stress overload     EXERCISES   PAIN-FREE Passive range of motion without range limits for elevation, ER(0); ER(90) and IR toward full motion in all planes.  Continue aquatic program in all planes and may gradually increase speed of motion.  Forward elevation progression: supine active assisted, active, to incline, to vertical supported, to vertical unsupported (after full passive range is established).  ER/IR AROM against gravity when full passive range is established.  Scapular protraction and retraction.  Active motion through short arc from balanced position and rhythmic stabilization in  balanced position (90 deg elevation in supine).    CRITERIA FOR RETURN TO WORK/SPORT: Per MD clearance based on demands, status of fracture healing, status of motion and strength - determined on a case by case basis.     PHASE 4  Begin functional progression as needed specific to sport and work demands

## 2025-05-29 ENCOUNTER — EVALUATION (OUTPATIENT)
Dept: PHYSICAL THERAPY | Facility: CLINIC | Age: 70
End: 2025-05-29
Attending: STUDENT IN AN ORGANIZED HEALTH CARE EDUCATION/TRAINING PROGRAM
Payer: MEDICARE

## 2025-05-29 DIAGNOSIS — M25.512 ACUTE PAIN OF LEFT SHOULDER: Primary | ICD-10-CM

## 2025-05-29 PROCEDURE — 97110 THERAPEUTIC EXERCISES: CPT

## 2025-05-29 PROCEDURE — 97161 PT EVAL LOW COMPLEX 20 MIN: CPT

## 2025-05-29 NOTE — PROGRESS NOTES
PT Evaluation     Today's date: 2025  Patient name: Manju Holland  : 1955  MRN: [id number]  Referring provider: Rajendra Hemphill MD  Dx:   Encounter Diagnosis     ICD-10-CM    1. Acute pain of left shoulder  M25.512           Start Time: 1118  Stop Time: 1202  Total time in clinic (min): 44 minutes    Assessment  Impairments: abnormal or restricted ROM, activity intolerance, impaired physical strength, lacks appropriate home exercise program, pain with function, unable to perform ADL, participation limitations and activity limitations  Symptom irritability: low    Assessment details: Pt is a 69 yo F presenting to the physical therapy clinic with c/c of L UE pain. Pt presents with no red flags at this time. Physical examination reveals limited A/PROM of the L shoulder, TTP at the proximal lateral aspect of the humerus with palpable bony callus, hypomobility of the L scapula, and ecchymosis of the LUE. Pt is limited in the use of her arm by her protocol, so she is unable to complete her ADLs and recreational activities w/o assistance from her . Skilled PT is medically necessary to address impairments and return pt to her PLOF.   Understanding of Dx/Px/POC: excellent     Prognosis: excellent    Goals  STG:  Pt will improve ROM by 50* where limited.  Pt will report <5/10 pain at worst in order to demonstrate improved symptom modulation.      LTG:  Pt will improve ROM by 100* where limited.  Pt will report <0/10 pain at worst in order to demonstrate improved symptom modulation.  Pt will return to PLOF w/o limitations.         Plan  Patient would benefit from: skilled physical therapy  Planned modality interventions: cryotherapy and thermotherapy: hydrocollator packs    Planned therapy interventions: balance, gait training, home exercise program, neuromuscular re-education, postural training, strengthening, stretching, therapeutic activities, therapeutic exercise and joint mobilization    Frequency:  1x week  Duration in weeks: 8  Plan of Care beginning date: 2025  Plan of Care expiration date: 2025  Treatment plan discussed with: patient and family        Subjective Evaluation    History of Present Illness  Mechanism of injury: Pt presents to the physical therapy clinic with c/c of L UE pain s/p L humeral fracture after a trip and fall on 5/10/25. Pt reports falling forward on an outstretched arm. She presents with UE sling donned. She has been performing her HEP given to her by Dr. Hemphill 3x/day. She notes attempting light AROM of the shoulder.   Quality of life: good    Patient Goals  Patient goals for therapy: independence with ADLs/IADLs, decreased pain, increased strength and return to sport/leisure activities    Pain  Current pain ratin  At best pain ratin  At worst pain ratin  Quality: dull ache  Relieving factors: rest  Aggravating factors: lifting  Progression: improved    Social Support  Lives with: spouse    Hand dominance: right      Diagnostic Tests  X-ray: abnormal        Objective     Postural Observations  Seated posture: fair  Standing posture: fair      Observations     Additional Observation Details  Ecchymosis L upper arm and forearm    Tenderness     Left Elbow   Tenderness in the lateral epicondyle and medial epicondyle.     Left Wrist/Hand   Tenderness in the lateral epicondyle and medial epicondyle.     Additional Tenderness Details  Proximal lateral humeral tenderness over bony callus     Cervical/Thoracic Screen   Cervical range of motion within normal limits  Cervical range of motion within normal limits with the following exceptions: Tightness on L with RSB and RROT    Neurological Testing     Sensation     Shoulder   Left Shoulder   Intact: light touch    Right Shoulder   Intact: Light touch    Active Range of Motion   Left Shoulder   Flexion: 20 degrees   Extension: 30 degrees   Abduction: 20 degrees     Right Shoulder   Normal active range of motion    Left  Elbow   Normal active range of motion    Left Wrist   Normal active range of motion    Passive Range of Motion   Left Shoulder   Flexion: 90 degrees     Joint Play     Additional Joint Play Details  Hypomobile L scapula    Strength/Myotome Testing     Additional Strength Details  Withheld strength testing 2/2 stage of healing             Precautions: Humeral Fx Protocol    PHASE 1   Weeks 0-2 Post-injury  Maintain sling at all times with no active/passive motion of shoulder.   It is okay to remove the sling to perform hygiene and to move the elbow, wrist, and fingers as tolerated.   No weightbearing allowed through upper extremity.      Weeks 2-4   Sling immobilization at all times except therapy (home or clinic) and personal hygiene.   No active use of the involved arm   NO rotation of the involved arm (internal or external)  PAIN-FREE PROM forward elevation - max 90 degrees elevation      GOALS:   Protect fracture site from movement to optimize healing environment.  Decrease risk for stiffness associated with immobilization.  Promote distal circulation of hand and forearm.  Education patient about activity guidelines and rehab progression/expectations      EXERCISES   Active , wrist flexion/extension; forearm pronation/supination;   Elbow flexion/extension  Scapular retraction/protraction as tolerated    Small [place] pendulum clockwise and counterclockwise.  Passive forward elevation to 90 degree maximum      CRITERIA TO PROGRESS TO PHASE 2: Pain not increased with passive elevation to 90 degrees AND Clearance based on radiographic evidence of lack of fracture fragment displacement at 2-3 week radiographic assessment         PHASE 2   Patient returns to the Shoulder Clinic at 3 weeks for radiographs     Sling immobilization at all times except therapy (home or clinic) and personal hygiene.   No active use of the involved arm   No internal rotation of the involved arm. Passive external rotation max: 40  degrees.  PAIN-FREE PROM forward elevation - max 140 degrees elevation      GOALS:   Protect fracture site with immobilization optimize healing environment.  Encourage motion in pain free range to decrease risk for stiffness associated with immobilization.  Promote distal circulation of hand and forearm.  Education patient about activity guidelines and rehab progression/expectations      EXERCISES   Passive forward elevation up to max 140 (supine well arm assisted; table top step back; table top supported using well arm to slide).  Passive external rotation with arm at neutral (alongside of body) up to max 40 (seated well arm assisted; supine cane assisted with arm supported into scapular plane)  May begin aquatics for Basic UE program with slow speed of motions; avoid hook and rotate exercise and cross body adduction (hug yourself).  Continue pendulum, elbow, wrist, hand and scapular retraction      CRITERIA TO PROGRESS TO PHASE 3: Pain-free passive forward elevation to 140; ER to 40 s Clearance by MD based on evidence of early callus at 6 week radiograph assessment         PHASE 3   Patient returns to the Shoulder Clinic at 6 weeks for radiographs (6-12 weeks)      GENERAL GUIDELINES AND PRECAUTIONS  Wean from sling gradually at home first, then in community.  Lifting restrictions: no more than 5 lbs  Avoid weight bearing on affected arm         GOALS  Emphasis on restoring passive range of motion.   Restore full passive motion of the glenohumeral joint first, then progress to active assisted, then active motion through the full range.  Restore functional use of the arm for ADL’s below shoulder level (feeding, grooming…)   Protect healing fracture from stress overload      EXERCISES   PAIN-FREE Passive range of motion without range limits for elevation, ER(0); ER(90) and IR toward full motion in all planes.  Continue aquatic program in all planes and may gradually increase speed of motion.  Forward elevation  progression: supine active assisted, active, to incline, to vertical supported, to vertical unsupported (after full passive range is established).  ER/IR AROM against gravity when full passive range is established.  Scapular protraction and retraction.  Active motion through short arc from balanced position and rhythmic stabilization in balanced position (90 deg elevation in supine).     CRITERIA FOR RETURN TO WORK/SPORT: Per MD clearance based on demands, status of fracture healing, status of motion and strength - determined on a case by case basis.      PHASE 4  Begin functional progression as needed specific to sport and work demands     POC expires Unit limit Auth Expiration date PT/OT/ST + Visit Limit?   7/24/25 BOMN NA BOMN                           Visit/Unit Tracking  AUTH Status:  Date 5/29              NA Used 1               Remaining                      Manuals 5/29            Flexion PROM to 90*             Scapular mobs                                       Neuro Re-Ed             Scapular clocks HEP            Scap retraction HEP                                                                             Ther Ex             Pt edu SG            Wrist flex/ext HEP            Supination/pronation HEP            Elbow flexion/extension HEP                                                                             Ther Activity                                       Gait Training                                       Modalities

## 2025-05-29 NOTE — HOME EXERCISE EDUCATION
Program_ID:[id number]   Access Code: [email or web address]  URL: [email or web address]  Date: 05-  Prepared By: Andrea Roberto    Program Notes      Exercises      - Circular Shoulder Pendulum with Table Support - 1 x daily - 7 x weekly - 3 sets - 10 reps      - Seated Scapular Retraction - 3 x daily - 7 x weekly - 3 sets - 10 reps      - Standing Elbow Flexion Extension AROM - 3 x daily - 7 x weekly - 3 sets - 10 reps      - Seated Forearm Pronation and Supination AROM - 3 x daily - 7 x weekly - 3 sets - 10 reps      - Wrist AROM Flexion Extension - 3 x daily - 7 x weekly - 3 sets - 10 reps      - Standing Scapular Clock (1 to 7) - 3 x daily - 7 x weekly - 3 sets - 10 reps      - Seated Gripping Towel - 3 x daily - 7 x weekly - 3 sets - 10 reps - 3 seconds hold

## 2025-06-05 ENCOUNTER — OFFICE VISIT (OUTPATIENT)
Dept: PHYSICAL THERAPY | Facility: CLINIC | Age: 70
End: 2025-06-05
Attending: STUDENT IN AN ORGANIZED HEALTH CARE EDUCATION/TRAINING PROGRAM
Payer: MEDICARE

## 2025-06-05 DIAGNOSIS — M25.512 ACUTE PAIN OF LEFT SHOULDER: Primary | ICD-10-CM

## 2025-06-05 PROCEDURE — 97140 MANUAL THERAPY 1/> REGIONS: CPT

## 2025-06-05 PROCEDURE — 97110 THERAPEUTIC EXERCISES: CPT

## 2025-06-05 NOTE — PROGRESS NOTES
Daily Note     Today's date: 2025  Patient name: Manju Holland  : 1955  MRN: [id number]  Referring provider: Rajendra Hemphill MD  Dx:   Encounter Diagnosis     ICD-10-CM    1. Acute pain of left shoulder  M25.512           Start Time: 1431  Stop Time: 1514  Total time in clinic (min): 43 minutes    Subjective: Pt denies changes in symptoms since last visit. She reports she has been more independent at home.       Objective: See treatment diary below      Assessment: Tolerated treatment well w/o adverse effects. Minor soreness experienced with PROM of the LUE. Pt tolerated manual techniques applying slight load through the GHJ without pain. Added load to HEP w/o issues. Patient would benefit from continued PT      Plan: Continue per plan of care.      Precautions: Humeral Fx Protocol    PHASE 1   Weeks 0-2 Post-injury  Maintain sling at all times with no active/passive motion of shoulder.   It is okay to remove the sling to perform hygiene and to move the elbow, wrist, and fingers as tolerated.   No weightbearing allowed through upper extremity.      Weeks 2-4   Sling immobilization at all times except therapy (home or clinic) and personal hygiene.   No active use of the involved arm   NO rotation of the involved arm (internal or external)  PAIN-FREE PROM forward elevation - max 90 degrees elevation      GOALS:   Protect fracture site from movement to optimize healing environment.  Decrease risk for stiffness associated with immobilization.  Promote distal circulation of hand and forearm.  Education patient about activity guidelines and rehab progression/expectations      EXERCISES   Active , wrist flexion/extension; forearm pronation/supination;   Elbow flexion/extension  Scapular retraction/protraction as tolerated    Small [place] pendulum clockwise and counterclockwise.  Passive forward elevation to 90 degree maximum      CRITERIA TO PROGRESS TO PHASE 2: Pain not increased with passive elevation  to 90 degrees AND Clearance based on radiographic evidence of lack of fracture fragment displacement at 2-3 week radiographic assessment         PHASE 2   Patient returns to the Shoulder Clinic at 3 weeks for radiographs     Sling immobilization at all times except therapy (home or clinic) and personal hygiene.   No active use of the involved arm   No internal rotation of the involved arm. Passive external rotation max: 40 degrees.  PAIN-FREE PROM forward elevation - max 140 degrees elevation      GOALS:   Protect fracture site with immobilization optimize healing environment.  Encourage motion in pain free range to decrease risk for stiffness associated with immobilization.  Promote distal circulation of hand and forearm.  Education patient about activity guidelines and rehab progression/expectations      EXERCISES   Passive forward elevation up to max 140 (supine well arm assisted; table top step back; table top supported using well arm to slide).  Passive external rotation with arm at neutral (alongside of body) up to max 40 (seated well arm assisted; supine cane assisted with arm supported into scapular plane)  May begin aquatics for Basic UE program with slow speed of motions; avoid hook and rotate exercise and cross body adduction (hug yourself).  Continue pendulum, elbow, wrist, hand and scapular retraction      CRITERIA TO PROGRESS TO PHASE 3: Pain-free passive forward elevation to 140; ER to 40 s Clearance by MD based on evidence of early callus at 6 week radiograph assessment         PHASE 3   Patient returns to the Shoulder Clinic at 6 weeks for radiographs (6-12 weeks)      GENERAL GUIDELINES AND PRECAUTIONS  Wean from sling gradually at home first, then in community.  Lifting restrictions: no more than 5 lbs  Avoid weight bearing on affected arm         GOALS  Emphasis on restoring passive range of motion.   Restore full passive motion of the glenohumeral joint first, then progress to active assisted,  then active motion through the full range.  Restore functional use of the arm for ADL’s below shoulder level (feeding, grooming…)   Protect healing fracture from stress overload      EXERCISES   PAIN-FREE Passive range of motion without range limits for elevation, ER(0); ER(90) and IR toward full motion in all planes.  Continue aquatic program in all planes and may gradually increase speed of motion.  Forward elevation progression: supine active assisted, active, to incline, to vertical supported, to vertical unsupported (after full passive range is established).  ER/IR AROM against gravity when full passive range is established.  Scapular protraction and retraction.  Active motion through short arc from balanced position and rhythmic stabilization in balanced position (90 deg elevation in supine).     CRITERIA FOR RETURN TO WORK/SPORT: Per MD clearance based on demands, status of fracture healing, status of motion and strength - determined on a case by case basis.      PHASE 4  Begin functional progression as needed specific to sport and work demands     POC expires Unit limit Auth Expiration date PT/OT/ST + Visit Limit?   7/24/25 BOMN NA BOMN                           Visit/Unit Tracking  AUTH Status:  Date 5/29 6/5             NA Used 1 2              Remaining                      Manuals 5/29 6/5           Flexion PROM to 90*  SG           Scapular mobs  SG           STM pec, lat  SG           Rhythmic stab  SG           Neuro Re-Ed             Scapular clocks HEP            Scap retraction HEP                                                                             Ther Ex             Pt edu SG            Wrist flex/ext HEP            Supination/pronation HEP Weighted 2x10           Elbow flexion/extension HEP 2#  2x15           Supine serratus punch  3x15           digiflex  Red  2x20           Wrist flex/ext  2#  2x15ea           Shrug  2#  2x15                        Ther Activity                                        Gait Training                                       Modalities

## 2025-06-10 ENCOUNTER — OFFICE VISIT (OUTPATIENT)
Dept: PHYSICAL THERAPY | Facility: CLINIC | Age: 70
End: 2025-06-10
Attending: STUDENT IN AN ORGANIZED HEALTH CARE EDUCATION/TRAINING PROGRAM
Payer: MEDICARE

## 2025-06-10 DIAGNOSIS — M25.512 ACUTE PAIN OF LEFT SHOULDER: Primary | ICD-10-CM

## 2025-06-10 PROCEDURE — 97140 MANUAL THERAPY 1/> REGIONS: CPT

## 2025-06-10 PROCEDURE — 97110 THERAPEUTIC EXERCISES: CPT

## 2025-06-10 NOTE — PROGRESS NOTES
Daily Note     Today's date: 6/10/2025  Patient name: Manju Holland  : 1955  MRN: [id number]  Referring provider: Rajendra Hemphill MD  Dx:   Encounter Diagnosis     ICD-10-CM    1. Acute pain of left shoulder  M25.512           Start Time: 1600  Stop Time: 1644  Total time in clinic (min): 44 minutes    Subjective: Pt denies changes in symptoms since last visit. She has d/c'd use of her sling for all activities other than sleep.       Objective: See treatment diary below      Assessment: Tolerated treatment well w/o adverse effects. Minor soreness experienced with PROM and AAROM of the LUE. Pt tolerated manual techniques applying slight load through the GHJ without pain. Added additional AAROM exercises to HEP. Patient would benefit from continued PT      Plan: Continue per plan of care.  Progress to phase 3 as long as x-ray is unremarkable     Precautions: Humeral Fx Protocol    PHASE 1   Weeks 0-2 Post-injury  Maintain sling at all times with no active/passive motion of shoulder.   It is okay to remove the sling to perform hygiene and to move the elbow, wrist, and fingers as tolerated.   No weightbearing allowed through upper extremity.      Weeks 2-4   Sling immobilization at all times except therapy (home or clinic) and personal hygiene.   No active use of the involved arm   NO rotation of the involved arm (internal or external)  PAIN-FREE PROM forward elevation - max 90 degrees elevation      GOALS:   Protect fracture site from movement to optimize healing environment.  Decrease risk for stiffness associated with immobilization.  Promote distal circulation of hand and forearm.  Education patient about activity guidelines and rehab progression/expectations      EXERCISES   Active , wrist flexion/extension; forearm pronation/supination;   Elbow flexion/extension  Scapular retraction/protraction as tolerated    Small [place] pendulum clockwise and counterclockwise.  Passive forward elevation to 90  degree maximum      CRITERIA TO PROGRESS TO PHASE 2: Pain not increased with passive elevation to 90 degrees AND Clearance based on radiographic evidence of lack of fracture fragment displacement at 2-3 week radiographic assessment         PHASE 2   Patient returns to the Shoulder Clinic at 3 weeks for radiographs     Sling immobilization at all times except therapy (home or clinic) and personal hygiene.   No active use of the involved arm   No internal rotation of the involved arm. Passive external rotation max: 40 degrees.  PAIN-FREE PROM forward elevation - max 140 degrees elevation      GOALS:   Protect fracture site with immobilization optimize healing environment.  Encourage motion in pain free range to decrease risk for stiffness associated with immobilization.  Promote distal circulation of hand and forearm.  Education patient about activity guidelines and rehab progression/expectations      EXERCISES   Passive forward elevation up to max 140 (supine well arm assisted; table top step back; table top supported using well arm to slide).  Passive external rotation with arm at neutral (alongside of body) up to max 40 (seated well arm assisted; supine cane assisted with arm supported into scapular plane)  May begin aquatics for Basic UE program with slow speed of motions; avoid hook and rotate exercise and cross body adduction (hug yourself).  Continue pendulum, elbow, wrist, hand and scapular retraction      CRITERIA TO PROGRESS TO PHASE 3: Pain-free passive forward elevation to 140; ER to 40 s Clearance by MD based on evidence of early callus at 6 week radiograph assessment         PHASE 3   Patient returns to the Shoulder Clinic at 6 weeks for radiographs (6-12 weeks)      GENERAL GUIDELINES AND PRECAUTIONS  Wean from sling gradually at home first, then in community.  Lifting restrictions: no more than 5 lbs  Avoid weight bearing on affected arm         GOALS  Emphasis on restoring passive range of motion.    Restore full passive motion of the glenohumeral joint first, then progress to active assisted, then active motion through the full range.  Restore functional use of the arm for ADL’s below shoulder level (feeding, grooming…)   Protect healing fracture from stress overload      EXERCISES   PAIN-FREE Passive range of motion without range limits for elevation, ER(0); ER(90) and IR toward full motion in all planes.  Continue aquatic program in all planes and may gradually increase speed of motion.  Forward elevation progression: supine active assisted, active, to incline, to vertical supported, to vertical unsupported (after full passive range is established).  ER/IR AROM against gravity when full passive range is established.  Scapular protraction and retraction.  Active motion through short arc from balanced position and rhythmic stabilization in balanced position (90 deg elevation in supine).     CRITERIA FOR RETURN TO WORK/SPORT: Per MD clearance based on demands, status of fracture healing, status of motion and strength - determined on a case by case basis.      PHASE 4  Begin functional progression as needed specific to sport and work demands     POC expires Unit limit Auth Expiration date PT/OT/ST + Visit Limit?   7/24/25 BOMN NA BOMN                           Visit/Unit Tracking  AUTH Status:  Date 5/29 6/5 6/10            NA Used 1 2 3             Remaining                      Manuals 5/29 6/5 6/10          Flexion PROM to 90*  SG SG          Scapular mobs  SG           STM pec, lat  SG           Rhythmic stab  SG SG          Neuro Re-Ed             Scapular clocks HEP            Scap retraction HEP                                                                             Ther Ex             Pt edu SG            Wrist flex/ext HEP            Supination/pronation HEP Weighted 2x10           Elbow flexion/extension HEP 2#  2x15           Supine serratus punch  3x15 2#  3x15          digiflex  Red  2x20            Wrist flex/ext  2#  2x15ea           Shrug  2#  2x15           Supine Cane ER   x20          Table slides   2x15          pulley's   x20          Ther Activity                                       Gait Training                                       Modalities

## 2025-06-17 ENCOUNTER — APPOINTMENT (OUTPATIENT)
Dept: RADIOLOGY | Facility: CLINIC | Age: 70
End: 2025-06-17
Attending: STUDENT IN AN ORGANIZED HEALTH CARE EDUCATION/TRAINING PROGRAM
Payer: MEDICARE

## 2025-06-17 ENCOUNTER — OFFICE VISIT (OUTPATIENT)
Dept: OBGYN CLINIC | Facility: CLINIC | Age: 70
End: 2025-06-17
Payer: MEDICARE

## 2025-06-17 VITALS — WEIGHT: 93.2 LBS | BODY MASS INDEX: 18.79 KG/M2 | RESPIRATION RATE: 18 BRPM | HEIGHT: 59 IN

## 2025-06-17 DIAGNOSIS — M25.512 LEFT SHOULDER PAIN, UNSPECIFIED CHRONICITY: Primary | ICD-10-CM

## 2025-06-17 DIAGNOSIS — M25.512 LEFT SHOULDER PAIN, UNSPECIFIED CHRONICITY: ICD-10-CM

## 2025-06-17 PROCEDURE — 99213 OFFICE O/P EST LOW 20 MIN: CPT | Performed by: STUDENT IN AN ORGANIZED HEALTH CARE EDUCATION/TRAINING PROGRAM

## 2025-06-17 PROCEDURE — 73030 X-RAY EXAM OF SHOULDER: CPT

## 2025-06-17 NOTE — PROGRESS NOTES
ASSESSMENT/PLAN:    Assessment & Plan  Left shoulder pain, unspecified chronicity         Discussed with patient history, exam and imaging with patient.  Presentation most consistent with left proximal humerus fracture, continue plan with nonoperative management at this time.  I explained the bruising of her arm which is migrating into her distal forearm and hand is normal and will decrease over time.  Her x-rays show signs of routine healing with no further displacement.  I explained she can wean out of the sling over the next week however she can continue to utilize it for comfort and safety for longer walks.  She will continue her efforts in formal physical therapy.   She will follow-up in 6 weeks for reevaluation and x-rays on arrival.    Return in about 6 weeks (around 7/29/2025).   _____________________________________________________  CHIEF COMPLAINT:  Chief Complaint   Patient presents with    Left Shoulder - Follow-up     Pt is concerned about the level of bruising       SUBJECTIVE:    Interval History 5/27/2025:   Manju Celaya is a 68-year-old female RHD, who presents today for follow-up evaluation of her left nondisplaced fracture of proximal end of humerus.  Patient states she is feeling better, she experiences less pain of her shoulder.  She feels that her bruising is resolving, but does feel some discomfort into the regions of bruising.  She has been diligent with her physical therapy and notes that they have added in some light weights for strengthening of her arm.  She denies any distal paresthesias.  Her pain is well-managed.      Manju Holland is a 70 y.o. year old female, RHD, who presents for evaluation of left shoulder pain after a fall on Saturday 5/10/2025. She was walking on the sidewalk, going to [employer] football game. Unsure why she fell. Has no history of falls. Was seen in the ED the same day when x-rays were performed and she was placed in the sling. Today, she reports  persistent pain. Pain managed with Tylenol. Tried percocet, but she was unable to tolerate it. History of osteopenia in spine. She takes vitamin D supplementation.       PAST MEDICAL HISTORY:  Past Medical History:   Diagnosis Date    BRCA1 negative     BRCA2 negative     Cancer (HCC) pancreatic    Cancer (HCC) Pancreatic    Chemotherapy induced neutropenia (HCC) 02/10/2021    Chemotherapy induced neutropenia (HCC) 02/10/2021    Chronic kidney disease     Diabetes mellitus (HCC)     Ectopic pregnancy     Gestational diabetes     Hypertension     Lactic acidosis 2020    Miscarriage     Neuropathy     Obstructive jaundice 2020    Pancreas cancer (HCC)     Port-A-Cath in place 2020       PAST SURGICAL HISTORY:  Past Surgical History:   Procedure Laterality Date    BREAST BIOPSY Left 2009    neg    BREAST SURGERY Left     calcifications     SECTION      CHOLECYSTECTOMY N/A 10/20/2020    Procedure: CHOLECYSTECTOMY;  Surgeon: Burton Kapoor MD;  Location: [facility];  Service: Surgical Oncology    COLONOSCOPY      ECTOPIC PREGNANCY SURGERY      partial ovary removed    FL GUIDED CENTRAL VENOUS ACCESS DEVICE INSERTION  2020    JOINT REPLACEMENT Bilateral     LAPAROTOMY N/A 10/20/2020    Procedure: LAPAROTOMY EXPLORATORY; INTRAOPERATIVE ULTRASOUND;  Surgeon: Burton Kapoor MD;  Location: [facility];  Service: Surgical Oncology    MANDIBLE FRACTURE SURGERY      NOSE SURGERY      deviated septum    IL CONIZATION CERVIX W/WO D&C RPR KNIFE/LASER      REPLACEMENT TOTAL HIP W/  RESURFACING IMPLANTS Bilateral     right hip done 2012; left hip done 2012    TONSILLECTOMY      TUNNELED VENOUS PORT PLACEMENT Left 2020    Procedure: INSERTION VENOUS PORT (PORT-A-CATH), left;  Surgeon: Burton Kapoor MD;  Location: [facility];  Service: Surgical Oncology    WHIPPLE PROCEDURE/PANCREATICO-DUODENECTOMY N/A 10/20/2020    Procedure: WHIPPLE  PROCEDURE/PANCREATICO-DUODENECTOMY;  Surgeon: Burton Kapoor MD;  Location: [facility];  Service: Surgical Oncology       FAMILY HISTORY:  Family History   Problem Relation Name Age of Onset    Diabetes Mother Bettye     Heart disease Mother Bettye     Heart disease Father Fritz     Breast cancer additional onset Sister Ayana     Breast cancer Sister Ayana 54    Stroke Maternal Grandfather Cyril     Breast cancer additional onset Maternal Aunt      Breast cancer Maternal Aunt      Colon cancer Neg Hx      Ovarian cancer Neg Hx      Uterine cancer Neg Hx      Cervical cancer Neg Hx         SOCIAL HISTORY:  Social History     Tobacco Use    Smoking status: Former    Smokeless tobacco: Never    Tobacco comments:     quit 40 years ago   Vaping Use    Vaping status: Never Used   Substance Use Topics    Alcohol use: Not Currently     Alcohol/week: 1.0 standard drink of alcohol     Types: 1 Glasses of wine per week     Comment: WINE OCCASIONALLY    Drug use: Never       MEDICATIONS:    Current Outpatient Medications:     amLODIPine (NORVASC) 2.5 mg tablet, Take 2.5 mg by mouth in the morning., Disp: , Rfl:     cholecalciferol (VITAMIN D3) 1,000 units tablet, Take 5,000 Units by mouth in the morning and 5,000 Units in the evening., Disp: , Rfl:     dicyclomine (BENTYL) 20 mg tablet, TAKE 1 TABLET BY MOUTH TWICE A DAY, Disp: 180 tablet, Rfl: 1    Empagliflozin (Jardiance) 10 MG TABS, Take 10 mg by mouth every morning, Disp: , Rfl:     ergocalciferol (VITAMIN D2) 50,000 units, Take 50,000 Units by mouth every 14 (fourteen) days, Disp: , Rfl:     gabapentin (NEURONTIN) 300 mg capsule, Take 300 mg by mouth in the morning and 300 mg in the evening and 300 mg before bedtime., Disp: , Rfl:     Lancets (OneTouch Delica Plus [id number]) MISC, in the morning. Test., Disp: , Rfl:     LORazepam (ATIVAN) 0.5 mg tablet, Take 1 tablet (0.5 mg total) by mouth 2 (two) times a day as needed for anxiety NO FURTHER REFILLS  WITHOUT CLINIC VISIT WITH PALLIATIVE CARE, Disp: 30 tablet, Rfl: 0    Multiple Vitamin (multivitamin) tablet, Take 1 tablet by mouth in the morning., Disp: , Rfl:     omega-3-acid ethyl esters (LOVAZA) 1 g capsule, Take 2 g by mouth in the morning. FISH OIL., Disp: , Rfl:     omeprazole (PriLOSEC) 20 mg delayed release capsule, TAKE 1 CAPSULE BY MOUTH TWICE A DAY (Patient taking differently: Take 20 mg by mouth 2 (two) times a day), Disp: 180 capsule, Rfl: 0    prochlorperazine (COMPAZINE) 10 mg tablet, Take 1 tablet (10 mg total) by mouth every 6 (six) hours as needed for nausea or vomiting, Disp: 45 tablet, Rfl: 3    traMADol (Ultram) 50 mg tablet, Take 1 tablet (50 mg total) by mouth every 8 (eight) hours as needed for moderate pain or severe pain, Disp: 24 tablet, Rfl: 0    vitamin B-12 (CYANOCOBALAMIN) 50 MCG tablet, Take by mouth in the morning and in the evening. Gummy form., Disp: , Rfl:     diphenhydrAMINE (BENADRYL) 25 mg capsule, Take 25 mg by mouth every 6 (six) hours as needed for itching (Patient not taking: Reported on 5/27/2025), Disp: , Rfl:     Icosapent Ethyl 1 g CAPS, Take 1 capsule by mouth daily (Patient not taking: Reported on 5/12/2025), Disp: , Rfl:     naloxone (NARCAN) 4 mg/0.1 mL nasal spray, Administer 1 spray into a nostril. If no response after 2-3 minutes, give another dose in the other nostril using a new spray., Disp: 1 each, Rfl: 1    OneTouch Verio test strip, daily Test (Patient not taking: Reported on 5/12/2025), Disp: , Rfl:     oxyCODONE-acetaminophen (PERCOCET) 5-325 mg per tablet, Take 1 tablet by mouth every 4 (four) hours as needed for moderate pain for up to 20 doses Max Daily Amount: 6 tablets (Patient not taking: Reported on 5/12/2025), Disp: 20 tablet, Rfl: 0    ALLERGIES:  Allergies   Allergen Reactions    Betadine [Povidone Iodine] Rash     Also itching from betadine    Other Throat Swelling     Pt unsure which chemotherapy drug caused tongue swelling and locked jaw.  "Please review in epic notes Folfirinox or possible Neulasta. NEED to review       Review of systems:   Constitutional: Negative for fatigue, fever or loss of apetite.   HENT: Negative.    Respiratory: Negative for shortness of breath, dyspnea.    Cardiovascular: Negative for chest pain/tightness.   Gastrointestinal: Negative for abdominal pain, N/V.   Endocrine: Negative for cold/heat intolerance, unexplained weight loss/gain.   Genitourinary: Negative for flank pain, dysuria, hematuria.   Musculoskeletal: As in HPI  Skin: Negative for rash.    Neurological: Negative for numbness or tingling  Psychiatric/Behavioral: Negative for agitation.  _____________________________________________________  PHYSICAL EXAMINATION:    Resp. rate 18, height 4' 11\" (1.499 m), weight 42.3 kg (93 lb 3.2 oz).    General: well developed, well nourished\", alert and oriented times 3, no acute distress  HEENT: Benign, normocephalic, atraumatic  Cardiovascular: regular rate    Pulmonary: No wheezing or stridor  Abdomen: Soft, Nontender  Skin: No open wounds, erythema, rash  Neurovascular: per examination below    MUSCULOSKELETAL EXAMINATION:  Left Shoulder exam  Bruising and ecchymosis over anterior arm and forearm   NonTTP cervical spine, clavicle, AC joint, acromion, scapular spine  TTP: mild proximal humerus  Active ROM 90 degrees forward flexion, 30 degrees external rotation  Passive ROM  Forward flexion: 120 degrees forward flexion  Strength Testing: deferred  SILT C5-T1  2+ Radial pulse, symmetric with contralateral      _____________________________________________________  STUDIES REVIEWED:  I have personally reviewed pertinent films and my interpretation is  :   X-rays of the left shoulder and humerus obtained today include long oblique fracture of proximal humerus at surgical neck with mild displacement, no increase in displacement since most recent x-rays, and signs of routine healing with early callus formation.  No lytic or " blastic lesions.    Scribe Attestation      I,:   am acting as a scribe while in the presence of the attending physician.:       I,:   personally performed the services described in this documentation    as scribed in my presence.:

## 2025-06-25 ENCOUNTER — OFFICE VISIT (OUTPATIENT)
Dept: PHYSICAL THERAPY | Facility: CLINIC | Age: 70
End: 2025-06-25
Attending: STUDENT IN AN ORGANIZED HEALTH CARE EDUCATION/TRAINING PROGRAM
Payer: MEDICARE

## 2025-06-25 DIAGNOSIS — M25.512 ACUTE PAIN OF LEFT SHOULDER: Primary | ICD-10-CM

## 2025-06-25 PROCEDURE — 97110 THERAPEUTIC EXERCISES: CPT

## 2025-06-25 PROCEDURE — 97140 MANUAL THERAPY 1/> REGIONS: CPT

## 2025-06-25 NOTE — PROGRESS NOTES
Daily Note     Today's date: 2025  Patient name: Manju Holland  : 1955  MRN: [id number]  Referring provider: Rajendra Hemphill MD  Dx:   Encounter Diagnosis     ICD-10-CM    1. Acute pain of left shoulder  M25.512             Start Time: 1115  Stop Time: 1158  Total time in clinic (min): 43 minutes    Subjective: Pt reports improvement in symptoms since last visit. She has d/c'd the sling at home and notices less bruising.       Objective: See treatment diary below       Assessment: Tolerated treatment well w/o adverse effects. Progressed program based on protocol. PROM almost WNL. Added some light UE strengthening that was tolerated without discomfort. Patient would benefit from continued PT      Plan: Continue per plan of care.       Precautions: Humeral Fx Protocol    PHASE 1   Weeks 0-2 Post-injury  Maintain sling at all times with no active/passive motion of shoulder.   It is okay to remove the sling to perform hygiene and to move the elbow, wrist, and fingers as tolerated.   No weightbearing allowed through upper extremity.      Weeks 2-4   Sling immobilization at all times except therapy (home or clinic) and personal hygiene.   No active use of the involved arm   NO rotation of the involved arm (internal or external)  PAIN-FREE PROM forward elevation - max 90 degrees elevation      GOALS:   Protect fracture site from movement to optimize healing environment.  Decrease risk for stiffness associated with immobilization.  Promote distal circulation of hand and forearm.  Education patient about activity guidelines and rehab progression/expectations      EXERCISES   Active , wrist flexion/extension; forearm pronation/supination;   Elbow flexion/extension  Scapular retraction/protraction as tolerated    Small [place] pendulum clockwise and counterclockwise.  Passive forward elevation to 90 degree maximum      CRITERIA TO PROGRESS TO PHASE 2: Pain not increased with passive elevation to 90 degrees  AND Clearance based on radiographic evidence of lack of fracture fragment displacement at 2-3 week radiographic assessment         PHASE 2   Patient returns to the Shoulder Clinic at 3 weeks for radiographs     Sling immobilization at all times except therapy (home or clinic) and personal hygiene.   No active use of the involved arm   No internal rotation of the involved arm. Passive external rotation max: 40 degrees.  PAIN-FREE PROM forward elevation - max 140 degrees elevation      GOALS:   Protect fracture site with immobilization optimize healing environment.  Encourage motion in pain free range to decrease risk for stiffness associated with immobilization.  Promote distal circulation of hand and forearm.  Education patient about activity guidelines and rehab progression/expectations      EXERCISES   Passive forward elevation up to max 140 (supine well arm assisted; table top step back; table top supported using well arm to slide).  Passive external rotation with arm at neutral (alongside of body) up to max 40 (seated well arm assisted; supine cane assisted with arm supported into scapular plane)  May begin aquatics for Basic UE program with slow speed of motions; avoid hook and rotate exercise and cross body adduction (hug yourself).  Continue pendulum, elbow, wrist, hand and scapular retraction      CRITERIA TO PROGRESS TO PHASE 3: Pain-free passive forward elevation to 140; ER to 40 s Clearance by MD based on evidence of early callus at 6 week radiograph assessment         PHASE 3   Patient returns to the Shoulder Clinic at 6 weeks for radiographs (6-12 weeks)      GENERAL GUIDELINES AND PRECAUTIONS  Wean from sling gradually at home first, then in community.  Lifting restrictions: no more than 5 lbs  Avoid weight bearing on affected arm         GOALS  Emphasis on restoring passive range of motion.   Restore full passive motion of the glenohumeral joint first, then progress to active assisted, then active  motion through the full range.  Restore functional use of the arm for ADL’s below shoulder level (feeding, grooming…)   Protect healing fracture from stress overload      EXERCISES   PAIN-FREE Passive range of motion without range limits for elevation, ER(0); ER(90) and IR toward full motion in all planes.  Continue aquatic program in all planes and may gradually increase speed of motion.  Forward elevation progression: supine active assisted, active, to incline, to vertical supported, to vertical unsupported (after full passive range is established).  ER/IR AROM against gravity when full passive range is established.  Scapular protraction and retraction.  Active motion through short arc from balanced position and rhythmic stabilization in balanced position (90 deg elevation in supine).     CRITERIA FOR RETURN TO WORK/SPORT: Per MD clearance based on demands, status of fracture healing, status of motion and strength - determined on a case by case basis.      PHASE 4  Begin functional progression as needed specific to sport and work demands     POC expires Unit limit Auth Expiration date PT/OT/ST + Visit Limit?   7/24/25 BOMN NA BOMN                           Visit/Unit Tracking  AUTH Status:  Date 5/29 6/5 6/10 6/25           NA Used 1 2 3 4            Remaining                      Manuals 5/29 6/5 6/10 6/25         PROM   SG SG SG         Scapular mobs  SG           STM pec, lat  SG           Rhythmic stab  SG SG          Neuro Re-Ed             Scapular clocks HEP            Scap retraction HEP                                                                             Ther Ex             Pt edu SG            Wrist flex/ext HEP            Supination/pronation HEP Weighted 2x10           Elbow flexion/extension HEP 2#  2x15  3#  2x15         Supine serratus punch  3x15 2#  3x15          digiflex  Red  2x20           Wrist flex/ext  2#  2x15ea           Shrug  2#  2x15           Supine Cane ER   x20 2x15          Table slides   2x15          pulley's   x20 3'         Supine cane flexion    2x15         rows    Red  3x12         Banded ER    Red  2x12         Ther Activity                                       Gait Training                                       Modalities

## 2025-07-03 ENCOUNTER — OFFICE VISIT (OUTPATIENT)
Dept: PHYSICAL THERAPY | Facility: CLINIC | Age: 70
End: 2025-07-03
Attending: STUDENT IN AN ORGANIZED HEALTH CARE EDUCATION/TRAINING PROGRAM
Payer: MEDICARE

## 2025-07-03 DIAGNOSIS — M25.512 ACUTE PAIN OF LEFT SHOULDER: Primary | ICD-10-CM

## 2025-07-03 PROCEDURE — 97110 THERAPEUTIC EXERCISES: CPT

## 2025-07-03 PROCEDURE — 97140 MANUAL THERAPY 1/> REGIONS: CPT

## 2025-07-03 NOTE — PROGRESS NOTES
Daily Note     Today's date: 7/3/2025  Patient name: Manju Holland  : 1955  MRN: [id number]  Referring provider: Rajendra Hemphill MD  Dx:   Encounter Diagnosis     ICD-10-CM    1. Acute pain of left shoulder  M25.512               Start Time: 945  Stop Time: 1030  Total time in clinic (min): 45 minutes    Subjective: Pt notes no significant change since last visit. She has been completing her exercises 3x/day. She reports b/l shoulder soreness today.      Objective: See treatment diary below       Assessment: Tolerated treatment well w/o adverse effects. Progressed program based on protocol. Pt required cueing to engage scapular musculature and retract shoulder blades appropriately. Patient would benefit from continued PT      Plan: Continue per plan of care.       Precautions: Humeral Fx Protocol    PHASE 1   Weeks 0-2 Post-injury  Maintain sling at all times with no active/passive motion of shoulder.   It is okay to remove the sling to perform hygiene and to move the elbow, wrist, and fingers as tolerated.   No weightbearing allowed through upper extremity.      Weeks 2-4   Sling immobilization at all times except therapy (home or clinic) and personal hygiene.   No active use of the involved arm   NO rotation of the involved arm (internal or external)  PAIN-FREE PROM forward elevation - max 90 degrees elevation      GOALS:   Protect fracture site from movement to optimize healing environment.  Decrease risk for stiffness associated with immobilization.  Promote distal circulation of hand and forearm.  Education patient about activity guidelines and rehab progression/expectations      EXERCISES   Active , wrist flexion/extension; forearm pronation/supination;   Elbow flexion/extension  Scapular retraction/protraction as tolerated    Small [place] pendulum clockwise and counterclockwise.  Passive forward elevation to 90 degree maximum      CRITERIA TO PROGRESS TO PHASE 2: Pain not increased with  passive elevation to 90 degrees AND Clearance based on radiographic evidence of lack of fracture fragment displacement at 2-3 week radiographic assessment         PHASE 2   Patient returns to the Shoulder Clinic at 3 weeks for radiographs     Sling immobilization at all times except therapy (home or clinic) and personal hygiene.   No active use of the involved arm   No internal rotation of the involved arm. Passive external rotation max: 40 degrees.  PAIN-FREE PROM forward elevation - max 140 degrees elevation      GOALS:   Protect fracture site with immobilization optimize healing environment.  Encourage motion in pain free range to decrease risk for stiffness associated with immobilization.  Promote distal circulation of hand and forearm.  Education patient about activity guidelines and rehab progression/expectations      EXERCISES   Passive forward elevation up to max 140 (supine well arm assisted; table top step back; table top supported using well arm to slide).  Passive external rotation with arm at neutral (alongside of body) up to max 40 (seated well arm assisted; supine cane assisted with arm supported into scapular plane)  May begin aquatics for Basic UE program with slow speed of motions; avoid hook and rotate exercise and cross body adduction (hug yourself).  Continue pendulum, elbow, wrist, hand and scapular retraction      CRITERIA TO PROGRESS TO PHASE 3: Pain-free passive forward elevation to 140; ER to 40 s Clearance by MD based on evidence of early callus at 6 week radiograph assessment         PHASE 3   Patient returns to the Shoulder Clinic at 6 weeks for radiographs (6-12 weeks)      GENERAL GUIDELINES AND PRECAUTIONS  Wean from sling gradually at home first, then in community.  Lifting restrictions: no more than 5 lbs  Avoid weight bearing on affected arm         GOALS  Emphasis on restoring passive range of motion.   Restore full passive motion of the glenohumeral joint first, then progress to  active assisted, then active motion through the full range.  Restore functional use of the arm for ADL’s below shoulder level (feeding, grooming…)   Protect healing fracture from stress overload      EXERCISES   PAIN-FREE Passive range of motion without range limits for elevation, ER(0); ER(90) and IR toward full motion in all planes.  Continue aquatic program in all planes and may gradually increase speed of motion.  Forward elevation progression: supine active assisted, active, to incline, to vertical supported, to vertical unsupported (after full passive range is established).  ER/IR AROM against gravity when full passive range is established.  Scapular protraction and retraction.  Active motion through short arc from balanced position and rhythmic stabilization in balanced position (90 deg elevation in supine).     CRITERIA FOR RETURN TO WORK/SPORT: Per MD clearance based on demands, status of fracture healing, status of motion and strength - determined on a case by case basis.      PHASE 4  Begin functional progression as needed specific to sport and work demands     POC expires Unit limit Auth Expiration date PT/OT/ST + Visit Limit?   7/24/25 BOMN NA BOMN                           Visit/Unit Tracking  AUTH Status:  Date 5/29 6/5 6/10 6/25 7/2          NA Used 1 2 3 4 5           Remaining                      Manuals 5/29 6/5 6/10 6/25 7/2        PROM   SG SG SG SG        Scapular mobs  SG   SG        STM pec, lat  SG           Rhythmic stab  SG SG          Neuro Re-Ed             Scapular clocks HEP            Scap retraction HEP                                                                             Ther Ex             Pt edu SG            Wrist flex/ext HEP            Supination/pronation HEP Weighted 2x10           Elbow flexion/extension HEP 2#  2x15  3#  2x15         Supine serratus punch  3x15 2#  3x15          digiflex  Red  2x20           Wrist flex/ext  2#  2x15ea           Shrug  2#  2x15            Supine Cane ER   x20 2x15         Table slides   2x15          pulley's   x20 3' 3'        Supine cane flexion    2x15 x20        Active supine shoulder flexion     x20        rows    Red  3x12 Red  2x12        Banded ER    Red  2x12 Red  2x12        Banded IR     Red  2x12        Supine theraband pull apart     Red  2x12        SALPD     Red  2x12        Ther Activity                                       Gait Training                                       Modalities

## 2025-07-10 ENCOUNTER — OFFICE VISIT (OUTPATIENT)
Dept: PHYSICAL THERAPY | Facility: CLINIC | Age: 70
End: 2025-07-10
Attending: STUDENT IN AN ORGANIZED HEALTH CARE EDUCATION/TRAINING PROGRAM
Payer: MEDICARE

## 2025-07-10 DIAGNOSIS — M25.512 ACUTE PAIN OF LEFT SHOULDER: Primary | ICD-10-CM

## 2025-07-10 PROCEDURE — 97110 THERAPEUTIC EXERCISES: CPT

## 2025-07-10 PROCEDURE — 97140 MANUAL THERAPY 1/> REGIONS: CPT

## 2025-07-10 NOTE — PROGRESS NOTES
Daily Note     Today's date: 7/10/2025  Patient name: Manju Holland  : 1955  MRN: [id number]  Referring provider: Rajendra Hemphill MD  Dx:   Encounter Diagnosis     ICD-10-CM    1. Acute pain of left shoulder  M25.512               Start Time: 948  Stop Time: 1028  Total time in clinic (min): 40 minutes    Subjective: Pt notes less soreness since reducing her daily exercise frequency.      Objective: See treatment diary below       Assessment: Tolerated treatment well w/o adverse effects. Pt demonstrates nearly normal PROM today. Progressed program to supine and s/l strengthening exercises that were very challenging but were tolerated well. Patient demonstrated fatigue post treatment and would benefit from continued PT      Plan: Continue per plan of care.       Precautions: Humeral Fx Protocol    PHASE 1   Weeks 0-2 Post-injury  Maintain sling at all times with no active/passive motion of shoulder.   It is okay to remove the sling to perform hygiene and to move the elbow, wrist, and fingers as tolerated.   No weightbearing allowed through upper extremity.      Weeks 2-4   Sling immobilization at all times except therapy (home or clinic) and personal hygiene.   No active use of the involved arm   NO rotation of the involved arm (internal or external)  PAIN-FREE PROM forward elevation - max 90 degrees elevation      GOALS:   Protect fracture site from movement to optimize healing environment.  Decrease risk for stiffness associated with immobilization.  Promote distal circulation of hand and forearm.  Education patient about activity guidelines and rehab progression/expectations      EXERCISES   Active , wrist flexion/extension; forearm pronation/supination;   Elbow flexion/extension  Scapular retraction/protraction as tolerated    Small [place] pendulum clockwise and counterclockwise.  Passive forward elevation to 90 degree maximum      CRITERIA TO PROGRESS TO PHASE 2: Pain not increased with passive  elevation to 90 degrees AND Clearance based on radiographic evidence of lack of fracture fragment displacement at 2-3 week radiographic assessment         PHASE 2   Patient returns to the Shoulder Clinic at 3 weeks for radiographs     Sling immobilization at all times except therapy (home or clinic) and personal hygiene.   No active use of the involved arm   No internal rotation of the involved arm. Passive external rotation max: 40 degrees.  PAIN-FREE PROM forward elevation - max 140 degrees elevation      GOALS:   Protect fracture site with immobilization optimize healing environment.  Encourage motion in pain free range to decrease risk for stiffness associated with immobilization.  Promote distal circulation of hand and forearm.  Education patient about activity guidelines and rehab progression/expectations      EXERCISES   Passive forward elevation up to max 140 (supine well arm assisted; table top step back; table top supported using well arm to slide).  Passive external rotation with arm at neutral (alongside of body) up to max 40 (seated well arm assisted; supine cane assisted with arm supported into scapular plane)  May begin aquatics for Basic UE program with slow speed of motions; avoid hook and rotate exercise and cross body adduction (hug yourself).  Continue pendulum, elbow, wrist, hand and scapular retraction      CRITERIA TO PROGRESS TO PHASE 3: Pain-free passive forward elevation to 140; ER to 40 s Clearance by MD based on evidence of early callus at 6 week radiograph assessment         PHASE 3   Patient returns to the Shoulder Clinic at 6 weeks for radiographs (6-12 weeks)      GENERAL GUIDELINES AND PRECAUTIONS  Wean from sling gradually at home first, then in community.  Lifting restrictions: no more than 5 lbs  Avoid weight bearing on affected arm         GOALS  Emphasis on restoring passive range of motion.   Restore full passive motion of the glenohumeral joint first, then progress to active  assisted, then active motion through the full range.  Restore functional use of the arm for ADL’s below shoulder level (feeding, grooming…)   Protect healing fracture from stress overload      EXERCISES   PAIN-FREE Passive range of motion without range limits for elevation, ER(0); ER(90) and IR toward full motion in all planes.  Continue aquatic program in all planes and may gradually increase speed of motion.  Forward elevation progression: supine active assisted, active, to incline, to vertical supported, to vertical unsupported (after full passive range is established).  ER/IR AROM against gravity when full passive range is established.  Scapular protraction and retraction.  Active motion through short arc from balanced position and rhythmic stabilization in balanced position (90 deg elevation in supine).     CRITERIA FOR RETURN TO WORK/SPORT: Per MD clearance based on demands, status of fracture healing, status of motion and strength - determined on a case by case basis.      PHASE 4  Begin functional progression as needed specific to sport and work demands     POC expires Unit limit Auth Expiration date PT/OT/ST + Visit Limit?   7/24/25 BOMN NA BOMN                           Visit/Unit Tracking  AUTH Status:  Date 5/29 6/5 6/10 6/25 7/2 7/10         NA Used 1 2 3 4 5 6          Remaining                      Manuals 5/29 6/5 6/10 6/25 7/2 7/10       PROM   SG SG SG SG SG       Scapular mobs  SG   SG        STM pec, lat  SG           Rhythmic stab  SG SG          GHJ mobs      SG       Neuro Re-Ed             Scapular clocks HEP            Scap retraction HEP                                                                             Ther Ex             Pt edu SG            Wrist flex/ext HEP            Supination/pronation HEP Weighted 2x10           Elbow flexion/extension HEP 2#  2x15  3#  2x15         Supine serratus punch  3x15 2#  3x15          digiflex  Red  2x20           Wrist flex/ext  2#  2x15ea            Shrug  2#  2x15           Supine Cane ER   x20 2x15         Table slides   2x15          pulley's   x20 3' 3'        Supine cane flexion    2x15 x20        Active supine shoulder flexion     x20 Head elevated  2#  3x12       rows    Red  3x12 Red  2x12        Banded ER    Red  2x12 Red  2x12        Banded IR     Red  2x12        Supine theraband pull apart     Red  2x12        SALPD     Red  2x12        S/L abduction      2#  3x12       S/l horizontal abduction      1#  3x12       S/l ER      1#  3x12       shrugs      5#  2x20       Ther Activity                                       Gait Training                                       Modalities

## 2025-07-17 ENCOUNTER — APPOINTMENT (OUTPATIENT)
Dept: PHYSICAL THERAPY | Facility: CLINIC | Age: 70
End: 2025-07-17
Attending: STUDENT IN AN ORGANIZED HEALTH CARE EDUCATION/TRAINING PROGRAM
Payer: MEDICARE

## 2025-07-18 ENCOUNTER — TELEPHONE (OUTPATIENT)
Dept: NEPHROLOGY | Facility: CLINIC | Age: 70
End: 2025-07-18

## 2025-07-18 NOTE — TELEPHONE ENCOUNTER
I called and left a message on machine for patient to return our call about scheduling her 12 month nephrology follow up with Dr. DELROY Esteban from our recall list for on or after 3/6/2026.

## 2025-07-21 ENCOUNTER — TELEPHONE (OUTPATIENT)
Dept: NEPHROLOGY | Facility: CLINIC | Age: 70
End: 2025-07-21

## 2025-07-21 NOTE — TELEPHONE ENCOUNTER
I called and left a message on machine for patient to return our call about scheduling her 12 month nephrology follow up appointment with Dr. DELROY Esteban from our recall list for on or after 3/6/2026. At this time the patient has been removed from the recall list and a letter was mailed out to the patient just as a reminder to return our call so we can help her schedule her 12 month nephrology follow up appointment.

## 2025-07-24 ENCOUNTER — OFFICE VISIT (OUTPATIENT)
Dept: PHYSICAL THERAPY | Facility: CLINIC | Age: 70
End: 2025-07-24
Attending: STUDENT IN AN ORGANIZED HEALTH CARE EDUCATION/TRAINING PROGRAM
Payer: MEDICARE

## 2025-07-24 DIAGNOSIS — M25.512 ACUTE PAIN OF LEFT SHOULDER: Primary | ICD-10-CM

## 2025-07-24 PROCEDURE — 97110 THERAPEUTIC EXERCISES: CPT

## 2025-07-24 NOTE — PROGRESS NOTES
Daily Note     Today's date: 2025  Patient name: Manju Holland  : 1955  MRN: [id number]  Referring provider: Rajendra Hemphill MD  Dx:   Encounter Diagnosis     ICD-10-CM    1. Acute pain of left shoulder  M25.512                 Start Time: 948  Stop Time: 1030  Total time in clinic (min): 42 minutes    Subjective: Pt reports her shoulder is feeling much better. She is ready to be d/c'd at this time.      Objective: See treatment diary below       Assessment: Tolerated treatment well w/o adverse effects. Pt reported general fatigue following session. Educated pt on continuing HEP outside of the clinic and self-management strategies. Pt has met all of her goals for PT and is being discharged at this time.     Plan: D/c     Precautions: Humeral Fx Protocol    PHASE 1   Weeks 0-2 Post-injury  Maintain sling at all times with no active/passive motion of shoulder.   It is okay to remove the sling to perform hygiene and to move the elbow, wrist, and fingers as tolerated.   No weightbearing allowed through upper extremity.      Weeks 2-4   Sling immobilization at all times except therapy (home or clinic) and personal hygiene.   No active use of the involved arm   NO rotation of the involved arm (internal or external)  PAIN-FREE PROM forward elevation - max 90 degrees elevation      GOALS:   Protect fracture site from movement to optimize healing environment.  Decrease risk for stiffness associated with immobilization.  Promote distal circulation of hand and forearm.  Education patient about activity guidelines and rehab progression/expectations      EXERCISES   Active , wrist flexion/extension; forearm pronation/supination;   Elbow flexion/extension  Scapular retraction/protraction as tolerated    Small [place] IRA pendulum clockwise and counterclockwise.  Passive forward elevation to 90 degree maximum      CRITERIA TO PROGRESS TO PHASE 2: Pain not increased with passive elevation to 90 degrees AND Clearance  based on radiographic evidence of lack of fracture fragment displacement at 2-3 week radiographic assessment         PHASE 2   Patient returns to the Shoulder Clinic at 3 weeks for radiographs     Sling immobilization at all times except therapy (home or clinic) and personal hygiene.   No active use of the involved arm   No internal rotation of the involved arm. Passive external rotation max: 40 degrees.  PAIN-FREE PROM forward elevation - max 140 degrees elevation      GOALS:   Protect fracture site with immobilization optimize healing environment.  Encourage motion in pain free range to decrease risk for stiffness associated with immobilization.  Promote distal circulation of hand and forearm.  Education patient about activity guidelines and rehab progression/expectations      EXERCISES   Passive forward elevation up to max 140 (supine well arm assisted; table top step back; table top supported using well arm to slide).  Passive external rotation with arm at neutral (alongside of body) up to max 40 (seated well arm assisted; supine cane assisted with arm supported into scapular plane)  May begin aquatics for Basic UE program with slow speed of motions; avoid hook and rotate exercise and cross body adduction (hug yourself).  Continue pendulum, elbow, wrist, hand and scapular retraction      CRITERIA TO PROGRESS TO PHASE 3: Pain-free passive forward elevation to 140; ER to 40 s Clearance by MD based on evidence of early callus at 6 week radiograph assessment         PHASE 3   Patient returns to the Shoulder Clinic at 6 weeks for radiographs (6-12 weeks)      GENERAL GUIDELINES AND PRECAUTIONS  Wean from sling gradually at home first, then in community.  Lifting restrictions: no more than 5 lbs  Avoid weight bearing on affected arm         GOALS  Emphasis on restoring passive range of motion.   Restore full passive motion of the glenohumeral joint first, then progress to active assisted, then active motion through  the full range.  Restore functional use of the arm for ADL’s below shoulder level (feeding, grooming…)   Protect healing fracture from stress overload      EXERCISES   PAIN-FREE Passive range of motion without range limits for elevation, ER(0); ER(90) and IR toward full motion in all planes.  Continue aquatic program in all planes and may gradually increase speed of motion.  Forward elevation progression: supine active assisted, active, to incline, to vertical supported, to vertical unsupported (after full passive range is established).  ER/IR AROM against gravity when full passive range is established.  Scapular protraction and retraction.  Active motion through short arc from balanced position and rhythmic stabilization in balanced position (90 deg elevation in supine).     CRITERIA FOR RETURN TO WORK/SPORT: Per MD clearance based on demands, status of fracture healing, status of motion and strength - determined on a case by case basis.      PHASE 4  Begin functional progression as needed specific to sport and work demands     POC expires Unit limit Auth Expiration date PT/OT/ST + Visit Limit?   7/24/25 BOMN NA BOMN                           Visit/Unit Tracking  AUTH Status:  Date 5/29 6/5 6/10 6/25 7/2 7/10 7/24        NA Used 1 2 3 4 5 6 7         Remaining                      Manuals 5/29 6/5 6/10 6/25 7/2 7/10 7/24      PROM   SG SG SG SG SG       Scapular mobs  SG   SG        STM pec, lat  SG           Rhythmic stab  SG SG          GHJ mobs      SG       Neuro Re-Ed             Scapular clocks HEP            Scap retraction HEP                                                                             Ther Ex             Pt edu SG            UBE       3'/3'      Wrist flex/ext HEP            Supination/pronation HEP Weighted 2x10           Elbow flexion/extension HEP 2#  2x15  3#  2x15         Supine serratus punch  3x15 2#  3x15          digiflex  Red  2x20           Wrist flex/ext  2#  2x15ea            Shrug  2#  2x15           Supine Cane ER   x20 2x15         Table slides   2x15          pulley's   x20 3' 3'        Supine cane flexion    2x15 x20        Active supine shoulder flexion     x20 Head elevated  2#  3x12       rows    Red  3x12 Red  2x12        Banded ER    Red  2x12 Red  2x12        Banded IR     Red  2x12        Supine theraband pull apart     Red  2x12        SALPD     Red  2x12        S/L abduction      2#  3x12 2#  3x12      S/l horizontal abduction      1#  3x12 2#  3x12      S/l ER      1#  3x12 2#  3x12      shrugs      5#  2x20       Prone y       3x12 active      Prone V       2#  3x12      Shoulder flexion       2#  3x12      Ther Activity                                       Gait Training                                       Modalities

## 2025-07-29 ENCOUNTER — OFFICE VISIT (OUTPATIENT)
Dept: OBGYN CLINIC | Facility: CLINIC | Age: 70
End: 2025-07-29
Payer: MEDICARE

## 2025-07-29 ENCOUNTER — APPOINTMENT (OUTPATIENT)
Dept: RADIOLOGY | Facility: CLINIC | Age: 70
End: 2025-07-29
Attending: STUDENT IN AN ORGANIZED HEALTH CARE EDUCATION/TRAINING PROGRAM
Payer: MEDICARE

## 2025-07-29 VITALS — HEIGHT: 59 IN | WEIGHT: 90.4 LBS | RESPIRATION RATE: 18 BRPM | BODY MASS INDEX: 18.22 KG/M2

## 2025-07-29 DIAGNOSIS — M25.512 LEFT SHOULDER PAIN, UNSPECIFIED CHRONICITY: Primary | ICD-10-CM

## 2025-07-29 DIAGNOSIS — M25.512 LEFT SHOULDER PAIN, UNSPECIFIED CHRONICITY: ICD-10-CM

## 2025-07-29 PROCEDURE — 99213 OFFICE O/P EST LOW 20 MIN: CPT | Performed by: STUDENT IN AN ORGANIZED HEALTH CARE EDUCATION/TRAINING PROGRAM

## 2025-07-29 PROCEDURE — 73030 X-RAY EXAM OF SHOULDER: CPT

## 2025-07-29 RX ORDER — ESCITALOPRAM OXALATE 5 MG/1
TABLET ORAL
COMMUNITY
Start: 2025-07-28

## 2025-08-07 ENCOUNTER — TELEPHONE (OUTPATIENT)
Dept: NEPHROLOGY | Facility: CLINIC | Age: 70
End: 2025-08-07

## 2025-08-13 ENCOUNTER — APPOINTMENT (OUTPATIENT)
Dept: LAB | Facility: HOSPITAL | Age: 70
End: 2025-08-13
Payer: MEDICARE

## 2025-08-13 DIAGNOSIS — C25.9 PANCREATIC ADENOCARCINOMA (HCC): ICD-10-CM

## 2025-08-13 DIAGNOSIS — K90.3 PANCREATIC STEATORRHEA: ICD-10-CM

## 2025-08-13 LAB
ALBUMIN SERPL BCG-MCNC: 3.9 G/DL (ref 3.5–5)
ALP SERPL-CCNC: 62 U/L (ref 34–104)
ALT SERPL W P-5'-P-CCNC: 27 U/L (ref 7–52)
ANION GAP SERPL CALCULATED.3IONS-SCNC: 6 MMOL/L (ref 4–13)
AST SERPL W P-5'-P-CCNC: 22 U/L (ref 13–39)
BASOPHILS # BLD AUTO: 0.03 THOUSANDS/ÂΜL (ref 0–0.1)
BASOPHILS NFR BLD AUTO: 1 % (ref 0–1)
BILIRUB SERPL-MCNC: 0.39 MG/DL (ref 0.2–1)
BUN SERPL-MCNC: 17 MG/DL (ref 5–25)
CALCIUM SERPL-MCNC: 9.2 MG/DL (ref 8.4–10.2)
CHLORIDE SERPL-SCNC: 101 MMOL/L (ref 96–108)
CO2 SERPL-SCNC: 31 MMOL/L (ref 21–32)
CREAT SERPL-MCNC: 0.96 MG/DL (ref 0.6–1.3)
EOSINOPHIL # BLD AUTO: 0.12 THOUSAND/ÂΜL (ref 0–0.61)
EOSINOPHIL NFR BLD AUTO: 2 % (ref 0–6)
ERYTHROCYTE [DISTWIDTH] IN BLOOD BY AUTOMATED COUNT: 11.9 % (ref 11.6–15.1)
GFR SERPL CREATININE-BSD FRML MDRD: 59 ML/MIN/1.73SQ M
GLUCOSE SERPL-MCNC: 277 MG/DL (ref 65–140)
HCT VFR BLD AUTO: 45.7 % (ref 34.8–46.1)
HGB BLD-MCNC: 15 G/DL (ref 11.5–15.4)
IMM GRANULOCYTES # BLD AUTO: 0.01 THOUSAND/UL (ref 0–0.2)
IMM GRANULOCYTES NFR BLD AUTO: 0 % (ref 0–2)
LYMPHOCYTES # BLD AUTO: 1.43 THOUSANDS/ÂΜL (ref 0.6–4.47)
LYMPHOCYTES NFR BLD AUTO: 25 % (ref 14–44)
MCH RBC QN AUTO: 31.2 PG (ref 26.8–34.3)
MCHC RBC AUTO-ENTMCNC: 32.8 G/DL (ref 31.4–37.4)
MCV RBC AUTO: 95 FL (ref 82–98)
MONOCYTES # BLD AUTO: 0.51 THOUSAND/ÂΜL (ref 0.17–1.22)
MONOCYTES NFR BLD AUTO: 9 % (ref 4–12)
NEUTROPHILS # BLD AUTO: 3.54 THOUSANDS/ÂΜL (ref 1.85–7.62)
NEUTS SEG NFR BLD AUTO: 63 % (ref 43–75)
NRBC BLD AUTO-RTO: 0 /100 WBCS
PLATELET # BLD AUTO: 219 THOUSANDS/UL (ref 149–390)
PMV BLD AUTO: 10.9 FL (ref 8.9–12.7)
POTASSIUM SERPL-SCNC: 3.9 MMOL/L (ref 3.5–5.3)
PROT SERPL-MCNC: 6.8 G/DL (ref 6.4–8.4)
RBC # BLD AUTO: 4.81 MILLION/UL (ref 3.81–5.12)
SODIUM SERPL-SCNC: 138 MMOL/L (ref 135–147)
WBC # BLD AUTO: 5.64 THOUSAND/UL (ref 4.31–10.16)

## 2025-08-13 PROCEDURE — 86301 IMMUNOASSAY TUMOR CA 19-9: CPT

## 2025-08-13 PROCEDURE — 80053 COMPREHEN METABOLIC PANEL: CPT

## 2025-08-13 PROCEDURE — 85025 COMPLETE CBC W/AUTO DIFF WBC: CPT

## 2025-08-13 PROCEDURE — 36415 COLL VENOUS BLD VENIPUNCTURE: CPT

## 2025-08-15 LAB — CANCER AG19-9 SERPL-ACNC: <2 U/ML (ref 0–35)

## 2025-08-18 ENCOUNTER — OFFICE VISIT (OUTPATIENT)
Dept: HEMATOLOGY ONCOLOGY | Facility: CLINIC | Age: 70
End: 2025-08-18
Payer: MEDICARE

## 2025-08-18 VITALS
WEIGHT: 92 LBS | SYSTOLIC BLOOD PRESSURE: 122 MMHG | HEART RATE: 58 BPM | RESPIRATION RATE: 18 BRPM | OXYGEN SATURATION: 100 % | TEMPERATURE: 98.2 F | BODY MASS INDEX: 18.55 KG/M2 | DIASTOLIC BLOOD PRESSURE: 78 MMHG | HEIGHT: 59 IN

## 2025-08-18 DIAGNOSIS — C25.9 PANCREATIC ADENOCARCINOMA (HCC): Primary | ICD-10-CM

## 2025-08-18 DIAGNOSIS — K90.3 PANCREATIC STEATORRHEA: ICD-10-CM

## 2025-08-18 PROCEDURE — 99214 OFFICE O/P EST MOD 30 MIN: CPT | Performed by: PHYSICIAN ASSISTANT

## 2025-08-18 PROCEDURE — G2211 COMPLEX E/M VISIT ADD ON: HCPCS | Performed by: PHYSICIAN ASSISTANT

## 2025-08-19 ENCOUNTER — TELEPHONE (OUTPATIENT)
Dept: SURGICAL ONCOLOGY | Facility: CLINIC | Age: 70
End: 2025-08-19

## 2025-08-19 ENCOUNTER — TELEPHONE (OUTPATIENT)
Age: 70
End: 2025-08-19

## (undated) DEVICE — SUT SILK 3-0 SH 30 IN K832H

## (undated) DEVICE — 3000CC GUARDIAN II: Brand: GUARDIAN

## (undated) DEVICE — SUT VICRYL 3-0 SH 27 IN J416H

## (undated) DEVICE — GLOVE INDICATOR PI UNDERGLOVE SZ 7 BLUE

## (undated) DEVICE — ELECTRODE BLADE MOD E-Z CLEAN 2.5IN 6.4CM -0012M

## (undated) DEVICE — BULB SYRINGE,IRRIGATION WITH PROTECTIVE CAP: Brand: DOVER

## (undated) DEVICE — PROXIMATE LINEAR CUTTER RELOAD, BLUE, 75MM: Brand: PROXIMATE

## (undated) DEVICE — BAG DECANTER

## (undated) DEVICE — SUT VICRYL 2-0 D-SPECIAL 27 IN D8114

## (undated) DEVICE — HARMONIC 1100 SHEARS, 20CM SHAFT LENGTH: Brand: HARMONIC

## (undated) DEVICE — LIGACLIP MCA MULTIPLE CLIP APPLIERS, 20 SMALL CLIPS: Brand: LIGACLIP

## (undated) DEVICE — 3M™ IOBAN™ 2 ANTIMICROBIAL INCISE DRAPE 6650EZ: Brand: IOBAN™ 2

## (undated) DEVICE — POOLE SUCTION HANDLE: Brand: CARDINAL HEALTH

## (undated) DEVICE — PAD GROUNDING ADULT

## (undated) DEVICE — PLUMEPEN PRO 10FT

## (undated) DEVICE — SUT ETHILON 3-0 FSLX 30 IN 1673H

## (undated) DEVICE — FOGARTY SPRING CLIPS 6MM: Brand: FOGARTY SOFTJAW

## (undated) DEVICE — LIGACLIP MCA MULTIPLE CLIP APPLIERS, 20 MEDIUM CLIPS: Brand: LIGACLIP

## (undated) DEVICE — ENSEAL LAPAROSCOPIC TISSUE SEALER G2 CURVED JAW FOR USE WITH G2 GENERATOR 5MM DIAMETER 35CM SHAFT LENGTH: Brand: ENSEAL

## (undated) DEVICE — INTENDED FOR TISSUE SEPARATION, AND OTHER PROCEDURES THAT REQUIRE A SHARP SURGICAL BLADE TO PUNCTURE OR CUT.: Brand: BARD-PARKER SAFETY BLADES SIZE 10, STERILE

## (undated) DEVICE — SURGICEL 4 X 8

## (undated) DEVICE — GLOVE SRG BIOGEL ECLIPSE 7

## (undated) DEVICE — MEDI-VAC YANK SUCT HNDL W/TPRD BULBOUS TIP: Brand: CARDINAL HEALTH

## (undated) DEVICE — SUT PDS II 1 CTX 36 IN Z371T

## (undated) DEVICE — INTENDED FOR TISSUE SEPARATION, AND OTHER PROCEDURES THAT REQUIRE A SHARP SURGICAL BLADE TO PUNCTURE OR CUT.: Brand: BARD-PARKER SAFETY BLADES SIZE 15, STERILE

## (undated) DEVICE — VESSEL LOOPS X-RAY DETECTABLE: Brand: DEROYAL

## (undated) DEVICE — GAUZE SPONGES,16 PLY: Brand: CURITY

## (undated) DEVICE — BETHLEHEM MAJOR GENERAL PACK: Brand: CARDINAL HEALTH

## (undated) DEVICE — PROBE SINGLE ELECTRODE 15CM NANOKNIFE

## (undated) DEVICE — SUT SILK 3-0 SH CR/8 18 IN C013D

## (undated) DEVICE — STRL BETHLACCESS CATHETER PACK: Brand: CARDINAL HEALTH

## (undated) DEVICE — MAGNETIC INSTRUMENT PAD 16" X 20"; LARGE; DISPOSABLE: Brand: CARDINAL HEALTH

## (undated) DEVICE — SUT SILK 2-0 18 IN A185H

## (undated) DEVICE — 3M™ STERI-STRIP™ COMPOUND BENZOIN TINCTURE 40 BAGS/CARTON 4 CARTONS/CASE C1544: Brand: 3M™ STERI-STRIP™

## (undated) DEVICE — BLANKET HYPOTHERMIA ADULT GAYMAR

## (undated) DEVICE — SUT SILK 2-0 SH 30 IN K833H

## (undated) DEVICE — CHLORAPREP HI-LITE 26ML ORANGE

## (undated) DEVICE — SUT MONOCRYL 4-0 PS-2 27 IN Y426H

## (undated) DEVICE — SUT SILK 3-0 18 IN A184H

## (undated) DEVICE — 3M™ TEGADERM™ TRANSPARENT FILM DRESSING FRAME STYLE, 1626W, 4 IN X 4-3/4 IN (10 CM X 12 CM), 50/CT 4CT/CASE: Brand: 3M™ TEGADERM™

## (undated) DEVICE — SUT PROLENE 3-0 SH 36 IN 8522H

## (undated) DEVICE — JACKSON-PRATT 100CC BULB RESERVOIR: Brand: CARDINAL HEALTH

## (undated) DEVICE — SUT PROLENE 2-0 SH 36 IN 8523H

## (undated) DEVICE — SUT PDS II 4-0 RB-1 27 IN Z304H

## (undated) DEVICE — PROXIMATE SKIN STAPLERS (35 WIDE) CONTAINS 35 STAINLESS STEEL STAPLES (FIXED HEAD): Brand: PROXIMATE

## (undated) DEVICE — 3M™ STERI-STRIP™ REINFORCED ADHESIVE SKIN CLOSURES, R1547, 1/2 IN X 4 IN (12 MM X 100 MM), 6 STRIPS/ENVELOPE: Brand: 3M™ STERI-STRIP™

## (undated) DEVICE — 3M™ TEGADERM™ TRANSPARENT FILM DRESSING FRAME STYLE, 1628, 6 IN X 8 IN (15 CM X 20 CM), 10/CT 8CT/CASE: Brand: 3M™ TEGADERM™

## (undated) DEVICE — INTENDED FOR TISSUE SEPARATION, AND OTHER PROCEDURES THAT REQUIRE A SHARP SURGICAL BLADE TO PUNCTURE OR CUT.: Brand: BARD-PARKER SAFETY BLADES SIZE 11, STERILE

## (undated) DEVICE — PROXIMATE RELOADABLE LINEAR CUTTER WITH SAFETY LOCK-OUT, 75MM: Brand: PROXIMATE

## (undated) DEVICE — GAUZE SPONGES,USP TYPE VII GAUZE, 12 PLY: Brand: CURITY

## (undated) DEVICE — JP CHANNEL DRAIN 19FR, FULL FLUTES: Brand: JACKSON-PRATT

## (undated) DEVICE — SUT SILK 2-0 SH CR/8 18 IN C012D

## (undated) DEVICE — TUBING SUCTION 5MM X 12 FT

## (undated) DEVICE — TRAY FOLEY 16FR URIMETER SURESTEP

## (undated) DEVICE — SUT PROLENE 6-0 C-1/C-1 30 IN 8706H